# Patient Record
Sex: MALE | Race: WHITE | Employment: OTHER | URBAN - METROPOLITAN AREA
[De-identification: names, ages, dates, MRNs, and addresses within clinical notes are randomized per-mention and may not be internally consistent; named-entity substitution may affect disease eponyms.]

---

## 2017-03-07 PROBLEM — K92.2 UPPER GI BLEED: Status: ACTIVE | Noted: 2017-03-07

## 2018-06-25 PROBLEM — I21.4 NSTEMI (NON-ST ELEVATED MYOCARDIAL INFARCTION) (HCC): Status: ACTIVE | Noted: 2018-06-25

## 2018-07-23 ENCOUNTER — TELEPHONE (OUTPATIENT)
Dept: CARDIOLOGY CLINIC | Age: 73
End: 2018-07-23

## 2018-07-23 ENCOUNTER — OFFICE VISIT (OUTPATIENT)
Dept: CARDIOLOGY CLINIC | Age: 73
End: 2018-07-23

## 2018-07-23 VITALS
BODY MASS INDEX: 37.74 KG/M2 | SYSTOLIC BLOOD PRESSURE: 160 MMHG | HEART RATE: 68 BPM | WEIGHT: 249 LBS | DIASTOLIC BLOOD PRESSURE: 90 MMHG | HEIGHT: 68 IN

## 2018-07-23 DIAGNOSIS — I25.10 CORONARY ARTERY DISEASE INVOLVING NATIVE CORONARY ARTERY OF NATIVE HEART WITHOUT ANGINA PECTORIS: Primary | ICD-10-CM

## 2018-07-23 PROCEDURE — 99214 OFFICE O/P EST MOD 30 MIN: CPT | Performed by: INTERNAL MEDICINE

## 2018-07-23 NOTE — TELEPHONE ENCOUNTER
Jadyn Joya at Dr. Scott Kim requested records to be faxed. EKG, Last OV note, cath and echo sent to [phone].

## 2018-07-23 NOTE — PROGRESS NOTES
([facility] [address] [facility] mellitus ([facility] Gout; Hyperlipidemia; Hypertension; and Lumbago. and presents with     Plan:  1. CAD: Continue aspirin and plavix for atleast one yr, continue statins, calcium channel betablockers, case discussed with Dr. Shruthi Davis, patient is referred to [facility] for angioplasty as he was turned down by CT sx  2. DM: stable continue metformin  3. Paroxysmal afib: stable, continue eliquis and calcium channel blocker with BB  4. Dyslipidemia: continue statins, CHECK LIPIDS  5. HTN: stable, continue lopressor and cardizezm medicatons  6. Health maintenance: exerise and diet  All labs, medications and tests reviewed, continue all other medications of all above medical condition listed as is.

## 2018-09-25 ENCOUNTER — HOSPITAL ENCOUNTER (INPATIENT)
Age: 73
LOS: 3 days | Discharge: NURSING, REHAB OR LONG-TERM CARE | DRG: 379 | End: 2018-10-01
Attending: EMERGENCY MEDICINE | Admitting: INTERNAL MEDICINE
Payer: COMMERCIAL

## 2018-09-25 ENCOUNTER — APPOINTMENT (OUTPATIENT)
Dept: GENERAL RADIOLOGY | Age: 73
DRG: 379 | End: 2018-09-25
Payer: COMMERCIAL

## 2018-09-25 DIAGNOSIS — R60.0 BILATERAL LOWER EXTREMITY EDEMA: ICD-10-CM

## 2018-09-25 DIAGNOSIS — K62.5 RECTAL BLEEDING: Primary | ICD-10-CM

## 2018-09-25 LAB
ALBUMIN SERPL-MCNC: 3.6 GM/DL (ref 3.4–5)
ALP BLD-CCNC: 92 IU/L (ref 40–129)
ALT SERPL-CCNC: 7 U/L (ref 10–40)
ANION GAP SERPL CALCULATED.3IONS-SCNC: 13 MMOL/L (ref 4–16)
AST SERPL-CCNC: 10 IU/L (ref 15–37)
BASOPHILS ABSOLUTE: 0.1 K/CU MM
BASOPHILS RELATIVE PERCENT: 0.7 % (ref 0–1)
BILIRUB SERPL-MCNC: 0.4 MG/DL (ref 0–1)
BUN BLDV-MCNC: 13 MG/DL (ref 6–23)
CALCIUM SERPL-MCNC: 9.4 MG/DL (ref 8.3–10.6)
CHLORIDE BLD-SCNC: 101 MMOL/L (ref 99–110)
CO2: 25 MMOL/L (ref 21–32)
CREAT SERPL-MCNC: 0.6 MG/DL (ref 0.9–1.3)
DIFFERENTIAL TYPE: ABNORMAL
EOSINOPHILS ABSOLUTE: 0.2 K/CU MM
EOSINOPHILS RELATIVE PERCENT: 2.5 % (ref 0–3)
GFR AFRICAN AMERICAN: >60 ML/MIN/1.73M2
GFR NON-AFRICAN AMERICAN: >60 ML/MIN/1.73M2
GLUCOSE BLD-MCNC: 113 MG/DL (ref 70–99)
GLUCOSE BLD-MCNC: 126 MG/DL (ref 70–99)
HCT VFR BLD CALC: 30.6 % (ref 42–52)
HEMOGLOBIN: 9.2 GM/DL (ref 13.5–18)
IMMATURE NEUTROPHIL %: 0.5 % (ref 0–0.43)
INR BLD: 1.55 INDEX
LYMPHOCYTES ABSOLUTE: 1.3 K/CU MM
LYMPHOCYTES RELATIVE PERCENT: 16.3 % (ref 24–44)
MCH RBC QN AUTO: 25.8 PG (ref 27–31)
MCHC RBC AUTO-ENTMCNC: 30.1 % (ref 32–36)
MCV RBC AUTO: 86 FL (ref 78–100)
MONOCYTES ABSOLUTE: 0.7 K/CU MM
MONOCYTES RELATIVE PERCENT: 8.9 % (ref 0–4)
NUCLEATED RBC %: 0 %
PDW BLD-RTO: 16.8 % (ref 11.7–14.9)
PLATELET # BLD: 476 K/CU MM (ref 140–440)
PMV BLD AUTO: 8.5 FL (ref 7.5–11.1)
POTASSIUM SERPL-SCNC: 3.7 MMOL/L (ref 3.5–5.1)
PRO-BNP: 352 PG/ML
PROTHROMBIN TIME: 17.6 SECONDS (ref 9.12–12.5)
RBC # BLD: 3.56 M/CU MM (ref 4.6–6.2)
SEGMENTED NEUTROPHILS ABSOLUTE COUNT: 5.8 K/CU MM
SEGMENTED NEUTROPHILS RELATIVE PERCENT: 71.1 % (ref 36–66)
SODIUM BLD-SCNC: 139 MMOL/L (ref 135–145)
TOTAL IMMATURE NEUTOROPHIL: 0.04 K/CU MM
TOTAL NUCLEATED RBC: 0 K/CU MM
TOTAL PROTEIN: 7.3 GM/DL (ref 6.4–8.2)
TROPONIN T: <0.01 NG/ML
WBC # BLD: 8.1 K/CU MM (ref 4–10.5)

## 2018-09-25 PROCEDURE — 84484 ASSAY OF TROPONIN QUANT: CPT

## 2018-09-25 PROCEDURE — 99285 EMERGENCY DEPT VISIT HI MDM: CPT

## 2018-09-25 PROCEDURE — 93005 ELECTROCARDIOGRAM TRACING: CPT | Performed by: PHYSICIAN ASSISTANT

## 2018-09-25 PROCEDURE — 6370000000 HC RX 637 (ALT 250 FOR IP): Performed by: INTERNAL MEDICINE

## 2018-09-25 PROCEDURE — 80053 COMPREHEN METABOLIC PANEL: CPT

## 2018-09-25 PROCEDURE — 85025 COMPLETE CBC W/AUTO DIFF WBC: CPT

## 2018-09-25 PROCEDURE — 86901 BLOOD TYPING SEROLOGIC RH(D): CPT

## 2018-09-25 PROCEDURE — G0378 HOSPITAL OBSERVATION PER HR: HCPCS

## 2018-09-25 PROCEDURE — 86850 RBC ANTIBODY SCREEN: CPT

## 2018-09-25 PROCEDURE — 71045 X-RAY EXAM CHEST 1 VIEW: CPT

## 2018-09-25 PROCEDURE — 86900 BLOOD TYPING SEROLOGIC ABO: CPT

## 2018-09-25 PROCEDURE — 82962 GLUCOSE BLOOD TEST: CPT

## 2018-09-25 PROCEDURE — 83880 ASSAY OF NATRIURETIC PEPTIDE: CPT

## 2018-09-25 PROCEDURE — 85610 PROTHROMBIN TIME: CPT

## 2018-09-25 RX ORDER — FAMOTIDINE 40 MG/1
40 TABLET, FILM COATED ORAL NIGHTLY
Refills: 4 | Status: ON HOLD | COMMUNITY
Start: 2018-09-05 | End: 2020-01-22 | Stop reason: HOSPADM

## 2018-09-25 RX ORDER — POLYETHYLENE GLYCOL 3350 17 G/17G
17 POWDER, FOR SOLUTION ORAL DAILY PRN
Refills: 4 | COMMUNITY
Start: 2018-09-05 | End: 2020-01-17

## 2018-09-25 RX ORDER — NICOTINE POLACRILEX 4 MG
15 LOZENGE BUCCAL PRN
Status: DISCONTINUED | OUTPATIENT
Start: 2018-09-25 | End: 2018-10-01 | Stop reason: HOSPADM

## 2018-09-25 RX ORDER — PRAVASTATIN SODIUM 10 MG
20 TABLET ORAL NIGHTLY
Status: DISCONTINUED | OUTPATIENT
Start: 2018-09-25 | End: 2018-10-01 | Stop reason: HOSPADM

## 2018-09-25 RX ORDER — POLYETHYLENE GLYCOL 3350 17 G/17G
17 POWDER, FOR SOLUTION ORAL ONCE
Status: COMPLETED | OUTPATIENT
Start: 2018-09-25 | End: 2018-09-25

## 2018-09-25 RX ORDER — ACETAMINOPHEN 325 MG/1
650 TABLET ORAL EVERY 4 HOURS PRN
Status: DISCONTINUED | OUTPATIENT
Start: 2018-09-25 | End: 2018-10-01 | Stop reason: HOSPADM

## 2018-09-25 RX ORDER — SODIUM CHLORIDE 0.9 % (FLUSH) 0.9 %
10 SYRINGE (ML) INJECTION EVERY 12 HOURS SCHEDULED
Status: DISCONTINUED | OUTPATIENT
Start: 2018-09-25 | End: 2018-09-30

## 2018-09-25 RX ORDER — TRAMADOL HYDROCHLORIDE 50 MG/1
50 TABLET ORAL EVERY 6 HOURS PRN
Status: DISCONTINUED | OUTPATIENT
Start: 2018-09-25 | End: 2018-10-01 | Stop reason: HOSPADM

## 2018-09-25 RX ORDER — ALLOPURINOL 300 MG/1
300 TABLET ORAL DAILY
Status: DISCONTINUED | OUTPATIENT
Start: 2018-09-25 | End: 2018-10-01 | Stop reason: HOSPADM

## 2018-09-25 RX ORDER — SODIUM CHLORIDE 0.9 % (FLUSH) 0.9 %
10 SYRINGE (ML) INJECTION PRN
Status: DISCONTINUED | OUTPATIENT
Start: 2018-09-25 | End: 2018-10-01 | Stop reason: HOSPADM

## 2018-09-25 RX ORDER — ONDANSETRON 2 MG/ML
4 INJECTION INTRAMUSCULAR; INTRAVENOUS EVERY 6 HOURS PRN
Status: DISCONTINUED | OUTPATIENT
Start: 2018-09-25 | End: 2018-10-01 | Stop reason: HOSPADM

## 2018-09-25 RX ORDER — DILTIAZEM HYDROCHLORIDE 360 MG/1
360 CAPSULE, EXTENDED RELEASE ORAL DAILY
Status: DISCONTINUED | OUTPATIENT
Start: 2018-09-25 | End: 2018-09-25 | Stop reason: CLARIF

## 2018-09-25 RX ORDER — FAMOTIDINE 20 MG/1
40 TABLET, FILM COATED ORAL NIGHTLY
Status: DISCONTINUED | OUTPATIENT
Start: 2018-09-25 | End: 2018-10-01 | Stop reason: HOSPADM

## 2018-09-25 RX ORDER — DEXTROSE MONOHYDRATE 50 MG/ML
100 INJECTION, SOLUTION INTRAVENOUS PRN
Status: DISCONTINUED | OUTPATIENT
Start: 2018-09-25 | End: 2018-10-01 | Stop reason: HOSPADM

## 2018-09-25 RX ORDER — CLOPIDOGREL BISULFATE 75 MG/1
75 TABLET ORAL DAILY
Status: CANCELLED | OUTPATIENT
Start: 2018-09-25

## 2018-09-25 RX ORDER — DILTIAZEM HYDROCHLORIDE 180 MG/1
360 CAPSULE, COATED, EXTENDED RELEASE ORAL DAILY
Status: DISCONTINUED | OUTPATIENT
Start: 2018-09-25 | End: 2018-10-01 | Stop reason: HOSPADM

## 2018-09-25 RX ORDER — ASPIRIN 81 MG/1
81 TABLET, CHEWABLE ORAL DAILY
Status: DISCONTINUED | OUTPATIENT
Start: 2018-09-25 | End: 2018-10-01 | Stop reason: HOSPADM

## 2018-09-25 RX ORDER — NITROGLYCERIN 0.4 MG/1
0.4 TABLET SUBLINGUAL EVERY 5 MIN PRN
Status: DISCONTINUED | OUTPATIENT
Start: 2018-09-25 | End: 2018-10-01 | Stop reason: HOSPADM

## 2018-09-25 RX ORDER — DEXTROSE MONOHYDRATE 25 G/50ML
12.5 INJECTION, SOLUTION INTRAVENOUS PRN
Status: DISCONTINUED | OUTPATIENT
Start: 2018-09-25 | End: 2018-10-01 | Stop reason: HOSPADM

## 2018-09-25 RX ADMIN — ALLOPURINOL 300 MG: 300 TABLET ORAL at 18:11

## 2018-09-25 RX ADMIN — METOPROLOL TARTRATE 25 MG: 25 TABLET ORAL at 20:18

## 2018-09-25 RX ADMIN — DILTIAZEM HYDROCHLORIDE 360 MG: 180 CAPSULE, COATED, EXTENDED RELEASE ORAL at 18:11

## 2018-09-25 RX ADMIN — PRAVASTATIN SODIUM 20 MG: 10 TABLET ORAL at 20:18

## 2018-09-25 RX ADMIN — POLYETHYLENE GLYCOL 3350 17 G: 17 POWDER, FOR SOLUTION ORAL at 18:11

## 2018-09-25 RX ADMIN — TRAMADOL HYDROCHLORIDE 50 MG: 50 TABLET, FILM COATED ORAL at 20:18

## 2018-09-25 RX ADMIN — FAMOTIDINE 40 MG: 20 TABLET ORAL at 20:18

## 2018-09-25 ASSESSMENT — PAIN DESCRIPTION - LOCATION
LOCATION: HIP
LOCATION: GENERALIZED

## 2018-09-25 ASSESSMENT — PAIN DESCRIPTION - PAIN TYPE: TYPE: CHRONIC PAIN

## 2018-09-25 ASSESSMENT — PAIN SCALES - GENERAL
PAINLEVEL_OUTOF10: 7

## 2018-09-25 NOTE — ED PROVIDER NOTES
eMERGENCY dEPARTMENT eNCOUnter      PCP: Malathi Gutierrez MD    CHIEF COMPLAINT    Chief Complaint   Patient presents with    Rectal Bleeding    Leg Swelling       HPI    Mohinder Davies is a 68 y.o. male who presents with Bilateral lower extremity swelling. Onset greater than one year. Context is patient notes persistent swelling and redness to bilateral lower extremities greater than one year. He states he comes in today because he is \"tired of it\". No new nature or severity recently. No recent trauma. No chest pain or shortness of breath. Patient also complains of bright red blood per rectum. Onset 2-3 weeks. Context is patient states he had a cardiac catheterization done 4 weeks ago and was discharged home at that time with blood thinner. He states he notes bright red blood with stool movements and in his adult diaper since this time. Otherwise no spontaneous bruises, bleeding from any other orifice. No lightheadedness, dizziness. No headache or vision changes. REVIEW OF SYSTEMS    Constitutional:  Denies fever, chills, weight loss or weakness   HENT:  Denies sore throat or ear pain   Cardiovascular:  Denies chest pain, palpitations   Respiratory:  Denies cough or shortness of breath    GI:  See HPI. Denies abdominal pain, nausea, vomiting, or diarrhea  :  Denies any urinary symptoms   Musculoskeletal:  PI.  Otherwise no rash. Denies back pain  Skin:  See HPI. Otherwise no rash. Neurologic:  Denies headache, focal weakness or sensory changes   Endocrine:  Denies polyuria or polydypsia   Lymphatic:  Denies swollen glands     All other review of systems are negative  See HPI and nursing notes for additional information     PAST MEDICAL AND SURGICAL HISTORY    Past Medical History:   Diagnosis Date    Arthropathy     Atrial fibrillation ([address]     Atrial flutter ([address]     Diabetes mellitus ([facility] [facility]     Gout     Hyperlipidemia     Hypertension     Lumbago      History reviewed. Inferior leads               EKG Interpretation  Please see ED physician's note for EKG interpretation      RADIOLOGY    Xr Chest Portable    Result Date: 9/25/2018  EXAMINATION: SINGLE XRAY VIEW OF THE CHEST 9/25/2018 1:58 pm COMPARISON: 06/25/2018 HISTORY: ORDERING SYSTEM PROVIDED HISTORY: leg swelling TECHNOLOGIST PROVIDED HISTORY: Reason for exam:->leg swelling Ordering Physician Provided Reason for Exam: bloody stool Acuity: Acute Type of Exam: Initial FINDINGS: Patchy atelectasis or consolidation at the right lung base. No pneumothorax. No pleural effusion. No other lung consolidation. Heart size is stable. Pulmonary vascular redistribution. Patchy atelectasis or consolidation at the right lung base. Pulmonary vascular redistribution. ______________________________________________________________________    Procedures:  Rectal Exam   Female supervising physician present. Rectal exam was performed by me:  - External inspection reveals full non-thrombosed external hemorrhoids. No masses, signs of abscess, fissures. - rectal exam is negative for masses or tenderness. Sphincter tone intact. No gross blood. Slightly enlarged prostate. No prostate nodules   Bedside Guiac testing done and results were positive. .   ( was positive ).    ______________________________________________________________________        ED COURSE & MEDICAL DECISION MAKING       History exam is consistent with rectal bleeding. Patient Hemoccult positive today. Patient otherwise hemodynamically stable. Patient does have chronic bilateral lower extremity edema. He lives alone at home and is wheelchair-bound. I feel he warrants admission to hospital for further workup of rectal bleeding, GI consult, especially in the setting that is currently taking Elavil at this. 1435 - I discussed patient case with hospitalist, Dr. Tai Stovall. He agrees to admit patient.   He recommends that I notify GI for

## 2018-09-25 NOTE — ED TRIAGE NOTES
Patient presented to the ED with complaints of rectal bleeding. Patient states his bleeding has been present for the past year but has increased tremendously over the past 2 days. Patient states he also has increase bilateral leg swelling, redness, and weeping.

## 2018-09-26 LAB
ANION GAP SERPL CALCULATED.3IONS-SCNC: 12 MMOL/L (ref 4–16)
BASOPHILS ABSOLUTE: 0.1 K/CU MM
BASOPHILS RELATIVE PERCENT: 0.5 % (ref 0–1)
BUN BLDV-MCNC: 9 MG/DL (ref 6–23)
CALCIUM SERPL-MCNC: 9.2 MG/DL (ref 8.3–10.6)
CHLORIDE BLD-SCNC: 101 MMOL/L (ref 99–110)
CO2: 27 MMOL/L (ref 21–32)
CREAT SERPL-MCNC: 0.6 MG/DL (ref 0.9–1.3)
DIFFERENTIAL TYPE: ABNORMAL
EOSINOPHILS ABSOLUTE: 0.2 K/CU MM
EOSINOPHILS RELATIVE PERCENT: 2 % (ref 0–3)
ESTIMATED AVERAGE GLUCOSE: 128 MG/DL
GFR AFRICAN AMERICAN: >60 ML/MIN/1.73M2
GFR NON-AFRICAN AMERICAN: >60 ML/MIN/1.73M2
GLUCOSE BLD-MCNC: 108 MG/DL (ref 70–99)
GLUCOSE BLD-MCNC: 119 MG/DL (ref 70–99)
GLUCOSE BLD-MCNC: 131 MG/DL (ref 70–99)
HBA1C MFR BLD: 6.1 % (ref 4.2–6.3)
HCT VFR BLD CALC: 28.7 % (ref 42–52)
HEMOGLOBIN: 8.6 GM/DL (ref 13.5–18)
IMMATURE NEUTROPHIL %: 0.5 % (ref 0–0.43)
LYMPHOCYTES ABSOLUTE: 1.3 K/CU MM
LYMPHOCYTES RELATIVE PERCENT: 14.2 % (ref 24–44)
MAGNESIUM: 1.6 MG/DL (ref 1.8–2.4)
MCH RBC QN AUTO: 25.4 PG (ref 27–31)
MCHC RBC AUTO-ENTMCNC: 30 % (ref 32–36)
MCV RBC AUTO: 84.9 FL (ref 78–100)
MONOCYTES ABSOLUTE: 0.9 K/CU MM
MONOCYTES RELATIVE PERCENT: 9.4 % (ref 0–4)
NUCLEATED RBC %: 0 %
PDW BLD-RTO: 16.5 % (ref 11.7–14.9)
PLATELET # BLD: 454 K/CU MM (ref 140–440)
PMV BLD AUTO: 8.7 FL (ref 7.5–11.1)
POTASSIUM SERPL-SCNC: 3.5 MMOL/L (ref 3.5–5.1)
RBC # BLD: 3.38 M/CU MM (ref 4.6–6.2)
SEGMENTED NEUTROPHILS ABSOLUTE COUNT: 6.9 K/CU MM
SEGMENTED NEUTROPHILS RELATIVE PERCENT: 73.4 % (ref 36–66)
SODIUM BLD-SCNC: 140 MMOL/L (ref 135–145)
TOTAL IMMATURE NEUTOROPHIL: 0.05 K/CU MM
TOTAL NUCLEATED RBC: 0 K/CU MM
WBC # BLD: 9.4 K/CU MM (ref 4–10.5)

## 2018-09-26 PROCEDURE — 2580000003 HC RX 258: Performed by: INTERNAL MEDICINE

## 2018-09-26 PROCEDURE — 97116 GAIT TRAINING THERAPY: CPT

## 2018-09-26 PROCEDURE — 97167 OT EVAL HIGH COMPLEX 60 MIN: CPT

## 2018-09-26 PROCEDURE — 97535 SELF CARE MNGMENT TRAINING: CPT

## 2018-09-26 PROCEDURE — 6370000000 HC RX 637 (ALT 250 FOR IP): Performed by: INTERNAL MEDICINE

## 2018-09-26 PROCEDURE — 93010 ELECTROCARDIOGRAM REPORT: CPT | Performed by: INTERNAL MEDICINE

## 2018-09-26 PROCEDURE — 97530 THERAPEUTIC ACTIVITIES: CPT

## 2018-09-26 PROCEDURE — 85025 COMPLETE CBC W/AUTO DIFF WBC: CPT

## 2018-09-26 PROCEDURE — G8987 SELF CARE CURRENT STATUS: HCPCS

## 2018-09-26 PROCEDURE — 97163 PT EVAL HIGH COMPLEX 45 MIN: CPT

## 2018-09-26 PROCEDURE — G8978 MOBILITY CURRENT STATUS: HCPCS

## 2018-09-26 PROCEDURE — G0378 HOSPITAL OBSERVATION PER HR: HCPCS

## 2018-09-26 PROCEDURE — 83036 HEMOGLOBIN GLYCOSYLATED A1C: CPT

## 2018-09-26 PROCEDURE — G8979 MOBILITY GOAL STATUS: HCPCS

## 2018-09-26 PROCEDURE — 94761 N-INVAS EAR/PLS OXIMETRY MLT: CPT

## 2018-09-26 PROCEDURE — 83735 ASSAY OF MAGNESIUM: CPT

## 2018-09-26 PROCEDURE — 82962 GLUCOSE BLOOD TEST: CPT

## 2018-09-26 PROCEDURE — 36415 COLL VENOUS BLD VENIPUNCTURE: CPT

## 2018-09-26 PROCEDURE — G8988 SELF CARE GOAL STATUS: HCPCS

## 2018-09-26 PROCEDURE — 80048 BASIC METABOLIC PNL TOTAL CA: CPT

## 2018-09-26 RX ORDER — TAMSULOSIN HYDROCHLORIDE 0.4 MG/1
0.4 CAPSULE ORAL DAILY
Status: DISCONTINUED | OUTPATIENT
Start: 2018-09-26 | End: 2018-10-01 | Stop reason: HOSPADM

## 2018-09-26 RX ADMIN — BISACODYL 10 MG: 5 TABLET, COATED ORAL at 20:27

## 2018-09-26 RX ADMIN — SODIUM CHLORIDE, PRESERVATIVE FREE 10 ML: 5 INJECTION INTRAVENOUS at 08:41

## 2018-09-26 RX ADMIN — ASPIRIN 81 MG CHEWABLE TABLET 81 MG: 81 TABLET CHEWABLE at 08:40

## 2018-09-26 RX ADMIN — METOPROLOL TARTRATE 25 MG: 25 TABLET ORAL at 08:40

## 2018-09-26 RX ADMIN — POLYETHYLENE GLYCOL-3350 AND ELECTROLYTES 4000 ML: 236; 6.74; 5.86; 2.97; 22.74 POWDER, FOR SOLUTION ORAL at 19:44

## 2018-09-26 RX ADMIN — DILTIAZEM HYDROCHLORIDE 360 MG: 180 CAPSULE, COATED, EXTENDED RELEASE ORAL at 08:41

## 2018-09-26 RX ADMIN — SODIUM CHLORIDE, PRESERVATIVE FREE 10 ML: 5 INJECTION INTRAVENOUS at 20:27

## 2018-09-26 RX ADMIN — PRAVASTATIN SODIUM 20 MG: 10 TABLET ORAL at 20:27

## 2018-09-26 RX ADMIN — FAMOTIDINE 40 MG: 20 TABLET ORAL at 20:26

## 2018-09-26 RX ADMIN — METOPROLOL TARTRATE 25 MG: 25 TABLET ORAL at 20:26

## 2018-09-26 RX ADMIN — TAMSULOSIN HYDROCHLORIDE 0.4 MG: 0.4 CAPSULE ORAL at 16:45

## 2018-09-26 RX ADMIN — ALLOPURINOL 300 MG: 300 TABLET ORAL at 08:40

## 2018-09-26 ASSESSMENT — PAIN SCALES - GENERAL
PAINLEVEL_OUTOF10: 0

## 2018-09-26 ASSESSMENT — PAIN DESCRIPTION - PAIN TYPE: TYPE: CHRONIC PAIN

## 2018-09-26 ASSESSMENT — PAIN SCALES - WONG BAKER: WONGBAKER_NUMERICALRESPONSE: 2

## 2018-09-26 ASSESSMENT — PAIN DESCRIPTION - ORIENTATION: ORIENTATION: RIGHT

## 2018-09-26 ASSESSMENT — PAIN DESCRIPTION - LOCATION: LOCATION: HIP

## 2018-09-26 NOTE — PROGRESS NOTES
Within Normal Limits  Observation/Palpation  Posture: Fair (pt forward flexion in stand to hold onto guard rails of BSC to assist in balance for toileting urinating into hand urinal)  Observation: BLE edema, redness and open sores   Balance  Sitting Balance: Supervision  Standing Balance: Contact guard assistance  Standing Balance  Time: ~2 minutes  Activity: in stand to urinate into hand urinal holding onto BSC  Sit to stand: Contact guard assistance  Stand to sit: Contact guard assistance  ADL  Feeding: Independent  Grooming: Stand by assistance  UE Bathing: Stand by assistance  LE Bathing:  Moderate assistance  UE Dressing: Setup  LE Dressing: Maximum assistance  Toileting: Maximum assistance (pt in stand to urinate forward flexion holding onto [facility] rails in front of reclining chair w/ maxA for placement of hand urinal )  Additional Comments: Some ADL determined per observation of actual ADL performance, functional mobility, balance, activity tolerance and cognition  Tone RUE  RUE Tone: Normotonic  Tone LUE  LUE Tone: Normotonic  Coordination  Movements Are Fluid And Coordinated: Yes     Bed mobility  Comment: pt sitting upright at beginning and end of session  Transfers  Sit to stand: Contact guard assistance  Stand to sit: Contact guard assistance     Cognition  Overall Cognitive Status: WFL  Perception  Overall Perceptual Status: WFL     Sensation  Overall Sensation Status: Impaired  Light Touch: Partial deficits in the RUE;Partial deficits in the LUE (numbness at times in fingers comes and goes)        LUE AROM (degrees)  LUE AROM : WFL  LUE General AROM: pt reports past 15 degrees of shoulder flexion increased pain however able to lift arms WFL, distal WFL  Left Hand AROM (degrees)  Left Hand AROM: WFL  RUE AROM (degrees)  RUE AROM : WFL  RUE General AROM: pt reports past 15 degrees of shoulder flexion increased pain however able to lift arms WFL, distal WFL  Right Hand AROM (degrees)  Right Hand AROM: WFL  LUE Strength  Gross LUE Strength: WFL  L Hand Grasp: 5/5  RUE Strength  Gross RUE Strength: WFL  R Hand Grasp: 5/5           Self Care Training:   Cues were given for safety, sequence, UE/LE placement, visual cues, and balance. Activities performed today included toileting using hand urinal    Therapeutic Activity Training:   Therapeutic activity training was instructed today. Cues were given for safety, sequence, UE/LE placement, awareness, and balance. Activities performed today included sit to stand, VCs to increase standing posture, stand to sit             Assessment   Performance deficits / Impairments: Decreased functional mobility ; Decreased safe awareness;Decreased balance;Decreased ADL status; Decreased endurance;Decreased high-level IADLs;Decreased fine motor control  Treatment Diagnosis: Rectal Bleeding  Prognosis: Fair  Decision Making: High Complexity  Assistance / Modification: Pt is a 67 yo male admitted from home for rectal bleeding. Pt at baseline is I in functional transfers and mobility (2/3 steps) w/ AD. Pt requires assistance for LE ADLs bathing/dressing and does not perform high level IADLs. ADL/high level IADL assistance provided by home health care 4x a week for 4 hours a day. Pt currently presents w/ above deficits and requires increased assistance for all ADLs, functional mobility and functional transfers. Pt would benefit from continued acute care OT services w/ discharge to SNF. Patient Education: ot role, discharge rec  Barriers to Learning: none  REQUIRES OT FOLLOW UP: Yes  Activity Tolerance  Activity Tolerance: Treatment limited secondary to medical complications (free text)  Safety Devices  Safety Devices in place: Yes  Type of devices: All fall risk precautions in place;Nurse notified;Gait belt;Patient at risk for falls; Left in chair;Call light within reach; Chair alarm in place  Restraints  Initially in place: No         Plan   Plan  Times per week: 2x+  Times per day:

## 2018-09-26 NOTE — PROGRESS NOTES
Two gi doctors listed on care team.  Sue serve to Itze Chetan Colon [address] first to see if he is the consulting  Due to his name being listed last.

## 2018-09-27 ENCOUNTER — ANESTHESIA EVENT (OUTPATIENT)
Dept: ENDOSCOPY | Age: 73
DRG: 379 | End: 2018-09-27
Payer: COMMERCIAL

## 2018-09-27 ENCOUNTER — ANESTHESIA (OUTPATIENT)
Dept: ENDOSCOPY | Age: 73
DRG: 379 | End: 2018-09-27
Payer: COMMERCIAL

## 2018-09-27 VITALS — SYSTOLIC BLOOD PRESSURE: 136 MMHG | OXYGEN SATURATION: 94 % | DIASTOLIC BLOOD PRESSURE: 67 MMHG

## 2018-09-27 LAB
GLUCOSE BLD-MCNC: 119 MG/DL (ref 70–99)
GLUCOSE BLD-MCNC: 122 MG/DL (ref 70–99)

## 2018-09-27 PROCEDURE — 6360000002 HC RX W HCPCS: Performed by: NURSE ANESTHETIST, CERTIFIED REGISTERED

## 2018-09-27 PROCEDURE — 3700000000 HC ANESTHESIA ATTENDED CARE: Performed by: INTERNAL MEDICINE

## 2018-09-27 PROCEDURE — 88305 TISSUE EXAM BY PATHOLOGIST: CPT

## 2018-09-27 PROCEDURE — 3700000001 HC ADD 15 MINUTES (ANESTHESIA): Performed by: INTERNAL MEDICINE

## 2018-09-27 PROCEDURE — 6370000000 HC RX 637 (ALT 250 FOR IP): Performed by: INTERNAL MEDICINE

## 2018-09-27 PROCEDURE — G0378 HOSPITAL OBSERVATION PER HR: HCPCS

## 2018-09-27 PROCEDURE — 2709999900 HC NON-CHARGEABLE SUPPLY: Performed by: INTERNAL MEDICINE

## 2018-09-27 PROCEDURE — 2580000003 HC RX 258: Performed by: INTERNAL MEDICINE

## 2018-09-27 PROCEDURE — 3609010600 HC COLONOSCOPY POLYPECTOMY SNARE/COLD BIOPSY: Performed by: INTERNAL MEDICINE

## 2018-09-27 PROCEDURE — 82962 GLUCOSE BLOOD TEST: CPT

## 2018-09-27 PROCEDURE — 0DBP8ZX EXCISION OF RECTUM, VIA NATURAL OR ARTIFICIAL OPENING ENDOSCOPIC, DIAGNOSTIC: ICD-10-PCS | Performed by: INTERNAL MEDICINE

## 2018-09-27 RX ORDER — PROPOFOL 10 MG/ML
INJECTION, EMULSION INTRAVENOUS CONTINUOUS PRN
Status: DISCONTINUED | OUTPATIENT
Start: 2018-09-27 | End: 2018-09-27

## 2018-09-27 RX ORDER — PROPOFOL 10 MG/ML
INJECTION, EMULSION INTRAVENOUS PRN
Status: DISCONTINUED | OUTPATIENT
Start: 2018-09-27 | End: 2018-09-27 | Stop reason: SDUPTHER

## 2018-09-27 RX ADMIN — ASPIRIN 81 MG CHEWABLE TABLET 81 MG: 81 TABLET CHEWABLE at 13:29

## 2018-09-27 RX ADMIN — PROPOFOL 30 MG: 10 INJECTION, EMULSION INTRAVENOUS at 12:22

## 2018-09-27 RX ADMIN — DILTIAZEM HYDROCHLORIDE 360 MG: 180 CAPSULE, COATED, EXTENDED RELEASE ORAL at 09:22

## 2018-09-27 RX ADMIN — PROPOFOL 30 MG: 10 INJECTION, EMULSION INTRAVENOUS at 12:29

## 2018-09-27 RX ADMIN — METOPROLOL TARTRATE 25 MG: 25 TABLET ORAL at 09:22

## 2018-09-27 RX ADMIN — PROPOFOL 50 MG: 10 INJECTION, EMULSION INTRAVENOUS at 12:08

## 2018-09-27 RX ADMIN — TAMSULOSIN HYDROCHLORIDE 0.4 MG: 0.4 CAPSULE ORAL at 13:29

## 2018-09-27 RX ADMIN — PROPOFOL 50 MG: 10 INJECTION, EMULSION INTRAVENOUS at 12:13

## 2018-09-27 RX ADMIN — PROPOFOL 50 MG: 10 INJECTION, EMULSION INTRAVENOUS at 12:10

## 2018-09-27 RX ADMIN — MAGESIUM CITRATE 296 ML: 1.75 LIQUID ORAL at 05:55

## 2018-09-27 RX ADMIN — METOPROLOL TARTRATE 25 MG: 25 TABLET ORAL at 21:33

## 2018-09-27 RX ADMIN — ALLOPURINOL 300 MG: 300 TABLET ORAL at 13:30

## 2018-09-27 RX ADMIN — FAMOTIDINE 40 MG: 20 TABLET ORAL at 21:33

## 2018-09-27 RX ADMIN — PROPOFOL 50 MG: 10 INJECTION, EMULSION INTRAVENOUS at 12:09

## 2018-09-27 RX ADMIN — PROPOFOL 30 MG: 10 INJECTION, EMULSION INTRAVENOUS at 12:16

## 2018-09-27 RX ADMIN — PROPOFOL 30 MG: 10 INJECTION, EMULSION INTRAVENOUS at 12:20

## 2018-09-27 RX ADMIN — PROPOFOL 50 MG: 10 INJECTION, EMULSION INTRAVENOUS at 12:27

## 2018-09-27 RX ADMIN — PROPOFOL 50 MG: 10 INJECTION, EMULSION INTRAVENOUS at 12:25

## 2018-09-27 RX ADMIN — SODIUM CHLORIDE, PRESERVATIVE FREE 10 ML: 5 INJECTION INTRAVENOUS at 21:35

## 2018-09-27 RX ADMIN — PRAVASTATIN SODIUM 20 MG: 10 TABLET ORAL at 21:33

## 2018-09-27 ASSESSMENT — PAIN SCALES - GENERAL: PAINLEVEL_OUTOF10: 0

## 2018-09-27 NOTE — CONSULTS
[address], [address] [facility]                                   CONSULTATION    PATIENT NAME: Thomas Domínguez                    :        1945  MED REC NO:   [id number]                          ROOM:       4102  ACCOUNT NO:   [de-identified]                           ADMIT DATE: 2018  PROVIDER:     Kareem Vides MD    CONSULT DATE:  2018    IDENTIFICATION:  The patient is a 67-year-old male. REFERRED BY:  Dr. Judith Whitman. REASON FOR CONSULTATION:  History of rectal bleeding. HISTORY OF PRESENT ILLNESS:  This is a 67-year-old male known to me came in  with a history of swelling of both feet. He has had chronic edema for a  year. The patient also has bad hips, has been unable to walk for the last  one year. The patient has had some bright red rectal bleeding off and on  for the last 3 or 4 weeks. No history of diarrhea or constipation. The  patient denies any history of weight loss or fever. PAST MEDICAL HISTORY:  The patient has a history of arthritis of the hip,  hypertension, hyperlipidemia, and atrial fibrillation. PAST SURGICAL HISTORY:  Unremarkable. The patient did not have an EGD  about two years ago which showed some gastritis. PHYSICAL EXAMINATION:  GENERAL:  Reveals he is moderately built and nourished, somewhat obese. HEENT:  Normal.  ABDOMEN:  Soft. There was no mass or tenderness. EXTREMITIES:  There is swelling of both the legs with possible some  cellulitis. LABORATORY DATA:  Electrolytes were normal.  BUN was 9, blood sugar was  108, hemoglobin was 8.6, and MCH 25.4. The patient had a CT of the chest  in  which was unremarkable except atherosclerotic changes in the aorta. IMPRESSION:  History of rectal bleeding, history of chronic edema of the  legs with possible cellulitis or venous stasis in the legs.     RECOMMENDATIONS:  We will proceed with a colonoscopy

## 2018-09-27 NOTE — ANESTHESIA PRE PROCEDURE
Department of Anesthesiology  Preprocedure Note       Name:  Farooq Banks   Age:  68 y.o.  :  1945                                          MRN:  [id number]         Date:  2018      Surgeon: Marisela Latif):  Garret John MD    Procedure: Procedure(s):  COLONOSCOPY    Medications prior to admission:   Prior to Admission medications    Medication Sig Start Date End Date Taking? Authorizing Provider   famotidine (PEPCID) 40 MG tablet Take 40 mg by mouth nightly  18  Yes Historical Provider, MD   polyethylene glycol (GLYCOLAX) powder Take 17 g by mouth daily as needed 18  Yes Historical Provider, MD   apixaban (ELIQUIS) 5 MG TABS tablet Take 1 tablet by mouth 2 times daily 18  Yes Khris Steve DO   metoprolol tartrate (LOPRESSOR) 25 MG tablet Take 1 tablet by mouth 2 times daily 18  Yes Khris Steve DO   clopidogrel (PLAVIX) 75 MG tablet Take 1 tablet by mouth daily 7/3/18  Yes Khris Steve DO   allopurinol (ZYLOPRIM) 300 MG tablet Take 300 mg by mouth daily   Yes Historical Provider, MD   diltiazem (TIAZAC) 360 MG extended release capsule Take 360 mg by mouth daily   Yes Historical Provider, MD   triamterene-hydrochlorothiazide (MAXZIDE-25) 37.5-25 MG per tablet Take 1 tablet by mouth daily   Yes Historical Provider, MD   metFORMIN (GLUCOPHAGE) 500 MG tablet Take 500 mg by mouth 2 times daily (with meals)   Yes Historical Provider, MD   pravastatin (PRAVACHOL) 20 MG tablet Take 20 mg by mouth nightly   Yes Historical Provider, MD   traMADol (ULTRAM) 50 MG tablet Take 50 mg by mouth every 6 hours as needed for Pain   Yes Historical Provider, MD   aspirin 81 MG chewable tablet Take 81 mg by mouth daily   Yes Historical Provider, MD   nitroGLYCERIN (NITROSTAT) 0.4 MG SL tablet up to max of 3 total doses.  If no relief after 1 dose, call 911. 18   Khris Steve DO       Current medications:    Current Facility-Administered Allergies: Allergies   Allergen Reactions    Demerol Hcl [Meperidine]     Gabapentin     Simvastatin        Problem List:    Patient Active Problem List   Diagnosis Code    Diabetes mellitus ([facility] E11.9    Atrial flutter (HCC) I48.92    Atrial fibrillation (HCC) I48.91    Gout M10.9    Hyperlipidemia E78.5    Hypertension I10    Arthropathy M12.9    Upper GI bleed K92.2    NSTEMI (non-ST elevated myocardial infarction) ([facility] I21.4    Rectal bleeding K62.5       Past Medical History:        Diagnosis Date    Arthropathy     Atrial fibrillation (HCC)     Atrial flutter (HCC)     Diabetes mellitus ([facility]     Gout     Hyperlipidemia     Hypertension     Lumbago        Past Surgical History:  History reviewed. No pertinent surgical history. Social History:    Social History   Substance Use Topics    Smoking status: Former Smoker     Packs/day: 0.50     Types: Cigarettes    Smokeless tobacco: Never Used    Alcohol use No                                Counseling given: Not Answered      Vital Signs (Current):   Vitals:    09/26/18 1057 09/26/18 1630 09/26/18 2015 09/27/18 0515   BP: (!) 156/80 (!) 179/81 (!) 158/72 (!) 146/65   Pulse: 66 67 67 67   Resp: 18 17 18 18   Temp: 97.8 °F (36.6 °C) 97.6 °F (36.4 °C) 98.1 °F (36.7 °C) 97.9 °F (36.6 °C)   TempSrc: Oral Oral Oral Oral   SpO2: 95% 96% 98% 93%   Weight:       Height:                                                  BP Readings from Last 3 Encounters:   09/27/18 (!) 146/65   07/23/18 (!) 160/90   07/02/18 127/73       NPO Status:                                                                                 BMI:   Wt Readings from Last 3 Encounters:   09/25/18 249 lb (112.9 kg)   07/23/18 249 lb (112.9 kg)   07/02/18 238 lb 8 oz (108.2 kg)     Body mass index is 37.86 kg/m².     CBC:   Lab Results   Component Value Date    WBC 9.4 09/26/2018    RBC 3.38 09/26/2018    HGB 8.6 09/26/2018    HCT 28.7 09/26/2018    MCV 84.9 09/26/2018 patient    Echo 6/2018:  Summary   Left ventricular systolic function is normal.   Ejection fraction is visually estimated at 50-55%.   Moderate concentric left ventricular hypertrophy.  N[place] dilated left atrium.   No evidence of any pericardial effusion.        Neuro/Psych:   Negative Neuro/Psych ROS              GI/Hepatic/Renal:   (+) bowel prep,          ROS comment: GI bleeding. Endo/Other:    (+) DiabetesType II DM, , blood dyscrasia: anticoagulation therapy:., .                  ROS comment: Gout Abdominal:   (+) obese,         Vascular: negative vascular ROS. Anesthesia Plan      general and TIVA     ASA 4       Induction: intravenous. Anesthetic plan and risks discussed with patient. Reg Taveras MD   9/27/2018    Pre Anesthesia Evaluation complete. Anesthesia plan, risks, benefits, alternatives, and personnel discussed with patient and/or legal guardian. Patient and/or legal guardian verbalized an understanding and agreed to proceed. Anesthesia plan discussed with care team members and agreed upon.   KARELY Mitchell - CRNA  9/27/2018

## 2018-09-27 NOTE — ANESTHESIA POSTPROCEDURE EVALUATION
Department of Anesthesiology  Postprocedure Note    Patient: Cory Carrington  MRN: [id number]  YOB: 1945  Date of evaluation: 9/27/2018  Time:  12:47 PM     Procedure Summary     Date:  09/27/18 Room / Location:  [address] [place] [address] 01 / [facility] ENDOSCOPY    Anesthesia Start:  7822 Anesthesia Stop:  5255    Procedure:  COLONOSCOPY POLYPECTOMY SNARE/COLD BIOPSY (N/A ) Diagnosis:  (--)    Surgeon:  Mabel Juarez MD Responsible Provider:  Mata Hernandez MD    Anesthesia Type:  general, TIVA ASA Status:  4          Anesthesia Type: general, TIVA    Francisca Phase I:  10    Francisca Phase II:  10    Last vitals: Reviewed and per EMR flowsheets.        Anesthesia Post Evaluation    Patient location during evaluation: bedside  Patient participation: complete - patient participated  Level of consciousness: awake and alert  Pain score: 0  Airway patency: patent  Nausea & Vomiting: no nausea and no vomiting  Complications: no  Cardiovascular status: blood pressure returned to baseline  Respiratory status: acceptable, nonlabored ventilation, spontaneous ventilation and room air  Hydration status: euvolemic

## 2018-09-27 NOTE — CARE COORDINATION
Met c pt to continue discharge planning. Pt is agreeable to , spoke with his sister and they chose [facility]. Permission given to share PHI, faxed info and notified of referral.  Pt will require precert/ therapy evals in chart.

## 2018-09-28 LAB — GLUCOSE BLD-MCNC: 176 MG/DL (ref 70–99)

## 2018-09-28 PROCEDURE — 1200000000 HC SEMI PRIVATE

## 2018-09-28 PROCEDURE — 6370000000 HC RX 637 (ALT 250 FOR IP): Performed by: HOSPITALIST

## 2018-09-28 PROCEDURE — 2580000003 HC RX 258: Performed by: INTERNAL MEDICINE

## 2018-09-28 PROCEDURE — 6370000000 HC RX 637 (ALT 250 FOR IP): Performed by: INTERNAL MEDICINE

## 2018-09-28 PROCEDURE — G0378 HOSPITAL OBSERVATION PER HR: HCPCS

## 2018-09-28 PROCEDURE — 82962 GLUCOSE BLOOD TEST: CPT

## 2018-09-28 RX ORDER — ZOLPIDEM TARTRATE 5 MG/1
5 TABLET ORAL ONCE
Status: COMPLETED | OUTPATIENT
Start: 2018-09-28 | End: 2018-09-28

## 2018-09-28 RX ADMIN — METOPROLOL TARTRATE 25 MG: 25 TABLET ORAL at 22:08

## 2018-09-28 RX ADMIN — TRAMADOL HYDROCHLORIDE 50 MG: 50 TABLET, FILM COATED ORAL at 18:56

## 2018-09-28 RX ADMIN — PRAVASTATIN SODIUM 20 MG: 10 TABLET ORAL at 22:08

## 2018-09-28 RX ADMIN — ZOLPIDEM TARTRATE 5 MG: 5 TABLET ORAL at 23:40

## 2018-09-28 RX ADMIN — TRAMADOL HYDROCHLORIDE 50 MG: 50 TABLET, FILM COATED ORAL at 01:40

## 2018-09-28 RX ADMIN — ALLOPURINOL 300 MG: 300 TABLET ORAL at 09:06

## 2018-09-28 RX ADMIN — INSULIN LISPRO 1 UNITS: 100 INJECTION, SOLUTION INTRAVENOUS; SUBCUTANEOUS at 22:16

## 2018-09-28 RX ADMIN — DILTIAZEM HYDROCHLORIDE 360 MG: 180 CAPSULE, COATED, EXTENDED RELEASE ORAL at 09:05

## 2018-09-28 RX ADMIN — SODIUM CHLORIDE, PRESERVATIVE FREE 10 ML: 5 INJECTION INTRAVENOUS at 09:07

## 2018-09-28 RX ADMIN — TAMSULOSIN HYDROCHLORIDE 0.4 MG: 0.4 CAPSULE ORAL at 09:06

## 2018-09-28 RX ADMIN — ASPIRIN 81 MG CHEWABLE TABLET 81 MG: 81 TABLET CHEWABLE at 09:05

## 2018-09-28 RX ADMIN — METOPROLOL TARTRATE 25 MG: 25 TABLET ORAL at 09:06

## 2018-09-28 RX ADMIN — FAMOTIDINE 40 MG: 20 TABLET ORAL at 22:08

## 2018-09-28 ASSESSMENT — PAIN SCALES - GENERAL
PAINLEVEL_OUTOF10: 4
PAINLEVEL_OUTOF10: 6
PAINLEVEL_OUTOF10: 0
PAINLEVEL_OUTOF10: 2

## 2018-09-28 ASSESSMENT — PAIN SCALES - WONG BAKER: WONGBAKER_NUMERICALRESPONSE: 2

## 2018-09-29 LAB
GLUCOSE BLD-MCNC: 112 MG/DL (ref 70–99)
GLUCOSE BLD-MCNC: 128 MG/DL (ref 70–99)
GLUCOSE BLD-MCNC: 130 MG/DL (ref 70–99)
GLUCOSE BLD-MCNC: 133 MG/DL (ref 70–99)
GLUCOSE BLD-MCNC: 134 MG/DL (ref 70–99)
HCT VFR BLD CALC: 29.2 % (ref 42–52)
HEMOGLOBIN: 8.7 GM/DL (ref 13.5–18)
MCH RBC QN AUTO: 25.5 PG (ref 27–31)
MCHC RBC AUTO-ENTMCNC: 29.8 % (ref 32–36)
MCV RBC AUTO: 85.6 FL (ref 78–100)
PDW BLD-RTO: 16.9 % (ref 11.7–14.9)
PLATELET # BLD: 464 K/CU MM (ref 140–440)
PMV BLD AUTO: 8.4 FL (ref 7.5–11.1)
RBC # BLD: 3.41 M/CU MM (ref 4.6–6.2)
WBC # BLD: 9 K/CU MM (ref 4–10.5)

## 2018-09-29 PROCEDURE — 82962 GLUCOSE BLOOD TEST: CPT

## 2018-09-29 PROCEDURE — 85027 COMPLETE CBC AUTOMATED: CPT

## 2018-09-29 PROCEDURE — 97530 THERAPEUTIC ACTIVITIES: CPT

## 2018-09-29 PROCEDURE — 36415 COLL VENOUS BLD VENIPUNCTURE: CPT

## 2018-09-29 PROCEDURE — 6370000000 HC RX 637 (ALT 250 FOR IP): Performed by: INTERNAL MEDICINE

## 2018-09-29 PROCEDURE — 1200000000 HC SEMI PRIVATE

## 2018-09-29 PROCEDURE — 2580000003 HC RX 258: Performed by: INTERNAL MEDICINE

## 2018-09-29 RX ORDER — POLYETHYLENE GLYCOL 3350 17 G/17G
17 POWDER, FOR SOLUTION ORAL DAILY
Status: DISCONTINUED | OUTPATIENT
Start: 2018-09-29 | End: 2018-10-01 | Stop reason: HOSPADM

## 2018-09-29 RX ORDER — DOCUSATE SODIUM 100 MG/1
100 CAPSULE, LIQUID FILLED ORAL DAILY
Status: DISCONTINUED | OUTPATIENT
Start: 2018-09-29 | End: 2018-10-01 | Stop reason: HOSPADM

## 2018-09-29 RX ADMIN — PRAVASTATIN SODIUM 20 MG: 10 TABLET ORAL at 20:33

## 2018-09-29 RX ADMIN — ASPIRIN 81 MG CHEWABLE TABLET 81 MG: 81 TABLET CHEWABLE at 10:15

## 2018-09-29 RX ADMIN — ALLOPURINOL 300 MG: 300 TABLET ORAL at 10:15

## 2018-09-29 RX ADMIN — FAMOTIDINE 40 MG: 20 TABLET ORAL at 20:33

## 2018-09-29 RX ADMIN — METOPROLOL TARTRATE 25 MG: 25 TABLET ORAL at 20:33

## 2018-09-29 RX ADMIN — TRAMADOL HYDROCHLORIDE 50 MG: 50 TABLET, FILM COATED ORAL at 10:12

## 2018-09-29 RX ADMIN — DILTIAZEM HYDROCHLORIDE 360 MG: 180 CAPSULE, COATED, EXTENDED RELEASE ORAL at 10:15

## 2018-09-29 RX ADMIN — TAMSULOSIN HYDROCHLORIDE 0.4 MG: 0.4 CAPSULE ORAL at 10:15

## 2018-09-29 RX ADMIN — DOCUSATE SODIUM 100 MG: 100 CAPSULE, LIQUID FILLED ORAL at 14:39

## 2018-09-29 RX ADMIN — POLYETHYLENE GLYCOL 3350 17 G: 17 POWDER, FOR SOLUTION ORAL at 14:39

## 2018-09-29 RX ADMIN — METOPROLOL TARTRATE 25 MG: 25 TABLET ORAL at 10:15

## 2018-09-29 ASSESSMENT — PAIN SCALES - GENERAL
PAINLEVEL_OUTOF10: 0
PAINLEVEL_OUTOF10: 0
PAINLEVEL_OUTOF10: 6

## 2018-09-29 ASSESSMENT — PAIN DESCRIPTION - ORIENTATION: ORIENTATION: RIGHT

## 2018-09-29 ASSESSMENT — PAIN DESCRIPTION - PAIN TYPE: TYPE: CHRONIC PAIN

## 2018-09-29 ASSESSMENT — PAIN DESCRIPTION - LOCATION: LOCATION: HIP

## 2018-09-30 LAB
GLUCOSE BLD-MCNC: 112 MG/DL (ref 70–99)
GLUCOSE BLD-MCNC: 134 MG/DL (ref 70–99)
GLUCOSE BLD-MCNC: 139 MG/DL (ref 70–99)
GLUCOSE BLD-MCNC: 159 MG/DL (ref 70–99)
GLUCOSE BLD-MCNC: 182 MG/DL (ref 70–99)

## 2018-09-30 PROCEDURE — 6370000000 HC RX 637 (ALT 250 FOR IP): Performed by: INTERNAL MEDICINE

## 2018-09-30 PROCEDURE — 82962 GLUCOSE BLOOD TEST: CPT

## 2018-09-30 PROCEDURE — 2580000003 HC RX 258: Performed by: INTERNAL MEDICINE

## 2018-09-30 PROCEDURE — 1200000000 HC SEMI PRIVATE

## 2018-09-30 RX ADMIN — DOCUSATE SODIUM 100 MG: 100 CAPSULE, LIQUID FILLED ORAL at 09:56

## 2018-09-30 RX ADMIN — INSULIN LISPRO 2 UNITS: 100 INJECTION, SOLUTION INTRAVENOUS; SUBCUTANEOUS at 12:32

## 2018-09-30 RX ADMIN — METOPROLOL TARTRATE 25 MG: 25 TABLET ORAL at 09:57

## 2018-09-30 RX ADMIN — PRAVASTATIN SODIUM 20 MG: 10 TABLET ORAL at 20:17

## 2018-09-30 RX ADMIN — DILTIAZEM HYDROCHLORIDE 360 MG: 180 CAPSULE, COATED, EXTENDED RELEASE ORAL at 09:57

## 2018-09-30 RX ADMIN — FAMOTIDINE 40 MG: 20 TABLET ORAL at 20:17

## 2018-09-30 RX ADMIN — ALLOPURINOL 300 MG: 300 TABLET ORAL at 09:56

## 2018-09-30 RX ADMIN — ASPIRIN 81 MG CHEWABLE TABLET 81 MG: 81 TABLET CHEWABLE at 09:57

## 2018-09-30 RX ADMIN — METOPROLOL TARTRATE 25 MG: 25 TABLET ORAL at 20:17

## 2018-09-30 RX ADMIN — POLYETHYLENE GLYCOL 3350 17 G: 17 POWDER, FOR SOLUTION ORAL at 09:57

## 2018-09-30 RX ADMIN — TRAMADOL HYDROCHLORIDE 50 MG: 50 TABLET, FILM COATED ORAL at 18:19

## 2018-09-30 RX ADMIN — TAMSULOSIN HYDROCHLORIDE 0.4 MG: 0.4 CAPSULE ORAL at 09:57

## 2018-09-30 ASSESSMENT — PAIN DESCRIPTION - PAIN TYPE
TYPE: CHRONIC PAIN
TYPE: CHRONIC PAIN

## 2018-09-30 ASSESSMENT — PAIN SCALES - GENERAL
PAINLEVEL_OUTOF10: 6
PAINLEVEL_OUTOF10: 0
PAINLEVEL_OUTOF10: 6

## 2018-09-30 ASSESSMENT — PAIN DESCRIPTION - FREQUENCY
FREQUENCY: CONTINUOUS
FREQUENCY: CONTINUOUS

## 2018-09-30 ASSESSMENT — PAIN DESCRIPTION - ORIENTATION
ORIENTATION: RIGHT
ORIENTATION: RIGHT

## 2018-09-30 ASSESSMENT — PAIN DESCRIPTION - LOCATION
LOCATION: HIP
LOCATION: HIP

## 2018-09-30 ASSESSMENT — PAIN DESCRIPTION - DESCRIPTORS: DESCRIPTORS: ACHING

## 2018-10-01 VITALS
HEIGHT: 68 IN | BODY MASS INDEX: 39.4 KG/M2 | OXYGEN SATURATION: 90 % | RESPIRATION RATE: 17 BRPM | WEIGHT: 260 LBS | DIASTOLIC BLOOD PRESSURE: 83 MMHG | HEART RATE: 85 BPM | TEMPERATURE: 100 F | SYSTOLIC BLOOD PRESSURE: 159 MMHG

## 2018-10-01 LAB
GLUCOSE BLD-MCNC: 132 MG/DL (ref 70–99)
GLUCOSE BLD-MCNC: 139 MG/DL (ref 70–99)

## 2018-10-01 PROCEDURE — 82962 GLUCOSE BLOOD TEST: CPT

## 2018-10-01 PROCEDURE — 6370000000 HC RX 637 (ALT 250 FOR IP): Performed by: INTERNAL MEDICINE

## 2018-10-01 PROCEDURE — 94761 N-INVAS EAR/PLS OXIMETRY MLT: CPT

## 2018-10-01 RX ORDER — TAMSULOSIN HYDROCHLORIDE 0.4 MG/1
0.4 CAPSULE ORAL DAILY
Qty: 30 CAPSULE | Refills: 3 | Status: SHIPPED | OUTPATIENT
Start: 2018-10-02

## 2018-10-01 RX ORDER — PSEUDOEPHEDRINE HCL 30 MG
100 TABLET ORAL 2 TIMES DAILY
Qty: 60 CAPSULE | Refills: 0 | Status: SHIPPED | OUTPATIENT
Start: 2018-10-01 | End: 2020-01-17

## 2018-10-01 RX ORDER — LANOLIN ALCOHOL/MO/W.PET/CERES
325 CREAM (GRAM) TOPICAL
Qty: 90 TABLET | Refills: 3 | Status: SHIPPED | OUTPATIENT
Start: 2018-10-01 | End: 2020-01-17 | Stop reason: DRUGHIGH

## 2018-10-01 RX ADMIN — TAMSULOSIN HYDROCHLORIDE 0.4 MG: 0.4 CAPSULE ORAL at 10:18

## 2018-10-01 RX ADMIN — TRAMADOL HYDROCHLORIDE 50 MG: 50 TABLET, FILM COATED ORAL at 00:22

## 2018-10-01 RX ADMIN — DOCUSATE SODIUM 100 MG: 100 CAPSULE, LIQUID FILLED ORAL at 10:17

## 2018-10-01 RX ADMIN — METOPROLOL TARTRATE 25 MG: 25 TABLET ORAL at 10:18

## 2018-10-01 RX ADMIN — POLYETHYLENE GLYCOL 3350 17 G: 17 POWDER, FOR SOLUTION ORAL at 10:18

## 2018-10-01 RX ADMIN — ASPIRIN 81 MG CHEWABLE TABLET 81 MG: 81 TABLET CHEWABLE at 10:18

## 2018-10-01 RX ADMIN — ALLOPURINOL 300 MG: 300 TABLET ORAL at 10:18

## 2018-10-01 RX ADMIN — DILTIAZEM HYDROCHLORIDE 360 MG: 180 CAPSULE, COATED, EXTENDED RELEASE ORAL at 10:18

## 2018-10-01 ASSESSMENT — PAIN SCALES - GENERAL
PAINLEVEL_OUTOF10: 0
PAINLEVEL_OUTOF10: 6

## 2018-10-01 NOTE — DISCHARGE SUMMARY
[address] Hospitalist     Discharge Summary    Name:  Leola De La Rosa /Age/Sex: 1945  (68 y.o. male)   MRN & CSN:  [id number] & [id number] Admission Date/Time: 2018 11:51 AM   Attending:  Purnima Bourne MD Discharging Physician: Purnima Bourne MD     HPI:     Please, see admission HPI in [address] and patient's hospital course below    Hospital Course:   Leola De La Rosa is a 68 y.o.  male  with past medical history of CAD, AFIB, DM type II who presents with rectal bleeding  and the following assessments reflect patient's hospital course      # Acute on chronic rectal bleeding -S/p Colonoscopy - diverticulosis -removal of polyps - on Eliquis, ASA and Plavix - recent Stent (18) - will hold Eliquis but continue with Plavix, ASA - GI following, had Scope -  - repeat CBC Hb 8.7 -stable -WEEKLY cbc ON dc     # CAD S/p STENTS (RCA, LAD) on 18 (at [facility] with ASA, Plavix, BB and Statins       # Paroxysmal AFIB - now rate controled - continue with Diltiazem, Lopressor  - holding Eliquis      # Hypertension - controled with Losartan, Lopressor and Diltiazem      # Anemia of chronic GI bleed - Hb 9.2 > 8.6, monitor CBC, transfuse for Hb <8 due to CAD     # BPH - with dribbling and frequency and nocturia - will start on Flomax, outpatient referral to Urology      Other comorbid conditions addressed include:  GERD  DM type II - Insulin sliding scale -hold metformin   Gout   Bilateral lymphedema      Awaiting percert for SNF    Patient is hemodynamically stable for DC to SNF    The patient/family expressed appropriate understanding of and agreement with the discharge recommendations, medications, and plan.      Consults this admission:  IP CONSULT TO HOSPITALIST  IP CONSULT TO GI  IP CONSULT TO GI    Discharge Instruction:   Follow up appointments: GI, cardiology   Primary care physician:  within 1 to 2 weeks    Diet:  diabetic diet   Activity: activity as tolerated  Disposition: Discharged to:   []Home, []C, [x]SNF, []Acute Rehab, []Hospice   Condition on discharge: Stable    Discharge Medications:      Toni Childs   Home Medication Instructions OGO:[id number]    Printed on:10/01/18 1102   Medication Information                      allopurinol (ZYLOPRIM) 300 MG tablet  Take 300 mg by mouth daily             apixaban (ELIQUIS) 5 MG TABS tablet  Take 1 tablet by mouth 2 times daily             aspirin 81 MG chewable tablet  Take 81 mg by mouth daily             clopidogrel (PLAVIX) 75 MG tablet  Take 1 tablet by mouth daily             diltiazem (TIAZAC) 360 MG extended release capsule  Take 360 mg by mouth daily             docusate (COLACE, DULCOLAX) 100 MG CAPS  Take 100 mg by mouth 2 times daily             famotidine (PEPCID) 40 MG tablet  Take 40 mg by mouth nightly              ferrous sulfate 325 (65 Fe) MG EC tablet  Take 1 tablet by mouth 3 times daily (with meals)             metFORMIN (GLUCOPHAGE) 500 MG tablet  Take 500 mg by mouth 2 times daily (with meals)             metoprolol tartrate (LOPRESSOR) 25 MG tablet  Take 1 tablet by mouth 2 times daily             miconazole (MICOTIN) 2 % powder  Apply topically 2 times daily. nitroGLYCERIN (NITROSTAT) 0.4 MG SL tablet  up to max of 3 total doses. If no relief after 1 dose, call 911.              polyethylene glycol (GLYCOLAX) powder  Take 17 g by mouth daily as needed             pravastatin (PRAVACHOL) 20 MG tablet  Take 20 mg by mouth nightly             tamsulosin (FLOMAX) 0.4 MG capsule  Take 1 capsule by mouth daily             triamterene-hydrochlorothiazide (MAXZIDE-25) 37.5-25 MG per tablet  Take 1 tablet by mouth daily                 Subjective _ patient resting on his recliner - denies any rectal bleeding, no chest pain, no SOB     Objective Findings at Discharge:   BP (!) 150/68   Pulse 97   Temp 98.7 °F (37.1 °C) (Oral)   Resp 16   Ht 5' 8\" (1.727 m)   Wt 260 lb (117.9 kg)   SpO2 93%   BMI

## 2018-10-01 NOTE — CARE COORDINATION
Pt's PASSR completed.  TC to [facility] [place] to inform of discharge time at 2 pm.     Kasi Odonnell, Social Work Student

## 2018-10-02 ENCOUNTER — HOSPITAL ENCOUNTER (OUTPATIENT)
Age: 73
Setting detail: SPECIMEN
Discharge: HOME | End: 2018-10-02
Payer: OTHER GOVERNMENT

## 2018-10-02 LAB
ALBUMIN SERPL-MCNC: 3.2 GM/DL (ref 3.4–5)
ALP BLD-CCNC: 80 IU/L (ref 40–129)
ALT SERPL-CCNC: 6 U/L (ref 10–40)
ANION GAP SERPL CALCULATED.3IONS-SCNC: 17 MMOL/L (ref 4–16)
AST SERPL-CCNC: 12 IU/L (ref 15–37)
BILIRUB SERPL-MCNC: 0.6 MG/DL (ref 0–1)
BUN BLDV-MCNC: 10 MG/DL (ref 6–23)
CALCIUM SERPL-MCNC: 8.5 MG/DL (ref 8.3–10.6)
CHLORIDE BLD-SCNC: 101 MMOL/L (ref 99–110)
CO2: 23 MMOL/L (ref 21–32)
CREAT SERPL-MCNC: 0.6 MG/DL (ref 0.9–1.3)
EKG ATRIAL RATE: 394 BPM
EKG DIAGNOSIS: NORMAL
EKG Q-T INTERVAL: 378 MS
EKG QRS DURATION: 74 MS
EKG QTC CALCULATION (BAZETT): 399 MS
EKG R AXIS: -17 DEGREES
EKG T AXIS: 11 DEGREES
EKG VENTRICULAR RATE: 67 BPM
GFR AFRICAN AMERICAN: >60 ML/MIN/1.73M2
GFR NON-AFRICAN AMERICAN: >60 ML/MIN/1.73M2
GLUCOSE BLD-MCNC: 111 MG/DL (ref 70–99)
HCT VFR BLD CALC: 28.6 % (ref 42–52)
HEMOGLOBIN: 8.5 GM/DL (ref 13.5–18)
MCH RBC QN AUTO: 25.6 PG (ref 27–31)
MCHC RBC AUTO-ENTMCNC: 29.7 % (ref 32–36)
MCV RBC AUTO: 86.1 FL (ref 78–100)
PDW BLD-RTO: 16.9 % (ref 11.7–14.9)
PLATELET # BLD: 460 K/CU MM (ref 140–440)
PMV BLD AUTO: 9.2 FL (ref 7.5–11.1)
POTASSIUM SERPL-SCNC: 3.1 MMOL/L (ref 3.5–5.1)
RBC # BLD: 3.32 M/CU MM (ref 4.6–6.2)
SODIUM BLD-SCNC: 141 MMOL/L (ref 135–145)
TOTAL PROTEIN: 5.9 GM/DL (ref 6.4–8.2)
WBC # BLD: 9.5 K/CU MM (ref 4–10.5)

## 2018-10-02 PROCEDURE — 85027 COMPLETE CBC AUTOMATED: CPT

## 2018-10-02 PROCEDURE — 36415 COLL VENOUS BLD VENIPUNCTURE: CPT

## 2018-10-02 PROCEDURE — 80053 COMPREHEN METABOLIC PANEL: CPT

## 2018-10-09 ENCOUNTER — HOSPITAL ENCOUNTER (OUTPATIENT)
Age: 73
Setting detail: SPECIMEN
Discharge: HOME | End: 2018-10-09
Payer: OTHER GOVERNMENT

## 2018-10-09 LAB
ANION GAP SERPL CALCULATED.3IONS-SCNC: 16 MMOL/L (ref 4–16)
BASOPHILS ABSOLUTE: 0 K/CU MM
BASOPHILS RELATIVE PERCENT: 0.5 % (ref 0–1)
BUN BLDV-MCNC: 16 MG/DL (ref 6–23)
CALCIUM SERPL-MCNC: 8.8 MG/DL (ref 8.3–10.6)
CHLORIDE BLD-SCNC: 99 MMOL/L (ref 99–110)
CO2: 25 MMOL/L (ref 21–32)
CREAT SERPL-MCNC: 0.8 MG/DL (ref 0.9–1.3)
DIFFERENTIAL TYPE: ABNORMAL
EOSINOPHILS ABSOLUTE: 0.3 K/CU MM
EOSINOPHILS RELATIVE PERCENT: 3.1 % (ref 0–3)
GFR AFRICAN AMERICAN: >60 ML/MIN/1.73M2
GFR NON-AFRICAN AMERICAN: >60 ML/MIN/1.73M2
GLUCOSE BLD-MCNC: 82 MG/DL (ref 70–99)
HCT VFR BLD CALC: 28.1 % (ref 42–52)
HEMOGLOBIN: 8.3 GM/DL (ref 13.5–18)
IMMATURE NEUTROPHIL %: 0.5 % (ref 0–0.43)
LYMPHOCYTES ABSOLUTE: 2 K/CU MM
LYMPHOCYTES RELATIVE PERCENT: 24.1 % (ref 24–44)
MCH RBC QN AUTO: 25 PG (ref 27–31)
MCHC RBC AUTO-ENTMCNC: 29.5 % (ref 32–36)
MCV RBC AUTO: 84.6 FL (ref 78–100)
MONOCYTES ABSOLUTE: 0.8 K/CU MM
MONOCYTES RELATIVE PERCENT: 9.6 % (ref 0–4)
NUCLEATED RBC %: 0 %
PDW BLD-RTO: 16.9 % (ref 11.7–14.9)
PLATELET # BLD: 610 K/CU MM (ref 140–440)
PMV BLD AUTO: 9.1 FL (ref 7.5–11.1)
POTASSIUM SERPL-SCNC: 3.6 MMOL/L (ref 3.5–5.1)
RBC # BLD: 3.32 M/CU MM (ref 4.6–6.2)
SEGMENTED NEUTROPHILS ABSOLUTE COUNT: 5.2 K/CU MM
SEGMENTED NEUTROPHILS RELATIVE PERCENT: 62.2 % (ref 36–66)
SODIUM BLD-SCNC: 140 MMOL/L (ref 135–145)
TOTAL IMMATURE NEUTOROPHIL: 0.04 K/CU MM
TOTAL NUCLEATED RBC: 0 K/CU MM
WBC # BLD: 8.4 K/CU MM (ref 4–10.5)

## 2018-10-09 PROCEDURE — 36415 COLL VENOUS BLD VENIPUNCTURE: CPT

## 2018-10-09 PROCEDURE — 85025 COMPLETE CBC W/AUTO DIFF WBC: CPT

## 2018-10-09 PROCEDURE — 80048 BASIC METABOLIC PNL TOTAL CA: CPT

## 2018-10-29 ENCOUNTER — TELEPHONE (OUTPATIENT)
Dept: CARDIOLOGY CLINIC | Age: 73
End: 2018-10-29

## 2018-10-29 NOTE — TELEPHONE ENCOUNTER
Called patient per Dasah Leyva to get a appointment per Dr Alesha Barraza    For GI bleed , spoke to patient for over 20 minutes  Didn't know who his Dr was , stated he is in a wheel chair  And doesn't know who his transportation company is   He has no family , Patient stated he will call to make a appointment when   He can set up transportation

## 2018-11-19 ENCOUNTER — HOSPITAL ENCOUNTER (OUTPATIENT)
Age: 73
Setting detail: SPECIMEN
Discharge: HOME | End: 2018-11-19
Payer: COMMERCIAL

## 2018-11-19 LAB
BASOPHILS ABSOLUTE: 0.1 K/CU MM
BASOPHILS RELATIVE PERCENT: 0.6 % (ref 0–1)
DIFFERENTIAL TYPE: ABNORMAL
EOSINOPHILS ABSOLUTE: 0.3 K/CU MM
EOSINOPHILS RELATIVE PERCENT: 4.1 % (ref 0–3)
HCT VFR BLD CALC: 31.8 % (ref 42–52)
HEMOGLOBIN: 9.2 GM/DL (ref 13.5–18)
IMMATURE NEUTROPHIL %: 0.4 % (ref 0–0.43)
LYMPHOCYTES ABSOLUTE: 1.7 K/CU MM
LYMPHOCYTES RELATIVE PERCENT: 20.9 % (ref 24–44)
MCH RBC QN AUTO: 24 PG (ref 27–31)
MCHC RBC AUTO-ENTMCNC: 28.9 % (ref 32–36)
MCV RBC AUTO: 82.8 FL (ref 78–100)
MONOCYTES ABSOLUTE: 0.8 K/CU MM
MONOCYTES RELATIVE PERCENT: 9.5 % (ref 0–4)
NUCLEATED RBC %: 0 %
PDW BLD-RTO: 20.5 % (ref 11.7–14.9)
PLATELET # BLD: 528 K/CU MM (ref 140–440)
PMV BLD AUTO: 9.4 FL (ref 7.5–11.1)
RBC # BLD: 3.84 M/CU MM (ref 4.6–6.2)
SEGMENTED NEUTROPHILS ABSOLUTE COUNT: 5.2 K/CU MM
SEGMENTED NEUTROPHILS RELATIVE PERCENT: 64.5 % (ref 36–66)
TOTAL IMMATURE NEUTOROPHIL: 0.03 K/CU MM
TOTAL NUCLEATED RBC: 0 K/CU MM
WBC # BLD: 8 K/CU MM (ref 4–10.5)

## 2018-11-19 PROCEDURE — 85025 COMPLETE CBC W/AUTO DIFF WBC: CPT

## 2018-11-20 ENCOUNTER — HOSPITAL ENCOUNTER (EMERGENCY)
Age: 73
Discharge: HOME | End: 2018-11-21
Attending: EMERGENCY MEDICINE
Payer: COMMERCIAL

## 2018-11-20 VITALS
SYSTOLIC BLOOD PRESSURE: 167 MMHG | DIASTOLIC BLOOD PRESSURE: 71 MMHG | OXYGEN SATURATION: 97 % | RESPIRATION RATE: 14 BRPM | HEART RATE: 57 BPM

## 2018-11-20 DIAGNOSIS — K62.5 RECTAL BLEEDING: Primary | ICD-10-CM

## 2018-11-20 LAB
ALBUMIN SERPL-MCNC: 3.7 GM/DL (ref 3.4–5)
ALP BLD-CCNC: 83 IU/L (ref 40–129)
ALT SERPL-CCNC: 7 U/L (ref 10–40)
ANION GAP SERPL CALCULATED.3IONS-SCNC: 15 MMOL/L (ref 4–16)
APTT: 28.1 SECONDS (ref 21.2–33)
AST SERPL-CCNC: 11 IU/L (ref 15–37)
BASOPHILS ABSOLUTE: 0.1 K/CU MM
BASOPHILS RELATIVE PERCENT: 0.7 % (ref 0–1)
BILIRUB SERPL-MCNC: 0.2 MG/DL (ref 0–1)
BUN BLDV-MCNC: 11 MG/DL (ref 6–23)
CALCIUM SERPL-MCNC: 9.8 MG/DL (ref 8.3–10.6)
CHLORIDE BLD-SCNC: 98 MMOL/L (ref 99–110)
CO2: 26 MMOL/L (ref 21–32)
CREAT SERPL-MCNC: 0.6 MG/DL (ref 0.9–1.3)
DIFFERENTIAL TYPE: ABNORMAL
EOSINOPHILS ABSOLUTE: 0.3 K/CU MM
EOSINOPHILS RELATIVE PERCENT: 3 % (ref 0–3)
GFR AFRICAN AMERICAN: >60 ML/MIN/1.73M2
GFR NON-AFRICAN AMERICAN: >60 ML/MIN/1.73M2
GLUCOSE BLD-MCNC: 118 MG/DL (ref 70–99)
HCT VFR BLD CALC: 33.5 % (ref 42–52)
HEMOGLOBIN: 9.4 GM/DL (ref 13.5–18)
IMMATURE NEUTROPHIL %: 0.6 % (ref 0–0.43)
INR BLD: 1.38 INDEX
LYMPHOCYTES ABSOLUTE: 1.8 K/CU MM
LYMPHOCYTES RELATIVE PERCENT: 21.2 % (ref 24–44)
MCH RBC QN AUTO: 24 PG (ref 27–31)
MCHC RBC AUTO-ENTMCNC: 28.1 % (ref 32–36)
MCV RBC AUTO: 85.5 FL (ref 78–100)
MONOCYTES ABSOLUTE: 0.8 K/CU MM
MONOCYTES RELATIVE PERCENT: 8.6 % (ref 0–4)
NUCLEATED RBC %: 0 %
PDW BLD-RTO: 20.4 % (ref 11.7–14.9)
PLATELET # BLD: 501 K/CU MM (ref 140–440)
PMV BLD AUTO: 9.1 FL (ref 7.5–11.1)
POTASSIUM SERPL-SCNC: 3.6 MMOL/L (ref 3.5–5.1)
PROTHROMBIN TIME: 15.7 SECONDS (ref 9.12–12.5)
RBC # BLD: 3.92 M/CU MM (ref 4.6–6.2)
SEGMENTED NEUTROPHILS ABSOLUTE COUNT: 5.7 K/CU MM
SEGMENTED NEUTROPHILS RELATIVE PERCENT: 65.9 % (ref 36–66)
SODIUM BLD-SCNC: 139 MMOL/L (ref 135–145)
TOTAL IMMATURE NEUTOROPHIL: 0.05 K/CU MM
TOTAL NUCLEATED RBC: 0 K/CU MM
TOTAL PROTEIN: 7.1 GM/DL (ref 6.4–8.2)
WBC # BLD: 8.7 K/CU MM (ref 4–10.5)

## 2018-11-20 PROCEDURE — 85730 THROMBOPLASTIN TIME PARTIAL: CPT

## 2018-11-20 PROCEDURE — 99283 EMERGENCY DEPT VISIT LOW MDM: CPT

## 2018-11-20 PROCEDURE — 36415 COLL VENOUS BLD VENIPUNCTURE: CPT

## 2018-11-20 PROCEDURE — 80053 COMPREHEN METABOLIC PANEL: CPT

## 2018-11-20 PROCEDURE — 85610 PROTHROMBIN TIME: CPT

## 2018-11-20 PROCEDURE — 85025 COMPLETE CBC W/AUTO DIFF WBC: CPT

## 2019-05-29 ENCOUNTER — HOSPITAL ENCOUNTER (OUTPATIENT)
Age: 74
Setting detail: SPECIMEN
Discharge: HOME | End: 2019-05-29
Payer: COMMERCIAL

## 2019-05-29 LAB
BACTERIA: NEGATIVE /HPF
BILIRUBIN URINE: NEGATIVE MG/DL
BLOOD, URINE: ABNORMAL
CLARITY: ABNORMAL
COLOR: YELLOW
GLUCOSE, URINE: NEGATIVE MG/DL
KETONES, URINE: NEGATIVE MG/DL
LEUKOCYTE ESTERASE, URINE: ABNORMAL
MUCUS: ABNORMAL HPF
NITRITE URINE, QUANTITATIVE: NEGATIVE
PH, URINE: 6 (ref 5–8)
PROTEIN UA: 100 MG/DL
RBC URINE: ABNORMAL /HPF (ref 0–3)
SPECIFIC GRAVITY UA: 1.01 (ref 1–1.03)
SQUAMOUS EPITHELIAL: 1 /HPF
TRICHOMONAS: ABNORMAL /HPF
UROBILINOGEN, URINE: NORMAL MG/DL (ref 0.2–1)
WBC CLUMP: ABNORMAL /HPF
WBC UA: 83 /HPF (ref 0–2)

## 2019-05-29 PROCEDURE — 81001 URINALYSIS AUTO W/SCOPE: CPT

## 2019-05-29 PROCEDURE — 87086 URINE CULTURE/COLONY COUNT: CPT

## 2019-05-31 LAB
CULTURE: ABNORMAL
Lab: ABNORMAL
SPECIMEN: ABNORMAL
TOTAL COLONY COUNT: ABNORMAL

## 2020-01-17 ENCOUNTER — HOSPITAL ENCOUNTER (INPATIENT)
Age: 75
LOS: 5 days | Discharge: HOME HEALTH | DRG: 871 | End: 2020-01-22
Attending: EMERGENCY MEDICINE | Admitting: INTERNAL MEDICINE
Payer: COMMERCIAL

## 2020-01-17 ENCOUNTER — APPOINTMENT (OUTPATIENT)
Dept: GENERAL RADIOLOGY | Age: 75
DRG: 871 | End: 2020-01-17
Payer: COMMERCIAL

## 2020-01-17 ENCOUNTER — APPOINTMENT (OUTPATIENT)
Dept: CT IMAGING | Age: 75
DRG: 871 | End: 2020-01-17
Payer: COMMERCIAL

## 2020-01-17 PROBLEM — K62.5 BRBPR (BRIGHT RED BLOOD PER RECTUM): Status: ACTIVE | Noted: 2020-01-17

## 2020-01-17 PROBLEM — A41.9 SEPSIS (HCC): Status: ACTIVE | Noted: 2020-01-17

## 2020-01-17 LAB
ABO/RH: NORMAL
ALBUMIN SERPL-MCNC: 3.6 GM/DL (ref 3.4–5)
ALP BLD-CCNC: 86 IU/L (ref 40–129)
ALT SERPL-CCNC: 6 U/L (ref 10–40)
ANION GAP SERPL CALCULATED.3IONS-SCNC: 10 MMOL/L (ref 4–16)
ANION GAP SERPL CALCULATED.3IONS-SCNC: 18 MMOL/L (ref 4–16)
ANISOCYTOSIS: ABNORMAL
ANTIBODY SCREEN: NEGATIVE
APTT: 37.7 SECONDS (ref 25.1–37.1)
AST SERPL-CCNC: 13 IU/L (ref 15–37)
BACTERIA: ABNORMAL /HPF
BANDED NEUTROPHILS ABSOLUTE COUNT: 2.3 K/CU MM
BANDED NEUTROPHILS RELATIVE PERCENT: 14 % (ref 5–11)
BASOPHILS ABSOLUTE: 0 K/CU MM
BASOPHILS RELATIVE PERCENT: 0.3 % (ref 0–1)
BILIRUB SERPL-MCNC: 0.7 MG/DL (ref 0–1)
BILIRUBIN URINE: NEGATIVE MG/DL
BLOOD, URINE: ABNORMAL
BUN BLDV-MCNC: 10 MG/DL (ref 6–23)
BUN BLDV-MCNC: 12 MG/DL (ref 6–23)
CALCIUM SERPL-MCNC: 8.2 MG/DL (ref 8.3–10.6)
CALCIUM SERPL-MCNC: 9.5 MG/DL (ref 8.3–10.6)
CHLORIDE BLD-SCNC: 93 MMOL/L (ref 99–110)
CHLORIDE BLD-SCNC: 94 MMOL/L (ref 99–110)
CLARITY: ABNORMAL
CO2: 23 MMOL/L (ref 21–32)
CO2: 28 MMOL/L (ref 21–32)
COLOR: YELLOW
CREAT SERPL-MCNC: 0.7 MG/DL (ref 0.9–1.3)
CREAT SERPL-MCNC: 0.8 MG/DL (ref 0.9–1.3)
DIFFERENTIAL TYPE: ABNORMAL
DIFFERENTIAL TYPE: ABNORMAL
EOSINOPHILS ABSOLUTE: 0 K/CU MM
EOSINOPHILS RELATIVE PERCENT: 0.2 % (ref 0–3)
GFR AFRICAN AMERICAN: >60 ML/MIN/1.73M2
GFR AFRICAN AMERICAN: >60 ML/MIN/1.73M2
GFR NON-AFRICAN AMERICAN: >60 ML/MIN/1.73M2
GFR NON-AFRICAN AMERICAN: >60 ML/MIN/1.73M2
GLUCOSE BLD-MCNC: 142 MG/DL (ref 70–99)
GLUCOSE BLD-MCNC: 142 MG/DL (ref 70–99)
GLUCOSE BLD-MCNC: 154 MG/DL (ref 70–99)
GLUCOSE BLD-MCNC: 211 MG/DL (ref 70–99)
GLUCOSE, URINE: NEGATIVE MG/DL
HCT VFR BLD CALC: 31.5 % (ref 42–52)
HCT VFR BLD CALC: 33.9 % (ref 42–52)
HCT VFR BLD CALC: 34.9 % (ref 42–52)
HEMOGLOBIN: 10.2 GM/DL (ref 13.5–18)
HEMOGLOBIN: 10.7 GM/DL (ref 13.5–18)
HEMOGLOBIN: 9.3 GM/DL (ref 13.5–18)
IMMATURE NEUTROPHIL %: 0.6 % (ref 0–0.43)
INR BLD: 2.29 INDEX
KETONES, URINE: NEGATIVE MG/DL
LACTATE: 1.9 MMOL/L (ref 0.4–2)
LACTATE: ABNORMAL MMOL/L (ref 0.4–2)
LEUKOCYTE ESTERASE, URINE: ABNORMAL
LYMPHOCYTES ABSOLUTE: 0.3 K/CU MM
LYMPHOCYTES ABSOLUTE: 0.8 K/CU MM
LYMPHOCYTES RELATIVE PERCENT: 2 % (ref 24–44)
LYMPHOCYTES RELATIVE PERCENT: 7.4 % (ref 24–44)
MCH RBC QN AUTO: 26.6 PG (ref 27–31)
MCH RBC QN AUTO: 26.9 PG (ref 27–31)
MCHC RBC AUTO-ENTMCNC: 29.5 % (ref 32–36)
MCHC RBC AUTO-ENTMCNC: 30.7 % (ref 32–36)
MCV RBC AUTO: 87.7 FL (ref 78–100)
MCV RBC AUTO: 90.3 FL (ref 78–100)
METAMYELOCYTES ABSOLUTE COUNT: 0.16 K/CU MM
METAMYELOCYTES PERCENT: 1 %
MONOCYTES ABSOLUTE: 0.7 K/CU MM
MONOCYTES ABSOLUTE: 0.8 K/CU MM
MONOCYTES RELATIVE PERCENT: 5 % (ref 0–4)
MONOCYTES RELATIVE PERCENT: 6.5 % (ref 0–4)
NITRITE URINE, QUANTITATIVE: NEGATIVE
NUCLEATED RBC %: 0 %
PDW BLD-RTO: 17.6 % (ref 11.7–14.9)
PDW BLD-RTO: 17.8 % (ref 11.7–14.9)
PH, URINE: 5 (ref 5–8)
PLATELET # BLD: 317 K/CU MM (ref 140–440)
PLATELET # BLD: 405 K/CU MM (ref 140–440)
PMV BLD AUTO: 8.8 FL (ref 7.5–11.1)
PMV BLD AUTO: 9 FL (ref 7.5–11.1)
POLYCHROMASIA: ABNORMAL
POTASSIUM SERPL-SCNC: 3.5 MMOL/L (ref 3.5–5.1)
POTASSIUM SERPL-SCNC: 3.5 MMOL/L (ref 3.5–5.1)
PRO-BNP: 764.1 PG/ML
PROTEIN UA: 100 MG/DL
PROTHROMBIN TIME: 27.9 SECONDS (ref 11.7–14.5)
RAPID INFLUENZA  B AGN: NEGATIVE
RAPID INFLUENZA A AGN: NEGATIVE
RBC # BLD: 3.49 M/CU MM (ref 4.6–6.2)
RBC # BLD: 3.98 M/CU MM (ref 4.6–6.2)
RBC URINE: 92 /HPF (ref 0–3)
SEGMENTED NEUTROPHILS ABSOLUTE COUNT: 12.8 K/CU MM
SEGMENTED NEUTROPHILS ABSOLUTE COUNT: 9.2 K/CU MM
SEGMENTED NEUTROPHILS RELATIVE PERCENT: 78 % (ref 36–66)
SEGMENTED NEUTROPHILS RELATIVE PERCENT: 85 % (ref 36–66)
SODIUM BLD-SCNC: 131 MMOL/L (ref 135–145)
SODIUM BLD-SCNC: 135 MMOL/L (ref 135–145)
SPECIFIC GRAVITY UA: 1.02 (ref 1–1.03)
TOTAL CK: 7280 IU/L (ref 38–174)
TOTAL IMMATURE NEUTOROPHIL: 0.06 K/CU MM
TOTAL NUCLEATED RBC: 0 K/CU MM
TOTAL PROTEIN: 7.7 GM/DL (ref 6.4–8.2)
TRICHOMONAS: ABNORMAL /HPF
TROPONIN T: 0.02 NG/ML
UROBILINOGEN, URINE: NORMAL MG/DL (ref 0.2–1)
WBC # BLD: 10.9 K/CU MM (ref 4–10.5)
WBC # BLD: 16.4 K/CU MM (ref 4–10.5)
WBC CLUMP: ABNORMAL /HPF
WBC UA: 1410 /HPF (ref 0–2)

## 2020-01-17 PROCEDURE — 83735 ASSAY OF MAGNESIUM: CPT

## 2020-01-17 PROCEDURE — 85007 BL SMEAR W/DIFF WBC COUNT: CPT

## 2020-01-17 PROCEDURE — C9113 INJ PANTOPRAZOLE SODIUM, VIA: HCPCS | Performed by: NURSE PRACTITIONER

## 2020-01-17 PROCEDURE — 1200000000 HC SEMI PRIVATE

## 2020-01-17 PROCEDURE — 2580000003 HC RX 258: Performed by: EMERGENCY MEDICINE

## 2020-01-17 PROCEDURE — 96366 THER/PROPH/DIAG IV INF ADDON: CPT

## 2020-01-17 PROCEDURE — 72125 CT NECK SPINE W/O DYE: CPT

## 2020-01-17 PROCEDURE — 85027 COMPLETE CBC AUTOMATED: CPT

## 2020-01-17 PROCEDURE — 2700000000 HC OXYGEN THERAPY PER DAY

## 2020-01-17 PROCEDURE — 2580000003 HC RX 258: Performed by: NURSE PRACTITIONER

## 2020-01-17 PROCEDURE — 83605 ASSAY OF LACTIC ACID: CPT

## 2020-01-17 PROCEDURE — 96376 TX/PRO/DX INJ SAME DRUG ADON: CPT

## 2020-01-17 PROCEDURE — 6360000002 HC RX W HCPCS: Performed by: NURSE PRACTITIONER

## 2020-01-17 PROCEDURE — 82962 GLUCOSE BLOOD TEST: CPT

## 2020-01-17 PROCEDURE — 86900 BLOOD TYPING SEROLOGIC ABO: CPT

## 2020-01-17 PROCEDURE — 99221 1ST HOSP IP/OBS SF/LOW 40: CPT | Performed by: INTERNAL MEDICINE

## 2020-01-17 PROCEDURE — 87086 URINE CULTURE/COLONY COUNT: CPT

## 2020-01-17 PROCEDURE — 80053 COMPREHEN METABOLIC PANEL: CPT

## 2020-01-17 PROCEDURE — 87040 BLOOD CULTURE FOR BACTERIA: CPT

## 2020-01-17 PROCEDURE — 81001 URINALYSIS AUTO W/SCOPE: CPT

## 2020-01-17 PROCEDURE — 96367 TX/PROPH/DG ADDL SEQ IV INF: CPT

## 2020-01-17 PROCEDURE — 85610 PROTHROMBIN TIME: CPT

## 2020-01-17 PROCEDURE — 94761 N-INVAS EAR/PLS OXIMETRY MLT: CPT

## 2020-01-17 PROCEDURE — 6360000002 HC RX W HCPCS: Performed by: INTERNAL MEDICINE

## 2020-01-17 PROCEDURE — 87804 INFLUENZA ASSAY W/OPTIC: CPT

## 2020-01-17 PROCEDURE — 74174 CTA ABD&PLVS W/CONTRAST: CPT

## 2020-01-17 PROCEDURE — 85018 HEMOGLOBIN: CPT

## 2020-01-17 PROCEDURE — 86901 BLOOD TYPING SEROLOGIC RH(D): CPT

## 2020-01-17 PROCEDURE — 73501 X-RAY EXAM HIP UNI 1 VIEW: CPT

## 2020-01-17 PROCEDURE — 99285 EMERGENCY DEPT VISIT HI MDM: CPT

## 2020-01-17 PROCEDURE — 70450 CT HEAD/BRAIN W/O DYE: CPT

## 2020-01-17 PROCEDURE — 6370000000 HC RX 637 (ALT 250 FOR IP): Performed by: NURSE PRACTITIONER

## 2020-01-17 PROCEDURE — 82550 ASSAY OF CK (CPK): CPT

## 2020-01-17 PROCEDURE — 96365 THER/PROPH/DIAG IV INF INIT: CPT

## 2020-01-17 PROCEDURE — 85014 HEMATOCRIT: CPT

## 2020-01-17 PROCEDURE — 83880 ASSAY OF NATRIURETIC PEPTIDE: CPT

## 2020-01-17 PROCEDURE — 36415 COLL VENOUS BLD VENIPUNCTURE: CPT

## 2020-01-17 PROCEDURE — 85025 COMPLETE CBC W/AUTO DIFF WBC: CPT

## 2020-01-17 PROCEDURE — 2580000003 HC RX 258: Performed by: INTERNAL MEDICINE

## 2020-01-17 PROCEDURE — 6360000004 HC RX CONTRAST MEDICATION: Performed by: EMERGENCY MEDICINE

## 2020-01-17 PROCEDURE — 84484 ASSAY OF TROPONIN QUANT: CPT

## 2020-01-17 PROCEDURE — 80048 BASIC METABOLIC PNL TOTAL CA: CPT

## 2020-01-17 PROCEDURE — 93005 ELECTROCARDIOGRAM TRACING: CPT | Performed by: EMERGENCY MEDICINE

## 2020-01-17 PROCEDURE — 6360000002 HC RX W HCPCS: Performed by: EMERGENCY MEDICINE

## 2020-01-17 PROCEDURE — 85730 THROMBOPLASTIN TIME PARTIAL: CPT

## 2020-01-17 PROCEDURE — 73700 CT LOWER EXTREMITY W/O DYE: CPT

## 2020-01-17 PROCEDURE — 86850 RBC ANTIBODY SCREEN: CPT

## 2020-01-17 PROCEDURE — 71046 X-RAY EXAM CHEST 2 VIEWS: CPT

## 2020-01-17 RX ORDER — TRIAMTERENE AND HYDROCHLOROTHIAZIDE 37.5; 25 MG/1; MG/1
1 TABLET ORAL DAILY
Status: DISCONTINUED | OUTPATIENT
Start: 2020-01-17 | End: 2020-01-22 | Stop reason: HOSPADM

## 2020-01-17 RX ORDER — LOSARTAN POTASSIUM 50 MG/1
50 TABLET ORAL DAILY
Status: DISCONTINUED | OUTPATIENT
Start: 2020-01-17 | End: 2020-01-22 | Stop reason: HOSPADM

## 2020-01-17 RX ORDER — 0.9 % SODIUM CHLORIDE 0.9 %
1000 INTRAVENOUS SOLUTION INTRAVENOUS ONCE
Status: COMPLETED | OUTPATIENT
Start: 2020-01-17 | End: 2020-01-17

## 2020-01-17 RX ORDER — NICOTINE POLACRILEX 4 MG
15 LOZENGE BUCCAL PRN
Status: DISCONTINUED | OUTPATIENT
Start: 2020-01-17 | End: 2020-01-22 | Stop reason: HOSPADM

## 2020-01-17 RX ORDER — DILTIAZEM HYDROCHLORIDE 120 MG/1
360 CAPSULE, COATED, EXTENDED RELEASE ORAL DAILY
Status: DISCONTINUED | OUTPATIENT
Start: 2020-01-17 | End: 2020-01-22 | Stop reason: HOSPADM

## 2020-01-17 RX ORDER — SODIUM CHLORIDE 0.9 % (FLUSH) 0.9 %
10 SYRINGE (ML) INJECTION PRN
Status: DISCONTINUED | OUTPATIENT
Start: 2020-01-17 | End: 2020-01-17 | Stop reason: SDUPTHER

## 2020-01-17 RX ORDER — FERROUS SULFATE TAB EC 324 MG (65 MG FE EQUIVALENT) 324 (65 FE) MG
324 TABLET DELAYED RESPONSE ORAL 2 TIMES DAILY
COMMUNITY
End: 2021-04-07 | Stop reason: SDUPTHER

## 2020-01-17 RX ORDER — PANTOPRAZOLE SODIUM 40 MG/10ML
40 INJECTION, POWDER, LYOPHILIZED, FOR SOLUTION INTRAVENOUS 2 TIMES DAILY
Status: DISCONTINUED | OUTPATIENT
Start: 2020-01-17 | End: 2020-01-18

## 2020-01-17 RX ORDER — DEXTROSE MONOHYDRATE 25 G/50ML
12.5 INJECTION, SOLUTION INTRAVENOUS PRN
Status: DISCONTINUED | OUTPATIENT
Start: 2020-01-17 | End: 2020-01-22 | Stop reason: HOSPADM

## 2020-01-17 RX ORDER — ALLOPURINOL 300 MG/1
300 TABLET ORAL DAILY
Status: DISCONTINUED | OUTPATIENT
Start: 2020-01-17 | End: 2020-01-22 | Stop reason: HOSPADM

## 2020-01-17 RX ORDER — SODIUM CHLORIDE 0.9 % (FLUSH) 0.9 %
10 SYRINGE (ML) INJECTION EVERY 12 HOURS SCHEDULED
Status: DISCONTINUED | OUTPATIENT
Start: 2020-01-17 | End: 2020-01-22 | Stop reason: HOSPADM

## 2020-01-17 RX ORDER — LOSARTAN POTASSIUM 50 MG/1
50 TABLET ORAL DAILY
COMMUNITY
End: 2021-10-27

## 2020-01-17 RX ORDER — ONDANSETRON 2 MG/ML
4 INJECTION INTRAMUSCULAR; INTRAVENOUS EVERY 6 HOURS PRN
Status: DISCONTINUED | OUTPATIENT
Start: 2020-01-17 | End: 2020-01-22 | Stop reason: HOSPADM

## 2020-01-17 RX ORDER — DEXTROSE MONOHYDRATE 50 MG/ML
100 INJECTION, SOLUTION INTRAVENOUS PRN
Status: DISCONTINUED | OUTPATIENT
Start: 2020-01-17 | End: 2020-01-22 | Stop reason: HOSPADM

## 2020-01-17 RX ORDER — SODIUM CHLORIDE 0.9 % (FLUSH) 0.9 %
10 SYRINGE (ML) INJECTION PRN
Status: DISCONTINUED | OUTPATIENT
Start: 2020-01-17 | End: 2020-01-22 | Stop reason: HOSPADM

## 2020-01-17 RX ORDER — SODIUM CHLORIDE 9 MG/ML
INJECTION, SOLUTION INTRAVENOUS CONTINUOUS
Status: DISCONTINUED | OUTPATIENT
Start: 2020-01-17 | End: 2020-01-17

## 2020-01-17 RX ORDER — TAMSULOSIN HYDROCHLORIDE 0.4 MG/1
0.4 CAPSULE ORAL NIGHTLY
Status: DISCONTINUED | OUTPATIENT
Start: 2020-01-17 | End: 2020-01-22 | Stop reason: HOSPADM

## 2020-01-17 RX ORDER — SODIUM CHLORIDE 9 MG/ML
INJECTION, SOLUTION INTRAVENOUS CONTINUOUS
Status: DISPENSED | OUTPATIENT
Start: 2020-01-17 | End: 2020-01-18

## 2020-01-17 RX ORDER — ACETAMINOPHEN 325 MG/1
650 TABLET ORAL EVERY 4 HOURS PRN
Status: DISCONTINUED | OUTPATIENT
Start: 2020-01-17 | End: 2020-01-22 | Stop reason: HOSPADM

## 2020-01-17 RX ORDER — FERROUS SULFATE 325(65) MG
325 TABLET ORAL 2 TIMES DAILY
Status: DISCONTINUED | OUTPATIENT
Start: 2020-01-17 | End: 2020-01-22 | Stop reason: HOSPADM

## 2020-01-17 RX ORDER — PRAVASTATIN SODIUM 10 MG
20 TABLET ORAL NIGHTLY
Status: DISCONTINUED | OUTPATIENT
Start: 2020-01-17 | End: 2020-01-22 | Stop reason: HOSPADM

## 2020-01-17 RX ADMIN — DILTIAZEM HYDROCHLORIDE 360 MG: 120 CAPSULE, COATED, EXTENDED RELEASE ORAL at 21:57

## 2020-01-17 RX ADMIN — SODIUM CHLORIDE: 9 INJECTION, SOLUTION INTRAVENOUS at 20:10

## 2020-01-17 RX ADMIN — VANCOMYCIN HYDROCHLORIDE 1500 MG: 5 INJECTION, POWDER, LYOPHILIZED, FOR SOLUTION INTRAVENOUS at 20:10

## 2020-01-17 RX ADMIN — CEFEPIME HYDROCHLORIDE 2 G: 2 INJECTION, POWDER, FOR SOLUTION INTRAVENOUS at 09:04

## 2020-01-17 RX ADMIN — SODIUM CHLORIDE: 9 INJECTION, SOLUTION INTRAVENOUS at 17:07

## 2020-01-17 RX ADMIN — PANTOPRAZOLE SODIUM 40 MG: 40 INJECTION, POWDER, FOR SOLUTION INTRAVENOUS at 22:26

## 2020-01-17 RX ADMIN — Medication 10 ML: at 10:18

## 2020-01-17 RX ADMIN — LOSARTAN POTASSIUM 50 MG: 50 TABLET, FILM COATED ORAL at 21:57

## 2020-01-17 RX ADMIN — FERROUS SULFATE TAB 325 MG (65 MG ELEMENTAL FE) 325 MG: 325 (65 FE) TAB at 21:57

## 2020-01-17 RX ADMIN — SODIUM CHLORIDE 1000 ML: 9 INJECTION, SOLUTION INTRAVENOUS at 09:03

## 2020-01-17 RX ADMIN — METOPROLOL TARTRATE 25 MG: 25 TABLET ORAL at 21:58

## 2020-01-17 RX ADMIN — SODIUM CHLORIDE: 9 INJECTION, SOLUTION INTRAVENOUS at 09:04

## 2020-01-17 RX ADMIN — TRIAMTERENE AND HYDROCHLOROTHIAZIDE 1 TABLET: 37.5; 25 TABLET ORAL at 21:58

## 2020-01-17 RX ADMIN — CEFEPIME HYDROCHLORIDE 2 G: 2 INJECTION, POWDER, FOR SOLUTION INTRAVENOUS at 22:28

## 2020-01-17 RX ADMIN — TAMSULOSIN HYDROCHLORIDE 0.4 MG: 0.4 CAPSULE ORAL at 21:57

## 2020-01-17 RX ADMIN — INSULIN LISPRO 1 UNITS: 100 INJECTION, SOLUTION INTRAVENOUS; SUBCUTANEOUS at 17:15

## 2020-01-17 RX ADMIN — ALLOPURINOL 300 MG: 300 TABLET ORAL at 21:58

## 2020-01-17 RX ADMIN — IOPAMIDOL 99 ML: 755 INJECTION, SOLUTION INTRAVENOUS at 10:19

## 2020-01-17 RX ADMIN — PRAVASTATIN SODIUM 20 MG: 10 TABLET ORAL at 21:57

## 2020-01-17 ASSESSMENT — PAIN DESCRIPTION - ONSET: ONSET: ON-GOING

## 2020-01-17 ASSESSMENT — PAIN DESCRIPTION - LOCATION
LOCATION: HIP
LOCATION: HIP

## 2020-01-17 ASSESSMENT — PAIN - FUNCTIONAL ASSESSMENT: PAIN_FUNCTIONAL_ASSESSMENT: ACTIVITIES ARE NOT PREVENTED

## 2020-01-17 ASSESSMENT — PAIN DESCRIPTION - ORIENTATION
ORIENTATION: RIGHT
ORIENTATION: RIGHT

## 2020-01-17 ASSESSMENT — PAIN DESCRIPTION - PAIN TYPE
TYPE: ACUTE PAIN
TYPE: ACUTE PAIN

## 2020-01-17 ASSESSMENT — PAIN DESCRIPTION - FREQUENCY: FREQUENCY: CONTINUOUS

## 2020-01-17 ASSESSMENT — PAIN SCALES - GENERAL
PAINLEVEL_OUTOF10: 8
PAINLEVEL_OUTOF10: 6

## 2020-01-17 ASSESSMENT — PAIN DESCRIPTION - DESCRIPTORS: DESCRIPTORS: CONSTANT

## 2020-01-17 NOTE — ED NOTES
Selena Pringle 303 paged hospitalist     Harpal Simons  01/17/20 [phone] mid level with [employer] returned call      Harpal Simons  01/17/20 [id number]

## 2020-01-17 NOTE — H&P
problem with uncertain prognosis      [] Elective major surgery      []Prescription drug management       History of Present Illness:     Chief Complaint: Fall, right hip pain    Kenzie Levy is a 69 y.o.  male  who presents with the above complaints, onset today. Context is, patient had ? fall today- he is unable to explain circumstances, whether he was dizzy or not and states he may have fallen asleep and fell out of the bed. He is otherwise answering questions appropriately and is w/o focal weakness. He denies dysuria, endorses frequency. EMS noted patient to be febrile and to have bright red blood in Depends undergarment. He reports noticing this blood intermittently, weekly to every other week, for the \"last few months. \"  He attributed this to his hx of diverticulosis. He also has hx of chronic BLE edema from venous stasis/insufficiency. He denies fever/chills, sob, chest pain/pressure, abdominal pain, diarrhea, n/v. Confirms code status. Ten point ROS reviewed negative, unless as noted above    Objective: Intake/Output Summary (Last 24 hours) at 1/17/2020 1328  Last data filed at 1/17/2020 1103  Gross per 24 hour   Intake 1050 ml   Output --   Net 1050 ml        Vitals:     Temp  99.7  HR  88  RR  18  BP  160/70  SPO2  96%    Physical Exam:     GEN Awake male, sitting upright in bed in no apparent distress. Appears given age. EYES Pupils are equally round. No scleral erythema, discharge, or conjunctivitis. HENT Mucous membranes are moist. Oral pharynx without exudates, no evidence of thrush. NECK Supple, no apparent thyromegaly or masses. RESP Clear to auscultation, no wheezes, rales or rhonchi. Symmetric chest movement while on room air. CARDIO/VASC S1/S2 auscultated. Regular rate without appreciable murmurs, rubs, or gallops. No JVD or carotid bruits. Peripheral pulses equal bilaterally and palpable. No peripheral edema.   GI Abdomen is soft without significant tenderness, Attends Denominational service: None     Active member of club or organization: None     Attends meetings of clubs or organizations: None     Relationship status: None    Intimate partner violence:     Fear of current or ex partner: None     Emotionally abused: None     Physically abused: None     Forced sexual activity: None   Other Topics Concern    None   Social History Narrative    None       Medications:     Medications:      ferrous sulfate 324 (65 Fe) MG EC tablet Take 324 mg by mouth 2 times daily Historical Provider, MD Needs Review   losartan (COZAAR) 50 MG tablet Take 50 mg by mouth daily Historical Provider, MD Needs Review   tamsulosin (FLOMAX) 0.4 MG capsule Take 1 capsule by mouth daily Deondre Vera MD Needs Review    Patient taking differently: Take 0.4 mg by mouth nightly      famotidine (PEPCID) 40 MG tablet Take 40 mg by mouth nightly  Historical MD Naeem Needs Review   apixaban (ELIQUIS) 5 MG TABS tablet Take 1 tablet by mouth 2 times daily Betsey Bartlett DO Needs Review   metoprolol tartrate (LOPRESSOR) 25 MG tablet Take 1 tablet by mouth 2 times daily Betsey Bartlett DO Needs Review   allopurinol (ZYLOPRIM) 300 MG tablet Take 300 mg by mouth daily Historical Provider, MD Needs Review   diltiazem (TIAZAC) 360 MG extended release capsule Take 360 mg by mouth daily Historical MD Naeem Needs Review   triamterene-hydrochlorothiazide (MAXZIDE-25) 37.5-25 MG per tablet Take 1 tablet by mouth daily Historical MD Naeem Needs Review   metFORMIN (GLUCOPHAGE) 500 MG tablet Take 500 mg by mouth 2 times daily (with meals) Historical Provider, MD Needs Review   pravastatin (PRAVACHOL) 20 MG tablet Take 20 mg by mouth nightly Historical MD Naeem Needs Review   docusate (COLACE, DULCOLAX) 100 MG CAPS Take 100 mg by mouth 2 times daily Deondre Vera MD Needs Review   miconazole (MICOTIN) 2 % powder Apply topically 2 times daily.  Deondre Vera MD Needs Review hip joint osteoarthrosis with collapse of the femoral head and  bony remodeling of the femoral head and acetabulum.  No evidence to suggest  an acute fracture.  There are erosive-like changes at the femoral head and  acetabular articular surface which are nonspecific and could represent  subcortical cystic change versus true erosions. Miguel Angel Kaity is also right inguinal  lymphadenopathy. Miguel Angel Kaity is joint capsular soft tissue thickening versus a  joint effusion.  Septic arthritis cannot be excluded in the appropriate  clinical setting. Right inguinal lymphadenopathy. Miguel Angel Kaity are also mildly enlarged right pelvic  lymph nodes. Subcutaneous fat stranding in the lateral aspect of the proximal thigh could  represent an early hematoma in the setting of fall.     CTA ABDOMEN PELVIS W CONTRAST [[id number]] Collected: 01/17/20 1057     Order Status: Completed Updated: 01/17/20 1252     Narrative:       EXAMINATION:  CTA OF THE ABDOMEN AND PELVIS WITH CONTRAST    1/17/2020 9:40 am:    TECHNIQUE:  CTA of the abdomen and pelvis was performed with the administration of  intravenous contrast. Multiplanar reformatted images are provided for review. MIP images are provided for review. Dose modulation, iterative  reconstruction, and/or weight based adjustment of the mA/kV was utilized to  reduce the radiation dose to as low as reasonably achievable. COMPARISON:  None.     HISTORY:  ORDERING SYSTEM PROVIDED HISTORY: GIB PROTOCOL  TECHNOLOGIST PROVIDED HISTORY:  Reason for exam:->GIB PROTOCOL  Reason for Exam: gi bleed protocol  Acuity: Acute  Type of Exam: Initial  Additional signs and symptoms: isovue-370 99ml IV    FINDINGS:    CTA ABDOMEN:    The lung bases are clear bilaterally.  Atherosclerotic vascular  calcifications are present.  The liver, spleen, pancreas, adrenal glands and  kidneys are without acute finding.  No urinary tract obstruction or calculus  is seen.  No bowel obstruction is seen.  No contrast extravasation is seen in  the loops of bowel within the abdomen.  Colonic diverticulosis is evident. No enlarged lymph nodes are seen.  No bony destructive process or acute  osseous injury is seen.  The delayed images demonstrate some tiny benign  subcentimeter renal cysts. CTA PELVIS:    No pelvic mass or fluid collection is seen.  Atherosclerotic vascular  calcifications are noted. Oval Cools is bladder wall thickening with probable  small diverticula likely reflecting sequela of chronic bladder outlet  obstruction.  The prostate gland is mildly enlarged.  No contrast  extravasation is seen in bowel loops of the pelvis to suggest site of active  GI bleeding.  Colonic diverticulosis is evident.  Severe arthritis of the  right hip is seen.  Moderate-to-severe arthritis of the left hip is seen. There is infiltration of the subcutaneous tissues of the patient's pannus  questioning panniculitis.  Fat-containing umbilical hernia is present. Oval Cools  are indeterminate periaortic and iliac lymph nodes.  The dominant lymph node  is seen in the external iliac chain on the right measuring 1.5 cm.     Impression:       No evidence of contrast extravasation within the bowel to suggest site of GI  bleeding.  Patient does have underlying colonic diverticulosis. Retroperitoneal lymphadenopathy which is indeterminate.  Short-term follow-up  CT in 3 months is recommended. Atherosclerotic vascular calcifications. Small renal cysts. Bilateral hip arthritis, particularly on the right. Infiltration of the subcutaneous tissues patient's pannus suggesting  panniculitis.     Abnormal bladder suggesting changes of chronic bladder outlet obstruction.     CT HEAD WO CONTRAST [[id number]] Collected: 01/17/20 1244     Order Status: Completed Updated: 01/17/20 1248     Narrative:       EXAMINATION:  CT OF THE HEAD WITHOUT CONTRAST  1/17/2020 9:39 am    TECHNIQUE:  CT of the head was performed without the administration of intravenous  contrast. Dose modulation, iterative reconstruction, and/or weight based  adjustment of the mA/kV was utilized to reduce the radiation dose to as low  as reasonably achievable. COMPARISON:  None. HISTORY:  ORDERING SYSTEM PROVIDED HISTORY: syncope, closed head injury  TECHNOLOGIST PROVIDED HISTORY:  Reason for exam:->syncope, closed head injury  Has a \"code stroke\" or \"stroke alert\" been called? ->No  Reason for Exam: syncope, closed head injury  Acuity: Acute  Type of Exam: Initial    FINDINGS:  BRAIN/VENTRICLES: There is no acute intracranial hemorrhage, mass effect or  midline shift.  No abnormal extra-axial fluid collection.  The gray-white  differentiation is maintained without evidence of an acute infarct.  There is  no evidence of hydrocephalus. Patchy hypodensities in the periventricular and  subcortical white matter, which are nonspecific, but may represent chronic  small vessel ischemic change. ORBITS: The visualized portion of the orbits demonstrate no acute abnormality. SINUSES: The visualized paranasal sinuses and mastoid air cells demonstrate  no acute abnormality. SOFT TISSUES/SKULL:  No acute abnormality of the visualized skull or soft  tissues.     Impression:       No acute intracranial abnormality.     CT CERVICAL SPINE WO CONTRAST [[id number]] Collected: 01/17/20 1054     Order Status: Completed Updated: 01/17/20 1100     Narrative:       EXAMINATION:  CT OF THE CERVICAL SPINE WITHOUT CONTRAST 1/17/2020 9:39 am    TECHNIQUE:  CT of the cervical spine was performed without the administration of  intravenous contrast. Multiplanar reformatted images are provided for review. Dose modulation, iterative reconstruction, and/or weight based adjustment of  the mA/kV was utilized to reduce the radiation dose to as low as reasonably  achievable. COMPARISON:  None.     HISTORY:  ORDERING SYSTEM PROVIDED HISTORY: fall, closed head injury  TECHNOLOGIST PROVIDED HISTORY:  Reason for exam:->fall, closed  Severe  narrowing of the superior left hip joint also present.  Pubic rami and  remainder of the pelvis intact.  Soft tissues appear normal.  Moderate  degenerative changes of the lower lumbar spine present.     Impression:       Chronic severe erosive changes of the right hip which have significantly  progressed since 03/09/2017.  Question of lucent line through the medial  aspect of right femoral head favored to be artifactual.  Moderate to severe  osteoarthritis of the left hip. Prachi Riley RECOMMENDATION:  CT scan of the right hip recommended for further evaluation especially if the  patient has severe pain above baseline and is unable to bear weight.     XR CHEST STANDARD (2 VW) [[id number]] Collected: 01/17/20 0844     Order Status: Completed Specimen: Chest Updated: 01/17/20 0850     Narrative:       EXAMINATION:  TWO XRAY VIEWS OF THE CHEST    1/17/2020 7:53 am    COMPARISON:  Chest x-ray 09/25/2018    HISTORY:  ORDERING SYSTEM PROVIDED HISTORY: fever, syncope  TECHNOLOGIST PROVIDED HISTORY:  Reason for exam:->fever, syncope  Reason for Exam: fever, syncope  Acuity: Acute  Type of Exam: Initial  Mechanism of Injury: male that presents after a fall.  Patient reports \"I do  not know what happened\".  Patient awoke on the ground next to his bed. Patient thinks he was down for approximately an hour but is not sure. Patient is unsure if he hit his head or lost consciousness. [place] is on  aspirin, Plavix and Eliquis.  Patient has been having intermittent bright red  blood per rectum.  Patient reports \"it is from my diverticulitis\".  No  current abdominal pain.  Some right hip pain that is currently 6/10, constant  and non-radiating, worse with movement    FINDINGS:  The lungs are poorly expanded.  Prominent pulmonary vasculature.   Costophrenic angles are clear.  Cardiac silhouette is unchanged, but appears  enlarged.  No large pneumothorax.  No acute displaced rib fractures.     Impression:       1.  Poorly expanded lungs.  Prominent pulmonary vasculature could be related  to the degree of inspiration or represent pulmonary vascular congestion.     XR HIP RIGHT (2-3 VIEWS) [[id number]]      Order Status: Canceled          Atrial fibrillation   Septal infarct , age undetermined   Abnormal ECG   When compared with ECG of 25-SEP-2018 12:14,   Previous ECG has undetermined rhythm, needs review   Nonspecific T wave abnormality, improved in Inferior leads   Nonspecific T wave abnormality no longer evident in Anterior leads   QT has lengthened     Electronically signed by KARELY Renee NP on 1/17/2020 at 1:28 PM

## 2020-01-17 NOTE — PROGRESS NOTES
Supriya,        Medications added or changed (ex.  new medication, dosage change, interval change, formulation change):  Ferrous sulfate dose changed to 324 mg bid from 325 mg tid  Losartan new medication    Medications removed from list (include reason, ex. noncompliance, medication cost, therapy complete etc.):   Aspirin no longer taking  Plavix no longer taking  Docusate no longer taking  Miconazole powder no longer using  Glycolax no longer using    Comments:  Patient has medications pill packed and had his pills with him  Updated med list per list included with pill pack   Patient states he does not take any other medications     To my knowledge the above medication history is accurate as of 1/17/2020 1:09 PM.   Paolo Hawkins CPhT   1/17/2020 1:09 PM

## 2020-01-17 NOTE — CONSULTS
CARDIOLOGY CONSULT NOTE   Reason for consultation:  GI bleeding     Referring physician:  Tomas Wright MD     Primary care physician: Chema Aldrich MD      Dear   Thanks for the consult. History of present illness:Bryce is a 69 y. o.year old who  presents with falls from his bed, he has severe leg swelling with lymphedema. He cannot walk properly due to hip pain, he had complex CSI of his RCA and LAD at [facility], he has afib and takes eliquis, he stopped plavix after one yr of his stent due to leg skin bleeding and has hemorrhoids and diverticulosis bleed also, cardiology consult is called for management of anticogulation, patient is not worried about his Gi bleed as he thinks from hemorrhoids and diverticulosis. He had fever also. Chief Complaint   Patient presents with    Fall     slid down wall, right leg ended up under patinet for ~1 hour    Fever     102.4 pta for EMS    Hip Pain     right     Blood pressure, cholesterol, blood glucose and weight are well controlled. Past medical history:    has a past medical history of Arthropathy, Atrial fibrillation ([address] Atrial flutter ([address] Diabetes mellitus ([address] Gout, Hyperlipidemia, Hypertension, and Lumbago. Past surgical history:   has a past surgical history that includes Colonoscopy (N/A, 9/27/2018). Social History:   reports that he has quit smoking. His smoking use included cigarettes. He smoked 0.50 packs per day. He has never used smokeless tobacco. He reports that he does not drink alcohol or use drugs.   Family history:   no family history of CAD, STROKE of DM    Allergies   Allergen Reactions    Demerol Hcl [Meperidine]     Gabapentin     Simvastatin        sodium chloride flush 0.9 % injection 10 mL, 2 times per day  sodium chloride flush 0.9 % injection 10 mL, PRN  magnesium hydroxide (MILK OF MAGNESIA) 400 MG/5ML suspension 30 mL, Daily PRN  ondansetron (ZOFRAN) injection 4 mg, Q6H PRN  insulin lispro (HUMALOG) injection vial 0-6 Units, TID WC  insulin lispro (HUMALOG) injection vial 0-3 Units, Nightly  glucose (GLUTOSE) 40 % oral gel 15 g, PRN  dextrose 50 % IV solution, PRN  glucagon (rDNA) injection 1 mg, PRN  dextrose 5 % solution, PRN  cefepime (MAXIPIME) 2 g in dextrose 5 % 50 mL IVPB, Q12H  pantoprazole (PROTONIX) injection 40 mg, BID  acetaminophen (TYLENOL) tablet 650 mg, Q4H PRN  allopurinol (ZYLOPRIM) tablet 300 mg, Daily  diltiazem (CARDIZEM CD) extended release capsule 360 mg, Daily  ferrous sulfate tablet 325 mg, BID  losartan (COZAAR) tablet 50 mg, Daily  metoprolol tartrate (LOPRESSOR) tablet 25 mg, BID  pravastatin (PRAVACHOL) tablet 20 mg, Nightly  tamsulosin (FLOMAX) capsule 0.4 mg, Nightly  triamterene-hydrochlorothiazide (MAXZIDE-25) 37.5-25 MG per tablet 1 tablet, Daily  0.9 % sodium chloride infusion, Continuous      Current Facility-Administered Medications   Medication Dose Route Frequency Provider Last Rate Last Dose    sodium chloride flush 0.9 % injection 10 mL  10 mL Intravenous 2 times per day Pavan Vanessa, APRN - NP        sodium chloride flush 0.9 % injection 10 mL  10 mL Intravenous PRN Pavan Vanessa, APRN - NP        magnesium hydroxide (MILK OF MAGNESIA) 400 MG/5ML suspension 30 mL  30 mL Oral Daily PRN Pavan Vanessa, APRN - NP        ondansetron (ZOFRAN) injection 4 mg  4 mg Intravenous Q6H PRN Pavan Vanessa, APRN - NP        insulin lispro (HUMALOG) injection vial 0-6 Units  0-6 Units Subcutaneous TID WC Viaruby Vanessa, APRN - NP        insulin lispro (HUMALOG) injection vial 0-3 Units  0-3 Units Subcutaneous Nightly Pavan Vanessa, APRN - NP        glucose (GLUTOSE) 40 % oral gel 15 g  15 g Oral PRN Pavan Hornd, APRN - NP        dextrose 50 % IV solution  12.5 g Intravenous PRN Pavan Vanessa, APRN - NP        glucagon (rDNA) injection 1 mg  1 mg Intramuscular PRN Pavan Vanessa, APRN - NP        dextrose 5 % solution  100 mL/hr Intravenous PRN Clayton Maclachlan bleeding problems, blood clots or swollen lymph nodes  · Allergic/Immunologic: No nasal congestion or hives  All systems negative except as marked. ·   ·      Physical Examination:    Vitals:    01/17/20 1557   BP: (!) 154/66   Pulse: 96   Resp:    Temp: 98.3 °F (36.8 °C)   SpO2: 100%      Wt Readings from Last 3 Encounters:   01/17/20 240 lb (108.9 kg)   10/01/18 260 lb (117.9 kg)   07/23/18 249 lb (112.9 kg)     Body mass index is 36.49 kg/m². General Appearance:  No distress, conversant    Constitutional:  Well developed, Well nourished, No acute distress, Non-toxic appearance. HENT:  Normocephalic, Atraumatic, Bilateral external ears normal, Oropharynx moist, No oral exudates, Nose normal. Neck- Normal range of motion, No tenderness, Supple, No stridor,no apical-carotid delay, no carotid bruit  Eyes:  PERRL, EOMI, Conjunctiva normal, No discharge. Respiratory:  Normal breath sounds, No respiratory distress, No wheezing, No chest tenderness. ,no use of accessory muscles, diaphragm movement is normal  Cardiovascular: (PMI) apex non displaced,no lifts no thrills, no s3,no s4, Normal heart rate, Normal rhythm, No murmurs, No rubs, No gallops. Carotid arteries pulse and amplitude are normal no bruit, no abdominal bruit noted ( normal abdominal aorta ausculation), femoral arteries pulse and amplitude are normal no bruit, pedal pulses are normal  GI:  Bowel sounds normal, Soft, No tenderness, No masses, No pulsatile masses, no hepatosplenomegally, no bruits  : External genitalia appear normal, No masses or lesions. No discharge. No CVA tenderness. Musculoskeletal:  Intact distal pulses, No edema, No tenderness, No cyanosis, No clubbing. Good range of motion in all major joints. No tenderness to palpation or major deformities noted. Back- No tenderness. Integument:  Warm, Dry, No erythema, No rash. Skin: no rash, no ulcers  Lymphatic:  No lymphadenopathy noted.    Neurologic:  Alert & oriented x 3,

## 2020-01-18 LAB
ADENOVIRUS DETECTION BY PCR: NOT DETECTED
BORDETELLA PERTUSSIS PCR: NOT DETECTED
CHLAMYDOPHILA PNEUMONIA PCR: NOT DETECTED
CORONAVIRUS 229E PCR: NOT DETECTED
CORONAVIRUS HKU1 PCR: NOT DETECTED
CORONAVIRUS NL63 PCR: NOT DETECTED
CORONAVIRUS OC43 PCR: NOT DETECTED
CULTURE: ABNORMAL
GLUCOSE BLD-MCNC: 115 MG/DL (ref 70–99)
GLUCOSE BLD-MCNC: 125 MG/DL (ref 70–99)
GLUCOSE BLD-MCNC: 174 MG/DL (ref 70–99)
GLUCOSE BLD-MCNC: 190 MG/DL (ref 70–99)
HCT VFR BLD CALC: 31.3 % (ref 42–52)
HCT VFR BLD CALC: 31.5 % (ref 42–52)
HCT VFR BLD CALC: 32.7 % (ref 42–52)
HEMOGLOBIN: 9.4 GM/DL (ref 13.5–18)
HEMOGLOBIN: 9.4 GM/DL (ref 13.5–18)
HEMOGLOBIN: 9.8 GM/DL (ref 13.5–18)
HUMAN METAPNEUMOVIRUS PCR: NOT DETECTED
INFLUENZA A BY PCR: NOT DETECTED
INFLUENZA A H1 (2009) PCR: NOT DETECTED
INFLUENZA A H1 PANDEMIC PCR: NOT DETECTED
INFLUENZA A H3 PCR: NOT DETECTED
INFLUENZA B BY PCR: NOT DETECTED
Lab: ABNORMAL
MAGNESIUM: 1.8 MG/DL (ref 1.8–2.4)
MYCOPLASMA PNEUMONIAE PCR: NOT DETECTED
PARAINFLUENZA 1 PCR: NOT DETECTED
PARAINFLUENZA 2 PCR: NOT DETECTED
PARAINFLUENZA 3 PCR: NOT DETECTED
PARAINFLUENZA 4 PCR: NOT DETECTED
RHINOVIRUS ENTEROVIRUS PCR: NOT DETECTED
RSV PCR: NOT DETECTED
SPECIMEN: ABNORMAL

## 2020-01-18 PROCEDURE — 87486 CHLMYD PNEUM DNA AMP PROBE: CPT

## 2020-01-18 PROCEDURE — 87798 DETECT AGENT NOS DNA AMP: CPT

## 2020-01-18 PROCEDURE — 85014 HEMATOCRIT: CPT

## 2020-01-18 PROCEDURE — 2700000000 HC OXYGEN THERAPY PER DAY

## 2020-01-18 PROCEDURE — 2580000003 HC RX 258: Performed by: INTERNAL MEDICINE

## 2020-01-18 PROCEDURE — 6360000002 HC RX W HCPCS: Performed by: INTERNAL MEDICINE

## 2020-01-18 PROCEDURE — 94761 N-INVAS EAR/PLS OXIMETRY MLT: CPT

## 2020-01-18 PROCEDURE — 6370000000 HC RX 637 (ALT 250 FOR IP): Performed by: NURSE PRACTITIONER

## 2020-01-18 PROCEDURE — 82962 GLUCOSE BLOOD TEST: CPT

## 2020-01-18 PROCEDURE — 96376 TX/PRO/DX INJ SAME DRUG ADON: CPT

## 2020-01-18 PROCEDURE — 87581 M.PNEUMON DNA AMP PROBE: CPT

## 2020-01-18 PROCEDURE — 6370000000 HC RX 637 (ALT 250 FOR IP): Performed by: SPECIALIST

## 2020-01-18 PROCEDURE — 1200000000 HC SEMI PRIVATE

## 2020-01-18 PROCEDURE — 85018 HEMOGLOBIN: CPT

## 2020-01-18 PROCEDURE — 87633 RESP VIRUS 12-25 TARGETS: CPT

## 2020-01-18 PROCEDURE — 2580000003 HC RX 258: Performed by: NURSE PRACTITIONER

## 2020-01-18 PROCEDURE — 96366 THER/PROPH/DIAG IV INF ADDON: CPT

## 2020-01-18 PROCEDURE — 36415 COLL VENOUS BLD VENIPUNCTURE: CPT

## 2020-01-18 PROCEDURE — 93010 ELECTROCARDIOGRAM REPORT: CPT | Performed by: INTERNAL MEDICINE

## 2020-01-18 PROCEDURE — 6360000002 HC RX W HCPCS: Performed by: NURSE PRACTITIONER

## 2020-01-18 RX ORDER — PANTOPRAZOLE SODIUM 40 MG/1
40 TABLET, DELAYED RELEASE ORAL
Status: DISCONTINUED | OUTPATIENT
Start: 2020-01-18 | End: 2020-01-22 | Stop reason: HOSPADM

## 2020-01-18 RX ADMIN — TAMSULOSIN HYDROCHLORIDE 0.4 MG: 0.4 CAPSULE ORAL at 20:05

## 2020-01-18 RX ADMIN — FERROUS SULFATE TAB 325 MG (65 MG ELEMENTAL FE) 325 MG: 325 (65 FE) TAB at 08:38

## 2020-01-18 RX ADMIN — CEFEPIME HYDROCHLORIDE 2 G: 2 INJECTION, POWDER, FOR SOLUTION INTRAVENOUS at 08:37

## 2020-01-18 RX ADMIN — LOSARTAN POTASSIUM 50 MG: 50 TABLET, FILM COATED ORAL at 08:38

## 2020-01-18 RX ADMIN — VANCOMYCIN HYDROCHLORIDE 1500 MG: 5 INJECTION, POWDER, LYOPHILIZED, FOR SOLUTION INTRAVENOUS at 20:53

## 2020-01-18 RX ADMIN — DILTIAZEM HYDROCHLORIDE 360 MG: 120 CAPSULE, COATED, EXTENDED RELEASE ORAL at 08:37

## 2020-01-18 RX ADMIN — METOPROLOL TARTRATE 25 MG: 25 TABLET ORAL at 20:05

## 2020-01-18 RX ADMIN — INSULIN LISPRO 1 UNITS: 100 INJECTION, SOLUTION INTRAVENOUS; SUBCUTANEOUS at 11:35

## 2020-01-18 RX ADMIN — TRIAMTERENE AND HYDROCHLOROTHIAZIDE 1 TABLET: 37.5; 25 TABLET ORAL at 08:37

## 2020-01-18 RX ADMIN — ALLOPURINOL 300 MG: 300 TABLET ORAL at 08:37

## 2020-01-18 RX ADMIN — FERROUS SULFATE TAB 325 MG (65 MG ELEMENTAL FE) 325 MG: 325 (65 FE) TAB at 20:05

## 2020-01-18 RX ADMIN — VANCOMYCIN HYDROCHLORIDE 1500 MG: 5 INJECTION, POWDER, LYOPHILIZED, FOR SOLUTION INTRAVENOUS at 09:10

## 2020-01-18 RX ADMIN — CEFEPIME HYDROCHLORIDE 2 G: 2 INJECTION, POWDER, FOR SOLUTION INTRAVENOUS at 20:07

## 2020-01-18 RX ADMIN — SODIUM CHLORIDE, PRESERVATIVE FREE 10 ML: 5 INJECTION INTRAVENOUS at 20:10

## 2020-01-18 RX ADMIN — PANTOPRAZOLE SODIUM 40 MG: 40 TABLET, DELAYED RELEASE ORAL at 08:45

## 2020-01-18 RX ADMIN — METOPROLOL TARTRATE 25 MG: 25 TABLET ORAL at 08:37

## 2020-01-18 RX ADMIN — SODIUM CHLORIDE: 9 INJECTION, SOLUTION INTRAVENOUS at 06:56

## 2020-01-18 RX ADMIN — PRAVASTATIN SODIUM 20 MG: 10 TABLET ORAL at 20:05

## 2020-01-18 ASSESSMENT — PAIN DESCRIPTION - ONSET
ONSET: ON-GOING
ONSET: ON-GOING

## 2020-01-18 ASSESSMENT — PAIN SCALES - GENERAL
PAINLEVEL_OUTOF10: 6

## 2020-01-18 ASSESSMENT — PAIN DESCRIPTION - LOCATION
LOCATION: HIP
LOCATION: HIP

## 2020-01-18 ASSESSMENT — PAIN DESCRIPTION - ORIENTATION
ORIENTATION: RIGHT
ORIENTATION: RIGHT

## 2020-01-18 ASSESSMENT — PAIN DESCRIPTION - PAIN TYPE
TYPE: ACUTE PAIN;CHRONIC PAIN
TYPE: ACUTE PAIN

## 2020-01-18 ASSESSMENT — PAIN - FUNCTIONAL ASSESSMENT
PAIN_FUNCTIONAL_ASSESSMENT: PREVENTS OR INTERFERES SOME ACTIVE ACTIVITIES AND ADLS
PAIN_FUNCTIONAL_ASSESSMENT: PREVENTS OR INTERFERES WITH ALL ACTIVE AND SOME PASSIVE ACTIVITIES

## 2020-01-18 ASSESSMENT — PAIN DESCRIPTION - DESCRIPTORS
DESCRIPTORS: CONSTANT
DESCRIPTORS: CONSTANT

## 2020-01-18 ASSESSMENT — PAIN DESCRIPTION - FREQUENCY
FREQUENCY: CONTINUOUS
FREQUENCY: CONTINUOUS

## 2020-01-18 NOTE — CONSULTS
(MAXIPIME) 2 g in dextrose 5 % 50 mL IVPB, 2 g, Intravenous, Q12H  acetaminophen (TYLENOL) tablet 650 mg, 650 mg, Oral, Q4H PRN  allopurinol (ZYLOPRIM) tablet 300 mg, 300 mg, Oral, Daily  diltiazem (CARDIZEM CD) extended release capsule 360 mg, 360 mg, Oral, Daily  ferrous sulfate tablet 325 mg, 325 mg, Oral, BID  losartan (COZAAR) tablet 50 mg, 50 mg, Oral, Daily  metoprolol tartrate (LOPRESSOR) tablet 25 mg, 25 mg, Oral, BID  pravastatin (PRAVACHOL) tablet 20 mg, 20 mg, Oral, Nightly  tamsulosin (FLOMAX) capsule 0.4 mg, 0.4 mg, Oral, Nightly  triamterene-hydrochlorothiazide (MAXZIDE-25) 37.5-25 MG per tablet 1 tablet, 1 tablet, Oral, Daily  vancomycin (VANCOCIN) 1,500 mg in dextrose 5 % 500 mL IVPB, 1,500 mg, Intravenous, Q12H    Allergies:  Demerol hcl [meperidine]; Gabapentin; and Simvastatin    Social History:   TOBACCO:   reports that he has quit smoking. His smoking use included cigarettes. He smoked 0.50 packs per day. He has never used smokeless tobacco.  ETOH:   reports no history of alcohol use. DRUGS:   reports no history of drug use. MARITAL STATUS:    OCCUPATION:      Family History:     History reviewed. No pertinent family history. REVIEW OF SYSTEMS:        PHYSICAL EXAM:    VITALS:  /66   Pulse 86   Temp 98.2 °F (36.8 °C) (Oral)   Resp 17   Ht 5' 8\" (1.727 m)   Wt 278 lb (126.1 kg)   SpO2 96%   BMI 42.27 kg/m²     TEMPERATURE:  Current - Temp: 98.2 °F (36.8 °C); Max - Temp  Av.1 °F (36.7 °C)  Min: 97.5 °F (36.4 °C)  Max: 98.5 °F (36.9 °C)  24HR BLOOD PRESSURE RANGE:  Systolic (12QSK), VVX:368 , Min:130 , NMU:610   ; Diastolic (87RYX), OLR:71, Min:59, Max:85    8HR INTAKE OUTPUT:  No intake/output data recorded.   URINARY CATHETER OUTPUT (Lane):     DRAIN/TUBE OUTPUT:        CONSTITUTIONAL:  awake, alert, poor historian  Obese, AF. VSS  Buried penis in fat bad and uncirc  No concerning scrotal masses  He declined a JESSICA  LE edema with RLE cellulitis    Data:    WBC:    Lab Results   Component Value Date    WBC 10.9 01/17/2020     Hemoglobin/Hematocrit:    Lab Results   Component Value Date    HGB 9.4 01/18/2020    HCT 31.3 01/18/2020     BMP:    Lab Results   Component Value Date     01/17/2020    K 3.5 01/17/2020    CL 93 01/17/2020    CO2 28 01/17/2020    BUN 10 01/17/2020    LABALBU 3.6 01/17/2020    CREATININE 0.7 01/17/2020    CALCIUM 8.2 01/17/2020    GFRAA >60 01/17/2020    LABGLOM >60 01/17/2020     PT/INR:    Lab Results   Component Value Date    PROTIME 27.9 01/17/2020    INR 2.29 01/17/2020           Imaging: [unfilled]    CT Scan a/p 1/17/2020:  No stones, mild enlarged prostate, thickening bladder with small diverticulum      Impression: acute urinary tract infection                       Chronic voiding symptoms on flomax                        On eliquis for a-fib. On aspirin and plavix      Recommendation: 1. Await urine culture and treat as indicated for 10 days                                   2. Continue flomax                                   3.  Follow-up outpatient for cystoscopy, PVR, and uroflow                                   4.  Will sign off      Patient seen and examined, chart reviewed.      Electronically signed by Gabriel Streeter MD on 1/18/2020 at 12:34 PM

## 2020-01-18 NOTE — CONSULTS
Vitals:  BP (!) 130/59   Pulse 78   Temp 98.5 °F (36.9 °C) (Oral)   Resp 22   Ht 5' 8\" (1.727 m)   Wt 278 lb (126.1 kg)   SpO2 95%   BMI 42.27 kg/m²   CONSTITUTIONAL: alert, cooperative, no apparent distress,   EYES:  pupils equal, round and reactive to light and sclera clear  ENT:  normocepalic, without obvious abnormality  NECK:  supple, symmetrical, trachea midline  HEMATOLOGIC/LYMPHATICS:  no cervical lymphadenopathy and no supraclavicular lymphadenopathy  LUNGS:  clear to auscultation  CARDIOVASCULAR:  regular rate and rhythm and no murmur noted  ABDOMEN:  Soft, non-tender with normal bowel sounds. No organomegaly or masses  NEUROLOGIC: no focal deficit detected  SKIN:  no lesions  EXTREMITIES: no clubbing, cyanosis, or edema  RECTAL; no mass- stool dark (?iron)    IMPRESSION:  1) history of hemorrhoids, rectal polyp and diverticulosis-- S/P colonoscopy by Dr Carmen Summers 9/2018  2) intermittent rectal bleeding- probably due to #1 aggravated by anticoag  3) history of erosive duodenitis 3/2017 by EGD    RECOMMENDATIONS:  1) he is not interested in repeat colonoscopy at this time  2) would follow H/H- if significant drop will talk to him again about repeat endoscopies  3) long-term PPI for ulcer prophylaxis        Mohit Strickland M.D

## 2020-01-19 LAB
ANION GAP SERPL CALCULATED.3IONS-SCNC: 9 MMOL/L (ref 4–16)
BUN BLDV-MCNC: 11 MG/DL (ref 6–23)
CALCIUM SERPL-MCNC: 8.5 MG/DL (ref 8.3–10.6)
CHLORIDE BLD-SCNC: 98 MMOL/L (ref 99–110)
CO2: 30 MMOL/L (ref 21–32)
CREAT SERPL-MCNC: 0.7 MG/DL (ref 0.9–1.3)
DOSE AMOUNT: NORMAL
DOSE TIME: NORMAL
GFR AFRICAN AMERICAN: >60 ML/MIN/1.73M2
GFR NON-AFRICAN AMERICAN: >60 ML/MIN/1.73M2
GLUCOSE BLD-MCNC: 132 MG/DL (ref 70–99)
GLUCOSE BLD-MCNC: 133 MG/DL (ref 70–99)
GLUCOSE BLD-MCNC: 152 MG/DL (ref 70–99)
GLUCOSE BLD-MCNC: 175 MG/DL (ref 70–99)
GLUCOSE BLD-MCNC: 195 MG/DL (ref 70–99)
HCT VFR BLD CALC: 30.6 % (ref 42–52)
HEMOGLOBIN: 9.1 GM/DL (ref 13.5–18)
POTASSIUM SERPL-SCNC: 3.5 MMOL/L (ref 3.5–5.1)
SODIUM BLD-SCNC: 137 MMOL/L (ref 135–145)
VANCOMYCIN TROUGH: 18.4 UG/ML (ref 10–20)

## 2020-01-19 PROCEDURE — 85018 HEMOGLOBIN: CPT

## 2020-01-19 PROCEDURE — 80202 ASSAY OF VANCOMYCIN: CPT

## 2020-01-19 PROCEDURE — 6360000002 HC RX W HCPCS: Performed by: INTERNAL MEDICINE

## 2020-01-19 PROCEDURE — 82962 GLUCOSE BLOOD TEST: CPT

## 2020-01-19 PROCEDURE — 85014 HEMATOCRIT: CPT

## 2020-01-19 PROCEDURE — 96366 THER/PROPH/DIAG IV INF ADDON: CPT

## 2020-01-19 PROCEDURE — 6370000000 HC RX 637 (ALT 250 FOR IP): Performed by: SPECIALIST

## 2020-01-19 PROCEDURE — 2580000003 HC RX 258: Performed by: NURSE PRACTITIONER

## 2020-01-19 PROCEDURE — 2580000003 HC RX 258: Performed by: INTERNAL MEDICINE

## 2020-01-19 PROCEDURE — 36415 COLL VENOUS BLD VENIPUNCTURE: CPT

## 2020-01-19 PROCEDURE — 97162 PT EVAL MOD COMPLEX 30 MIN: CPT

## 2020-01-19 PROCEDURE — 97530 THERAPEUTIC ACTIVITIES: CPT

## 2020-01-19 PROCEDURE — 1200000000 HC SEMI PRIVATE

## 2020-01-19 PROCEDURE — 6360000002 HC RX W HCPCS: Performed by: NURSE PRACTITIONER

## 2020-01-19 PROCEDURE — 96376 TX/PRO/DX INJ SAME DRUG ADON: CPT

## 2020-01-19 PROCEDURE — 51702 INSERT TEMP BLADDER CATH: CPT

## 2020-01-19 PROCEDURE — 87081 CULTURE SCREEN ONLY: CPT

## 2020-01-19 PROCEDURE — 80048 BASIC METABOLIC PNL TOTAL CA: CPT

## 2020-01-19 PROCEDURE — 6370000000 HC RX 637 (ALT 250 FOR IP): Performed by: NURSE PRACTITIONER

## 2020-01-19 RX ADMIN — TRIAMTERENE AND HYDROCHLOROTHIAZIDE 1 TABLET: 37.5; 25 TABLET ORAL at 08:28

## 2020-01-19 RX ADMIN — CEFEPIME HYDROCHLORIDE 2 G: 2 INJECTION, POWDER, FOR SOLUTION INTRAVENOUS at 21:06

## 2020-01-19 RX ADMIN — CEFEPIME HYDROCHLORIDE 2 G: 2 INJECTION, POWDER, FOR SOLUTION INTRAVENOUS at 08:27

## 2020-01-19 RX ADMIN — LOSARTAN POTASSIUM 50 MG: 50 TABLET, FILM COATED ORAL at 08:27

## 2020-01-19 RX ADMIN — METOPROLOL TARTRATE 25 MG: 25 TABLET ORAL at 20:52

## 2020-01-19 RX ADMIN — ALLOPURINOL 300 MG: 300 TABLET ORAL at 08:28

## 2020-01-19 RX ADMIN — METOPROLOL TARTRATE 25 MG: 25 TABLET ORAL at 08:27

## 2020-01-19 RX ADMIN — VANCOMYCIN HYDROCHLORIDE 1500 MG: 5 INJECTION, POWDER, LYOPHILIZED, FOR SOLUTION INTRAVENOUS at 09:00

## 2020-01-19 RX ADMIN — FERROUS SULFATE TAB 325 MG (65 MG ELEMENTAL FE) 325 MG: 325 (65 FE) TAB at 20:52

## 2020-01-19 RX ADMIN — SODIUM CHLORIDE, PRESERVATIVE FREE 10 ML: 5 INJECTION INTRAVENOUS at 20:54

## 2020-01-19 RX ADMIN — VANCOMYCIN 1500 MG: 1 INJECTION, SOLUTION INTRAVENOUS at 21:37

## 2020-01-19 RX ADMIN — PANTOPRAZOLE SODIUM 40 MG: 40 TABLET, DELAYED RELEASE ORAL at 06:16

## 2020-01-19 RX ADMIN — DILTIAZEM HYDROCHLORIDE 360 MG: 120 CAPSULE, COATED, EXTENDED RELEASE ORAL at 08:27

## 2020-01-19 RX ADMIN — SODIUM CHLORIDE, PRESERVATIVE FREE 10 ML: 5 INJECTION INTRAVENOUS at 08:27

## 2020-01-19 RX ADMIN — TAMSULOSIN HYDROCHLORIDE 0.4 MG: 0.4 CAPSULE ORAL at 20:52

## 2020-01-19 RX ADMIN — PRAVASTATIN SODIUM 20 MG: 10 TABLET ORAL at 20:52

## 2020-01-19 RX ADMIN — INSULIN LISPRO 1 UNITS: 100 INJECTION, SOLUTION INTRAVENOUS; SUBCUTANEOUS at 17:02

## 2020-01-19 RX ADMIN — FERROUS SULFATE TAB 325 MG (65 MG ELEMENTAL FE) 325 MG: 325 (65 FE) TAB at 08:28

## 2020-01-19 RX ADMIN — INSULIN LISPRO 1 UNITS: 100 INJECTION, SOLUTION INTRAVENOUS; SUBCUTANEOUS at 12:19

## 2020-01-19 NOTE — CONSULTS
WFL  · Hearing:  [facility]  · Cardiopulmonary:  VSS  · Cognition: WFL, see OT/SLP note for further evaluation. Body Structures/Function  · ROM R/L:  WFL. · Strength R/L:  2/5, poor strength observed in function. · Neuro:  WFL      Mobility:  · Rolling L/R:  Min A  · Supine to sit:  Mod A-- pt needs assist for bilat LE's to reach EOB. HOB elevated. Trunk boost and seated scooting assist needed to reach EOB. Takes approx 5 min to complete   · Transfers: max A transfers from EOB and commode. Needs assist for LE repositioning d/t edema restrictions to ROM. Unsuccessful on first trial. Increased momentum cues and lift on subsequent attempts. Fwd flexed with excess WB through device. Pivot transfers are very effortful, slides feet to pivot, not       able to clear foot from ground    · Sitting balance:  SBA. · Standing balance:  Min/CGA with RW-- excess reliance on device and double limb support. Statically stands for up to 2 min at [facility]. No LE buckling or LOB with pivoting. Min A for pericare in standing  · Gait: unable at this time    [facility] 6 Clicks Inpatient Mobility:   Current: 12/24  PLOF: 21/24    Treatment:  Therapeutic Activity Training:   Therapeutic activity training was instructed today. Cues were given for safety, sequence, UE/LE placement, awareness, and balance. Activities performed today included bed mobility training, sup-sit, sit-stand, SPT. Safety: patient left in chair with chair alarm, call light within reach, RN notified, gait belt used. Assessment:  Pt is a 68 yo male who presents to [facility] with UTI and sepsis. He had fall at home. BRBPR also pertinent to admission (had BM today with no blood in stool). PT evaluation reveals strength, power, balance, and endurance deficits. Pt has limited functional capacity at baseline, but has had decline and not receiving adequate assist at home. Pt to benefit from continued skilled PT and dc to SNF when medically cleared.    Activity

## 2020-01-19 NOTE — CONSULTS
[address] [facility]  consulted by Dr. Nabor Clark for monitoring and adjustment. Indication for treatment: UTI with sepsis, Cellulitis  Goal trough: 15 mcg/mL  Other antimicrobials:  cefepime     Pertinent Laboratory Values:   Temp Readings from Last 3 Encounters:   01/19/20 98.3 °F (36.8 °C) (Oral)   10/01/18 100 °F (37.8 °C) (Oral)   07/02/18 97.5 °F (36.4 °C) (Oral)     Recent Labs     01/17/20  0800 01/17/20  1119 01/17/20  2228   WBC 16.4*  --  10.9*   LACTATE 3.2  LACTI CALLED TO DR BENITES @ 0869 [phone] [facility] MLT  RESULTS READ BACK  * 1.9  --      Recent Labs     01/17/20  0800 01/17/20  2228 01/19/20  0518   BUN 12 10 11   CREATININE 0.8* 0.7* 0.7*     Estimated Creatinine Clearance: 124 mL/min (A) (based on SCr of 0.7 mg/dL (L)). Intake/Output Summary (Last 24 hours) at 1/19/2020 1227  Last data filed at 1/19/2020 0758  Gross per 24 hour   Intake 940 ml   Output 450 ml   Net 490 ml       Pertinent Cultures:  Date    Source    Results  1/17   Rapid Influenza  Negative  1/17   Urine          Heavy Aerococcus  1/17                            Blood                                       No growth    Vancomycin level:   TROUGH:    Recent Labs     01/19/20  0518   VANCOTROUGH 18.4     RANDOM:  No results for input(s): VANCORANDOM in the last 72 hours. Assessment:  · WBC and temperature: WBC improved down to 10.9 on 1/17, pt afebrile  · SCr, BUN, and urine output: SCR normal, output data appears incomplete   · Diabetic patient  · Day(s) of therapy: 3  · Vancomycin level: 1/19 - 18.4 (8½ hour level)    Plan:  · Vanco level within acceptable range @18.4.   Level was drawn early with true trough likely being between 10-15  · Continue vancomycin 1,500mg ivpb q12h  · Renal function remains stable  · Will recheck the vanco level on 1/21 @07:30  · Heavy aerococcus in the urine  · Pharmacy will continue to monitor patient and adjust therapy as indicated    [facility] 1/21 @07:30    Thank you for the consult. Vasquez Salinas  1/19/2020 12:27 PM

## 2020-01-19 NOTE — PROGRESS NOTES
Pt up to chair today with therapy. Had BM. Juan Pantoja used to place pt back into bed. Pt repositioned throughout the day but removed pillows and leaned to the rt side. Pt resting in bed. Call light within reach. Call concerns addressed.

## 2020-01-19 NOTE — PROGRESS NOTES
Medications:   Medications:    pantoprazole  40 mg Oral QAM AC    sodium chloride flush  10 mL Intravenous 2 times per day    insulin lispro  0-6 Units Subcutaneous TID WC    insulin lispro  0-3 Units Subcutaneous Nightly    cefepime  2 g Intravenous Q12H    allopurinol  300 mg Oral Daily    diltiazem  360 mg Oral Daily    ferrous sulfate  325 mg Oral BID    losartan  50 mg Oral Daily    metoprolol tartrate  25 mg Oral BID    pravastatin  20 mg Oral Nightly    tamsulosin  0.4 mg Oral Nightly    triamterene-hydrochlorothiazide  1 tablet Oral Daily    vancomycin  1,500 mg Intravenous Q12H      Infusions:    dextrose       PRN Meds: sodium chloride flush, 10 mL, PRN  magnesium hydroxide, 30 mL, Daily PRN  ondansetron, 4 mg, Q6H PRN  glucose, 15 g, PRN  dextrose, 12.5 g, PRN  glucagon (rDNA), 1 mg, PRN  dextrose, 100 mL/hr, PRN  acetaminophen, 650 mg, Q4H PRN          Electronically signed by Chuck Borja MD on 1/19/2020 at 10:19 AM

## 2020-01-20 LAB
ANION GAP SERPL CALCULATED.3IONS-SCNC: 11 MMOL/L (ref 4–16)
BASOPHILS ABSOLUTE: 0 K/CU MM
BASOPHILS RELATIVE PERCENT: 0.5 % (ref 0–1)
BUN BLDV-MCNC: 9 MG/DL (ref 6–23)
CALCIUM SERPL-MCNC: 8.5 MG/DL (ref 8.3–10.6)
CHLORIDE BLD-SCNC: 97 MMOL/L (ref 99–110)
CO2: 29 MMOL/L (ref 21–32)
CREAT SERPL-MCNC: 0.6 MG/DL (ref 0.9–1.3)
DIFFERENTIAL TYPE: ABNORMAL
EOSINOPHILS ABSOLUTE: 0.2 K/CU MM
EOSINOPHILS RELATIVE PERCENT: 4.2 % (ref 0–3)
FERRITIN: 272 NG/ML (ref 30–400)
FOLATE: 9.8 NG/ML (ref 3.1–17.5)
GFR AFRICAN AMERICAN: >60 ML/MIN/1.73M2
GFR NON-AFRICAN AMERICAN: >60 ML/MIN/1.73M2
GLUCOSE BLD-MCNC: 109 MG/DL (ref 70–99)
GLUCOSE BLD-MCNC: 110 MG/DL (ref 70–99)
GLUCOSE BLD-MCNC: 114 MG/DL (ref 70–99)
GLUCOSE BLD-MCNC: 131 MG/DL (ref 70–99)
GLUCOSE BLD-MCNC: 134 MG/DL (ref 70–99)
HCT VFR BLD CALC: 28.9 % (ref 42–52)
HCT VFR BLD CALC: 43.5 % (ref 42–52)
HEMOGLOBIN: 12.7 GM/DL (ref 13.5–18)
HEMOGLOBIN: 8.7 GM/DL (ref 13.5–18)
IMMATURE NEUTROPHIL %: 0.5 % (ref 0–0.43)
IRON: 27 UG/DL (ref 59–158)
LYMPHOCYTES ABSOLUTE: 0.6 K/CU MM
LYMPHOCYTES RELATIVE PERCENT: 11.5 % (ref 24–44)
MCH RBC QN AUTO: 27.7 PG (ref 27–31)
MCHC RBC AUTO-ENTMCNC: 29.2 % (ref 32–36)
MCV RBC AUTO: 94.8 FL (ref 78–100)
MONOCYTES ABSOLUTE: 0.5 K/CU MM
MONOCYTES RELATIVE PERCENT: 9.3 % (ref 0–4)
NUCLEATED RBC %: 0 %
PCT TRANSFERRIN: 10 % (ref 10–44)
PDW BLD-RTO: 18.5 % (ref 11.7–14.9)
PLATELET # BLD: 304 K/CU MM (ref 140–440)
PMV BLD AUTO: 10 FL (ref 7.5–11.1)
POTASSIUM SERPL-SCNC: 3.6 MMOL/L (ref 3.5–5.1)
RBC # BLD: 4.59 M/CU MM (ref 4.6–6.2)
RETICULOCYTE COUNT PCT: 1.4 % (ref 0.2–2.2)
SEGMENTED NEUTROPHILS ABSOLUTE COUNT: 4.1 K/CU MM
SEGMENTED NEUTROPHILS RELATIVE PERCENT: 74 % (ref 36–66)
SODIUM BLD-SCNC: 137 MMOL/L (ref 135–145)
TOTAL IMMATURE NEUTOROPHIL: 0.03 K/CU MM
TOTAL IRON BINDING CAPACITY: 265 UG/DL (ref 250–450)
TOTAL NUCLEATED RBC: 0 K/CU MM
TOTAL RETICULOCYTE COUNT: 0.06 K/CU MM
UNSATURATED IRON BINDING CAPACITY: 238 UG/DL (ref 110–370)
VITAMIN B-12: 992.2 PG/ML (ref 211–911)
WBC # BLD: 5.5 K/CU MM (ref 4–10.5)

## 2020-01-20 PROCEDURE — 82607 VITAMIN B-12: CPT

## 2020-01-20 PROCEDURE — 97530 THERAPEUTIC ACTIVITIES: CPT

## 2020-01-20 PROCEDURE — 96367 TX/PROPH/DG ADDL SEQ IV INF: CPT

## 2020-01-20 PROCEDURE — 82728 ASSAY OF FERRITIN: CPT

## 2020-01-20 PROCEDURE — 83540 ASSAY OF IRON: CPT

## 2020-01-20 PROCEDURE — 96376 TX/PRO/DX INJ SAME DRUG ADON: CPT

## 2020-01-20 PROCEDURE — 2580000003 HC RX 258: Performed by: INTERNAL MEDICINE

## 2020-01-20 PROCEDURE — 6370000000 HC RX 637 (ALT 250 FOR IP): Performed by: NURSE PRACTITIONER

## 2020-01-20 PROCEDURE — 97166 OT EVAL MOD COMPLEX 45 MIN: CPT

## 2020-01-20 PROCEDURE — 1200000000 HC SEMI PRIVATE

## 2020-01-20 PROCEDURE — 51798 US URINE CAPACITY MEASURE: CPT

## 2020-01-20 PROCEDURE — 99222 1ST HOSP IP/OBS MODERATE 55: CPT | Performed by: INTERNAL MEDICINE

## 2020-01-20 PROCEDURE — 85045 AUTOMATED RETICULOCYTE COUNT: CPT

## 2020-01-20 PROCEDURE — 85018 HEMOGLOBIN: CPT

## 2020-01-20 PROCEDURE — 85014 HEMATOCRIT: CPT

## 2020-01-20 PROCEDURE — 99211 OFF/OP EST MAY X REQ PHY/QHP: CPT

## 2020-01-20 PROCEDURE — 6370000000 HC RX 637 (ALT 250 FOR IP): Performed by: SPECIALIST

## 2020-01-20 PROCEDURE — APPSS60 APP SPLIT SHARED TIME 46-60 MINUTES: Performed by: NURSE PRACTITIONER

## 2020-01-20 PROCEDURE — 80048 BASIC METABOLIC PNL TOTAL CA: CPT

## 2020-01-20 PROCEDURE — 82962 GLUCOSE BLOOD TEST: CPT

## 2020-01-20 PROCEDURE — 83550 IRON BINDING TEST: CPT

## 2020-01-20 PROCEDURE — 36415 COLL VENOUS BLD VENIPUNCTURE: CPT

## 2020-01-20 PROCEDURE — 85025 COMPLETE CBC W/AUTO DIFF WBC: CPT

## 2020-01-20 PROCEDURE — 82746 ASSAY OF FOLIC ACID SERUM: CPT

## 2020-01-20 PROCEDURE — 96366 THER/PROPH/DIAG IV INF ADDON: CPT

## 2020-01-20 PROCEDURE — 6360000002 HC RX W HCPCS: Performed by: NURSE PRACTITIONER

## 2020-01-20 PROCEDURE — 97535 SELF CARE MNGMENT TRAINING: CPT

## 2020-01-20 PROCEDURE — 2580000003 HC RX 258: Performed by: NURSE PRACTITIONER

## 2020-01-20 PROCEDURE — 6360000002 HC RX W HCPCS: Performed by: INTERNAL MEDICINE

## 2020-01-20 PROCEDURE — 97110 THERAPEUTIC EXERCISES: CPT

## 2020-01-20 RX ADMIN — ACETAMINOPHEN 650 MG: 325 TABLET ORAL at 07:05

## 2020-01-20 RX ADMIN — CEFEPIME HYDROCHLORIDE 2 G: 2 INJECTION, POWDER, FOR SOLUTION INTRAVENOUS at 08:51

## 2020-01-20 RX ADMIN — METOPROLOL TARTRATE 25 MG: 25 TABLET ORAL at 21:46

## 2020-01-20 RX ADMIN — AMPICILLIN SODIUM 1 G: 1 INJECTION, POWDER, FOR SOLUTION INTRAMUSCULAR; INTRAVENOUS at 23:52

## 2020-01-20 RX ADMIN — ALLOPURINOL 300 MG: 300 TABLET ORAL at 08:51

## 2020-01-20 RX ADMIN — PANTOPRAZOLE SODIUM 40 MG: 40 TABLET, DELAYED RELEASE ORAL at 07:05

## 2020-01-20 RX ADMIN — FERROUS SULFATE TAB 325 MG (65 MG ELEMENTAL FE) 325 MG: 325 (65 FE) TAB at 08:51

## 2020-01-20 RX ADMIN — METOPROLOL TARTRATE 25 MG: 25 TABLET ORAL at 08:51

## 2020-01-20 RX ADMIN — SODIUM CHLORIDE, PRESERVATIVE FREE 10 ML: 5 INJECTION INTRAVENOUS at 23:52

## 2020-01-20 RX ADMIN — DILTIAZEM HYDROCHLORIDE 360 MG: 120 CAPSULE, COATED, EXTENDED RELEASE ORAL at 08:51

## 2020-01-20 RX ADMIN — FERROUS SULFATE TAB 325 MG (65 MG ELEMENTAL FE) 325 MG: 325 (65 FE) TAB at 21:46

## 2020-01-20 RX ADMIN — LOSARTAN POTASSIUM 50 MG: 50 TABLET, FILM COATED ORAL at 08:51

## 2020-01-20 RX ADMIN — AMPICILLIN SODIUM 1 G: 1 INJECTION, POWDER, FOR SOLUTION INTRAMUSCULAR; INTRAVENOUS at 20:20

## 2020-01-20 RX ADMIN — TAMSULOSIN HYDROCHLORIDE 0.4 MG: 0.4 CAPSULE ORAL at 21:46

## 2020-01-20 RX ADMIN — AMPICILLIN SODIUM 1 G: 1 INJECTION, POWDER, FOR SOLUTION INTRAMUSCULAR; INTRAVENOUS at 17:42

## 2020-01-20 RX ADMIN — TRIAMTERENE AND HYDROCHLOROTHIAZIDE 1 TABLET: 37.5; 25 TABLET ORAL at 08:51

## 2020-01-20 RX ADMIN — SODIUM CHLORIDE, PRESERVATIVE FREE 10 ML: 5 INJECTION INTRAVENOUS at 08:51

## 2020-01-20 RX ADMIN — PRAVASTATIN SODIUM 20 MG: 10 TABLET ORAL at 21:46

## 2020-01-20 ASSESSMENT — PAIN SCALES - GENERAL: PAINLEVEL_OUTOF10: 6

## 2020-01-20 NOTE — CONSULTS
Take 1 tablet by mouth 2 times daily 60 tablet 0    allopurinol (ZYLOPRIM) 300 MG tablet Take 300 mg by mouth daily      diltiazem (TIAZAC) 360 MG extended release capsule Take 360 mg by mouth daily      triamterene-hydrochlorothiazide (MAXZIDE-25) 37.5-25 MG per tablet Take 1 tablet by mouth daily      metFORMIN (GLUCOPHAGE) 500 MG tablet Take 500 mg by mouth 2 times daily (with meals)      pravastatin (PRAVACHOL) 20 MG tablet Take 20 mg by mouth nightly      nitroGLYCERIN (NITROSTAT) 0.4 MG SL tablet up to max of 3 total doses.  If no relief after 1 dose, call 911. 25 tablet 0         Objective:      BP (!) 160/70   Pulse 75   Temp 98 °F (36.7 °C) (Oral)   Resp 18   Ht 5' 8\" (1.727 m)   Wt 286 lb 3.2 oz (129.8 kg)   SpO2 98%   BMI 43.52 kg/m²   Bill Risk Score: Bill Scale Score: 16    LABS    CBC:   Lab Results   Component Value Date    WBC 10.9 01/17/2020    RBC 3.49 01/17/2020    HGB 8.7 01/20/2020    HCT 28.9 01/20/2020    MCV 90.3 01/17/2020    MCH 26.6 01/17/2020    MCHC 29.5 01/17/2020    RDW 17.8 01/17/2020     01/17/2020    MPV 8.8 01/17/2020     CMP:    Lab Results   Component Value Date     01/20/2020    K 3.6 01/20/2020    CL 97 01/20/2020    CO2 29 01/20/2020    BUN 9 01/20/2020    CREATININE 0.6 01/20/2020    GFRAA >60 01/20/2020    LABGLOM >60 01/20/2020    GLUCOSE 114 01/20/2020    PROT 7.7 01/17/2020    LABALBU 3.6 01/17/2020    CALCIUM 8.5 01/20/2020    BILITOT 0.7 01/17/2020    ALKPHOS 86 01/17/2020    AST 13 01/17/2020    ALT 6 01/17/2020     Albumin:    Lab Results   Component Value Date    LABALBU 3.6 01/17/2020     PT/INR:    Lab Results   Component Value Date    PROTIME 27.9 01/17/2020    INR 2.29 01/17/2020     HgBA1c:    Lab Results   Component Value Date    LABA1C 6.1 09/26/2018         Assessment:     Patient Active Problem List   Diagnosis    Diabetes mellitus ([facility]    Atrial flutter ([facility]    Atrial fibrillation ([facility]    Gout    Hyperlipidemia   

## 2020-01-20 NOTE — PROGRESS NOTES
Hospitalist Progress Note      Name:  Светлана Kirk /Age/Sex: 1945  (69 y.o. male)   MRN & CSN:  [id number] & [id number] Admission Date/Time: 2020  7:32 AM   [facility]  [facility] PCP: Ghislaine Donato MD         Hospital Day: 4    Assessment and Plan:   Светлана Kirk is a 69 y.o.  male  who presents with:    Patient presented with a fall, could not get up, activated the EMS with his bracelet, temp 102.4 per EMS. See ID noted  evening - can be discharge on PO abx. Needs to be seen by CM prior to dc. He told me he will decline an ECF. He is disabled, can only take a few steps to transfer to [facility]. He is mostly in a . Sister is his main contact, and her number is at the bottom of this note. He has home aides at home.       Sepsis had 102.4 fever per EMS  -RR 22 on arrival  -had a fall  -admitted  and treated for cellulitis RLE based on redness (which he thinks is baseline)  -also treated for UTI - see CT findings  - - ID consult appreciated - can be discharged on amoxicillin (recommendation received after end of shift) - per ID doubt cellulitis, and diagnosed by Aerococcus bacteriuria    Venous stasis changes   -outpatient follow up  -wound care team consult, and recommend outpatient follow up with wound care center    GI bleed - conservative management for now, GI saw patient and signed off - see GI note  -hx pandiverticulosis on cscope int he past    Acute urinary retention - follow up outpatient with urology  -he has LUTS    AFIB - appreciate cardiology consult  -per cardio office note - Dr. Cesar Foster on 2018, patient with paroxysmal AFIB  -On admit EKG showed AFIB    R hip arthritis  -he reports the R hip is \"bone on bone\"     Anemia  - - check anemia panel    CAD  -had PCI LAD at [facility]       Social hx  -patient lives in [address] [place] [address] per sister, she helped  him there about 2 years ago  -patient is in a [facility], can take 2 steps to transfer, and that is about

## 2020-01-20 NOTE — CONSULTS
Noted    Sepsis ([facility] 01/17/2020    BRBPR (bright red blood per rectum) 01/17/2020    Rectal bleeding 09/25/2018    NSTEMI (non-ST elevated myocardial infarction) ([facility] 06/25/2018    Upper GI bleed 03/07/2017    Diabetes mellitus (HCC)     Atrial flutter (HCC)     Atrial fibrillation (HCC)     Gout     Hyperlipidemia     Hypertension     Arthropathy      Past Medical History:   Diagnosis Date    Arthropathy     Atrial fibrillation (HCC)     Atrial flutter (HCC)     Diabetes mellitus ([facility]     Gout     Hyperlipidemia     Hypertension     Lumbago       Past Surgical History:   Procedure Laterality Date    COLONOSCOPY N/A 9/27/2018    COLONOSCOPY POLYPECTOMY SNARE/COLD BIOPSY performed by Raiza Clemente MD at [facility] reviewed. No pertinent family history. Infectious disease related family history - not contibutory. SOCIAL HISTORY  Social History     Tobacco Use    Smoking status: Former Smoker     Packs/day: 0.50     Types: Cigarettes    Smokeless tobacco: Never Used   Substance Use Topics    Alcohol use: No       Born:[place], [place]   Lives:[place], [place] alone   No recent travel of significance.  No recent unusual exposures.  NO pets   ? ALLERGIES  Allergies   Allergen Reactions    Demerol Hcl [Meperidine]     Gabapentin     Simvastatin       MEDICATIONS  Reviewed and are per the chart/EMR. ? Antibiotics:   Present:  Vancomycin 1/19-  Cefepime 1/17-    Past:  Ceftriaxone 1/17    ?  -------------------------------------------------------------------------------------------------------------------    Vital Signs:  Vitals:    01/20/20 0845   BP: (!) 160/70   Pulse: 75   Resp: 18   Temp: 98 °F (36.7 °C)   SpO2: 98%         Exam:    VS: noted; wt 286 lb (129.8 kg) 5'8\" Height  Gen: alert and oriented X3, no distress  Skin: no stigmata of endocarditis  Wounds: BLE lymphedema. No ulcerations present.    HEMT: AT/NC Oropharynx pink, moist, and without lesions or changes of chronic bladder outlet obstruction. 1/17/20 CT Hip right WO Contrast:  Impression:        Severe right hip joint osteoarthrosis with collapse of the femoral head and  bony remodeling of the femoral head and acetabulum.  No evidence to suggest  an acute fracture.  There are erosive-like changes at the femoral head and  acetabular articular surface which are nonspecific and could represent  subcortical cystic change versus true erosions.  There is also right inguinal  lymphadenopathy. Lieutenant Lines is joint capsular soft tissue thickening versus a  joint effusion.  Septic arthritis cannot be excluded in the appropriate  clinical setting. Right inguinal lymphadenopathy. Lieutenant Lines are also mildly enlarged right pelvic  lymph nodes. Subcutaneous fat stranding in the lateral aspect of the proximal thigh could  represent an early hematoma in the setting of fall. ??  I have examined this patient and available medical records on this date and have made the above observations, conclusions and recommendations. Electronically signed by: Electronically signed by Chris Ray.  KARELY Alvarez CNP on 1/20/2020 at 9:31 AM

## 2020-01-20 NOTE — PROGRESS NOTES
Fair  Plan: 2+x/week      Goals:  1. Pt will complete all aspects of bed mobility for EOB/OOB ADLs min A  2. Pt will complete UB/LB bathing min A with setup using long handled sponge PRN  3. Pt will complete all aspects of LB dressing mod A with setup using AE PRN  4. Pt will complete all functional transfers to and from bed, chair, toilet, shower chair CGA/good safety awareness  5. Pt will ambulate HH distance to bathroom for toileting CGA with RW  6. Pt will complete all aspects of toileting task mod A  7. Pt will complete oral hygiene/grooming routine in standing at sink CGA with setup  8.  Pt will complete ther ex/ther act with focus on light UE strengthening and functional standing tolerance >4 minutes      Time:   Time in: 1353  Time out: 1448  Timed treatment minutes: 40  Total time: 55      Electronically signed by:    CASE Cassidy/L, [facility], .[id number]

## 2020-01-20 NOTE — PLAN OF CARE
Problem: Falls - Risk of:  Goal: Will remain free from falls  Description  Will remain free from falls  1/18/2020 1026 by Nu Dominique LPN  Outcome: Ongoing  0/75/0196 3947 by Silvio Zavala LPN  Outcome: Ongoing  2/28/3800 1833 by Silvio Zavala LPN  Outcome: Ongoing  Goal: Absence of physical injury  Description  Absence of physical injury  1/18/2020 1026 by Nu Dominique LPN  Outcome: Ongoing  4/40/8197 8219 by Silvio Zavala LPN  Outcome: Ongoing  6/39/2781 6529 by Silvio Zavala LPN  Outcome: Ongoing     Problem: Pain:  Goal: Pain level will decrease  Description  Pain level will decrease  1/18/2020 1026 by Nu Dominique LPN  Outcome: Ongoing  8/58/9958 5789 by Silvio Zavala LPN  Outcome: Ongoing  7/40/6486 3952 by Silvio Zavala LPN  Outcome: Ongoing  Goal: Control of acute pain  Description  Control of acute pain  1/18/2020 1026 by Nu Dominique LPN  Outcome: Ongoing  5/90/5989 1108 by Silvio Zavala LPN  Outcome: Ongoing  6/93/0202 7235 by Silvio Zavala LPN  Outcome: Ongoing  Goal: Control of chronic pain  Description  Control of chronic pain  1/18/2020 1026 by Nu Dominique LPN  Outcome: Ongoing  9/60/9355 7398 by Silvio Zavala LPN  Outcome: Ongoing  9/13/9654 1542 by Silvio Zavala LPN  Outcome: Ongoing     Problem: Risk for Impaired Skin Integrity  Goal: Tissue integrity - skin and mucous membranes  Description  Structural intactness and normal physiological function of skin and  mucous membranes.   1/18/2020 1026 by Nu Dominique LPN  Outcome: Ongoing  0/84/0194 5614 by Silvio Zavala LPN  Outcome: Ongoing  0/69/1636 4446 by Silvio Zavala LPN  Outcome: Ongoing
Problem: Falls - Risk of:  Goal: Will remain free from falls  Description  Will remain free from falls  7/87/1640 7532 by Estephania Artis LPN  Outcome: Ongoing  3/16/4485 5348 by Estephania Artis LPN  Outcome: Ongoing  1/17/2020 1654 by Leif Chu RN  Outcome: Ongoing  Goal: Absence of physical injury  Description  Absence of physical injury  9/73/5369 7809 by Estephania Artis LPN  Outcome: Ongoing  5/81/3009 1791 by Estephania Artis LPN  Outcome: Ongoing  1/17/2020 1654 by Leif Chu RN  Outcome: Ongoing     Problem: Pain:  Goal: Pain level will decrease  Description  Pain level will decrease  5/12/2930 7631 by Estephania Artis LPN  Outcome: Ongoing  9/95/0138 9545 by Estephania Artis LPN  Outcome: Ongoing  1/17/2020 1654 by Leif Chu RN  Outcome: Ongoing  Goal: Control of acute pain  Description  Control of acute pain  0/51/9745 4683 by Estephania Artis LPN  Outcome: Ongoing  6/29/5821 8846 by Estephania Artis LPN  Outcome: Ongoing  1/17/2020 1654 by Leif Chu RN  Outcome: Ongoing  Goal: Control of chronic pain  Description  Control of chronic pain  2/99/9425 0683 by Estephania Artis LPN  Outcome: Ongoing  7/92/0693 4942 by Estephania Artis LPN  Outcome: Ongoing  1/17/2020 1654 by Leif Chu RN  Outcome: Ongoing     Problem: Risk for Impaired Skin Integrity  Goal: Tissue integrity - skin and mucous membranes  Description  Structural intactness and normal physiological function of skin and  mucous membranes.   0/81/9642 1975 by Estephania Artis LPN  Outcome: Ongoing  4/38/5760 3655 by Estephania Artis LPN  Outcome: Ongoing
Problem: Falls - Risk of:  Goal: Will remain free from falls  Description  Will remain free from falls  Outcome: Ongoing  Goal: Absence of physical injury  Description  Absence of physical injury  Outcome: Ongoing
Problem: Falls - Risk of:  Goal: Will remain free from falls  Description  Will remain free from falls  Outcome: Ongoing  Goal: Absence of physical injury  Description  Absence of physical injury  Outcome: Ongoing     Problem: Pain:  Goal: Pain level will decrease  Description  Pain level will decrease  Outcome: Ongoing  Goal: Control of acute pain  Description  Control of acute pain  Outcome: Ongoing  Goal: Control of chronic pain  Description  Control of chronic pain  Outcome: Ongoing     Problem: Risk for Impaired Skin Integrity  Goal: Tissue integrity - skin and mucous membranes  Description  Structural intactness and normal physiological function of skin and  mucous membranes.   Outcome: Ongoing
1654 by Tiera Moore RN  Outcome: Ongoing  Goal: Control of chronic pain  Description  Control of chronic pain  6/64/0707 2592 by Shabnam Naidu LPN  Outcome: Ongoing  1/17/2020 1654 by Tiera Moore RN  Outcome: Ongoing     Problem: Risk for Impaired Skin Integrity  Goal: Tissue integrity - skin and mucous membranes  Description  Structural intactness and normal physiological function of skin and  mucous membranes. Outcome: Ongoing     Problem: Falls - Risk of:  Goal: Will remain free from falls  Description  Will remain free from falls  2/51/3514 9576 by Shabnam Naidu LPN  Outcome: Ongoing  1/17/2020 1654 by Tiera Moore RN  Outcome: Ongoing  Goal: Absence of physical injury  Description  Absence of physical injury  6/64/2752 4668 by Shabnam Naidu LPN  Outcome: Ongoing  1/17/2020 1654 by Tiera Moore RN  Outcome: Ongoing     Problem: Pain:  Goal: Pain level will decrease  Description  Pain level will decrease  8/19/2747 0322 by Shabnam Naidu LPN  Outcome: Ongoing  1/17/2020 1654 by Tiera Moore RN  Outcome: Ongoing  Goal: Control of acute pain  Description  Control of acute pain  3/11/1389 1182 by Shabnam Naidu LPN  Outcome: Ongoing  1/17/2020 1654 by Tiera Moore RN  Outcome: Ongoing  Goal: Control of chronic pain  Description  Control of chronic pain  0/22/0494 2257 by Shabnam Naidu LPN  Outcome: Ongoing  1/17/2020 1654 by Tiera Moore RN  Outcome: Ongoing     Problem: Risk for Impaired Skin Integrity  Goal: Tissue integrity - skin and mucous membranes  Description  Structural intactness and normal physiological function of skin and  mucous membranes.   Outcome: Ongoing

## 2020-01-21 LAB
CULTURE: NORMAL
GLUCOSE BLD-MCNC: 102 MG/DL (ref 70–99)
GLUCOSE BLD-MCNC: 110 MG/DL (ref 70–99)
GLUCOSE BLD-MCNC: 172 MG/DL (ref 70–99)
Lab: NORMAL
SPECIMEN: NORMAL

## 2020-01-21 PROCEDURE — 6370000000 HC RX 637 (ALT 250 FOR IP): Performed by: SPECIALIST

## 2020-01-21 PROCEDURE — 97535 SELF CARE MNGMENT TRAINING: CPT

## 2020-01-21 PROCEDURE — 82962 GLUCOSE BLOOD TEST: CPT

## 2020-01-21 PROCEDURE — 6370000000 HC RX 637 (ALT 250 FOR IP): Performed by: NURSE PRACTITIONER

## 2020-01-21 PROCEDURE — 85014 HEMATOCRIT: CPT

## 2020-01-21 PROCEDURE — 97530 THERAPEUTIC ACTIVITIES: CPT

## 2020-01-21 PROCEDURE — 99232 SBSQ HOSP IP/OBS MODERATE 35: CPT | Performed by: NURSE PRACTITIONER

## 2020-01-21 PROCEDURE — 2580000003 HC RX 258: Performed by: NURSE PRACTITIONER

## 2020-01-21 PROCEDURE — 1200000000 HC SEMI PRIVATE

## 2020-01-21 PROCEDURE — 94761 N-INVAS EAR/PLS OXIMETRY MLT: CPT

## 2020-01-21 PROCEDURE — 96366 THER/PROPH/DIAG IV INF ADDON: CPT

## 2020-01-21 PROCEDURE — 85018 HEMOGLOBIN: CPT

## 2020-01-21 PROCEDURE — 6360000002 HC RX W HCPCS: Performed by: INTERNAL MEDICINE

## 2020-01-21 PROCEDURE — 2580000003 HC RX 258: Performed by: INTERNAL MEDICINE

## 2020-01-21 PROCEDURE — 97116 GAIT TRAINING THERAPY: CPT

## 2020-01-21 PROCEDURE — 96376 TX/PRO/DX INJ SAME DRUG ADON: CPT

## 2020-01-21 RX ADMIN — LOSARTAN POTASSIUM 50 MG: 50 TABLET, FILM COATED ORAL at 09:05

## 2020-01-21 RX ADMIN — PRAVASTATIN SODIUM 20 MG: 10 TABLET ORAL at 21:14

## 2020-01-21 RX ADMIN — METOPROLOL TARTRATE 25 MG: 25 TABLET ORAL at 21:14

## 2020-01-21 RX ADMIN — SODIUM CHLORIDE, PRESERVATIVE FREE 10 ML: 5 INJECTION INTRAVENOUS at 09:06

## 2020-01-21 RX ADMIN — AMPICILLIN SODIUM 1 G: 1 INJECTION, POWDER, FOR SOLUTION INTRAMUSCULAR; INTRAVENOUS at 03:46

## 2020-01-21 RX ADMIN — ALLOPURINOL 300 MG: 300 TABLET ORAL at 09:05

## 2020-01-21 RX ADMIN — FERROUS SULFATE TAB 325 MG (65 MG ELEMENTAL FE) 325 MG: 325 (65 FE) TAB at 21:13

## 2020-01-21 RX ADMIN — FERROUS SULFATE TAB 325 MG (65 MG ELEMENTAL FE) 325 MG: 325 (65 FE) TAB at 09:05

## 2020-01-21 RX ADMIN — AMPICILLIN SODIUM 1 G: 1 INJECTION, POWDER, FOR SOLUTION INTRAMUSCULAR; INTRAVENOUS at 16:34

## 2020-01-21 RX ADMIN — METOPROLOL TARTRATE 25 MG: 25 TABLET ORAL at 09:05

## 2020-01-21 RX ADMIN — AMPICILLIN SODIUM 1 G: 1 INJECTION, POWDER, FOR SOLUTION INTRAMUSCULAR; INTRAVENOUS at 13:35

## 2020-01-21 RX ADMIN — AMPICILLIN SODIUM 1 G: 1 INJECTION, POWDER, FOR SOLUTION INTRAMUSCULAR; INTRAVENOUS at 09:06

## 2020-01-21 RX ADMIN — PANTOPRAZOLE SODIUM 40 MG: 40 TABLET, DELAYED RELEASE ORAL at 06:16

## 2020-01-21 RX ADMIN — TAMSULOSIN HYDROCHLORIDE 0.4 MG: 0.4 CAPSULE ORAL at 21:13

## 2020-01-21 RX ADMIN — DILTIAZEM HYDROCHLORIDE 360 MG: 120 CAPSULE, COATED, EXTENDED RELEASE ORAL at 09:05

## 2020-01-21 RX ADMIN — TRIAMTERENE AND HYDROCHLOROTHIAZIDE 1 TABLET: 37.5; 25 TABLET ORAL at 09:05

## 2020-01-21 NOTE — PROGRESS NOTES
dentition in good state of repair  Eyes: PERRLA, EOMI, conjunctiva pink, sclera anicteric. Neck: Supple. Trachea midline. No LAD. Chest: no distress and CTA. Good air movement. Heart: RRR and no MRG. Abd: soft, non-distended, no tenderness, no hepatomegaly. Normoactive bowel sounds. Ext: no clubbing, cyanosis, or edema  Catheter Site: without erythema or tenderness draining clear yellow urine  Neuro: Mental status intact.  CN 2-12 intact and no focal sensory or motor deficits     Radiologic / Imaging / TESTING  Reviewed       Labs:    Recent Results (from the past 24 hour(s))   POCT Glucose    Collection Time: 01/20/20  1:33 PM   Result Value Ref Range    POC Glucose 134 (H) 70 - 99 MG/DL   ANEMIA PANEL    Collection Time: 01/20/20  2:57 PM   Result Value Ref Range    WBC 5.5 4.0 - 10.5 K/CU MM    RBC 4.59 (L) 4.6 - 6.2 M/CU MM    Hemoglobin 12.7 (L) 13.5 - 18.0 GM/DL    Hematocrit 43.5 42 - 52 %    MCV 94.8 78 - 100 FL    MCH 27.7 27 - 31 PG    MCHC 29.2 (L) 32.0 - 36.0 %    RDW 18.5 (H) 11.7 - 14.9 %    Platelets 853 434 - 040 K/CU MM    MPV 10.0 7.5 - 11.1 FL    Differential Type AUTOMATED DIFFERENTIAL     Segs Relative 74.0 (H) 36 - 66 %    Lymphocytes % 11.5 (L) 24 - 44 %    Monocytes % 9.3 (H) 0 - 4 %    Eosinophils % 4.2 (H) 0 - 3 %    Basophils % 0.5 0 - 1 %    Segs Absolute 4.1 K/CU MM    Lymphocytes Absolute 0.6 K/CU MM    Monocytes Absolute 0.5 K/CU MM    Eosinophils Absolute 0.2 K/CU MM    Basophils Absolute 0.0 K/CU MM    Nucleated RBC % 0.0 %    Total Nucleated RBC 0.0 K/CU MM    TRC 0.0620 K/CU MM    Total Immature Neutrophil 0.03 K/CU MM    Immature Neutrophil % 0.5 (H) 0 - 0.43 %    Vitamin B-12 992.2 (H) 211 - 911 pg/ml    Folate 9.8 3.1 - 17.5 NG/ML    Iron 27 (L) 59 - 158 ug/dL    UIBC 238 110 - 370 ug/dL    TIBC 265 250 - 450 ug/dL    Transferrin % 10 10 - 44 %    Retic Ct Pct 1.4 0.2 - 2.20 %    Ferritin 272 30 - 400 NG/ML   POCT Glucose    Collection Time: 01/20/20  6:46 PM   Result Value Ref Range    POC Glucose 109 (H) 70 - 99 MG/DL   POCT Glucose    Collection Time: 01/20/20  8:56 PM   Result Value Ref Range    POC Glucose 110 (H) 70 - 99 MG/DL   POCT Glucose    Collection Time: 01/21/20  9:04 AM   Result Value Ref Range    POC Glucose 102 (H) 70 - 99 MG/DL     CULTURE results: Invalid input(s): BLOOD CULTURE,  URINE CULTURE, SURGICAL CULTURE    Diagnosis:  Patient Active Problem List   Diagnosis    [facility] mellitus ([facility]    Atrial flutter (HCC)    Atrial fibrillation ([facility]    Gout    Hyperlipidemia    Hypertension    Arthropathy    Upper GI bleed    NSTEMI (non-ST elevated myocardial infarction) (HCC)    Rectal bleeding    Sepsis (HCC)    BRBPR (bright red blood per rectum)    Acute cystitis without hematuria    Leukocytosis       Active Problems  Active Problems:    Sepsis (HCC)    BRBPR (bright red blood per rectum)    Acute cystitis without hematuria    Leukocytosis  Resolved Problems:    * No resolved hospital problems. *    Electronically signed by: Electronically signed by KARELY Grubbs CNP on 1/21/2020 at 10:28 AM

## 2020-01-21 NOTE — PROGRESS NOTES
PRN  ondansetron, 4 mg, Q6H PRN  glucose, 15 g, PRN  dextrose, 12.5 g, PRN  glucagon (rDNA), 1 mg, PRN  dextrose, 100 mL/hr, PRN  acetaminophen, 650 mg, Q4H PRN      Electronically signed by Haylee Marshall MD on 1/21/2020 at 5:37 PM

## 2020-01-21 NOTE — PROGRESS NOTES
Pt sat in chair most of day. Pt worked with therapy from chair to bedside and from chair to bed. Pt had no complaints of pain. Pt vitals WNL.

## 2020-01-22 VITALS
TEMPERATURE: 97.9 F | DIASTOLIC BLOOD PRESSURE: 76 MMHG | SYSTOLIC BLOOD PRESSURE: 131 MMHG | BODY MASS INDEX: 39.4 KG/M2 | HEIGHT: 68 IN | WEIGHT: 260 LBS | RESPIRATION RATE: 18 BRPM | HEART RATE: 92 BPM | OXYGEN SATURATION: 95 %

## 2020-01-22 LAB
BASOPHILS ABSOLUTE: 0.1 K/CU MM
BASOPHILS RELATIVE PERCENT: 0.5 % (ref 0–1)
CULTURE: NORMAL
CULTURE: NORMAL
DIFFERENTIAL TYPE: ABNORMAL
EOSINOPHILS ABSOLUTE: 0.2 K/CU MM
EOSINOPHILS RELATIVE PERCENT: 2.4 % (ref 0–3)
GLUCOSE BLD-MCNC: 123 MG/DL (ref 70–99)
GLUCOSE BLD-MCNC: 241 MG/DL (ref 70–99)
HCT VFR BLD CALC: 35.3 % (ref 42–52)
HEMOGLOBIN: 9.9 GM/DL (ref 13.5–18)
IMMATURE NEUTROPHIL %: 1.8 % (ref 0–0.43)
LYMPHOCYTES ABSOLUTE: 0.9 K/CU MM
LYMPHOCYTES RELATIVE PERCENT: 10.2 % (ref 24–44)
Lab: NORMAL
Lab: NORMAL
MCH RBC QN AUTO: 26.9 PG (ref 27–31)
MCHC RBC AUTO-ENTMCNC: 28 % (ref 32–36)
MCV RBC AUTO: 95.9 FL (ref 78–100)
MONOCYTES ABSOLUTE: 0.6 K/CU MM
MONOCYTES RELATIVE PERCENT: 6.7 % (ref 0–4)
NUCLEATED RBC %: 0.4 %
PDW BLD-RTO: 17.4 % (ref 11.7–14.9)
PLATELET # BLD: 396 K/CU MM (ref 140–440)
PMV BLD AUTO: 9 FL (ref 7.5–11.1)
RBC # BLD: 3.68 M/CU MM (ref 4.6–6.2)
SEGMENTED NEUTROPHILS ABSOLUTE COUNT: 7.2 K/CU MM
SEGMENTED NEUTROPHILS RELATIVE PERCENT: 78.4 % (ref 36–66)
SPECIMEN: NORMAL
SPECIMEN: NORMAL
TOTAL IMMATURE NEUTOROPHIL: 0.16 K/CU MM
TOTAL NUCLEATED RBC: 0 K/CU MM
WBC # BLD: 9.1 K/CU MM (ref 4–10.5)

## 2020-01-22 PROCEDURE — 6360000002 HC RX W HCPCS: Performed by: INTERNAL MEDICINE

## 2020-01-22 PROCEDURE — 36415 COLL VENOUS BLD VENIPUNCTURE: CPT

## 2020-01-22 PROCEDURE — 99232 SBSQ HOSP IP/OBS MODERATE 35: CPT | Performed by: NURSE PRACTITIONER

## 2020-01-22 PROCEDURE — 6370000000 HC RX 637 (ALT 250 FOR IP): Performed by: NURSE PRACTITIONER

## 2020-01-22 PROCEDURE — 51798 US URINE CAPACITY MEASURE: CPT

## 2020-01-22 PROCEDURE — 85025 COMPLETE CBC W/AUTO DIFF WBC: CPT

## 2020-01-22 PROCEDURE — 82962 GLUCOSE BLOOD TEST: CPT

## 2020-01-22 PROCEDURE — 6370000000 HC RX 637 (ALT 250 FOR IP): Performed by: SPECIALIST

## 2020-01-22 PROCEDURE — 6370000000 HC RX 637 (ALT 250 FOR IP): Performed by: INTERNAL MEDICINE

## 2020-01-22 PROCEDURE — 2580000003 HC RX 258: Performed by: NURSE PRACTITIONER

## 2020-01-22 PROCEDURE — 96376 TX/PRO/DX INJ SAME DRUG ADON: CPT

## 2020-01-22 PROCEDURE — 2580000003 HC RX 258: Performed by: INTERNAL MEDICINE

## 2020-01-22 RX ORDER — PANTOPRAZOLE SODIUM 40 MG/1
40 TABLET, DELAYED RELEASE ORAL
Qty: 60 TABLET | Refills: 0 | Status: SHIPPED | OUTPATIENT
Start: 2020-01-23 | End: 2020-11-17

## 2020-01-22 RX ORDER — CLOPIDOGREL BISULFATE 75 MG/1
75 TABLET ORAL DAILY
Status: DISCONTINUED | OUTPATIENT
Start: 2020-01-22 | End: 2020-01-22

## 2020-01-22 RX ORDER — AMOXICILLIN 500 MG/1
500 CAPSULE ORAL 2 TIMES DAILY
Qty: 14 CAPSULE | Refills: 0 | Status: SHIPPED | OUTPATIENT
Start: 2020-01-22 | End: 2020-01-29

## 2020-01-22 RX ADMIN — DILTIAZEM HYDROCHLORIDE 360 MG: 120 CAPSULE, COATED, EXTENDED RELEASE ORAL at 09:28

## 2020-01-22 RX ADMIN — PANTOPRAZOLE SODIUM 40 MG: 40 TABLET, DELAYED RELEASE ORAL at 06:12

## 2020-01-22 RX ADMIN — ALLOPURINOL 300 MG: 300 TABLET ORAL at 09:29

## 2020-01-22 RX ADMIN — TRIAMTERENE AND HYDROCHLOROTHIAZIDE 1 TABLET: 37.5; 25 TABLET ORAL at 09:28

## 2020-01-22 RX ADMIN — AMPICILLIN SODIUM 1 G: 1 INJECTION, POWDER, FOR SOLUTION INTRAMUSCULAR; INTRAVENOUS at 04:01

## 2020-01-22 RX ADMIN — APIXABAN 5 MG: 5 TABLET, FILM COATED ORAL at 13:28

## 2020-01-22 RX ADMIN — FERROUS SULFATE TAB 325 MG (65 MG ELEMENTAL FE) 325 MG: 325 (65 FE) TAB at 09:28

## 2020-01-22 RX ADMIN — SODIUM CHLORIDE, PRESERVATIVE FREE 10 ML: 5 INJECTION INTRAVENOUS at 09:29

## 2020-01-22 RX ADMIN — LOSARTAN POTASSIUM 50 MG: 50 TABLET, FILM COATED ORAL at 09:29

## 2020-01-22 RX ADMIN — INSULIN LISPRO 2 UNITS: 100 INJECTION, SOLUTION INTRAVENOUS; SUBCUTANEOUS at 13:23

## 2020-01-22 RX ADMIN — METOPROLOL TARTRATE 25 MG: 25 TABLET ORAL at 09:28

## 2020-01-22 RX ADMIN — AMPICILLIN SODIUM 1 G: 1 INJECTION, POWDER, FOR SOLUTION INTRAMUSCULAR; INTRAVENOUS at 09:29

## 2020-01-22 RX ADMIN — AMPICILLIN SODIUM 1 G: 1 INJECTION, POWDER, FOR SOLUTION INTRAMUSCULAR; INTRAVENOUS at 00:08

## 2020-01-22 RX ADMIN — AMPICILLIN SODIUM 1 G: 1 INJECTION, POWDER, FOR SOLUTION INTRAMUSCULAR; INTRAVENOUS at 13:28

## 2020-01-22 NOTE — DISCHARGE SUMMARY
Discharge Summary    Name:  Colonel Cooper /Age/Sex: 1945  (69 y.o. 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  male who was admitted to the hospital after he fell at home. He does not think he lost consciousness. He was noted to have a T-max of 102.4 in route to the hospital per EMS report. He has not had any fevers while in the hospital. He was subsequently noted to have UTI. He was treated with ampicillin and discharged with amoxicillin for 7 more days. Patient refused to go to SNF multiple times. He has the capacity to make medical decisions. Principal admission diagnosis    1. UTI-- treated with ampicillin in the hospital.  ID consulted. Patient discharged with amoxicillin for 7 more days. 2.  Acute Urinary retention-- Lane removed. Patient passed voiding trials. Patient discharged home without Lane. 3.  Suspected GI bleeding-- Hgb 10.7 on admission. Baseline Hgb around 9. Hgb remains stable around baseline. GI consulted. Conservative management recommended. GI bleeding has not been confirmed. Less likely patient has GI bleeding. Patient was discharged home with Protonix. Patient to follow-up with GI on outpatient basis. 4.  Paroxysmal A. fib-- rate controlled, on Eliquis  -Continue metoprolol/diltiazem    5. CAD--s/p PCI with DIANA x2 to LAD in 2018. Patient is not on aspirin or Plavix. He is only on Eliquis. Additional discharge diagnosis  1. Gout--no acute flare  2. Hypertension--BP controlled  3. BPH--on Flomax  4. DM2--on metformin,  5. Morbid obesity-- weighs 260 pounds, BMI 39.6  6. Right hip osteoarthritis-- severe  7.   Chronic anemia--on iron supplementation      The patient expressed appropriate understanding of and agreement with the discharge recommendations,
BID    losartan  50 mg Oral Daily    metoprolol tartrate  25 mg Oral BID    pravastatin  20 mg Oral Nightly    tamsulosin  0.4 mg Oral Nightly    triamterene-hydrochlorothiazide  1 tablet Oral Daily      Infusions:    dextrose       PRN Meds: sodium chloride flush, 10 mL, PRN  magnesium hydroxide, 30 mL, Daily PRN  ondansetron, 4 mg, Q6H PRN  glucose, 15 g, PRN  dextrose, 12.5 g, PRN  glucagon (rDNA), 1 mg, PRN  dextrose, 100 mL/hr, PRN  acetaminophen, 650 mg, Q4H PRN        Electronically signed by Evelyne Roberts MD on 1/22/2020 at 12:27 PM

## 2020-01-22 NOTE — CARE COORDINATION
Dr Jaqueline Giang states discharge plan is for today and pt will need [facility]. , also a ride home. Spoke with pt and he has not preference for [facility] or transportation agencies. PS to Justina STRONG earlier , and she has [facility]. set up. Set up Transport with [facility] for 1630. PS to Dr Jaqueline Giang and updated pt's RN Mollie Simmonds.  Pt also agreeable with plan. 1640 Called [facility]. and to inform them of pt's discharge.

## 2020-01-23 LAB
EKG DIAGNOSIS: NORMAL
EKG Q-T INTERVAL: 392 MS
EKG QRS DURATION: 94 MS
EKG QTC CALCULATION (BAZETT): 492 MS
EKG R AXIS: -26 DEGREES
EKG T AXIS: 50 DEGREES
EKG VENTRICULAR RATE: 95 BPM

## 2020-11-17 ENCOUNTER — HOSPITAL ENCOUNTER (OUTPATIENT)
Age: 75
Setting detail: OBSERVATION
Discharge: HOME | End: 2020-11-18
Attending: HOSPITALIST | Admitting: HOSPITALIST
Payer: COMMERCIAL

## 2020-11-17 PROBLEM — E87.6 ACUTE HYPOKALEMIA: Status: ACTIVE | Noted: 2020-11-17

## 2020-11-17 LAB
ABO/RH: NORMAL
ALBUMIN SERPL-MCNC: 3.1 GM/DL (ref 3.4–5)
ALP BLD-CCNC: 69 IU/L (ref 40–129)
ALT SERPL-CCNC: <5 U/L (ref 10–40)
ANION GAP SERPL CALCULATED.3IONS-SCNC: 14 MMOL/L (ref 4–16)
ANTIBODY SCREEN: NEGATIVE
APTT: 36.9 SECONDS (ref 25.1–37.1)
AST SERPL-CCNC: 11 IU/L (ref 15–37)
BASOPHILS ABSOLUTE: 0.1 K/CU MM
BASOPHILS RELATIVE PERCENT: 0.6 % (ref 0–1)
BILIRUB SERPL-MCNC: 0.4 MG/DL (ref 0–1)
BUN BLDV-MCNC: 8 MG/DL (ref 6–23)
CALCIUM SERPL-MCNC: 9.1 MG/DL (ref 8.3–10.6)
CHLORIDE BLD-SCNC: 97 MMOL/L (ref 99–110)
CO2: 25 MMOL/L (ref 21–32)
CREAT SERPL-MCNC: 0.7 MG/DL (ref 0.9–1.3)
DIFFERENTIAL TYPE: ABNORMAL
EOSINOPHILS ABSOLUTE: 0.6 K/CU MM
EOSINOPHILS RELATIVE PERCENT: 7.2 % (ref 0–3)
ESTIMATED AVERAGE GLUCOSE: 131 MG/DL
GFR AFRICAN AMERICAN: >60 ML/MIN/1.73M2
GFR NON-AFRICAN AMERICAN: >60 ML/MIN/1.73M2
GLUCOSE BLD-MCNC: 114 MG/DL (ref 70–99)
GLUCOSE BLD-MCNC: 118 MG/DL (ref 70–99)
GLUCOSE BLD-MCNC: 119 MG/DL (ref 70–99)
HBA1C MFR BLD: 6.2 % (ref 4.2–6.3)
HCT VFR BLD CALC: 30.9 % (ref 42–52)
HCT VFR BLD CALC: 31.7 % (ref 42–52)
HCT VFR BLD CALC: 36.9 % (ref 42–52)
HEMOGLOBIN: 10.7 GM/DL (ref 13.5–18)
HEMOGLOBIN: 9.5 GM/DL (ref 13.5–18)
HEMOGLOBIN: 9.7 GM/DL (ref 13.5–18)
IMMATURE NEUTROPHIL %: 0.4 % (ref 0–0.43)
INR BLD: 1.8 INDEX
LIPASE: 23 IU/L (ref 13–60)
LYMPHOCYTES ABSOLUTE: 1 K/CU MM
LYMPHOCYTES RELATIVE PERCENT: 12.7 % (ref 24–44)
MAGNESIUM: 2 MG/DL (ref 1.8–2.4)
MCH RBC QN AUTO: 28 PG (ref 27–31)
MCHC RBC AUTO-ENTMCNC: 30.6 % (ref 32–36)
MCV RBC AUTO: 91.4 FL (ref 78–100)
MONOCYTES ABSOLUTE: 0.6 K/CU MM
MONOCYTES RELATIVE PERCENT: 7.8 % (ref 0–4)
NUCLEATED RBC %: 0 %
PDW BLD-RTO: 17.5 % (ref 11.7–14.9)
PLATELET # BLD: 437 K/CU MM (ref 140–440)
PMV BLD AUTO: 8.5 FL (ref 7.5–11.1)
POTASSIUM SERPL-SCNC: 3.3 MMOL/L (ref 3.5–5.1)
PROTHROMBIN TIME: 21.9 SECONDS (ref 11.7–14.5)
RBC # BLD: 3.47 M/CU MM (ref 4.6–6.2)
SARS-COV-2, NAAT: NOT DETECTED
SEGMENTED NEUTROPHILS ABSOLUTE COUNT: 5.7 K/CU MM
SEGMENTED NEUTROPHILS RELATIVE PERCENT: 71.3 % (ref 36–66)
SODIUM BLD-SCNC: 136 MMOL/L (ref 135–145)
SOURCE: NORMAL
TOTAL IMMATURE NEUTOROPHIL: 0.03 K/CU MM
TOTAL NUCLEATED RBC: 0 K/CU MM
TOTAL PROTEIN: 7 GM/DL (ref 6.4–8.2)
WBC # BLD: 8 K/CU MM (ref 4–10.5)

## 2020-11-17 PROCEDURE — 85730 THROMBOPLASTIN TIME PARTIAL: CPT

## 2020-11-17 PROCEDURE — 93005 ELECTROCARDIOGRAM TRACING: CPT | Performed by: PHYSICIAN ASSISTANT

## 2020-11-17 PROCEDURE — 85018 HEMOGLOBIN: CPT

## 2020-11-17 PROCEDURE — 83690 ASSAY OF LIPASE: CPT

## 2020-11-17 PROCEDURE — 85014 HEMATOCRIT: CPT

## 2020-11-17 PROCEDURE — 99285 EMERGENCY DEPT VISIT HI MDM: CPT

## 2020-11-17 PROCEDURE — 96374 THER/PROPH/DIAG INJ IV PUSH: CPT

## 2020-11-17 PROCEDURE — 6360000002 HC RX W HCPCS: Performed by: NURSE PRACTITIONER

## 2020-11-17 PROCEDURE — 2580000003 HC RX 258: Performed by: NURSE PRACTITIONER

## 2020-11-17 PROCEDURE — 86850 RBC ANTIBODY SCREEN: CPT

## 2020-11-17 PROCEDURE — 6370000000 HC RX 637 (ALT 250 FOR IP): Performed by: NURSE PRACTITIONER

## 2020-11-17 PROCEDURE — 80053 COMPREHEN METABOLIC PANEL: CPT

## 2020-11-17 PROCEDURE — G0378 HOSPITAL OBSERVATION PER HR: HCPCS

## 2020-11-17 PROCEDURE — 85610 PROTHROMBIN TIME: CPT

## 2020-11-17 PROCEDURE — C9113 INJ PANTOPRAZOLE SODIUM, VIA: HCPCS | Performed by: NURSE PRACTITIONER

## 2020-11-17 PROCEDURE — 94761 N-INVAS EAR/PLS OXIMETRY MLT: CPT

## 2020-11-17 PROCEDURE — 99284 EMERGENCY DEPT VISIT MOD MDM: CPT

## 2020-11-17 PROCEDURE — 93010 ELECTROCARDIOGRAM REPORT: CPT | Performed by: INTERNAL MEDICINE

## 2020-11-17 PROCEDURE — 6370000000 HC RX 637 (ALT 250 FOR IP): Performed by: SPECIALIST

## 2020-11-17 PROCEDURE — 1200000000 HC SEMI PRIVATE

## 2020-11-17 PROCEDURE — 86901 BLOOD TYPING SEROLOGIC RH(D): CPT

## 2020-11-17 PROCEDURE — 83735 ASSAY OF MAGNESIUM: CPT

## 2020-11-17 PROCEDURE — 85025 COMPLETE CBC W/AUTO DIFF WBC: CPT

## 2020-11-17 PROCEDURE — 82962 GLUCOSE BLOOD TEST: CPT

## 2020-11-17 PROCEDURE — 36415 COLL VENOUS BLD VENIPUNCTURE: CPT

## 2020-11-17 PROCEDURE — 86900 BLOOD TYPING SEROLOGIC ABO: CPT

## 2020-11-17 PROCEDURE — U0002 COVID-19 LAB TEST NON-CDC: HCPCS

## 2020-11-17 PROCEDURE — 83036 HEMOGLOBIN GLYCOSYLATED A1C: CPT

## 2020-11-17 RX ORDER — ACETAMINOPHEN 650 MG/1
650 SUPPOSITORY RECTAL EVERY 6 HOURS PRN
Status: DISCONTINUED | OUTPATIENT
Start: 2020-11-17 | End: 2020-11-18 | Stop reason: HOSPADM

## 2020-11-17 RX ORDER — NICOTINE POLACRILEX 4 MG
15 LOZENGE BUCCAL PRN
Status: DISCONTINUED | OUTPATIENT
Start: 2020-11-17 | End: 2020-11-18 | Stop reason: HOSPADM

## 2020-11-17 RX ORDER — DILTIAZEM HYDROCHLORIDE 180 MG/1
360 CAPSULE, COATED, EXTENDED RELEASE ORAL DAILY
Status: DISCONTINUED | OUTPATIENT
Start: 2020-11-17 | End: 2020-11-18 | Stop reason: HOSPADM

## 2020-11-17 RX ORDER — FERROUS SULFATE 325(65) MG
324 TABLET ORAL 2 TIMES DAILY WITH MEALS
Status: DISCONTINUED | OUTPATIENT
Start: 2020-11-18 | End: 2020-11-18 | Stop reason: HOSPADM

## 2020-11-17 RX ORDER — SODIUM CHLORIDE 0.9 % (FLUSH) 0.9 %
10 SYRINGE (ML) INJECTION PRN
Status: DISCONTINUED | OUTPATIENT
Start: 2020-11-17 | End: 2020-11-18 | Stop reason: HOSPADM

## 2020-11-17 RX ORDER — POTASSIUM CHLORIDE 20 MEQ/1
20 TABLET, EXTENDED RELEASE ORAL DAILY
COMMUNITY

## 2020-11-17 RX ORDER — PRAVASTATIN SODIUM 10 MG
20 TABLET ORAL NIGHTLY
Status: DISCONTINUED | OUTPATIENT
Start: 2020-11-17 | End: 2020-11-18 | Stop reason: HOSPADM

## 2020-11-17 RX ORDER — POTASSIUM CHLORIDE 7.45 MG/ML
10 INJECTION INTRAVENOUS PRN
Status: DISCONTINUED | OUTPATIENT
Start: 2020-11-17 | End: 2020-11-18 | Stop reason: HOSPADM

## 2020-11-17 RX ORDER — FAMOTIDINE 40 MG/1
40 TABLET, FILM COATED ORAL NIGHTLY
COMMUNITY

## 2020-11-17 RX ORDER — POTASSIUM CHLORIDE 20 MEQ/1
20 TABLET, EXTENDED RELEASE ORAL DAILY
Status: DISCONTINUED | OUTPATIENT
Start: 2020-11-17 | End: 2020-11-18 | Stop reason: HOSPADM

## 2020-11-17 RX ORDER — DEXTROSE MONOHYDRATE 50 MG/ML
100 INJECTION, SOLUTION INTRAVENOUS PRN
Status: DISCONTINUED | OUTPATIENT
Start: 2020-11-17 | End: 2020-11-18 | Stop reason: HOSPADM

## 2020-11-17 RX ORDER — TRIAMTERENE AND HYDROCHLOROTHIAZIDE 37.5; 25 MG/1; MG/1
1 TABLET ORAL DAILY
Status: DISCONTINUED | OUTPATIENT
Start: 2020-11-17 | End: 2020-11-18 | Stop reason: HOSPADM

## 2020-11-17 RX ORDER — DEXTROSE MONOHYDRATE 25 G/50ML
12.5 INJECTION, SOLUTION INTRAVENOUS PRN
Status: DISCONTINUED | OUTPATIENT
Start: 2020-11-17 | End: 2020-11-18 | Stop reason: HOSPADM

## 2020-11-17 RX ORDER — NITROGLYCERIN 0.4 MG/1
0.4 TABLET SUBLINGUAL EVERY 5 MIN PRN
Status: DISCONTINUED | OUTPATIENT
Start: 2020-11-17 | End: 2020-11-18 | Stop reason: HOSPADM

## 2020-11-17 RX ORDER — POTASSIUM CHLORIDE 20 MEQ/1
40 TABLET, EXTENDED RELEASE ORAL PRN
Status: DISCONTINUED | OUTPATIENT
Start: 2020-11-17 | End: 2020-11-18 | Stop reason: HOSPADM

## 2020-11-17 RX ORDER — SODIUM CHLORIDE 9 MG/ML
10 INJECTION INTRAVENOUS EVERY 12 HOURS
Status: DISCONTINUED | OUTPATIENT
Start: 2020-11-17 | End: 2020-11-18 | Stop reason: HOSPADM

## 2020-11-17 RX ORDER — ACETAMINOPHEN 325 MG/1
650 TABLET ORAL EVERY 6 HOURS PRN
Status: DISCONTINUED | OUTPATIENT
Start: 2020-11-17 | End: 2020-11-18 | Stop reason: HOSPADM

## 2020-11-17 RX ORDER — ALLOPURINOL 300 MG/1
300 TABLET ORAL DAILY
Status: DISCONTINUED | OUTPATIENT
Start: 2020-11-17 | End: 2020-11-18 | Stop reason: HOSPADM

## 2020-11-17 RX ORDER — LOSARTAN POTASSIUM 50 MG/1
50 TABLET ORAL DAILY
Status: DISCONTINUED | OUTPATIENT
Start: 2020-11-17 | End: 2020-11-18 | Stop reason: HOSPADM

## 2020-11-17 RX ORDER — ONDANSETRON 2 MG/ML
4 INJECTION INTRAMUSCULAR; INTRAVENOUS EVERY 6 HOURS PRN
Status: DISCONTINUED | OUTPATIENT
Start: 2020-11-17 | End: 2020-11-18 | Stop reason: HOSPADM

## 2020-11-17 RX ORDER — TAMSULOSIN HYDROCHLORIDE 0.4 MG/1
0.4 CAPSULE ORAL NIGHTLY
Status: DISCONTINUED | OUTPATIENT
Start: 2020-11-17 | End: 2020-11-18 | Stop reason: HOSPADM

## 2020-11-17 RX ORDER — PANTOPRAZOLE SODIUM 40 MG/10ML
40 INJECTION, POWDER, LYOPHILIZED, FOR SOLUTION INTRAVENOUS EVERY 12 HOURS
Status: DISCONTINUED | OUTPATIENT
Start: 2020-11-17 | End: 2020-11-18 | Stop reason: HOSPADM

## 2020-11-17 RX ORDER — PROMETHAZINE HYDROCHLORIDE 12.5 MG/1
12.5 TABLET ORAL EVERY 6 HOURS PRN
Status: DISCONTINUED | OUTPATIENT
Start: 2020-11-17 | End: 2020-11-18 | Stop reason: HOSPADM

## 2020-11-17 RX ORDER — SODIUM CHLORIDE 0.9 % (FLUSH) 0.9 %
10 SYRINGE (ML) INJECTION EVERY 12 HOURS SCHEDULED
Status: DISCONTINUED | OUTPATIENT
Start: 2020-11-17 | End: 2020-11-18 | Stop reason: HOSPADM

## 2020-11-17 RX ADMIN — PRAVASTATIN SODIUM 20 MG: 10 TABLET ORAL at 21:23

## 2020-11-17 RX ADMIN — SODIUM CHLORIDE, PRESERVATIVE FREE 10 ML: 5 INJECTION INTRAVENOUS at 21:23

## 2020-11-17 RX ADMIN — PANTOPRAZOLE SODIUM 40 MG: 40 INJECTION, POWDER, FOR SOLUTION INTRAVENOUS at 18:13

## 2020-11-17 RX ADMIN — TRIAMTERENE AND HYDROCHLOROTHIAZIDE 1 TABLET: 37.5; 25 TABLET ORAL at 18:13

## 2020-11-17 RX ADMIN — POTASSIUM CHLORIDE 20 MEQ: 1500 TABLET, EXTENDED RELEASE ORAL at 18:13

## 2020-11-17 RX ADMIN — SODIUM CHLORIDE, PRESERVATIVE FREE 10 ML: 5 INJECTION INTRAVENOUS at 18:19

## 2020-11-17 RX ADMIN — POLYETHYLENE GLYCOL 3350, SODIUM SULFATE ANHYDROUS, SODIUM BICARBONATE, SODIUM CHLORIDE, POTASSIUM CHLORIDE 2000 ML: 236; 22.74; 6.74; 5.86; 2.97 POWDER, FOR SOLUTION ORAL at 18:13

## 2020-11-17 RX ADMIN — ACETAMINOPHEN 650 MG: 325 TABLET ORAL at 19:27

## 2020-11-17 RX ADMIN — DILTIAZEM HYDROCHLORIDE 360 MG: 180 CAPSULE, COATED, EXTENDED RELEASE ORAL at 18:13

## 2020-11-17 RX ADMIN — LOSARTAN POTASSIUM 50 MG: 50 TABLET, FILM COATED ORAL at 18:14

## 2020-11-17 RX ADMIN — ALLOPURINOL 300 MG: 300 TABLET ORAL at 18:14

## 2020-11-17 RX ADMIN — METOPROLOL TARTRATE 25 MG: 25 TABLET, FILM COATED ORAL at 21:23

## 2020-11-17 RX ADMIN — TAMSULOSIN HYDROCHLORIDE 0.4 MG: 0.4 CAPSULE ORAL at 21:23

## 2020-11-17 ASSESSMENT — PAIN DESCRIPTION - LOCATION
LOCATION: HIP
LOCATION: HIP

## 2020-11-17 ASSESSMENT — PAIN SCALES - GENERAL
PAINLEVEL_OUTOF10: 7
PAINLEVEL_OUTOF10: 6
PAINLEVEL_OUTOF10: 6

## 2020-11-17 NOTE — ED PROVIDER NOTES
eMERGENCY dEPARTMENT eNCOUnter         [address] [facility]     PCP: Olivier Spain MD    CHIEF COMPLAINT    Chief Complaint   Patient presents with    Rectal Bleeding       HPI    Jaelyn Josue is a 77 y.o. male who presents with rectal bleeding. Onset is acute on chronic. Context patient states for the last 40+ years, he has had abnormal bowel movements, intermittently bright red blood and sometimes black and dark. Was sent by PCP to a GI specialist who he saw on 11/10/2020, was told he would need a colonoscopy which patient was reluctant to do as he is mainly bedbound and is concerned about the colon prep which would cause a lot of diarrhea/stool. He was told by GI specialist that bleeding may be secondary to hemorrhoid however \"I do not believe it, there is too much blood. \"  States that he began having dark stools again this morning with a history of diverticulitis/diverticulosis. Denying any abdominal pain, fever chills, dizziness or lightheadedness. Is on Eliquis for history of A. fib. No dysuria hematuria. Denying any abdominal surgical history. Patient states last colonoscopy was in 2018. States that this morning, he had 3 episodes of bright red blood mixed with dark stools. REVIEW OF SYSTEMS    Constitutional:  Denies fever, chills, weight loss or weakness   HENT:  Denies sore throat or ear pain   Cardiovascular:  Denies chest pain, palpitations or swelling   Respiratory:  Denies cough or shortness of breath   GI:  See HPI above  : No hematuria or dysuria. Musculoskeletal:  Denies back pain or groin pain or masses. No pain or swelling of extremities.   Skin:  Denies rash  Neurologic:  Denies headache, focal weakness or sensory changes   Endocrine:  Denies polyuria or polydypsia   Lymphatic:  Denies swollen glands     All other review of systems are negative  See HPI and nursing notes for additional information     PAST MEDICAL & SURGICAL HISTORY    Past Medical History:   Diagnosis Date    Arthropathy     [facility]     Atrial flutter [facility]     Diabetes mellitus ([facility]     Gout     Hyperlipidemia     Hypertension     Lumbago      Past Surgical History:   Procedure Laterality Date    COLONOSCOPY N/A 9/27/2018    COLONOSCOPY POLYPECTOMY SNARE/COLD BIOPSY performed by Osiris Reed MD at [address] [place] [address]    Current Outpatient Rx   Medication Sig Dispense Refill    famotidine (PEPCID) 40 MG tablet Take 40 mg by mouth nightly      potassium chloride (KLOR-CON M) 20 MEQ extended release tablet Take 20 mEq by mouth daily      ferrous sulfate 324 (65 Fe) MG EC tablet Take 324 mg by mouth 2 times daily      losartan (COZAAR) 50 MG tablet Take 50 mg by mouth daily      tamsulosin (FLOMAX) 0.4 MG capsule Take 1 capsule by mouth daily (Patient taking differently: Take 0.4 mg by mouth nightly ) 30 capsule 3    apixaban (ELIQUIS) 5 MG TABS tablet Take 1 tablet by mouth 2 times daily 60 tablet 0    metoprolol tartrate (LOPRESSOR) 25 MG tablet Take 1 tablet by mouth 2 times daily 60 tablet 0    allopurinol (ZYLOPRIM) 300 MG tablet Take 300 mg by mouth daily      diltiazem (TIAZAC) 360 MG extended release capsule Take 360 mg by mouth daily      triamterene-hydrochlorothiazide (MAXZIDE-25) 37.5-25 MG per tablet Take 1 tablet by mouth daily      metFORMIN (GLUCOPHAGE) 500 MG tablet Take 500 mg by mouth 2 times daily (with meals)      pravastatin (PRAVACHOL) 20 MG tablet Take 20 mg by mouth nightly      nitroGLYCERIN (NITROSTAT) 0.4 MG SL tablet up to max of 3 total doses.  If no relief after 1 dose, call 911. 25 tablet 0       ALLERGIES    Allergies   Allergen Reactions    Demerol Hcl [Meperidine]     Gabapentin     Simvastatin        SOCIAL AND FAMILY HISTORY    Social History     Socioeconomic History    Marital status: Single     Spouse name: Not on file    Number of children: Not on file  Years of education: Not on file    Highest education level: Not on file   Occupational History    Not on file   Social Needs    Financial resource strain: Not on file    Food insecurity     Worry: Not on file     Inability: Not on file    Transportation needs     Medical: Not on file     Non-medical: Not on file   Tobacco Use    Smoking status: Former Smoker     Packs/day: 0.50     Types: Cigarettes    Smokeless tobacco: Never Used   Substance and Sexual Activity    Alcohol use: No    Drug use: No    Sexual activity: Not on file   Lifestyle    Physical activity     Days per week: Not on file     Minutes per session: Not on file    Stress: Not on file   Relationships    Social connections     Talks on phone: Not on file     Gets together: Not on file     Attends Sikhism service: Not on file     Active member of club or organization: Not on file     Attends meetings of clubs or organizations: Not on file     Relationship status: Not on file    Intimate partner violence     Fear of current or ex partner: Not on file     Emotionally abused: Not on file     Physically abused: Not on file     Forced sexual activity: Not on file   Other Topics Concern    Not on file   Social History Narrative    Not on file     No family history on file. PHYSICAL EXAM    VITAL SIGNS: BP (!) 149/64   Pulse 66   Temp 98 °F (36.7 °C) (Oral)   Resp 14   SpO2 94%   General:  Well developed, obese male, In no acute distress  Eyes:  Sclera nonicteric, Conjunctiva moist, No discharge  Head:  Normocephalic, Atramautic  Neck/Lymphatics: Supple, no JVD, no swollen nodes  Respiratory:  Clear to ausculation bilaterally, No retractions, Non labored breathing  Cardiovascular:  RRR, No murmurs/gallops/thrills  GI:   No gross discoloration. Bowel sounds present in all quadrants, No audible bruits. Soft,  Nondistended. Soft obese abdomen without localized tenderness however exam is difficult secondary to patient's body habitus. No palpable pulsatile masses or obvious hernias. Back:  No CVA tenderness to percussion. RECTAL:  Chaperoned exam with Ahsan Dillard RN at bedside. Placed into right side lying position. +multiple soft nontender nonbleeding skin tags with a larger external nonthrombosed hemorrhoid at the 6oclock position, no active hemorrhage. No anal fissures. No outer anal rin tenderness induration or fluctuance. There is no tenderness to palpation. Rectal tone is strong. Rectal vault is clear of any stool, no sample available for hemoccult card.  Upon removal of finger, slight pink bloody discharge on tip of glove, no clots or bright red blood  Musculoskeletal:  No edema, No deformity  Peripheral Vascular: Distal pulses 2+ equal bilaterally  Integument: No rash, Normal turgor  Neurologic:  Alert & oriented, Normal speech  Psychiatric: Cooperative, pleasant affect       I have reviewed and interpreted all of the currently available lab results from this visit (if applicable):  Results for orders placed or performed during the hospital encounter of 11/17/20   CBC auto diff   Result Value Ref Range    WBC 8.0 4.0 - 10.5 K/CU MM    RBC 3.47 (L) 4.6 - 6.2 M/CU MM    Hemoglobin 9.7 (L) 13.5 - 18.0 GM/DL    Hematocrit 31.7 (L) 42 - 52 %    MCV 91.4 78 - 100 FL    MCH 28.0 27 - 31 PG    MCHC 30.6 (L) 32.0 - 36.0 %    RDW 17.5 (H) 11.7 - 14.9 %    Platelets 767 968 - 463 K/CU MM    MPV 8.5 7.5 - 11.1 FL    Differential Type AUTOMATED DIFFERENTIAL     Segs Relative 71.3 (H) 36 - 66 %    Lymphocytes % 12.7 (L) 24 - 44 %    Monocytes % 7.8 (H) 0 - 4 %    Eosinophils % 7.2 (H) 0 - 3 %    Basophils % 0.6 0 - 1 %    Segs Absolute 5.7 K/CU MM    Lymphocytes Absolute 1.0 K/CU MM    Monocytes Absolute 0.6 K/CU MM    Eosinophils Absolute 0.6 K/CU MM    Basophils Absolute 0.1 K/CU MM    Nucleated RBC % 0.0 %    Total Nucleated RBC 0.0 K/CU MM    Total Immature Neutrophil 0.03 K/CU MM    Immature Neutrophil % 0.4 0 - 0.43 %   CMP   Result Value Ref Range    Sodium 136 135 - 145 MMOL/L    Potassium 3.3 (L) 3.5 - 5.1 MMOL/L    Chloride 97 (L) 99 - 110 mMol/L    CO2 25 21 - 32 MMOL/L    BUN 8 6 - 23 MG/DL    CREATININE 0.7 (L) 0.9 - 1.3 MG/DL    Glucose 119 (H) 70 - 99 MG/DL    Calcium 9.1 8.3 - 10.6 MG/DL    Alb 3.1 (L) 3.4 - 5.0 GM/DL    Total Protein 7.0 6.4 - 8.2 GM/DL    Total Bilirubin 0.4 0.0 - 1.0 MG/DL    ALT <5 (L) 10 - 40 U/L    AST 11 (L) 15 - 37 IU/L    Alkaline Phosphatase 69 40 - 129 IU/L    GFR Non-African American >60 >60 mL/min/1.73m2    GFR African American >60 >60 mL/min/1.73m2    Anion Gap 14 4 - 16   Lipase   Result Value Ref Range    Lipase 23 13 - 60 IU/L   Protime/INR & PTT   Result Value Ref Range    Protime 21.9 (H) 11.7 - 14.5 SECONDS    INR 1.80 INDEX    aPTT 36.9 25.1 - 37.1 SECONDS   Hemoglobin and hematocrit, blood   Result Value Ref Range    Hemoglobin 9.5 (L) 13.5 - 18.0 GM/DL    Hematocrit 30.9 (L) 42 - 52 %   EKG 12 Lead   Result Value Ref Range    Ventricular Rate 62 BPM    Atrial Rate 227 BPM    QRS Duration 104 ms    Q-T Interval 452 ms    QTc Calculation (Bazett) 458 ms    R Axis -41 degrees    T Axis 5 degrees    Diagnosis       Junctional rhythm  Left axis deviation  Possible Anterior infarct (cited on or before 17-JAN-2020)  Abnormal ECG  When compared with ECG of 17-JAN-2020 07:57,  Junctional rhythm has replaced Atrial fibrillation  Vent. rate has decreased BY  33 BPM  Nonspecific T wave abnormality no longer evident in Lateral leads     TYPE AND SCREEN   Result Value Ref Range    ABO/Rh O POSITIVE     Antibody Screen NEGATIVE         RADIOLOGY/PROCEDURES    NONE    ED COURSE & MEDICAL DECISION MAKING      Vital signs and nursing notes reviewed during ED course. I have independently evaluated this patient . Supervising MD - Dr Estephanie Hunter - present in the Emergency Department, available for consultation, throughout entirety of  patient care.  All pertinent Lab data and radiographic results reviewed with patient at bedside. The patient and / or the family were informed of the results of any tests, a time was given to answer questions, a plan was proposed and they agreed with plan. Differential diagnosis: Abdominal Aortic Aneurysm, Ischemic Bowel, Bowel Obstruction, Acute Cholecystitis, Acute Appendicitis, other    Clinical  IMPRESSION    1. Lower GI bleed        Patient presents with acute on chronic rectal bleeding. On exam, obese elderly male, nontoxic afebrile and in no acute respiratory distress. Vitals are stable, appears hemodynamically intact, not hypotensive or tachycardic. Abdominal exam is difficult secondary patient's body habitus but no localized tenderness rebound or guarding. No CVA tenderness. On chaperoned rectal exam, patient has multiple external skin tags without evidence of fissures. There is a small soft nonthrombosed not actively bleeding external hemorrhoid. Rectal vault is clear of any stool, unable to obtain a sample for Hemoccult testing however following the removal of finger during digital rectal exam, there is a small amount of pink blood to the tip of the glove. Presentation concerning for lower GI bleed versus internal hemorrhoid. CBC without leukocytosis. Hemoglobin is 9.7 which is stable compared to previous. Mild hypokalemia 3.3 which patient states is chronic and take potassium supplements for. Normal BUN and creatinine. Normal lipase. Did order PT/INR and type and screen. Patient's primary concern is that he is not able to tolerate outpatient bowel prep as he lives by himself. We will plan to consult with case management as well as patient's GI, Dr. Mills Leader for further recommendations. Did order a three hour repeat H& H which is unchanged. Patient remained hemodynamically stable throughout ED encounter.       I did consult with Dr. Mills Leader - GI - and discussed patient's history, ED presentation/course including any pertinent laboratory findings and imaging study findings. He/she recommends hospitalist admission, clear liquid diet starting now, for bowel prep and anticipate colonoscopy    I then spoke with hospitalist, Voncille Cogan CNP, who agrees to admit patient for further care. In light of current events, I did utilize appropriate PPE (including N95 face mask, safety glasses, gloves as recommended by the health facility/national standard best practice, during my bedside interactions with the patient. Patient was masked throughout ED course. Full droplet precaution as well as full PPE was followed throughout patient's ED course and evaluation. In consideration of current COVID19 pandemic, with effort to minimize unnecessary provider exposure, this patient was seen at bedside by me independently. However, in compliance with current hospital EMILY/ED protocol, prior to admission I did discuss this patient case with emergency department physician, Dr. Cece Hess, who did agree with ED workup/evaluation and plan for admission. Of note, this Pt was NOT admitted to the ICU. Comment: Please note this report has been produced using speech recognition software and may contain errors related to that system including errors in grammar, punctuation, and spelling, as well as words and phrases that may be inappropriate. If there are any questions or concerns please feel free to contact the dictating provider for clarification.           Saulo Ba PA-C  11/17/20 1508

## 2020-11-17 NOTE — ACP (ADVANCE CARE PLANNING)
Advance Care Planning     Advance Care Planning Activator (Inpatient)  Conversation Note      Date of ACP Conversation: 11/17/2020    Conversation Conducted with: Patient with Decision Making Capacity    ACP Activator: Neal Doty    *When Decision Maker makes decisions on behalf of the incapacitated patient: Decision Maker is asked to consider and make decisions based on patient values, known preferences, or best interests. Health Care Decision Maker:     Current Designated Health Care Decision Maker:   Primary Decision Maker: Maria M Lauren - Brother/Sister - [phone]  (If there is a valid Health Care Decision Maker named in the [facility] Makers\" box in the ACP activity, but it is not visible above, be sure to open that field and then select the health care decision maker relationship (ie \"primary\") in the blank space to the right of the name.) Validate  this information as still accurate & up-to-date; edit [address] field as needed.)    Note: Assess and validate information in current ACP documents, as indicated. Care Preferences    Ventilation: \"If you were in your present state of health and suddenly became very ill and were unable to breathe on your own, what would your preference be about the use of a ventilator (breathing machine) if it were available to you? \"      Would the patient desire the use of ventilator (breathing machine)?: Initially, Pt is unclear- not sure if he wants this. Pt descibes his life as being poor yet not sure if he wants to have DNR order. However, pt later stated Yes- he wants ventilation. \"If your health worsens and it becomes clear that your chance of recovery is unlikely, what would your preference be about the use of a ventilator (breathing machine) if it were available to you? \"     Would the patient desire the use of ventilator (breathing machine)?:   NO      Resuscitation  \"CPR works best to restart the heart when there is a sudden event, like a heart attack, in someone who is otherwise healthy. Unfortunately, CPR does not typically restart the heart for people who have serious health conditions or who are very sick. \"    \"In the event your heart stopped as a result of an underlying serious health condition, would you want attempts to be made to restart your heart (answer \"yes\" for attempt to resuscitate) or would you prefer a natural death (answer \"no\" for do not attempt to resuscitate)? \"  Initially, Pt is unclear- not sure if he wants this. Pt descibes his life as being poor yet not sure if he wants to have DNR order. However, pt later stated Yes- he wants Resuscitation. NOTE: If the patient has a valid advance directive AND now provides care preference(s) that are inconsistent with that prior directive, advise the patient to consider either: creating a new advance directive that complies with state-specific requirements; or, if that is not possible, orally revoking that prior directive in accordance with state-specific requirements, which must be documented in the EHR. [] Yes   [] No   Educated Patient / Rani Christianson regarding differences between Advance Directives and portable DNR orders.     Length of ACP Conversation in minutes:  18    Conversation Outcomes:  [x] ACP discussion completed  [] Existing advance directive reviewed with patient; no changes to patient's previously recorded wishes  [] New Advance Directive completed  [] Portable Do Not Rescitate prepared for Provider review and signature  [] POLST/POST/MOLST/MOST prepared for Provider review and signature      Follow-up plan:    [] Schedule follow-up conversation to continue planning  [] Referred individual to Provider for additional questions/concerns   [] Advised patient/agent/surrogate to review completed ACP document and update if needed with changes in condition, patient preferences or care setting    [x] This note routed to one or more involved healthcare providers     LSW identified POA in chart dated 10/21/99 appointing his sister above as his agent.

## 2020-11-17 NOTE — ED NOTES
Pt AOx4, requesting to use the restroom. This nurse provided to urinal for the pt to use and even placed their genital into urinal and gave privacy. Pt ha been calling staff to help him hold his genital area to help him pee. Pt does admit that he uses urinal at home and is independently repositions himself in bed but when urinal handed to him he says \" I dont know how to hold the urinal\". This nurse helped to place the urinal in right place one more time. Call light within reach.      Susana Reilly RN  11/17/20 3472

## 2020-11-17 NOTE — PROGRESS NOTES
Medication History  [facility]    Patient Name: Emilee Díaz 1945     Medication history has been completed by: Beth Dugan CPhT    Source(s) of information: patient supplied packaged medications from retail pharmacy     Primary Care Physician: Soco Pereira MD     Pharmacy: [facility]    Allergies as of 11/17/2020 - Review Complete 01/20/2020   Allergen Reaction Noted    Demerol hcl [meperidine]  03/07/2017    Gabapentin  03/07/2017    Simvastatin  03/07/2017        Prior to Admission medications    Medication Sig Start Date End Date Taking?  Authorizing Provider   famotidine (PEPCID) 40 MG tablet Take 40 mg by mouth nightly   Yes Historical Provider, MD   potassium chloride (KLOR-CON M) 20 MEQ extended release tablet Take 20 mEq by mouth daily   Yes Historical Provider, MD   ferrous sulfate 324 (65 Fe) MG EC tablet Take 324 mg by mouth 2 times daily   Yes Historical Provider, MD   losartan (COZAAR) 50 MG tablet Take 50 mg by mouth daily   Yes Historical Provider, MD   tamsulosin (FLOMAX) 0.4 MG capsule Take 1 capsule by mouth daily  Patient taking differently: Take 0.4 mg by mouth nightly  10/2/18  Yes Clementina Glover MD   apixaban (ELIQUIS) 5 MG TABS tablet Take 1 tablet by mouth 2 times daily 7/2/18  Yes Karen Jhaveri DO   metoprolol tartrate (LOPRESSOR) 25 MG tablet Take 1 tablet by mouth 2 times daily 7/2/18  Yes Karen Jhaveri,    allopurinol (ZYLOPRIM) 300 MG tablet Take 300 mg by mouth daily   Yes Historical Provider, MD   diltiazem (TIAZAC) 360 MG extended release capsule Take 360 mg by mouth daily   Yes Historical Provider, MD   triamterene-hydrochlorothiazide (MAXZIDE-25) 37.5-25 MG per tablet Take 1 tablet by mouth daily   Yes Historical Provider, MD   metFORMIN (GLUCOPHAGE) 500 MG tablet Take 500 mg by mouth 2 times daily (with meals)   Yes Historical Provider, MD   pravastatin (PRAVACHOL) 20 MG tablet Take 20 mg by mouth nightly   Yes Historical Provider, MD   nitroGLYCERIN (NITROSTAT) 0.4 MG SL tablet up to max of 3 total doses. If no relief after 1 dose, call 911. 7/2/18   Ambar Hickey,      Medications added or changed (ex. new medication, dosage change, interval change, formulation change):  Pantoprazole changed to Famotidine (added)  Potassium chloride (added)    Comments:  Medication list provided by patient from retail pharmacy. Patient states he has taken no meds piror to ER visit.     To my knowledge the above medication history is accurate as of 11/17/2020 1:11 PM.   Rajendra Monroy CPhT   11/17/2020 1:11 PM

## 2020-11-17 NOTE — ED NOTES
Pt repositioned in the bed, linens and sheets changed and put on a gown. Call light within reach.      Chester Lau RN  11/17/20 [id number]

## 2020-11-17 NOTE — CONSULTS
Department of Internal Medicine  Gastroenterology Consult Note  Melany Camejo MD      Reason for Consult:  Rectal bleeding    Primary Care Physician:  Soco Pereira MD    History Obtained From:  patient    HISTORY OF PRESENT ILLNESS:              The patient is a 77 y.o.  male former patient of Dr Angelo Blair who has retired. He had a 1 cm rectal tubulo-villous adenoma removed by Dr Bernabe Clark 2 years ago and erosive duodenitis by EGD 3/2017. I saw him earlier this year with rectal bleeding, however he declined colonoscopy. He presented to the ED with intermittent rectal bleeding and a Hb of 9.7      Past Medical History:        Diagnosis Date    Arthropathy     Atrial fibrillation (HCC)     Atrial flutter (HCC)     Diabetes mellitus ([address]     Gout     Hyperlipidemia     Hypertension     Lumbago        Past Surgical History:        Procedure Laterality Date    COLONOSCOPY N/A 9/27/2018    COLONOSCOPY POLYPECTOMY SNARE/COLD BIOPSY performed by Khadra Hurley MD at [facility] ENDOSCOPY       Medications Prior to Admission:    Prior to Admission medications    Medication Sig Start Date End Date Taking? Authorizing Provider   pantoprazole (PROTONIX) 40 MG tablet Take 1 tablet by mouth every morning (before breakfast) 1/23/20   Zenaida Salinas MD   ferrous sulfate 324 (65 Fe) MG EC tablet Take 324 mg by mouth 2 times daily    Historical Provider, MD   losartan (COZAAR) 50 MG tablet Take 50 mg by mouth daily    Historical Provider, MD   tamsulosin (FLOMAX) 0.4 MG capsule Take 1 capsule by mouth daily  Patient taking differently: Take 0.4 mg by mouth nightly  10/2/18   Clementina Glover MD   nitroGLYCERIN (NITROSTAT) 0.4 MG SL tablet up to max of 3 total doses.  If no relief after 1 dose, call 911. 7/2/18   Karen Jhaveri, DO   apixaban (ELIQUIS) 5 MG TABS tablet Take 1 tablet by mouth 2 times daily 7/2/18   Karen Jhaveri, DO   metoprolol tartrate (LOPRESSOR) 25 MG tablet Take 1 tablet by mouth 2 times daily 7/2/18   Mel Toussaint DO   allopurinol (ZYLOPRIM) 300 MG tablet Take 300 mg by mouth daily    Historical Provider, MD   diltiazem (TIAZAC) 360 MG extended release capsule Take 360 mg by mouth daily    Historical Provider, MD   triamterene-hydrochlorothiazide (MAXZIDE-25) 37.5-25 MG per tablet Take 1 tablet by mouth daily    Historical Provider, MD   metFORMIN (GLUCOPHAGE) 500 MG tablet Take 500 mg by mouth 2 times daily (with meals)    Historical Provider, MD   pravastatin (PRAVACHOL) 20 MG tablet Take 20 mg by mouth nightly    Historical Provider, MD       Allergies: Allergies   Allergen Reactions    Demerol Hcl [Meperidine]     Gabapentin     Simvastatin    . Social History:    TOBACCO:  No  ETOH:  No    Family History:   No family history on file. REVIEW OF SYSTEMS: (POSITIVES WILL BE UNDERLINED)  CONSTITUTIONAL:    Weight change,fatigue, fever, chills  EYES:  Diplopia, change in vision  EARS:  hearing loss, tinnitus, vertigo  NOSE:   epistaxis  MOUTH/THROAT:     hoarseness, sore throat. RESPIRATORY:  SOB,  cough, sputum, hemoptysis  CARDIOVASCULAR : chest pain,palpitations, dyspnea exertion, orthopnea, paroxysmal nocturnal dyspnea, pedal edema. GASTROINTESTINAL:  See HPI  GENITOURINARY:   dysuria, hematuria, . HEMATOLOGIC/LYMPHATIC:   Anemia, bleeding tendencies.   MUSCULOSKELETAL:    myalgias,  joint pain  NEUROLOGICAL:   Loss of Consciousness, paresthesias, anesthesias, focal weakness  SKIN :   History of dermatitis, rashes  PSYCHIATRIC:  depression, , anxiety past psychosis  ENDOCRINE:  History of diabetes, thyroid disease  ALL/IMM : allergies, rashes    PHYSICAL EXAM:    Vitals:  BP (!) 149/64   Pulse 66   Temp 98 °F (36.7 °C) (Oral)   Resp 14   SpO2 94%   CONSTITUTIONAL: alert, cooperative, no apparent distress,   EYES:  pupils equal, round and reactive to light and sclera clear  ENT:  normocepalic, without obvious abnormality  NECK:  supple, symmetrical,

## 2020-11-17 NOTE — CARE COORDINATION
LSW was consulted to assist this pt with discharge planning. Pt here today for intermittent rectal bleeding and a Hb of 9.7. Thalia Rubin informed LSW that pt lives by self and has not been able to do colonoscopy prep. Pt cannot get to bathroom in time and fears he will have accidents all over his house. GI is to be consulted. LSW spoke to this pt and explained CM role. Pt reports he lives alone in a 1 story apartment - [facility] [facility]. Pt explained he has Waiver via [employer] and Provider is Mick [phone]. Pt reports he receives 20 hrs week- 5 hr on ///. Pt states he really likes his HHA- Deborah Young. Pt does have [employer] and [place] benefits which he uses and his PCP is from [place]- Dr. Aranza Lindsay. Pt also noted he sees Dr. Nisha Douglas as doctor because [facility] requires this. Pt can cannot afford his medications. Pt has transportation via [address] [place] [address] and [employer]. Pt has the following DME: w/c, walker, lift  chair, raised toilet seat and electric chair, which [facility] paid $18,000.00 for. Thalia Rubin then came into room. She just got off phone w/Dr. Sylvie Barger. He wants to admit pt and have colonoscopy completed here in hospital. Pt is amenable to this and does not prefer to go to [place]. LSW then completed ACP w/pt. He reports she has no adult children, his parents are  as well as his brother and he has 5 sisters living. Although Flori Schaeffer- sister is his POA, he does not talk to her much. Pt states he is unclear about his code status at this time. Pt descibes his life as being poor yet not sure if he wants to have DNR order. LSW stresses importance for him to make decision on way or the other. In regards to SNF, pt is consistent as he has been in past- He WILL NOT GO TO A SNF! Pt wants to remain in his apartment. He notes his mom went in SNF when she was 77 and remained there for 15 yrs and wanted to leave @ the end.      1325  LSW called Mick- spoke to [place].  Informed her of pt's

## 2020-11-17 NOTE — ED NOTES
Report given to [facility] for room 1539 and denies further questions.      Alonzo Ortiz RN  11/17/20 5523

## 2020-11-18 ENCOUNTER — ANESTHESIA (OUTPATIENT)
Dept: ENDOSCOPY | Age: 75
End: 2020-11-18
Payer: COMMERCIAL

## 2020-11-18 ENCOUNTER — ANESTHESIA EVENT (OUTPATIENT)
Dept: ENDOSCOPY | Age: 75
End: 2020-11-18
Payer: COMMERCIAL

## 2020-11-18 VITALS
SYSTOLIC BLOOD PRESSURE: 189 MMHG | OXYGEN SATURATION: 92 % | HEART RATE: 62 BPM | TEMPERATURE: 97.4 F | RESPIRATION RATE: 18 BRPM | DIASTOLIC BLOOD PRESSURE: 81 MMHG

## 2020-11-18 VITALS
DIASTOLIC BLOOD PRESSURE: 58 MMHG | TEMPERATURE: 98.6 F | OXYGEN SATURATION: 96 % | RESPIRATION RATE: 18 BRPM | SYSTOLIC BLOOD PRESSURE: 124 MMHG

## 2020-11-18 PROBLEM — K62.5 RECTAL BLEED: Status: ACTIVE | Noted: 2020-11-18

## 2020-11-18 LAB
ANION GAP SERPL CALCULATED.3IONS-SCNC: 13 MMOL/L (ref 4–16)
BUN BLDV-MCNC: 5 MG/DL (ref 6–23)
CALCIUM SERPL-MCNC: 9 MG/DL (ref 8.3–10.6)
CHLORIDE BLD-SCNC: 102 MMOL/L (ref 99–110)
CO2: 25 MMOL/L (ref 21–32)
CREAT SERPL-MCNC: 0.6 MG/DL (ref 0.9–1.3)
GFR AFRICAN AMERICAN: >60 ML/MIN/1.73M2
GFR NON-AFRICAN AMERICAN: >60 ML/MIN/1.73M2
GLUCOSE BLD-MCNC: 103 MG/DL (ref 70–99)
GLUCOSE BLD-MCNC: 167 MG/DL (ref 70–99)
HCT VFR BLD CALC: 36.2 % (ref 42–52)
HCT VFR BLD CALC: 40.9 % (ref 42–52)
HEMOGLOBIN: 11.1 GM/DL (ref 13.5–18)
HEMOGLOBIN: 11.5 GM/DL (ref 13.5–18)
IRON: 32 UG/DL (ref 59–158)
MCH RBC QN AUTO: 28.5 PG (ref 27–31)
MCHC RBC AUTO-ENTMCNC: 30.7 % (ref 32–36)
MCV RBC AUTO: 92.8 FL (ref 78–100)
PCT TRANSFERRIN: 10 % (ref 10–44)
PDW BLD-RTO: 17.5 % (ref 11.7–14.9)
PLATELET # BLD: 533 K/CU MM (ref 140–440)
PMV BLD AUTO: 9.3 FL (ref 7.5–11.1)
POTASSIUM SERPL-SCNC: 4.1 MMOL/L (ref 3.5–5.1)
RBC # BLD: 3.9 M/CU MM (ref 4.6–6.2)
SODIUM BLD-SCNC: 140 MMOL/L (ref 135–145)
TOTAL IRON BINDING CAPACITY: 307 UG/DL (ref 250–450)
UNSATURATED IRON BINDING CAPACITY: 275 UG/DL (ref 110–370)
WBC # BLD: 7.5 K/CU MM (ref 4–10.5)

## 2020-11-18 PROCEDURE — 80048 BASIC METABOLIC PNL TOTAL CA: CPT

## 2020-11-18 PROCEDURE — 85014 HEMATOCRIT: CPT

## 2020-11-18 PROCEDURE — C9113 INJ PANTOPRAZOLE SODIUM, VIA: HCPCS | Performed by: NURSE PRACTITIONER

## 2020-11-18 PROCEDURE — 82962 GLUCOSE BLOOD TEST: CPT

## 2020-11-18 PROCEDURE — G0378 HOSPITAL OBSERVATION PER HR: HCPCS

## 2020-11-18 PROCEDURE — 6370000000 HC RX 637 (ALT 250 FOR IP): Performed by: SPECIALIST

## 2020-11-18 PROCEDURE — 2709999900 HC NON-CHARGEABLE SUPPLY: Performed by: SPECIALIST

## 2020-11-18 PROCEDURE — 36415 COLL VENOUS BLD VENIPUNCTURE: CPT

## 2020-11-18 PROCEDURE — 85018 HEMOGLOBIN: CPT

## 2020-11-18 PROCEDURE — 96376 TX/PRO/DX INJ SAME DRUG ADON: CPT

## 2020-11-18 PROCEDURE — 6370000000 HC RX 637 (ALT 250 FOR IP): Performed by: NURSE PRACTITIONER

## 2020-11-18 PROCEDURE — 3700000001 HC ADD 15 MINUTES (ANESTHESIA): Performed by: SPECIALIST

## 2020-11-18 PROCEDURE — 2500000003 HC RX 250 WO HCPCS: Performed by: NURSE ANESTHETIST, CERTIFIED REGISTERED

## 2020-11-18 PROCEDURE — 85027 COMPLETE CBC AUTOMATED: CPT

## 2020-11-18 PROCEDURE — 83540 ASSAY OF IRON: CPT

## 2020-11-18 PROCEDURE — 94761 N-INVAS EAR/PLS OXIMETRY MLT: CPT

## 2020-11-18 PROCEDURE — 83550 IRON BINDING TEST: CPT

## 2020-11-18 PROCEDURE — 2580000003 HC RX 258: Performed by: NURSE PRACTITIONER

## 2020-11-18 PROCEDURE — 6360000002 HC RX W HCPCS: Performed by: NURSE PRACTITIONER

## 2020-11-18 PROCEDURE — 3609027000 HC COLONOSCOPY: Performed by: SPECIALIST

## 2020-11-18 PROCEDURE — 6360000002 HC RX W HCPCS: Performed by: NURSE ANESTHETIST, CERTIFIED REGISTERED

## 2020-11-18 PROCEDURE — 3700000000 HC ANESTHESIA ATTENDED CARE: Performed by: SPECIALIST

## 2020-11-18 RX ORDER — LIDOCAINE HYDROCHLORIDE 20 MG/ML
INJECTION, SOLUTION EPIDURAL; INFILTRATION; INTRACAUDAL; PERINEURAL PRN
Status: DISCONTINUED | OUTPATIENT
Start: 2020-11-18 | End: 2020-11-18 | Stop reason: SDUPTHER

## 2020-11-18 RX ORDER — SODIUM CHLORIDE 9 MG/ML
INJECTION, SOLUTION INTRAVENOUS CONTINUOUS
Status: ACTIVE | OUTPATIENT
Start: 2020-11-18 | End: 2020-11-18

## 2020-11-18 RX ORDER — PROPOFOL 10 MG/ML
INJECTION, EMULSION INTRAVENOUS PRN
Status: DISCONTINUED | OUTPATIENT
Start: 2020-11-18 | End: 2020-11-18 | Stop reason: SDUPTHER

## 2020-11-18 RX ADMIN — FERROUS SULFATE TAB 325 MG (65 MG ELEMENTAL FE) 324 MG: 325 (65 FE) TAB at 10:07

## 2020-11-18 RX ADMIN — PROPOFOL 220 MG: 10 INJECTION, EMULSION INTRAVENOUS at 08:55

## 2020-11-18 RX ADMIN — POLYETHYLENE GLYCOL 3350, SODIUM SULFATE ANHYDROUS, SODIUM BICARBONATE, SODIUM CHLORIDE, POTASSIUM CHLORIDE 2000 ML: 236; 22.74; 6.74; 5.86; 2.97 POWDER, FOR SOLUTION ORAL at 04:53

## 2020-11-18 RX ADMIN — TRIAMTERENE AND HYDROCHLOROTHIAZIDE 1 TABLET: 37.5; 25 TABLET ORAL at 10:01

## 2020-11-18 RX ADMIN — DILTIAZEM HYDROCHLORIDE 360 MG: 180 CAPSULE, COATED, EXTENDED RELEASE ORAL at 10:02

## 2020-11-18 RX ADMIN — METOPROLOL TARTRATE 25 MG: 25 TABLET, FILM COATED ORAL at 10:02

## 2020-11-18 RX ADMIN — SODIUM CHLORIDE: 9 INJECTION, SOLUTION INTRAVENOUS at 00:57

## 2020-11-18 RX ADMIN — PANTOPRAZOLE SODIUM 40 MG: 40 INJECTION, POWDER, FOR SOLUTION INTRAVENOUS at 04:53

## 2020-11-18 RX ADMIN — LIDOCAINE HYDROCHLORIDE 100 MG: 20 INJECTION, SOLUTION EPIDURAL; INFILTRATION; INTRACAUDAL; PERINEURAL at 08:53

## 2020-11-18 RX ADMIN — ALLOPURINOL 300 MG: 300 TABLET ORAL at 10:02

## 2020-11-18 RX ADMIN — INSULIN LISPRO 1 UNITS: 100 INJECTION, SOLUTION INTRAVENOUS; SUBCUTANEOUS at 12:34

## 2020-11-18 RX ADMIN — POTASSIUM CHLORIDE 20 MEQ: 1500 TABLET, EXTENDED RELEASE ORAL at 10:01

## 2020-11-18 RX ADMIN — PROPOFOL 60 MG: 10 INJECTION, EMULSION INTRAVENOUS at 08:53

## 2020-11-18 RX ADMIN — LOSARTAN POTASSIUM 50 MG: 50 TABLET, FILM COATED ORAL at 10:02

## 2020-11-18 NOTE — DISCHARGE INSTR - COC
Continuity of Care Form    Patient Name: Maninder Mauritian   :  1945  MRN:  [id number]    Admit date:  2020  Discharge date:  ***    Code Status Order: Full Code   Advance Directives:     Admitting Physician:  Sandoval Souza MD  PCP: Gayle Rodriguez MD    Discharging Nurse: [facility] Unit/Room#: ENDO/NONE  Discharging Unit Phone Number: ***    Emergency Contact:   Extended Emergency Contact Information  Primary Emergency Contact: Maria M Lauren  Home Phone: [phone]  Relation: Brother/Sister    Past Surgical History:  Past Surgical History:   Procedure Laterality Date    COLONOSCOPY N/A 2018    COLONOSCOPY POLYPECTOMY SNARE/COLD BIOPSY performed by Radha Gibbs MD at [facility] ENDOSCOPY       Immunization History:   Immunization History   Administered Date(s) Administered    Tdap (Boostrix, Adacel) 2017       Active Problems:  Patient Active Problem List   Diagnosis Code    Diabetes mellitus ([address] E11.9    Atrial flutter ([address] I48.92    Atrial fibrillation ([address] I48.91    Gout M10.9    Hyperlipidemia E78.5    Hypertension I10    Arthropathy M12.9    Upper GI bleed K92.2    NSTEMI (non-ST elevated myocardial infarction) ([address] I21.4    Rectal bleeding K62.5    Sepsis ([address] A41.9    BRBPR (bright red blood per rectum) K62.5    Acute cystitis without hematuria N30.00    Leukocytosis D72.829    Acute hypokalemia E87.6       Isolation/Infection:   Isolation          No Isolation        Patient Infection Status     None to display          Nurse Assessment:  Last Vital Signs: BP (!) 189/81   Pulse 62   Temp 97.4 °F (36.3 °C) (Oral)   Resp 18   SpO2 92%     Last documented pain score (0-10 scale): Pain Level: 7  Last Weight:   Wt Readings from Last 1 Encounters:   20 260 lb (117.9 kg)     Mental Status:  {IP PT MENTAL STATUS:}    IV Access:  [facility] IV ACCESS:194714252}    Nursing Mobility/ADLs:  Walking   {P DME AZTP:992722017}  Transfer  {P DME IWYZ:829686508}  Bathing  {CHP DME WBSQ:249122732}  Dressing  {CHP DME LPBI:088933204}  Toileting  {CHP DME SDO}  Feeding  {CHP DME GXBT:805379598}  Med Admin  {CHP DME HQWD:301585735}  Med Delivery   { FERMIN MED Delivery:032310066}    Wound Care Documentation and Therapy:  Wound 17 Leg Left (Active)   Number of days: 1248        Elimination:  Continence:   · Bowel: {YES / MP:71265}  · Bladder: {YES / TM:82748}  Urinary Catheter: {Urinary Catheter:541941756}   Colostomy/Ileostomy/Ileal Conduit: {YES / PD:79794}       Date of Last BM: ***    Intake/Output Summary (Last 24 hours) at 2020 0747  Last data filed at 2020 0458  Gross per 24 hour   Intake --   Output 1075 ml   Net -1075 ml     I/O last 3 completed shifts:  In: -   Out: 1081 [Urine:1075]    Safety Concerns:     [address] Safety Concerns:604412134}    Impairments/Disabilities:      [address] Impairments/Disabilities:137700324}      Patient's personal belongings (please select all that are sent with patient):  {CHP DME Belongings:247894260}    RN SIGNATURE:  {Esignature:014075521}    CASE MANAGEMENT/SOCIAL WORK SECTION    Inpatient Status Date: ***    Readmission Risk Assessment Score:  Readmission Risk              Risk of Unplanned Readmission:        14           Discharging to Facility/ Agency   · Name:   · Address:  · Phone:  · Fax:    Dialysis Facility (if applicable)   · Name:  · Address:  · Dialysis Schedule:  · Phone:  · Fax:    / signature: {Esignature:641477225}    PHYSICIAN SECTION      Nutrition Therapy:  Current Nutrition Therapy:   [address] Diet List:638195109}    Routes of Feeding: {CHP DME Other Feedings:711753465}  Liquids: {Slp liquid thickness:72458}  Daily Fluid Restriction: {CHP DME Yes amt example:637297648}  Last Modified Barium Swallow with Video (Video Swallowing Test): {Done Not Done VTKD:027398430}    Treatments at the Time of Hospital Discharge:   Respiratory Treatments: ***  Oxygen Therapy:  {Therapy; copd oxygen:29230}  Ventilator:    {[facility] Vent BPTF:530341962}    Rehab Therapies: {THERAPEUTIC INTERVENTION:4957914329}  Weight Bearing Status/Restrictions: {[facility] Weight Bearin}  Other Medical Equipment (for information only, NOT a DME order):  {EQUIPMENT:018330521}  Other Treatments: ***    Prognosis: {Prognosis:2677262331}    Condition at Discharge: [address] [place] [address] Patient Condition:360306029}    Rehab Potential (if transferring to Rehab): {Prognosis:4185965971}    Recommended Labs or Other Treatments After Discharge: ***    Physician Certification: I certify the above information and transfer of Roma Sanford  is necessary for the continuing treatment of the diagnosis listed and that he requires {Admit to Appropriate Level of Care:66200} for {GREATER/LESS:283240760} 30 days.      Update Admission H&P: {CHP DME Changes in ZRJSE:667254450}    PHYSICIAN SIGNATURE:  {Esignature:383803773}

## 2020-11-18 NOTE — PROGRESS NOTES
REPORT CALLED TO MIKAELA VALLES. PT STILL DROWSY AT THIS TIME. VS STABLE. MAY HAVE DIABETIC DIET PER DR MACARIO.

## 2020-11-18 NOTE — CARE COORDINATION
CM into see pt to initiate a safe discharge plan. Cm into see, introduced self and explained role of CM. Pt is kind, alert and oriented. Pt lives alone in the [place] [address]. Pt shared that he is able to walk only a few steps. He feels he safely is able to transfer from bed/ to lift chair/to scooter. Pt DME includes a walker, scooter, lift chr. He receives meals 7 days a week. Transport is from [employer] or [facility]. He also uses [email or web address] [facility]. Pt has aides from [employer] 5 hrs per day. They do grocery shopping, assist with all ADLS. Pt has no children but has 4 sisters and Briseida Hays is described as very supportive. Pt has a [facility] physician and Dr Cesilia Bennett. CM discussed discharge planning. CM discussed SNF and pt adamantly refuses stating that he wants to go home. Pt has [facility] home care. CM called [facility] and spoke with   CM provided card and encouraged to call for any needs or concern. CM is available if any needs arise. Upon discharge pt will be going home with [address] [place] [address] care  Please call family  Please fax H/P, D/S.  AVS, order, and face sheet to [phone]  Please call [phone] and inform of discharge  CM received call from Karolyn at [facility] and they will be able to see pt for SN when discharged.  [facility]

## 2020-11-18 NOTE — BRIEF OP NOTE
BRIEF COLONOSCOPY REPORT:   Photos and full colonoscopy report available by going to \"chart review\" then \"procedures\" then  \"colonoscopy\" then \"View Endoscopy Report\"      IMPRESSION :   1) poor prep-- sufficient to rule out a large lesion but not to exclude small or flat polyps  2) pan-diverticulosis noted - R10.0  2) small internal hemorrhoids  4) small rectal varices noted    PLAN :  1) repeat colonoscopy within 1 year due to poor prep  2) OK with me to discharge

## 2020-11-18 NOTE — H&P
History and Physical  KARELY Hutchison-BC   Internal Medicine Hospitalist      Name:  Jaelyn Josue /Age/Sex: 1945  (77 y.o. male)   MRN & CSN:  [id number] & [id number] Admission Date/Time: 2020  8:24 AM   Location:  [address] [place] [address]-A PCP: Olivier Spain MD       Hospital Day: 1      Supervising Physician: Dr. Irby Median     Chief Complaint: Rectal Bleeding     Assessment and Plan:   Jaelyn Josue is a 77 y.o. male who presents with Rectal bleeding     Rectal bleeding, suspected r/t lower GI bleed vs hemorroid - known h/o chronic rectal bleed.       -was told by GI that he need a colonoscopy but reluctant to do       -was also told that bleeding could be r/t hemorroid       -Hgb in ED 9.5       -Rapid COVID-Negative    -GI Dr. Frandy Pabon consulted in ED, plan to scope tomorrow    -monitor H & H q 6 hrs    -clear liquid for now, for bowel prep    -NPO after MN for possible colonoscopy    -continue gentle IVF    -Protonix IV, 40 mg BID     -hold Eliquis    -will transfuse blood for Hgb <7    -check daily CBC, BMP in AM    -pending stool occult blood      Acute hypokalemia (3.3), Mg level normal.       -cont tele monitoring       -on K sliding scale protocol       -replete and re-check, BMP in AM            Paroxysmal A.  Fib - rate controlled, on Eliquis but put on hold d/t bleeding.       -continue metoprolol/diltiazem       -cont tele monitoring     Hypertension, uncontrolled       -cont Losartan, Lopressor, Maxzide       -monitor BP trends     DM type 2 - Last A1C 6.1       -Sliding scale       -monitor accucheck       -diabetic diet       -pending HgbA1C     Morbid obesity - current BMI 39.5       -Health maintenance: exercise, lifestyle modification, and reduced caloric intake     Chronic Illnesses:       -CAD s/p PCI with DIANA x2 to LAD (2018), not on aspirin or Plavix but on Eliquis       -gout -no acute flare, on allopurinol       -hyperlipidemia -on Pravastatin       -bilateral frequency or urgency. Reports that he has 3 episode of bright red blood that mixed with dark stools this morning. Patient denies chest pain, palpitation, fever, chills or diaphoresis, cough, and SOB or difficulty breathing. Denies any other symptoms including headache, paresthesias, and B/L weakness. Rapid COVID19 test in ED is negative. Upon interview, the patient provided the history as above. ED Course: Discussed case with ED physician prior to admission. ROS: Ten point ROS reviewed and negative, unless as noted above per HPI. Objective: Intake/Output Summary (Last 24 hours) at 11/17/2020 2159  Last data filed at 11/17/2020 1656  Gross per 24 hour   Intake --   Output 350 ml   Net -350 ml        Vitals:   Vitals:    11/17/20 0825 11/17/20 1032 11/17/20 1636 11/17/20 2100   BP: (!) 174/72 (!) 149/64 (!) 166/90 (!) 180/73   Pulse: 66  96 72   Resp: 14  15 18   Temp: 98 °F (36.7 °C)  97.7 °F (36.5 °C) 97.9 °F (36.6 °C)   TempSrc: Oral  Oral Oral   SpO2: 93% 94% 94% 92%     Physical Exam: 11/17/20    GEN  -Awake, alert, appearing elderly male, lying in bed, cooperative, able to give adequate history. Appears given age. EYES -Pupils are equally round. No vision changes. No scleral erythema, discharge, or conjunctivitis. HENT -MM are moist. Oral pharynx without exudates, no evidence of thrush. NECK -Supple, no apparent thyromegaly or masses. RESP -LS CTA equal bilat, no wheezes, rales or rhonchi. Symmetric chest movement. No respiratory distress noted. C/V  -S1/S2 auscultated. RRR without appreciable M/R/G. No JVD or carotid bruits. Peripheral pulses equal bilaterally and palpable. Peripheral pulses equal bilaterally and palpable. No peripheral edema. GI  -Abdomen is soft, round, non-distended, no significant tenderness. No masses or guarding. + BS in all quadrants. Rectal exam deferred.   -Lane catheter is not present.   LYMPH  -No palpable cervical lymphadenopathy and no hepatosplenomegaly. No petechiae or ecchymoses. MS  -B/L extremities strong muscles strength. Full movements. No gross joint deformities. No swelling, intact sensation symmetrical.   SKIN  -Normal coloration, warm, dry. No open wounds or ulcers. NEURO  -CN 2-12 appear grossly intact, normal speech, no lateralizing weakness. PSYC  -Awake, alert, oriented x 4. Appropriate affect. Past Medical History:      Past Medical History:   Diagnosis Date    Arthropathy     Atrial fibrillation (HCC)     [facility] fl[facility] ([facility]     Diabetes mellitus ([facility]     Gout     Hyperlipidemia     Hypertension     Lumbago      Past Surgery History:  Patient  has a past surgical history that includes Colonoscopy (N/A, 9/27/2018). Social History:    FAM HX: Assessed: family history includes No Known Problems in his father and mother.   Soc HX:   Social History     Socioeconomic History    Marital status: Single     Spouse name: Not on file    Number of children: Not on file    Years of education: Not on file    Highest education level: Not on file   Occupational History    Not on file   Social Needs    Financial resource strain: Not on file    Food insecurity     Worry: Not on file     Inability: Not on file    Transportation needs     Medical: Not on file     Non-medical: Not on file   Tobacco Use    Smoking status: Former Smoker     Packs/day: 0.50     Types: Cigarettes    Smokeless tobacco: Never Used   Substance and Sexual Activity    Alcohol use: No    Drug use: No    Sexual activity: Not on file   Lifestyle    Physical activity     Days per week: Not on file     Minutes per session: Not on file    Stress: Not on file   Relationships    Social connections     Talks on phone: Not on file     Gets together: Not on file     Attends [employer] service: Not on file     Active member of club or organization: Not on file     Attends meetings of clubs or organizations: Not on file     Relationship status: Not on file  Intimate partner violence     Fear of current or ex partner: Not on file     Emotionally abused: Not on file     Physically abused: Not on file     Forced sexual activity: Not on file   Other Topics Concern    Not on file   Social History Narrative    Not on file     TOBACCO:   reports that he has quit smoking. His smoking use included cigarettes. He smoked 0.50 packs per day. He has never used smokeless tobacco.  ETOH:   reports no history of alcohol use. Drugs:  reports no history of drug use. Allergies: Allergies   Allergen Reactions    Demerol Hcl [Meperidine]     Gabapentin     Simvastatin      Medications:   Medications:    sodium chloride flush  10 mL Intravenous 2 times per day    pantoprazole  40 mg Intravenous Q12H    And    sodium chloride (PF)  10 mL Intravenous Q12H    allopurinol  300 mg Oral Daily    dilTIAZem  360 mg Oral Daily    [START ON 11/18/2020] ferrous sulfate  324 mg Oral BID WC    losartan  50 mg Oral Daily    metoprolol tartrate  25 mg Oral BID    potassium chloride  20 mEq Oral Daily    pravastatin  20 mg Oral Nightly    tamsulosin  0.4 mg Oral Nightly    triamterene-hydroCHLOROthiazide  1 tablet Oral Daily    insulin lispro  0-6 Units Subcutaneous TID WC    insulin lispro  0-3 Units Subcutaneous Nightly    [START ON 11/18/2020] polyethylene glycol  2,000 mL Oral Once      Infusions:    dextrose       PRN Meds: sodium chloride flush, 10 mL, PRN  acetaminophen, 650 mg, Q6H PRN    Or  acetaminophen, 650 mg, Q6H PRN  promethazine, 12.5 mg, Q6H PRN    Or  ondansetron, 4 mg, Q6H PRN  potassium chloride, 40 mEq, PRN    Or  potassium alternative oral replacement, 40 mEq, PRN    Or  potassium chloride, 10 mEq, PRN  nitroGLYCERIN, 0.4 mg, Q5 Min PRN  glucose, 15 g, PRN  dextrose, 12.5 g, PRN  glucagon (rDNA), 1 mg, PRN  dextrose, 100 mL/hr, PRN      Prior to Admission Meds:  Prior to Admission medications    Medication Sig Start Date End Date Taking?  Authorizing Provider   famotidine (PEPCID) 40 MG tablet Take 40 mg by mouth nightly   Yes Historical Provider, MD   potassium chloride (KLOR-CON M) 20 MEQ extended release tablet Take 20 mEq by mouth daily   Yes Historical Provider, MD   ferrous sulfate 324 (65 Fe) MG EC tablet Take 324 mg by mouth 2 times daily   Yes Historical Provider, MD   losartan (COZAAR) 50 MG tablet Take 50 mg by mouth daily   Yes Historical Provider, MD   tamsulosin (FLOMAX) 0.4 MG capsule Take 1 capsule by mouth daily  Patient taking differently: Take 0.4 mg by mouth nightly  10/2/18  Yes Lorrie Prather MD   apixaban (ELIQUIS) 5 MG TABS tablet Take 1 tablet by mouth 2 times daily 7/2/18  Yes Pamella Atkinson DO   metoprolol tartrate (LOPRESSOR) 25 MG tablet Take 1 tablet by mouth 2 times daily 7/2/18  Yes Pamella Atkinson DO   allopurinol (ZYLOPRIM) 300 MG tablet Take 300 mg by mouth daily   Yes Historical Provider, MD   diltiazem (TIAZAC) 360 MG extended release capsule Take 360 mg by mouth daily   Yes Historical Provider, MD   triamterene-hydrochlorothiazide (MAXZIDE-25) 37.5-25 MG per tablet Take 1 tablet by mouth daily   Yes Historical Provider, MD   metFORMIN (GLUCOPHAGE) 500 MG tablet Take 500 mg by mouth 2 times daily (with meals)   Yes Historical Provider, MD   pravastatin (PRAVACHOL) 20 MG tablet Take 20 mg by mouth nightly   Yes Historical Provider, MD   nitroGLYCERIN (NITROSTAT) 0.4 MG SL tablet up to max of 3 total doses.  If no relief after 1 dose, call 911. 7/2/18   Pamella Atkinson DO     Data:     Laboratory this visit:  Reviewed  Recent Labs     11/17/20 0905 11/17/20  1246 11/17/20  1943   WBC 8.0  --   --    HGB 9.7* 9.5* 10.7*   HCT 31.7* 30.9* 36.9*     --   --       Recent Labs     11/17/20 0905      K 3.3*   CL 97*   CO2 25   BUN 8   CREATININE 0.7*     Recent Labs     11/17/20 0905   AST 11*   ALT <5*   BILITOT 0.4   ALKPHOS 69     Recent Labs     11/17/20 0905   INR 1.80     No results for input(s): CKTOTAL, CKMB, CKMBINDEX in the last 72 hours. Invalid input(s): Tree Kelsey input(s): PRO-BNP    Radiology this visit:  Reviewed. No results found. EKG this visit:   EKG: atrial tachycardia, rate 62  Left axis deviation   Possible Anterior infarct (cited on or before 17-JAN-2020)   Abnormal ECG   When compared with ECG of 17-JAN-2020 07:57,   Junctional rhythm has replaced Atrial fibrillation   Vent. rate has decreased BY  33 BPM   Nonspecific T wave abnormality no longer evident in Lateral leads   Confirmed by Leigh Garcia MD [facility] ([id number]) on 11/17/2020 5:17:33 PM    Current Treatment Team:  Treatment Team: Attending Provider: Mahamed Gaytan MD; Consulting Physician: Tiffanie Toledo MD; Consulting Physician: Bing Desai MD; Consulting Physician: Mahmaed Gaytan MD; Surgeon: Bing Desai MD; Advanced Practice Nurse: KARELY Hutchison CNP; Registered Nurse:  RENATO Magana   [facility]  11/17/2020 9:59 PM      Electronically signed by KARELY Hutchison CNP on 11/17/2020 at 9:59 PM

## 2020-11-18 NOTE — ANESTHESIA PRE PROCEDURE
Department of Anesthesiology  Preprocedure Note       Name:  Sukumar Wagner   Age:  77 y.o.  :  1945                                          MRN:  [id number]         Date:  2020      Surgeon: Chepe Mcmullen):  Armen Mariscal MD    Procedure: Procedure(s):  COLONOSCOPY DIAGNOSTIC    Medications prior to admission:   Prior to Admission medications    Medication Sig Start Date End Date Taking? Authorizing Provider   famotidine (PEPCID) 40 MG tablet Take 40 mg by mouth nightly   Yes Historical Provider, MD   potassium chloride (KLOR-CON M) 20 MEQ extended release tablet Take 20 mEq by mouth daily   Yes Historical Provider, MD   ferrous sulfate 324 (65 Fe) MG EC tablet Take 324 mg by mouth 2 times daily   Yes Historical Provider, MD   losartan (COZAAR) 50 MG tablet Take 50 mg by mouth daily   Yes Historical Provider, MD   tamsulosin (FLOMAX) 0.4 MG capsule Take 1 capsule by mouth daily  Patient taking differently: Take 0.4 mg by mouth nightly  10/2/18  Yes Tonia Mccoy MD   apixaban (ELIQUIS) 5 MG TABS tablet Take 1 tablet by mouth 2 times daily 18  Yes Du Cruz DO   metoprolol tartrate (LOPRESSOR) 25 MG tablet Take 1 tablet by mouth 2 times daily 18  Yes Du Cruz DO   allopurinol (ZYLOPRIM) 300 MG tablet Take 300 mg by mouth daily   Yes Historical Provider, MD   diltiazem (TIAZAC) 360 MG extended release capsule Take 360 mg by mouth daily   Yes Historical Provider, MD   triamterene-hydrochlorothiazide (MAXZIDE-25) 37.5-25 MG per tablet Take 1 tablet by mouth daily   Yes Historical Provider, MD   metFORMIN (GLUCOPHAGE) 500 MG tablet Take 500 mg by mouth 2 times daily (with meals)   Yes Historical Provider, MD   pravastatin (PRAVACHOL) 20 MG tablet Take 20 mg by mouth nightly   Yes Historical Provider, MD   nitroGLYCERIN (NITROSTAT) 0.4 MG SL tablet up to max of 3 total doses.  If no relief after 1 dose, call 911. 18   Du Cruz DO Current medications:    Current Facility-Administered Medications   Medication Dose Route Frequency Provider Last Rate Last Dose    0.9 % sodium chloride infusion   Intravenous Continuous Zimmer Ridgel, APRN - CNP 75 mL/hr at 11/18/20 0057      sodium chloride flush 0.9 % injection 10 mL  10 mL Intravenous 2 times per day Zimmer Ridgel, APRN - CNP   10 mL at 11/17/20 2123    sodium chloride flush 0.9 % injection 10 mL  10 mL Intravenous PRN Zimmer Ridgel, APRN - CNP        acetaminophen (TYLENOL) tablet 650 mg  650 mg Oral Q6H PRN Zimmer Ridgel, APRN - CNP   650 mg at 11/17/20 1927    Or    acetaminophen (TYLENOL) suppository 650 mg  650 mg Rectal Q6H PRN Zimmer Ridgel, APRN - CNP        promethazine (PHENERGAN) tablet 12.5 mg  12.5 mg Oral Q6H PRN Zimmer Ridgel, APRN - CNP        Or    ondansetron (ZOFRAN) injection 4 mg  4 mg Intravenous Q6H PRN Zimmer Ridgel, APRN - CNP        pantoprazole (PROTONIX) injection 40 mg  40 mg Intravenous Q12H Zimmer Ridgel, APRN - CNP   40 mg at 11/18/20 0453    And    sodium chloride (PF) 0.9 % injection 10 mL  10 mL Intravenous Q12H Zimmer Ridgel, APRN - CNP   10 mL at 11/17/20 1819    potassium chloride (KLOR-CON M) extended release tablet 40 mEq  40 mEq Oral PRN Zimmer Ridgel, APRN - CNP        Or    potassium bicarb-citric acid (EFFER-K) effervescent tablet 40 mEq  40 mEq Oral PRN Zimmer Ridgel, APRN - CNP        Or    potassium chloride 10 mEq/100 mL IVPB (Peripheral Line)  10 mEq Intravenous PRN Zimmer Ridgel, APRN - CNP        allopurinol (ZYLOPRIM) tablet 300 mg  300 mg Oral Daily Graciela Livingston, APRN - CNP   300 mg at 11/17/20 1814    dilTIAZem (CARDIZEM CD) extended release capsule 360 mg  360 mg Oral Daily Graciela Livingston, APRN - CNP   360 mg at 11/17/20 1813    ferrous sulfate (IRON 325) tablet 324 mg  324 mg Oral BID WC Graciela Livingston, APRN - CNP        losartan (COZAAR) tablet 50 mg  50 mg Oral Daily Graciela Livingston, APRN - ELSY   50 mg Leukocytosis D72.829    Acute hypokalemia E87.6       Past Medical History:        Diagnosis Date    Arthropathy     Atrial fibrillation (HCC)     Atrial flutter (HCC)     Diabetes mellitus ([address]     Gout     Hyperlipidemia     Hypertension     Lumbago        Past Surgical History:        Procedure Laterality Date    COLONOSCOPY N/A 9/27/2018    COLONOSCOPY POLYPECTOMY SNARE/COLD BIOPSY performed by Radha Gibbs MD at [facility] ENDOSCOPY       Social History:    Social History     Tobacco Use    Smoking status: Former Smoker     Packs/day: 0.50     Types: Cigarettes    Smokeless tobacco: Never Used   Substance Use Topics    Alcohol use:  No                                Counseling given: Not Answered      Vital Signs (Current):   Vitals:    11/17/20 1032 11/17/20 1636 11/17/20 2100 11/18/20 0630   BP: (!) 149/64 (!) 166/90 (!) 180/73 (!) 189/81   Pulse:  96 72 62   Resp:  15 18 18   Temp:  97.7 °F (36.5 °C) 97.9 °F (36.6 °C) 97.4 °F (36.3 °C)   TempSrc:  Oral Oral Oral   SpO2: 94% 94% 92% 92%                                              BP Readings from Last 3 Encounters:   11/18/20 (!) 189/81   01/22/20 131/76   11/20/18 (!) 167/71       NPO Status:                                                                                 BMI:   Wt Readings from Last 3 Encounters:   01/22/20 260 lb (117.9 kg)   10/01/18 260 lb (117.9 kg)   07/23/18 249 lb (112.9 kg)     There is no height or weight on file to calculate BMI.    CBC:   Lab Results   Component Value Date    WBC 7.5 11/18/2020    RBC 3.90 11/18/2020    HGB 11.1 11/18/2020    HCT 36.2 11/18/2020    MCV 92.8 11/18/2020    RDW 17.5 11/18/2020     11/18/2020       CMP:   Lab Results   Component Value Date     11/18/2020    K 4.1 11/18/2020     11/18/2020    CO2 25 11/18/2020    BUN 5 11/18/2020    CREATININE 0.6 11/18/2020    GFRAA >60 11/18/2020    LABGLOM >60 11/18/2020    GLUCOSE 103 11/18/2020    PROT 7.0 11/17/2020    CALCIUM 9.0 11/18/2020    BILITOT 0.4 11/17/2020    ALKPHOS 69 11/17/2020    AST 11 11/17/2020    ALT <5 11/17/2020       POC Tests:   Recent Labs     11/17/20 2103   POCGLU 114*       Coags:   Lab Results   Component Value Date    PROTIME 21.9 11/17/2020    INR 1.80 11/17/2020    APTT 36.9 11/17/2020       HCG (If Applicable): No results found for: PREGTESTUR, PREGSERUM, HCG, HCGQUANT     ABGs: No results found for: PHART, PO2ART, ZDX8NQC, GLM1MAZ, BEART, F7WZZKLY     Type & Screen (If Applicable):  No results found for: LABABO, LABRH    Drug/Infectious Status (If Applicable):  No results found for: HIV, HEPCAB    COVID-19 Screening (If Applicable):   Lab Results   Component Value Date    COVID19 NOT DETECTED 11/17/2020         Anesthesia Evaluation    Airway: Mallampati: II  TM distance: >3 FB   Neck ROM: full  Mouth opening: > = 3 FB Dental:    (+) upper dentures      Pulmonary:normal exam                               Cardiovascular:    (+) hypertension:, past MI:,                   Neuro/Psych:               GI/Hepatic/Renal:             Endo/Other:                     Abdominal:           Vascular:                                        Anesthesia Plan      general     ASA 3       Induction: intravenous. MIPS: Postoperative opioids intended. Anesthetic plan and risks discussed with patient. Plan discussed with CRNA.     Attending anesthesiologist reviewed and agrees with Pre Eval content              Sim Schlatter, MD   11/18/2020

## 2020-11-18 NOTE — PROGRESS NOTES
Occupational Therapy      Pt discharged from therapy services. At baseline pt performs stand pivot transfers from hospital bed to wheelchair and has raised toilet seat w/ walk in shower and shower bench. He receives care from caregivers 4x a week for 20 hours a week. Pt reports no current weakness and is not interested in therapy services despite education on importance of therapy for discharge planning.  Pt has been discharged from OT therapy as he is at baseline level and is not interested in therapy services    Ana Alcantar OTR/L [id number]  2:05 PM,11/18/2020

## 2020-11-18 NOTE — DISCHARGE SUMMARY
Hospital Medicine  DISCHARGE SUMMARY  Date: 11/18/2020  Name: Bianca De Leon  MRN: [id number]  YOB: 1945     Patient's PCP: Ramsey Moctezuma MD  Admit Date: 11/17/2020   Discharge Date: 11/18/2020  Admitting Physician: Rubens Nicholas MD  Discharge Physician: Rubens Nicholas MD      Discharge Diagnoses:   Rectal bleeding - known h/o chronic rectal bleed   Acute hypokalemia    Paroxysmal A. Fib    Hypertension, uncontrolled   DM type 2    Morbid obesity - BMI 39.5     Chronic Illnesses:       -CAD s/p PCI with DIANA x2 to LAD (08/2018)       -gout        -hyperlipidemia       -bilateral hip osteoarthritis       -chronic anemia        -BPH     HPI:    Chief Complaint   Patient presents with    Rectal Bleeding     Bianca De Leon is a 77 y.o. male with past medical history of CAD, Afib on Eliquis, DM type 2, gout, hyperlipidemia, hypertension, bilateral hip osteoarthritis, chronic anemia, and lumbago presented to the ED with complaints of rectal bleeding. Patient reports that he also had history of chronic rectal bleed and was seen by GI last 11/10/2020, who told he would need a colonoscopy. Stated that he was reluctant to do because he is bed bound and he is concerned about colon prep that could cause him to have diarrhea. He was also told the his bleeding could be secondary to hemorrhoid. He denies abdominal pain, nausea, vomiting, changes in bowels, dysuria, hematuria, and frequency or urgency. Reports that he has 3 episode of bright red blood that mixed with dark stools this morning. Patient denies chest pain, palpitation, fever, chills or diaphoresis, cough, and SOB or difficulty breathing. Denies any other symptoms including headache, paresthesias, and B/L weakness. Rapid COVID19 test in ED is negative. Hospital Course  Patient came in due to rectal bleeding. There was concern for lower GI bleed versus hemorrhoid. He has a history of chronic rectal bleeding.   Hemoglobin was stable during his metoprolol tartrate 25 MG tablet  Commonly known as:  LOPRESSOR  Take 1 tablet by mouth 2 times daily     nitroGLYCERIN 0.4 MG SL tablet  Commonly known as:  NITROSTAT  up to max of 3 total doses. If no relief after 1 dose, call 911. potassium chloride 20 MEQ extended release tablet  Commonly known as:  KLOR-CON M     pravastatin 20 MG tablet  Commonly known as:  PRAVACHOL     triamterene-hydroCHLOROthiazide 37.5-25 MG per tablet  Commonly known as:  MAXZIDE-25        STOP taking these medications    pantoprazole 40 MG tablet  Commonly known as:  PROTONIX            Patient Instructions: Follow-up With  Details  Why  Contact Info   [address]   Shamar Ni MD  Schedule an appointment as soon as possible for a visit in 1 week  for follow up of your hospital stay  [address] [facility]   Bertrand Meigs, MD  Schedule an appointment as soon as possible for a visit in 1 month  to check on your rectal bleeding  [address] [facility]  [phone]         Medications: see computerized discharge medication list  Activity: activity as tolerated  Diet: cardiac diet and diabetic diet   Disposition: home with home health  Discharged Condition: Stable      The patient was seen and examined on day of discharge and this discharge summary is in conjunction with any daily progress note from day of discharge. Time spent on discharge is 39 minutes in the examination, evaluation, counseling and review of medications and discharge plan.     Leia Stinson MD

## 2020-11-19 ENCOUNTER — CARE COORDINATION (OUTPATIENT)
Dept: CASE MANAGEMENT | Age: 75
End: 2020-11-19

## 2020-11-19 NOTE — CARE COORDINATION
Maddy [facility] Transitions Initial Follow Up Call    Call within 2 business days of discharge: Yes    Patient: Cuba Quan Patient : 1945   MRN: [id number]  Reason for Admission:   Lower GI Bleed  Discharge Date: 20 RARS: Readmission Risk Score: 14      Last Discharge [facility]       Complaint Diagnosis Description Type Department Provider    20 Rectal Bleeding Lower GI bleed ED to Hosp-Admission (Discharged) (ADMITTED) 5130 Angel Lovelace MD           Spoke with:   patient    Facility:   [facility]    Non-face-to-face services provided:  Communication with home health agencies or other community services the patient is currently using-1  Education of patient/family/caregiver/guardian to support self-management-1       Challenges to be reviewed by the provider   Additional needs identified to be addressed with provider : N/A    COVID-19:  Not detected 20         Method of communication with provider : phone    Advance Care Planning:   Does patient have an Advance Directive:  yes    Was this a readmission? yes  Patient stated reason for admission: no  Patients top risk factors for readmission: UTI/ Acute urinary retention    Care Transition Nurse (CTN) contacted the patient by telephone to perform post hospital discharge assessment. Verified name and  with patient as identifiers. Provided introduction to self, and explanation of the CTN role. Patient reports that he is feeling ok. Denies abdominal pain, cramping, dark or bloody stools. Reports that his bowels are moving w/o issue. Urinating qs. Instructed on GI bleed and s/s to report to MD.    Patient is very pleasant; is extremely talkative and is difficult to engage. Patient tends to politely talk over CTN. CTN reviewed discharge instructions, medical action plan and red flags with patient who verbalized understanding.  Patient was given an opportunity to ask questions and does not have any further questions or concerns at this time. Were discharge instructions available to patient? Yes. Reviewed appropriate site of care based on symptoms and resources available to patient including: COVID risk education. The patient agrees to contact the PCP office for questions related to their healthcare. Medication reconciliation was performed with patient, who verbalizes understanding of administration of home medications. Advised obtaining a 90-day supply of all daily and as-needed medications. Patient confirms that he has all of his medications and is taking them as directed. Patient confirms that he is active with [facility]. Reports that he has not been contacted to this point. Initially patient was adamant that CTN not contact agency to confirm services; but finally consented requesting that ''nothing be done to get his hours taken away\". Reassurance given. Informed of CTN plan for follow up. Covid Risk Education    Patient has following risk factors of:  CAD/DM   Education provided regarding infection prevention, and signs and symptoms of COVID-19 and when to seek medical attention with patient who verbalized understanding. Discussed exposure protocols and quarantine From CDC: Are you at higher risk for severe illness?   and given an opportunity for questions and concerns. The patient agrees to contact the COVID-19 hotline [phone] or PCP office for questions related to COVID-19. For more information on steps you can take to protect yourself, see CDC's How to Protect Yourself     Patient/family/caregiver given information for [facility] and agrees to enroll ;  declines      Plan for CTN follow up based on severity of symptoms and risk factors. Plan for next call:  Confirm PCP follow up    CTN provided contact information for future needs. Spoke with [facility]. Confirmed services and plan for [place] [address] follow up. Spoke with [address] [place] [address].   Earliest available appointment scheduled Dec. 2nd at 1:30.      Spoke with patient . Confirmed above. No questions or further needs reported. Care Transitions 24 Hour Call    Do you have any ongoing symptoms?:  No  Do you have a copy of your discharge instructions?:  Yes  Do you have all of your prescriptions and are they filled?:  Yes  Have you been contacted by a [email or web address]?:  No  Have you scheduled your follow up appointment?:  Yes  How are you going to get to your appointment?:  Car - family or friend to transport  Were you discharged with any Home Care or Post Acute Services:  Yes  Post Acute Services:  [address] [place] [address] Abimael Calero  Do you feel like you have everything you need to keep you well at home?:  Yes  Care Transitions Interventions         No future appointments.     Rajesh Smith RN

## 2020-11-24 LAB
EKG ATRIAL RATE: 227 BPM
EKG DIAGNOSIS: NORMAL
EKG Q-T INTERVAL: 452 MS
EKG QRS DURATION: 104 MS
EKG QTC CALCULATION (BAZETT): 458 MS
EKG R AXIS: -41 DEGREES
EKG T AXIS: 5 DEGREES
EKG VENTRICULAR RATE: 62 BPM

## 2020-12-04 ENCOUNTER — CARE COORDINATION (OUTPATIENT)
Dept: CASE MANAGEMENT | Age: 75
End: 2020-12-04

## 2020-12-04 NOTE — CARE COORDINATION
[facility] Transitions Follow Up Call    2020    Patient: Cassie Price  Patient : 1945   MRN: [id number]  Reason for Admission:   Lower GI Bleed  Discharge Date: 20 RARS: Readmission Risk Score: 14      Follow up: Attempted to reach patient for Care Transition follow up. No answer to phone. Message left with CTN contact information and request for call back. No future appointments.     Eve Rubio RN

## 2020-12-15 ENCOUNTER — CARE COORDINATION (OUTPATIENT)
Dept: CASE MANAGEMENT | Age: 75
End: 2020-12-15

## 2020-12-15 NOTE — CARE COORDINATION
[facility] Transitions Follow Up Call    12/15/2020    Patient: Shekhar Campoverde  Patient : 1945   MRN: [id number]  Reason for Admission:   Lower GI bleed  Discharge Date: 20 RARS: Readmission Risk Score: 14    Follow Up: Attempted to reach patient for Care Transition. No answer to phone. Message left on voicemail to inform of final transition call. CTN contact information given with request for call back. No further outreach planned. No future appointments.     Steven Bullard RN

## 2021-01-29 ENCOUNTER — CLINICAL DOCUMENTATION (OUTPATIENT)
Dept: ONCOLOGY | Age: 76
End: 2021-01-29

## 2021-02-12 ENCOUNTER — HOSPITAL ENCOUNTER (OUTPATIENT)
Dept: INFUSION THERAPY | Age: 76
Discharge: HOME | End: 2021-02-12
Payer: COMMERCIAL

## 2021-02-12 ENCOUNTER — INITIAL CONSULT (OUTPATIENT)
Dept: ONCOLOGY | Age: 76
End: 2021-02-12
Payer: COMMERCIAL

## 2021-02-12 VITALS
BODY MASS INDEX: 39.53 KG/M2 | OXYGEN SATURATION: 96 % | SYSTOLIC BLOOD PRESSURE: 147 MMHG | RESPIRATION RATE: 18 BRPM | HEIGHT: 68 IN | HEART RATE: 60 BPM | DIASTOLIC BLOOD PRESSURE: 73 MMHG

## 2021-02-12 DIAGNOSIS — D64.9 ANEMIA, UNSPECIFIED TYPE: ICD-10-CM

## 2021-02-12 DIAGNOSIS — E53.8 B12 DEFICIENCY: ICD-10-CM

## 2021-02-12 LAB
ALBUMIN SERPL-MCNC: 3.7 GM/DL (ref 3.4–5)
ALP BLD-CCNC: 88 IU/L (ref 40–128)
ALT SERPL-CCNC: 7 U/L (ref 10–40)
ANION GAP SERPL CALCULATED.3IONS-SCNC: 10 MMOL/L (ref 4–16)
AST SERPL-CCNC: 13 IU/L (ref 15–37)
BASOPHILS ABSOLUTE: 0 K/CU MM
BASOPHILS RELATIVE PERCENT: 0.2 % (ref 0–1)
BILIRUB SERPL-MCNC: 0.5 MG/DL (ref 0–1)
BUN BLDV-MCNC: 11 MG/DL (ref 6–23)
CALCIUM SERPL-MCNC: 8.7 MG/DL (ref 8.3–10.6)
CHLORIDE BLD-SCNC: 104 MMOL/L (ref 99–110)
CO2: 27 MMOL/L (ref 21–32)
CREAT SERPL-MCNC: 0.8 MG/DL (ref 0.9–1.3)
DIFFERENTIAL TYPE: ABNORMAL
EOSINOPHILS ABSOLUTE: 0.3 K/CU MM
EOSINOPHILS RELATIVE PERCENT: 3.9 % (ref 0–3)
ERYTHROCYTE SEDIMENTATION RATE: 98 MM/HR (ref 0–20)
FERRITIN: 46 NG/ML (ref 30–400)
FOLATE: 8.6 NG/ML (ref 3.1–17.5)
GFR AFRICAN AMERICAN: >60 ML/MIN/1.73M2
GFR NON-AFRICAN AMERICAN: >60 ML/MIN/1.73M2
GLUCOSE BLD-MCNC: 97 MG/DL (ref 70–99)
HCT VFR BLD CALC: 30.7 % (ref 42–52)
HEMOGLOBIN: 9.6 GM/DL (ref 13.5–18)
IRON: 61 UG/DL (ref 59–158)
LACTATE DEHYDROGENASE: 142 IU/L (ref 120–246)
LYMPHOCYTES ABSOLUTE: 1 K/CU MM
LYMPHOCYTES RELATIVE PERCENT: 12 % (ref 24–44)
MCH RBC QN AUTO: 27.5 PG (ref 27–31)
MCHC RBC AUTO-ENTMCNC: 31.3 % (ref 32–36)
MCV RBC AUTO: 88 FL (ref 78–100)
MONOCYTES ABSOLUTE: 0.5 K/CU MM
MONOCYTES RELATIVE PERCENT: 6.5 % (ref 0–4)
PCT TRANSFERRIN: 20 % (ref 10–44)
PDW BLD-RTO: 17.7 % (ref 11.7–14.9)
PLATELET # BLD: 408 K/CU MM (ref 140–440)
PMV BLD AUTO: 8 FL (ref 7.5–11.1)
POTASSIUM SERPL-SCNC: 3.7 MMOL/L (ref 3.5–5.1)
RBC # BLD: 3.49 M/CU MM (ref 4.6–6.2)
RETICULOCYTE COUNT PCT: 1.6 % (ref 0.2–2.2)
SEGMENTED NEUTROPHILS ABSOLUTE COUNT: 6.2 K/CU MM
SEGMENTED NEUTROPHILS RELATIVE PERCENT: 77.4 % (ref 36–66)
SODIUM BLD-SCNC: 141 MMOL/L (ref 135–145)
TOTAL IRON BINDING CAPACITY: 299 UG/DL (ref 250–450)
TOTAL PROTEIN: 7.1 GM/DL (ref 6.4–8.2)
TSH HIGH SENSITIVITY: 2.84 UIU/ML (ref 0.27–4.2)
UNSATURATED IRON BINDING CAPACITY: 238 UG/DL (ref 110–370)
VITAMIN B-12: 341.5 PG/ML (ref 211–911)
WBC # BLD: 8 K/CU MM (ref 4–10.5)

## 2021-02-12 PROCEDURE — 84443 ASSAY THYROID STIM HORMONE: CPT

## 2021-02-12 PROCEDURE — 83550 IRON BINDING TEST: CPT

## 2021-02-12 PROCEDURE — 99202 OFFICE O/P NEW SF 15 MIN: CPT

## 2021-02-12 PROCEDURE — 99204 OFFICE O/P NEW MOD 45 MIN: CPT | Performed by: INTERNAL MEDICINE

## 2021-02-12 PROCEDURE — 82728 ASSAY OF FERRITIN: CPT

## 2021-02-12 PROCEDURE — 82607 VITAMIN B-12: CPT

## 2021-02-12 PROCEDURE — G8427 DOCREV CUR MEDS BY ELIG CLIN: HCPCS | Performed by: INTERNAL MEDICINE

## 2021-02-12 PROCEDURE — 83540 ASSAY OF IRON: CPT

## 2021-02-12 PROCEDURE — 3017F COLORECTAL CA SCREEN DOC REV: CPT | Performed by: INTERNAL MEDICINE

## 2021-02-12 PROCEDURE — 84165 PROTEIN E-PHORESIS SERUM: CPT

## 2021-02-12 PROCEDURE — 80053 COMPREHEN METABOLIC PANEL: CPT

## 2021-02-12 PROCEDURE — 1123F ACP DISCUSS/DSCN MKR DOCD: CPT | Performed by: INTERNAL MEDICINE

## 2021-02-12 PROCEDURE — 4040F PNEUMOC VAC/ADMIN/RCVD: CPT | Performed by: INTERNAL MEDICINE

## 2021-02-12 PROCEDURE — 85045 AUTOMATED RETICULOCYTE COUNT: CPT

## 2021-02-12 PROCEDURE — 86039 ANTINUCLEAR ANTIBODIES (ANA): CPT

## 2021-02-12 PROCEDURE — 82746 ASSAY OF FOLIC ACID SERUM: CPT

## 2021-02-12 PROCEDURE — 36415 COLL VENOUS BLD VENIPUNCTURE: CPT

## 2021-02-12 PROCEDURE — G8484 FLU IMMUNIZE NO ADMIN: HCPCS | Performed by: INTERNAL MEDICINE

## 2021-02-12 PROCEDURE — 86430 RHEUMATOID FACTOR TEST QUAL: CPT

## 2021-02-12 PROCEDURE — 1036F TOBACCO NON-USER: CPT | Performed by: INTERNAL MEDICINE

## 2021-02-12 PROCEDURE — 86038 ANTINUCLEAR ANTIBODIES: CPT

## 2021-02-12 PROCEDURE — 85025 COMPLETE CBC W/AUTO DIFF WBC: CPT

## 2021-02-12 PROCEDURE — 83010 ASSAY OF HAPTOGLOBIN QUANT: CPT

## 2021-02-12 PROCEDURE — G8417 CALC BMI ABV UP PARAM F/U: HCPCS | Performed by: INTERNAL MEDICINE

## 2021-02-12 PROCEDURE — 85652 RBC SED RATE AUTOMATED: CPT

## 2021-02-12 PROCEDURE — 83615 LACTATE (LD) (LDH) ENZYME: CPT

## 2021-02-12 NOTE — PROGRESS NOTES
MA Rooming Questions  Patient: Daniel Cordova  MRN: [id number]    Date: 2/12/2021        1. Do you have any new issues? no     NP  2. Do you need any refills on medications?    no    5. Did the patient have a depression screening completed today?  No    No data recorded     PHQ-9 Given to (if applicable):               PHQ-9 Score (if applicable):                     [] Positive     []  Negative              Does question #9 need addressed (if applicable)                     [] Yes    []  No               Jessi Newton MA

## 2021-02-12 NOTE — PROGRESS NOTES
Patient Name:  Gerson Felipe  Patient :  1945  Patient MRN:  [id number]     Primary Oncologist: Abraham Fowler MD  Referring Provider: Stephanie James MD     Date of Service: 2021      Reason for Consult:  Anemia      Chief Complaint:    Chief Complaint   Patient presents with   [facility] Patient       Encounter Diagnosis   Name Primary?  Anemia, unspecified type         HPI:   2021: He arrived alone to the clinic today. Reported that he has a history of diverticulitis-reported black tarry stools most of the days with bright red bleeding per rectum 2 to 3 days a month. Intermittent constipation, relieved with MiraLAX. Is on iron tablets. Reported that he had very little stool content each time. Reported epigastric abdominal pain. Denied any nausea vomiting. Reported that he takes Tums. Reported that he is on blood thinners. Not sure whether he has lost any weight. Denied any other overt bleeding. Denied any lower extremity edema. Denied any chest pain. Reported shortness of breath on minimal exertion. Denied any palpitations or dizziness. Denied dysphagia odynophagia. 2021 CT scan of the head was normal. CT scan of the cervical spine was normal except for mild to moderate spondylosis. CT scan of the right hip revealed joint osteoarthrosis. Right inguinal lymphadenopathy. Subcutaneous fat stranding in the lateral aspect of the proximal thigh could represent early hematoma. CT of the abdomen pelvis revealed retroperitoneal lymphadenopathy. Panniculitis    2021:CBC 2020 CBC with WBC of 7.1 hemoglobin of 9.8 hematocrit of 29.2 MCV of 82.4 platelets of 653 dip within normal limits sodium 138 potassium 3.3 BUN 7 creatinine 0.8 calcium 9.6 LFTs within normal limits. Urine with no blood.   Celiac panel with IgA 492    Past Medical History:     Hypertension, BPH, gout, GERD, diabetes, hyperlipidemia                                                           Past Surgery History:    PCI with stent placement         Demerol is reported as allergies but he had very mild symptoms. Social History:   Lives by himself. Did not make the mistake of [place]. Quit smoking 3 years ago. Prior to which he smoked 2 packs/day for 50 years. Alcohol abuse in the past quit in 1998. Smoked marijuana. Family History:    Sister diagnosed with breast cancer.                                                                                            Allergies   Allergen Reactions    Demerol Hcl [Meperidine]     Gabapentin     Simvastatin        Current Outpatient Medications on File Prior to Visit   Medication Sig Dispense Refill    famotidine (PEPCID) 40 MG tablet Take 40 mg by mouth nightly      potassium chloride (KLOR-CON M) 20 MEQ extended release tablet Take 20 mEq by mouth daily      ferrous sulfate 324 (65 Fe) MG EC tablet Take 324 mg by mouth 2 times daily      losartan (COZAAR) 50 MG tablet Take 50 mg by mouth daily      tamsulosin (FLOMAX) 0.4 MG capsule Take 1 capsule by mouth daily (Patient taking differently: Take 0.4 mg by mouth nightly ) 30 capsule 3    apixaban (ELIQUIS) 5 MG TABS tablet Take 1 tablet by mouth 2 times daily 60 tablet 0    metoprolol tartrate (LOPRESSOR) 25 MG tablet Take 1 tablet by mouth 2 times daily 60 tablet 0    allopurinol (ZYLOPRIM) 300 MG tablet Take 300 mg by mouth daily      diltiazem (TIAZAC) 360 MG extended release capsule Take 360 mg by mouth daily      triamterene-hydrochlorothiazide (MAXZIDE-25) 37.5-25 MG per tablet Take 1 tablet by mouth daily      metFORMIN (GLUCOPHAGE) 500 MG tablet Take 500 mg by mouth 2 times daily (with meals)      pravastatin (PRAVACHOL) 20 MG tablet Take 20 mg by mouth nightly      nitroGLYCERIN (NITROSTAT) 0.4 MG SL tablet up to max of 3 total doses. If no relief after 1 dose, call 911. (Patient not taking: Reported on 2/12/2021) 25 tablet 0     No current facility-administered medications on file prior to visit. Review of Systems:    Constitutional:  No weight loss, No fever, No chills, No night sweats. Energy level poor  Eyes:  No diplopia, No transient or permanent loss of vision, No scotomata. ENT / Mouth:  No epistaxis, No dysphagia, No hoarseness, No oral ulcers, No gingival bleeding. No sore throat, No postnasal drip, No nasal drip, No mouth pain, No sinus pain, No tinnitus, Normal hearing. Cardiovascular:  No chest pain, No palpitations, No syncope, No upper extremity edema, No lower extremity edema, No calf discomfort. Respiratory:  No cough. No hemoptysis, No pleurisy, No wheezing, No dyspnea. Gastrointestinal:  No nausea, No vomiting,No diarrhea, No hematochezia, No melena, No jaundice, No dyspepsia, No dysphagia. Urinary:  No dysuria, No hematuria, No urinary incontinence. Skin:  No rash, No nodules, No pruritus, No lesions. Neurologic:  No confusion, No seizures, No syncope, No tremor, No speech change, No headache, No hiccups, No abnormal gait, No sensory changes, No weakness. Psychiatric:  No depression, No anxiety, Concentration normal.  Endocrine:  No polyuria, No polydipsia, No hot flashes, No thyroid symptoms. Hematologic:  No epistaxis, No gingival bleeding, No petechiae, No ecchymosis. Lymphatic:  No lymphadenopathy, No lymphedema. Allergy / Immunologic:  No eczema, No frequent mucous infections, No frequent respiratory infections, No recurrent urticarial, No frequent skin infections. Vital Signs: BP (!) 147/73 (Site: Right Upper Arm, Position: Sitting, Cuff Size: Large Adult)   Pulse 60   Resp 18   Ht 5' 8\" (1.727 m)   SpO2 96%   BMI 39.53 kg/m²      Physical Exam:  CONSTITUTIONAL: awake, alert, obese, arrive in motorized wheelchair.    EYES: palor, no icetrus  ENT: ATNC  NECK: fullness  HEMATOLOGIC/LYMPHATIC: no cervical, supraclavicular or axillary lymphadenopathy   LUNGS: bilateral coarse bs   CARDIOVASCULAR:s1s2 rrr no murmurs  ABDOMEN: soft ntnd bs pos  NEUROLOGIC: GI  EXTREMITIES: bilateral LE edema with chronic venous stasis changes.       Labs:  Hematology:  Lab Results   Component Value Date    WBC 7.5 11/18/2020    RBC 3.90 (L) 11/18/2020    HGB 11.5 (L) 11/18/2020    HCT 40.9 (L) 11/18/2020    MCV 92.8 11/18/2020    MCH 28.5 11/18/2020    MCHC 30.7 (L) 11/18/2020    RDW 17.5 (H) 11/18/2020     (H) 11/18/2020    MPV 9.3 11/18/2020    BANDSPCT 14 (H) 01/17/2020    SEGSPCT 71.3 (H) 11/17/2020    EOSRELPCT 7.2 (H) 11/17/2020    BASOPCT 0.6 11/17/2020    LYMPHOPCT 12.7 (L) 11/17/2020    MONOPCT 7.8 (H) 11/17/2020    BANDABS 2.30 01/17/2020    SEGSABS 5.7 11/17/2020    EOSABS 0.6 11/17/2020    BASOSABS 0.1 11/17/2020    LYMPHSABS 1.0 11/17/2020    MONOSABS 0.6 11/17/2020    DIFFTYPE AUTOMATED DIFFERENTIAL 11/17/2020    ANISOCYTOSIS 1+ 01/17/2020    POLYCHROM 1+ 01/17/2020     No results found for: ESR  Chemistry:  Lab Results   Component Value Date     11/18/2020    K 4.1 11/18/2020     11/18/2020    CO2 25 11/18/2020    BUN 5 (L) 11/18/2020    CREATININE 0.6 (L) 11/18/2020    GLUCOSE 103 (H) 11/18/2020    CALCIUM 9.0 11/18/2020    PROT 7.0 11/17/2020    LABALBU 3.1 (L) 11/17/2020    BILITOT 0.4 11/17/2020    ALKPHOS 69 11/17/2020    AST 11 (L) 11/17/2020    ALT <5 (L) 11/17/2020    LABGLOM >60 11/18/2020    GFRAA >60 11/18/2020    MG 2.0 11/17/2020    POCGLU 167 (H) 11/18/2020     No results found for: MMA, LDH, HOMOCYSTEINE  No components found for: LD  No results found for: TSHHS, T4FREE, FT3  Immunology:  Lab Results   Component Value Date    PROT 7.0 11/17/2020     No results found for: [place], KLFLCR  No results found for: B2M  Coagulation Panel:  Lab Results   Component Value Date    PROTIME 21.9 (H) 11/17/2020    INR 1.80 11/17/2020    APTT 36.9 11/17/2020     Anemia Panel:  Lab Results   Component Value Date    XJWKJKMN74 992.2 (H) 01/20/2020    FOLATE 9.8 01/20/2020     Tumor Markers:  No results found for: , CEA, , LABCA2, PSA     Observations:  ECOG:  No data recorded     Assessment & Plan:                                                          Anemia:  NC. Note low normal ferritin. Continue oral iron. Is being followed by GI, no evidence of GI malignancy. R/O hemolysis, monoclonal gammopathy, thyroid dysfunction, CTD, RA. May have MDS vs AOCD vs aoe. May consider further evaluation with [facility] if anemia persistent or worsening. Continue other medical care. Thank you for letting us be part of the care and will follow along. Discussed above findings and plan with him and he voiced understanding. Answered all his questions. Discussed healthy lifestyle including healthy diet, regular exercise as tolerated. Also discussed importance of being up-to-date with age-appropriate screening tools. Recommend follow-up with primary care physician and other  specialist.    Please do not hesitate to contact us if you need further information.     Return to clinic April 2021 or earlier if new Sx.     Satinder Landis    Electronically signed by Bora Andres MD on 2/12/2021 at 2:54 PM

## 2021-02-14 LAB
HAPTOGLOBIN: 306 MG/DL (ref 30–200)
RHEUMATOID FACTOR: <10 IU/ML (ref 0–14)

## 2021-02-15 PROBLEM — E53.8 B12 DEFICIENCY: Status: ACTIVE | Noted: 2021-02-15

## 2021-02-15 LAB
ALBUMIN ELP: 3.1 GM/DL (ref 3.2–5.6)
ALPHA-1-GLOBULIN: 0.3 GM/DL (ref 0.1–0.4)
ALPHA-2-GLOBULIN: 1 GM/DL (ref 0.4–1.2)
ANTI-NUCLEAR ANTIBODY (ANA): DETECTED
BETA GLOBULIN: 1.1 GM/DL (ref 0.5–1.3)
GAMMA GLOBULIN: 1.5 GM/DL (ref 0.5–1.6)
SPEP INTERPRETATION: ABNORMAL
TOTAL PROTEIN: 7.1 GM/DL (ref 6.4–8.2)

## 2021-02-15 RX ORDER — CYANOCOBALAMIN 1000 UG/ML
1000 INJECTION INTRAMUSCULAR; SUBCUTANEOUS ONCE
Status: CANCELLED
Start: 2021-02-22

## 2021-02-16 DIAGNOSIS — R76.8 ELEVATED ANTINUCLEAR ANTIBODY (ANA) LEVEL: ICD-10-CM

## 2021-02-16 LAB
ANA PATTERN: ABNORMAL
ANA TITER: ABNORMAL
ANTINUCLEAR ANTIBODY, HEP-2, IGG: DETECTED
INTERPRETATION: ABNORMAL

## 2021-02-19 ENCOUNTER — TELEPHONE (OUTPATIENT)
Dept: ONCOLOGY | Age: 76
End: 2021-02-19

## 2021-02-19 ENCOUNTER — TELEPHONE (OUTPATIENT)
Dept: INFUSION THERAPY | Age: 76
End: 2021-02-19

## 2021-02-19 NOTE — TELEPHONE ENCOUNTER
Called to schedule pt for his B12 injections; pt was unware he needs injections. Pt states he is in a wheelchair & asked if Sandy Sharpe can do this for him.  NN April informed & will call pt

## 2021-02-19 NOTE — TELEPHONE ENCOUNTER
Spoke with pt regarding his desire to have the B12 injections given by his home health nurse. His home health care agency is [facility] [phone]. Attempted to call to give orders but unable to leave message. Will call when the [address] [place] [address] [place].

## 2021-02-22 ENCOUNTER — CLINICAL DOCUMENTATION (OUTPATIENT)
Dept: ONCOLOGY | Age: 76
End: 2021-02-22

## 2021-02-22 NOTE — PROGRESS NOTES
Copy of vitamin b 12 order faxed to [facility] [phone] they will give the monthly injections.

## 2021-04-07 ENCOUNTER — OFFICE VISIT (OUTPATIENT)
Dept: ONCOLOGY | Age: 76
End: 2021-04-07
Payer: COMMERCIAL

## 2021-04-07 DIAGNOSIS — D64.9 ANEMIA, UNSPECIFIED TYPE: Primary | ICD-10-CM

## 2021-04-07 PROCEDURE — 99422 OL DIG E/M SVC 11-20 MIN: CPT | Performed by: INTERNAL MEDICINE

## 2021-04-07 RX ORDER — FERROUS SULFATE TAB EC 324 MG (65 MG FE EQUIVALENT) 324 (65 FE) MG
324 TABLET DELAYED RESPONSE ORAL 2 TIMES DAILY
Qty: 60 TABLET | Refills: 5 | Status: SHIPPED | OUTPATIENT
Start: 2021-04-07 | End: 2021-11-15

## 2021-04-07 ASSESSMENT — PATIENT HEALTH QUESTIONNAIRE - PHQ9
SUM OF ALL RESPONSES TO PHQ QUESTIONS 1-9: 1
SUM OF ALL RESPONSES TO PHQ QUESTIONS 1-9: 1

## 2021-04-07 NOTE — PROGRESS NOTES
MA Rooming Questions  Patient: Zaira Prakash  MRN: [id number]    Date: 4/7/2021        1. Do you have any new issues?   no         2. Do you need any refills on medications?    no    3. Have you had any imaging done since your last visit? yes - labs 02/12    4. Have you been hospitalized or seen in the emergency room since your last visit here?   no    5. Did the patient have a depression screening completed today?  Yes    PHQ-9 Total Score: 1 (4/7/2021 10:02 AM)       PHQ-9 Given to (if applicable):               PHQ-9 Score (if applicable):                     [] Positive     []  Negative              Does question #9 need addressed (if applicable)                     [] Yes    []  No               Emma Muñoz MA
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
## 2021-05-07 ENCOUNTER — HOSPITAL ENCOUNTER (OUTPATIENT)
Age: 76
Setting detail: SPECIMEN
Discharge: HOME | End: 2021-05-07
Payer: COMMERCIAL

## 2021-05-07 LAB
ALBUMIN SERPL-MCNC: 3.7 GM/DL (ref 3.4–5)
ALP BLD-CCNC: 82 IU/L (ref 40–129)
ALT SERPL-CCNC: 7 U/L (ref 10–40)
ANION GAP SERPL CALCULATED.3IONS-SCNC: 9 MMOL/L (ref 4–16)
AST SERPL-CCNC: 16 IU/L (ref 15–37)
BANDED NEUTROPHILS ABSOLUTE COUNT: 0.07 K/CU MM
BANDED NEUTROPHILS RELATIVE PERCENT: 1 % (ref 5–11)
BASOPHILS ABSOLUTE: 0.1 K/CU MM
BASOPHILS RELATIVE PERCENT: 2 % (ref 0–1)
BILIRUB SERPL-MCNC: 0.4 MG/DL (ref 0–1)
BUN BLDV-MCNC: 9 MG/DL (ref 6–23)
CALCIUM SERPL-MCNC: 9.4 MG/DL (ref 8.3–10.6)
CHLORIDE BLD-SCNC: 97 MMOL/L (ref 99–110)
CO2: 29 MMOL/L (ref 21–32)
CREAT SERPL-MCNC: 0.7 MG/DL (ref 0.9–1.3)
DIFFERENTIAL TYPE: ABNORMAL
EOSINOPHILS ABSOLUTE: 0.3 K/CU MM
EOSINOPHILS RELATIVE PERCENT: 5 % (ref 0–3)
FERRITIN: 57 NG/ML (ref 30–400)
FOLATE: 6.6 NG/ML (ref 3.1–17.5)
GFR AFRICAN AMERICAN: >60 ML/MIN/1.73M2
GFR NON-AFRICAN AMERICAN: >60 ML/MIN/1.73M2
GLUCOSE BLD-MCNC: 87 MG/DL (ref 70–99)
HCT VFR BLD CALC: 34.1 % (ref 42–52)
HEMOGLOBIN: 10.7 GM/DL (ref 13.5–18)
IRON: 48 UG/DL (ref 59–158)
LYMPHOCYTES ABSOLUTE: 1.5 K/CU MM
LYMPHOCYTES RELATIVE PERCENT: 22 % (ref 24–44)
MCH RBC QN AUTO: 28.2 PG (ref 27–31)
MCHC RBC AUTO-ENTMCNC: 31.4 % (ref 32–36)
MCV RBC AUTO: 89.7 FL (ref 78–100)
MONOCYTES ABSOLUTE: 0.4 K/CU MM
MONOCYTES RELATIVE PERCENT: 6 % (ref 0–4)
PCT TRANSFERRIN: 16 % (ref 10–44)
PDW BLD-RTO: 16.8 % (ref 11.7–14.9)
PLATELET # BLD: 451 K/CU MM (ref 140–440)
PMV BLD AUTO: 9.3 FL (ref 7.5–11.1)
POTASSIUM SERPL-SCNC: 4.2 MMOL/L (ref 3.5–5.1)
RBC # BLD: 3.8 M/CU MM (ref 4.6–6.2)
RBC # BLD: ABNORMAL 10*6/UL
SEGMENTED NEUTROPHILS ABSOLUTE COUNT: 4.4 K/CU MM
SEGMENTED NEUTROPHILS RELATIVE PERCENT: 64 % (ref 36–66)
SODIUM BLD-SCNC: 135 MMOL/L (ref 135–145)
TOTAL IRON BINDING CAPACITY: 302 UG/DL (ref 250–450)
TOTAL PROTEIN: 7.3 GM/DL (ref 6.4–8.2)
TSH HIGH SENSITIVITY: 3.28 UIU/ML (ref 0.27–4.2)
UNSATURATED IRON BINDING CAPACITY: 254 UG/DL (ref 110–370)
VITAMIN B-12: 951.2 PG/ML (ref 211–911)
WBC # BLD: 6.8 K/CU MM (ref 4–10.5)

## 2021-05-07 PROCEDURE — 85007 BL SMEAR W/DIFF WBC COUNT: CPT

## 2021-05-07 PROCEDURE — 82728 ASSAY OF FERRITIN: CPT

## 2021-05-07 PROCEDURE — 80053 COMPREHEN METABOLIC PANEL: CPT

## 2021-05-07 PROCEDURE — 83550 IRON BINDING TEST: CPT

## 2021-05-07 PROCEDURE — 82607 VITAMIN B-12: CPT

## 2021-05-07 PROCEDURE — 82746 ASSAY OF FOLIC ACID SERUM: CPT

## 2021-05-07 PROCEDURE — 83540 ASSAY OF IRON: CPT

## 2021-05-07 PROCEDURE — 85027 COMPLETE CBC AUTOMATED: CPT

## 2021-05-07 PROCEDURE — 84443 ASSAY THYROID STIM HORMONE: CPT

## 2021-06-07 ENCOUNTER — HOSPITAL ENCOUNTER (EMERGENCY)
Age: 76
Discharge: HOME | End: 2021-06-07
Attending: EMERGENCY MEDICINE
Payer: COMMERCIAL

## 2021-06-07 VITALS
WEIGHT: 260.44 LBS | HEIGHT: 68 IN | HEART RATE: 54 BPM | TEMPERATURE: 98.3 F | BODY MASS INDEX: 39.47 KG/M2 | OXYGEN SATURATION: 94 % | RESPIRATION RATE: 20 BRPM | SYSTOLIC BLOOD PRESSURE: 139 MMHG | DIASTOLIC BLOOD PRESSURE: 69 MMHG

## 2021-06-07 DIAGNOSIS — M79.89 LEG SWELLING: Primary | ICD-10-CM

## 2021-06-07 DIAGNOSIS — I89.0 LYMPHEDEMA: ICD-10-CM

## 2021-06-07 DIAGNOSIS — L03.116 CELLULITIS OF LEFT LOWER EXTREMITY: ICD-10-CM

## 2021-06-07 LAB
ALBUMIN SERPL-MCNC: 3.6 GM/DL (ref 3.4–5)
ALP BLD-CCNC: 72 IU/L (ref 40–129)
ALT SERPL-CCNC: 9 U/L (ref 10–40)
ANION GAP SERPL CALCULATED.3IONS-SCNC: 10 MMOL/L (ref 4–16)
AST SERPL-CCNC: 14 IU/L (ref 15–37)
BASOPHILS ABSOLUTE: 0 K/CU MM
BASOPHILS RELATIVE PERCENT: 0.6 % (ref 0–1)
BILIRUB SERPL-MCNC: 0.4 MG/DL (ref 0–1)
BILIRUBIN DIRECT: 0.2 MG/DL (ref 0–0.3)
BILIRUBIN, INDIRECT: 0.2 MG/DL (ref 0–0.7)
BUN BLDV-MCNC: 7 MG/DL (ref 6–23)
CALCIUM SERPL-MCNC: 9.3 MG/DL (ref 8.3–10.6)
CHLORIDE BLD-SCNC: 99 MMOL/L (ref 99–110)
CO2: 27 MMOL/L (ref 21–32)
CREAT SERPL-MCNC: 0.6 MG/DL (ref 0.9–1.3)
DIFFERENTIAL TYPE: ABNORMAL
EOSINOPHILS ABSOLUTE: 0.3 K/CU MM
EOSINOPHILS RELATIVE PERCENT: 4.4 % (ref 0–3)
GFR AFRICAN AMERICAN: >60 ML/MIN/1.73M2
GFR NON-AFRICAN AMERICAN: >60 ML/MIN/1.73M2
GLUCOSE BLD-MCNC: 99 MG/DL (ref 70–99)
HCT VFR BLD CALC: 35.2 % (ref 42–52)
HEMOGLOBIN: 11 GM/DL (ref 13.5–18)
IMMATURE NEUTROPHIL %: 0.3 % (ref 0–0.43)
LACTIC ACID, SEPSIS: 1.9 MMOL/L (ref 0.5–1.9)
LACTIC ACID, SEPSIS: 2 MMOL/L (ref 0.5–1.9)
LYMPHOCYTES ABSOLUTE: 1.1 K/CU MM
LYMPHOCYTES RELATIVE PERCENT: 15.4 % (ref 24–44)
MAGNESIUM: 1.7 MG/DL (ref 1.8–2.4)
MCH RBC QN AUTO: 27.6 PG (ref 27–31)
MCHC RBC AUTO-ENTMCNC: 31.3 % (ref 32–36)
MCV RBC AUTO: 88.4 FL (ref 78–100)
MONOCYTES ABSOLUTE: 0.6 K/CU MM
MONOCYTES RELATIVE PERCENT: 8.4 % (ref 0–4)
NUCLEATED RBC %: 0 %
PDW BLD-RTO: 17.1 % (ref 11.7–14.9)
PLATELET # BLD: 362 K/CU MM (ref 140–440)
PMV BLD AUTO: 9.3 FL (ref 7.5–11.1)
POTASSIUM SERPL-SCNC: 3.5 MMOL/L (ref 3.5–5.1)
RBC # BLD: 3.98 M/CU MM (ref 4.6–6.2)
SEGMENTED NEUTROPHILS ABSOLUTE COUNT: 5 K/CU MM
SEGMENTED NEUTROPHILS RELATIVE PERCENT: 70.9 % (ref 36–66)
SODIUM BLD-SCNC: 136 MMOL/L (ref 135–145)
TOTAL IMMATURE NEUTOROPHIL: 0.02 K/CU MM
TOTAL NUCLEATED RBC: 0 K/CU MM
TOTAL PROTEIN: 7 GM/DL (ref 6.4–8.2)
TROPONIN T: <0.01 NG/ML
WBC # BLD: 7 K/CU MM (ref 4–10.5)

## 2021-06-07 PROCEDURE — 83605 ASSAY OF LACTIC ACID: CPT

## 2021-06-07 PROCEDURE — 80053 COMPREHEN METABOLIC PANEL: CPT

## 2021-06-07 PROCEDURE — 85025 COMPLETE CBC W/AUTO DIFF WBC: CPT

## 2021-06-07 PROCEDURE — 84484 ASSAY OF TROPONIN QUANT: CPT

## 2021-06-07 PROCEDURE — 83735 ASSAY OF MAGNESIUM: CPT

## 2021-06-07 PROCEDURE — 82248 BILIRUBIN DIRECT: CPT

## 2021-06-07 PROCEDURE — 36415 COLL VENOUS BLD VENIPUNCTURE: CPT

## 2021-06-07 PROCEDURE — 6370000000 HC RX 637 (ALT 250 FOR IP): Performed by: EMERGENCY MEDICINE

## 2021-06-07 PROCEDURE — 99285 EMERGENCY DEPT VISIT HI MDM: CPT

## 2021-06-07 RX ORDER — DOXYCYCLINE HYCLATE 100 MG
100 TABLET ORAL ONCE
Status: COMPLETED | OUTPATIENT
Start: 2021-06-07 | End: 2021-06-07

## 2021-06-07 RX ORDER — DOXYCYCLINE HYCLATE 100 MG
100 TABLET ORAL 2 TIMES DAILY
Qty: 20 TABLET | Refills: 0 | Status: SHIPPED | OUTPATIENT
Start: 2021-06-07 | End: 2021-06-17

## 2021-06-07 RX ADMIN — DOXYCYCLINE HYCLATE 100 MG: 100 TABLET, COATED ORAL at 18:08

## 2021-06-07 SDOH — ECONOMIC STABILITY: FOOD INSECURITY: WITHIN THE PAST 12 MONTHS, YOU WORRIED THAT YOUR FOOD WOULD RUN OUT BEFORE YOU GOT MONEY TO BUY MORE.: OFTEN TRUE

## 2021-06-07 SDOH — ECONOMIC STABILITY: FOOD INSECURITY: WITHIN THE PAST 12 MONTHS, THE FOOD YOU BOUGHT JUST DIDN'T LAST AND YOU DIDN'T HAVE MONEY TO GET MORE.: OFTEN TRUE

## 2021-06-07 ASSESSMENT — PAIN DESCRIPTION - DESCRIPTORS: DESCRIPTORS: ACHING

## 2021-06-07 ASSESSMENT — PAIN DESCRIPTION - FREQUENCY: FREQUENCY: CONTINUOUS

## 2021-06-07 ASSESSMENT — PAIN DESCRIPTION - LOCATION: LOCATION: LEG

## 2021-06-07 ASSESSMENT — PAIN DESCRIPTION - PAIN TYPE: TYPE: ACUTE PAIN

## 2021-06-07 ASSESSMENT — PAIN DESCRIPTION - ORIENTATION: ORIENTATION: RIGHT;LEFT

## 2021-06-07 NOTE — ED NOTES
Pt in wheelchair awaiting transport. Education provided for home care and medication. Pt denies any questions.       Zeke Sears RN  06/07/21 [id number]
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
## 2021-06-07 NOTE — ED PROVIDER NOTES
Emergency Department Encounter    Patient: Tyao Newton  MRN: [id number]  : 1945  Date of Evaluation: 2021  ED Provider:  Allen Danielle MD    Triage Chief Complaint:   Leg Swelling    [place]:  Tayo Newton is a 77 y.o. male that presents with acute on chronic bilateral lower extremity swelling and redness. No fever or chills. No leg pain. Patient reports swelling has not increased any more than normal and is just slightly more red. He does feel that his bilateral legs are more warm than usual.  Patient reports he called home health told him he should go to the emergency department for further evaluation.   ROS - see HPI, below listed is current ROS at time of my eval:  General:  No fevers, no chills, no weakness  Eyes:  No recent vison changes, no discharge  ENT:  No sore throat, no nasal congestion, no hearing changes  Cardiovascular:  No chest pain, no palpitations  Respiratory:  No shortness of breath, no cough, no wheezing  Gastrointestinal:  No pain, no nausea, no vomiting, no diarrhea  Musculoskeletal:  No muscle pain, no joint pain  Skin:  No rash, no pruritis, no easy bruising  Neurologic:  No speech problems, no headache, no extremity numbness, no extremity tingling, no extremity weakness  Psychiatric:  No anxiety  Genitourinary:  No dysuria, no hematuria  Endocrine:  No unexpected weight gain, no unexpected weight loss  Extremities:  no edema, no pain    Past Medical History:   Diagnosis Date    Arthropathy     Atrial fibrillation (HCC)     Atrial flutter (HCC)     Diabetes mellitus (HCC)     Gout     Hyperlipidemia     Hypertension     Lumbago      Past Surgical History:   Procedure Laterality Date    COLONOSCOPY N/A 2018    COLONOSCOPY POLYPECTOMY SNARE/COLD BIOPSY performed by Abraham Conway MD at [place] [address] COLONOSCOPY N/A 2020    COLONOSCOPY DIAGNOSTIC performed by Zaid Mejía MD at [facility] ENDOSCOPY     Family History   Problem Relation Age of Onset  No Known Problems Mother     No Known Problems Father      Social History     Socioeconomic History    Marital status: Single     Spouse name: Not on file    Number of children: Not on file    Years of education: Not on file    Highest education level: Not on file   Occupational History    Not on file   Tobacco Use    Smoking status: Former Smoker     Packs/day: 0.50     Types: Cigarettes    Smokeless tobacco: Never Used   Substance and Sexual Activity    Alcohol use: No    Drug use: No    Sexual activity: Not on file   Other Topics Concern    Not on file   Social History Narrative    Not on file     Social Determinants of Health     Financial Resource Strain:     Difficulty of Paying Living Expenses:    Food Insecurity: Food Insecurity Present    Worried About Running Out of Food in the Last Year: Often true    Ivy of Food in the Last Year: Often true   Transportation Needs:     Lack of Transportation (Medical):  Lack of Transportation (Non-Medical):    Physical Activity:     Days of Exercise per Week:     Minutes of Exercise per Session:    Stress:     Feeling of Stress :    Social Connections:     Frequency of Communication with Friends and Family:     Frequency of Social Gatherings with Friends and Family:     Attends L[employer] day Services:     Active Member of Clubs or Organizations:     Attends Club or Organization Meetings:     Marital Status:    Intimate Partner Violence:     Fear of Current or Ex-Partner:     Emotionally Abused:     Physically Abused:     Sexually Abused:      No current facility-administered medications for this encounter.      Current Outpatient Medications   Medication Sig Dispense Refill    doxycycline hyclate (VIBRA-TABS) 100 MG tablet Take 1 tablet by mouth 2 times daily for 10 days 20 tablet 0    cyanocobalamin ([employer] VITAMIN B12) 1000 MCG tablet Take 1 tablet by mouth daily 30 tablet 5    ferrous sulfate 324 (65 Fe) MG EC tablet Take 1 tablet by mouth 2 times daily 60 tablet 5    famotidine (PEPCID) 40 MG tablet Take 40 mg by mouth nightly      potassium chloride (KLOR-CON M) 20 MEQ extended release tablet Take 20 mEq by mouth daily      losartan (COZAAR) 50 MG tablet Take 50 mg by mouth daily      tamsulosin (FLOMAX) 0.4 MG capsule Take 1 capsule by mouth daily (Patient taking differently: Take 0.4 mg by mouth nightly ) 30 capsule 3    nitroGLYCERIN (NITROSTAT) 0.4 MG SL tablet up to max of 3 total doses. If no relief after 1 dose, call 911. (Patient not taking: Reported on 2/12/2021) 25 tablet 0    apixaban (ELIQUIS) 5 MG TABS tablet Take 1 tablet by mouth 2 times daily 60 tablet 0    metoprolol tartrate (LOPRESSOR) 25 MG tablet Take 1 tablet by mouth 2 times daily 60 tablet 0    allopurinol (ZYLOPRIM) 300 MG tablet Take 300 mg by mouth daily      diltiazem (TIAZAC) 360 MG extended release capsule Take 360 mg by mouth daily      triamterene-hydrochlorothiazide (MAXZIDE-25) 37.5-25 MG per tablet Take 1 tablet by mouth daily      metFORMIN (GLUCOPHAGE) 500 MG tablet Take 500 mg by mouth 2 times daily (with meals)      pravastatin (PRAVACHOL) 20 MG tablet Take 20 mg by mouth nightly       Allergies   Allergen Reactions    Demerol Hcl [Meperidine]     Gabapentin     Simvastatin        Nursing Notes Reviewed    Physical Exam:  Triage VS:    ED Triage Vitals [06/07/21 1408]   Enc Vitals Group      /64      Pulse 58      Resp 18      Temp 99 °F (37.2 °C)      Temp src       SpO2 93 %      Weight 260 lb 7 oz (118.1 kg)      Height 5' 8\" (1.727 m)      Head Circumference       Peak Flow       Pain Score       Pain Loc       Pain Edu? Excl. in [address]? My pulse ox interpretation is - normal    General appearance:  No acute distress. Skin:  Warm. Dry. Eye:  Extraocular movements intact. Ears, nose, mouth and throat:  Oral mucosa moist   Neck:  Trachea midline. Extremity: Bilateral lower extremity lymphedema.   Erythema and warmth noted bilaterally left greater than right. Heart:  Regular rate and rhythm, normal S1 & S2, no extra heart sounds. Perfusion:  intact  Respiratory:  Lungs clear to auscultation bilaterally. Respirations nonlabored. Abdominal:  Normal bowel sounds. Soft. Nontender. Non distended. Back:  No CVA tenderness to palpation     Neurological:  Alert and oriented times 3. No focal neuro deficits.              Psychiatric:  Appropriate    I have reviewed and interpreted all of the currently available lab results from this visit (if applicable):  Results for orders placed or performed during the hospital encounter of 06/07/21   CBC Auto Differential   Result Value Ref Range    WBC 7.0 4.0 - 10.5 K/CU MM    RBC 3.98 (L) 4.6 - 6.2 M/CU MM    Hemoglobin 11.0 (L) 13.5 - 18.0 GM/DL    Hematocrit 35.2 (L) 42 - 52 %    MCV 88.4 78 - 100 FL    MCH 27.6 27 - 31 PG    MCHC 31.3 (L) 32.0 - 36.0 %    RDW 17.1 (H) 11.7 - 14.9 %    Platelets 724 419 - 056 K/CU MM    MPV 9.3 7.5 - 11.1 FL    Differential Type AUTOMATED DIFFERENTIAL     Segs Relative 70.9 (H) 36 - 66 %    Lymphocytes % 15.4 (L) 24 - 44 %    Monocytes % 8.4 (H) 0 - 4 %    Eosinophils % 4.4 (H) 0 - 3 %    Basophils % 0.6 0 - 1 %    Segs Absolute 5.0 K/CU MM    Lymphocytes Absolute 1.1 K/CU MM    Monocytes Absolute 0.6 K/CU MM    Eosinophils Absolute 0.3 K/CU MM    Basophils Absolute 0.0 K/CU MM    Nucleated RBC % 0.0 %    Total Nucleated RBC 0.0 K/CU MM    Total Immature Neutrophil 0.02 K/CU MM    Immature Neutrophil % 0.3 0 - 0.43 %   Basic Metabolic Panel w/ Reflex to MG   Result Value Ref Range    Sodium 136 135 - 145 MMOL/L    Potassium 3.5 3.5 - 5.1 MMOL/L    Chloride 99 99 - 110 mMol/L    CO2 27 21 - 32 MMOL/L    Anion Gap 10 4 - 16    BUN 7 6 - 23 MG/DL    CREATININE 0.6 (L) 0.9 - 1.3 MG/DL    Glucose 99 70 - 99 MG/DL    Calcium 9.3 8.3 - 10.6 MG/DL    GFR Non-African American >60 >60 mL/min/1.73m2    GFR African American >60 >60 mL/min/1.73m2 Troponin   Result Value Ref Range    Troponin T <0.010 <0.01 NG/ML   Hepatic Function Panel   Result Value Ref Range    Albumin 3.6 3.4 - 5.0 GM/DL    Total Bilirubin 0.4 0.0 - 1.0 MG/DL    Bilirubin, Direct 0.2 0.0 - 0.3 MG/DL    Bilirubin, Indirect 0.2 0 - 0.7 MG/DL    Alkaline Phosphatase 72 40 - 129 IU/L    AST 14 (L) 15 - 37 IU/L    ALT 9 (L) 10 - 40 U/L    Total Protein 7.0 6.4 - 8.2 GM/DL   Lactate, Sepsis   Result Value Ref Range    Lactic Acid, Sepsis 2.0 (HH) 0.5 - 1.9 mMOL/L   Lactate, Sepsis   Result Value Ref Range    Lactic Acid, Sepsis 1.9 0.5 - 1.9 mMOL/L   Magnesium   Result Value Ref Range    Magnesium 1.7 (L) 1.8 - 2.4 mg/dl      Radiographs (if obtained):  Radiologist's Report Reviewed:  No results found. MDM:  76-year-old male with a past with history of A. fib/a flutter on Eliquis, hyperlipidemia, hypertension with concern for lower extremity cellulitis. Patient has chronic lymphedema. Strict ED return precautions given. Will treat patient with doxycycline 1 tab p.o. twice daily x10 days. Patient given first dose in the emergency department. Discussed importance of calling his primary care physician first thing tomorrow morning for close follow-up and further recommendations. Patient knowledge understanding agreement plan of care. He was discharged in good condition with stable vitals. Clinical Impression:  1. Leg swelling    2. Lymphedema    3.  Cellulitis of left lower extremity      Disposition referral (if applicable):  Fanta Murray MD  [address] [place] [id number] [phone]    Call   for close follow up and further recommendations    Disposition medications (if applicable):  Discharge Medication List as of 6/7/2021  6:00 PM        ED Provider Disposition Time  DISPOSITION Decision To Discharge 06/07/2021 05:49:57 PM      Comment: Please note this report has been produced using speech recognition software and may contain errors related to that system including errors in grammar, punctuation, and spelling, as well as words and phrases that may be inappropriate. Efforts were made to edit the dictations.         Maira Garay MD  06/07/21 [id number]

## 2021-06-07 NOTE — ED NOTES
Bed: H-07  Expected date:   Expected time:   Means of arrival:   Comments:  Ruby Whitney RN  06/07/21 [id number]

## 2021-06-30 ENCOUNTER — OFFICE VISIT (OUTPATIENT)
Dept: ONCOLOGY | Age: 76
End: 2021-06-30
Payer: COMMERCIAL

## 2021-06-30 DIAGNOSIS — D72.829 LEUKOCYTOSIS, UNSPECIFIED TYPE: ICD-10-CM

## 2021-06-30 DIAGNOSIS — E53.8 B12 DEFICIENCY: Primary | ICD-10-CM

## 2021-06-30 DIAGNOSIS — D64.9 ANEMIA, UNSPECIFIED TYPE: ICD-10-CM

## 2021-06-30 PROCEDURE — 99422 OL DIG E/M SVC 11-20 MIN: CPT | Performed by: INTERNAL MEDICINE

## 2021-06-30 NOTE — PROGRESS NOTES
Patient Name:  Amber Tamez  Patient :  1945  Patient MRN:  [id number]     Primary Oncologist: Ran Mcgowan MD  Referring Provider: Shannan Meraz MD     Date of Service: 2021     Pursuant to the emergency declaration under the [address] [place] [address] authority and the [employer] Act, this Virtual  Visit was conducted, with patient's consent, to reduce the patient's risk of exposure to COVID-19 and provide continuity of care for an established patient.     This patient was contacted today via telephone with audio capabilities      Patient gave verbal consent which was obtained during the visit. Chief Complaint:    Chief Complaint   Patient presents with    Follow-up       Encounter Diagnoses   Name Primary?  B12 deficiency Yes    Leukocytosis, unspecified type     Anemia, unspecified type         HPI:   2021: He arrived alone to the clinic today. Reported that he has a history of diverticulitis-reported black tarry stools most of the days with bright red bleeding per rectum 2 to 3 days a month. Intermittent constipation, relieved with MiraLAX. Is on iron tablets. Reported that he had very little stool content each time. Reported epigastric abdominal pain. Denied any nausea vomiting. Reported that he takes Tums. Reported that he is on blood thinners. Not sure whether he has lost any weight. Denied any other overt bleeding. Denied any lower extremity edema. Denied any chest pain. Reported shortness of breath on minimal exertion. Denied any palpitations or dizziness. Denied dysphagia odynophagia. 2021 CT scan of the head was normal. CT scan of the cervical spine was normal except for mild to moderate spondylosis. CT scan of the right hip revealed joint osteoarthrosis. Right inguinal lymphadenopathy.   Subcutaneous fat stranding in the lateral aspect of the proximal thigh could represent early hematoma. CT of the abdomen pelvis revealed retroperitoneal lymphadenopathy. Panniculitis    2/5/2021:CBC July 1, 2020 CBC with WBC of 7.1 hemoglobin of 9.8 hematocrit of 29.2 MCV of 82.4 platelets of 483 dip within normal limits sodium 138 potassium 3.3 BUN 7 creatinine 0.8 calcium 9.6 LFTs within normal limits. Urine with no blood. Celiac panel with IgA 492    Past Medical History:     Hypertension, BPH, gout, GERD, diabetes, hyperlipidemia                                                           Past Surgery History:    PCI with stent placement         Demerol is reported as allergies but he had very mild symptoms. Social History:   Lives by himself. Did not make the mistake of [place]. Quit smoking 3 years ago. Prior to which he smoked 2 packs/day for 50 years. Alcohol abuse in the past quit in 1998. Smoked marijuana. Family History:    Sister diagnosed with breast cancer.                                                                                            Allergies   Allergen Reactions    Demerol Hcl [Meperidine]     Gabapentin     Simvastatin        Current Outpatient Medications on File Prior to Visit   Medication Sig Dispense Refill    cyanocobalamin ([employer] VITAMIN B12) 1000 MCG tablet Take 1 tablet by mouth daily 30 tablet 5    ferrous sulfate 324 (65 Fe) MG EC tablet Take 1 tablet by mouth 2 times daily 60 tablet 5    famotidine (PEPCID) 40 MG tablet Take 40 mg by mouth nightly      potassium chloride (KLOR-CON M) 20 MEQ extended release tablet Take 20 mEq by mouth daily      losartan (COZAAR) 50 MG tablet Take 50 mg by mouth daily      tamsulosin (FLOMAX) 0.4 MG capsule Take 1 capsule by mouth daily (Patient taking differently: Take 0.4 mg by mouth nightly ) 30 capsule 3    nitroGLYCERIN (NITROSTAT) 0.4 MG SL tablet up to max of 3 total doses. If no relief after 1 dose, call 911. (Patient not taking: Reported on 2/12/2021) 25 tablet 0    apixaban (ELIQUIS) 5 MG TABS tablet Take 1 tablet by mouth 2 times daily 60 tablet 0    metoprolol tartrate (LOPRESSOR) 25 MG tablet Take 1 tablet by mouth 2 times daily 60 tablet 0    allopurinol (ZYLOPRIM) 300 MG tablet Take 300 mg by mouth daily      diltiazem (TIAZAC) 360 MG extended release capsule Take 360 mg by mouth daily      triamterene-hydrochlorothiazide (MAXZIDE-25) 37.5-25 MG per tablet Take 1 tablet by mouth daily      metFORMIN (GLUCOPHAGE) 500 MG tablet Take 500 mg by mouth 2 times daily (with meals)      pravastatin (PRAVACHOL) 20 MG tablet Take 20 mg by mouth nightly       No current facility-administered medications on file prior to visit. Interval history: 6/30/21: Televisit: Reported that he went to ER as his toes were swollen and discoloration. Very sedentary LS. Last week he has blood in the urine for three days and quit spontaneously. Has been having dark stools. Has been on oral iron tablets. Has h/o diverticulitis. No other bleeding. No chest pain, increased sob. No fever. No weight loss. Vital Signs: There were no vitals taken for this visit. Physical Exam:  Alert and oriented  Televisit.       Labs:  Hematology:  Lab Results   Component Value Date    WBC 7.0 06/07/2021    RBC 3.98 (L) 06/07/2021    HGB 11.0 (L) 06/07/2021    HCT 35.2 (L) 06/07/2021    MCV 88.4 06/07/2021    MCH 27.6 06/07/2021    MCHC 31.3 (L) 06/07/2021    RDW 17.1 (H) 06/07/2021     06/07/2021    MPV 9.3 06/07/2021    BANDSPCT 1 (L) 05/07/2021    SEGSPCT 70.9 (H) 06/07/2021    EOSRELPCT 4.4 (H) 06/07/2021    BASOPCT 0.6 06/07/2021    LYMPHOPCT 15.4 (L) 06/07/2021    MONOPCT 8.4 (H) 06/07/2021    BANDABS 0.07 05/07/2021    SEGSABS 5.0 06/07/2021    EOSABS 0.3 06/07/2021    BASOSABS 0.0 06/07/2021    LYMPHSABS 1.1 06/07/2021    MONOSABS 0.6 06/07/2021 DIFFTYPE AUTOMATED DIFFERENTIAL 06/07/2021    ANISOCYTOSIS 1+ 01/17/2020    POLYCHROM 1+ 01/17/2020     Lab Results   Component Value Date    ESR 98 (H) 02/12/2021     Chemistry:  Lab Results   Component Value Date     06/07/2021    K 3.5 06/07/2021    CL 99 06/07/2021    CO2 27 06/07/2021    BUN 7 06/07/2021    CREATININE 0.6 (L) 06/07/2021    GLUCOSE 99 06/07/2021    CALCIUM 9.3 06/07/2021    PROT 7.0 06/07/2021    LABALBU 3.6 06/07/2021    BILITOT 0.4 06/07/2021    ALKPHOS 72 06/07/2021    AST 14 (L) 06/07/2021    ALT 9 (L) 06/07/2021    LABGLOM >60 06/07/2021    GFRAA >60 06/07/2021    MG 1.7 (L) 06/07/2021    POCGLU 167 (H) 11/18/2020     Lab Results   Component Value Date     02/12/2021     No components found for: LD  Lab Results   Component Value Date    TSHHS 3.280 05/07/2021     Immunology:  Lab Results   Component Value Date    PROT 7.0 06/07/2021    SPEP INTERPRETATION - No significant abnormality. LP. 02/12/2021    ALBUMINELP 3.1 (L) 02/12/2021    LABALPH 0.3 02/12/2021    LABALPH 1.0 02/12/2021    LABBETA 1.1 02/12/2021    GAMGLOB 1.5 02/12/2021     No results found for: Zuhair Mcghee, FRANKIELCR  No results found for: B2M  Coagulation Panel:  Lab Results   Component Value Date    PROTIME 21.9 (H) 11/17/2020    INR 1.80 11/17/2020    APTT 36.9 11/17/2020     Anemia Panel:  Lab Results   Component Value Date    UQRUIRNE18 951.2 (H) 05/07/2021    FOLATE 6.6 05/07/2021     Tumor Markers:  No results found for: , CEA, , LABCA2, PSA     Observations:  ECOG:  No data recorded       Assessment & Plan:                                                          Anemia:  NC. Note low normal ferritin in may 2021. Continue oral iron along with orange juice. Defers GI or urology eval.  No hemolysis, monoclonal gammopathy, thyroid dysfunction, CTD, RA. May have MDS vs AOCD vs aoe. Hb 11 June 7 2021. Ferritin not done.  Continue oral iron and will monitor for now    Continue other medical care.    Discussed above findings and plan with him and he voiced understanding. Answered all his questions. Discussed healthy lifestyle including healthy diet, regular exercise as tolerated. Also discussed importance of being up-to-date with age-appropriate screening tools. Recommend follow-up with primary care physician and other  specialist.    Please do not hesitate to contact us if you need further information.     Televisit , labs ordered for June 21    Total time 11min     MEGHAN    Electronically signed by Karsten Ren MD on 6/30/2021 at 4:11 PM

## 2021-06-30 NOTE — PROGRESS NOTES
MA Rooming Questions  Patient: Lenin Ko  MRN: [id number]    Date: 6/30/2021        1. Do you have any new issues?   no         2. Do you need any refills on medications?    no    3. Have you had any imaging done since your last visit?   no    4. Have you been hospitalized or seen in the emergency room since your last visit here?   yes - ED    5. Did the patient have a depression screening completed today?  No    No data recorded     PHQ-9 Given to (if applicable):               PHQ-9 Score (if applicable):                     [] Positive     []  Negative              Does question #9 need addressed (if applicable)                     [] Yes    []  No               Candace Chaves CMA

## 2021-11-15 RX ORDER — FERROUS SULFATE 325(65) MG
TABLET ORAL
Qty: 60 TABLET | Refills: 5 | Status: SHIPPED | OUTPATIENT
Start: 2021-11-15

## 2021-12-08 ENCOUNTER — APPOINTMENT (OUTPATIENT)
Dept: GENERAL RADIOLOGY | Age: 76
DRG: 870 | End: 2021-12-08
Payer: COMMERCIAL

## 2021-12-08 ENCOUNTER — APPOINTMENT (OUTPATIENT)
Dept: CT IMAGING | Age: 76
DRG: 870 | End: 2021-12-08
Payer: COMMERCIAL

## 2021-12-08 ENCOUNTER — HOSPITAL ENCOUNTER (INPATIENT)
Age: 76
LOS: 27 days | Discharge: NURSING, REHAB OR LONG-TERM CARE | DRG: 870 | End: 2022-01-05
Attending: EMERGENCY MEDICINE | Admitting: INTERNAL MEDICINE
Payer: COMMERCIAL

## 2021-12-08 DIAGNOSIS — I48.20 CHRONIC ATRIAL FIBRILLATION (HCC): Primary | ICD-10-CM

## 2021-12-08 DIAGNOSIS — J18.9 MULTIFOCAL PNEUMONIA: ICD-10-CM

## 2021-12-08 DIAGNOSIS — N30.00 ACUTE CYSTITIS WITHOUT HEMATURIA: ICD-10-CM
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
## 2021-12-08 DIAGNOSIS — I21.4 NSTEMI (NON-ST ELEVATED MYOCARDIAL INFARCTION) (HCC): ICD-10-CM

## 2021-12-08 DIAGNOSIS — M12.9 ARTHROPATHY: ICD-10-CM

## 2021-12-08 LAB
ALBUMIN SERPL-MCNC: 3.3 GM/DL (ref 3.4–5)
ALP BLD-CCNC: 75 IU/L (ref 40–129)
ALT SERPL-CCNC: 9 U/L (ref 10–40)
ANION GAP SERPL CALCULATED.3IONS-SCNC: 16 MMOL/L (ref 4–16)
AST SERPL-CCNC: 29 IU/L (ref 15–37)
BACTERIA: NEGATIVE /HPF
BASOPHILS ABSOLUTE: 0 K/CU MM
BASOPHILS RELATIVE PERCENT: 0.2 % (ref 0–1)
BILIRUB SERPL-MCNC: 0.6 MG/DL (ref 0–1)
BILIRUBIN URINE: NEGATIVE MG/DL
BLOOD, URINE: ABNORMAL
BUN BLDV-MCNC: 19 MG/DL (ref 6–23)
CALCIUM SERPL-MCNC: 8.1 MG/DL (ref 8.3–10.6)
CHLORIDE BLD-SCNC: 96 MMOL/L (ref 99–110)
CLARITY: ABNORMAL
CO2: 20 MMOL/L (ref 21–32)
COLOR: YELLOW
CREAT SERPL-MCNC: 0.8 MG/DL (ref 0.9–1.3)
DIFFERENTIAL TYPE: ABNORMAL
EOSINOPHILS ABSOLUTE: 0 K/CU MM
EOSINOPHILS RELATIVE PERCENT: 0 % (ref 0–3)
GFR AFRICAN AMERICAN: >60 ML/MIN/1.73M2
GFR NON-AFRICAN AMERICAN: >60 ML/MIN/1.73M2
GLUCOSE BLD-MCNC: 155 MG/DL (ref 70–99)
GLUCOSE, URINE: NEGATIVE MG/DL
HCT VFR BLD CALC: 34.9 % (ref 42–52)
HEMOGLOBIN: 10.9 GM/DL (ref 13.5–18)
IMMATURE NEUTROPHIL %: 0.6 % (ref 0–0.43)
KETONES, URINE: ABNORMAL MG/DL
LACTATE: 1.9 MMOL/L (ref 0.4–2)
LEUKOCYTE ESTERASE, URINE: ABNORMAL
LYMPHOCYTES ABSOLUTE: 0.3 K/CU MM
LYMPHOCYTES RELATIVE PERCENT: 3.9 % (ref 24–44)
MCH RBC QN AUTO: 28.2 PG (ref 27–31)
MCHC RBC AUTO-ENTMCNC: 31.2 % (ref 32–36)
MCV RBC AUTO: 90.2 FL (ref 78–100)
MONOCYTES ABSOLUTE: 0.3 K/CU MM
MONOCYTES RELATIVE PERCENT: 5.3 % (ref 0–4)
MUCUS: ABNORMAL HPF
NITRITE URINE, QUANTITATIVE: POSITIVE
NUCLEATED RBC %: 0 %
PDW BLD-RTO: 15.7 % (ref 11.7–14.9)
PH, URINE: 7 (ref 5–8)
PLATELET # BLD: 262 K/CU MM (ref 140–440)
PMV BLD AUTO: 8.8 FL (ref 7.5–11.1)
POTASSIUM SERPL-SCNC: 3.1 MMOL/L (ref 3.5–5.1)
PROTEIN UA: >500 MG/DL
RBC # BLD: 3.87 M/CU MM (ref 4.6–6.2)
RBC URINE: 779 /HPF (ref 0–3)
SEGMENTED NEUTROPHILS ABSOLUTE COUNT: 5.7 K/CU MM
SEGMENTED NEUTROPHILS RELATIVE PERCENT: 90 % (ref 36–66)
SODIUM BLD-SCNC: 132 MMOL/L (ref 135–145)
SPECIFIC GRAVITY UA: 1.02 (ref 1–1.03)
TOTAL CK: 547 IU/L (ref 38–174)
TOTAL IMMATURE NEUTOROPHIL: 0.04 K/CU MM
TOTAL NUCLEATED RBC: 0 K/CU MM
TOTAL PROTEIN: 6.4 GM/DL (ref 6.4–8.2)
TRICHOMONAS: ABNORMAL /HPF
TROPONIN T: 0.09 NG/ML
UROBILINOGEN, URINE: NEGATIVE MG/DL (ref 0.2–1)
WBC # BLD: 6.4 K/CU MM (ref 4–10.5)
WBC UA: 16 /HPF (ref 0–2)

## 2021-12-08 PROCEDURE — 85025 COMPLETE CBC W/AUTO DIFF WBC: CPT

## 2021-12-08 PROCEDURE — 87040 BLOOD CULTURE FOR BACTERIA: CPT

## 2021-12-08 PROCEDURE — 72170 X-RAY EXAM OF PELVIS: CPT

## 2021-12-08 PROCEDURE — 87449 NOS EACH ORGANISM AG IA: CPT

## 2021-12-08 PROCEDURE — 87186 SC STD MICRODIL/AGAR DIL: CPT

## 2021-12-08 PROCEDURE — 70450 CT HEAD/BRAIN W/O DYE: CPT

## 2021-12-08 PROCEDURE — 6360000002 HC RX W HCPCS: Performed by: STUDENT IN AN ORGANIZED HEALTH CARE EDUCATION/TRAINING PROGRAM

## 2021-12-08 PROCEDURE — 80053 COMPREHEN METABOLIC PANEL: CPT

## 2021-12-08 PROCEDURE — 6360000004 HC RX CONTRAST MEDICATION: Performed by: STUDENT IN AN ORGANIZED HEALTH CARE EDUCATION/TRAINING PROGRAM

## 2021-12-08 PROCEDURE — 87150 DNA/RNA AMPLIFIED PROBE: CPT

## 2021-12-08 PROCEDURE — 93005 ELECTROCARDIOGRAM TRACING: CPT | Performed by: EMERGENCY MEDICINE

## 2021-12-08 PROCEDURE — 74177 CT ABD & PELVIS W/CONTRAST: CPT

## 2021-12-08 PROCEDURE — 87899 AGENT NOS ASSAY W/OPTIC: CPT

## 2021-12-08 PROCEDURE — 99285 EMERGENCY DEPT VISIT HI MDM: CPT

## 2021-12-08 PROCEDURE — 83605 ASSAY OF LACTIC ACID: CPT

## 2021-12-08 PROCEDURE — 81001 URINALYSIS AUTO W/SCOPE: CPT

## 2021-12-08 PROCEDURE — 96375 TX/PRO/DX INJ NEW DRUG ADDON: CPT

## 2021-12-08 PROCEDURE — 72125 CT NECK SPINE W/O DYE: CPT

## 2021-12-08 PROCEDURE — 71045 X-RAY EXAM CHEST 1 VIEW: CPT

## 2021-12-08 PROCEDURE — 2580000003 HC RX 258: Performed by: EMERGENCY MEDICINE

## 2021-12-08 PROCEDURE — 82550 ASSAY OF CK (CPK): CPT

## 2021-12-08 PROCEDURE — 73030 X-RAY EXAM OF SHOULDER: CPT

## 2021-12-08 PROCEDURE — 6360000002 HC RX W HCPCS: Performed by: EMERGENCY MEDICINE

## 2021-12-08 PROCEDURE — 84484 ASSAY OF TROPONIN QUANT: CPT

## 2021-12-08 PROCEDURE — 71260 CT THORAX DX C+: CPT

## 2021-12-08 PROCEDURE — 2500000003 HC RX 250 WO HCPCS: Performed by: STUDENT IN AN ORGANIZED HEALTH CARE EDUCATION/TRAINING PROGRAM

## 2021-12-08 PROCEDURE — 6370000000 HC RX 637 (ALT 250 FOR IP): Performed by: EMERGENCY MEDICINE

## 2021-12-08 RX ORDER — METOPROLOL TARTRATE 50 MG/1
25 TABLET, FILM COATED ORAL ONCE
Status: COMPLETED | OUTPATIENT
Start: 2021-12-08 | End: 2021-12-08

## 2021-12-08 RX ORDER — DILTIAZEM HYDROCHLORIDE 5 MG/ML
15 INJECTION INTRAVENOUS ONCE
Status: COMPLETED | OUTPATIENT
Start: 2021-12-08 | End: 2021-12-08

## 2021-12-08 RX ORDER — MORPHINE SULFATE 2 MG/ML
2 INJECTION, SOLUTION INTRAMUSCULAR; INTRAVENOUS ONCE
Status: COMPLETED | OUTPATIENT
Start: 2021-12-08 | End: 2021-12-08

## 2021-12-08 RX ORDER — LORAZEPAM 2 MG/ML
0.5 INJECTION INTRAMUSCULAR ONCE
Status: COMPLETED | OUTPATIENT
Start: 2021-12-08 | End: 2021-12-08

## 2021-12-08 RX ORDER — 0.9 % SODIUM CHLORIDE 0.9 %
500 INTRAVENOUS SOLUTION INTRAVENOUS ONCE
Status: COMPLETED | OUTPATIENT
Start: 2021-12-08 | End: 2021-12-09

## 2021-12-08 RX ADMIN — DILTIAZEM HYDROCHLORIDE 15 MG: 5 INJECTION INTRAVENOUS at 22:30

## 2021-12-08 RX ADMIN — MORPHINE SULFATE 2 MG: 2 INJECTION, SOLUTION INTRAMUSCULAR; INTRAVENOUS at 21:36

## 2021-12-08 RX ADMIN — METOPROLOL TARTRATE 25 MG: 50 TABLET, FILM COATED ORAL at 21:36

## 2021-12-08 RX ADMIN — LORAZEPAM 0.5 MG: 2 INJECTION INTRAMUSCULAR; INTRAVENOUS at 21:36

## 2021-12-08 RX ADMIN — IOPAMIDOL 80 ML: 755 INJECTION, SOLUTION INTRAVENOUS at 22:24

## 2021-12-08 RX ADMIN — SODIUM CHLORIDE 500 ML: 9 INJECTION, SOLUTION INTRAVENOUS at 21:36

## 2021-12-08 ASSESSMENT — PAIN SCALES - GENERAL: PAINLEVEL_OUTOF10: 4

## 2021-12-08 NOTE — ED PROVIDER NOTES
Triage Chief Complaint:   Fall      [place]:  Aimee Apple is a 77 y.o. male that presents to the emergency department with a fall out of wheelchair, says he doesn't recall what happened. States he \"laid on the ground for an hour. \"  He does have a history of A. fib, a flutter, diabetes, gout, hypertension, hyperlipidemia. .  Patient is on Eliquis. Patient has extremely swollen bilateral lower extremities that he appears to have been itching and has some abrasions to his lower extremities. He has chronic lymphedema, this is not new for him. Vitals were normal per EMS. Patient denies that his legs are more swollen than normal. He denies any chest pain, no diaphoresis. He denies any diarrhea, vomiting. No fevers or chills. No chest pain or pressure. No difficulty breathing.     Past Medical History:   Diagnosis Date    Arthropathy     Atrial fibrillation (HCC)     Atrial flutter (HCC)     Diabetes mellitus ([facility] [address]     Gout     Hyperlipidemia     Hypertension     Lumbago      Past Surgical History:   Procedure Laterality Date    COLONOSCOPY N/A 9/27/2018    COLONOSCOPY POLYPECTOMY SNARE/COLD BIOPSY performed by Elvis Cooper MD at [place] [address] COLONOSCOPY N/A 11/18/2020    COLONOSCOPY DIAGNOSTIC performed by Genoveva Ritter MD at [facility] ENDOSCOPY     Family History   Problem Relation Age of Onset    No Known Problems Mother     No Known Problems Father      Social History     Socioeconomic History    Marital status: Single     Spouse name: Not on file    Number of children: Not on file    Years of education: Not on file    Highest education level: Not on file   Occupational History    Not on file   Tobacco Use    Smoking status: Former Smoker     Packs/day: 0.50     Types: Cigarettes    Smokeless tobacco: Never Used   Substance and Sexual Activity    Alcohol use: No    Drug use: No    Sexual activity: Not on file   Other Topics Concern    Not on file   Social History Narrative    Not on file     Social Determinants of Health     Financial Resource Strain:     Difficulty of Paying Living Expenses: Not on file   Food Insecurity: Food Insecurity Present    Worried About [address] in the Last Year: Often true    Ivy of Food in the Last Year: Often true   Transportation Needs:     Lack of Transportation (Medical): Not on file    Lack of Transportation (Non-Medical):  Not on file   Physical Activity:     Days of Exercise per Week: Not on file    Minutes of Exercise per Session: Not on file   Stress:     Feeling of Stress : Not on file   Social Connections:     Frequency of Communication with Friends and Family: Not on file    Frequency of Social Gatherings with Friends and Family: Not on file    Attends Jehovah's witness Services: Not on file    Active Member of Clubs or Organizations: Not on file    Attends Club or Organization Meetings: Not on file    Marital Status: Not on file   Intimate Partner Violence:     Fear of Current or Ex-Partner: Not on file    Emotionally Abused: Not on file    Physically Abused: Not on file    Sexually Abused: Not on file   Housing Stability:     Unable to Pay for Housing in the Last Year: Not on file    Number of Places Lived in the Last Year: Not on file    Unstable Housing in the Last Year: Not on file     Current Facility-Administered Medications   Medication Dose Route Frequency Provider Last Rate Last Admin    metoprolol tartrate (LOPRESSOR) tablet 25 mg  25 mg Oral Once Alison Souza MD        morphine (PF) injection 2 mg  2 mg IntraVENous Once Alison Souza MD        0.9 % sodium chloride bolus  500 mL IntraVENous Once Alison Souza MD         Current Outpatient Medications   Medication Sig Dispense Refill    FEROSUL 325 (65 Fe) MG tablet take ONE TABLET BY MOUTH TWICE DAILY 60 tablet 5    lidocaine (LIDODERM) 5 % APPLY 1 PATCH TO SKIN EVERY DAY AS NEEDED - LEAVE ON FOR UP TO 12 HOURS - AFTER YOU TAKE THE PATCH OFF, DO NOT PUT ANOTHER PATCH ON THAT AREA OF SKIN FOR AT LEAST 12 HOURS      losartan (COZAAR) 100 MG tablet TAKE ONE TABLET BY MOUTH EVERY DAY      cyanocobalamin (CVS VITAMIN B12) 1000 MCG tablet Take 1 tablet by mouth daily 30 tablet 5    famotidine (PEPCID) 40 MG tablet Take 40 mg by mouth nightly      potassium chloride (KLOR-CON M) 20 MEQ extended release tablet Take 20 mEq by mouth daily      tamsulosin (FLOMAX) 0.4 MG capsule Take 1 capsule by mouth daily (Patient taking differently: Take 0.4 mg by mouth nightly ) 30 capsule 3    nitroGLYCERIN (NITROSTAT) 0.4 MG SL tablet up to max of 3 total doses. If no relief after 1 dose, call 911. 25 tablet 0    apixaban (ELIQUIS) 5 MG TABS tablet Take 1 tablet by mouth 2 times daily 60 tablet 0    metoprolol tartrate (LOPRESSOR) 25 MG tablet Take 1 tablet by mouth 2 times daily 60 tablet 0    allopurinol (ZYLOPRIM) 300 MG tablet Take 300 mg by mouth daily      diltiazem (TIAZAC) 360 MG extended release capsule Take 360 mg by mouth daily      triamterene-hydrochlorothiazide (MAXZIDE-25) 37.5-25 MG per tablet Take 1 tablet by mouth daily      metFORMIN (GLUCOPHAGE) 500 MG tablet Take 500 mg by mouth 2 times daily (with meals)      pravastatin (PRAVACHOL) 20 MG tablet Take 20 mg by mouth nightly       Allergies   Allergen Reactions    Demerol Hcl [Meperidine]     Gabapentin     Simvastatin      Nursing Notes Reviewed    ROS:  At least 10 systems reviewed and otherwise negative except as in the [place]. Physical Exam:  ED Triage Vitals [12/08/21 1737]   Enc Vitals Group      BP (!) 151/68      Pulse 99      Resp 18      Temp 98.9 °F (37.2 °C)      Temp src       SpO2 93 %      Weight       Height       Head Circumference       Peak Flow       Pain Score       Pain Loc       Pain Edu? Excl. in [address]? My pulse oximetry interpretation is which is within the normal range    GENERAL APPEARANCE: Awake and alert. Cooperative. No acute distress.   HEAD: Normocephalic. Atraumatic. EYES: EOM's grossly intact. Sclera anicteric. ENT: Mucous membranes are moist. Tolerates saliva. No trismus. NECK: Supple. No meningismus. Trachea midline. HEART: RRR. Radial pulses 2+. LUNGS: Respirations unlabored. CTAB. No wheezing or stridor. ABDOMEN: Soft. Non-tender. No guarding or rebound. Elevated BMI. EXTREMITIES: chronic lymphedema. Some abrasion to both legs. No convincing cellulitis, warmth, redness, appears to have chronic venous stasis. SKIN: Warm and dry. NEUROLOGICAL: No gross facial drooping. Moves all 4 extremities spontaneously. Awake, alert, oriented. PSYCHIATRIC: Normal mood. I have reviewed and interpreted all of the currently available lab results from this visit (if applicable):  No results found for this visit on 12/08/21. Radiographs:  [] Radiologist's Wet Read Report Reviewed:        XR PELVIS (1-2 VIEWS) (Final result)  Result time 12/08/21 19:56:32  Final result by Michelle Leonardo MD (12/08/21 19:56:32)                Impression:    No acute abnormality detected. Severe degenerative changes at the right hip, with chronic bony remodeling,   not substantially changed when compared to the CT from January 2020. Narrative:    EXAMINATION:   ONE XRAY VIEW OF THE PELVIS     12/8/2021 2:48 pm     COMPARISON:   CT abdomen pelvis, 01/17/2020     HISTORY:   ORDERING SYSTEM PROVIDED HISTORY: trauma   TECHNOLOGIST PROVIDED HISTORY:   Reason for exam:->trauma   Reason for Exam: pain   Additional signs and symptoms: none   Relevant Medical/Surgical History: none     FINDINGS:   The ilioischial and iliopectineal lines are intact bilaterally.  The SI   joints and pubic symphysis are congruent.  The sacral neural foramina arches   are intact.      Severe, chronic degenerative disease is seen at the right hip, with bony   remodeling of the acetabulum and the femoral head, which appears similar when   compared to the previous abdomen and pelvis CTA from January 2020. Marguerite Parris is   at least moderate if not severe left hip arthrosis, not well evaluated.                       XR CHEST PORTABLE (Final result)  Result time 12/08/21 19:36:27  Final result by Richard Serna MD (12/08/21 19:36:27)                Impression:    1. No acute cardiopulmonary process identified. 2. Chronic interstitial changes of the lungs. Narrative:    EXAMINATION:   ONE XRAY VIEW OF THE CHEST     12/8/2021 5:48 pm     COMPARISON:   Chest x-ray January 17, 2020; chest x-ray September 25, 2018     HISTORY:   ORDERING SYSTEM PROVIDED HISTORY: fall   TECHNOLOGIST PROVIDED HISTORY:   Reason for exam:->fall   Reason for Exam: fall   Relevant Medical/Surgical History: A-fib     FINDINGS:   Cardiac silhouette is enlarged but stable.  Atherosclerotic changes of the   aorta.  Patchy interstitial opacities throughout the lungs appear unchanged   when compared with exam from January 17, 2020 and are similar to exam from   September 25, 2018 suggesting chronic interstitial lung changes.  No definite   superimposed consolidation.  No pleural effusion or pneumothorax.                       CT CERVICAL SPINE WO CONTRAST (Final result)  Result time 12/08/21 19:46:00  Final result by Love Valencia MD (12/08/21 19:46:00)                Impression:    Motion degradation challenge evaluation.  No convincing evidence acute   displaced fracture traumatic malalignment.  There are moderate multilevel   degenerate change             Narrative:    EXAMINATION:   CT OF THE CERVICAL SPINE WITHOUT CONTRAST 12/8/2021 6:31 pm     TECHNIQUE:   CT of the cervical spine was performed without the administration of   intravenous contrast. Multiplanar reformatted images are provided for review. Dose modulation, iterative reconstruction, and/or weight based adjustment of   the mA/kV was utilized to reduce the radiation dose to as low as reasonably   achievable.      COMPARISON:   CT cervical spine 01/17/2020 HISTORY:   ORDERING SYSTEM PROVIDED HISTORY: found on ground   TECHNOLOGIST PROVIDED HISTORY:   Reason for exam:->found on ground   Decision Support Exception - unselect if not a suspected or confirmed   emergency medical condition->Emergency Medical Condition ([place])   Reason for Exam: found on ground   Additional signs and symptoms: poor historian   Relevant Medical/Surgical History: unknown     FINDINGS:   BONES/ALIGNMENT: Motion challenge evaluation.  No convincing evidence acute   displaced fracture traumatic malalignment slight reversal normal cervical   lordosis could be related to positioning or spasm. DEGENERATIVE CHANGES: Moderate severe multilevel degenerate changes notably   C4 through C7. SOFT TISSUES: There is no prevertebral soft tissue swelling.                       CT HEAD WO CONTRAST (Final result)  Result time 12/08/21 19:28:08  Final result by Jerardo Mo MD (12/08/21 19:28:08)                Impression:    No acute intracranial abnormality. Geographic lucency frontal bone noted, consider bone scan imaging or consider   skeletal survey. Narrative:    EXAMINATION:   CT OF THE HEAD WITHOUT CONTRAST  12/8/2021 6:31 pm     TECHNIQUE:   CT of the head was performed without the administration of intravenous   contrast. Dose modulation, iterative reconstruction, and/or weight based   adjustment of the mA/kV was utilized to reduce the radiation dose to as low   as reasonably achievable. COMPARISON:   CT head 01/17/2020     HISTORY:   ORDERING SYSTEM PROVIDED HISTORY: fall, found on ground   TECHNOLOGIST PROVIDED HISTORY:   Reason for exam:->fall, found on ground   Has a \"code stroke\" or \"stroke alert\" been called? ->No   Decision Support Exception - unselect if not a suspected or confirmed   emergency medical condition->Emergency Medical Condition ([place])   Reason for Exam: fall, found on ground   Additional signs and symptoms: poor historian   Relevant Medical/Surgical History: unknown     FINDINGS:   BRAIN/VENTRICLES: There is no acute intracranial hemorrhage, mass effect or   midline shift.  No abnormal extra-axial fluid collection.  The gray-white   differentiation is maintained without evidence of an acute infarct.  There is   no evidence of hydrocephalus. Generalized involution changes and chronic   small ischemic disease.  Motion does degrade image quality.  Vascular   calcifications. ORBITS: The visualized portion of the orbits demonstrate no acute abnormality. SINUSES: The visualized paranasal sinuses and mastoid air cells demonstrate   no acute abnormality. SOFT TISSUES/SKULL:  No acute abnormality of the visualized skull or soft   tissues.  Geographic lucency lesion identified right frontal bone appears to   be new from prior examination. [] Discussed with Radiologist:     [] The following radiograph was interpreted by myself in the absence of a radiologist:     EKG: (All EKG's are interpreted by myself in the absence of a cardiologist)  The Ekg interpreted by me shows  atrial fibrillation with a rate of 92  Axis is   Left axis deviation  QTc is  normal  Intervals and Durations are unremarkable. ST Segments: no acute change  No significant change from prior EKG dated 11-      MDM:  Normotensive. Afebrile. Heart in the 90s. Sats 93% on room air. CT head shows no acute finding. CT c spine no fracture, DJD. Cruz Freud CXR shows no acute findings. XR pelvis shows no acute abnormality. DJD in right hip. Patient has chronic afib history, takes metoprolol at night, did take morning meds. Heart rate did jump up to 130s. Gave his night dose of beta blocker. Stated hes now having LLQ abd pain, will send him back down to CT scan. Labs were hemolyzed and are needing a redraw. Awaiting labs, CT abd and xr left shoulder as RN stated he is now complaining of left shoulder pain, WIll sign out to oncoming provider.      8:52 PM EST I have signed out Anamika Salmeron Rajiv's Emergency Department care to Dr. Rachel Vidales. We discussed the history, physical exam, completed/pending test results (if obtained) and current treatment plan. Please refer to his/her chart for the patients further details, remaining Emergency Department course, final disposition and diagnosis. Clinical Impression:  1. Chronic atrial fibrillation ([address]    2. Fall, initial encounter        Disposition Vitals:  [unfilled], [unfilled], [unfilled], [unfilled]    Disposition referral (if applicable):  No follow-up provider specified.     Disposition medications (if applicable):  New Prescriptions    No medications on file         (Please note that portions of this note may have been completed with a voice recognition program. Efforts were made to edit the dictations but occasionally words are mis-transcribed.)    MD Henrique Nayak MD  12/08/21 [id number]

## 2021-12-08 NOTE — ED NOTES
Bed: 02TR-02  Expected date:   Expected time:   Means of arrival:   Comments:  ems     Nessa Rodgers RN  12/08/21 [id number]

## 2021-12-09 ENCOUNTER — APPOINTMENT (OUTPATIENT)
Dept: ULTRASOUND IMAGING | Age: 76
DRG: 870 | End: 2021-12-09
Payer: COMMERCIAL

## 2021-12-09 ENCOUNTER — APPOINTMENT (OUTPATIENT)
Dept: GENERAL RADIOLOGY | Age: 76
DRG: 870 | End: 2021-12-09
Payer: COMMERCIAL

## 2021-12-09 ENCOUNTER — APPOINTMENT (OUTPATIENT)
Dept: CT IMAGING | Age: 76
DRG: 870 | End: 2021-12-09
Payer: COMMERCIAL

## 2021-12-09 PROBLEM — J18.9 PNA (PNEUMONIA): Status: ACTIVE | Noted: 2021-12-09

## 2021-12-09 PROBLEM — R77.8 TROPONIN I ABOVE REFERENCE RANGE: Status: ACTIVE | Noted: 2021-12-09

## 2021-12-09 LAB
ALBUMIN SERPL-MCNC: 3 GM/DL (ref 3.4–5)
ALP BLD-CCNC: 67 IU/L (ref 40–129)
ALT SERPL-CCNC: 10 U/L (ref 10–40)
ANION GAP SERPL CALCULATED.3IONS-SCNC: 9 MMOL/L (ref 4–16)
APTT: 41.2 SECONDS (ref 25.1–37.1)
APTT: >240 SECONDS (ref 25.1–37.1)
AST SERPL-CCNC: 32 IU/L (ref 15–37)
BASOPHILS ABSOLUTE: 0 K/CU MM
BASOPHILS RELATIVE PERCENT: 0.1 % (ref 0–1)
BILIRUB SERPL-MCNC: 0.4 MG/DL (ref 0–1)
BUN BLDV-MCNC: 16 MG/DL (ref 6–23)
CALCIUM SERPL-MCNC: 8.3 MG/DL (ref 8.3–10.6)
CHLORIDE BLD-SCNC: 98 MMOL/L (ref 99–110)
CO2: 26 MMOL/L (ref 21–32)
CREAT SERPL-MCNC: 0.8 MG/DL (ref 0.9–1.3)
DIFFERENTIAL TYPE: ABNORMAL
EKG ATRIAL RATE: 94 BPM
EKG DIAGNOSIS: NORMAL
EKG Q-T INTERVAL: 358 MS
EKG QRS DURATION: 102 MS
EKG QTC CALCULATION (BAZETT): 442 MS
EKG R AXIS: -53 DEGREES
EKG T AXIS: 78 DEGREES
EKG VENTRICULAR RATE: 92 BPM
EOSINOPHILS ABSOLUTE: 0 K/CU MM
EOSINOPHILS RELATIVE PERCENT: 0 % (ref 0–3)
GFR AFRICAN AMERICAN: >60 ML/MIN/1.73M2
GFR NON-AFRICAN AMERICAN: >60 ML/MIN/1.73M2
GLUCOSE BLD-MCNC: 174 MG/DL (ref 70–99)
HCT VFR BLD CALC: 33.9 % (ref 42–52)
HCT VFR BLD CALC: 36.6 % (ref 42–52)
HCT VFR BLD CALC: 39.6 % (ref 42–52)
HEMOGLOBIN: 10.5 GM/DL (ref 13.5–18)
HEMOGLOBIN: 11.2 GM/DL (ref 13.5–18)
HEMOGLOBIN: 12.3 GM/DL (ref 13.5–18)
IMMATURE NEUTROPHIL %: 0.5 % (ref 0–0.43)
LEGIONELLA URINARY AG: NEGATIVE
LV EF: 38 %
LVEF MODALITY: NORMAL
LYMPHOCYTES ABSOLUTE: 0.6 K/CU MM
LYMPHOCYTES RELATIVE PERCENT: 6.1 % (ref 24–44)
MAGNESIUM: 2 MG/DL (ref 1.8–2.4)
MCH RBC QN AUTO: 27.9 PG (ref 27–31)
MCHC RBC AUTO-ENTMCNC: 31 % (ref 32–36)
MCV RBC AUTO: 89.9 FL (ref 78–100)
MONOCYTES ABSOLUTE: 0.5 K/CU MM
MONOCYTES RELATIVE PERCENT: 5.6 % (ref 0–4)
NUCLEATED RBC %: 0 %
PDW BLD-RTO: 16 % (ref 11.7–14.9)
PLATELET # BLD: 255 K/CU MM (ref 140–440)
PMV BLD AUTO: 9.5 FL (ref 7.5–11.1)
POTASSIUM SERPL-SCNC: 2.9 MMOL/L (ref 3.5–5.1)
RAPID INFLUENZA  B AGN: NEGATIVE
RAPID INFLUENZA A AGN: NEGATIVE
RBC # BLD: 3.77 M/CU MM (ref 4.6–6.2)
SARS-COV-2, NAAT: DETECTED
SEGMENTED NEUTROPHILS ABSOLUTE COUNT: 8.5 K/CU MM
SEGMENTED NEUTROPHILS RELATIVE PERCENT: 87.7 % (ref 36–66)
SODIUM BLD-SCNC: 133 MMOL/L (ref 135–145)
SOURCE: ABNORMAL
STREP PNEUMONIAE ANTIGEN: NORMAL
TOTAL IMMATURE NEUTOROPHIL: 0.05 K/CU MM
TOTAL NUCLEATED RBC: 0 K/CU MM
TOTAL PROTEIN: 6.7 GM/DL (ref 6.4–8.2)
TROPONIN T: 0.15 NG/ML
TROPONIN T: 0.21 NG/ML
TROPONIN T: 0.24 NG/ML
WBC # BLD: 9.7 K/CU MM (ref 4–10.5)

## 2021-12-09 PROCEDURE — 85025 COMPLETE CBC W/AUTO DIFF WBC: CPT

## 2021-12-09 PROCEDURE — 71045 X-RAY EXAM CHEST 1 VIEW: CPT

## 2021-12-09 PROCEDURE — 87804 INFLUENZA ASSAY W/OPTIC: CPT

## 2021-12-09 PROCEDURE — 80053 COMPREHEN METABOLIC PANEL: CPT

## 2021-12-09 PROCEDURE — 6370000000 HC RX 637 (ALT 250 FOR IP): Performed by: INTERNAL MEDICINE

## 2021-12-09 PROCEDURE — 84484 ASSAY OF TROPONIN QUANT: CPT

## 2021-12-09 PROCEDURE — 1200000000 HC SEMI PRIVATE

## 2021-12-09 PROCEDURE — 2580000003 HC RX 258: Performed by: STUDENT IN AN ORGANIZED HEALTH CARE EDUCATION/TRAINING PROGRAM

## 2021-12-09 PROCEDURE — 93970 EXTREMITY STUDY: CPT

## 2021-12-09 PROCEDURE — 87635 SARS-COV-2 COVID-19 AMP PRB: CPT

## 2021-12-09 PROCEDURE — 87086 URINE CULTURE/COLONY COUNT: CPT

## 2021-12-09 PROCEDURE — 96365 THER/PROPH/DIAG IV INF INIT: CPT

## 2021-12-09 PROCEDURE — 85018 HEMOGLOBIN: CPT

## 2021-12-09 PROCEDURE — 2580000003 HC RX 258: Performed by: INTERNAL MEDICINE

## 2021-12-09 PROCEDURE — 6360000002 HC RX W HCPCS: Performed by: STUDENT IN AN ORGANIZED HEALTH CARE EDUCATION/TRAINING PROGRAM

## 2021-12-09 PROCEDURE — 94761 N-INVAS EAR/PLS OXIMETRY MLT: CPT

## 2021-12-09 PROCEDURE — 85014 HEMATOCRIT: CPT

## 2021-12-09 PROCEDURE — 2500000003 HC RX 250 WO HCPCS: Performed by: NURSE PRACTITIONER

## 2021-12-09 PROCEDURE — 70450 CT HEAD/BRAIN W/O DYE: CPT

## 2021-12-09 PROCEDURE — 85730 THROMBOPLASTIN TIME PARTIAL: CPT

## 2021-12-09 PROCEDURE — 93306 TTE W/DOPPLER COMPLETE: CPT

## 2021-12-09 PROCEDURE — 96375 TX/PRO/DX INJ NEW DRUG ADDON: CPT

## 2021-12-09 PROCEDURE — 83735 ASSAY OF MAGNESIUM: CPT

## 2021-12-09 PROCEDURE — 2700000000 HC OXYGEN THERAPY PER DAY

## 2021-12-09 PROCEDURE — 99223 1ST HOSP IP/OBS HIGH 75: CPT | Performed by: INTERNAL MEDICINE

## 2021-12-09 PROCEDURE — 93010 ELECTROCARDIOGRAM REPORT: CPT | Performed by: INTERNAL MEDICINE

## 2021-12-09 PROCEDURE — 6360000002 HC RX W HCPCS: Performed by: INTERNAL MEDICINE

## 2021-12-09 PROCEDURE — 2580000003 HC RX 258: Performed by: NURSE PRACTITIONER

## 2021-12-09 RX ORDER — POTASSIUM CHLORIDE 20 MEQ/1
40 TABLET, EXTENDED RELEASE ORAL PRN
Status: DISCONTINUED | OUTPATIENT
Start: 2021-12-09 | End: 2021-12-30 | Stop reason: SDUPTHER

## 2021-12-09 RX ORDER — SODIUM CHLORIDE 0.9 % (FLUSH) 0.9 %
5-40 SYRINGE (ML) INJECTION PRN
Status: DISCONTINUED | OUTPATIENT
Start: 2021-12-09 | End: 2022-01-05 | Stop reason: HOSPADM

## 2021-12-09 RX ORDER — HEPARIN SODIUM 1000 [USP'U]/ML
30 INJECTION, SOLUTION INTRAVENOUS; SUBCUTANEOUS PRN
Status: DISCONTINUED | OUTPATIENT
Start: 2021-12-09 | End: 2021-12-10

## 2021-12-09 RX ORDER — POTASSIUM CHLORIDE 20 MEQ/1
60 TABLET, EXTENDED RELEASE ORAL ONCE
Status: COMPLETED | OUTPATIENT
Start: 2021-12-09 | End: 2021-12-09

## 2021-12-09 RX ORDER — POLYETHYLENE GLYCOL 3350 17 G/17G
17 POWDER, FOR SOLUTION ORAL DAILY PRN
Status: DISCONTINUED | OUTPATIENT
Start: 2021-12-09 | End: 2022-01-05 | Stop reason: HOSPADM

## 2021-12-09 RX ORDER — TAMSULOSIN HYDROCHLORIDE 0.4 MG/1
0.4 CAPSULE ORAL NIGHTLY
Status: DISCONTINUED | OUTPATIENT
Start: 2021-12-09 | End: 2022-01-05 | Stop reason: HOSPADM

## 2021-12-09 RX ORDER — DILTIAZEM HYDROCHLORIDE 5 MG/ML
10 INJECTION INTRAVENOUS ONCE
Status: COMPLETED | OUTPATIENT
Start: 2021-12-09 | End: 2021-12-09

## 2021-12-09 RX ORDER — SODIUM CHLORIDE 0.9 % (FLUSH) 0.9 %
5-40 SYRINGE (ML) INJECTION EVERY 12 HOURS SCHEDULED
Status: DISCONTINUED | OUTPATIENT
Start: 2021-12-09 | End: 2022-01-05 | Stop reason: HOSPADM

## 2021-12-09 RX ORDER — HEPARIN SODIUM 10000 [USP'U]/100ML
5-30 INJECTION, SOLUTION INTRAVENOUS CONTINUOUS
Status: DISCONTINUED | OUTPATIENT
Start: 2021-12-09 | End: 2021-12-10

## 2021-12-09 RX ORDER — ONDANSETRON 2 MG/ML
4 INJECTION INTRAMUSCULAR; INTRAVENOUS EVERY 6 HOURS PRN
Status: DISCONTINUED | OUTPATIENT
Start: 2021-12-09 | End: 2022-01-05 | Stop reason: HOSPADM

## 2021-12-09 RX ORDER — ONDANSETRON 4 MG/1
4 TABLET, ORALLY DISINTEGRATING ORAL EVERY 8 HOURS PRN
Status: DISCONTINUED | OUTPATIENT
Start: 2021-12-09 | End: 2022-01-05 | Stop reason: HOSPADM

## 2021-12-09 RX ORDER — TRIAMTERENE AND HYDROCHLOROTHIAZIDE 37.5; 25 MG/1; MG/1
1 TABLET ORAL DAILY
Status: DISCONTINUED | OUTPATIENT
Start: 2021-12-09 | End: 2021-12-19

## 2021-12-09 RX ORDER — ALLOPURINOL 300 MG/1
300 TABLET ORAL DAILY
Status: DISCONTINUED | OUTPATIENT
Start: 2021-12-09 | End: 2022-01-05 | Stop reason: HOSPADM

## 2021-12-09 RX ORDER — FAMOTIDINE 20 MG/1
40 TABLET, FILM COATED ORAL NIGHTLY
Status: DISCONTINUED | OUTPATIENT
Start: 2021-12-09 | End: 2021-12-13

## 2021-12-09 RX ORDER — POTASSIUM CHLORIDE 7.45 MG/ML
10 INJECTION INTRAVENOUS PRN
Status: DISCONTINUED | OUTPATIENT
Start: 2021-12-09 | End: 2021-12-30 | Stop reason: SDUPTHER

## 2021-12-09 RX ORDER — DILTIAZEM HYDROCHLORIDE 180 MG/1
360 CAPSULE, COATED, EXTENDED RELEASE ORAL DAILY
Status: DISCONTINUED | OUTPATIENT
Start: 2021-12-09 | End: 2021-12-09

## 2021-12-09 RX ORDER — IPRATROPIUM BROMIDE AND ALBUTEROL SULFATE 2.5; .5 MG/3ML; MG/3ML
1 SOLUTION RESPIRATORY (INHALATION)
Status: DISCONTINUED | OUTPATIENT
Start: 2021-12-09 | End: 2021-12-09

## 2021-12-09 RX ORDER — LOSARTAN POTASSIUM 100 MG/1
100 TABLET ORAL DAILY
Status: DISCONTINUED | OUTPATIENT
Start: 2021-12-09 | End: 2021-12-19

## 2021-12-09 RX ORDER — HEPARIN SODIUM 1000 [USP'U]/ML
60 INJECTION, SOLUTION INTRAVENOUS; SUBCUTANEOUS ONCE
Status: COMPLETED | OUTPATIENT
Start: 2021-12-09 | End: 2021-12-09

## 2021-12-09 RX ORDER — ALBUTEROL SULFATE 2.5 MG/3ML
2.5 SOLUTION RESPIRATORY (INHALATION)
Status: DISCONTINUED | OUTPATIENT
Start: 2021-12-09 | End: 2021-12-09

## 2021-12-09 RX ORDER — AZITHROMYCIN 250 MG/1
500 TABLET, FILM COATED ORAL EVERY 24 HOURS
Status: DISPENSED | OUTPATIENT
Start: 2021-12-10 | End: 2021-12-13

## 2021-12-09 RX ORDER — PRAVASTATIN SODIUM 10 MG
20 TABLET ORAL NIGHTLY
Status: DISCONTINUED | OUTPATIENT
Start: 2021-12-09 | End: 2022-01-05 | Stop reason: HOSPADM

## 2021-12-09 RX ORDER — METOPROLOL TARTRATE 50 MG/1
25 TABLET, FILM COATED ORAL 2 TIMES DAILY
Status: DISCONTINUED | OUTPATIENT
Start: 2021-12-09 | End: 2021-12-10

## 2021-12-09 RX ORDER — HEPARIN SODIUM 1000 [USP'U]/ML
60 INJECTION, SOLUTION INTRAVENOUS; SUBCUTANEOUS PRN
Status: DISCONTINUED | OUTPATIENT
Start: 2021-12-09 | End: 2021-12-10

## 2021-12-09 RX ORDER — ACETAMINOPHEN 650 MG/1
650 SUPPOSITORY RECTAL EVERY 6 HOURS PRN
Status: DISCONTINUED | OUTPATIENT
Start: 2021-12-09 | End: 2022-01-05 | Stop reason: HOSPADM

## 2021-12-09 RX ORDER — ACETAMINOPHEN 325 MG/1
650 TABLET ORAL EVERY 6 HOURS PRN
Status: DISCONTINUED | OUTPATIENT
Start: 2021-12-09 | End: 2022-01-05 | Stop reason: HOSPADM

## 2021-12-09 RX ORDER — SODIUM CHLORIDE 9 MG/ML
25 INJECTION, SOLUTION INTRAVENOUS PRN
Status: DISCONTINUED | OUTPATIENT
Start: 2021-12-09 | End: 2022-01-05 | Stop reason: HOSPADM

## 2021-12-09 RX ADMIN — METOPROLOL TARTRATE 25 MG: 50 TABLET, FILM COATED ORAL at 09:17

## 2021-12-09 RX ADMIN — METOPROLOL TARTRATE 25 MG: 50 TABLET, FILM COATED ORAL at 22:45

## 2021-12-09 RX ADMIN — HEPARIN SODIUM 6500 UNITS: 1000 INJECTION INTRAVENOUS; SUBCUTANEOUS at 09:17

## 2021-12-09 RX ADMIN — DILTIAZEM HYDROCHLORIDE 5 MG/HR: 5 INJECTION INTRAVENOUS at 23:47

## 2021-12-09 RX ADMIN — POTASSIUM CHLORIDE 60 MEQ: 1500 TABLET, EXTENDED RELEASE ORAL at 11:57

## 2021-12-09 RX ADMIN — FAMOTIDINE 40 MG: 20 TABLET ORAL at 22:44

## 2021-12-09 RX ADMIN — TAMSULOSIN HYDROCHLORIDE 0.4 MG: 0.4 CAPSULE ORAL at 22:45

## 2021-12-09 RX ADMIN — LOSARTAN POTASSIUM 100 MG: 100 TABLET, FILM COATED ORAL at 09:17

## 2021-12-09 RX ADMIN — SODIUM CHLORIDE, PRESERVATIVE FREE 10 ML: 5 INJECTION INTRAVENOUS at 22:43

## 2021-12-09 RX ADMIN — AZITHROMYCIN DIHYDRATE 500 MG: 500 INJECTION, POWDER, LYOPHILIZED, FOR SOLUTION INTRAVENOUS at 01:24

## 2021-12-09 RX ADMIN — HEPARIN SODIUM AND DEXTROSE 12 UNITS/KG/HR: 10000; 5 INJECTION INTRAVENOUS at 09:27

## 2021-12-09 RX ADMIN — CEFTRIAXONE 1000 MG: 1 INJECTION, POWDER, FOR SOLUTION INTRAMUSCULAR; INTRAVENOUS at 02:57

## 2021-12-09 RX ADMIN — DILTIAZEM HYDROCHLORIDE 10 MG: 5 INJECTION INTRAVENOUS at 23:30

## 2021-12-09 RX ADMIN — PRAVASTATIN SODIUM 20 MG: 10 TABLET ORAL at 22:44

## 2021-12-09 RX ADMIN — ALLOPURINOL 300 MG: 300 TABLET ORAL at 09:17

## 2021-12-09 NOTE — ED NOTES
Incont care given to patient. Patient turn onto right side per request. VSS.       August King RN  12/09/21 [id number]

## 2021-12-09 NOTE — ED NOTES
Patient aPTT >240.0, heparin infusion stopped at this time per orders and Dr. Ry Mina notified.       Macho He RN  12/09/21 [id number]

## 2021-12-09 NOTE — ED NOTES
Patient refusing dinner at this time. Did tolerate drinking fluids.       Mireille Jackson RN  12/09/21 [id number]

## 2021-12-09 NOTE — ED NOTES
This nurse received new order for patient to have Klor-con 60 mEq once per Dr. Chloe Kearns. This nurse confirmed and read back with Dr. Chloe Kearns.       Denise Connelly RN  12/09/21 1077

## 2021-12-09 NOTE — CONSULTS
(ZYLOPRIM) tablet 300 mg, Daily  [Held by provider] apixaban (ELIQUIS) tablet 5 mg, BID  dilTIAZem (CARDIZEM CD) extended release capsule 360 mg, Daily  famotidine (PEPCID) tablet 40 mg, Nightly  [Held by provider] metFORMIN (GLUCOPHAGE) tablet 500 mg, BID WC  losartan (COZAAR) tablet 100 mg, Daily  metoprolol tartrate (LOPRESSOR) tablet 25 mg, BID  pravastatin (PRAVACHOL) tablet 20 mg, Nightly  tamsulosin (FLOMAX) capsule 0.4 mg, Nightly  triamterene-hydroCHLOROthiazide (MAXZIDE-25) 37.5-25 MG per tablet 1 tablet, Daily  sodium chloride flush 0.9 % injection 5-40 mL, 2 times per day  sodium chloride flush 0.9 % injection 5-40 mL, PRN  0.9 % sodium chloride infusion, PRN  ondansetron (ZOFRAN-ODT) disintegrating tablet 4 mg, Q8H PRN   Or  ondansetron (ZOFRAN) injection 4 mg, Q6H PRN  polyethylene glycol (GLYCOLAX) packet 17 g, Daily PRN  acetaminophen (TYLENOL) tablet 650 mg, Q6H PRN   Or  acetaminophen (TYLENOL) suppository 650 mg, Q6H PRN  albuterol (PROVENTIL) nebulizer solution 2.5 mg, Q2H PRN  ipratropium-albuterol (DUONEB) nebulizer solution 1 ampule, Q4H WA  [START ON 12/10/2021] cefTRIAXone (ROCEPHIN) 1000 mg IVPB in 50 mL D5W minibag, Q24H   And  [START ON 12/10/2021] azithromycin (ZITHROMAX) tablet 500 mg, Q24H      Current Facility-Administered Medications   Medication Dose Route Frequency Provider Last Rate Last Admin    potassium chloride (KLOR-CON M) extended release tablet 40 mEq  40 mEq Oral PRN Ever Meka Turner MD        Or    potassium bicarb-citric acid (EFFER-K) effervescent tablet 40 mEq  40 mEq Oral PRN Ever Meka Turner MD        Or    potassium chloride 10 mEq/100 mL IVPB (Peripheral Line)  10 mEq IntraVENous PRN Ever Meka Turner MD        allopurinol (ZYLOPRIM) tablet 300 mg  300 mg Oral Daily Ever Meka Turner, MD        [Held by provider] apixaban (ELIQUIS) tablet 5 mg  5 mg Oral BID Ever Turner MD        dilTIAZem (CARDIZEM CD) extended release capsule 360 mg  360 mg Oral Daily Ever Cb Waller MD        famotidine (PEPCID) tablet 40 mg  40 mg Oral Nightly Boris Skinner MD        [Held by provider] metFORMIN (GLUCOPHAGE) tablet 500 mg  500 mg Oral BID  Ever Cb Waller MD        losartan (COZAAR) tablet 100 mg  100 mg Oral Daily Ever Cb Waller MD        metoprolol tartrate (LOPRESSOR) tablet 25 mg  25 mg Oral BID Janeen PINEDA MD        pravastatin (PRAVACHOL) tablet 20 mg  20 mg Oral Nightly Ever Cb Waller MD        tamsulosin (FLOMAX) capsule 0.4 mg  0.4 mg Oral Nightly Janeen PINEDA MD        triamterene-hydroCHLOROthiazide (MAXZIDE-25) 37.5-25 MG per tablet 1 tablet  1 tablet Oral Daily Ever Cb Waller MD        sodium chloride flush 0.9 % injection 5-40 mL  5-40 mL IntraVENous 2 times per day Janeen PINEDA MD        sodium chloride flush 0.9 % injection 5-40 mL  5-40 mL IntraVENous PRN Ever Cb Waller MD        0.9 % sodium chloride infusion  25 mL IntraVENous PRN Ever Cb Waller MD        ondansetron (ZOFRAN-ODT) disintegrating tablet 4 mg  4 mg Oral Q8H PRN Ever Cb Waller MD        Or    ondansetron (ZOFRAN) injection 4 mg  4 mg IntraVENous Q6H PRN Ever Cb Waller MD        polyethylene glycol (GLYCOLAX) packet 17 g  17 g Oral Daily PRN Ever Cb Waller MD        acetaminophen (TYLENOL) tablet 650 mg  650 mg Oral Q6H PRN Janeen PINEDA MD        Or    acetaminophen (TYLENOL) suppository 650 mg  650 mg Rectal Q6H PRN Ever Cb Waller MD        albuterol (PROVENTIL) nebulizer solution 2.5 mg  2.5 mg Nebulization Q2H PRN Janeen PINEDA MD        ipratropium-albuterol (DUONEB) nebulizer solution 1 ampule  1 ampule Inhalation Q4H WA Ever Cb Waller MD        [START ON 12/10/2021] cefTRIAXone (ROCEPHIN) 1000 mg IVPB in 50 mL D5W minibag  1,000 mg IntraVENous Q24H Ever Reubin Waller, MD        And   [place]ake [START ON 12/10/2021] azithromycin (ZITHROMAX) tablet 500 mg  500 mg Oral Q24H Ever Waller MD         Current Outpatient Medications   Medication Sig Dispense Refill    FEROSUL 325 (65 Fe) MG tablet take ONE TABLET BY MOUTH TWICE DAILY 60 tablet 5    lidocaine (LIDODERM) 5 % APPLY 1 PATCH TO SKIN EVERY DAY AS NEEDED - LEAVE ON FOR UP TO 12 HOURS - AFTER YOU TAKE THE PATCH OFF, DO NOT PUT ANOTHER PATCH ON THAT AREA OF SKIN FOR AT LEAST 12 HOURS      losartan (COZAAR) 100 MG tablet TAKE ONE TABLET BY MOUTH EVERY DAY      cyanocobalamin ([employer] VITAMIN B12) 1000 MCG tablet Take 1 tablet by mouth daily 30 tablet 5    famotidine (PEPCID) 40 MG tablet Take 40 mg by mouth nightly      potassium chloride (KLOR-CON M) 20 MEQ extended release tablet Take 20 mEq by mouth daily      tamsulosin (FLOMAX) 0.4 MG capsule Take 1 capsule by mouth daily (Patient taking differently: Take 0.4 mg by mouth nightly ) 30 capsule 3    nitroGLYCERIN (NITROSTAT) 0.4 MG SL tablet up to max of 3 total doses.  If no relief after 1 dose, call 911. 25 tablet 0    apixaban (ELIQUIS) 5 MG TABS tablet Take 1 tablet by mouth 2 times daily 60 tablet 0    metoprolol tartrate (LOPRESSOR) 25 MG tablet Take 1 tablet by mouth 2 times daily 60 tablet 0    allopurinol (ZYLOPRIM) 300 MG tablet Take 300 mg by mouth daily      diltiazem (TIAZAC) 360 MG extended release capsule Take 360 mg by mouth daily      triamterene-hydrochlorothiazide (MAXZIDE-25) 37.5-25 MG per tablet Take 1 tablet by mouth daily      metFORMIN (GLUCOPHAGE) 500 MG tablet Take 500 mg by mouth 2 times daily (with meals)      pravastatin (PRAVACHOL) 20 MG tablet Take 20 mg by mouth nightly       Review of Systems:   · Constitutional: No Fever or Weight Loss   · Eyes: No Decreased Vision  · ENT: No Headaches, Hearing Loss or Vertigo  · Cardiovascular: As per HPI  · Respiratory: As per HPI  · Gastrointestinal: No abdominal pain, appetite loss, blood in stools, constipation, diarrhea or heartburn  · Genitourinary: No dysuria, trouble voiding, or hematuria  · Musculoskeletal:  No gait disturbance, weakness or joint complaints  · Integumentary: No rash or pruritis  · Neurological: No TIA or stroke symptoms  · Psychiatric: No anxiety or depression  · Endocrine: No malaise, fatigue or temperature intolerance  · Hematologic/Lymphatic: No bleeding problems, blood clots or swollen lymph nodes  · Allergic/Immunologic: No nasal congestion or hives  All systems negative except as marked. Physical Examination:    Vitals:    12/09/21 0022 12/09/21 0252 12/09/21 0459 12/09/21 0712   BP: 131/81 114/67 119/71 124/65   Pulse: 81 77 69 61   Resp: 26 23 29 15   Temp:       SpO2: 94% 96% 98% 94%       General Appearance:  No distress, conversant    Constitutional:  Well developed, Well nourished, No acute distress, Non-toxic appearance. HENT:  Normocephalic, Atraumatic, Bilateral external ears normal, Oropharynx moist, No oral exudates, Nose normal. Neck- Normal range of motion, No tenderness, Supple, No stridor,no apical-carotid delay  Lymphatics : no palpable lymph nodes  Eyes:  PERRL, EOMI, Conjunctiva normal, No discharge. Respiratory:  Normal breath sounds, No respiratory distress, No wheezing, No chest tenderness. ,no use of accessory muscles, crackles Absent   Cardiovascular: (PMI) apex non displaced,no lifts no thrills, ankle swelling   , 1+, s1 and s2 audible,Murmur. Present, JVD not noted    Abdomen /GI:  Bowel sounds normal, Soft, No tenderness, No masses, No gross visceromegaly   :  No costovertebral angle tenderness   Musculoskeletal: Chronic lymphangitic changes with chronic skin changes 2+ edema, no tenderness, no deformities.  Back- no tenderness  Integument:  Well hydrated, no rash   Lymphatic:  No lymphadenopathy noted   Neurologic:  Alert & oriented x 3, CN 2-12 normal, normal motor function, normal sensory function, no focal deficits noted           Medical decision making and Data review:    Lab Review   Recent Labs     12/08/21  2210   WBC 6.4   HGB 10.9*   HCT 34.9*         Recent Labs     12/08/21  2210   *   K 3.1*   CL 96*   CO2 20* BUN 19   CREATININE 0.8*     Recent Labs     12/08/21 2210   AST 29   ALT 9*   BILITOT 0.6   ALKPHOS 75     Recent Labs     12/08/21  2210 12/09/21  0240   TROPONINT 0.088* 0.244*       No results for input(s): PROBNP in the last 72 hours. Lab Results   Component Value Date    INR 1.80 11/17/2020    PROTIME 21.9 (H) 11/17/2020       EKG: (reviewed by myself)    ECHO:(reviewed by myself)    Chest Xray:(reviewed by myself)  XR PELVIS (1-2 VIEWS)    Result Date: 12/8/2021  EXAMINATION: ONE XRAY VIEW OF THE PELVIS 12/8/2021 2:48 pm COMPARISON: CT abdomen pelvis, 01/17/2020 HISTORY: ORDERING SYSTEM PROVIDED HISTORY: trauma TECHNOLOGIST PROVIDED HISTORY: Reason for exam:->trauma Reason for Exam: pain Additional signs and symptoms: none Relevant Medical/Surgical History: none FINDINGS: The ilioischial and iliopectineal lines are intact bilaterally. The SI joints and pubic symphysis are congruent. The sacral neural foramina arches are intact. Severe, chronic degenerative disease is seen at the right hip, with bony remodeling of the acetabulum and the femoral head, which appears similar when compared to the previous abdomen and pelvis CTA from January 2020. There is at least moderate if not severe left hip arthrosis, not well evaluated. No acute abnormality detected. Severe degenerative changes at the right hip, with chronic bony remodeling, not substantially changed when compared to the CT from January 2020. CT HEAD WO CONTRAST    Result Date: 12/9/2021  EXAMINATION: CT OF THE HEAD WITHOUT CONTRAST  12/9/2021 12:49 am TECHNIQUE: CT of the head was performed without the administration of intravenous contrast. Dose modulation, iterative reconstruction, and/or weight based adjustment of the mA/kV was utilized to reduce the radiation dose to as low as reasonably achievable.  COMPARISON: None HISTORY: ORDERING SYSTEM PROVIDED HISTORY: AMS, fall TECHNOLOGIST PROVIDED HISTORY: Reason for exam:->AMS, fall Has a \"code stroke\" or \"stroke alert\" been called? ->No Decision Support Exception - unselect if not a suspected or confirmed emergency medical condition->Emergency Medical Condition (MA) Reason for Exam: AMS, fall FINDINGS: BRAIN/VENTRICLES: There is no acute intracranial hemorrhage, mass effect or midline shift. No abnormal extra-axial fluid collection. The gray-white differentiation is maintained without evidence of an acute infarct. There is no evidence of hydrocephalus. Moderate periventricular and deep subcortical white matter hypodensity is present. Diffuse atrophy is present. ORBITS: The visualized portion of the orbits demonstrate no acute abnormality. SINUSES: Ethmoid sinus disease is present. SOFT TISSUES/SKULL:  No acute abnormality of the visualized skull or soft tissues. No acute intracranial abnormality. Age related changes including chronic small vessel ischemic disease and cerebral atrophy. CT HEAD WO CONTRAST    Result Date: 12/8/2021  EXAMINATION: CT OF THE HEAD WITHOUT CONTRAST  12/8/2021 6:31 pm TECHNIQUE: CT of the head was performed without the administration of intravenous contrast. Dose modulation, iterative reconstruction, and/or weight based adjustment of the mA/kV was utilized to reduce the radiation dose to as low as reasonably achievable. COMPARISON: CT head 01/17/2020 HISTORY: ORDERING SYSTEM PROVIDED HISTORY: fall, found on ground TECHNOLOGIST PROVIDED HISTORY: Reason for exam:->fall, found on ground Has a \"code stroke\" or \"stroke alert\" been called? ->No Decision Support Exception - unselect if not a suspected or confirmed emergency medical condition->Emergency Medical Condition (MA) Reason for Exam: fall, found on ground Additional signs and symptoms: poor historian Relevant Medical/Surgical History: unknown FINDINGS: BRAIN/VENTRICLES: There is no acute intracranial hemorrhage, mass effect or midline shift. No abnormal extra-axial fluid collection.   The gray-white differentiation is maintained without evidence of an acute infarct. There is no evidence of hydrocephalus. Generalized involution changes and chronic small ischemic disease. Motion does degrade image quality. Vascular calcifications. ORBITS: The visualized portion of the orbits demonstrate no acute abnormality. SINUSES: The visualized paranasal sinuses and mastoid air cells demonstrate no acute abnormality. SOFT TISSUES/SKULL:  No acute abnormality of the visualized skull or soft tissues. Geographic lucency lesion identified right frontal bone appears to be new from prior examination. No acute intracranial abnormality. Geographic lucency frontal bone noted, consider bone scan imaging or consider skeletal survey. CT CHEST W CONTRAST    Result Date: 12/8/2021  EXAMINATION: CT OF THE ABDOMEN AND PELVIS WITH CONTRAST; CT OF THE CHEST WITH CONTRAST 12/8/2021 10:26 pm; 12/8/2021 10:25 pm TECHNIQUE: CT of the abdomen and pelvis was performed with the administration of intravenous contrast. Multiplanar reformatted images are provided for review. Dose modulation, iterative reconstruction, and/or weight based adjustment of the mA/kV was utilized to reduce the radiation dose to as low as reasonably achievable.; CT of the chest was performed with the administration of intravenous contrast. Multiplanar reformatted images are provided for review. Dose modulation, iterative reconstruction, and/or weight based adjustment of the mA/kV was utilized to reduce the radiation dose to as low as reasonably achievable. COMPARISON: CT angiogram chest abdomen/pelvis 01/17/2020 HISTORY: ORDERING SYSTEM PROVIDED HISTORY: abd pain TECHNOLOGIST PROVIDED HISTORY: Reason for exam:->abd pain Additional Contrast?->None Decision Support Exception - unselect if not a suspected or confirmed emergency medical condition->Emergency Medical Condition (MA) Reason for Exam: abd pain.  combative,won't listen Additional signs and symptoms: no Relevant Medical/Surgical History: 80 ml isovue 370 used.; ORDERING SYSTEM PROVIDED HISTORY: rule out occult PNA or injury, sats now 92% TECHNOLOGIST PROVIDED HISTORY: Reason for exam:->rule out occult PNA or injury, sats now 92% Reason for Exam: rule out occult PNA or injury, sats now 92% Additional signs and symptoms: no Relevant Medical/Surgical History: 80 ml isovue 370 used. FINDINGS: Chest: Mediastinum: Cardiomegaly. Triple-vessel coronary disease. No pericardial effusion. Mild scattered mediastinal hilar lymph nodes likely reactive. Lungs/pleura: Multifocal airspace opacities worrisome for multifocal pneumonia. These airspace opacities most confluent right lower lobe, left lower lobe lingula and left greater than right upper lobe. No effusion. No pneumothorax Soft Tissues/Bones: Degenerate changes. No suspicious osseous lesion. Abdomen/Pelvis: Organs: The kidneys symmetric in size and enhancement with multiple bilateral hypodensities. These are most compatible with cyst.  These are not well characterized due to the subcentimeter size. No hydronephrosis. No perinephric collections. Otherwise the liver, gallbladder, spleen, right adrenal gland and pancreas unremarkable. Mild thickened appearance left adrenal gland. Motion challenge evaluation of the organs. GI/Bowel: Mild retained stool throughout the colon. No evidence of bowel obstruction. Colonic diverticulosis noted. This most pronounced within the sigmoid colon. The appendix unremarkable. Pelvis: Multiple bladder trabeculation and diverticula adenopathy identified. There is slight haziness of the bladder anteriorly and from prevesical fat could be infectious inflammatory. Please correlate urinalysis findings. Prostate is borderline size mildly enlarged. Peritoneum/Retroperitoneum: Aortic vascular calcifications. Multiple perirectal lymph nodes again identified.   Unclear if these are reactive due to the the inflammatory change involving the bladder or could be due to underlying lymphoproliferative process. Again these are a follow up in 3-6 months. Bones/Soft Tissues: Multilevel degenerate change. This is L4-5 identified unchanged prior exam.  No suspicious osseous lesion. Multifocal consolidative changes both lungs worrisome for multifocal pneumonia. Follow-up in following medical treatment course is recommended document complete resolution. Multifocal bladder traumatic trabeculation/diverticula noted with slight haziness of surrounding fat planes. Please correlate with diffuse urinalysis findings. Is findings could suggest underlying cystitis. Prominence of the Alisson aortic lymph nodes again identified. These could be reactive due to an underlying infectious inflammatory process such is the bladder haziness. However neoplastic process may also considered. Consider 3-6 month follow-up. RECOMMENDATIONS: Unavailable     CT CERVICAL SPINE WO CONTRAST    Result Date: 12/8/2021  EXAMINATION: CT OF THE CERVICAL SPINE WITHOUT CONTRAST 12/8/2021 6:31 pm TECHNIQUE: CT of the cervical spine was performed without the administration of intravenous contrast. Multiplanar reformatted images are provided for review. Dose modulation, iterative reconstruction, and/or weight based adjustment of the mA/kV was utilized to reduce the radiation dose to as low as reasonably achievable. COMPARISON: CT cervical spine 01/17/2020 HISTORY: ORDERING SYSTEM PROVIDED HISTORY: found on ground TECHNOLOGIST PROVIDED HISTORY: Reason for exam:->found on ground Decision Support Exception - unselect if not a suspected or confirmed emergency medical condition->Emergency Medical Condition (MA) Reason for Exam: found on ground Additional signs and symptoms: poor historian Relevant Medical/Surgical History: unknown FINDINGS: BONES/ALIGNMENT: Motion challenge evaluation.   No convincing evidence acute displaced fracture traumatic malalignment slight reversal normal cervical lordosis could be related to medical treatment course is recommended document complete resolution. Multifocal bladder traumatic trabeculation/diverticula noted with slight haziness of surrounding fat planes. Please correlate with diffuse urinalysis findings. Is findings could suggest underlying cystitis. Prominence of the Alisson aortic lymph nodes again identified. These could be reactive due to an underlying infectious inflammatory process such is the bladder haziness. However neoplastic process may also considered. Consider 3-6 month follow-up. RECOMMENDATIONS: Unavailable     XR SHOULDER LEFT (MIN 2 VIEWS)    Result Date: 12/8/2021  EXAMINATION: THREE XRAY VIEWS OF THE LEFT SHOULDER 12/8/2021 9:13 pm COMPARISON: None. HISTORY: ORDERING SYSTEM PROVIDED HISTORY: now having pain after fall TECHNOLOGIST PROVIDED HISTORY: Reason for exam:->now having pain after fall Reason for Exam: now having pain after fall Additional signs and symptoms: now having pain after fall Relevant Medical/Surgical History: now having pain after fall FINDINGS: No acute fracture or dislocation is identified. A mass or masslike consolidation is noted in the left lung. No acute fracture identified. Incidentally noted mass or masslike consolidation in the left upper lobe. Advise chest CT for further evaluation. XR CHEST PORTABLE    Result Date: 12/8/2021  EXAMINATION: ONE XRAY VIEW OF THE CHEST 12/8/2021 5:48 pm COMPARISON: Chest x-ray January 17, 2020; chest x-ray September 25, 2018 HISTORY: ORDERING SYSTEM PROVIDED HISTORY: fall TECHNOLOGIST PROVIDED HISTORY: Reason for exam:->fall Reason for Exam: fall Relevant Medical/Surgical History: A-fib FINDINGS: Cardiac silhouette is enlarged but stable. Atherosclerotic changes of the aorta. Patchy interstitial opacities throughout the lungs appear unchanged when compared with exam from January 17, 2020 and are similar to exam from September 25, 2018 suggesting chronic interstitial lung changes.   No definite superimposed consolidation. No pleural effusion or pneumothorax. 1. No acute cardiopulmonary process identified. 2. Chronic interstitial changes of the lungs. All labs, medications and tests reviewed by myself including data  from outside source , patient and available family . Continue all other medications of all above medical condition listed as is. Impression:  Active Problems:    Diabetes mellitus (HCC)    PNA (pneumonia)    Troponin I above reference range  Resolved Problems:    * No resolved hospital problems. *      Assessment: 77 y. o.year old with PMH of  has a past medical history of Arthropathy, Atrial fibrillation ([address] Atrial flutter ([address] Diabetes mellitus ([facility] [address] Gout, Hyperlipidemia, Hypertension, and Lumbago. Plan and Recommendations:    Elevated Troponin : Complicated coronary on cath in 2018 check serial troponin if they are not rising we will continue medical management for now. If troponin are rising , patient may need further invasive / non invasive work up . Continue Aspirin and add  anti anginal therapy for chest pain . Start heparin  Get echo  Ultrasound lower extremity rule out DVT  CHF: Acute Systolic/ Diastolic decompensated heart failure. Appears to be volume overloaded . Agree with diuresis. We can try IV Lasix 40 mg BID. Sven Cong Strict Is and Os and Daily weights. chf nurse consult  HTN: stable, continue present medications   Atrial Fibrillation: In sinus rhythm now on chronic anticoagulation hold for possible invasive work-up if needed  DVT prophylaxis if no contraindication  6. Dyslipidemia: continue statins           Thank you  much for consult and giving us the opportunity in contributing in the care of this patient. Please feel free to call me for any questions.        Zelma Bernheim, MD, 12/9/2021 7:18 AM

## 2021-12-09 NOTE — CONSULTS
[facility]ckLo[facility] Hospitalat [facility], [address]   Consult Note  [facility] 1 2 3 4 5    Date: 2021   Patient: Susan Reyes   : 1945   DOA: 2021   MRN: [id number]   ROOM#: [id number]     Reason for Consult: Multifocal bladder traumatic trabeculation/diverticula, cystitis  Requesting Physician:  Dr. Ravi Mir  Collaborating Urologist on Call at time of admission: Dr. Ericka Petit: Fall    History Obtained From:  patient, electronic medical record    HISTORY OF PRESENT ILLNESS:                The patient is a 77 y.o. male with significant past medical history of A-fib on Eliquis, A-flutter, DM, BPH, HLD, and HTN who presented with a fal from his wheelchair. Work-up revealed possible CAP, elevated troponin, cystitis, and microscopic hematuria. Pt is resting comfortably and states he is feeling OK. He does not think he has any issues voiding. Currently has external catheter in place with dark yellow/yuni urine return. Pt is a poor historian. ED Provider's HPI 21: Susan Reyes is a 77 y.o. male that presents to the emergency department with a fall out of wheelchair, says he doesn't recall what happened. States he \"laid on the ground for an hour. \"  He does have a history of A. fib, a flutter, diabetes, gout, hypertension, hyperlipidemia. .  Patient is on Eliquis. Patient has extremely swollen bilateral lower extremities that he appears to have been itching and has some abrasions to his lower extremities. He has chronic lymphedema, this is not new for him. Vitals were normal per EMS. Patient denies that his legs are more swollen than normal. He denies any chest pain, no diaphoresis. He denies any diarrhea, vomiting. No fevers or chills. No chest pain or pressure. No difficulty breathing.     Past Medical History:        Diagnosis Date    Arthropathy     Atrial fibrillation (HCC)     Atrial flutter (HCC)     Diabetes mellitus ([address]     Gout     Hyperlipidemia     Hypertension  Lumbago      Past Surgical History:        Procedure Laterality Date    COLONOSCOPY N/A 9/27/2018    COLONOSCOPY POLYPECTOMY SNARE/COLD BIOPSY performed by Beverly Colmenares MD at [facility] COLONOSCOPY N/A 11/18/2020    COLONOSCOPY DIAGNOSTIC performed by Leigh Gan MD at [facility] ENDOSCOPY     Current Medications:   Current Facility-Administered Medications: potassium chloride (KLOR-CON M) extended release tablet 40 mEq, 40 mEq, Oral, PRN **OR** potassium bicarb-citric acid (EFFER-K) effervescent tablet 40 mEq, 40 mEq, Oral, PRN **OR** potassium chloride 10 mEq/100 mL IVPB (Peripheral Line), 10 mEq, IntraVENous, PRN  allopurinol (ZYLOPRIM) tablet 300 mg, 300 mg, Oral, Daily  [Held by provider] apixaban (ELIQUIS) tablet 5 mg, 5 mg, Oral, BID  dilTIAZem (CARDIZEM CD) extended release capsule 360 mg, 360 mg, Oral, Daily  famotidine (PEPCID) tablet 40 mg, 40 mg, Oral, Nightly  [Held by provider] metFORMIN (GLUCOPHAGE) tablet 500 mg, 500 mg, Oral, BID WC  losartan (COZAAR) tablet 100 mg, 100 mg, Oral, Daily  metoprolol tartrate (LOPRESSOR) tablet 25 mg, 25 mg, Oral, BID  pravastatin (PRAVACHOL) tablet 20 mg, 20 mg, Oral, Nightly  tamsulosin (FLOMAX) capsule 0.4 mg, 0.4 mg, Oral, Nightly  triamterene-hydroCHLOROthiazide (MAXZIDE-25) 37.5-25 MG per tablet 1 tablet, 1 tablet, Oral, Daily  sodium chloride flush 0.9 % injection 5-40 mL, 5-40 mL, IntraVENous, 2 times per day  sodium chloride flush 0.9 % injection 5-40 mL, 5-40 mL, IntraVENous, PRN  0.9 % sodium chloride infusion, 25 mL, IntraVENous, PRN  ondansetron (ZOFRAN-ODT) disintegrating tablet 4 mg, 4 mg, Oral, Q8H PRN **OR** ondansetron (ZOFRAN) injection 4 mg, 4 mg, IntraVENous, Q6H PRN  polyethylene glycol (GLYCOLAX) packet 17 g, 17 g, Oral, Daily PRN  acetaminophen (TYLENOL) tablet 650 mg, 650 mg, Oral, Q6H PRN **OR** acetaminophen (TYLENOL) suppository 650 mg, 650 mg, Rectal, Q6H PRN  albuterol (PROVENTIL) nebulizer solution 2.5 mg, 2.5 mg, Nebulization, Q2H PRN  ipratropium-albuterol (DUONEB) nebulizer solution 1 ampule, 1 ampule, Inhalation, Q4H WA  [START ON 12/10/2021] cefTRIAXone (ROCEPHIN) 1000 mg IVPB in 50 mL D5W minibag, 1,000 mg, IntraVENous, Q24H **AND** [START ON 12/10/2021] azithromycin (ZITHROMAX) tablet 500 mg, 500 mg, Oral, Q24H  heparin (porcine) injection 60 Units/kg, 60 Units/kg, IntraVENous, Once  heparin (porcine) injection 60 Units/kg, 60 Units/kg, IntraVENous, PRN  heparin (porcine) injection 30 Units/kg, 30 Units/kg, IntraVENous, PRN  heparin 25,000 units in dextrose 5% 250 mL (premix) infusion, 5-30 Units/kg/hr, IntraVENous, Continuous    Allergies:  Demerol hcl [meperidine], Gabapentin, and Simvastatin    Social History:   TOBACCO:   reports that he has quit smoking. His smoking use included cigarettes. He smoked 0.50 packs per day. He has never used smokeless tobacco.  ETOH:   reports no history of alcohol use. DRUGS:   reports no history of drug use. Family History:       Problem Relation Age of Onset    No Known Problems Mother     No Known Problems Father        REVIEW OF SYSTEMS:     CONSTITUTIONAL:  negative  RESPIRATORY:  negative  CARDIOVASCULAR:  negative  GASTROINTESTINAL:  negative  GENITOURINARY:  negative    PHYSICAL EXAM:      VITALS:  /65   Pulse 61   Temp 98.9 °F (37.2 °C)   Resp 15   SpO2 94%      TEMPERATURE:  Current - Temp: 98.9 °F (37.2 °C);  Max - Temp  Av.9 °F (37.2 °C)  Min: 98.9 °F (37.2 °C)  Max: 98.9 °F (37.2 °C)  24HR BLOOD PRESSURE RANGE:  Systolic (20OXL), GLF:390 , Min:114 , RKA:400   ; Diastolic (64DDZ), MTQ:29, Min:65, Max:175    General appearance: alert, appears stated age, cooperative, no distress and moderately obese  Head: Normocephalic, without obvious abnormality, atraumatic  Back: No CVA tenderness  Abdomen: Soft, non-tender, non-distended   : External catheter in place, urine dark yellow/yuni colored    DATA:    WBC:    Lab Results   Component Value Date    WBC 6.4 12/08/2021     Hemoglobin/Hematocrit:    Lab Results   Component Value Date    HGB 10.9 12/08/2021    HCT 34.9 12/08/2021     BMP:    Lab Results   Component Value Date     12/08/2021    K 3.1 12/08/2021    CL 96 12/08/2021    CO2 20 12/08/2021    BUN 19 12/08/2021    LABALBU 3.3 12/08/2021    CREATININE 0.8 12/08/2021    CALCIUM 8.1 12/08/2021    GFRAA >60 12/08/2021    LABGLOM >60 12/08/2021     PT/INR:    Lab Results   Component Value Date    PROTIME 21.9 11/17/2020    INR 1.80 11/17/2020     Blood Culture: pending  Urine Culture: pending    Imaging:  XR PELVIS (1-2 VIEWS)    Result Date: 12/8/2021  EXAMINATION: ONE XRAY VIEW OF THE PELVIS 12/8/2021 2:48 pm COMPARISON: CT abdomen pelvis, 01/17/2020 HISTORY: ORDERING SYSTEM PROVIDED HISTORY: trauma TECHNOLOGIST PROVIDED HISTORY: Reason for exam:->trauma Reason for Exam: pain Additional signs and symptoms: none Relevant Medical/Surgical History: none FINDINGS: The ilioischial and iliopectineal lines are intact bilaterally. The SI joints and pubic symphysis are congruent. The sacral neural foramina arches are intact. Severe, chronic degenerative disease is seen at the right hip, with bony remodeling of the acetabulum and the femoral head, which appears similar when compared to the previous abdomen and pelvis CTA from January 2020. There is at least moderate if not severe left hip arthrosis, not well evaluated. No acute abnormality detected. Severe degenerative changes at the right hip, with chronic bony remodeling, not substantially changed when compared to the CT from January 2020. CT HEAD WO CONTRAST    Result Date: 12/9/2021  EXAMINATION: CT OF THE HEAD WITHOUT CONTRAST  12/9/2021 12:49 am TECHNIQUE: CT of the head was performed without the administration of intravenous contrast. Dose modulation, iterative reconstruction, and/or weight based adjustment of the mA/kV was utilized to reduce the radiation dose to as low as reasonably achievable. COMPARISON: None HISTORY: ORDERING SYSTEM PROVIDED HISTORY: AMS, fall TECHNOLOGIST PROVIDED HISTORY: Reason for exam:->AMS, fall Has a \"code stroke\" or \"stroke alert\" been called? ->No Decision Support Exception - unselect if not a suspected or confirmed emergency medical condition->Emergency Medical Condition (MA) Reason for Exam: AMS, fall FINDINGS: BRAIN/VENTRICLES: There is no acute intracranial hemorrhage, mass effect or midline shift. No abnormal extra-axial fluid collection. The gray-white differentiation is maintained without evidence of an acute infarct. There is no evidence of hydrocephalus. Moderate periventricular and deep subcortical white matter hypodensity is present. Diffuse atrophy is present. ORBITS: The visualized portion of the orbits demonstrate no acute abnormality. SINUSES: Ethmoid sinus disease is present. SOFT TISSUES/SKULL:  No acute abnormality of the visualized skull or soft tissues. No acute intracranial abnormality. Age related changes including chronic small vessel ischemic disease and cerebral atrophy. CT HEAD WO CONTRAST    Result Date: 12/8/2021  EXAMINATION: CT OF THE HEAD WITHOUT CONTRAST  12/8/2021 6:31 pm TECHNIQUE: CT of the head was performed without the administration of intravenous contrast. Dose modulation, iterative reconstruction, and/or weight based adjustment of the mA/kV was utilized to reduce the radiation dose to as low as reasonably achievable. COMPARISON: CT head 01/17/2020 HISTORY: ORDERING SYSTEM PROVIDED HISTORY: fall, found on ground TECHNOLOGIST PROVIDED HISTORY: Reason for exam:->fall, found on ground Has a \"code stroke\" or \"stroke alert\" been called? ->No Decision Support Exception - unselect if not a suspected or confirmed emergency medical condition->Emergency Medical Condition (MA) Reason for Exam: fall, found on ground Additional signs and symptoms: poor historian Relevant Medical/Surgical History: unknown FINDINGS: BRAIN/VENTRICLES: There is no acute intracranial hemorrhage, mass effect or midline shift. No abnormal extra-axial fluid collection. The gray-white differentiation is maintained without evidence of an acute infarct. There is no evidence of hydrocephalus. Generalized involution changes and chronic small ischemic disease. Motion does degrade image quality. Vascular calcifications. ORBITS: The visualized portion of the orbits demonstrate no acute abnormality. SINUSES: The visualized paranasal sinuses and mastoid air cells demonstrate no acute abnormality. SOFT TISSUES/SKULL:  No acute abnormality of the visualized skull or soft tissues. Geographic lucency lesion identified right frontal bone appears to be new from prior examination. No acute intracranial abnormality. Geographic lucency frontal bone noted, consider bone scan imaging or consider skeletal survey. CT CHEST W CONTRAST    Result Date: 12/8/2021  EXAMINATION: CT OF THE ABDOMEN AND PELVIS WITH CONTRAST; CT OF THE CHEST WITH CONTRAST 12/8/2021 10:26 pm; 12/8/2021 10:25 pm TECHNIQUE: CT of the abdomen and pelvis was performed with the administration of intravenous contrast. Multiplanar reformatted images are provided for review. Dose modulation, iterative reconstruction, and/or weight based adjustment of the mA/kV was utilized to reduce the radiation dose to as low as reasonably achievable.; CT of the chest was performed with the administration of intravenous contrast. Multiplanar reformatted images are provided for review. Dose modulation, iterative reconstruction, and/or weight based adjustment of the mA/kV was utilized to reduce the radiation dose to as low as reasonably achievable.  COMPARISON: CT angiogram chest abdomen/pelvis 01/17/2020 HISTORY: ORDERING SYSTEM PROVIDED HISTORY: abd pain TECHNOLOGIST PROVIDED HISTORY: Reason for exam:->abd pain Additional Contrast?->None Decision Support Exception - unselect if not a suspected or confirmed emergency medical condition->Emergency Medical Condition (MA) Reason for Exam: abd pain. combative,won't listen Additional signs and symptoms: no Relevant Medical/Surgical History: 80 ml isovue 370 used.; ORDERING SYSTEM PROVIDED HISTORY: rule out occult PNA or injury, sats now 92% TECHNOLOGIST PROVIDED HISTORY: Reason for exam:->rule out occult PNA or injury, sats now 92% Reason for Exam: rule out occult PNA or injury, sats now 92% Additional signs and symptoms: no Relevant Medical/Surgical History: 80 ml isovue 370 used. FINDINGS: Chest: Mediastinum: Cardiomegaly. Triple-vessel coronary disease. No pericardial effusion. Mild scattered mediastinal hilar lymph nodes likely reactive. Lungs/pleura: Multifocal airspace opacities worrisome for multifocal pneumonia. These airspace opacities most confluent right lower lobe, left lower lobe lingula and left greater than right upper lobe. No effusion. No pneumothorax Soft Tissues/Bones: Degenerate changes. No suspicious osseous lesion. Abdomen/Pelvis: Organs: The kidneys symmetric in size and enhancement with multiple bilateral hypodensities. These are most compatible with cyst.  These are not well characterized due to the subcentimeter size. No hydronephrosis. No perinephric collections. Otherwise the liver, gallbladder, spleen, right adrenal gland and pancreas unremarkable. Mild thickened appearance left adrenal gland. Motion challenge evaluation of the organs. GI/Bowel: Mild retained stool throughout the colon. No evidence of bowel obstruction. Colonic diverticulosis noted. This most pronounced within the sigmoid colon. The appendix unremarkable. Pelvis: Multiple bladder trabeculation and diverticula adenopathy identified. There is slight haziness of the bladder anteriorly and from prevesical fat could be infectious inflammatory. Please correlate urinalysis findings. Prostate is borderline size mildly enlarged. Peritoneum/Retroperitoneum: Aortic vascular calcifications. Multiple perirectal lymph nodes again identified. Unclear if these are reactive due to the the inflammatory change involving the bladder or could be due to underlying lymphoproliferative process. Again these are a follow up in 3-6 months. Bones/Soft Tissues: Multilevel degenerate change. This is L4-5 identified unchanged prior exam.  No suspicious osseous lesion. Multifocal consolidative changes both lungs worrisome for multifocal pneumonia. Follow-up in following medical treatment course is recommended document complete resolution. Multifocal bladder traumatic trabeculation/diverticula noted with slight haziness of surrounding fat planes. Please correlate with diffuse urinalysis findings. Is findings could suggest underlying cystitis. Prominence of the Alisson aortic lymph nodes again identified. These could be reactive due to an underlying infectious inflammatory process such is the bladder haziness. However neoplastic process may also considered. Consider 3-6 month follow-up. RECOMMENDATIONS: Unavailable     CT CERVICAL SPINE WO CONTRAST    Result Date: 12/8/2021  EXAMINATION: CT OF THE CERVICAL SPINE WITHOUT CONTRAST 12/8/2021 6:31 pm TECHNIQUE: CT of the cervical spine was performed without the administration of intravenous contrast. Multiplanar reformatted images are provided for review. Dose modulation, iterative reconstruction, and/or weight based adjustment of the mA/kV was utilized to reduce the radiation dose to as low as reasonably achievable.  COMPARISON: CT cervical spine 01/17/2020 HISTORY: ORDERING SYSTEM PROVIDED HISTORY: found on ground TECHNOLOGIST PROVIDED HISTORY: Reason for exam:->found on ground Decision Support Exception - unselect if not a suspected or confirmed emergency medical condition->Emergency Medical Condition (MA) Reason for Exam: found on ground Additional signs and symptoms: poor historian Relevant Medical/Surgical History: unknown FINDINGS: BONES/ALIGNMENT: Motion challenge evaluation. No convincing evidence acute displaced fracture traumatic malalignment slight reversal normal cervical lordosis could be related to positioning or spasm. DEGENERATIVE CHANGES: Moderate severe multilevel degenerate changes notably C4 through C7. SOFT TISSUES: There is no prevertebral soft tissue swelling. Motion degradation challenge evaluation. No convincing evidence acute displaced fracture traumatic malalignment. There are moderate multilevel degenerate change     CT ABDOMEN PELVIS W IV CONTRAST Additional Contrast? None    Result Date: 12/8/2021  EXAMINATION: CT OF THE ABDOMEN AND PELVIS WITH CONTRAST; CT OF THE CHEST WITH CONTRAST 12/8/2021 10:26 pm; 12/8/2021 10:25 pm TECHNIQUE: CT of the abdomen and pelvis was performed with the administration of intravenous contrast. Multiplanar reformatted images are provided for review. Dose modulation, iterative reconstruction, and/or weight based adjustment of the mA/kV was utilized to reduce the radiation dose to as low as reasonably achievable.; CT of the chest was performed with the administration of intravenous contrast. Multiplanar reformatted images are provided for review. Dose modulation, iterative reconstruction, and/or weight based adjustment of the mA/kV was utilized to reduce the radiation dose to as low as reasonably achievable. COMPARISON: CT angiogram chest abdomen/pelvis 01/17/2020 HISTORY: ORDERING SYSTEM PROVIDED HISTORY: abd pain TECHNOLOGIST PROVIDED HISTORY: Reason for exam:->abd pain Additional Contrast?->None Decision Support Exception - unselect if not a suspected or confirmed emergency medical condition->Emergency Medical Condition (MA) Reason for Exam: abd pain.  combative,won't listen Additional signs and symptoms: no Relevant Medical/Surgical History: 80 ml isovue 370 used.; ORDERING SYSTEM PROVIDED HISTORY: rule out occult PNA or injury, sats now 92% TECHNOLOGIST PROVIDED HISTORY: Reason for exam:->rule out occult PNA or injury, sats now 92% Reason for Exam: rule out occult PNA or injury, sats now 92% Additional signs and symptoms: no Relevant Medical/Surgical History: 80 ml isovue 370 used. FINDINGS: Chest: Mediastinum: Cardiomegaly. Triple-vessel coronary disease. No pericardial effusion. Mild scattered mediastinal hilar lymph nodes likely reactive. Lungs/pleura: Multifocal airspace opacities worrisome for multifocal pneumonia. These airspace opacities most confluent right lower lobe, left lower lobe lingula and left greater than right upper lobe. No effusion. No pneumothorax Soft Tissues/Bones: Degenerate changes. No suspicious osseous lesion. Abdomen/Pelvis: Organs: The kidneys symmetric in size and enhancement with multiple bilateral hypodensities. These are most compatible with cyst.  These are not well characterized due to the subcentimeter size. No hydronephrosis. No perinephric collections. Otherwise the liver, gallbladder, spleen, right adrenal gland and pancreas unremarkable. Mild thickened appearance left adrenal gland. Motion challenge evaluation of the organs. GI/Bowel: Mild retained stool throughout the colon. No evidence of bowel obstruction. Colonic diverticulosis noted. This most pronounced within the sigmoid colon. The appendix unremarkable. Pelvis: Multiple bladder trabeculation and diverticula adenopathy identified. There is slight haziness of the bladder anteriorly and from prevesical fat could be infectious inflammatory. Please correlate urinalysis findings. Prostate is borderline size mildly enlarged. Peritoneum/Retroperitoneum: Aortic vascular calcifications. Multiple perirectal lymph nodes again identified. Unclear if these are reactive due to the the inflammatory change involving the bladder or could be due to underlying lymphoproliferative process. Again these are a follow up in 3-6 months. Bones/Soft Tissues: Multilevel degenerate change.   This is L4-5 identified unchanged prior exam.  No suspicious osseous lesion. Multifocal consolidative changes both lungs worrisome for multifocal pneumonia. Follow-up in following medical treatment course is recommended document complete resolution. Multifocal bladder traumatic trabeculation/diverticula noted with slight haziness of surrounding fat planes. Please correlate with diffuse urinalysis findings. Is findings could suggest underlying cystitis. Prominence of the Alisson aortic lymph nodes again identified. These could be reactive due to an underlying infectious inflammatory process such is the bladder haziness. However neoplastic process may also considered. Consider 3-6 month follow-up. RECOMMENDATIONS: Unavailable       XR SHOULDER LEFT (MIN 2 VIEWS)    Result Date: 12/8/2021  EXAMINATION: THREE XRAY VIEWS OF THE LEFT SHOULDER 12/8/2021 9:13 pm COMPARISON: None. HISTORY: ORDERING SYSTEM PROVIDED HISTORY: now having pain after fall TECHNOLOGIST PROVIDED HISTORY: Reason for exam:->now having pain after fall Reason for Exam: now having pain after fall Additional signs and symptoms: now having pain after fall Relevant Medical/Surgical History: now having pain after fall FINDINGS: No acute fracture or dislocation is identified. A mass or masslike consolidation is noted in the left lung. No acute fracture identified. Incidentally noted mass or masslike consolidation in the left upper lobe. Advise chest CT for further evaluation. XR CHEST PORTABLE    Result Date: 12/8/2021  EXAMINATION: ONE XRAY VIEW OF THE CHEST 12/8/2021 5:48 pm COMPARISON: Chest x-ray January 17, 2020; chest x-ray September 25, 2018 HISTORY: ORDERING SYSTEM PROVIDED HISTORY: fall TECHNOLOGIST PROVIDED HISTORY: Reason for exam:->fall Reason for Exam: fall Relevant Medical/Surgical History: A-fib FINDINGS: Cardiac silhouette is enlarged but stable. Atherosclerotic changes of the aorta.   Patchy interstitial opacities throughout the lungs appear unchanged when

## 2021-12-09 NOTE — PLAN OF CARE
Admission pending CT head, now more confused after recent fall, initial CT w/o ICH, will repeat.  C/w neurochecks

## 2021-12-09 NOTE — H&P
HISTORY AND PHYSICAL  (Hospitalist, Internal Medicine)  IDENTIFYING INFORMATION   PATIENT:  Nat Slater  MRN:  [id number]  ADMIT DATE: 12/8/2021  TIME OF EVALUATION: 12/9/2021 2:15 AM    CHIEF COMPLAINT     Came in for fall  HISTORY OF PRESENT ILLNESS   Nat Slater is a 77 y.o. male admitted for potential pneumonia, although patient is a poor historian and has no acute complaints of lower respiratory tract or upper respiratory tract infection. Patient denies any shortness of breath or cough. ED provider started treatment based on imaging, will continue treatment at this time, and call pulmonary to further differentiate whether patient has pneumonia as he shows limited clinical signs at this time. Patient does realize that he fell couple days ago and had a hard time getting up. Does complain of generalized weakness, however otherwise has no focal neurological deficits. Pt otherwise has no complaints of CP, SOB, dizziness, N/V/C/D, abdominal pain, dysuria, joint pains, rash/boils, or fevers.        PMH listed below:    PAST MEDICAL, SURGICAL, FAMILY, and SOCIAL HISTORY     Past Medical History:   Diagnosis Date    Arthropathy     Atrial fibrillation (HCC)     [facility]     Diabetes mellitus ([facility]     Gout     Hyperlipidemia     Hypertension     Lumbago      Past Surgical History:   Procedure Laterality Date    COLONOSCOPY N/A 9/27/2018    COLONOSCOPY POLYPECTOMY SNARE/COLD BIOPSY performed by Osvaldo Hemphill MD at [place] [address] COLONOSCOPY N/A 11/18/2020    COLONOSCOPY DIAGNOSTIC performed by Maykel Esposito MD at [facility] ENDOSCOPY     Family History   Problem Relation Age of Onset    No Known Problems Mother     No Known Problems Father      Family Hx of HTN  Family Hx as reviewed above, otherwise non-contributory  Social History     Socioeconomic History    Marital status: Single     Spouse name: None    Number of children: None    Years of education: None    Highest education level: None   Occupational History    None   Tobacco Use    Smoking status: Former Smoker     Packs/day: 0.50     Types: Cigarettes    Smokeless tobacco: Never Used   Substance and Sexual Activity    Alcohol use: No    Drug use: No    Sexual activity: None   Other Topics Concern    None   Social History Narrative    None     Social Determinants of Health     Financial Resource Strain:     Difficulty of Paying Living Expenses: Not on file   Food Insecurity: Food Insecurity Present    Worried About Running Out of Food in the Last Year: Often true    Ivy of Food in the Last Year: Often true   Transportation Needs:     Lack of Transportation (Medical): Not on file    Lack of Transportation (Non-Medical): Not on file   Physical Activity:     Days of Exercise per Week: Not on file    Minutes of Exercise per Session: Not on file   Stress:     Feeling of Stress : Not on file   Social Connections:     Frequency of Communication with Friends and Family: Not on file    Frequency of Social Gatherings with Friends and Family: Not on file    Attends Congregational Services: Not on file    Active Member of [address] [place] [address] or Organizations: Not on file    Attends Club or Organization Meetings: Not on file    Marital Status: Not on file   Intimate Partner Violence:     Fear of Current or Ex-Partner: Not on file    Emotionally Abused: Not on file    Physically Abused: Not on file    Sexually Abused: Not on file   Housing Stability:     Unable to Pay for Housing in the Last Year: Not on file    Number of [place] in the Last Year: Not on file    Unstable Housing in the Last Year: Not on file       MEDICATIONS   Medications Prior to Admission  Not in a hospital admission.     Current Medications  Current Facility-Administered Medications   Medication Dose Route Frequency Provider Last Rate Last Admin    potassium chloride (KLOR-CON M) extended release tablet 40 mEq  40 mEq Oral CLYDEN Ever Gray MD        Or   Ap Mahan potassium bicarb-citric acid (EFFER-K) effervescent tablet 40 mEq  40 mEq Oral PRN Ever Turner MD        Or    potassium chloride 10 mEq/100 mL IVPB (Peripheral Line)  10 mEq IntraVENous PRN Ever Turner MD        cefTRIAXone (ROCEPHIN) 1000 mg IVPB in 50 mL D5W minibag  1,000 mg IntraVENous Once Trupti Valiente MD         Current Outpatient Medications   Medication Sig Dispense Refill    FEROSUL 325 (65 Fe) MG tablet take ONE TABLET BY MOUTH TWICE DAILY 60 tablet 5    lidocaine (LIDODERM) 5 % APPLY 1 PATCH TO SKIN EVERY DAY AS NEEDED - LEAVE ON FOR UP TO 12 HOURS - AFTER YOU TAKE THE PATCH OFF, DO NOT PUT ANOTHER PATCH ON THAT AREA OF SKIN FOR AT LEAST 12 HOURS      losartan (COZAAR) 100 MG tablet TAKE ONE TABLET BY MOUTH EVERY DAY      cyanocobalamin ([employer] VITAMIN B12) 1000 MCG tablet Take 1 tablet by mouth daily 30 tablet 5    famotidine (PEPCID) 40 MG tablet Take 40 mg by mouth nightly      potassium chloride (KLOR-CON M) 20 MEQ extended release tablet Take 20 mEq by mouth daily      tamsulosin (FLOMAX) 0.4 MG capsule Take 1 capsule by mouth daily (Patient taking differently: Take 0.4 mg by mouth nightly ) 30 capsule 3    nitroGLYCERIN (NITROSTAT) 0.4 MG SL tablet up to max of 3 total doses.  If no relief after 1 dose, call 911. 25 tablet 0    apixaban (ELIQUIS) 5 MG TABS tablet Take 1 tablet by mouth 2 times daily 60 tablet 0    metoprolol tartrate (LOPRESSOR) 25 MG tablet Take 1 tablet by mouth 2 times daily 60 tablet 0    allopurinol (ZYLOPRIM) 300 MG tablet Take 300 mg by mouth daily      diltiazem (TIAZAC) 360 MG extended release capsule Take 360 mg by mouth daily      triamterene-hydrochlorothiazide (MAXZIDE-25) 37.5-25 MG per tablet Take 1 tablet by mouth daily      metFORMIN (GLUCOPHAGE) 500 MG tablet Take 500 mg by mouth 2 times daily (with meals)      pravastatin (PRAVACHOL) 20 MG tablet Take 20 mg by mouth nightly           Allergies  Allergies   Allergen Reactions    Demerol Hcl [Meperidine]     Gabapentin     Simvastatin        REVIEW OF SYSTEMS   Within above limitations. 14 point review of systems reviewed. Pertinent positive or negative as per HPI or otherwise negative per 14 point systems review. PHYSICAL EXAM     Wt Readings from Last 3 Encounters:   06/07/21 260 lb 7 oz (118.1 kg)   01/22/20 260 lb (117.9 kg)   10/01/18 260 lb (117.9 kg)       Blood pressure 131/81, pulse 81, temperature 98.9 °F (37.2 °C), resp. rate 26, SpO2 94 %. General - AAO x 3  Psych - Appropriate affect/speech. No agitation  Eyes - Eye lids intact. No scleral icterus  ENT - Lips wnl. External ear clear/dry/intact. No thyromegaly on inspection  Neuro - No gross peripheral or central neuro deficits on inspection  Heart - Sinus. RRR. S1 and S2 present. No added HS/murmurs appreciated. No elevated JVD appreciated  Lung - Adequate air entry b/l, No crackles/wheezes appreciated  GI - Soft. No guarding/rigidity. No hepatosplenomegaly/ascites. BS+   - No CVA/suprapubic tenderness or palpable bladder distension  Skin - Intact. No rash/petechiae/ecchymosis. Warm extremities  MSK - Joints with normal ROM.  No joint swellings    Lines/Drains/Airways/Wounds:  [unfilled]    LABS AND IMAGING   CBC  [unfilled]    Last 3 Hemoglobin  Lab Results   Component Value Date    HGB 10.9 12/08/2021    HGB 11.0 06/07/2021    HGB 10.7 05/07/2021     Last 3 WBC/ANC  Lab Results   Component Value Date    WBC 6.4 12/08/2021    WBC 7.0 06/07/2021    WBC 6.8 05/07/2021     No components found for: GRNLOCTYABS  Last 3 Platelets  No results found for: PLATELET  Chemistry  [unfilled]  [unfilled]  Lab Results   Component Value Date     02/12/2021     Coagulation Studies  Lab Results   Component Value Date    INR 1.80 11/17/2020     Liver Function Studies  Lab Results   Component Value Date    ALT 9 12/08/2021    AST 29 12/08/2021    ALKPHOS 75 12/08/2021       Recent Imaging        Relevant labs and imaging reviewed    ASSESSMENT AND PLAN   Robert Yeager is a 77 y.o. male p/w    Multifocal consolidated lesions possibly d/t Community-acquired pneumonia  - CXR with infiltrates  - ceftriaxone and azithromycin   - sputum cuture, , legionella antigen, strep pneumonia antigen  -Covid swab ordered, f/u   - PRN O2 via NC  - pulmonary consult if needing continued antibiotics, as patient reports no symptoms of lower respiratory tract infection    Fall  -Repeat head CT with no acute findings  Neurochecks  PT OT      Paroxysmal A.  Fib - rate controlled, on Eliquis but put on hold d/t possible bleeding.       -continue metoprolol/diltiazem       -cont tele monitoring    Hypertension, uncontrolled       -cont Losartan, Lopressor, Maxzide       -monitor BP trends    Hematuria  Seen on UA  With possible concern of cystitis  Urology consult  Continue with antibiotics as above  Monitor H&H  Held anticoagulation, restart if hemoglobin stable        DM type 2 -        -Sliding scale       -monitor accucheck       -diabetic diet       -pending HgbA1C    Morbid obesity - current BMI 39.5       -Health maintenance: exercise, lifestyle modification, and reduced caloric intake    Chronic Illnesses:       -CAD s/p PCI with DIANA x2 to LAD (08/2018), not on aspirin or Plavix but on Eliquis, held temporarily at this time       -hyperlipidemia -on Pravastatin       -bilateral hip osteoarthritis       -chronic anemia -on ferrous sulfate       -BPH -on Flomax        Case d/w ED provider    DVT ppx: held for recent fall and hematuria  Code status: [facility], Internal Medicine  12/9/2021 at 2:29 AM

## 2021-12-09 NOTE — ED NOTES
Patient admitted to room 3007. [facility]       HAI García  12/09/21 6880

## 2021-12-10 LAB
APTT: 41.2 SECONDS (ref 25.1–37.1)
BASE EXCESS MIXED: 1.8 (ref 0–1.2)
CARBON MONOXIDE, BLOOD: 1.8 % (ref 0–5)
CO2 CONTENT: 30.2 MMOL/L (ref 19–24)
COMMENT: ABNORMAL
CULTURE: NORMAL
HCO3 ARTERIAL: 28.6 MMOL/L (ref 18–23)
HCT VFR BLD CALC: 36 % (ref 42–52)
HCT VFR BLD CALC: 37.1 % (ref 42–52)
HCT VFR BLD CALC: 38.3 % (ref 42–52)
HEMOGLOBIN: 11.2 GM/DL (ref 13.5–18)
HEMOGLOBIN: 11.6 GM/DL (ref 13.5–18)
HEMOGLOBIN: 11.8 GM/DL (ref 13.5–18)
HIGH SENSITIVE C-REACTIVE PROTEIN: 171 MG/L
Lab: NORMAL
METHEMOGLOBIN ARTERIAL: 1.2 %
O2 SATURATION: 86.5 % (ref 96–97)
PCO2 ARTERIAL: 53 MMHG (ref 32–45)
PH BLOOD: 7.34 (ref 7.34–7.45)
PO2 ARTERIAL: 58 MMHG (ref 75–100)
POTASSIUM SERPL-SCNC: 3.7 MMOL/L (ref 3.5–5.1)
PROCALCITONIN: 31.75
SPECIMEN: NORMAL
TROPONIN T: 0.42 NG/ML
TROPONIN T: 0.6 NG/ML

## 2021-12-10 PROCEDURE — 82803 BLOOD GASES ANY COMBINATION: CPT

## 2021-12-10 PROCEDURE — 2500000003 HC RX 250 WO HCPCS: Performed by: NURSE PRACTITIONER

## 2021-12-10 PROCEDURE — 6360000002 HC RX W HCPCS: Performed by: NURSE PRACTITIONER

## 2021-12-10 PROCEDURE — 6370000000 HC RX 637 (ALT 250 FOR IP): Performed by: INTERNAL MEDICINE

## 2021-12-10 PROCEDURE — 85730 THROMBOPLASTIN TIME PARTIAL: CPT

## 2021-12-10 PROCEDURE — 86141 C-REACTIVE PROTEIN HS: CPT

## 2021-12-10 PROCEDURE — 84145 PROCALCITONIN (PCT): CPT

## 2021-12-10 PROCEDURE — 84132 ASSAY OF SERUM POTASSIUM: CPT

## 2021-12-10 PROCEDURE — 2060000000 HC ICU INTERMEDIATE R&B

## 2021-12-10 PROCEDURE — 36600 WITHDRAWAL OF ARTERIAL BLOOD: CPT

## 2021-12-10 PROCEDURE — 94761 N-INVAS EAR/PLS OXIMETRY MLT: CPT

## 2021-12-10 PROCEDURE — 6360000002 HC RX W HCPCS: Performed by: INTERNAL MEDICINE

## 2021-12-10 PROCEDURE — 85014 HEMATOCRIT: CPT

## 2021-12-10 PROCEDURE — XW043H5 INTRODUCTION OF TOCILIZUMAB INTO CENTRAL VEIN, PERCUTANEOUS APPROACH, NEW TECHNOLOGY GROUP 5: ICD-10-PCS | Performed by: INTERNAL MEDICINE

## 2021-12-10 PROCEDURE — 2580000003 HC RX 258: Performed by: INTERNAL MEDICINE

## 2021-12-10 PROCEDURE — 99213 OFFICE O/P EST LOW 20 MIN: CPT

## 2021-12-10 PROCEDURE — 6370000000 HC RX 637 (ALT 250 FOR IP): Performed by: NURSE PRACTITIONER

## 2021-12-10 PROCEDURE — 84484 ASSAY OF TROPONIN QUANT: CPT

## 2021-12-10 PROCEDURE — APPSS60 APP SPLIT SHARED TIME 46-60 MINUTES: Performed by: NURSE PRACTITIONER

## 2021-12-10 PROCEDURE — 36415 COLL VENOUS BLD VENIPUNCTURE: CPT

## 2021-12-10 PROCEDURE — 99233 SBSQ HOSP IP/OBS HIGH 50: CPT | Performed by: INTERNAL MEDICINE

## 2021-12-10 PROCEDURE — 51702 INSERT TEMP BLADDER CATH: CPT

## 2021-12-10 PROCEDURE — 83735 ASSAY OF MAGNESIUM: CPT

## 2021-12-10 PROCEDURE — 2700000000 HC OXYGEN THERAPY PER DAY

## 2021-12-10 PROCEDURE — 85018 HEMOGLOBIN: CPT

## 2021-12-10 RX ORDER — FUROSEMIDE 10 MG/ML
40 INJECTION INTRAMUSCULAR; INTRAVENOUS 2 TIMES DAILY
Status: DISCONTINUED | OUTPATIENT
Start: 2021-12-10 | End: 2021-12-19

## 2021-12-10 RX ORDER — SPIRONOLACTONE 25 MG/1
25 TABLET ORAL DAILY
Status: DISCONTINUED | OUTPATIENT
Start: 2021-12-10 | End: 2021-12-19

## 2021-12-10 RX ORDER — METOPROLOL SUCCINATE 50 MG/1
50 TABLET, EXTENDED RELEASE ORAL 2 TIMES DAILY
Status: DISCONTINUED | OUTPATIENT
Start: 2021-12-10 | End: 2022-01-05 | Stop reason: HOSPADM

## 2021-12-10 RX ORDER — LORAZEPAM 2 MG/ML
0.5 INJECTION INTRAMUSCULAR ONCE
Status: COMPLETED | OUTPATIENT
Start: 2021-12-10 | End: 2021-12-10

## 2021-12-10 RX ORDER — DEXAMETHASONE SODIUM PHOSPHATE 10 MG/ML
6 INJECTION INTRAMUSCULAR; INTRAVENOUS DAILY
Status: DISCONTINUED | OUTPATIENT
Start: 2021-12-10 | End: 2021-12-29

## 2021-12-10 RX ORDER — GUAIFENESIN/DEXTROMETHORPHAN 100-10MG/5
5 SYRUP ORAL EVERY 4 HOURS PRN
Status: DISCONTINUED | OUTPATIENT
Start: 2021-12-10 | End: 2022-01-05 | Stop reason: HOSPADM

## 2021-12-10 RX ORDER — FUROSEMIDE 10 MG/ML
40 INJECTION INTRAMUSCULAR; INTRAVENOUS 3 TIMES DAILY
Status: DISCONTINUED | OUTPATIENT
Start: 2021-12-10 | End: 2021-12-10 | Stop reason: SDUPTHER

## 2021-12-10 RX ORDER — POTASSIUM CHLORIDE 20 MEQ/1
40 TABLET, EXTENDED RELEASE ORAL 2 TIMES DAILY WITH MEALS
Status: DISCONTINUED | OUTPATIENT
Start: 2021-12-10 | End: 2021-12-12 | Stop reason: ALTCHOICE

## 2021-12-10 RX ORDER — NITROGLYCERIN 20 MG/100ML
10 INJECTION INTRAVENOUS CONTINUOUS
Status: DISCONTINUED | OUTPATIENT
Start: 2021-12-10 | End: 2021-12-11

## 2021-12-10 RX ORDER — FUROSEMIDE 10 MG/ML
40 INJECTION INTRAMUSCULAR; INTRAVENOUS 2 TIMES DAILY
Status: DISCONTINUED | OUTPATIENT
Start: 2021-12-10 | End: 2021-12-10

## 2021-12-10 RX ORDER — POTASSIUM CHLORIDE 20 MEQ/1
40 TABLET, EXTENDED RELEASE ORAL
Status: DISCONTINUED | OUTPATIENT
Start: 2021-12-10 | End: 2021-12-10 | Stop reason: DRUGHIGH

## 2021-12-10 RX ADMIN — FUROSEMIDE 40 MG: 10 INJECTION, SOLUTION INTRAVENOUS at 01:29

## 2021-12-10 RX ADMIN — SODIUM CHLORIDE, PRESERVATIVE FREE 10 ML: 5 INJECTION INTRAVENOUS at 21:17

## 2021-12-10 RX ADMIN — METOPROLOL TARTRATE 25 MG: 50 TABLET, FILM COATED ORAL at 06:27

## 2021-12-10 RX ADMIN — LOSARTAN POTASSIUM 100 MG: 100 TABLET, FILM COATED ORAL at 06:26

## 2021-12-10 RX ADMIN — TAMSULOSIN HYDROCHLORIDE 0.4 MG: 0.4 CAPSULE ORAL at 21:12

## 2021-12-10 RX ADMIN — NITROGLYCERIN 10 MCG/MIN: 20 INJECTION INTRAVENOUS at 10:38

## 2021-12-10 RX ADMIN — LORAZEPAM 0.5 MG: 2 INJECTION INTRAMUSCULAR; INTRAVENOUS at 01:29

## 2021-12-10 RX ADMIN — FUROSEMIDE 40 MG: 10 INJECTION, SOLUTION INTRAVENOUS at 16:43

## 2021-12-10 RX ADMIN — SPIRONOLACTONE 25 MG: 25 TABLET ORAL at 16:57

## 2021-12-10 RX ADMIN — APIXABAN 5 MG: 5 TABLET, FILM COATED ORAL at 06:46

## 2021-12-10 RX ADMIN — FUROSEMIDE 40 MG: 10 INJECTION, SOLUTION INTRAVENOUS at 13:29

## 2021-12-10 RX ADMIN — FAMOTIDINE 40 MG: 20 TABLET ORAL at 21:12

## 2021-12-10 RX ADMIN — ALLOPURINOL 300 MG: 300 TABLET ORAL at 06:27

## 2021-12-10 RX ADMIN — DEXAMETHASONE SODIUM PHOSPHATE 6 MG: 10 INJECTION INTRAMUSCULAR; INTRAVENOUS at 13:28

## 2021-12-10 RX ADMIN — LORAZEPAM 0.5 MG: 2 INJECTION INTRAMUSCULAR; INTRAVENOUS at 06:26

## 2021-12-10 RX ADMIN — METOPROLOL SUCCINATE 50 MG: 50 TABLET, EXTENDED RELEASE ORAL at 21:17

## 2021-12-10 RX ADMIN — POTASSIUM CHLORIDE 40 MEQ: 1500 TABLET, EXTENDED RELEASE ORAL at 06:28

## 2021-12-10 RX ADMIN — TRIAMTERENE AND HYDROCHLOROTHIAZIDE 1 TABLET: 37.5; 25 TABLET ORAL at 06:27

## 2021-12-10 RX ADMIN — CEFTRIAXONE SODIUM 1000 MG: 1 INJECTION, POWDER, FOR SOLUTION INTRAMUSCULAR; INTRAVENOUS at 01:34

## 2021-12-10 RX ADMIN — PRAVASTATIN SODIUM 20 MG: 10 TABLET ORAL at 21:12

## 2021-12-10 RX ADMIN — TOCILIZUMAB 810 MG: 180 INJECTION, SOLUTION SUBCUTANEOUS at 17:57

## 2021-12-10 RX ADMIN — POTASSIUM CHLORIDE 40 MEQ: 1500 TABLET, EXTENDED RELEASE ORAL at 16:44

## 2021-12-10 RX ADMIN — FUROSEMIDE 40 MG: 10 INJECTION, SOLUTION INTRAVENOUS at 06:26

## 2021-12-10 RX ADMIN — GUAIFENESIN AND DEXTROMETHORPHAN 5 ML: 100; 10 SYRUP ORAL at 06:26

## 2021-12-10 RX ADMIN — APIXABAN 5 MG: 5 TABLET, FILM COATED ORAL at 21:12

## 2021-12-10 RX ADMIN — AZITHROMYCIN MONOHYDRATE 500 MG: 250 TABLET ORAL at 00:39

## 2021-12-10 ASSESSMENT — PAIN SCALES - GENERAL
PAINLEVEL_OUTOF10: 0
PAINLEVEL_OUTOF10: 0

## 2021-12-10 NOTE — ED NOTES
Pt had a drop in O2. Pt lungs sound \"wet\". Pt placed on non-rebreather. Respiratory called.  dawson QUINTEROS messaged and called for salma Nicole RN  12/09/21 2120

## 2021-12-10 NOTE — PROGRESS NOTES
.        Hospitalist Progress Note      Name:  Ronald Chu /Age/Sex: 1945  (77 y.o. male)   MRN & CSN:  [id number] & [id number] Admission Date/Time: 2021  5:33 PM   Location:  [address] [place] [address] PCP: Naresh Frey MD         Hospital Day: 3    Assessment and Plan:   Ronald Chu is a 77 y.o.  male  who presents with <principal problem not specified>    1. Covid pneumonia. Started on dexamethasone. Consulted pharmacy for tocilizumab and remdesivir. 2. Community acquired pneumonia. Procalcitonin 31.75, . Continue IV antibiotics. Follow up cultures. 3. Acute respiratory failure due to covid pneumonia. Continue supplemental oxygen. Pulmonary consult. 4. Fall   5. Hypertension  6. Lymphedema  7. Paroxysmal atrial fibrillation on apixaban  8. Diabetes Mellitus  9. CAD. Elevated troponin. Cardiology consulted  10. Acute CHF. On IV lasix per Cardiology  11. Hypokalemia. Replace and monitor    Diet ADULT DIET; Regular   DVT Prophylaxis [] Lovenox, []  Heparin, [] SCDs, [] Ambulation   GI Prophylaxis [] PPI,  [] H2 Blocker,  [] Carafate,  [] Diet/Tube Feeds   Code Status Full Code   Disposition Patient requires continued admission due to   MDM [] Low, [] Moderate,[]  High  Patient's risk as above due to      History of Present Illness:     Chief Complaint: <principal problem not specified>  Ronald Chu is a 77 y.o.  male  who presents with fall but found to have pneumonia. Patient more concerned about his legs. Limited history. Not volunteering much history. Ten point ROS reviewed negative, unless as noted above    Objective: Intake/Output Summary (Last 24 hours) at 12/10/2021 1218  Last data filed at 12/10/2021 7346  Gross per 24 hour   Intake --   Output 400 ml   Net -400 ml      Vitals:   Vitals:    12/10/21 0627   BP: (!) 164/87   Pulse: 101   Resp:    Temp:    SpO2:      Physical Exam:   GEN Awake male, sitting upright in bed in no apparent distress.  Appears given age.  Gerry Mage are equally round. No scleral erythema, discharge, or conjunctivitis. HENT Mucous membranes are moist. Oral pharynx without exudates, no evidence of thrush. NECK Supple, no apparent thyromegaly or masses. RESP Bilateral rhonchi. Symmetric chest movement. CARDIO/VASC S1/S2 auscultated. Regular rate without appreciable murmurs, rubs, or gallops. No JVD or carotid bruits. Moderate bilateral lymphedema. GI Abdomen is soft without significant tenderness, masses, or guarding. Bowel sounds are normoactive. Rectal exam deferred.  No costovertebral angle tenderness. Normal appearing external genitalia. Lane catheter is not present. HEME/LYMPH No palpable cervical lymphadenopathy and no hepatosplenomegaly. No petechiae or ecchymoses. MSK No gross joint deformities. SKIN Normal coloration, warm, dry. NEURO Cranial nerves appear intact, no lateralizing weakness. PSYCH Awake, alert, oriented x 3.       Medications:   Medications:    furosemide  40 mg IntraVENous TID    potassium chloride  40 mEq Oral BID WC    dexamethasone  6 mg IntraVENous Daily    allopurinol  300 mg Oral Daily    apixaban  5 mg Oral BID    famotidine  40 mg Oral Nightly    [Held by provider] metFORMIN  500 mg Oral BID WC    losartan  100 mg Oral Daily    metoprolol tartrate  25 mg Oral BID    pravastatin  20 mg Oral Nightly    tamsulosin  0.4 mg Oral Nightly    triamterene-hydroCHLOROthiazide  1 tablet Oral Daily    sodium chloride flush  5-40 mL IntraVENous 2 times per day    cefTRIAXone (ROCEPHIN) IV  1,000 mg IntraVENous Q24H    And    azithromycin  500 mg Oral Q24H      Infusions:    nitroGLYCERIN 10 mcg/min (12/10/21 1038)    sodium chloride      dilTIAZem (CARDIZEM) 125 mg in dextrose 5% 125 mL infusion 5 mg/hr (12/10/21 0020)     PRN Meds: guaiFENesin-dextromethorphan, 5 mL, Q4H PRN  potassium chloride, 40 mEq, PRN   Or  potassium alternative oral replacement, 40 mEq, PRN   Or  potassium chloride, 10 mEq, PRN  sodium chloride flush, 5-40 mL, PRN  sodium chloride, 25 mL, PRN  ondansetron, 4 mg, Q8H PRN   Or  ondansetron, 4 mg, Q6H PRN  polyethylene glycol, 17 g, Daily PRN  acetaminophen, 650 mg, Q6H PRN   Or  acetaminophen, 650 mg, Q6H PRN            Electronically signed by Daniel Hunt MD on 12/10/2021 at 12:18 PM

## 2021-12-10 NOTE — PROGRESS NOTES
Upon entering room pt was found to have nasal cannula on forehead and SPO2 75%. NC placed back in nose pt slow to recover. Non rebreather placed on pt at 15L until saturations increase and WNL.    VSS  RR 24 HR 90 controlled afib

## 2021-12-10 NOTE — PROGRESS NOTES
[facility] [place]  [address], [address] [facility],Third [address]  Phone: [phone]    Fax [phone]                  Jannette Faulkner MD, Nadeen Wells MD, [address] MD, MD Karli Joya MD Bettyjane Mola, MD Nels Clubs, KARELY Vargas, KARELY Young, [address] [place], [place] [address]KARELY Polanco    Cardiology Progress Note     Today's Plan: diuresis/ pulmonary    Admit Date:  12/8/2021    Consult reason/ Seen today for: atrial fibrillation    Subjective and  Overnight Events:  Appears to be in respiratory distress. He is now on 100% NRBM. Positive for COVID and respiratory distress    Assessment / Plan / Recommendation:     1. Elevated Troponin : Complicated coronary on cath in 2018. troponin this morning is higher, but given respiratory status and COVID + infection, we will continue medical management for now. Continue Aspirin  2. Echo shows EF of 35 to 40% with septal wall hypokinesis and moderate regurgitation, fall in EF since June 2018  3. Check ABG  4. Give AM lasix now, start NTG infusion. He would probably benefit from bipap. Pulmonology physician, Dr. Jailyn Fermin, notified of patient condition. 5. CHF: Acute Systolic/ Diastolic decompensated heart failure. Appears to be volume overloaded. Agree with diuresis. Increase IV Lasix to 40 mg TID. Start NTG infusion. Strict Is and Os and Daily weights. chf nurse consult  6. HTN: stable, continue present medications   7. Atrial Fibrillation: In sinus rhythm now on chronic anticoagulation. Hepairn was on hold due to elevated PTT< resume eliquis given no intervention planned at this time. IF needed can switch to lovenox if PO medications become problematic. 8. DVT prophylaxis if no contraindication  9. Dyslipidemia: continue statins       Echo 12/9/2021  Summary   Left ventricular systolic function is abnormal.   Ejection fraction is visually estimated at 35-40 %%.    Septal wall is hypokinetic   Moderate left ventricular hypertrophy. Grade III diastolic dysfunction. Moderately dilated left atrium. Mild to moderate mitral regurgitation. No evidence of any pericardial effusion. History of Presenting Illness:    Chief complain on admission : 77 y. o.year old who is admitted for  Chief Complaint   Patient presents with   [facility] Fall        Past medical history:    has a past medical history of Arthropathy, Atrial fibrillation ([address] Atrial flutter ([address] Diabetes mellitus ([address] Gout, Hyperlipidemia, Hypertension, and Lumbago. Past surgical history:   has a past surgical history that includes Colonoscopy (N/A, 9/27/2018) and Colonoscopy (N/A, 11/18/2020). Social History:   reports that he has quit smoking. His smoking use included cigarettes. He smoked 0.50 packs per day. He has never used smokeless tobacco. He reports that he does not drink alcohol and does not use drugs. Family history:  family history includes No Known Problems in his father and mother. Allergies   Allergen Reactions    Demerol Hcl [Meperidine]     Gabapentin     Simvastatin        Review of Systems   All 14 systems were reviewed and are negative  Except for the positive findings  which as documented     /72   Pulse 89   Temp 99.9 °F (37.7 °C) (Oral)   Resp 27   Ht 5' 8\" (1.727 m)   Wt 229 lb 4.5 oz (104 kg)   SpO2 (!) 88%   BMI 34.86 kg/m²   No intake or output data in the 24 hours ending 12/10/21 0532    Physical Exam  Vitals reviewed. Constitutional:       General: He is in acute distress. Appearance: Normal appearance. He is obese. He is not ill-appearing. HENT:      Head: Atraumatic. Neck:      Vascular: No carotid bruit. Cardiovascular:      Rate and Rhythm: Tachycardia present. Rhythm irregular. Pulses: Normal pulses. Heart sounds: Murmur heard. Pulmonary:      Effort: Respiratory distress present. Breath sounds: Wheezing and rales present. Comments: 100% NRBM  Musculoskeletal:         General: No swelling or deformity. Cervical back: Neck supple. No muscular tenderness. Neurological:      Mental Status: He is alert. Telemetry Reviewed:   Atrial fibrillation rate 110    Medications:    furosemide  40 mg IntraVENous BID    LORazepam  0.5 mg IntraVENous Once    allopurinol  300 mg Oral Daily    [Held by provider] apixaban  5 mg Oral BID    famotidine  40 mg Oral Nightly    [Held by provider] metFORMIN  500 mg Oral BID WC    losartan  100 mg Oral Daily    metoprolol tartrate  25 mg Oral BID    pravastatin  20 mg Oral Nightly    tamsulosin  0.4 mg Oral Nightly    triamterene-hydroCHLOROthiazide  1 tablet Oral Daily    sodium chloride flush  5-40 mL IntraVENous 2 times per day    cefTRIAXone (ROCEPHIN) IV  1,000 mg IntraVENous Q24H    And    azithromycin  500 mg Oral Q24H      sodium chloride      heparin (PORCINE) Infusion Stopped (12/09/21 1844)    dilTIAZem (CARDIZEM) 125 mg in dextrose 5% 125 mL infusion 5 mg/hr (12/10/21 0020)     guaiFENesin-dextromethorphan, potassium chloride **OR** potassium alternative oral replacement **OR** potassium chloride, sodium chloride flush, sodium chloride, ondansetron **OR** ondansetron, polyethylene glycol, acetaminophen **OR** acetaminophen, heparin (porcine), heparin (porcine)    Lab Data:  CBC:   Recent Labs     12/08/21 2210 12/08/21 2210 12/09/21  0704 12/09/21  0704 12/09/21  1548 12/09/21  2130 12/10/21  0451   WBC 6.4  --  9.7  --   --   --   --    HGB 10.9*   < > 10.5*   < > 11.2* 12.3* 11.8*   HCT 34.9*   < > 33.9*   < > 36.6* 39.6* 38.3*   MCV 90.2  --  89.9  --   --   --   --      --  255  --   --   --   --     < > = values in this interval not displayed.      BMP:   Recent Labs     12/08/21 2210 12/09/21  0704   * 133*   K 3.1* 2.9*   CL 96* 98*   CO2 20* 26   BUN 19 16   CREATININE 0.8* 0.8*     PT/INR: No results for input(s): PROTIME, INR in the last 72 hours.  BNP:  No results for input(s): PROBNP in the last 72 hours. TROPONIN:   Recent Labs     12/09/21  0240 12/09/21  0704 12/09/21  1548   TROPONINT 0.244* 0.214* 0.154*           All labs, medications and tests reviewed by myself , continue all other medications of all above medical condition listed as is except for changes mentioned above. Thank you very much for consult , please call with questions. Electronically signed by KARELY Keene CNP on 12/10/2021 at 5:32 AM    CARDIOLOGY ATTENDING ADDENDUM    I have seen ,spoken to  and examined this patient personally, independently of the nurse practitioner. I have reviewed the hospital care given to date and reviewed all pertinent labs and imaging. The plan was developed mutually at the time of the visit with the patient,  NP   and myself. I have spoken with patient, nursing staff and provided written and verbal instructions . The above note has been reviewed and I agree with the assessment, diagnosis, and treatment plan with changes made by me as follows     HPI:  I have reviewed the above HPI  And agree with above   Lamont Pascual is a 77 y. o.year old who and presents with had concerns including Fall.   Chief Complaint   Patient presents with    Fall     Please review addendum/changes made to note above   Interval history: Respiratory distress in setting of COVID and CHF    Physical Exam:  General:  Awake, alert, NAD  Head:normal  Eye:normal  Neck:  No JVD   Chest:  Clear to auscultation, respiration easy  Cardiovascular:  S1 and S2 audible, No added heart sounds, No 2+f ankle edema, or volume overload, No evidence of JVD, No crackles  Abdomen:   nontender  Extremities:  2+ edema  Pulses; palpable  Neuro: grossly normal      MEDICAL DECISION MAKING;    I agree with the above plan, which was planned by myself and discussed with NP.  -Bilateral edema with EF of 35%  Abnormal troponin in setting of CHF possible acute coronary syndrome and Covid pneumonia complicated by acute respiratory distress  Continue supportive care  Restart Eliquis, uptitrate metoprolol  Add Plavix  And Aldactone  At Lasix      Gus Madera MD [facility] 12/10/21

## 2021-12-10 NOTE — PROGRESS NOTES
[facility], [address]   Progress Note  [facility] 0 1 2      Date: 12/10/2021   Patient: Kathleen Perez   : 1945   DOA: 2021   MRN: [id number]   ROOM#: [id number]     Admit Date: 2021     Collaborating Urologist on Call at time of admission: Dr. Yesenia Conway  CC: Fall  Reason for Consult: Multifocal bladder traumatic trabeculation/diverticula, cystitis    Subjective:     Pain: mild, no nausea and no vomiting,   Bowel Movement/Flatus: Yes  Voidinfr castrejon catheter in place, urine clear yellow with minimal debris in tubing    Pt resting in bed, denies any issues with his catheter. On 100% NRBM.     Objective:    Vitals:    BP (!) 164/87   Pulse 101   Temp (!) 56 °F (13.3 °C)   Resp 27   Ht 5' 8\" (1.727 m)   Wt 229 lb 4.5 oz (104 kg)   SpO2 (!) 88%   BMI 34.86 kg/m²    Temp  Av.9 °F (31.6 °C)  Min: 56 °F (13.3 °C)  Max: 100.8 °F (38.2 °C)     Intake/Output Summary (Last 24 hours) at 12/10/2021 0830  Last data filed at 12/10/2021 4025  Gross per 24 hour   Intake --   Output 400 ml   Net -400 ml       Physical Exam:   General appearance: alert, appears stated age, cooperative, moderate distress and moderately obese  Head: Normocephalic, without obvious abnormality, atraumatic  Back: No CVA tenderness  Abdomen: Soft, non-tender, non-distended   : 16fr castrejon catheter in place, urine clear yellow with minimal debris in tubing    Labs:   WBC:    Lab Results   Component Value Date    WBC 9.7 2021      Hemoglobin/Hematocrit:    Lab Results   Component Value Date    HGB 11.8 12/10/2021    HCT 38.3 12/10/2021      BMP:   Lab Results   Component Value Date     2021    K 2.9 2021    CL 98 2021    CO2 26 2021    BUN 16 2021    LABALBU 3.0 2021    CREATININE 0.8 2021    CALCIUM 8.3 2021    GFRAA >60 2021    LABGLOM >60 2021      PT/INR:    Lab Results   Component Value Date    PROTIME 21.9 2020    INR 1.80 11/17/2020      PTT:    Lab Results   Component Value Date    APTT >240.0 12/09/2021     Blood Culture: 4 OUT OF 4 BOTTLES POSITIVE, PROTEUS SPECIES BY PCR  Urine Culture: pending    Imaging:  ECHO Complete 2DW Doppler W Color    Result Date: 12/9/2021  Transthoracic Echocardiography Report (TTE)  Demographics   Patient Name       Miquel Goldsmith    Date of Study       12/09/2021   Date of Birth      1945         Gender              Male   Age                77 year(s)         Race                   Patient Number     [id number]         Room Number         Chad Carmona [facility] 76.   Visit Number       [id number]   Corporate ID       [id number]   Accession Number   [id number]         [address] Solano [facility]   Ordering Physician Susana Henson MD                 Physician           MD  Procedure Type of Study   TTE procedure:ECHOCARDIOGRAM COMPLETE 2D W DOPPLER W COLOR. Procedure Date Date: 12/09/2021 Start: 11:31 AM Study Location: Portable Technical Quality: Adequate visualization Indications:NSTEMI. Patient Status: Routine Height: 68 inches Weight: 239 pounds BSA: 2.2 m2 BMI: 36.34 kg/m2 HR: 66 bpm BP: 151/68 mmHg  Conclusions   Summary  Left ventricular systolic function is abnormal.  Ejection fraction is visually estimated at 35-40 %%. Septal wall is hypokinetic  Moderate left ventricular hypertrophy. Grade III diastolic dysfunction. Moderately dilated left atrium. Mild to moderate mitral regurgitation. No evidence of any pericardial effusion.    Signature   ------------------------------------------------------------------  Electronically signed by Jose Carlos Kamara MD (Interpreting  physician) on 12/09/2021 at 03:41 PM  ------------------------------------------------------------------   Findings   Left Ventricle  Left ventricular systolic function is abnormal. Ejection fraction is visually estimated at 35-40 %  Moderate left ventricular hypertrophy. Grade III diastolic dysfunction. Left ventricle size is normal.  Septal wall is hypokinetic   Left Atrium  Moderately dilated left atrium. Right Atrium  Essentially normal right atrium. Right Ventricle  Essentially normal right ventricle. Aortic Valve  Structurally normal aortic valve. Mitral Valve  Mild to moderate mitral regurgitation. Tricuspid Valve  Structurally normal tricuspid valve. Pulmonic Valve  The pulmonic valve was not well visualized. Pericardial Effusion  No evidence of any pericardial effusion. Pleural Effusion  No evidence of pleural effusion. Miscellaneous  Inferior vena cava is dilated, measuring at 2.7 cm, but does collapse with  respiration.   M-Mode/2D Measurements & Calculations   LV Diastolic Dimension:  LV Systolic Dimension:  LA Dimension: 4 cmAO Root  5.83 cm                  4.04 cm                 Dimension: 3.8 cmLA Area:  LV FS:30.7 %             LV Volume Diastolic:    30.6 cm2  LV PW Diastolic: 6.53 cm 904 ml  LV PW Systolic: 3.75 cm  LV Volume Systolic: 85  Septum Diastolic: 5.45   ml  cm                       LV EDV/LV EDV Index:    RV Diastolic Dimension:  Septum Systolic: 8.67 cm 578 LL/17 m2LV ESV/LV   2.69 cm  CO: 4.74 l/min           ESV Index: 85 ml/39 m2  CI: 2.15 l/m*m2          EF Calculated (A4C): 41 LA/Aorta: 1.05                           %  LV Area Diastolic: 98.1  EF Calculated (2D):     LA volume/Index: 99 ml  cm2                      57.6 %                  /82X6  LV Area Systolic: 80.8  cm2                      LV Length: 9.06 cm                            LVOT: 2.2 cm  Doppler Measurements & Calculations   MV Peak E-Wave: 101 cm/s AV Peak Velocity: 127 cm/s LVOT Peak Velocity: 103  MV Peak A-Wave: 34.1     AV Peak Gradient: 6.45     cm/s  cm/s                     mmHg                       LVOT Mean Velocity: 72.7  MV E/A Ratio: 2.96       AV Mean Velocity: 95.8     cm/s  MV Peak Gradient: 4.08   cm/s                       LVOT Peak Gradient: 4  mmHg                     AV Mean Gradient: 4 mmHg   mmHgLVOT Mean Gradient:                           AV VTI: 23.9 cm            2 mmHg  MV P1/2t: 58 msec        AV Area (Continuity):3 cm2  MVA by PHT:3.79 cm2                           LVOT VTI: 18.9 cm  MV E' Septal Velocity:  6.8 cm/s  MV E' Lateral Velocity:  7.68 cm/s  MV E/E' septal: 14.85  MV E/E' lateral: 13.15      XR PELVIS (1-2 VIEWS)    Result Date: 12/8/2021  EXAMINATION: ONE XRAY VIEW OF THE PELVIS 12/8/2021 2:48 pm COMPARISON: CT abdomen pelvis, 01/17/2020 HISTORY: ORDERING SYSTEM PROVIDED HISTORY: trauma TECHNOLOGIST PROVIDED HISTORY: Reason for exam:->trauma Reason for Exam: pain Additional signs and symptoms: none Relevant Medical/Surgical History: none FINDINGS: The ilioischial and iliopectineal lines are intact bilaterally. The SI joints and pubic symphysis are congruent. The sacral neural foramina arches are intact. Severe, chronic degenerative disease is seen at the right hip, with bony remodeling of the acetabulum and the femoral head, which appears similar when compared to the previous abdomen and pelvis CTA from January 2020. There is at least moderate if not severe left hip arthrosis, not well evaluated. No acute abnormality detected. Severe degenerative changes at the right hip, with chronic bony remodeling, not substantially changed when compared to the CT from January 2020. CT HEAD WO CONTRAST    Result Date: 12/10/2021  EXAMINATION: CT OF THE HEAD WITHOUT CONTRAST  12/9/2021 12:49 am TECHNIQUE: CT of the head was performed without the administration of intravenous contrast. Dose modulation, iterative reconstruction, and/or weight based adjustment of the mA/kV was utilized to reduce the radiation dose to as low as reasonably achievable.  COMPARISON: None HISTORY: ORDERING SYSTEM PROVIDED HISTORY: AMS, fall TECHNOLOGIST PROVIDED HISTORY: Reason for exam:->AMS, fall Has a \"code stroke\" or \"stroke alert\" been called? ->No Decision Support Exception - unselect if not a suspected or confirmed emergency medical condition->Emergency Medical Condition (MA) Reason for Exam: AMS, fall FINDINGS: BRAIN/VENTRICLES: There is no acute intracranial hemorrhage, mass effect or midline shift. No abnormal extra-axial fluid collection. The gray-white differentiation is maintained without evidence of an acute infarct. There is no evidence of hydrocephalus. Moderate periventricular and deep subcortical white matter hypodensity is present. Diffuse atrophy is present. ORBITS: The visualized portion of the orbits demonstrate no acute abnormality. SINUSES: Ethmoid sinus disease is present. SOFT TISSUES/SKULL:  No acute abnormality of the visualized skull or soft tissues. No acute intracranial abnormality. Age related changes including chronic small vessel ischemic disease and cerebral atrophy. CT HEAD WO CONTRAST    Result Date: 12/8/2021  EXAMINATION: CT OF THE HEAD WITHOUT CONTRAST  12/8/2021 6:31 pm TECHNIQUE: CT of the head was performed without the administration of intravenous contrast. Dose modulation, iterative reconstruction, and/or weight based adjustment of the mA/kV was utilized to reduce the radiation dose to as low as reasonably achievable. COMPARISON: CT head 01/17/2020 HISTORY: ORDERING SYSTEM PROVIDED HISTORY: fall, found on ground TECHNOLOGIST PROVIDED HISTORY: Reason for exam:->fall, found on ground Has a \"code stroke\" or \"stroke alert\" been called? ->No Decision Support Exception - unselect if not a suspected or confirmed emergency medical condition->Emergency Medical Condition (MA) Reason for Exam: fall, found on ground Additional signs and symptoms: poor historian Relevant Medical/Surgical History: unknown FINDINGS: BRAIN/VENTRICLES: There is no acute intracranial hemorrhage, mass effect or midline shift.   No abnormal extra-axial fluid collection. The gray-white differentiation is maintained without evidence of an acute infarct. There is no evidence of hydrocephalus. Generalized involution changes and chronic small ischemic disease. Motion does degrade image quality. Vascular calcifications. ORBITS: The visualized portion of the orbits demonstrate no acute abnormality. SINUSES: The visualized paranasal sinuses and mastoid air cells demonstrate no acute abnormality. SOFT TISSUES/SKULL:  No acute abnormality of the visualized skull or soft tissues. Geographic lucency lesion identified right frontal bone appears to be new from prior examination. No acute intracranial abnormality. Geographic lucency frontal bone noted, consider bone scan imaging or consider skeletal survey. CT CHEST W CONTRAST    Result Date: 12/8/2021  EXAMINATION: CT OF THE ABDOMEN AND PELVIS WITH CONTRAST; CT OF THE CHEST WITH CONTRAST 12/8/2021 10:26 pm; 12/8/2021 10:25 pm TECHNIQUE: CT of the abdomen and pelvis was performed with the administration of intravenous contrast. Multiplanar reformatted images are provided for review. Dose modulation, iterative reconstruction, and/or weight based adjustment of the mA/kV was utilized to reduce the radiation dose to as low as reasonably achievable.; CT of the chest was performed with the administration of intravenous contrast. Multiplanar reformatted images are provided for review. Dose modulation, iterative reconstruction, and/or weight based adjustment of the mA/kV was utilized to reduce the radiation dose to as low as reasonably achievable. COMPARISON: CT angiogram chest abdomen/pelvis 01/17/2020 HISTORY: ORDERING SYSTEM PROVIDED HISTORY: abd pain TECHNOLOGIST PROVIDED HISTORY: Reason for exam:->abd pain Additional Contrast?->None Decision Support Exception - unselect if not a suspected or confirmed emergency medical condition->Emergency Medical Condition (MA) Reason for Exam: abd pain.  combative,won't listen Additional signs and symptoms: no Relevant Medical/Surgical History: 80 ml isovue 370 used.; ORDERING SYSTEM PROVIDED HISTORY: rule out occult PNA or injury, sats now 92% TECHNOLOGIST PROVIDED HISTORY: Reason for exam:->rule out occult PNA or injury, sats now 92% Reason for Exam: rule out occult PNA or injury, sats now 92% Additional signs and symptoms: no Relevant Medical/Surgical History: 80 ml isovue 370 used. FINDINGS: Chest: Mediastinum: Cardiomegaly. Triple-vessel coronary disease. No pericardial effusion. Mild scattered mediastinal hilar lymph nodes likely reactive. Lungs/pleura: Multifocal airspace opacities worrisome for multifocal pneumonia. These airspace opacities most confluent right lower lobe, left lower lobe lingula and left greater than right upper lobe. No effusion. No pneumothorax Soft Tissues/Bones: Degenerate changes. No suspicious osseous lesion. Abdomen/Pelvis: Organs: The kidneys symmetric in size and enhancement with multiple bilateral hypodensities. These are most compatible with cyst.  These are not well characterized due to the subcentimeter size. No hydronephrosis. No perinephric collections. Otherwise the liver, gallbladder, spleen, right adrenal gland and pancreas unremarkable. Mild thickened appearance left adrenal gland. Motion challenge evaluation of the organs. GI/Bowel: Mild retained stool throughout the colon. No evidence of bowel obstruction. Colonic diverticulosis noted. This most pronounced within the sigmoid colon. The appendix unremarkable. Pelvis: Multiple bladder trabeculation and diverticula adenopathy identified. There is slight haziness of the bladder anteriorly and from prevesical fat could be infectious inflammatory. Please correlate urinalysis findings. Prostate is borderline size mildly enlarged. Peritoneum/Retroperitoneum: Aortic vascular calcifications. Multiple perirectal lymph nodes again identified.   Unclear if these are reactive due to the the inflammatory change involving the bladder or could be due to underlying lymphoproliferative process. Again these are a follow up in 3-6 months. Bones/Soft Tissues: Multilevel degenerate change. This is L4-5 identified unchanged prior exam.  No suspicious osseous lesion. Multifocal consolidative changes both lungs worrisome for multifocal pneumonia. Follow-up in following medical treatment course is recommended document complete resolution. Multifocal bladder traumatic trabeculation/diverticula noted with slight haziness of surrounding fat planes. Please correlate with diffuse urinalysis findings. Is findings could suggest underlying cystitis. Prominence of the Alisson aortic lymph nodes again identified. These could be reactive due to an underlying infectious inflammatory process such is the bladder haziness. However neoplastic process may also considered. Consider 3-6 month follow-up. RECOMMENDATIONS: Unavailable     CT CERVICAL SPINE WO CONTRAST    Result Date: 12/8/2021  EXAMINATION: CT OF THE CERVICAL SPINE WITHOUT CONTRAST 12/8/2021 6:31 pm TECHNIQUE: CT of the cervical spine was performed without the administration of intravenous contrast. Multiplanar reformatted images are provided for review. Dose modulation, iterative reconstruction, and/or weight based adjustment of the mA/kV was utilized to reduce the radiation dose to as low as reasonably achievable. COMPARISON: CT cervical spine 01/17/2020 HISTORY: ORDERING SYSTEM PROVIDED HISTORY: found on ground TECHNOLOGIST PROVIDED HISTORY: Reason for exam:->found on ground Decision Support Exception - unselect if not a suspected or confirmed emergency medical condition->Emergency Medical Condition (MA) Reason for Exam: found on ground Additional signs and symptoms: poor historian Relevant Medical/Surgical History: unknown FINDINGS: BONES/ALIGNMENT: Motion challenge evaluation.   No convincing evidence acute displaced fracture traumatic malalignment slight signs and symptoms: no Relevant Medical/Surgical History: 80 ml isovue 370 used. FINDINGS: Chest: Mediastinum: Cardiomegaly. Triple-vessel coronary disease. No pericardial effusion. Mild scattered mediastinal hilar lymph nodes likely reactive. Lungs/pleura: Multifocal airspace opacities worrisome for multifocal pneumonia. These airspace opacities most confluent right lower lobe, left lower lobe lingula and left greater than right upper lobe. No effusion. No pneumothorax Soft Tissues/Bones: Degenerate changes. No suspicious osseous lesion. Abdomen/Pelvis: Organs: The kidneys symmetric in size and enhancement with multiple bilateral hypodensities. These are most compatible with cyst.  These are not well characterized due to the subcentimeter size. No hydronephrosis. No perinephric collections. Otherwise the liver, gallbladder, spleen, right adrenal gland and pancreas unremarkable. Mild thickened appearance left adrenal gland. Motion challenge evaluation of the organs. GI/Bowel: Mild retained stool throughout the colon. No evidence of bowel obstruction. Colonic diverticulosis noted. This most pronounced within the sigmoid colon. The appendix unremarkable. Pelvis: Multiple bladder trabeculation and diverticula adenopathy identified. There is slight haziness of the bladder anteriorly and from prevesical fat could be infectious inflammatory. Please correlate urinalysis findings. Prostate is borderline size mildly enlarged. Peritoneum/Retroperitoneum: Aortic vascular calcifications. Multiple perirectal lymph nodes again identified. Unclear if these are reactive due to the the inflammatory change involving the bladder or could be due to underlying lymphoproliferative process. Again these are a follow up in 3-6 months. Bones/Soft Tissues: Multilevel degenerate change. This is L4-5 identified unchanged prior exam.  No suspicious osseous lesion.      Multifocal consolidative changes both lungs worrisome for multifocal pneumonia. Follow-up in following medical treatment course is recommended document complete resolution. Multifocal bladder traumatic trabeculation/diverticula noted with slight haziness of surrounding fat planes. Please correlate with diffuse urinalysis findings. Is findings could suggest underlying cystitis. Prominence of the Alisson aortic lymph nodes again identified. These could be reactive due to an underlying infectious inflammatory process such is the bladder haziness. However neoplastic process may also considered. Consider 3-6 month follow-up. RECOMMENDATIONS: Unavailable     XR SHOULDER LEFT (MIN 2 VIEWS)    Result Date: 12/8/2021  EXAMINATION: THREE XRAY VIEWS OF THE LEFT SHOULDER 12/8/2021 9:13 pm COMPARISON: None. HISTORY: ORDERING SYSTEM PROVIDED HISTORY: now having pain after fall TECHNOLOGIST PROVIDED HISTORY: Reason for exam:->now having pain after fall Reason for Exam: now having pain after fall Additional signs and symptoms: now having pain after fall Relevant Medical/Surgical History: now having pain after fall FINDINGS: No acute fracture or dislocation is identified. A mass or masslike consolidation is noted in the left lung. No acute fracture identified. Incidentally noted mass or masslike consolidation in the left upper lobe. Advise chest CT for further evaluation. XR CHEST PORTABLE    Result Date: 12/10/2021  EXAMINATION: ONE XRAY VIEW OF THE CHEST 12/9/2021 10:42 pm COMPARISON: December 8, 2021 HISTORY: ORDERING SYSTEM PROVIDED HISTORY: dyspnea TECHNOLOGIST PROVIDED HISTORY: Reason for exam:->dyspnea Reason for Exam: dyspnea Additional signs and symptoms: unknown Relevant Medical/Surgical History: A-fib FINDINGS: No lines or tubes. Cardiomediastinal silhouette is enlarged. Interval worsening of diffuse airspace opacities. Probable small right pleural effusion. No pneumothorax. No acute osseous abnormality.      1. Cardiomegaly with severe congestive heart failure. XR CHEST PORTABLE    Result Date: 12/8/2021  EXAMINATION: ONE XRAY VIEW OF THE CHEST 12/8/2021 5:48 pm COMPARISON: Chest x-ray January 17, 2020; chest x-ray September 25, 2018 HISTORY: ORDERING SYSTEM PROVIDED HISTORY: fall TECHNOLOGIST PROVIDED HISTORY: Reason for exam:->fall Reason for Exam: fall Relevant Medical/Surgical History: A-fib FINDINGS: Cardiac silhouette is enlarged but stable. Atherosclerotic changes of the aorta. Patchy interstitial opacities throughout the lungs appear unchanged when compared with exam from January 17, 2020 and are similar to exam from September 25, 2018 suggesting chronic interstitial lung changes. No definite superimposed consolidation. No pleural effusion or pneumothorax. 1. No acute cardiopulmonary process identified. 2. Chronic interstitial changes of the lungs. VL DUP LOWER EXTREMITY VENOUS BILATERAL    Result Date: 12/9/2021  EXAMINATION: DUPLEX VENOUS ULTRASOUND OF THE BILATERAL LOWER [id number]/9/2021 8:16 am TECHNIQUE: Duplex ultrasound using B-mode/gray scaled imaging, Doppler spectral analysis and color flow Doppler was obtained of the deep venous structures of the lower bilateral extremities. COMPARISON: None. HISTORY: ORDERING SYSTEM PROVIDED HISTORY: dvt TECHNOLOGIST PROVIDED HISTORY: Reason for exam:->dvt FINDINGS: Overall exam is somewhat technically difficult and limited secondary to patient body habitus. Left popliteal vein not visualized secondary to patient's limited mobility. Right femoral vein difficult to compress secondary to patient body habitus. Within the limitations: The visualized veins of the bilateral lower extremities are patent and free of echogenic thrombus. The veins demonstrate good compressibility with normal color flow study and spectral analysis. Nonspecific subcutaneous edema to bilateral lower extremities. No focal fluid collections noted. No evidence of DVT in either lower extremity within the limitations. Mild nonspecific subcutaneous edema to bilateral lower extremities. Assessment & Plan:      Bhavani Montano is a 77y.o. year old male admitted 12/8/2021 for pneumonia, UTI.     1) Sepsis with Cystitis: CT a/p w contrast 12/8/21: Multifocal bladder traumatic trabeculation/diverticula noted with slight haziness of surrounding fat planes. Blood cultures 4/4 +Proteus              UA w pos nit, small leuks, >770 RBC's; urine cx pending. WBC 9.7, T-max 100.8 overnight              On IV Rocephin; adjust abx as necessary per urine c&s. 2) BPH: continue Flomax. Recommend outpatient Cystoscopy/TRUS/Uroflow in the near future. Lane removal/voiding trial prior to discharge     Will follow peripherally. Patient seen and examined, chart reviewed.      Electronically signed by Conrad Anne PA-C on 12/10/2021 at 8:30 AM

## 2021-12-10 NOTE — CONSULTS
[facility]  Consult Note       Leopold Bruch  AGE: 77 y.o. GENDER: male  : 1945  TODAY'S DATE:  12/10/2021    Subjective:     Reason for  Evaluation and Assessment: wound care eval. Leopold Bruch is a 77 y.o. male referred by:   [x] Physician  [] Nursing  [] Other:      Wound Identification:  Wound Type: venous and pressure  Contributing Factors: edema, venous stasis, obesity and malnutrition        PAST MEDICAL HISTORY        Diagnosis Date    Arthropathy     Atrial fibrillation (HCC)     [facility]     Diabetes mellitus ([facility]     Gout     Hyperlipidemia     Hypertension     Lumbago        PAST SURGICAL HISTORY    Past Surgical History:   Procedure Laterality Date    COLONOSCOPY N/A 2018    COLONOSCOPY POLYPECTOMY SNARE/COLD BIOPSY performed by Nichol Pizarro MD at [place] [address] COLONOSCOPY N/A 2020    COLONOSCOPY DIAGNOSTIC performed by Radha Burden MD at [facility] ENDOSCOPY       FAMILY HISTORY    Family History   Problem Relation Age of Onset    No Known Problems Mother     No Known Problems Father        SOCIAL HISTORY    Social History     Tobacco Use    Smoking status: Former Smoker     Packs/day: 0.50     Types: Cigarettes    Smokeless tobacco: Never Used   Substance Use Topics    Alcohol use: No    Drug use: No       ALLERGIES    Allergies   Allergen Reactions    Demerol Hcl [Meperidine]     Gabapentin     Simvastatin        MEDICATIONS    No current facility-administered medications on file prior to encounter.      Current Outpatient Medications on File Prior to Encounter   Medication Sig Dispense Refill    FEROSUL 325 (65 Fe) MG tablet take ONE TABLET BY MOUTH TWICE DAILY 60 tablet 5    lidocaine (LIDODERM) 5 % APPLY 1 PATCH TO SKIN EVERY DAY AS NEEDED - LEAVE ON FOR UP TO 12 HOURS - AFTER YOU TAKE THE PATCH OFF, DO NOT PUT ANOTHER PATCH ON THAT AREA OF SKIN FOR AT LEAST 12 HOURS      losartan (COZAAR) 100 MG tablet TAKE ONE TABLET BY MOUTH EVERY DAY      cyanocobalamin ([employer] VITAMIN B12) 1000 MCG tablet Take 1 tablet by mouth daily 30 tablet 5    famotidine (PEPCID) 40 MG tablet Take 40 mg by mouth nightly      potassium chloride (KLOR-CON M) 20 MEQ extended release tablet Take 20 mEq by mouth daily      tamsulosin (FLOMAX) 0.4 MG capsule Take 1 capsule by mouth daily (Patient taking differently: Take 0.4 mg by mouth nightly ) 30 capsule 3    nitroGLYCERIN (NITROSTAT) 0.4 MG SL tablet up to max of 3 total doses.  If no relief after 1 dose, call 911. 25 tablet 0    apixaban (ELIQUIS) 5 MG TABS tablet Take 1 tablet by mouth 2 times daily 60 tablet 0    metoprolol tartrate (LOPRESSOR) 25 MG tablet Take 1 tablet by mouth 2 times daily 60 tablet 0    allopurinol (ZYLOPRIM) 300 MG tablet Take 300 mg by mouth daily      diltiazem (TIAZAC) 360 MG extended release capsule Take 360 mg by mouth daily      triamterene-hydrochlorothiazide (MAXZIDE-25) 37.5-25 MG per tablet Take 1 tablet by mouth daily      metFORMIN (GLUCOPHAGE) 500 MG tablet Take 500 mg by mouth 2 times daily (with meals)      pravastatin (PRAVACHOL) 20 MG tablet Take 20 mg by mouth nightly           Objective:      BP (!) 164/87   Pulse 101   Temp (!) 56 °F (13.3 °C)   Resp 27   Ht 5' 8\" (1.727 m)   Wt 229 lb 4.5 oz (104 kg)   SpO2 (!) 88%   BMI 34.86 kg/m²   Bill Risk Score: Bill Scale Score: 14    LABS    CBC:   Lab Results   Component Value Date    WBC 9.7 12/09/2021    RBC 3.77 12/09/2021    HGB 11.8 12/10/2021    HCT 38.3 12/10/2021    MCV 89.9 12/09/2021    MCH 27.9 12/09/2021    MCHC 31.0 12/09/2021    RDW 16.0 12/09/2021     12/09/2021    MPV 9.5 12/09/2021     CMP:    Lab Results   Component Value Date     12/09/2021    K 2.9 12/09/2021    CL 98 12/09/2021    CO2 26 12/09/2021    BUN 16 12/09/2021    CREATININE 0.8 12/09/2021    GFRAA >60 12/09/2021    LABGLOM >60 12/09/2021    GLUCOSE 174 12/09/2021    PROT 6.7 12/09/2021 LABALBU 3.0 12/09/2021    CALCIUM 8.3 12/09/2021    BILITOT 0.4 12/09/2021    ALKPHOS 67 12/09/2021    AST 32 12/09/2021    ALT 10 12/09/2021     Albumin:    Lab Results   Component Value Date    LABALBU 3.0 12/09/2021     PT/INR:    Lab Results   Component Value Date    PROTIME 21.9 11/17/2020    INR 1.80 11/17/2020     HgBA1c:    Lab Results   Component Value Date    LABA1C 6.2 11/17/2020         Assessment:     Patient Active Problem List   Diagnosis    [facility] mellitus [facility]    Atrial flutter ([facility]    Atrial fibrillation ([facility]    Gout    Hyperlipidemia    Hypertension    Arthropathy    Upper GI bleed    NSTEMI (non-ST elevated myocardial infarction) ([facility]    Rectal bleeding    Sepsis ([facility]    BRBPR (bright red blood per rectum)    Acute cystitis without hematuria    Leukocytosis    Acute hypokalemia    Rectal bleed    Anemia    B12 deficiency    Elevated antinuclear antibody (FREDRICK) level    PNA (pneumonia)    Troponin I above reference range       Measurements:  Wound 06/18/17 Leg Left (Active)   Number of days: 1635       Wound 12/10/21 Tibial Right; Posterior (Active)   Wound Image   12/10/21 0900   Wound Etiology Venous 12/10/21 0900   Dressing Status New dressing applied 12/10/21 0900   Wound Cleansed Other (Comment) 12/10/21 0900   Dressing/Treatment ABD; Hydrofiber Ag; Roll gauze 12/10/21 0900   Wound Length (cm) 9.5 cm 12/10/21 0900   Wound Width (cm) 6 cm 12/10/21 0900   Wound Depth (cm) 0.1 cm 12/10/21 0900   Wound Surface Area (cm^2) 57 cm^2 12/10/21 0900   Wound Volume (cm^3) 5.7 cm^3 12/10/21 0900   Distance Tunneling (cm) 0 cm 12/10/21 0900   Tunneling Position ___ O'Clock 0 12/10/21 0900   Undermining Starts ___ O'Clock 0 12/10/21 0900   Undermining Ends___ O'Clock 0 12/10/21 0900   Undermining Maxium Distance (cm) 0 12/10/21 0900   Drainage Amount Moderate 12/10/21 0900   Drainage Description Serosanguinous 12/10/21 0900   Odor None 12/10/21 0900   Alisson-wound Assessment Intact 12/10/21 0900   Margins Undefined edges 12/10/21 0900   Wound Thickness Description not for Pressure Injury Partial thickness 12/10/21 0900   Number of days: 0       Wound 12/10/21 Pretibial Right; Lateral cluster (Active)   Wound Image   12/10/21 0900   Wound Etiology Venous 12/10/21 0900   Dressing Status New dressing applied 12/10/21 0900   Wound Cleansed Cleansed with saline 12/10/21 0900   Dressing/Treatment ABD; Hydrofiber Ag; Roll gauze 12/10/21 0900   Wound Length (cm) 9 cm 12/10/21 0900   Wound Width (cm) 11 cm 12/10/21 0900   Wound Depth (cm) 0.1 cm 12/10/21 0900   Wound Surface Area (cm^2) 99 cm^2 12/10/21 0900   Wound Volume (cm^3) 9.9 cm^3 12/10/21 0900   Distance Tunneling (cm) 0 cm 12/10/21 0900   Tunneling Position ___ O'Clock 0 12/10/21 0900   Undermining Starts ___ O'Clock 0 12/10/21 0900   Undermining Ends___ O'Clock 0 12/10/21 0900   Undermining Maxium Distance (cm) 0 12/10/21 0900   Drainage Amount Moderate 12/10/21 0900   Drainage Description Serosanguinous 12/10/21 0900   Odor None 12/10/21 0900   Alisson-wound Assessment Intact 12/10/21 0900   Margins Defined edges 12/10/21 0900   Wound Thickness Description not for Pressure Injury Full thickness 12/10/21 0900   Number of days: 0       Wound 12/10/21 Buttocks Left (Active)   Wound Image   12/10/21 0900   Wound Etiology Deep tissue/Injury 12/10/21 0900   Dressing Status New dressing applied 12/10/21 0900   Wound Cleansed Not Cleansed 12/10/21 0900   Dressing/Treatment Silicone border 32/48/12 0900   Wound Length (cm) 1 cm 12/10/21 0900   Wound Width (cm) 0.8 cm 12/10/21 0900   Wound Depth (cm) 0 cm 12/10/21 0900   Wound Surface Area (cm^2) 0.8 cm^2 12/10/21 0900   Wound Volume (cm^3) 0 cm^3 12/10/21 0900   Distance Tunneling (cm) 0 cm 12/10/21 0900   Tunneling Position ___ O'Clock 0 12/10/21 0900   Undermining Starts ___ O'Clock 0 12/10/21 0900   Undermining Ends___ O'Clock 0 12/10/21 0900   Undermining Maxium Distance (cm) 0 12/10/21 0900 Drainage Amount None 12/10/21 0900   Odor None 12/10/21 0900   Alisson-wound Assessment Intact 12/10/21 0900   Margins Attached edges 12/10/21 0900   Number of days: 0       Response to treatment:   Patient tolerated fair    Pain Assessment:  Severity:  Pt did not verbalize pain   Quality of pain:   Wound Pain Timing/Severity:   Premedicated:     Plan:     Plan of Care: Wound 12/10/21 Tibial Right; Posterior-Dressing/Treatment: ABD, Hydrofiber Ag, Roll gauze  Wound 12/10/21 Pretibial Right; Lateral cluster-Dressing/Treatment: ABD, Hydrofiber Ag, Roll gauze  Wound 12/10/21 Buttocks Left-Dressing/Treatment: Silicone border     Patient in bed is on a non-re breather and is sob. Pt agreeable to wound care eval. Pt has wounds to posterior/lateral leg venous/trauma. Pt has chronic venous skin changes. Cleansed with NS. Measured and pictured. Applied dressing as above. Pt has a deep tissue injury to left buttocks measured and pictured. Applied silicone border. Heels intact and floated. Pt turned to rt side. Atmos air pump placed to bed. Pt is at moderate risk for skin breakdown AEB heriberto. Follow heriberto orders. Specialty Bed Required :  yes  [] Low Air Loss   [x] Pressure Redistribution  [] Fluid Immersion  [] Bariatric  [] Total Pressure Relief  [] Other:     Discharge Plan:  Placement for patient upon discharge: tbd  Hospice Care: no  Patient appropriate for Outpatient [address]: [facility]    Patient/Caregiver Teaching:  Level of patient/caregiver understanding able to:  Cares explained as given. No evidence of learning. Electronically signed by Katerina Casas.  HAI Hoang,  on 12/10/2021 at 11:48 AM

## 2021-12-11 ENCOUNTER — APPOINTMENT (OUTPATIENT)
Dept: GENERAL RADIOLOGY | Age: 76
DRG: 870 | End: 2021-12-11
Payer: COMMERCIAL

## 2021-12-11 LAB
ALBUMIN SERPL-MCNC: 3.3 GM/DL (ref 3.4–5)
ALP BLD-CCNC: 69 IU/L (ref 40–129)
ALT SERPL-CCNC: 16 U/L (ref 10–40)
ANION GAP SERPL CALCULATED.3IONS-SCNC: 13 MMOL/L (ref 4–16)
APTT: 39.3 SECONDS (ref 25.1–37.1)
APTT: 44.3 SECONDS (ref 25.1–37.1)
APTT: 46.8 SECONDS (ref 25.1–37.1)
AST SERPL-CCNC: 38 IU/L (ref 15–37)
BASE EXCESS MIXED: 0.8 (ref 0–1.2)
BILIRUB SERPL-MCNC: 0.5 MG/DL (ref 0–1)
BUN BLDV-MCNC: 26 MG/DL (ref 6–23)
CALCIUM SERPL-MCNC: 8.7 MG/DL (ref 8.3–10.6)
CARBON MONOXIDE, BLOOD: 1.8 % (ref 0–5)
CHLORIDE BLD-SCNC: 98 MMOL/L (ref 99–110)
CO2 CONTENT: 29.9 MMOL/L (ref 19–24)
CO2: 27 MMOL/L (ref 21–32)
CREAT SERPL-MCNC: 1 MG/DL (ref 0.9–1.3)
CULTURE: ABNORMAL
GFR AFRICAN AMERICAN: >60 ML/MIN/1.73M2
GFR NON-AFRICAN AMERICAN: >60 ML/MIN/1.73M2
GLUCOSE BLD-MCNC: 245 MG/DL (ref 70–99)
GLUCOSE BLD-MCNC: 279 MG/DL (ref 70–99)
HCO3 ARTERIAL: 28.2 MMOL/L (ref 18–23)
HCT VFR BLD CALC: 34.3 % (ref 42–52)
HCT VFR BLD CALC: 35.3 % (ref 42–52)
HCT VFR BLD CALC: 35.9 % (ref 42–52)
HCT VFR BLD CALC: 40.2 % (ref 42–52)
HEMOGLOBIN: 10.7 GM/DL (ref 13.5–18)
HEMOGLOBIN: 11 GM/DL (ref 13.5–18)
HEMOGLOBIN: 11.1 GM/DL (ref 13.5–18)
HEMOGLOBIN: 12.7 GM/DL (ref 13.5–18)
Lab: ABNORMAL
Lab: ABNORMAL
MCH RBC QN AUTO: 27.7 PG (ref 27–31)
MCHC RBC AUTO-ENTMCNC: 30.9 % (ref 32–36)
MCV RBC AUTO: 89.5 FL (ref 78–100)
METHEMOGLOBIN ARTERIAL: 1.3 %
O2 SATURATION: 81.3 % (ref 96–97)
PCO2 ARTERIAL: 56 MMHG (ref 32–45)
PDW BLD-RTO: 15.9 % (ref 11.7–14.9)
PH BLOOD: 7.31 (ref 7.34–7.45)
PLATELET # BLD: 300 K/CU MM (ref 140–440)
PMV BLD AUTO: 9.9 FL (ref 7.5–11.1)
PO2 ARTERIAL: 55 MMHG (ref 75–100)
POTASSIUM SERPL-SCNC: 4.8 MMOL/L (ref 3.5–5.1)
RBC # BLD: 4.01 M/CU MM (ref 4.6–6.2)
SODIUM BLD-SCNC: 138 MMOL/L (ref 135–145)
SPECIMEN: ABNORMAL
SPECIMEN: ABNORMAL
TOTAL PROTEIN: 6.6 GM/DL (ref 6.4–8.2)
TROPONIN T: 0.21 NG/ML
TROPONIN T: 0.22 NG/ML
TROPONIN T: 0.22 NG/ML
WBC # BLD: 4.9 K/CU MM (ref 4–10.5)

## 2021-12-11 PROCEDURE — 6370000000 HC RX 637 (ALT 250 FOR IP): Performed by: INTERNAL MEDICINE

## 2021-12-11 PROCEDURE — 99223 1ST HOSP IP/OBS HIGH 75: CPT | Performed by: INTERNAL MEDICINE

## 2021-12-11 PROCEDURE — 6360000002 HC RX W HCPCS: Performed by: INTERNAL MEDICINE

## 2021-12-11 PROCEDURE — 85027 COMPLETE CBC AUTOMATED: CPT

## 2021-12-11 PROCEDURE — 2580000003 HC RX 258: Performed by: INTERNAL MEDICINE

## 2021-12-11 PROCEDURE — 80053 COMPREHEN METABOLIC PANEL: CPT

## 2021-12-11 PROCEDURE — 2580000003 HC RX 258: Performed by: NURSE PRACTITIONER

## 2021-12-11 PROCEDURE — 85014 HEMATOCRIT: CPT

## 2021-12-11 PROCEDURE — 85018 HEMOGLOBIN: CPT

## 2021-12-11 PROCEDURE — 94761 N-INVAS EAR/PLS OXIMETRY MLT: CPT

## 2021-12-11 PROCEDURE — 82803 BLOOD GASES ANY COMBINATION: CPT

## 2021-12-11 PROCEDURE — 2700000000 HC OXYGEN THERAPY PER DAY

## 2021-12-11 PROCEDURE — 84484 ASSAY OF TROPONIN QUANT: CPT

## 2021-12-11 PROCEDURE — 36415 COLL VENOUS BLD VENIPUNCTURE: CPT

## 2021-12-11 PROCEDURE — 6360000002 HC RX W HCPCS

## 2021-12-11 PROCEDURE — 82962 GLUCOSE BLOOD TEST: CPT

## 2021-12-11 PROCEDURE — 71045 X-RAY EXAM CHEST 1 VIEW: CPT

## 2021-12-11 PROCEDURE — 94660 CPAP INITIATION&MGMT: CPT

## 2021-12-11 PROCEDURE — 99233 SBSQ HOSP IP/OBS HIGH 50: CPT | Performed by: INTERNAL MEDICINE

## 2021-12-11 PROCEDURE — 6360000002 HC RX W HCPCS: Performed by: NURSE PRACTITIONER

## 2021-12-11 PROCEDURE — 2060000000 HC ICU INTERMEDIATE R&B

## 2021-12-11 PROCEDURE — 2500000003 HC RX 250 WO HCPCS: Performed by: INTERNAL MEDICINE

## 2021-12-11 PROCEDURE — 85730 THROMBOPLASTIN TIME PARTIAL: CPT

## 2021-12-11 PROCEDURE — 36600 WITHDRAWAL OF ARTERIAL BLOOD: CPT

## 2021-12-11 PROCEDURE — 2500000003 HC RX 250 WO HCPCS: Performed by: NURSE PRACTITIONER

## 2021-12-11 RX ORDER — OXYCODONE HYDROCHLORIDE 5 MG/1
5 TABLET ORAL EVERY 4 HOURS PRN
Status: DISCONTINUED | OUTPATIENT
Start: 2021-12-11 | End: 2022-01-05 | Stop reason: HOSPADM

## 2021-12-11 RX ORDER — LORAZEPAM 2 MG/ML
0.5 INJECTION INTRAMUSCULAR ONCE
Status: COMPLETED | OUTPATIENT
Start: 2021-12-11 | End: 2021-12-11

## 2021-12-11 RX ORDER — MORPHINE SULFATE 2 MG/ML
2 INJECTION, SOLUTION INTRAMUSCULAR; INTRAVENOUS ONCE
Status: DISCONTINUED | OUTPATIENT
Start: 2021-12-11 | End: 2021-12-20

## 2021-12-11 RX ORDER — MORPHINE SULFATE 4 MG/ML
INJECTION, SOLUTION INTRAMUSCULAR; INTRAVENOUS
Status: COMPLETED
Start: 2021-12-11 | End: 2021-12-11

## 2021-12-11 RX ADMIN — DEXMEDETOMIDINE 0.1 MCG/KG/HR: 100 INJECTION, SOLUTION, CONCENTRATE INTRAVENOUS at 21:34

## 2021-12-11 RX ADMIN — OXYCODONE HYDROCHLORIDE 5 MG: 5 TABLET ORAL at 16:16

## 2021-12-11 RX ADMIN — AZITHROMYCIN MONOHYDRATE 500 MG: 250 TABLET ORAL at 00:45

## 2021-12-11 RX ADMIN — TRIAMTERENE AND HYDROCHLOROTHIAZIDE 1 TABLET: 37.5; 25 TABLET ORAL at 09:19

## 2021-12-11 RX ADMIN — MORPHINE SULFATE 2 MG: 4 INJECTION INTRAVENOUS at 19:15

## 2021-12-11 RX ADMIN — APIXABAN 5 MG: 5 TABLET, FILM COATED ORAL at 09:20

## 2021-12-11 RX ADMIN — METOPROLOL SUCCINATE 50 MG: 50 TABLET, EXTENDED RELEASE ORAL at 09:20

## 2021-12-11 RX ADMIN — FUROSEMIDE 40 MG: 10 INJECTION, SOLUTION INTRAVENOUS at 09:20

## 2021-12-11 RX ADMIN — DEXAMETHASONE SODIUM PHOSPHATE 6 MG: 10 INJECTION INTRAMUSCULAR; INTRAVENOUS at 09:20

## 2021-12-11 RX ADMIN — LORAZEPAM 0.5 MG: 2 INJECTION INTRAMUSCULAR; INTRAVENOUS at 20:07

## 2021-12-11 RX ADMIN — LOSARTAN POTASSIUM 100 MG: 100 TABLET, FILM COATED ORAL at 09:24

## 2021-12-11 RX ADMIN — POTASSIUM CHLORIDE 40 MEQ: 1500 TABLET, EXTENDED RELEASE ORAL at 16:16

## 2021-12-11 RX ADMIN — FUROSEMIDE 40 MG: 10 INJECTION, SOLUTION INTRAVENOUS at 17:14

## 2021-12-11 RX ADMIN — SPIRONOLACTONE 25 MG: 25 TABLET ORAL at 09:19

## 2021-12-11 RX ADMIN — CEFTRIAXONE SODIUM 1000 MG: 1 INJECTION, POWDER, FOR SOLUTION INTRAMUSCULAR; INTRAVENOUS at 03:25

## 2021-12-11 RX ADMIN — POTASSIUM CHLORIDE 40 MEQ: 1500 TABLET, EXTENDED RELEASE ORAL at 09:19

## 2021-12-11 RX ADMIN — SODIUM CHLORIDE, PRESERVATIVE FREE 10 ML: 5 INJECTION INTRAVENOUS at 09:20

## 2021-12-11 RX ADMIN — ALLOPURINOL 300 MG: 300 TABLET ORAL at 09:24

## 2021-12-11 RX ADMIN — SODIUM CHLORIDE, PRESERVATIVE FREE 10 ML: 5 INJECTION INTRAVENOUS at 20:11

## 2021-12-11 RX ADMIN — DILTIAZEM HYDROCHLORIDE 5 MG/HR: 5 INJECTION INTRAVENOUS at 19:10

## 2021-12-11 ASSESSMENT — PAIN SCALES - GENERAL
PAINLEVEL_OUTOF10: 5
PAINLEVEL_OUTOF10: 0
PAINLEVEL_OUTOF10: 5
PAINLEVEL_OUTOF10: 0

## 2021-12-11 NOTE — PROGRESS NOTES
.        Hospitalist Progress Note      Name:  Summer Suggs /Age/Sex: 1945  (77 y.o. male)   MRN & CSN:  [id number] & [id number] Admission Date/Time: 2021  5:33 PM   Location:  -A PCP: Leigh Colmenares MD         Hospital Day: 4    Assessment and Plan:   Summer Suggs is a 77 y.o.  male  who presents with <principal problem not specified>    1. Covid pneumonia. Continue dexamethasone. Tocilizumab given 12/10/2021.   2. Community acquired pneumonia. Procalcitonin 31.75, . Continue IV antibiotics. Follow up cultures. 3. Acute respiratory failure due to covid pneumonia. Continue supplemental oxygen. Requiring high flow oxygen. Pulmonary consult. 4. Fall   5. Hypertension  6. Lymphedema  7. Paroxysmal atrial fibrillation on apixaban  8. Diabetes Mellitus  9. CAD. Elevated troponin. Cardiology consulted  10. Acute CHF. On IV lasix per Cardiology  11. Hypokalemia. Replace and monitor  12. Hip pain. Pain meds. Diet ADULT DIET; Regular   DVT Prophylaxis [] Lovenox, []  Heparin, [] SCDs, [] Ambulation   GI Prophylaxis [] PPI,  [] H2 Blocker,  [] Carafate,  [] Diet/Tube Feeds   Code Status Full Code   Disposition Patient requires continued admission due to   MDM [] Low, [] Moderate,[]  High  Patient's risk as above due to      History of Present Illness:     Chief Complaint: <principal problem not specified>  Summer Suggs is a 77 y.o.  male  who presents with fall but found to have community acquired pneumonia, started on IV antibiotics. Tested for covid after admission, came back positive. Was on room air with satisfactory o2 sats. Now on high flow oxygen. Complains of right hip pain. States he has arthritis and is supposed to have hip replacement     Ten point ROS reviewed negative, unless as noted above    Objective:        Intake/Output Summary (Last 24 hours) at 2021 1127  Last data filed at 2021 0918  Gross per 24 hour   Intake 239.41 ml   Output 2400 ml   Net -2160.59 ml      Vitals:   Vitals:    12/11/21 0914   BP: 109/84   Pulse: 76   Resp: 26   Temp: 97.9 °F (36.6 °C)   SpO2: 95%     Physical Exam:   GEN Awake male, sitting upright in bed in no apparent distress. Appears given age. EYES Pupils are equally round. No scleral erythema, discharge, or conjunctivitis. HENT Mucous membranes are moist. Oral pharynx without exudates, no evidence of thrush. NECK Supple, no apparent thyromegaly or masses. RESP Bilateral rhonchi, few coarse crackles. Symmetric chest movement. CARDIO/VASC S1/S2 auscultated. Regular rate without appreciable murmurs, rubs, or gallops. No JVD or carotid bruits. Moderate bilateral lymphedema. GI Abdomen is soft without significant tenderness, masses, or guarding. Bowel sounds are normoactive. Rectal exam deferred.  No costovertebral angle tenderness. Normal appearing external genitalia. Lane catheter is not present. HEME/LYMPH No palpable cervical lymphadenopathy and no hepatosplenomegaly. No petechiae or ecchymoses. MSK No gross joint deformities. SKIN Normal coloration, warm, dry. NEURO Cranial nerves appear intact, no lateralizing weakness. PSYCH Awake, alert, oriented x 3.       Medications:   Medications:    potassium chloride  40 mEq Oral BID WC    dexamethasone  6 mg IntraVENous Daily    furosemide  40 mg IntraVENous BID    metoprolol succinate  50 mg Oral BID    spironolactone  25 mg Oral Daily    allopurinol  300 mg Oral Daily    apixaban  5 mg Oral BID    famotidine  40 mg Oral Nightly    [Held by provider] metFORMIN  500 mg Oral BID WC    losartan  100 mg Oral Daily    pravastatin  20 mg Oral Nightly    tamsulosin  0.4 mg Oral Nightly    triamterene-hydroCHLOROthiazide  1 tablet Oral Daily    sodium chloride flush  5-40 mL IntraVENous 2 times per day    cefTRIAXone (ROCEPHIN) IV  1,000 mg IntraVENous Q24H    And    azithromycin  500 mg Oral Q24H      Infusions:    sodium chloride       PRN Meds: guaiFENesin-dextromethorphan, 5 mL, Q4H PRN  potassium chloride, 40 mEq, PRN   Or  potassium alternative oral replacement, 40 mEq, PRN   Or  potassium chloride, 10 mEq, PRN  sodium chloride flush, 5-40 mL, PRN  sodium chloride, 25 mL, PRN  ondansetron, 4 mg, Q8H PRN   Or  ondansetron, 4 mg, Q6H PRN  polyethylene glycol, 17 g, Daily PRN  acetaminophen, 650 mg, Q6H PRN   Or  acetaminophen, 650 mg, Q6H PRN            Electronically signed by Rehana Dixon MD on 12/11/2021 at 11:27 AM

## 2021-12-11 NOTE — PROGRESS NOTES
12/11/21 0825   Oxygen Therapy/Pulse Ox   O2 Therapy Oxygen   $Oxygen $Daily Charge   O2 Device High flow nasal cannula   O2 Flow Rate (L/min) 15 L/min   SpO2 93 %   Pulse Oximeter Device Mode Continuous   Pulse Oximeter Device Location Finger   $Pulse Oximeter $Spot check (multiple/continuous)   Blood Gas  Performed?  No

## 2021-12-11 NOTE — PROGRESS NOTES
Physician Progress Note      Rajesh Valera  CSN #:                  [id number]  :                       1945  ADMIT DATE:       2021 5:33 PM  [address] [place] DATE:  RESPONDING  PROVIDER #:        Adan Bowers           QUERY TEXT:    Pt admitted with pneumonia. Pt noted to have laboratory test positive for   COVID 19 Pneumonia. If possible, please document in progress notes and   discharge summary if Covid 19 Pneumonia was present on admission (POA): The medical record reflects the following:  Risk Factors: Pneumonia  Clinical Indicators: SARS CoV-2 Rapid  \"DETECTED The specimen is POSITIVE for   SARS-CoV-2, the novel coronovirus  associated with COVID-19.\", \"Multifocal consolidated lesions possibly d/t   Community-acquired pneumonia  - CXR with infiltrates \"  Treatment: Testing, IV Rocephin, azithromycin    Thank you Shell Carye RN, CDS ([phone])  Options provided:  -- Yes, COVID-19 Pneumonia was present at the time of the order to admit to   the hospital  -- Other - I will add my own diagnosis  -- Disagree - Not applicable / Not valid  -- Disagree - Clinically unable to determine / Unknown  -- Refer to Clinical Documentation Reviewer    PROVIDER RESPONSE TEXT:    COVID-19 was not present at the time of inpatient admission order.     Query created by: Trupti Gu on 12/10/2021 8:06 AM      Electronically signed by:  Roma Reyna 2021 9:58 AM

## 2021-12-11 NOTE — PROGRESS NOTES
no thrills, no s3,no s4, Normal heart rate, Normal rhythm, No murmurs, No rubs, No gallops. Carotid arteries pulse and amplitude are normal no bruit, no abdominal bruit noted ( normal abdominal aorta ausculation), femoral arteries pulse and amplitude are normal no bruit, pedal pulses are normal  GI:  Bowel sounds normal, Soft, No tenderness, No masses, No pulsatile masses. : External genitalia appear normal, No masses or lesions. No discharge. No CVA tenderness. Musculoskeletal:  Intact distal pulses, No edema, No tenderness, No cyanosis, No clubbing. Good range of motion in all major joints. No tenderness to palpation or major deformities noted. Back- No tenderness. Integument:  Warm, Dry, No erythema, No rash. Lymphatic:  No lymphadenopathy noted. Neurologic:  Alert & oriented x 3, Normal motor function, Normal sensory function, No focal deficits noted.    Psychiatric:  Affect normal, Judgment normal, Mood normal.     Medications:    potassium chloride  40 mEq Oral BID WC    dexamethasone  6 mg IntraVENous Daily    furosemide  40 mg IntraVENous BID    metoprolol succinate  50 mg Oral BID    spironolactone  25 mg Oral Daily    allopurinol  300 mg Oral Daily    apixaban  5 mg Oral BID    famotidine  40 mg Oral Nightly    [Held by provider] metFORMIN  500 mg Oral BID WC    losartan  100 mg Oral Daily    pravastatin  20 mg Oral Nightly    tamsulosin  0.4 mg Oral Nightly    triamterene-hydroCHLOROthiazide  1 tablet Oral Daily    sodium chloride flush  5-40 mL IntraVENous 2 times per day    cefTRIAXone (ROCEPHIN) IV  1,000 mg IntraVENous Q24H    And    azithromycin  500 mg Oral Q24H      sodium chloride       oxyCODONE, guaiFENesin-dextromethorphan, potassium chloride **OR** potassium alternative oral replacement **OR** potassium chloride, sodium chloride flush, sodium chloride, ondansetron **OR** ondansetron, polyethylene glycol, acetaminophen **OR** acetaminophen    Lab Data:  CBC:   Recent Labs     12/08/21 2210 12/08/21 2210 12/09/21  0704 12/09/21  1548 12/10/21  2112 12/11/21  0253 12/11/21  0848   WBC 6.4  --  9.7  --   --   --  4.9   HGB 10.9*   < > 10.5*   < > 11.2* 10.7* 11.1*  11.0*   HCT 34.9*   < > 33.9*   < > 36.0* 34.3* 35.9*  35.3*   MCV 90.2  --  89.9  --   --   --  89.5     --  255  --   --   --  300    < > = values in this interval not displayed. BMP:   Recent Labs     12/08/21 2210 12/09/21  0704 12/10/21  1503   * 133*  --    K 3.1* 2.9* 3.7   CL 96* 98*  --    CO2 20* 26  --    BUN 19 16  --    CREATININE 0.8* 0.8*  --      LIVER PROFILE:   Recent Labs     12/08/21 2210 12/09/21  0704   AST 29 32   ALT 9* 10   BILITOT 0.6 0.4   ALKPHOS 75 67     PT/INR: No results for input(s): PROTIME, INR in the last 72 hours. APTT:   Recent Labs     12/10/21  2112 12/11/21  0253 12/11/21  0848   APTT 41.2* 46.8* 44.3*     BNP:  No results for input(s): BNP in the last 72 hours. TROPONIN: @TROPONINI:3@      Assessment:  77 y. o.year old who is admitted for          Plan:  Elevated trop\" h/o LAD rotablator used PCI of mid LAD at [place], once COVID and CHF is resolved, will decide for angiogram  CHF; ok to stop IV ntg TODAY, continue lasix drip  Cardiomyopathy: on losartan and BB, LVEF IS 35-40%  All labs, medications and tests reviewed, continue all other medications of all above medical condition listed as is.       Yobany Barajas MD, MD 12/11/2021 4:52 PM

## 2021-12-11 NOTE — PROGRESS NOTES
Skin assessment completed by this RN and Dalia VALLES scattered bruising, redness, abrasions BUE, redness/DTI left buttocks/coccyx, redness/tear L groin, redness BLE.

## 2021-12-11 NOTE — CONSULTS
Pulmonary Consult Note      Reason for Consult: Pneumonia  Requesting Physician: Dr. Samuel Thurman  Subjective:   CHIEF COMPLAINT :fall    Patient Active Problem List    Diagnosis Date Noted    PNA (pneumonia) 12/09/2021    Troponin I above reference range 12/09/2021    Elevated antinuclear antibody (FREDRICK) level 02/16/2021    B12 deficiency 02/15/2021    Anemia 02/12/2021    Rectal bleed 11/18/2020    Acute hypokalemia 11/17/2020    Acute cystitis without hematuria     Leukocytosis     Sepsis ([facility] [address] 01/17/2020    BRBPR (bright red blood per rectum) 01/17/2020    Rectal bleeding 09/25/2018    NSTEMI (non-ST elevated myocardial infarction) ([facility] [address] 06/25/2018    Upper GI bleed 03/07/2017    Diabetes mellitus ([facility] [address]     Atrial flutter (HCC)     Atrial fibrillation ([facility] [address]     Gout     Hyperlipidemia     Hypertension     Arthropathy         HPI:                The patient is a 77 y.o. male with significant past medical history of Afib- flutter, DM II, HLD, HTN  presents with complaints of fall. He is a poor historian. He has gen weakness, no fever, no chest pain, no palpitations, no leg weakness. He had a fall recently. He is positive for COVID-19. He has combined systolic and Diastolic dysfunction. His CXR and CTA showed no PE but bilateral pneumonia. At this time he is getting Abx, Inhalers. Tocilizumab, Decadron and diuresis. He is lying in the bed.  He is in mild resp distress    Past Medical History:      Diagnosis Date    Arthropathy     Atrial fibrillation (HCC)     Atrial flutter (HCC)     Diabetes mellitus ([facility] [address]     Gout     Hyperlipidemia     Hypertension     Lumbago       Past Surgical History:        Procedure Laterality Date    COLONOSCOPY N/A 9/27/2018    COLONOSCOPY POLYPECTOMY SNARE/COLD BIOPSY performed by Willis Zhang MD at [place] [address] COLONOSCOPY N/A 11/18/2020    COLONOSCOPY DIAGNOSTIC performed by Ana Sotomayor MD at [facility] ENDOSCOPY     Current Medications:     potassium chloride  40 mEq Oral BID WC    dexamethasone  6 mg IntraVENous Daily    furosemide  40 mg IntraVENous BID    metoprolol succinate  50 mg Oral BID    spironolactone  25 mg Oral Daily    allopurinol  300 mg Oral Daily    apixaban  5 mg Oral BID    famotidine  40 mg Oral Nightly    [Held by provider] metFORMIN  500 mg Oral BID WC    losartan  100 mg Oral Daily    pravastatin  20 mg Oral Nightly    tamsulosin  0.4 mg Oral Nightly    triamterene-hydroCHLOROthiazide  1 tablet Oral Daily    sodium chloride flush  5-40 mL IntraVENous 2 times per day    cefTRIAXone (ROCEPHIN) IV  1,000 mg IntraVENous Q24H    And    azithromycin  500 mg Oral Q24H     Allergies:    Social History:    TOBACCO:   reports that he has quit smoking. His smoking use included cigarettes. He smoked 0.50 packs per day. He has never used smokeless tobacco.  ETOH:   reports no history of alcohol use. Patient currently lives independently    Family History:       Problem Relation Age of Onset    No Known Problems Mother     No Known Problems Father        REVIEW OF SYSTEMS:    CONSTITUTIONAL:  negative for fevers, chills, diaphoresis, activity change, appetite change, fatigue, night sweats and unexpected weight change.    EYES:  negative for blurred vision, eye discharge, visual disturbance and icterus  HEENT:  negative for hearing loss, tinnitus, ear drainage, sinus pressure, nasal congestion, epistaxis and snoring  RESPIRATORY:  See HPI  CARDIOVASCULAR:  negative for chest pain, palpitations, exertional chest pressure/discomfort, edema, syncope  GASTROINTESTINAL:  negative for nausea, vomiting, diarrhea, constipation, blood in stool and abdominal pain  GENITOURINARY:  negative for frequency, dysuria, urinary incontinence, decreased urine volume, and hematuria  HEMATOLOGIC/LYMPHATIC:  negative for easy bruising, bleeding and lymphadenopathy  ALLERGIC/IMMUNOLOGIC:  negative for recurrent infections, angioedema, anaphylaxis and drug reactions  ENDOCRINE:  negative for weight changes and diabetic symptoms including polyuria, polydipsia and polyphagia  MUSCULOSKELETAL:  negative for  pain, joint swelling, decreased range of motion and muscle weakness  NEUROLOGICAL:  negative for headaches, slurred speech, unilateral weakness  PSYCHIATRIC/BEHAVIORAL: negative for hallucinations, behavioral problems, confusion and agitation. Objective:   PHYSICAL EXAM:      VITALS:  /84   Pulse 76   Temp 97.9 °F (36.6 °C) (Oral)   Resp 26   Ht 5' 8\" (1.727 m)   Wt 229 lb 4.5 oz (104 kg)   SpO2 95%   BMI 34.86 kg/m²   24HR INTAKE/OUTPUT:      Intake/Output Summary (Last 24 hours) at 2021 1208  Last data filed at 2021 0918  Gross per 24 hour   Intake 239.41 ml   Output 2400 ml   Net -2160.59 ml     CURRENT PULSE OXIMETRY:  SpO2: 95 %  24HR PULSE OXIMETRY RANGE:  SpO2  Av %  Min: 90 %  Max: 96 %    CONSTITUTIONAL:  awake, alert, cooperative, no apparent distress, and appears stated age  NECK:  Supple, symmetrical, trachea midline, no adenopathy, thyroid symmetric, not enlarged and no tenderness, skin normal  LUNGS: Bilateral basal crackles  CARDIOVASCULAR:  normal S1 and S2, no edema and no JVD  ABDOMEN:  normal bowel sounds, non-distended and no masses palpated, and no tenderness to palpation. No hepatospleenomegaly  LYMPHADENOPATHY:  no axillary or supraclavicular adenopathy. No cervical adnenopathy  PSYCHIATRIC: Oriented to person place and time. No obvious depression or anxiety. MUSCULOSKELETAL: No obvious misalignment or effusion of the joints. No clubbing, cyanosis of the digits. SKIN:  normal skin color, texture, turgor and no redness, warmth, or swelling.  No palpable nodules    DATA:    Old records have been reviewed  CBC with Differential:    Lab Results   Component Value Date    WBC 4.9 2021    RBC 4.01 2021    HGB 11.1 2021    HGB 11.0 2021    HCT 35.9 2021    HCT 35.3 2021    PLT 300 12/11/2021    MCV 89.5 12/11/2021    MCH 27.7 12/11/2021    MCHC 30.9 12/11/2021    RDW 15.9 12/11/2021    SEGSPCT 87.7 12/09/2021    BANDSPCT 1 05/07/2021    LYMPHOPCT 6.1 12/09/2021    MONOPCT 5.6 12/09/2021    BASOPCT 0.1 12/09/2021    MONOSABS 0.5 12/09/2021    LYMPHSABS 0.6 12/09/2021    EOSABS 0.0 12/09/2021    BASOSABS 0.0 12/09/2021    DIFFTYPE AUTOMATED DIFFERENTIAL 12/09/2021     BMP:    Lab Results   Component Value Date     12/09/2021    K 3.7 12/10/2021    CL 98 12/09/2021    CO2 26 12/09/2021    BUN 16 12/09/2021    CREATININE 0.8 12/09/2021    CALCIUM 8.3 12/09/2021    GFRAA >60 12/09/2021    LABGLOM >60 12/09/2021    GLUCOSE 174 12/09/2021     Hepatic Function Panel:    Lab Results   Component Value Date    ALKPHOS 67 12/09/2021    ALT 10 12/09/2021    AST 32 12/09/2021    PROT 6.7 12/09/2021    BILITOT 0.4 12/09/2021    BILIDIR 0.2 06/07/2021    IBILI 0.2 06/07/2021     ABG:    Lab Results   Component Value Date    QBC3UDM 28.6 12/10/2021    FQY5VJU 53.0 12/10/2021    PO2ART 58 12/10/2021       Cultures:   Pending    Radiology Review:    Cardiomegaly with severe congestive heart failure  Multifocal consolidative changes both lungs worrisome for multifocal   pneumonia.  Follow-up in following medical treatment course is recommended   document complete resolution.       Multifocal bladder traumatic trabeculation/diverticula noted with slight   haziness of surrounding fat planes.  Please correlate with diffuse urinalysis   findings.  Is findings could suggest underlying cystitis.       Prominence of the Alisson aortic lymph nodes again identified.  These could be   reactive due to an underlying infectious inflammatory process such is the   bladder haziness.  However neoplastic process may also considered.  Consider   3-6 month follow-up.          Assessment/Plan       Patient Active Problem List    Diagnosis Date Noted    PNA (pneumonia) 12/09/2021    Troponin I above reference range 12/09/2021  Elevated antinuclear antibody (FREDRICK) level 02/16/2021    B12 deficiency 02/15/2021    Anemia 02/12/2021    Rectal bleed 11/18/2020    Acute hypokalemia 11/17/2020    Acute cystitis without hematuria     Leukocytosis     Sepsis ([facility] [address] 01/17/2020    BRBPR (bright red blood per rectum) 01/17/2020    Rectal bleeding 09/25/2018    NSTEMI (non-ST elevated myocardial infarction) ([facility] [address] 06/25/2018    Upper GI bleed 03/07/2017    Diabetes mellitus (HCC)     Atrial flutter (HCC)     Atrial fibrillation (HCC)     Gout     Hyperlipidemia     Hypertension     Arthropathy    Acute on chronic hypoxic hypercapneic resp failure sec to Cardiogenic and non Cardiogenic Pulmonary edema  Systolic CHF with EF of 44-53%  Grade II Diastolic dysfunction  Bilateral Pneumonia sec to COVID-19  Obesity  DIVYA  Chronic Metabolic alkalosis      PLAN  1. Abx  2. F/u C&S  3. BIPAP qhs and prn  4. Keep sats > 92%  5. ICS  6. OOB  7. CXR in am  8. Tocilizumab  9. Decadron  10. Insulin  11. Inhalers  12. CHF optimization  13. DVT and GI Prophylaxis  14.  C/w present management          Electronically signed by Derrek Jackson MD on 12/11/2021 at 12:08 PM

## 2021-12-12 ENCOUNTER — APPOINTMENT (OUTPATIENT)
Dept: GENERAL RADIOLOGY | Age: 76
DRG: 870 | End: 2021-12-12
Payer: COMMERCIAL

## 2021-12-12 LAB
ALBUMIN SERPL-MCNC: 3 GM/DL (ref 3.4–5)
ALP BLD-CCNC: 64 IU/L (ref 40–128)
ALT SERPL-CCNC: 14 U/L (ref 10–40)
ANION GAP SERPL CALCULATED.3IONS-SCNC: 8 MMOL/L (ref 4–16)
APTT: 33.4 SECONDS (ref 25.1–37.1)
APTT: 35.4 SECONDS (ref 25.1–37.1)
APTT: 38.1 SECONDS (ref 25.1–37.1)
APTT: 38.6 SECONDS (ref 25.1–37.1)
AST SERPL-CCNC: 30 IU/L (ref 15–37)
BILIRUB SERPL-MCNC: 0.4 MG/DL (ref 0–1)
BUN BLDV-MCNC: 32 MG/DL (ref 6–23)
CALCIUM SERPL-MCNC: 8.9 MG/DL (ref 8.3–10.6)
CHLORIDE BLD-SCNC: 102 MMOL/L (ref 99–110)
CO2: 34 MMOL/L (ref 21–32)
CREAT SERPL-MCNC: 1 MG/DL (ref 0.9–1.3)
GFR AFRICAN AMERICAN: >60 ML/MIN/1.73M2
GFR NON-AFRICAN AMERICAN: >60 ML/MIN/1.73M2
GLUCOSE BLD-MCNC: 184 MG/DL (ref 70–99)
HCT VFR BLD CALC: 35.4 % (ref 42–52)
HCT VFR BLD CALC: 36.6 % (ref 42–52)
HCT VFR BLD CALC: 39.4 % (ref 42–52)
HCT VFR BLD CALC: 40 % (ref 42–52)
HEMOGLOBIN: 11 GM/DL (ref 13.5–18)
HEMOGLOBIN: 11.4 GM/DL (ref 13.5–18)
HEMOGLOBIN: 12.2 GM/DL (ref 13.5–18)
HEMOGLOBIN: 12.2 GM/DL (ref 13.5–18)
POTASSIUM SERPL-SCNC: 5.3 MMOL/L (ref 3.5–5.1)
SODIUM BLD-SCNC: 144 MMOL/L (ref 135–145)
TOTAL PROTEIN: 6.3 GM/DL (ref 6.4–8.2)
TROPONIN T: 0.14 NG/ML
TROPONIN T: 0.16 NG/ML
TROPONIN T: 0.18 NG/ML
TROPONIN T: 0.21 NG/ML

## 2021-12-12 PROCEDURE — 94660 CPAP INITIATION&MGMT: CPT

## 2021-12-12 PROCEDURE — 2580000003 HC RX 258: Performed by: NURSE PRACTITIONER

## 2021-12-12 PROCEDURE — 99233 SBSQ HOSP IP/OBS HIGH 50: CPT | Performed by: INTERNAL MEDICINE

## 2021-12-12 PROCEDURE — 6360000002 HC RX W HCPCS: Performed by: INTERNAL MEDICINE

## 2021-12-12 PROCEDURE — 2060000000 HC ICU INTERMEDIATE R&B

## 2021-12-12 PROCEDURE — 6370000000 HC RX 637 (ALT 250 FOR IP): Performed by: INTERNAL MEDICINE

## 2021-12-12 PROCEDURE — 80053 COMPREHEN METABOLIC PANEL: CPT

## 2021-12-12 PROCEDURE — 2580000003 HC RX 258: Performed by: INTERNAL MEDICINE

## 2021-12-12 PROCEDURE — 85730 THROMBOPLASTIN TIME PARTIAL: CPT

## 2021-12-12 PROCEDURE — 36415 COLL VENOUS BLD VENIPUNCTURE: CPT

## 2021-12-12 PROCEDURE — 85018 HEMOGLOBIN: CPT

## 2021-12-12 PROCEDURE — 84484 ASSAY OF TROPONIN QUANT: CPT

## 2021-12-12 PROCEDURE — 2500000003 HC RX 250 WO HCPCS: Performed by: NURSE PRACTITIONER

## 2021-12-12 PROCEDURE — 71045 X-RAY EXAM CHEST 1 VIEW: CPT

## 2021-12-12 PROCEDURE — 85014 HEMATOCRIT: CPT

## 2021-12-12 PROCEDURE — 99232 SBSQ HOSP IP/OBS MODERATE 35: CPT | Performed by: INTERNAL MEDICINE

## 2021-12-12 RX ORDER — LORAZEPAM 2 MG/ML
1 INJECTION INTRAMUSCULAR EVERY 6 HOURS PRN
Status: COMPLETED | OUTPATIENT
Start: 2021-12-12 | End: 2021-12-26

## 2021-12-12 RX ADMIN — PRAVASTATIN SODIUM 20 MG: 10 TABLET ORAL at 20:22

## 2021-12-12 RX ADMIN — APIXABAN 5 MG: 5 TABLET, FILM COATED ORAL at 20:22

## 2021-12-12 RX ADMIN — FAMOTIDINE 40 MG: 20 TABLET ORAL at 20:22

## 2021-12-12 RX ADMIN — SODIUM CHLORIDE, PRESERVATIVE FREE 10 ML: 5 INJECTION INTRAVENOUS at 20:23

## 2021-12-12 RX ADMIN — DEXMEDETOMIDINE 1 MCG/KG/HR: 100 INJECTION, SOLUTION, CONCENTRATE INTRAVENOUS at 23:22

## 2021-12-12 RX ADMIN — DEXMEDETOMIDINE 0.3 MCG/KG/HR: 100 INJECTION, SOLUTION, CONCENTRATE INTRAVENOUS at 16:49

## 2021-12-12 RX ADMIN — DEXAMETHASONE SODIUM PHOSPHATE 6 MG: 10 INJECTION INTRAMUSCULAR; INTRAVENOUS at 08:03

## 2021-12-12 RX ADMIN — CEFTRIAXONE SODIUM 1000 MG: 1 INJECTION, POWDER, FOR SOLUTION INTRAMUSCULAR; INTRAVENOUS at 03:51

## 2021-12-12 RX ADMIN — TAMSULOSIN HYDROCHLORIDE 0.4 MG: 0.4 CAPSULE ORAL at 20:22

## 2021-12-12 RX ADMIN — SODIUM CHLORIDE, PRESERVATIVE FREE 10 ML: 5 INJECTION INTRAVENOUS at 08:03

## 2021-12-12 RX ADMIN — FUROSEMIDE 40 MG: 10 INJECTION, SOLUTION INTRAVENOUS at 18:29

## 2021-12-12 RX ADMIN — FUROSEMIDE 40 MG: 10 INJECTION, SOLUTION INTRAVENOUS at 08:02

## 2021-12-12 RX ADMIN — METOPROLOL SUCCINATE 50 MG: 50 TABLET, EXTENDED RELEASE ORAL at 20:22

## 2021-12-12 ASSESSMENT — PAIN SCALES - GENERAL: PAINLEVEL_OUTOF10: 0

## 2021-12-12 NOTE — PROGRESS NOTES
12/12/21 1114   NIV Type   NIV Started/Stopped On   Equipment Type v60   Mode Bilevel   Mask Type Full face mask   Mask Size Medium   Settings/Measurements   IPAP 22 cmH20   CPAP/EPAP 10 cmH2O   Rate Ordered 12   Resp 19   Insp Rise Time (%) 3 %   FiO2  90 %   I Time/ I Time % 0.9 s   Vt Exhaled 516 mL   Minute Volume 11.5 Liters   Mask Leak (lpm) 18 lpm   Comfort Level Good   Using Accessory Muscles No   SpO2 97   Breath Sounds   Right Upper Lobe Diminished   Right Middle Lobe Diminished   Right Lower Lobe Diminished   Left Upper Lobe Diminished   Left Lower Lobe Diminished   Alarm Settings   Alarms On Y   Press Low Alarm 3 cmH2O   High Pressure Alarm 30 cmH2O   Delay Alarm 20 sec(s)   Apnea (secs) 20 secs   Resp Rate Low Alarm 12   High Respiratory Rate 50 br/min

## 2021-12-12 NOTE — PROGRESS NOTES
Today's plan:  continue IV lasix 40mg bid, ON BIPAP now for respiratory distress with possible ARDS      Admit Date:  12/8/2021    Subjective:OK      Chief complaints on admission  Chief Complaint   Patient presents with    Fall         History of present illness:Bryce is a 77 y. o.year old who  presents with had concerns including Fall. Past medical history:    has a past medical history of Arthropathy, Atrial fibrillation ([address] Atrial flutter ([address] Diabetes mellitus ([address] Gout, Hyperlipidemia, Hypertension, and Lumbago. Past surgical history:   has a past surgical history that includes Colonoscopy (N/A, 9/27/2018) and Colonoscopy (N/A, 11/18/2020). Social History:   reports that he has quit smoking. His smoking use included cigarettes. He smoked 0.50 packs per day. He has never used smokeless tobacco. He reports that he does not drink alcohol and does not use drugs. Family history:  family history includes No Known Problems in his father and mother. Allergies   Allergen Reactions    Demerol Hcl [Meperidine]     Gabapentin     Simvastatin          Objective:   BP (!) 152/111   Pulse 59   Temp 97.1 °F (36.2 °C) (Axillary)   Resp 20   Ht 5' 8\" (1.727 m)   Wt 229 lb 4.5 oz (104 kg)   SpO2 96%   BMI 34.86 kg/m²       Intake/Output Summary (Last 24 hours) at 12/12/2021 1705  Last data filed at 12/12/2021 1632  Gross per 24 hour   Intake 347.77 ml   Output 1365 ml   Net -1017.23 ml         Physical Exam:  Constitutional:  Well developed, Well nourished, No acute distress, Non-toxic appearance. HENT:  Normocephalic, Atraumatic, Bilateral external ears normal, Oropharynx moist, No oral exudates, Nose normal. Neck- Normal range of motion, No tenderness, Supple, No stridor. Eyes:  PERRL, EOMI, Conjunctiva normal, No discharge. Respiratory:  Normal breath sounds, No respiratory distress, No wheezing, No chest tenderness. ,no use of accessory muscles, diaphragm movement is normal  Cardiovascular: (PMI) apex non displaced,no lifts no thrills, no s3,no s4, Normal heart rate, Normal rhythm, No murmurs, No rubs, No gallops. Carotid arteries pulse and amplitude are normal no bruit, no abdominal bruit noted ( normal abdominal aorta ausculation), femoral arteries pulse and amplitude are normal no bruit, pedal pulses are normal  GI:  Bowel sounds normal, Soft, No tenderness, No masses, No pulsatile masses. : External genitalia appear normal, No masses or lesions. No discharge. No CVA tenderness. Musculoskeletal:  Intact distal pulses, No edema, No tenderness, No cyanosis, No clubbing. Good range of motion in all major joints. No tenderness to palpation or major deformities noted. Back- No tenderness. Integument:  Warm, Dry, No erythema, No rash. Lymphatic:  No lymphadenopathy noted. Neurologic:  Alert & oriented x 3, Normal motor function, Normal sensory function, No focal deficits noted.    Psychiatric:  Affect normal, Judgment normal, Mood normal.     Medications:    morphine  2 mg IntraVENous Once    dexmedetomidine (PRECEDEX) IV bolus  1 mcg/kg IntraVENous Once    dexamethasone  6 mg IntraVENous Daily    furosemide  40 mg IntraVENous BID    metoprolol succinate  50 mg Oral BID    spironolactone  25 mg Oral Daily    allopurinol  300 mg Oral Daily    apixaban  5 mg Oral BID    famotidine  40 mg Oral Nightly    [Held by provider] metFORMIN  500 mg Oral BID WC    losartan  100 mg Oral Daily    pravastatin  20 mg Oral Nightly    tamsulosin  0.4 mg Oral Nightly    triamterene-hydroCHLOROthiazide  1 tablet Oral Daily    sodium chloride flush  5-40 mL IntraVENous 2 times per day    cefTRIAXone (ROCEPHIN) IV  1,000 mg IntraVENous Q24H    And    azithromycin  500 mg Oral Q24H      dilTIAZem (CARDIZEM) 125 mg in dextrose 5% 125 mL infusion Stopped (12/11/21 2312)    dexmedetomidine (PRECEDEX) IV infusion 0.3 mcg/kg/hr (12/12/21 1865)    sodium chloride       oxyCODONE, guaiFENesin-dextromethorphan, potassium chloride **OR** potassium alternative oral replacement **OR** potassium chloride, sodium chloride flush, sodium chloride, ondansetron **OR** ondansetron, polyethylene glycol, acetaminophen **OR** acetaminophen    Lab Data:  CBC:   Recent Labs     12/11/21  0848 12/11/21  1939 12/12/21  0132 12/12/21  0635 12/12/21  1328   WBC 4.9  --   --   --   --    HGB 11.1*  11.0*   < > 11.0* 11.4* 12.2*   HCT 35.9*  35.3*   < > 35.4* 36.6* 39.4*   MCV 89.5  --   --   --   --      --   --   --   --     < > = values in this interval not displayed. BMP:   Recent Labs     12/10/21  1503 12/11/21  1939 12/12/21  1049   NA  --  138 144   K 3.7 4.8 5.3*   CL  --  98* 102   CO2  --  27 34*   BUN  --  26* 32*   CREATININE  --  1.0 1.0     LIVER PROFILE:   Recent Labs     12/11/21  1939 12/12/21  1049   AST 38* 30   ALT 16 14   BILITOT 0.5 0.4   ALKPHOS 69 64     PT/INR: No results for input(s): PROTIME, INR in the last 72 hours. APTT:   Recent Labs     12/12/21  0132 12/12/21  0635 12/12/21  1328   APTT 38.6* 38.1* 35.4     BNP:  No results for input(s): BNP in the last 72 hours. TROPONIN: @TROPONINI:3@      Assessment:  77 y. o.year old who is admitted for          Plan:  Elevated trop\" h/o LAD rotablator used PCI of mid LAD at [place], once COVID and CHF is resolved, will decide for angiogram  CHF; ok to stop IV ntg TODAY, continue lasix drip  Cardiomyopathy: on losartan and BB, LVEF IS 35-40%  All labs, medications and tests reviewed, continue all other medications of all above medical condition listed as is.       Keisha Smith MD, MD 12/12/2021 5:05 PM

## 2021-12-12 NOTE — PROGRESS NOTES
12/12/21 1509   NIV Type   NIV Started/Stopped On   Equipment Type v60   Mode Bilevel   Mask Type Full face mask   Mask Size Medium   Settings/Measurements   IPAP 22 cmH20   CPAP/EPAP 10 cmH2O   Rate Ordered 12   Resp 21   Insp Rise Time (%) 3 %   FiO2  75 %   I Time/ I Time % 0.9 s   Vt Exhaled 673 mL   Minute Volume 20.1 Liters   Mask Leak (lpm) 17 lpm   Comfort Level Good   Using Accessory Muscles No   SpO2 96   Breath Sounds   Right Upper Lobe Diminished   Right Middle Lobe Diminished   Right Lower Lobe Diminished   Left Upper Lobe Diminished   Left Lower Lobe Diminished   Alarm Settings   Alarms On Y   Press Low Alarm 3 cmH2O   High Pressure Alarm 30 cmH2O   Delay Alarm 20 sec(s)   Apnea (secs) 20 secs   Resp Rate Low Alarm 12   High Respiratory Rate 50 br/min

## 2021-12-12 NOTE — SIGNIFICANT EVENT
SIGNIFICANT EVENT:  RAPID RESPONSE    RAPID RESPONSE was called for Jerry Wolfe for hypoxia, A fib with RVR. Patient seen and examined in 2030/2030-A.    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 Patient was on nonrebreather and high flow nasal cannula with oxygen saturation 84-88%. Patient was awake, alert, oriented, tachypneic, tachycardic. Gave 2 mg of IV morphine. Stat ABG ordered. Patient placed on BiPAP. Chest x-ray, CMP repeated. VITALS  Vitals:    12/11/21 1902 12/11/21 1921 12/11/21 1922 12/11/21 1948   BP: (!) 207/114  (!) 152/107 125/74   Pulse: 136  130 107   Resp: 26 29 (!) 34 27   Temp:    97.1 °F (36.2 °C)   TempSrc:    Axillary   SpO2: (!) 79%  92% 94%   Weight:       Height:              PHYSICAL EXAM   GEN awake, alert, in moderate distress  HENT moist mucous membranes   RESP rhonchi  CARDIO/VASC tachycardic  NEURO no focal deficits       ASSESSMENT/PLAN:    #. Acute respiratory failure with hypoxia secondary to Covid 19 and CHF:  -ABG-pH 7.31, PCO2 56, PO2 55, HCO3 28.2  -Chest x-ray-personally reviewed-multiple patchy infiltrates.  -Placed on BiPAP-22/10, FiO2 100%. -Patient agreeable for intubation if needed. -Pulmonology on board. #.  A. fib with RVR:  -Patient started on Cardizem drip.  -Is on Eliquis, metoprolol succinate    #. COVID-19 pneumonia:  -Patient is on dexamethasone  -On ceftriaxone, azithromycin. Procalcitonin 31.75. #.  Acute on chronic systolic CHF  -Patient is on Lasix 40 mg IV twice daily.  -Is on spironolactone, losartan, metoprolol succinate    #.   Hypokalemia  -Potassium 2.9-12/9/2021, repeat was 3.7 on 12/10/2021.  -Patient is on potassium supplements, spironolactone, losartan. Will repeat CMP. #.  Chronic anemia  -Monitor H&H closely as patient is on Eliquis. Patient has history of GI bleed/rectal bleeding in the past.      Due to the immediate potential for life-threatening deterioration due to acute respiratory failure with hypoxia, A. fib with RVR , I spent 34 minutes providing critical care. This time is excluding time spent performing procedures.     Electronically signed by Amalia Rios MD on 12/11/2021 at 8:01 PM

## 2021-12-12 NOTE — PROGRESS NOTES
12/12/21 0821   NIV Type   $NIV $Daily Charge   NIV Started/Stopped On   Equipment Type v60   Mode Bilevel   Mask Type Full face mask   Mask Size Medium   Settings/Measurements   IPAP 22 cmH20   CPAP/EPAP 10 cmH2O   Rate Ordered 12   Resp 27   Insp Rise Time (%) 3 %   FiO2  95 %   I Time/ I Time % 0.9 s   Vt Exhaled 341 mL   Minute Volume 9.6 Liters   Mask Leak (lpm) 29 lpm   Comfort Level Good   Using Accessory Muscles No   SpO2 96   Breath Sounds   Right Upper Lobe Diminished   Right Middle Lobe Diminished   Right Lower Lobe Diminished   Left Upper Lobe Diminished   Left Lower Lobe Diminished   Alarm Settings   Alarms On Y   Press Low Alarm 3 cmH2O   High Pressure Alarm 30 cmH2O   Delay Alarm 20 sec(s)   Apnea (secs) 20 secs   Resp Rate Low Alarm 12   High Respiratory Rate 50 br/min

## 2021-12-12 NOTE — PROGRESS NOTES
.        Hospitalist Progress Note      Name:  Robert Yeager /Age/Sex: 1945  (77 y.o. male)   MRN & CSN:  [id number] & [id number] Admission Date/Time: 2021  5:33 PM   Location:  -A PCP: Dixon Bailey MD         Hospital Day: 5    Assessment and Plan:   Robert Yeager is a 77 y.o.  male  who presents with <principal problem not specified>    1. Covid pneumonia. Continue dexamethasone. Tocilizumab given 12/10/2021.   2. Community acquired pneumonia. Procalcitonin 31.75, . Continue IV antibiotics. Follow up cultures. 3. Acute respiratory failure due to covid pneumonia. On bipap. Pulmonary following. 4. Fall    5. Hypertension  6. Lymphedema  7. Paroxysmal atrial fibrillation on apixaban  8. Diabetes Mellitus  9. CAD. Elevated troponin. Cardiology consulted  10. Acute CHF. On IV lasix per Cardiology  11. Hypokalemia. Replace and monitor  12. Hip pain. Pain meds. Prognosis guarded    Diet ADULT DIET; Regular   DVT Prophylaxis [] Lovenox, []  Heparin, [] SCDs, [] Ambulation   GI Prophylaxis [] PPI,  [] H2 Blocker,  [] Carafate,  [] Diet/Tube Feeds   Code Status Full Code   Disposition Patient requires continued admission due to   MDM [] Low, [] Moderate,[]  High  Patient's risk as above due to      History of Present Illness:     Chief Complaint: <principal problem not specified>  Robert Yeager is a 77 y.o.  male  who presents with fall but found to have community acquired pneumonia, started on IV antibiotics. Tested for covid after admission, came back positive. Overnight patient went into respiratory distress, worsening hypoxia, placed on bipap, precedex. Ten point ROS reviewed negative, unless as noted above    Objective:        Intake/Output Summary (Last 24 hours) at 2021 1610  Last data filed at 2021 3456  Gross per 24 hour   Intake 107.77 ml   Output 1215 ml   Net -1107.23 ml      Vitals:   Vitals:    21 1509   BP:    Pulse:    Resp: 21   Temp: SpO2:      Physical Exam:   GEN Drowsy male. Appears given age. EYES Pupils are equally round. No scleral erythema, discharge, or conjunctivitis. HENT Mucous membranes are moist. Oral pharynx without exudates, no evidence of thrush. NECK Supple, no apparent thyromegaly or masses. RESP Bilateral rhonchi, few coarse crackles. Symmetric chest movement. CARDIO/VASC S1/S2 auscultated. Regular rate without appreciable murmurs, rubs, or gallops. No JVD or carotid bruits. Bilateral lymphedema. GI Abdomen is soft without significant tenderness, masses, or guarding. Bowel sounds are normoactive. Rectal exam deferred.  No costovertebral angle tenderness. Normal appearing external genitalia. Lane catheter is not present. HEME/LYMPH No palpable cervical lymphadenopathy and no hepatosplenomegaly. No petechiae or ecchymoses. MSK No gross joint deformities. SKIN Normal coloration, warm, dry.   NEURO  Drowsy, lightly sedated  PSYCH   Drowsy, lightly sedated    Medications:   Medications:    morphine  2 mg IntraVENous Once    dexmedetomidine (PRECEDEX) IV bolus  1 mcg/kg IntraVENous Once    dexamethasone  6 mg IntraVENous Daily    furosemide  40 mg IntraVENous BID    metoprolol succinate  50 mg Oral BID    spironolactone  25 mg Oral Daily    allopurinol  300 mg Oral Daily    apixaban  5 mg Oral BID    famotidine  40 mg Oral Nightly    [Held by provider] metFORMIN  500 mg Oral BID WC    losartan  100 mg Oral Daily    pravastatin  20 mg Oral Nightly    tamsulosin  0.4 mg Oral Nightly    triamterene-hydroCHLOROthiazide  1 tablet Oral Daily    sodium chloride flush  5-40 mL IntraVENous 2 times per day    cefTRIAXone (ROCEPHIN) IV  1,000 mg IntraVENous Q24H    And    azithromycin  500 mg Oral Q24H      Infusions:    dilTIAZem (CARDIZEM) 125 mg in dextrose 5% 125 mL infusion Stopped (12/11/21 7812)    dexmedetomidine (PRECEDEX) IV infusion 0.2 mcg/kg/hr (12/12/21 5007)    sodium chloride       PRN Meds: oxyCODONE, 5 mg, Q4H PRN  guaiFENesin-dextromethorphan, 5 mL, Q4H PRN  potassium chloride, 40 mEq, PRN   Or  potassium alternative oral replacement, 40 mEq, PRN   Or  potassium chloride, 10 mEq, PRN  sodium chloride flush, 5-40 mL, PRN  sodium chloride, 25 mL, PRN  ondansetron, 4 mg, Q8H PRN   Or  ondansetron, 4 mg, Q6H PRN  polyethylene glycol, 17 g, Daily PRN  acetaminophen, 650 mg, Q6H PRN   Or  acetaminophen, 650 mg, Q6H PRN            Electronically signed by Lesvia Aceves MD on 12/12/2021 at 4:10 PM

## 2021-12-12 NOTE — PROGRESS NOTES
Pulmonary and Critical Care  Progress Note      VITALS:  BP (!) 152/96   Pulse 56   Temp 97.2 °F (36.2 °C) (Axillary)   Resp 21   Ht 5' 8\" (1.727 m)   Wt 229 lb 4.5 oz (104 kg)   SpO2 98%   BMI 34.86 kg/m²     Subjective:   CHIEF COMPLAINT :fall     HPI:                The patient is lying in the bed. He is in mild resp distress. He is still on high flow oxygen. Objective:   PHYSICAL EXAM:    LUNGS:Occasional basal crackles  Abd-soft, BS+,NT  Ext- no pedal edema  CVS-s1s2, no murmurs      DATA:    CBC:  Recent Labs     12/11/21  0848 12/11/21  0848 12/11/21  1939 12/12/21  0132 12/12/21  0635   WBC 4.9  --   --   --   --    RBC 4.01*  --   --   --   --    HGB 11.1*  11.0*   < > 12.7* 11.0* 11.4*   HCT 35.9*  35.3*   < > 40.2* 35.4* 36.6*     --   --   --   --    MCV 89.5  --   --   --   --    MCH 27.7  --   --   --   --    MCHC 30.9*  --   --   --   --    RDW 15.9*  --   --   --   --     < > = values in this interval not displayed. BMP:  Recent Labs     12/10/21  1503 12/11/21  1939 12/12/21  1049   NA  --  138 144   K 3.7 4.8 5.3*   CL  --  98* 102   CO2  --  27 34*   BUN  --  26* 32*   CREATININE  --  1.0 1.0   CALCIUM  --  8.7 8.9   GLUCOSE  --  279* 184*      ABG:  Recent Labs     12/10/21  0700 12/11/21  1915   PH 7.34 7.31*   PO2ART 58* 55*   WUL0OFH 53.0* 56.0*   O2SAT 86.5* 81.3*     BNP  No results found for: BNP   D-Dimer:  No results found for: DDIMER   Radiology:   Persistent bilateral airspace disease without acute interval change.         1.        Assessment/Plan     Patient Active Problem List    Diagnosis Date Noted    PNA (pneumonia) 12/09/2021    Troponin I above reference range 12/09/2021    Elevated antinuclear antibody (FREDRICK) level 02/16/2021    B12 deficiency 02/15/2021    Anemia 02/12/2021    Rectal bleed 11/18/2020    Acute hypokalemia 11/17/2020    Acute cystitis without hematuria     Leukocytosis     Sepsis ([facility] [address] 01/17/2020    BRBPR (bright red blood per rectum) 01/17/2020    Rectal bleeding 09/25/2018    NSTEMI (non-ST elevated myocardial infarction) ([facility] [address] 06/25/2018    Upper GI bleed 03/07/2017    Diabetes mellitus (HCC)     Atrial flutter (HCC)     Atrial fibrillation (HCC)     Gout     Hyperlipidemia     Hypertension     Arthropathy    Acute on chronic hypoxic hypercapneic resp failure sec to Cardiogenic and non Cardiogenic Pulmonary edema  Systolic CHF with EF of 13-57%  Grade II Diastolic dysfunction  Bilateral Pneumonia sec to COVID-19  Obesity  DIVYA  Chronic Metabolic alkalosis       1. Abx  2. F/u C&S  3. Inhalers  4. Decadron  5. Insulin  6. ICS  7. OOB  8. Keep sats >92%  9. BIPAP  10. Prone ventilation as tolerated  11.  C.w present management    Electronically signed by Carmen Seals MD on 12/12/2021 at 1:46 PM

## 2021-12-13 ENCOUNTER — APPOINTMENT (OUTPATIENT)
Dept: GENERAL RADIOLOGY | Age: 76
DRG: 870 | End: 2021-12-13
Payer: COMMERCIAL

## 2021-12-13 ENCOUNTER — ANESTHESIA (OUTPATIENT)
Dept: INPATIENT UNIT | Age: 76
DRG: 870 | End: 2021-12-13
Payer: COMMERCIAL

## 2021-12-13 ENCOUNTER — ANESTHESIA EVENT (OUTPATIENT)
Dept: INPATIENT UNIT | Age: 76
DRG: 870 | End: 2021-12-13
Payer: COMMERCIAL

## 2021-12-13 LAB
ALBUMIN SERPL-MCNC: 2.9 GM/DL (ref 3.4–5)
ALP BLD-CCNC: 63 IU/L (ref 40–129)
ALT SERPL-CCNC: 12 U/L (ref 10–40)
ANION GAP SERPL CALCULATED.3IONS-SCNC: 12 MMOL/L (ref 4–16)
APTT: 32.9 SECONDS (ref 25.1–37.1)
APTT: 34.6 SECONDS (ref 25.1–37.1)
AST SERPL-CCNC: 24 IU/L (ref 15–37)
BASOPHILS ABSOLUTE: 0 K/CU MM
BASOPHILS RELATIVE PERCENT: 0.1 % (ref 0–1)
BILIRUB SERPL-MCNC: 0.3 MG/DL (ref 0–1)
BUN BLDV-MCNC: 41 MG/DL (ref 6–23)
CALCIUM SERPL-MCNC: 8.4 MG/DL (ref 8.3–10.6)
CHLORIDE BLD-SCNC: 103 MMOL/L (ref 99–110)
CO2: 26 MMOL/L (ref 21–32)
CREAT SERPL-MCNC: 0.9 MG/DL (ref 0.9–1.3)
DIFFERENTIAL TYPE: ABNORMAL
EOSINOPHILS ABSOLUTE: 0 K/CU MM
EOSINOPHILS RELATIVE PERCENT: 0 % (ref 0–3)
GFR AFRICAN AMERICAN: >60 ML/MIN/1.73M2
GFR NON-AFRICAN AMERICAN: >60 ML/MIN/1.73M2
GLUCOSE BLD-MCNC: 203 MG/DL (ref 70–99)
GLUCOSE BLD-MCNC: 213 MG/DL (ref 70–99)
GLUCOSE BLD-MCNC: 256 MG/DL (ref 70–99)
GLUCOSE BLD-MCNC: 261 MG/DL (ref 70–99)
GLUCOSE BLD-MCNC: 280 MG/DL (ref 70–99)
HCT VFR BLD CALC: 40.3 % (ref 42–52)
HCT VFR BLD CALC: 43.1 % (ref 42–52)
HEMOGLOBIN: 12.6 GM/DL (ref 13.5–18)
HEMOGLOBIN: 13.2 GM/DL (ref 13.5–18)
IMMATURE NEUTROPHIL %: 1.4 % (ref 0–0.43)
LYMPHOCYTES ABSOLUTE: 0.3 K/CU MM
LYMPHOCYTES RELATIVE PERCENT: 4.7 % (ref 24–44)
MAGNESIUM: 1.8 MG/DL (ref 1.8–2.4)
MCH RBC QN AUTO: 27.8 PG (ref 27–31)
MCHC RBC AUTO-ENTMCNC: 31.3 % (ref 32–36)
MCV RBC AUTO: 88.8 FL (ref 78–100)
MONOCYTES ABSOLUTE: 0.1 K/CU MM
MONOCYTES RELATIVE PERCENT: 1.8 % (ref 0–4)
NUCLEATED RBC %: 0.7 %
PDW BLD-RTO: 15.6 % (ref 11.7–14.9)
PLATELET # BLD: 360 K/CU MM (ref 140–440)
PMV BLD AUTO: 10.6 FL (ref 7.5–11.1)
POTASSIUM SERPL-SCNC: 4.4 MMOL/L (ref 3.5–5.1)
RBC # BLD: 4.54 M/CU MM (ref 4.6–6.2)
SEGMENTED NEUTROPHILS ABSOLUTE COUNT: 6.7 K/CU MM
SEGMENTED NEUTROPHILS RELATIVE PERCENT: 92 % (ref 36–66)
SODIUM BLD-SCNC: 141 MMOL/L (ref 135–145)
TOTAL IMMATURE NEUTOROPHIL: 0.1 K/CU MM
TOTAL NUCLEATED RBC: 0.1 K/CU MM
TOTAL PROTEIN: 5.7 GM/DL (ref 6.4–8.2)
TROPONIN T: 0.15 NG/ML
TROPONIN T: 0.15 NG/ML
WBC # BLD: 7.3 K/CU MM (ref 4–10.5)

## 2021-12-13 PROCEDURE — 5A1955Z RESPIRATORY VENTILATION, GREATER THAN 96 CONSECUTIVE HOURS: ICD-10-PCS | Performed by: INTERNAL MEDICINE

## 2021-12-13 PROCEDURE — 31500 INSERT EMERGENCY AIRWAY: CPT

## 2021-12-13 PROCEDURE — 36556 INSERT NON-TUNNEL CV CATH: CPT

## 2021-12-13 PROCEDURE — 89220 SPUTUM SPECIMEN COLLECTION: CPT

## 2021-12-13 PROCEDURE — 71045 X-RAY EXAM CHEST 1 VIEW: CPT

## 2021-12-13 PROCEDURE — 6360000002 HC RX W HCPCS: Performed by: INTERNAL MEDICINE

## 2021-12-13 PROCEDURE — 6360000002 HC RX W HCPCS: Performed by: NURSE PRACTITIONER

## 2021-12-13 PROCEDURE — 36620 INSERTION CATHETER ARTERY: CPT

## 2021-12-13 PROCEDURE — 85025 COMPLETE CBC W/AUTO DIFF WBC: CPT

## 2021-12-13 PROCEDURE — 6360000002 HC RX W HCPCS

## 2021-12-13 PROCEDURE — 99233 SBSQ HOSP IP/OBS HIGH 50: CPT | Performed by: INTERNAL MEDICINE

## 2021-12-13 PROCEDURE — 94761 N-INVAS EAR/PLS OXIMETRY MLT: CPT

## 2021-12-13 PROCEDURE — 2580000003 HC RX 258: Performed by: NURSE PRACTITIONER

## 2021-12-13 PROCEDURE — 2500000003 HC RX 250 WO HCPCS: Performed by: NURSE PRACTITIONER

## 2021-12-13 PROCEDURE — 2500000003 HC RX 250 WO HCPCS

## 2021-12-13 PROCEDURE — 32551 INSERTION OF CHEST TUBE: CPT

## 2021-12-13 PROCEDURE — 0W9B30Z DRAINAGE OF LEFT PLEURAL CAVITY WITH DRAINAGE DEVICE, PERCUTANEOUS APPROACH: ICD-10-PCS | Performed by: THORACIC SURGERY (CARDIOTHORACIC VASCULAR SURGERY)

## 2021-12-13 PROCEDURE — 2000000000 HC ICU R&B

## 2021-12-13 PROCEDURE — 85014 HEMATOCRIT: CPT

## 2021-12-13 PROCEDURE — 31500 INSERT EMERGENCY AIRWAY: CPT | Performed by: NURSE ANESTHETIST, CERTIFIED REGISTERED

## 2021-12-13 PROCEDURE — 6370000000 HC RX 637 (ALT 250 FOR IP): Performed by: NURSE PRACTITIONER

## 2021-12-13 PROCEDURE — 36415 COLL VENOUS BLD VENIPUNCTURE: CPT

## 2021-12-13 PROCEDURE — 6370000000 HC RX 637 (ALT 250 FOR IP): Performed by: INTERNAL MEDICINE

## 2021-12-13 PROCEDURE — 84484 ASSAY OF TROPONIN QUANT: CPT

## 2021-12-13 PROCEDURE — 2700000000 HC OXYGEN THERAPY PER DAY

## 2021-12-13 PROCEDURE — 2580000003 HC RX 258: Performed by: INTERNAL MEDICINE

## 2021-12-13 PROCEDURE — 74018 RADEX ABDOMEN 1 VIEW: CPT

## 2021-12-13 PROCEDURE — 82962 GLUCOSE BLOOD TEST: CPT

## 2021-12-13 PROCEDURE — 83735 ASSAY OF MAGNESIUM: CPT

## 2021-12-13 PROCEDURE — 80053 COMPREHEN METABOLIC PANEL: CPT

## 2021-12-13 PROCEDURE — 02HV33Z INSERTION OF INFUSION DEVICE INTO SUPERIOR VENA CAVA, PERCUTANEOUS APPROACH: ICD-10-PCS | Performed by: THORACIC SURGERY (CARDIOTHORACIC VASCULAR SURGERY)

## 2021-12-13 PROCEDURE — 94002 VENT MGMT INPAT INIT DAY: CPT

## 2021-12-13 PROCEDURE — 85730 THROMBOPLASTIN TIME PARTIAL: CPT

## 2021-12-13 PROCEDURE — 85018 HEMOGLOBIN: CPT

## 2021-12-13 PROCEDURE — 0BH17EZ INSERTION OF ENDOTRACHEAL AIRWAY INTO TRACHEA, VIA NATURAL OR ARTIFICIAL OPENING: ICD-10-PCS | Performed by: INTERNAL MEDICINE

## 2021-12-13 RX ORDER — ZIPRASIDONE MESYLATE 20 MG/ML
20 INJECTION, POWDER, LYOPHILIZED, FOR SOLUTION INTRAMUSCULAR ONCE
Status: COMPLETED | OUTPATIENT
Start: 2021-12-13 | End: 2021-12-13

## 2021-12-13 RX ORDER — PROPOFOL 10 MG/ML
5-50 INJECTION, EMULSION INTRAVENOUS
Status: DISCONTINUED | OUTPATIENT
Start: 2021-12-13 | End: 2021-12-29

## 2021-12-13 RX ORDER — NOREPINEPHRINE BIT/0.9 % NACL 16MG/250ML
2-100 INFUSION BOTTLE (ML) INTRAVENOUS CONTINUOUS
Status: DISCONTINUED | OUTPATIENT
Start: 2021-12-13 | End: 2021-12-29

## 2021-12-13 RX ORDER — SODIUM CHLORIDE 0.9 % (FLUSH) 0.9 %
5-40 SYRINGE (ML) INJECTION EVERY 12 HOURS SCHEDULED
Status: DISCONTINUED | OUTPATIENT
Start: 2021-12-13 | End: 2022-01-05 | Stop reason: HOSPADM

## 2021-12-13 RX ORDER — NICOTINE POLACRILEX 4 MG
15 LOZENGE BUCCAL PRN
Status: DISCONTINUED | OUTPATIENT
Start: 2021-12-13 | End: 2022-01-05 | Stop reason: HOSPADM

## 2021-12-13 RX ORDER — CHLORHEXIDINE GLUCONATE 0.12 MG/ML
15 RINSE ORAL 2 TIMES DAILY
Status: DISCONTINUED | OUTPATIENT
Start: 2021-12-13 | End: 2022-01-05 | Stop reason: HOSPADM

## 2021-12-13 RX ORDER — DIPHENHYDRAMINE HYDROCHLORIDE 50 MG/ML
25 INJECTION INTRAMUSCULAR; INTRAVENOUS ONCE
Status: COMPLETED | OUTPATIENT
Start: 2021-12-13 | End: 2021-12-13

## 2021-12-13 RX ORDER — 0.9 % SODIUM CHLORIDE 0.9 %
1000 INTRAVENOUS SOLUTION INTRAVENOUS ONCE
Status: COMPLETED | OUTPATIENT
Start: 2021-12-13 | End: 2021-12-13

## 2021-12-13 RX ORDER — NOREPINEPHRINE BIT/0.9 % NACL 16MG/250ML
INFUSION BOTTLE (ML) INTRAVENOUS
Status: COMPLETED
Start: 2021-12-13 | End: 2021-12-13

## 2021-12-13 RX ORDER — MIDAZOLAM HYDROCHLORIDE 2 MG/2ML
2 INJECTION, SOLUTION INTRAMUSCULAR; INTRAVENOUS ONCE
Status: COMPLETED | OUTPATIENT
Start: 2021-12-13 | End: 2021-12-13

## 2021-12-13 RX ORDER — DEXTROSE MONOHYDRATE 25 G/50ML
12.5 INJECTION, SOLUTION INTRAVENOUS PRN
Status: DISCONTINUED | OUTPATIENT
Start: 2021-12-13 | End: 2022-01-05 | Stop reason: HOSPADM

## 2021-12-13 RX ORDER — DEXTROSE MONOHYDRATE 50 MG/ML
100 INJECTION, SOLUTION INTRAVENOUS PRN
Status: DISCONTINUED | OUTPATIENT
Start: 2021-12-13 | End: 2022-01-05 | Stop reason: HOSPADM

## 2021-12-13 RX ORDER — SODIUM CHLORIDE 9 MG/ML
25 INJECTION, SOLUTION INTRAVENOUS PRN
Status: DISCONTINUED | OUTPATIENT
Start: 2021-12-13 | End: 2021-12-25

## 2021-12-13 RX ORDER — SODIUM CHLORIDE 0.9 % (FLUSH) 0.9 %
5-40 SYRINGE (ML) INJECTION PRN
Status: DISCONTINUED | OUTPATIENT
Start: 2021-12-13 | End: 2022-01-05 | Stop reason: HOSPADM

## 2021-12-13 RX ADMIN — SODIUM CHLORIDE, PRESERVATIVE FREE 10 ML: 5 INJECTION INTRAVENOUS at 09:07

## 2021-12-13 RX ADMIN — LORAZEPAM 1 MG: 2 INJECTION INTRAMUSCULAR; INTRAVENOUS at 00:17

## 2021-12-13 RX ADMIN — INSULIN LISPRO 6 UNITS: 100 INJECTION, SOLUTION INTRAVENOUS; SUBCUTANEOUS at 18:19

## 2021-12-13 RX ADMIN — MIDAZOLAM 1 MG/HR: 5 INJECTION INTRAMUSCULAR; INTRAVENOUS at 14:41

## 2021-12-13 RX ADMIN — TAMSULOSIN HYDROCHLORIDE 0.4 MG: 0.4 CAPSULE ORAL at 20:18

## 2021-12-13 RX ADMIN — FUROSEMIDE 40 MG: 10 INJECTION, SOLUTION INTRAVENOUS at 17:06

## 2021-12-13 RX ADMIN — CHLORHEXIDINE GLUCONATE 0.12% ORAL RINSE 15 ML: 1.2 LIQUID ORAL at 20:18

## 2021-12-13 RX ADMIN — Medication 2 MCG/MIN: at 07:06

## 2021-12-13 RX ADMIN — METOPROLOL SUCCINATE 50 MG: 50 TABLET, EXTENDED RELEASE ORAL at 20:17

## 2021-12-13 RX ADMIN — DIPHENHYDRAMINE HYDROCHLORIDE 25 MG: 50 INJECTION INTRAMUSCULAR; INTRAVENOUS at 03:42

## 2021-12-13 RX ADMIN — SPIRONOLACTONE 25 MG: 25 TABLET ORAL at 13:28

## 2021-12-13 RX ADMIN — HYDRALAZINE HYDROCHLORIDE 10 MG: 20 INJECTION INTRAMUSCULAR; INTRAVENOUS at 05:52

## 2021-12-13 RX ADMIN — FAMOTIDINE 20 MG: 10 INJECTION, SOLUTION INTRAVENOUS at 20:18

## 2021-12-13 RX ADMIN — CHLORHEXIDINE GLUCONATE 0.12% ORAL RINSE 15 ML: 1.2 LIQUID ORAL at 09:06

## 2021-12-13 RX ADMIN — SODIUM CHLORIDE, PRESERVATIVE FREE 10 ML: 5 INJECTION INTRAVENOUS at 20:19

## 2021-12-13 RX ADMIN — FAMOTIDINE 20 MG: 10 INJECTION, SOLUTION INTRAVENOUS at 09:06

## 2021-12-13 RX ADMIN — MIDAZOLAM HYDROCHLORIDE 2 MG: 1 INJECTION, SOLUTION INTRAMUSCULAR; INTRAVENOUS at 17:23

## 2021-12-13 RX ADMIN — FUROSEMIDE 40 MG: 10 INJECTION, SOLUTION INTRAVENOUS at 09:06

## 2021-12-13 RX ADMIN — OXYCODONE HYDROCHLORIDE 5 MG: 5 TABLET ORAL at 18:19

## 2021-12-13 RX ADMIN — PRAVASTATIN SODIUM 20 MG: 10 TABLET ORAL at 20:17

## 2021-12-13 RX ADMIN — DEXMEDETOMIDINE 1.1 MCG/KG/HR: 100 INJECTION, SOLUTION, CONCENTRATE INTRAVENOUS at 03:42

## 2021-12-13 RX ADMIN — ZIPRASIDONE MESYLATE 20 MG: 20 INJECTION, POWDER, LYOPHILIZED, FOR SOLUTION INTRAMUSCULAR at 03:42

## 2021-12-13 RX ADMIN — SODIUM CHLORIDE 1000 ML: 9 INJECTION, SOLUTION INTRAVENOUS at 06:37

## 2021-12-13 RX ADMIN — APIXABAN 5 MG: 5 TABLET, FILM COATED ORAL at 13:28

## 2021-12-13 RX ADMIN — CEFTRIAXONE SODIUM 1000 MG: 1 INJECTION, POWDER, FOR SOLUTION INTRAMUSCULAR; INTRAVENOUS at 03:25

## 2021-12-13 RX ADMIN — APIXABAN 5 MG: 5 TABLET, FILM COATED ORAL at 20:17

## 2021-12-13 RX ADMIN — INSULIN LISPRO 4 UNITS: 100 INJECTION, SOLUTION INTRAVENOUS; SUBCUTANEOUS at 12:23

## 2021-12-13 RX ADMIN — ALLOPURINOL 300 MG: 300 TABLET ORAL at 13:28

## 2021-12-13 RX ADMIN — Medication 2 MCG/MIN: at 19:39

## 2021-12-13 RX ADMIN — DEXAMETHASONE SODIUM PHOSPHATE 6 MG: 10 INJECTION INTRAMUSCULAR; INTRAVENOUS at 09:06

## 2021-12-13 RX ADMIN — PROPOFOL 10 MCG/KG/MIN: 10 INJECTION, EMULSION INTRAVENOUS at 06:42

## 2021-12-13 ASSESSMENT — PULMONARY FUNCTION TESTS
PIF_VALUE: 28
PIF_VALUE: 33
PIF_VALUE: 32
PIF_VALUE: 34
PIF_VALUE: 43
PIF_VALUE: 32
PIF_VALUE: 30
PIF_VALUE: 35
PIF_VALUE: 49
PIF_VALUE: 30
PIF_VALUE: 39
PIF_VALUE: 35
PIF_VALUE: 40
PIF_VALUE: 33
PIF_VALUE: 29
PIF_VALUE: 31
PIF_VALUE: 33
PIF_VALUE: 29
PIF_VALUE: 30
PIF_VALUE: 36
PIF_VALUE: 30
PIF_VALUE: 35
PIF_VALUE: 30
PIF_VALUE: 30
PIF_VALUE: 43
PIF_VALUE: 33
PIF_VALUE: 33
PIF_VALUE: 30
PIF_VALUE: 39
PIF_VALUE: 30
PIF_VALUE: 29
PIF_VALUE: 30
PIF_VALUE: 30
PIF_VALUE: 29
PIF_VALUE: 43
PIF_VALUE: 30
PIF_VALUE: 31
PIF_VALUE: 33
PIF_VALUE: 35
PIF_VALUE: 35
PIF_VALUE: 29
PIF_VALUE: 29
PIF_VALUE: 34
PIF_VALUE: 32
PIF_VALUE: 32

## 2021-12-13 ASSESSMENT — PAIN SCALES - GENERAL
PAINLEVEL_OUTOF10: 0
PAINLEVEL_OUTOF10: 5
PAINLEVEL_OUTOF10: 0
PAINLEVEL_OUTOF10: 0

## 2021-12-13 NOTE — PROGRESS NOTES
Dr. Clair Elmore is currently at the bedside to emergently place a chest tube in Pt since his post intubation PROGRESS [facility] shows a left large tension pneumothorax.

## 2021-12-13 NOTE — PLAN OF CARE
Problem: Falls - Risk of:  Goal: Will remain free from falls  Description: Will remain free from falls  Outcome: Ongoing  Goal: Absence of physical injury  Description: Absence of physical injury  Outcome: Ongoing     Problem: Airway Clearance - Ineffective  Goal: Achieve or maintain patent airway  Outcome: Ongoing     Problem: Gas Exchange - Impaired  Goal: Absence of hypoxia  Outcome: Ongoing  Goal: Promote optimal lung function  Outcome: Ongoing     Problem: Breathing Pattern - Ineffective  Goal: Ability to achieve and maintain a regular respiratory rate  Outcome: Ongoing     Problem: Body Temperature -  Risk of, Imbalanced  Goal: Ability to maintain a body temperature within defined limits  Outcome: Ongoing  Goal: Will regain or maintain usual level of consciousness  Outcome: Ongoing  Goal: Complications related to the disease process, condition or treatment will be avoided or minimized  Outcome: Ongoing     Problem: Isolation Precautions - Risk of Spread of Infection  Goal: Prevent transmission of infection  Outcome: Ongoing     Problem: Nutrition Deficits  Goal: Optimize nutritional status  Outcome: Ongoing     Problem: Risk for Fluid Volume Deficit  Goal: Maintain normal heart rhythm  Outcome: Ongoing  Goal: Maintain absence of muscle cramping  Outcome: Ongoing  Goal: Maintain normal serum potassium, sodium, calcium, phosphorus, and pH  Outcome: Ongoing     Problem: Loneliness or Risk for Loneliness  Goal: Demonstrate positive use of time alone when socialization is not possible  Outcome: Ongoing     Problem: Fatigue  Goal: Verbalize increase energy and improved vitality  Outcome: Ongoing     Problem: Patient Education: Go to Patient Education Activity  Goal: Patient/Family Education  Outcome: Ongoing     Problem: Pain:  Description: Pain management should include both nonpharmacologic and pharmacologic interventions.   Goal: Pain level will decrease  Description: Pain level will decrease  Outcome: Ongoing  Goal: Control of acute pain  Description: Control of acute pain  Outcome: Ongoing  Goal: Control of chronic pain  Description: Control of chronic pain  Outcome: Ongoing     Problem: Skin Integrity:  Goal: Will show no infection signs and symptoms  Description: Will show no infection signs and symptoms  Outcome: Ongoing  Goal: Absence of new skin breakdown  Description: Absence of new skin breakdown  Outcome: Ongoing

## 2021-12-13 NOTE — ANESTHESIA PROCEDURE NOTES
Airway  Date/Time: 12/13/2021 6:20 AM  Urgency: emergent    Airway not difficult    General Information and Staff    Patient location during procedure: ICU  Resident/CRNA: KARELY Cunningham CRNA  Performed: resident/CRNA     Consent for Airway (if performed for an anesthetic, see related documentation for consents)  Patient identity confirmed: per hospital policy  Consent: The procedure was performed in an emergent situation. Verbal consent not obtained. Written consent not obtained. Risks and benefits: risks, benefits and alternatives were not discussed      Code status verified:yes  Indications and Patient Condition  Indications for airway management: respiratory failure  Spontaneous ventilation: present  Sedation level: no sedation  Preoxygenated: yes  Patient position: sniffing  MILS maintained throughout  Mask difficulty assessment: not attempted    Final Airway Details  Final airway type: endotracheal airway      Successful airway: ETT  Cuffed: yes   Successful intubation technique: video laryngoscopy  Facilitating devices/methods: intubating stylet  Blade: Mina  Blade size: #4  ETT size (mm): 7.5  Cormack-Lehane Classification: grade I - full view of glottis  Placement verified by: chest auscultation   Inital cuff pressure (cm H2O): 0  Measured from: lips  ETT to lips (cm): 22  Number of attempts at approach: 1  Ventilation between attempts: bag mask  Number of other approaches attempted: 0    Additional Comments  4 mg versed, 100 mg rocuronium IVP.  ETT secured by RT. VSS  no

## 2021-12-13 NOTE — PROGRESS NOTES
pT INTUBATED BY crna WITH OUT COMPLICATION WITH 7.5 ett 22 LIP. pOSITICVE COLOR CHANGE AND EQUAL CHEST RISE. pLACED ON VENT ac rr 20 vt 500 100% O2 peep 10.

## 2021-12-13 NOTE — PROGRESS NOTES
12/13/21 1505   Oxygen Therapy/Pulse Ox   O2 Therapy Oxygen   O2 Device Ventilator   FiO2  80 %   Resp 20   SpO2 98 %   Pulse Oximeter Device Mode Continuous   Pulse Oximeter Device Location Finger

## 2021-12-13 NOTE — CARE COORDINATION
Patient admitted to COVID unit and is on the vent. Per medical record review , patient is from home, has PCP and insurance to assist with RX coverage. Case Management to follow to assist with any potential needs.

## 2021-12-13 NOTE — PROCEDURES
Preprocedure diagnosis: Need for hemodynamic monitoring  Postprocedure diagnosis: Same    Procedure: Left arterial line placement using ultrasound guidance at the bedside    Surgeon: Karo Ma MD    Estimated blood loss: Less than 20 cc    Description of procedure: The left wrist was prepped and draped in normal sterile fashion. A timeout was performed and site was confirmed. Ultrasound guidance was used to access the radial artery using a micropuncture radial needle. Wire was advanced under ultrasound guidance without difficulty. The micro sheath was then advanced over the wire. The wire and dilator were removed. There was excellent pulsatile flow. This was connected to the monitoring circuit and sutured into place. No immediate complications were noted.

## 2021-12-13 NOTE — PROCEDURES
Chest Tube Placement  Placement of 12 [place] chest tube to the left side    Pre-operative Diagnosis: Left tension PTX    Post-operative Diagnosis: Same    Indications:  PTX    Procedure Details:  Unable to be obtained due to the emergent nature of this procedure. Under sterile conditions the patient was properly positioned. A standard kit was used with Chorhexidine solution and sterile drapes. 1% Lidocaine was used to anesthetize the rib space at the entry site. An entry needle above the rib margin with good return of air. The guidewire was advanced into chest.  The entry site was serially dilated. The 12 Fr chest tube was then inserted using seldinger technique. The tube was secured with a 2-0 silk and connected to a pleur-evac. Patient tolerated procedure well. CXR ordered. Findings: good rush of air on entry    Complications:  None; patient tolerated the procedure well.           Condition: Stable

## 2021-12-13 NOTE — PLAN OF CARE
Nutrition Problem #1: Inadequate oral intake  Intervention: Food and/or Nutrient Delivery: Start Tube Feeding  Nutritional Goals: pt will tolerate EN initiation within the next 24 hr

## 2021-12-13 NOTE — PROGRESS NOTES
Occupational Therapy  Per the physical rehabilitation process, the OT order will be discontinued. Current charting indicates that pt is intubated. Please re-order when extubated and medically stable to participate in skilled OT.    [address] [facility] DEDE   [id number]   4:03 PM, 12/13/2021

## 2021-12-13 NOTE — PROGRESS NOTES
.        Hospitalist Progress Note      Name:  Toby Peng /Age/Sex: 1945  (77 y.o. male)   MRN & CSN:  [id number] & [id number] Admission Date/Time: 2021  5:33 PM   Location:  -A PCP: Paulo Faulkner MD         Hospital Day: 6    Assessment and Plan:   Toby Peng is a 77 y.o.  male  who presents with <principal problem not specified>    1. Covid pneumonia. Continue dexamethasone. Tocilizumab given 12/10/2021.   2. Community acquired pneumonia. Continue IV antibiotics. Follow up cultures. 3. Acute respiratory failure due to covid pneumonia. Intubated 2021. On vent support Pulmonary following. 4. Left tension pneumothorax. Chest tube placed this morning 2021.  5. Fall    6. Hypertension  7. Lymphedema  8. Paroxysmal atrial fibrillation on apixaban  9. Diabetes Mellitus  10. CAD. Elevated troponin. Cardiology consulted  11. Acute CHF. On IV lasix per Cardiology  12. Hypokalemia. Replace and monitor  13. Hip pain. Pain meds. Prognosis guarded    Diet ADULT DIET; Regular  ADULT TUBE FEEDING; Nasogastric; Peptide Based High Protein; Continuous; 10; Yes; 10; Q 4 hours; 20; 30; Q 4 hours   DVT Prophylaxis [] Lovenox, []  Heparin, [] SCDs, [] Ambulation   GI Prophylaxis [] PPI,  [] H2 Blocker,  [] Carafate,  [] Diet/Tube Feeds   Code Status Full Code   Disposition Patient requires continued admission due to   MDM [] Low, [] Moderate,[]  High  Patient's risk as above due to      History of Present Illness:     Chief Complaint: <principal problem not specified>  Toby Peng is a 77 y.o.  male  who presents with fall but found to have community acquired pneumonia, started on IV antibiotics. Tested for covid after admission, came back positive. Patient was intubated early this morning    Chest tube placed for left tension pneumothorax    Ten point ROS reviewed negative, unless as noted above    Objective:        Intake/Output Summary (Last 24 hours) at 2021 1046  Last data filed at 12/13/2021 0553  Gross per 24 hour   Intake 240 ml   Output 1950 ml   Net -1710 ml      Vitals:   Vitals:    12/13/21 1005   BP: 81/69   Pulse: 52   Resp: 20   Temp:    SpO2: 100%     Physical Exam:   GEN Sedated male. Appears given age. EYES Pupils are equally round. No scleral erythema, discharge, or conjunctivitis. HENT Mucous membranes are moist. Oral pharynx without exudates, no evidence of thrush. NECK Supple, no apparent thyromegaly or masses. RESP Diminished BS. Left chest tube in place. CARDIO/VASC S1/S2 auscultated. Regular rate without appreciable murmurs, rubs, or gallops. No JVD or carotid bruits. Bilateral lymphedema. GI Abdomen is soft without significant tenderness, masses, or guarding. Bowel sounds are normoactive. MSK No gross joint deformities. SKIN Normal coloration, warm, dry.   NEURO  Sedated    Medications:   Medications:    chlorhexidine  15 mL Mouth/Throat BID    famotidine (PEPCID) injection  20 mg IntraVENous BID    lidocaine  5 mL IntraDERmal Once    sodium chloride flush  5-40 mL IntraVENous 2 times per day    morphine  2 mg IntraVENous Once    dexmedetomidine (PRECEDEX) IV bolus  1 mcg/kg IntraVENous Once    dexamethasone  6 mg IntraVENous Daily    furosemide  40 mg IntraVENous BID    metoprolol succinate  50 mg Oral BID    spironolactone  25 mg Oral Daily    allopurinol  300 mg Oral Daily    apixaban  5 mg Oral BID    [Held by provider] metFORMIN  500 mg Oral BID WC    losartan  100 mg Oral Daily    pravastatin  20 mg Oral Nightly    tamsulosin  0.4 mg Oral Nightly    triamterene-hydroCHLOROthiazide  1 tablet Oral Daily    sodium chloride flush  5-40 mL IntraVENous 2 times per day    cefTRIAXone (ROCEPHIN) IV  1,000 mg IntraVENous Q24H      Infusions:    propofol 10 mcg/kg/min (12/13/21 7583)    fentaNYL Stopped (12/13/21 0932)    sodium chloride      norepinephrine 1 mcg/min (12/13/21 1021)    sodium chloride       PRN Meds: hydrALAZINE (APRESOLINE) ivpb, 10 mg, Q4H PRN  sodium chloride flush, 5-40 mL, PRN  sodium chloride, 25 mL, PRN  LORazepam, 1 mg, Q6H PRN  oxyCODONE, 5 mg, Q4H PRN  guaiFENesin-dextromethorphan, 5 mL, Q4H PRN  potassium chloride, 40 mEq, PRN   Or  potassium alternative oral replacement, 40 mEq, PRN   Or  potassium chloride, 10 mEq, PRN  sodium chloride flush, 5-40 mL, PRN  sodium chloride, 25 mL, PRN  ondansetron, 4 mg, Q8H PRN   Or  ondansetron, 4 mg, Q6H PRN  polyethylene glycol, 17 g, Daily PRN  acetaminophen, 650 mg, Q6H PRN   Or  acetaminophen, 650 mg, Q6H PRN            Electronically signed by Cyndi Miller MD on 12/13/2021 at 10:46 AM

## 2021-12-13 NOTE — PROGRESS NOTES
12/13/21 1112   Oxygen Therapy/Pulse Ox   O2 Therapy Oxygen   $Oxygen $Daily Charge   O2 Device Ventilator   FiO2  90 %   Resp 20   SpO2 99 %   Pulse Oximeter Device Mode Continuous   Pulse Oximeter Device Location Forehead

## 2021-12-13 NOTE — PROGRESS NOTES
Today's plan:  Patient intubated, had tension pneumothorax and required chest tube      Admit Date:  12/8/2021    Subjective:OK      Chief complaints on admission  Chief Complaint   Patient presents with    Fall         History of present illness:Bryce is a 77 y. o.year old who  presents with had concerns including Fall. Past medical history:    has a past medical history of Arthropathy, Atrial fibrillation ([address] Atrial flutter ([address] Diabetes mellitus ([address] Gout, Hyperlipidemia, Hypertension, and Lumbago. Past surgical history:   has a past surgical history that includes Colonoscopy (N/A, 9/27/2018) and Colonoscopy (N/A, 11/18/2020). Social History:   reports that he has quit smoking. His smoking use included cigarettes. He smoked 0.50 packs per day. He has never used smokeless tobacco. He reports that he does not drink alcohol and does not use drugs. Family history:  family history includes No Known Problems in his father and mother. Allergies   Allergen Reactions    Demerol Hcl [Meperidine]     Gabapentin     Simvastatin          Objective:   BP 81/69   Pulse 52   Temp 97.5 °F (36.4 °C) (Axillary)   Resp 20   Ht 5' 7.99\" (1.727 m)   Wt 216 lb 0.8 oz (98 kg)   SpO2 100%   BMI 32.86 kg/m²       Intake/Output Summary (Last 24 hours) at 12/13/2021 1034  Last data filed at 12/13/2021 0553  Gross per 24 hour   Intake 240 ml   Output 1950 ml   Net -1710 ml         Physical Exam:  Constitutional:  Well developed, Well nourished, No acute distress, Non-toxic appearance. HENT:  Normocephalic, Atraumatic, Bilateral external ears normal, Oropharynx moist, No oral exudates, Nose normal. Neck- Normal range of motion, No tenderness, Supple, No stridor. Eyes:  PERRL, EOMI, Conjunctiva normal, No discharge. Respiratory:  Normal breath sounds, No respiratory distress, No wheezing, No chest tenderness. ,no use of accessory muscles, diaphragm movement is normal  Cardiovascular: (PMI) apex non displaced,no lifts no thrills, no s3,no s4, Normal heart rate, Normal rhythm, No murmurs, No rubs, No gallops. Carotid arteries pulse and amplitude are normal no bruit, no abdominal bruit noted ( normal abdominal aorta ausculation), femoral arteries pulse and amplitude are normal no bruit, pedal pulses are normal  GI:  Bowel sounds normal, Soft, No tenderness, No masses, No pulsatile masses. : External genitalia appear normal, No masses or lesions. No discharge. No CVA tenderness. Musculoskeletal:  Intact distal pulses, No edema, No tenderness, No cyanosis, No clubbing. Good range of motion in all major joints. No tenderness to palpation or major deformities noted. Back- No tenderness. Integument:  Warm, Dry, No erythema, No rash. Lymphatic:  No lymphadenopathy noted. Neurologic:  Alert & oriented x 3, Normal motor function, Normal sensory function, No focal deficits noted.    Psychiatric:  Affect normal, Judgment normal, Mood normal.     Medications:    chlorhexidine  15 mL Mouth/Throat BID    famotidine (PEPCID) injection  20 mg IntraVENous BID    lidocaine  5 mL IntraDERmal Once    sodium chloride flush  5-40 mL IntraVENous 2 times per day    morphine  2 mg IntraVENous Once    dexmedetomidine (PRECEDEX) IV bolus  1 mcg/kg IntraVENous Once    dexamethasone  6 mg IntraVENous Daily    furosemide  40 mg IntraVENous BID    metoprolol succinate  50 mg Oral BID    spironolactone  25 mg Oral Daily    allopurinol  300 mg Oral Daily    apixaban  5 mg Oral BID    [Held by provider] metFORMIN  500 mg Oral BID WC    losartan  100 mg Oral Daily    pravastatin  20 mg Oral Nightly    tamsulosin  0.4 mg Oral Nightly    triamterene-hydroCHLOROthiazide  1 tablet Oral Daily    sodium chloride flush  5-40 mL IntraVENous 2 times per day    cefTRIAXone (ROCEPHIN) IV  1,000 mg IntraVENous Q24H      propofol 10 mcg/kg/min (12/13/21 4783)    fentaNYL Stopped (12/13/21 7601)    sodium chloride      norepinephrine 1 mcg/min (12/13/21 1021)    sodium chloride       hydrALAZINE (APRESOLINE) ivpb, sodium chloride flush, sodium chloride, LORazepam, oxyCODONE, guaiFENesin-dextromethorphan, potassium chloride **OR** potassium alternative oral replacement **OR** potassium chloride, sodium chloride flush, sodium chloride, ondansetron **OR** ondansetron, polyethylene glycol, acetaminophen **OR** acetaminophen    Lab Data:  CBC:   Recent Labs     12/11/21  0848 12/11/21  1939 12/12/21  1328 12/12/21 2131 12/13/21  0310   WBC 4.9  --   --   --   --    HGB 11.1*  11.0*   < > 12.2* 12.2* 13.2*   HCT 35.9*  35.3*   < > 39.4* 40.0* 43.1   MCV 89.5  --   --   --   --      --   --   --   --     < > = values in this interval not displayed. BMP:   Recent Labs     12/10/21  1503 12/11/21 1939 12/12/21  1049   NA  --  138 144   K 3.7 4.8 5.3*   CL  --  98* 102   CO2  --  27 34*   BUN  --  26* 32*   CREATININE  --  1.0 1.0     LIVER PROFILE:   Recent Labs     12/11/21 1939 12/12/21  1049   AST 38* 30   ALT 16 14   BILITOT 0.5 0.4   ALKPHOS 69 64     PT/INR: No results for input(s): PROTIME, INR in the last 72 hours. APTT:   Recent Labs     12/12/21  1328 12/12/21 2131 12/13/21  0310   APTT 35.4 33.4 34.6     BNP:  No results for input(s): BNP in the last 72 hours. TROPONIN: @TROPONINI:3@      Assessment:  77 y. o.year old who is admitted for          Plan:  Respiratory failure: intubtated, tension pneumothorax, s/p chest tube, poor prognosis over all  Elevated trop\" h/o LAD rotablator used PCI of mid LAD at [place], once COVID and CHF is resolved, will decide for angiogram  CHF; ok to stop IV ntg TODAY, continue lasix drip  Cardiomyopathy: on losartan and BB, LVEF IS 35-40%  All labs, medications and tests reviewed, continue all other medications of all above medical condition listed as is.       Jacqueline Mariee MD, MD 12/13/2021 10:34 AM

## 2021-12-13 NOTE — PROGRESS NOTES
Pulmonary and Critical Care  Progress Note      VITALS:  BP (!) 120/91   Pulse 53   Temp 96.7 °F (35.9 °C) (Rectal)   Resp 20   Ht 5' 7.99\" (1.727 m)   Wt 216 lb 0.8 oz (98 kg)   SpO2 98%   BMI 32.86 kg/m²     Subjective:   CHIEF COMPLAINT :Fall     HPI:                The patient is on the vent and sedated. He is in mild resp distress. He had a left chest tube for the pneumothorax. Objective:   PHYSICAL EXAM:    LUNGS:Occasional basal crackles  Abd-soft, BS+,NT  Ext- no pedal edema  CVS-s1s2, no murmurs      DATA:    CBC:  Recent Labs     12/11/21  0848 12/11/21 1939 12/12/21 2131 12/13/21  0310 12/13/21  1014   WBC 4.9  --   --   --  7.3   RBC 4.01*  --   --   --  4.54*   HGB 11.1*  11.0*   < > 12.2* 13.2* 12.6*   HCT 35.9*  35.3*   < > 40.0* 43.1 40.3*     --   --   --  360   MCV 89.5  --   --   --  88.8   MCH 27.7  --   --   --  27.8   MCHC 30.9*  --   --   --  31.3*   RDW 15.9*  --   --   --  15.6*   SEGSPCT  --   --   --   --  92.0*    < > = values in this interval not displayed. BMP:  Recent Labs     12/11/21 1939 12/12/21  1049 12/13/21  1014    144 141   K 4.8 5.3* 4.4   CL 98* 102 103   CO2 27 34* 26   BUN 26* 32* 41*   CREATININE 1.0 1.0 0.9   CALCIUM 8.7 8.9 8.4   GLUCOSE 279* 184* 280*      ABG:  Recent Labs     12/11/21 1915   PH 7.31*   PO2ART 55*   DDY3MYW 56.0*   O2SAT 81.3*     BNP  No results found for: BNP   D-Dimer:  No results found for: DDIMER   Radiology:   Persistent small left pneumothorax with 1 cm pleural apical separation, with   intervally placed left-sided chest tube.       Similar diffuse bilateral airspace disease     Interval development of a large left tension pneumothorax.       Extensive bilateral airspace opacities without significant change       1.        Assessment/Plan     Patient Active Problem List    Diagnosis Date Noted    PNA (pneumonia) 12/09/2021    Troponin I above reference range 12/09/2021    Elevated antinuclear antibody (FREDRICK) level 02/16/2021    B12 deficiency 02/15/2021    Anemia 02/12/2021    Rectal bleed 11/18/2020    Acute hypokalemia 11/17/2020    Acute cystitis without hematuria     Leukocytosis     Sepsis ([facility] 01/17/2020    BRBPR (bright red blood per rectum) 01/17/2020    Rectal bleeding 09/25/2018    NSTEMI (non-ST elevated myocardial infarction) ([facility] 06/25/2018    Upper GI bleed 03/07/2017    Diabetes mellitus (HCC)     Atrial flutter (HCC)     Atrial fibrillation (HCC)     Gout     Hyperlipidemia     Hypertension     Arthropathy    Acute on chronic hypoxic hypercapneic resp failure sec to Cardiogenic and non Cardiogenic Pulmonary edema  Systolic CHF with EF of 12-49%  Grade II Diastolic dysfunction  Bilateral Pneumonia sec to COVID-19  Obesity  DIVYA  Chronic Metabolic alkalosis  Left pneumothorax s/p left chest tube  VDRF       1. Decadron  2. Insulin  3. Inhalers  4. CXR in am  5. CT per CTS  6. ICS  7. OOB  8. Keep sats > 92%  9. Abx  10. C/w present management\  11.  SAT and SBT trial when stable    Electronically signed by Shelly Watkins MD on 12/13/2021 at 1:14 PM

## 2021-12-13 NOTE — CONSULTS
Department of Cardiovascular & Thoracic Surgery   Consult Note        Reason for Consult: Tension pneumothorax need for IV access  Requesting Physician:  Merna Dumas MD        Date of Consult: 12/13/21    Chief Complaint: Intubated with hypoxia    History Obtained From:  electronic medical record, nurses at the bedside    HISTORY OF PRESENT ILLNESS:    The patient is a 77 y.o. male who presents with Shortness of breath and pneumonia. Patient was found to have COVID-19 and was intubated this morning. He was found to have a tension pneumothorax on the left side. He is on low-dose Levophed with poor blood pressure monitoring and very poor IV access. We were consulted to assist in the management.     Past Medical History:        Diagnosis Date    Arthropathy     Atrial fibrillation (HCC)     Atrial flutter (HCC)     Diabetes mellitus ([address]     Gout     Hyperlipidemia     Hypertension     Lumbago      Past Surgical History:        Procedure Laterality Date    COLONOSCOPY N/A 9/27/2018    COLONOSCOPY POLYPECTOMY SNARE/COLD BIOPSY performed by Madhuri Heard MD at [facility] [address] COLONOSCOPY N/A 11/18/2020    COLONOSCOPY DIAGNOSTIC performed by Denys Llanos MD at [address] [facility] ENDOSCOPY     Current Medications:   Current Facility-Administered Medications: hydrALAZINE (APRESOLINE) 10 mg in sodium chloride 0.9 % 50 mL ivpb, 10 mg, IntraVENous, Q4H PRN  propofol injection, 5-50 mcg/kg/min, IntraVENous, Titrated  chlorhexidine (PERIDEX) 0.12 % solution 15 mL, 15 mL, Mouth/Throat, BID  famotidine (PEPCID) injection 20 mg, 20 mg, IntraVENous, BID  fentaNYL (SUBLIMAZE) 1,000 mcg in sodium chloride 0.9% 100 mL infusion, 12.5-200 mcg/hr, IntraVENous, Continuous  lidocaine 1 % injection 5 mL, 5 mL, IntraDERmal, Once  sodium chloride flush 0.9 % injection 5-40 mL, 5-40 mL, IntraVENous, 2 times per day  sodium chloride flush 0.9 % injection 5-40 mL, 5-40 mL, IntraVENous, PRN  0.9 % sodium chloride infusion, 25 mL, IntraVENous, PRN  norepinephrine (LEVOPHED) 16 mg in sodium chloride 0.9 % 250 mL infusion, 2-100 mcg/min, IntraVENous, Continuous  LORazepam (ATIVAN) injection 1 mg, 1 mg, IntraVENous, Q6H PRN  oxyCODONE (ROXICODONE) immediate release tablet 5 mg, 5 mg, Oral, Q4H PRN  morphine (PF) injection 2 mg, 2 mg, IntraVENous, Once  dexmedetomidine (PRECEDEX) 104 mcg in sodium chloride 0.9 % 50 mL IV bolus, 1 mcg/kg, IntraVENous, Once  guaiFENesin-dextromethorphan (ROBITUSSIN DM) 100-10 MG/5ML syrup 5 mL, 5 mL, Oral, Q4H PRN  dexamethasone (DECADRON) injection 6 mg, 6 mg, IntraVENous, Daily  furosemide (LASIX) injection 40 mg, 40 mg, IntraVENous, BID  metoprolol succinate (TOPROL XL) extended release tablet 50 mg, 50 mg, Oral, BID  spironolactone (ALDACTONE) tablet 25 mg, 25 mg, Oral, Daily  potassium chloride (KLOR-CON M) extended release tablet 40 mEq, 40 mEq, Oral, PRN **OR** potassium bicarb-citric acid (EFFER-K) effervescent tablet 40 mEq, 40 mEq, Oral, PRN **OR** potassium chloride 10 mEq/100 mL IVPB (Peripheral Line), 10 mEq, IntraVENous, PRN  allopurinol (ZYLOPRIM) tablet 300 mg, 300 mg, Oral, Daily  apixaban (ELIQUIS) tablet 5 mg, 5 mg, Oral, BID  [Held by provider] metFORMIN (GLUCOPHAGE) tablet 500 mg, 500 mg, Oral, BID WC  losartan (COZAAR) tablet 100 mg, 100 mg, Oral, Daily  pravastatin (PRAVACHOL) tablet 20 mg, 20 mg, Oral, Nightly  tamsulosin (FLOMAX) capsule 0.4 mg, 0.4 mg, Oral, Nightly  triamterene-hydroCHLOROthiazide (MAXZIDE-25) 37.5-25 MG per tablet 1 tablet, 1 tablet, Oral, Daily  sodium chloride flush 0.9 % injection 5-40 mL, 5-40 mL, IntraVENous, 2 times per day  sodium chloride flush 0.9 % injection 5-40 mL, 5-40 mL, IntraVENous, PRN  0.9 % sodium chloride infusion, 25 mL, IntraVENous, PRN  ondansetron (ZOFRAN-ODT) disintegrating tablet 4 mg, 4 mg, Oral, Q8H PRN **OR** ondansetron (ZOFRAN) injection 4 mg, 4 mg, IntraVENous, Q6H PRN  polyethylene glycol (GLYCOLAX) packet 17 g, 17 g, Oral, Daily PRN  acetaminophen (TYLENOL) tablet 650 mg, 650 mg, Oral, Q6H PRN **OR** acetaminophen (TYLENOL) suppository 650 mg, 650 mg, Rectal, Q6H PRN  cefTRIAXone (ROCEPHIN) 1000 mg IVPB in 50 mL D5W minibag, 1,000 mg, IntraVENous, Q24H **AND** [] azithromycin (ZITHROMAX) tablet 500 mg, 500 mg, Oral, Q24H  Allergies:  Demerol hcl [meperidine], Gabapentin, and Simvastatin    Social History:   Social History     Socioeconomic History    Marital status: Single     Spouse name: Not on file    Number of children: Not on file    Years of education: Not on file    Highest education level: Not on file   Occupational History    Not on file   Tobacco Use    Smoking status: Former Smoker     Packs/day: 0.50     Types: Cigarettes    Smokeless tobacco: Never Used   Substance and Sexual Activity    Alcohol use: No    Drug use: No    Sexual activity: Not on file   Other Topics Concern    Not on file   Social History Narrative    Not on file     Social Determinants of Health     Financial Resource Strain:     Difficulty of Paying Living Expenses: Not on file   Food Insecurity: Food Insecurity Present    Worried About Running Out of Food in the Last Year: Often true    Ivy of Food in the Last Year: Often true   Transportation Needs:     Lack of Transportation (Medical): Not on file    Lack of Transportation (Non-Medical):  Not on file   Physical Activity:     Days of Exercise per Week: Not on file    Minutes of Exercise per Session: Not on file   Stress:     Feeling of Stress : Not on file   Social Connections:     Frequency of Communication with Friends and Family: Not on file    Frequency of Social Gatherings with Friends and Family: Not on file    Attends Judaism Services: Not on file    Active Member of Clubs or Organizations: Not on file    Attends Club or Organization Meetings: Not on file    Marital Status: Not on file   Intimate Partner Violence:     Fear of Current or Ex-Partner: Not on file  Emotionally Abused: Not on file    Physically Abused: Not on file    Sexually Abused: Not on file   Housing Stability:     Unable to Pay for Housing in the Last Year: Not on file    Number of Places Lived in the Last Year: Not on file    Unstable Housing in the Last Year: Not on file     Family History:    Family History   Problem Relation Age of Onset    No Known Problems Mother     No Known Problems Father        REVIEW OF SYSTEMS: Unable to determine from the patient as he is intubated though I am reviewing from previous notes in chart  CONSTITUTIONAL:  Neg. EYES:  Neg. EARS:  Neg     NOSE:  Neg.    MOUTH/THROAT:  Neg.    RESPIRATORY:   Current hypoxic episode. CARDIOVASCULAR   Neg. GASTROINTESTINAL:  Neg. GENITOURINARY:  Neg.    HEMATOLOGIC/LYMPHATIC:  Neg.    MUSCULOSKELETAL:  Neg. NEUROLOGICAL:  Neg. SKIN :  Neg. PSYCHIATRIC:  Neg   ENDOCRINE:  Neg. ALL/IMM :  Neg. PHYSICAL EXAM:  VITALS:  BP (!) 161/90   Pulse 56   Temp 97.5 °F (36.4 °C) (Axillary)   Resp 20   Ht 5' 8\" (1.727 m)   Wt 216 lb 0.8 oz (98 kg)   SpO2 99%   BMI 32.85 kg/m²   CONSTITUTIONAL: Intubated and sedated, no apparent distress, and appears stated age  NECK:  Supple, symmetrical, trachea midline, no adenopathy, thyroid symmetric, not enlarged and no tenderness, skin normal, ET tube in place  HEMATOLOGIC/LYMPHATICS:  no cervical lymphadenopathy and no supraclavicular lymphadenopathy  LUNGS: Rhonchi throughout  CARDIOVASCULAR:  Normal apical impulse, regular rate and rhythm, normal S1 and S2, no S3 or S4, and no murmur noted  CHEST/BREASTS:  symmetric  ABDOMEN: soft nt nd, no pulsitile masses, obese  MUSCULOSKELETAL:  There is no redness, warmth, or swelling of the joints. Full range of motion noted. Motor strength is 5 out of 5 all extremities bilaterally.   Tone is normal.  NEUROLOGIC: Intubated and sedated  SKIN:  no bruising or bleeding and normal skin color, texture, turgor  VASC: 1+ femoral pulses        DATA:  Chest x-ray shows large left pneumothorax    IMPRESSION  Active Hospital Problems    Diagnosis Date Noted    PNA (pneumonia) [J18.9] 12/09/2021    Troponin I above reference range [R77.8] 12/09/2021    Diabetes mellitus ([facility] [E11.9]        Large tension pneumothorax with hypoxia      RECOMMENDATIONS:    We will proceed with emergent left chest tube placement. This is an emergency and we are unable to obtain consent as the patient is on high PEEP with low saturations    There is poor IV access and blood pressure monitoring so we will place central line and arterial line as well.     Electronically signed by Kerri Fowler MD on 12/13/2021 at 9:15 AM

## 2021-12-13 NOTE — PROGRESS NOTES
Physical Therapy    Physical Therapy Treatment Note  Name: Angel Amador MRN: [id number] :   1945   Date:  2021   Admission Date: 2021 Room:  -A   Restrictions/Precautions:        (+) COVID  PT attempts to see pt for eval today, upon chart review and observation prior to entry, pt is currently intubated. PT will HOLD eval until pt extubated and medically appropriate. PT to d/c order. Please re-order when pt is medically appropriate. Thank you.              Electronically signed by:    Sebas Manning, PT  2021, 3:58 PM

## 2021-12-13 NOTE — SIGNIFICANT EVENT
Called to bedside due to nursing report of SPO2 74% on BiPAP 22/12 100% FiO2. Patient is minimally responsive Precedex. Decision made to have patient intubated and placed on mechanical ventilation. Anesthesia called to perform intubation. Patient was intubated without difficulty, patient did develop hypotension post intubation IVF bolus given, Levophed ordered to start if BP was unresponsive to IVF.

## 2021-12-13 NOTE — PROGRESS NOTES
Radiology Tech is currently at the bedside to get a STAT PCXR & KUB on Pt for post left chest tube, Left IJ CVC, and NG tube placements.

## 2021-12-13 NOTE — PROGRESS NOTES
12/13/21 0734   Oxygen Therapy/Pulse Ox   O2 Therapy Oxygen   $Oxygen $Daily Charge   O2 Device Ventilator   FiO2  100 %   Resp 20   SpO2 96 %   Pulse Oximeter Device Mode Continuous   Pulse Oximeter Device Location Forehead   $Pulse Oximeter $Spot check (multiple/continuous)

## 2021-12-13 NOTE — CONSULTS
Comprehensive Nutrition Assessment    Type and Reason for Visit:  Consult    Nutrition Recommendations/Plan:   EN Order: Vital HP @ 20 ml/hr  Will closely monitor GI tolerance, nutrition status, poc    Nutrition Assessment:  Pt admitted for chronic atrial fibrillation, PMH: HTN, HLD, Gout, DM, Afib, +covid-19, Pt currently sedated on vent, +CT, +levophed ggt, +NGT, dietitian consult for tube feed order and manage, pt is at high nutrition risk    Malnutrition Assessment:  Malnutrition Status:  Insufficient data    Context:  Acute Illness       Estimated Daily Nutrient Needs:  Energy (kcal):  [address] [facility] 2010); Weight Used for Energy Requirements:  Current     Protein (g):   (1.2-2.0 g/kg); Weight Used for Protein Requirements:  Ideal          Wounds:  Multiple, Deep Tissue Injury, Venous Stasis       Current Nutrition Therapies:    ADULT DIET; Regular (pt intubated)  Additional Calorie Sources:   pt is receiving ~164 kcal from current propofol rate    Anthropometric Measures:  · Height: 5' 7.99\" (172.7 cm)  · Current Body Weight: 216 lb 0.8 oz (98 kg)     · Ideal Body Weight: 154 lbs; % Ideal Body Weight 140.3 %   · BMI: 32.9  · BMI Categories: Obese Class 1 (BMI 30.0-34. 9)       Nutrition Diagnosis:   · Inadequate oral intake related to acute injury/trauma as evidenced by NPO or clear liquid status due to medical condition    Nutrition Interventions:   Food and/or Nutrient Delivery:  Start Tube Feeding  Nutrition Education/Counseling:  No recommendation at this time   Coordination of Nutrition Care:  Continue to monitor while inpatient    Goals:  pt will tolerate EN initiation within the next 24 hr       Nutrition Monitoring and Evaluation:   Behavioral-Environmental Outcomes:  None Identified   Food/Nutrient Intake Outcomes:  Enteral Nutrition Intake/Tolerance  Physical Signs/Symptoms Outcomes:  Biochemical Data, GI Status, Hemodynamic Status, Fluid Status or Edema, Weight, Skin     Discharge Planning:     Too soon to determine     Electronically signed by Beatrice Urbano MS, RD, LD on 12/13/21 at 10:18 AM EST    Contact: [phone]

## 2021-12-14 ENCOUNTER — APPOINTMENT (OUTPATIENT)
Dept: GENERAL RADIOLOGY | Age: 76
DRG: 870 | End: 2021-12-14
Payer: COMMERCIAL

## 2021-12-14 LAB
ALBUMIN SERPL-MCNC: 2.8 GM/DL (ref 3.4–5)
ALBUMIN SERPL-MCNC: 2.8 GM/DL (ref 3.4–5)
ALP BLD-CCNC: 65 IU/L (ref 40–128)
ALP BLD-CCNC: 66 IU/L (ref 40–128)
ALT SERPL-CCNC: 10 U/L (ref 10–40)
ALT SERPL-CCNC: 10 U/L (ref 10–40)
ANION GAP SERPL CALCULATED.3IONS-SCNC: 13 MMOL/L (ref 4–16)
ANION GAP SERPL CALCULATED.3IONS-SCNC: 14 MMOL/L (ref 4–16)
ANISOCYTOSIS: ABNORMAL
AST SERPL-CCNC: 21 IU/L (ref 15–37)
AST SERPL-CCNC: 21 IU/L (ref 15–37)
BANDED NEUTROPHILS ABSOLUTE COUNT: 1.25 K/CU MM
BANDED NEUTROPHILS RELATIVE PERCENT: 15 % (ref 5–11)
BASE EXCESS MIXED: 6.4 (ref 0–1.2)
BILIRUB SERPL-MCNC: 0.4 MG/DL (ref 0–1)
BILIRUB SERPL-MCNC: 0.5 MG/DL (ref 0–1)
BUN BLDV-MCNC: 49 MG/DL (ref 6–23)
BUN BLDV-MCNC: 52 MG/DL (ref 6–23)
CALCIUM SERPL-MCNC: 8.7 MG/DL (ref 8.3–10.6)
CALCIUM SERPL-MCNC: 8.8 MG/DL (ref 8.3–10.6)
CARBON MONOXIDE, BLOOD: 2.8 % (ref 0–5)
CHLORIDE BLD-SCNC: 107 MMOL/L (ref 99–110)
CHLORIDE BLD-SCNC: 107 MMOL/L (ref 99–110)
CO2 CONTENT: 30.5 MMOL/L (ref 19–24)
CO2: 28 MMOL/L (ref 21–32)
CO2: 29 MMOL/L (ref 21–32)
COMMENT: ABNORMAL
CREAT SERPL-MCNC: 1 MG/DL (ref 0.9–1.3)
CREAT SERPL-MCNC: 1.1 MG/DL (ref 0.9–1.3)
DIFFERENTIAL TYPE: ABNORMAL
GFR AFRICAN AMERICAN: >60 ML/MIN/1.73M2
GFR AFRICAN AMERICAN: >60 ML/MIN/1.73M2
GFR NON-AFRICAN AMERICAN: >60 ML/MIN/1.73M2
GFR NON-AFRICAN AMERICAN: >60 ML/MIN/1.73M2
GLUCOSE BLD-MCNC: 171 MG/DL (ref 70–99)
GLUCOSE BLD-MCNC: 176 MG/DL (ref 70–99)
GLUCOSE BLD-MCNC: 189 MG/DL (ref 70–99)
GLUCOSE BLD-MCNC: 191 MG/DL (ref 70–99)
GLUCOSE BLD-MCNC: 198 MG/DL (ref 70–99)
GLUCOSE BLD-MCNC: 210 MG/DL (ref 70–99)
HCO3 ARTERIAL: 29.4 MMOL/L (ref 18–23)
HCT VFR BLD CALC: 38.7 % (ref 42–52)
HEMOGLOBIN: 12.1 GM/DL (ref 13.5–18)
LYMPHOCYTES ABSOLUTE: 0.3 K/CU MM
LYMPHOCYTES RELATIVE PERCENT: 4 % (ref 24–44)
MCH RBC QN AUTO: 27.9 PG (ref 27–31)
MCHC RBC AUTO-ENTMCNC: 31.3 % (ref 32–36)
MCV RBC AUTO: 89.2 FL (ref 78–100)
METAMYELOCYTES ABSOLUTE COUNT: 0.08 K/CU MM
METAMYELOCYTES PERCENT: 1 %
METHEMOGLOBIN ARTERIAL: 0.5 %
MONOCYTES ABSOLUTE: 0.1 K/CU MM
MONOCYTES RELATIVE PERCENT: 1 % (ref 0–4)
NUCLEATED RED BLOOD CELLS: 3
O2 SATURATION: 97.6 % (ref 96–97)
PCO2 ARTERIAL: 36 MMHG (ref 32–45)
PDW BLD-RTO: 15.9 % (ref 11.7–14.9)
PH BLOOD: 7.52 (ref 7.34–7.45)
PLATELET # BLD: 311 K/CU MM (ref 140–440)
PLT MORPHOLOGY: ABNORMAL
PMV BLD AUTO: 10.8 FL (ref 7.5–11.1)
PO2 ARTERIAL: 107 MMHG (ref 75–100)
POLYCHROMASIA: ABNORMAL
POTASSIUM SERPL-SCNC: 3.6 MMOL/L (ref 3.5–5.1)
POTASSIUM SERPL-SCNC: 3.8 MMOL/L (ref 3.5–5.1)
RBC # BLD: 4.34 M/CU MM (ref 4.6–6.2)
SEGMENTED NEUTROPHILS ABSOLUTE COUNT: 6.6 K/CU MM
SEGMENTED NEUTROPHILS RELATIVE PERCENT: 79 % (ref 36–66)
SODIUM BLD-SCNC: 149 MMOL/L (ref 135–145)
SODIUM BLD-SCNC: 149 MMOL/L (ref 135–145)
TOTAL PROTEIN: 5.7 GM/DL (ref 6.4–8.2)
TOTAL PROTEIN: 5.8 GM/DL (ref 6.4–8.2)
WBC # BLD: 8.3 K/CU MM (ref 4–10.5)

## 2021-12-14 PROCEDURE — 6370000000 HC RX 637 (ALT 250 FOR IP): Performed by: NURSE PRACTITIONER

## 2021-12-14 PROCEDURE — 37799 UNLISTED PX VASCULAR SURGERY: CPT

## 2021-12-14 PROCEDURE — 6360000002 HC RX W HCPCS: Performed by: INTERNAL MEDICINE

## 2021-12-14 PROCEDURE — 2580000003 HC RX 258: Performed by: NURSE PRACTITIONER

## 2021-12-14 PROCEDURE — 85007 BL SMEAR W/DIFF WBC COUNT: CPT

## 2021-12-14 PROCEDURE — 85027 COMPLETE CBC AUTOMATED: CPT

## 2021-12-14 PROCEDURE — 2580000003 HC RX 258: Performed by: INTERNAL MEDICINE

## 2021-12-14 PROCEDURE — 2500000003 HC RX 250 WO HCPCS: Performed by: NURSE PRACTITIONER

## 2021-12-14 PROCEDURE — 2700000000 HC OXYGEN THERAPY PER DAY

## 2021-12-14 PROCEDURE — 82962 GLUCOSE BLOOD TEST: CPT

## 2021-12-14 PROCEDURE — 94761 N-INVAS EAR/PLS OXIMETRY MLT: CPT

## 2021-12-14 PROCEDURE — 2000000000 HC ICU R&B

## 2021-12-14 PROCEDURE — 80053 COMPREHEN METABOLIC PANEL: CPT

## 2021-12-14 PROCEDURE — 6370000000 HC RX 637 (ALT 250 FOR IP): Performed by: INTERNAL MEDICINE

## 2021-12-14 PROCEDURE — 89220 SPUTUM SPECIMEN COLLECTION: CPT

## 2021-12-14 PROCEDURE — 94003 VENT MGMT INPAT SUBQ DAY: CPT

## 2021-12-14 PROCEDURE — 71045 X-RAY EXAM CHEST 1 VIEW: CPT

## 2021-12-14 PROCEDURE — 99233 SBSQ HOSP IP/OBS HIGH 50: CPT | Performed by: INTERNAL MEDICINE

## 2021-12-14 PROCEDURE — 82803 BLOOD GASES ANY COMBINATION: CPT

## 2021-12-14 RX ORDER — ATROPINE SULFATE 0.1 MG/ML
INJECTION INTRAVENOUS
Status: DISPENSED
Start: 2021-12-14 | End: 2021-12-15

## 2021-12-14 RX ADMIN — METOPROLOL SUCCINATE 50 MG: 50 TABLET, EXTENDED RELEASE ORAL at 08:18

## 2021-12-14 RX ADMIN — PRAVASTATIN SODIUM 20 MG: 10 TABLET ORAL at 20:40

## 2021-12-14 RX ADMIN — TAMSULOSIN HYDROCHLORIDE 0.4 MG: 0.4 CAPSULE ORAL at 20:40

## 2021-12-14 RX ADMIN — SODIUM CHLORIDE, PRESERVATIVE FREE 10 ML: 5 INJECTION INTRAVENOUS at 08:19

## 2021-12-14 RX ADMIN — INSULIN LISPRO 2 UNITS: 100 INJECTION, SOLUTION INTRAVENOUS; SUBCUTANEOUS at 11:37

## 2021-12-14 RX ADMIN — FUROSEMIDE 40 MG: 10 INJECTION, SOLUTION INTRAVENOUS at 17:11

## 2021-12-14 RX ADMIN — CEFTRIAXONE SODIUM 1000 MG: 1 INJECTION, POWDER, FOR SOLUTION INTRAMUSCULAR; INTRAVENOUS at 04:24

## 2021-12-14 RX ADMIN — LOSARTAN POTASSIUM 100 MG: 100 TABLET, FILM COATED ORAL at 08:22

## 2021-12-14 RX ADMIN — APIXABAN 5 MG: 5 TABLET, FILM COATED ORAL at 20:40

## 2021-12-14 RX ADMIN — INSULIN LISPRO 2 UNITS: 100 INJECTION, SOLUTION INTRAVENOUS; SUBCUTANEOUS at 17:13

## 2021-12-14 RX ADMIN — MIDAZOLAM 9 MG/HR: 5 INJECTION INTRAMUSCULAR; INTRAVENOUS at 15:24

## 2021-12-14 RX ADMIN — CHLORHEXIDINE GLUCONATE 0.12% ORAL RINSE 15 ML: 1.2 LIQUID ORAL at 20:40

## 2021-12-14 RX ADMIN — METOPROLOL SUCCINATE 50 MG: 50 TABLET, EXTENDED RELEASE ORAL at 20:40

## 2021-12-14 RX ADMIN — FUROSEMIDE 40 MG: 10 INJECTION, SOLUTION INTRAVENOUS at 08:18

## 2021-12-14 RX ADMIN — Medication 12.5 MCG/HR: at 08:14

## 2021-12-14 RX ADMIN — TRIAMTERENE AND HYDROCHLOROTHIAZIDE 1 TABLET: 37.5; 25 TABLET ORAL at 08:18

## 2021-12-14 RX ADMIN — ALLOPURINOL 300 MG: 300 TABLET ORAL at 08:22

## 2021-12-14 RX ADMIN — SPIRONOLACTONE 25 MG: 25 TABLET ORAL at 08:17

## 2021-12-14 RX ADMIN — CHLORHEXIDINE GLUCONATE 0.12% ORAL RINSE 15 ML: 1.2 LIQUID ORAL at 08:18

## 2021-12-14 RX ADMIN — FAMOTIDINE 20 MG: 10 INJECTION, SOLUTION INTRAVENOUS at 20:40

## 2021-12-14 RX ADMIN — APIXABAN 5 MG: 5 TABLET, FILM COATED ORAL at 08:17

## 2021-12-14 RX ADMIN — DEXAMETHASONE SODIUM PHOSPHATE 6 MG: 10 INJECTION INTRAMUSCULAR; INTRAVENOUS at 08:17

## 2021-12-14 RX ADMIN — FAMOTIDINE 20 MG: 10 INJECTION, SOLUTION INTRAVENOUS at 08:17

## 2021-12-14 RX ADMIN — MIDAZOLAM 8 MG/HR: 5 INJECTION INTRAMUSCULAR; INTRAVENOUS at 04:07

## 2021-12-14 RX ADMIN — INSULIN LISPRO 2 UNITS: 100 INJECTION, SOLUTION INTRAVENOUS; SUBCUTANEOUS at 08:15

## 2021-12-14 ASSESSMENT — PULMONARY FUNCTION TESTS
PIF_VALUE: 25
PIF_VALUE: 28
PIF_VALUE: 29
PIF_VALUE: 25
PIF_VALUE: 29
PIF_VALUE: 24
PIF_VALUE: 25

## 2021-12-14 ASSESSMENT — PAIN SCALES - GENERAL
PAINLEVEL_OUTOF10: 8
PAINLEVEL_OUTOF10: 0
PAINLEVEL_OUTOF10: 8

## 2021-12-14 NOTE — PROGRESS NOTES
[facility]   Progress Note  [facility] 0 1 2      Date: 2021   Patient: Fabiano Serrano   : 1945   DOA: 2021   MRN: [id number]   ROOM#: -A     Admit Date: 2021     Collaborating Urologist on Call at time of admission: Dr. Candelario Rodriguez  CC: Fall  Reason for Consult: Multifocal bladder traumatic trabeculation/diverticula, cystitis    Subjective:     Pt sedated/intubated and on pressors  Voidinfr castrejon catheter in place, urine clear yellow     Objective:    Vitals:    /73   Pulse 78   Temp 99.9 °F (37.7 °C) (Rectal)   Resp 21   Ht 5' 7.99\" (1.727 m)   Wt 216 lb 0.8 oz (98 kg)   SpO2 95%   BMI 32.86 kg/m²    Temp  Av.2 °F (36.8 °C)  Min: 96.7 °F (35.9 °C)  Max: 99.9 °F (37.7 °C)     Intake/Output Summary (Last 24 hours) at 2021 4293  Last data filed at 2021 0615  Gross per 24 hour   Intake --   Output 1200 ml   Net -1200 ml       Physical Exam:   General appearance: Sedated and intubated on ventilator  Abdomen: Soft, non-distended   : 16fr castrejon catheter in place, urine clear yellow    Labs:   WBC:    Lab Results   Component Value Date    WBC 8.3 2021      Hemoglobin/Hematocrit:    Lab Results   Component Value Date    HGB 12.1 2021    HCT 38.7 2021      BMP:   Lab Results   Component Value Date     2021    K 3.8 2021     2021    CO2 28 2021    BUN 49 2021    LABALBU 2.8 2021    CREATININE 1.0 2021    CALCIUM 8.7 2021    GFRAA >60 2021    LABGLOM >60 2021      PT/INR:    Lab Results   Component Value Date    PROTIME 21.9 2020    INR 1.80 2020      PTT:    Lab Results   Component Value Date    APTT 32.9 2021     Blood Culture: Susceptibility     Proteus mirabilis     BACTERIAL SUSCEPTIBILITY PANEL DAY     ampicillin <=8  Sensitive     ampicillin-sulbactam <=8/4  Sensitive     ceFAZolin <=2  Sensitive     cefepime <=2 Sensitive     cefuroxime <=4  Sensitive     ciprofloxacin <=1  Sensitive     ertapenem <=0.5  Sensitive     gentamicin <=4  Sensitive     meropenem <=1  Sensitive     piperacillin-tazobactam <=16  Sensitive     trimethoprim-sulfamethoxazole <=2/38  Sensitive        Urine Culture: <50,000 CFU/ml mixed skin/urogenital mary jane    Imaging:  ECHO Complete 2D W Doppler W Color    Result Date: 12/9/2021  Transthoracic Echocardiography Report (TTE)  Demographics   Patient Name       Alma Mueller    Date of Study       12/09/2021   Date of Birth      1945         Gender              Male   Age                77 year(s)         Race                   Patient Number     [id number]         Room Number         [address] [facility].   Visit Number       [id number]   Corporate ID       [id number]   Accession Number   [id number]         [address] [facility] ANU   Ordering Physician Parker Henson MD                 Physician           MD  Procedure Type of Study   TTE procedure:ECHOCARDIOGRAM COMPLETE 2D W DOPPLER W COLOR. Procedure Date Date: 12/09/2021 Start: 11:31 AM Study Location: Portable Technical Quality: Adequate visualization Indications:NSTEMI. Patient Status: Routine Height: 68 inches Weight: 239 pounds BSA: 2.2 m2 BMI: 36.34 kg/m2 HR: 66 bpm BP: 151/68 mmHg  Conclusions   Summary  Left ventricular systolic function is abnormal.  Ejection fraction is visually estimated at 35-40 %%. Septal wall is hypokinetic  Moderate left ventricular hypertrophy. Grade III diastolic dysfunction. Moderately dilated left atrium. Mild to moderate mitral regurgitation. No evidence of any pericardial effusion.    Signature   ------------------------------------------------------------------  Electronically signed by Karley Nye MD (Interpreting  physician) on 12/09/2021 at 03:41 PM ------------------------------------------------------------------   Findings   Left Ventricle  Left ventricular systolic function is abnormal.  Ejection fraction is visually estimated at 35-40 %  Moderate left ventricular hypertrophy. Grade III diastolic dysfunction. Left ventricle size is normal.  Septal wall is hypokinetic   Left Atrium  Moderately dilated left atrium. Right Atrium  Essentially normal right atrium. Right Ventricle  Essentially normal right ventricle. Aortic Valve  Structurally normal aortic valve. Mitral Valve  Mild to moderate mitral regurgitation. Tricuspid Valve  Structurally normal tricuspid valve. Pulmonic Valve  The pulmonic valve was not well visualized. Pericardial Effusion  No evidence of any pericardial effusion. Pleural Effusion  No evidence of pleural effusion. Miscellaneous  Inferior vena cava is dilated, measuring at 2.7 cm, but does collapse with  respiration.   M-Mode/2D Measurements & Calculations   LV Diastolic Dimension:  LV Systolic Dimension:  LA Dimension: 4 cmAO Root  5.83 cm                  4.04 cm                 Dimension: 3.8 cmLA Area:  LV FS:30.7 %             LV Volume Diastolic:    66.4 cm2  LV PW Diastolic: 2.71 cm 237 ml  LV PW Systolic: 8.35 cm  LV Volume Systolic: 85  Septum Diastolic: 1.85   ml  cm                       LV EDV/LV EDV Index:    RV Diastolic Dimension:  Septum Systolic: 0.02 cm 769 NZ/74 m2LV ESV/LV   2.69 cm  CO: 4.74 l/min           ESV Index: 85 ml/39 m2  CI: 2.15 l/m*m2          EF Calculated (A4C): 41 LA/Aorta: 1.05                           %  LV Area Diastolic: 43.1  EF Calculated (2D):     LA volume/Index: 99 ml  cm2                      57.6 %                  /36K7  LV Area Systolic: 13.5  cm2                      LV Length: 9.06 cm                            LVOT: 2.2 cm  Doppler Measurements & Calculations   MV Peak E-Wave: 101 cm/s AV Peak Velocity: 127 cm/s LVOT Peak Velocity: 103  MV Peak A-Wave: 34.1     AV Peak Gradient: 6.45     cm/s  cm/s                     mmHg                       LVOT Mean Velocity: 72.7  MV E/A Ratio: 2.96       AV Mean Velocity: 95.8     cm/s  MV Peak Gradient: 4.08   cm/s                       LVOT Peak Gradient: 4  mmHg                     AV Mean Gradient: 4 mmHg   mmHgLVOT Mean Gradient:                           AV VTI: 23.9 cm            2 mmHg  MV P1/2t: 58 msec        AV Area (Continuity):3 cm2  MVA by PHT:3.79 cm2                           LVOT VTI: 18.9 cm  MV E' Septal Velocity:  6.8 cm/s  MV E' Lateral Velocity:  7.68 cm/s  MV E/E' septal: 14.85  MV E/E' lateral: 13.15      XR PELVIS (1-2 VIEWS)    Result Date: 12/8/2021  EXAMINATION: ONE XRAY VIEW OF THE PELVIS 12/8/2021 2:48 pm COMPARISON: CT abdomen pelvis, 01/17/2020 HISTORY: ORDERING SYSTEM PROVIDED HISTORY: trauma TECHNOLOGIST PROVIDED HISTORY: Reason for exam:->trauma Reason for Exam: pain Additional signs and symptoms: none Relevant Medical/Surgical History: none FINDINGS: The ilioischial and iliopectineal lines are intact bilaterally. The SI joints and pubic symphysis are congruent. The sacral neural foramina arches are intact. Severe, chronic degenerative disease is seen at the right hip, with bony remodeling of the acetabulum and the femoral head, which appears similar when compared to the previous abdomen and pelvis CTA from January 2020. There is at least moderate if not severe left hip arthrosis, not well evaluated. No acute abnormality detected. Severe degenerative changes at the right hip, with chronic bony remodeling, not substantially changed when compared to the CT from January 2020.      XR ABDOMEN (KUB) (SINGLE AP VIEW)    Result Date: 12/13/2021  EXAMINATION: ONE SUPINE XRAY VIEW(S) OF THE ABDOMEN 12/13/2021 10:50 am COMPARISON: CT of the abdomen and pelvis of 12/08/2021 HISTORY: ORDERING SYSTEM PROVIDED HISTORY: NG tube placement confirmation TECHNOLOGIST PROVIDED HISTORY: Reason for exam:->NG tube placement confirmation Reason for Exam: NG tube placement confirmation FINDINGS: Nonobstructive bowel gas pattern. Extensive bibasilar airspace opacities, better evaluated on recent chest imaging. Dense vascular calcifications are noted. No acute osseous abnormality. NG tube in place with tip and side port below the diaphragm and in the region of the gastric body. NG tube in place with tip and side port below the diaphragm and in the region of the gastric body. CT HEAD WO CONTRAST    Result Date: 12/10/2021  EXAMINATION: CT OF THE HEAD WITHOUT CONTRAST  12/9/2021 12:49 am TECHNIQUE: CT of the head was performed without the administration of intravenous contrast. Dose modulation, iterative reconstruction, and/or weight based adjustment of the mA/kV was utilized to reduce the radiation dose to as low as reasonably achievable. COMPARISON: None HISTORY: ORDERING SYSTEM PROVIDED HISTORY: AMS, fall TECHNOLOGIST PROVIDED HISTORY: Reason for exam:->AMS, fall Has a \"code stroke\" or \"stroke alert\" been called? ->No Decision Support Exception - unselect if not a suspected or confirmed emergency medical condition->Emergency Medical Condition (MA) Reason for Exam: AMS, fall FINDINGS: BRAIN/VENTRICLES: There is no acute intracranial hemorrhage, mass effect or midline shift. No abnormal extra-axial fluid collection. The gray-white differentiation is maintained without evidence of an acute infarct. There is no evidence of hydrocephalus. Moderate periventricular and deep subcortical white matter hypodensity is present. Diffuse atrophy is present. ORBITS: The visualized portion of the orbits demonstrate no acute abnormality. SINUSES: Ethmoid sinus disease is present. SOFT TISSUES/SKULL:  No acute abnormality of the visualized skull or soft tissues. No acute intracranial abnormality. Age related changes including chronic small vessel ischemic disease and cerebral atrophy.      CT HEAD WO CONTRAST    Result Date: 12/8/2021  EXAMINATION: CT OF THE HEAD WITHOUT CONTRAST  12/8/2021 6:31 pm TECHNIQUE: CT of the head was performed without the administration of intravenous contrast. Dose modulation, iterative reconstruction, and/or weight based adjustment of the mA/kV was utilized to reduce the radiation dose to as low as reasonably achievable. COMPARISON: CT head 01/17/2020 HISTORY: ORDERING SYSTEM PROVIDED HISTORY: fall, found on ground TECHNOLOGIST PROVIDED HISTORY: Reason for exam:->fall, found on ground Has a \"code stroke\" or \"stroke alert\" been called? ->No Decision Support Exception - unselect if not a suspected or confirmed emergency medical condition->Emergency Medical Condition (MA) Reason for Exam: fall, found on ground Additional signs and symptoms: poor historian Relevant Medical/Surgical History: unknown FINDINGS: BRAIN/VENTRICLES: There is no acute intracranial hemorrhage, mass effect or midline shift. No abnormal extra-axial fluid collection. The gray-white differentiation is maintained without evidence of an acute infarct. There is no evidence of hydrocephalus. Generalized involution changes and chronic small ischemic disease. Motion does degrade image quality. Vascular calcifications. ORBITS: The visualized portion of the orbits demonstrate no acute abnormality. SINUSES: The visualized paranasal sinuses and mastoid air cells demonstrate no acute abnormality. SOFT TISSUES/SKULL:  No acute abnormality of the visualized skull or soft tissues. Geographic lucency lesion identified right frontal bone appears to be new from prior examination. No acute intracranial abnormality. Geographic lucency frontal bone noted, consider bone scan imaging or consider skeletal survey.      CT CHEST W CONTRAST    Result Date: 12/8/2021  EXAMINATION: CT OF THE ABDOMEN AND PELVIS WITH CONTRAST; CT OF THE CHEST WITH CONTRAST 12/8/2021 10:26 pm; 12/8/2021 10:25 pm TECHNIQUE: CT of the abdomen and pelvis was performed with the administration of intravenous contrast. Multiplanar reformatted images are provided for review. Dose modulation, iterative reconstruction, and/or weight based adjustment of the mA/kV was utilized to reduce the radiation dose to as low as reasonably achievable.; CT of the chest was performed with the administration of intravenous contrast. Multiplanar reformatted images are provided for review. Dose modulation, iterative reconstruction, and/or weight based adjustment of the mA/kV was utilized to reduce the radiation dose to as low as reasonably achievable. COMPARISON: CT angiogram chest abdomen/pelvis 01/17/2020 HISTORY: ORDERING SYSTEM PROVIDED HISTORY: abd pain TECHNOLOGIST PROVIDED HISTORY: Reason for exam:->abd pain Additional Contrast?->None Decision Support Exception - unselect if not a suspected or confirmed emergency medical condition->Emergency Medical Condition (MA) Reason for Exam: abd pain. combative,won't listen Additional signs and symptoms: no Relevant Medical/Surgical History: 80 ml isovue 370 used.; ORDERING SYSTEM PROVIDED HISTORY: rule out occult PNA or injury, sats now 92% TECHNOLOGIST PROVIDED HISTORY: Reason for exam:->rule out occult PNA or injury, sats now 92% Reason for Exam: rule out occult PNA or injury, sats now 92% Additional signs and symptoms: no Relevant Medical/Surgical History: 80 ml isovue 370 used. FINDINGS: Chest: Mediastinum: Cardiomegaly. Triple-vessel coronary disease. No pericardial effusion. Mild scattered mediastinal hilar lymph nodes likely reactive. Lungs/pleura: Multifocal airspace opacities worrisome for multifocal pneumonia. These airspace opacities most confluent right lower lobe, left lower lobe lingula and left greater than right upper lobe. No effusion. No pneumothorax Soft Tissues/Bones: Degenerate changes. No suspicious osseous lesion. Abdomen/Pelvis: Organs: The kidneys symmetric in size and enhancement with multiple bilateral hypodensities.   These are 12/8/2021  EXAMINATION: CT OF THE CERVICAL SPINE WITHOUT CONTRAST 12/8/2021 6:31 pm TECHNIQUE: CT of the cervical spine was performed without the administration of intravenous contrast. Multiplanar reformatted images are provided for review. Dose modulation, iterative reconstruction, and/or weight based adjustment of the mA/kV was utilized to reduce the radiation dose to as low as reasonably achievable. COMPARISON: CT cervical spine 01/17/2020 HISTORY: ORDERING SYSTEM PROVIDED HISTORY: found on ground TECHNOLOGIST PROVIDED HISTORY: Reason for exam:->found on ground Decision Support Exception - unselect if not a suspected or confirmed emergency medical condition->Emergency Medical Condition (MA) Reason for Exam: found on ground Additional signs and symptoms: poor historian Relevant Medical/Surgical History: unknown FINDINGS: BONES/ALIGNMENT: Motion challenge evaluation. No convincing evidence acute displaced fracture traumatic malalignment slight reversal normal cervical lordosis could be related to positioning or spasm. DEGENERATIVE CHANGES: Moderate severe multilevel degenerate changes notably C4 through C7. SOFT TISSUES: There is no prevertebral soft tissue swelling. Motion degradation challenge evaluation. No convincing evidence acute displaced fracture traumatic malalignment. There are moderate multilevel degenerate change     CT ABDOMEN PELVIS W IV CONTRAST Additional Contrast? None    Result Date: 12/8/2021  EXAMINATION: CT OF THE ABDOMEN AND PELVIS WITH CONTRAST; CT OF THE CHEST WITH CONTRAST 12/8/2021 10:26 pm; 12/8/2021 10:25 pm TECHNIQUE: CT of the abdomen and pelvis was performed with the administration of intravenous contrast. Multiplanar reformatted images are provided for review.  Dose modulation, iterative reconstruction, and/or weight based adjustment of the mA/kV was utilized to reduce the radiation dose to as low as reasonably achievable.; CT of the chest was performed with the administration of intravenous contrast. Multiplanar reformatted images are provided for review. Dose modulation, iterative reconstruction, and/or weight based adjustment of the mA/kV was utilized to reduce the radiation dose to as low as reasonably achievable. COMPARISON: CT angiogram chest abdomen/pelvis 01/17/2020 HISTORY: ORDERING SYSTEM PROVIDED HISTORY: abd pain TECHNOLOGIST PROVIDED HISTORY: Reason for exam:->abd pain Additional Contrast?->None Decision Support Exception - unselect if not a suspected or confirmed emergency medical condition->Emergency Medical Condition (MA) Reason for Exam: abd pain. combative,won't listen Additional signs and symptoms: no Relevant Medical/Surgical History: 80 ml isovue 370 used.; ORDERING SYSTEM PROVIDED HISTORY: rule out occult PNA or injury, sats now 92% TECHNOLOGIST PROVIDED HISTORY: Reason for exam:->rule out occult PNA or injury, sats now 92% Reason for Exam: rule out occult PNA or injury, sats now 92% Additional signs and symptoms: no Relevant Medical/Surgical History: 80 ml isovue 370 used. FINDINGS: Chest: Mediastinum: Cardiomegaly. Triple-vessel coronary disease. No pericardial effusion. Mild scattered mediastinal hilar lymph nodes likely reactive. Lungs/pleura: Multifocal airspace opacities worrisome for multifocal pneumonia. These airspace opacities most confluent right lower lobe, left lower lobe lingula and left greater than right upper lobe. No effusion. No pneumothorax Soft Tissues/Bones: Degenerate changes. No suspicious osseous lesion. Abdomen/Pelvis: Organs: The kidneys symmetric in size and enhancement with multiple bilateral hypodensities. These are most compatible with cyst.  These are not well characterized due to the subcentimeter size. No hydronephrosis. No perinephric collections. Otherwise the liver, gallbladder, spleen, right adrenal gland and pancreas unremarkable. Mild thickened appearance left adrenal gland.   Motion challenge evaluation of the organs. GI/Bowel: Mild retained stool throughout the colon. No evidence of bowel obstruction. Colonic diverticulosis noted. This most pronounced within the sigmoid colon. The appendix unremarkable. Pelvis: Multiple bladder trabeculation and diverticula adenopathy identified. There is slight haziness of the bladder anteriorly and from prevesical fat could be infectious inflammatory. Please correlate urinalysis findings. Prostate is borderline size mildly enlarged. Peritoneum/Retroperitoneum: Aortic vascular calcifications. Multiple perirectal lymph nodes again identified. Unclear if these are reactive due to the the inflammatory change involving the bladder or could be due to underlying lymphoproliferative process. Again these are a follow up in 3-6 months. Bones/Soft Tissues: Multilevel degenerate change. This is L4-5 identified unchanged prior exam.  No suspicious osseous lesion. Multifocal consolidative changes both lungs worrisome for multifocal pneumonia. Follow-up in following medical treatment course is recommended document complete resolution. Multifocal bladder traumatic trabeculation/diverticula noted with slight haziness of surrounding fat planes. Please correlate with diffuse urinalysis findings. Is findings could suggest underlying cystitis. Prominence of the Alisson aortic lymph nodes again identified. These could be reactive due to an underlying infectious inflammatory process such is the bladder haziness. However neoplastic process may also considered. Consider 3-6 month follow-up. RECOMMENDATIONS: Unavailable     XR SHOULDER LEFT (MIN 2 VIEWS)    Result Date: 12/8/2021  EXAMINATION: THREE XRAY VIEWS OF THE LEFT SHOULDER 12/8/2021 9:13 pm COMPARISON: None.  HISTORY: ORDERING SYSTEM PROVIDED HISTORY: now having pain after fall TECHNOLOGIST PROVIDED HISTORY: Reason for exam:->now having pain after fall Reason for Exam: now having pain after fall Additional signs and symptoms: now having pain after fall Relevant Medical/Surgical History: now having pain after fall FINDINGS: No acute fracture or dislocation is identified. A mass or masslike consolidation is noted in the left lung. No acute fracture identified. Incidentally noted mass or masslike consolidation in the left upper lobe. Advise chest CT for further evaluation. XR CHEST PORTABLE    Result Date: 12/13/2021  EXAMINATION: ONE XRAY VIEW OF THE CHEST 12/13/2021 9:43 am COMPARISON: 12/13/2021, 3 hours prior HISTORY: ORDERING SYSTEM PROVIDED HISTORY: Left chest tube placement and Left IJ CVC placement. TECHNOLOGIST PROVIDED HISTORY: Reason for exam:->Left chest tube placement and Left IJ CVC placement. Reason for Exam: chest tube placement and Left IJ CVC placement. FINDINGS: Endotracheal tube terminates 5.5 cm from the cornelio, unchanged. Left-sided central venous catheter tip projects over the superior vena cava, also unchanged. Partially visualized enteric tube terminates outside of the field of view. Left-sided chest tube with distal tip projected over the left suprahilar region. The cardiomediastinal silhouette is mildly obscured, but likely unchanged. Interval decrease in size of the left pneumothorax with approximately 1 cm pleural apical separation. Diffuse pulmonary infiltrates. No large right pleural effusion grossly identified on this limited portable study. Suspected small left pleural effusion, unchanged. Osseous structures are grossly unchanged. Persistent small left pneumothorax with 1 cm pleural apical separation, with intervally placed left-sided chest tube. Similar diffuse bilateral airspace disease.      XR CHEST PORTABLE    Result Date: 12/13/2021  EXAMINATION: ONE XRAY VIEW OF THE CHEST 12/13/2021 7:06 am COMPARISON: Chest radiograph dated December 12, 2021 HISTORY: ORDERING SYSTEM PROVIDED HISTORY: ETT placement TECHNOLOGIST PROVIDED HISTORY: Reason for exam:->ETT placement Reason for Exam: et and ng tube placement confirmation Additional signs and symptoms: confirmation of tube placement FINDINGS: Tip of the ET tube is 5.5 cm above the cornelio. The enteric tube extends below the diaphragm. There is shift of the heart and mediastinum to the right. There is a large left tension pneumothorax. Diffuse bilateral airspace opacities are noted. Interval development of a large left tension pneumothorax. Extensive bilateral airspace opacities without significant change. Findings were discussed with Dr. Cristiano Kramer  at 7:43 am on 12/13/2021. XR CHEST PORTABLE    Result Date: 12/12/2021  EXAMINATION: ONE XRAY VIEW OF THE CHEST 12/12/2021 6:23 am COMPARISON: 12/11/2021 HISTORY: ORDERING SYSTEM PROVIDED HISTORY: Hypoxia TECHNOLOGIST PROVIDED HISTORY: Reason for exam:->Hypoxia Reason for Exam: Hypoxia FINDINGS: Similar partial obscuration of the cardiomediastinal silhouette, which is likely unchanged. Diffuse pulmonary infiltrates, right greater than left, similar to prior. No large pneumothorax or pleural effusion visualized on this limited portable study. Osseous structures are grossly unchanged. Persistent bilateral airspace disease without acute interval change. XR CHEST PORTABLE    Result Date: 12/11/2021  EXAMINATION: ONE XRAY VIEW OF THE CHEST 12/9/2021 10:42 pm COMPARISON: December 8, 2021 HISTORY: ORDERING SYSTEM PROVIDED HISTORY: dyspnea TECHNOLOGIST PROVIDED HISTORY: Reason for exam:->dyspnea Reason for Exam: dyspnea Additional signs and symptoms: unknown Relevant Medical/Surgical History: A-fib FINDINGS: No lines or tubes. Cardiomediastinal silhouette is enlarged. Interval worsening of diffuse airspace opacities. Probable small right pleural effusion. No pneumothorax. No acute osseous abnormality. 1. Cardiomegaly with severe congestive heart failure.      XR CHEST PORTABLE    Result Date: 12/11/2021  EXAMINATION: ONE XRAY VIEW OF THE CHEST 12/11/2021 8:10 pm COMPARISON: Chest radiograph 12/09/2021. HISTORY: ORDERING SYSTEM PROVIDED HISTORY: dyspnea hypoxia TECHNOLOGIST PROVIDED HISTORY: Reason for exam:->dyspnea hypoxia Reason for Exam: dyspnea, hypoxia Additional signs and symptoms: sob, confusion FINDINGS: The cardiac silhouette is enlarged but stable. Severe extensive bilateral airspace opacities worsened from the previous study. No pneumothorax identified. No acute osseous abnormality. Severe extensive bilateral airspace opacities worsened from the previous exam compatible with edema, ARDS, or multifocal pneumonia. XR CHEST PORTABLE    Result Date: 12/8/2021  EXAMINATION: ONE XRAY VIEW OF THE CHEST 12/8/2021 5:48 pm COMPARISON: Chest x-ray January 17, 2020; chest x-ray September 25, 2018 HISTORY: ORDERING SYSTEM PROVIDED HISTORY: fall TECHNOLOGIST PROVIDED HISTORY: Reason for exam:->fall Reason for Exam: fall Relevant Medical/Surgical History: A-fib FINDINGS: Cardiac silhouette is enlarged but stable. Atherosclerotic changes of the aorta. Patchy interstitial opacities throughout the lungs appear unchanged when compared with exam from January 17, 2020 and are similar to exam from September 25, 2018 suggesting chronic interstitial lung changes. No definite superimposed consolidation. No pleural effusion or pneumothorax. 1. No acute cardiopulmonary process identified. 2. Chronic interstitial changes of the lungs. VL DUP LOWER EXTREMITY VENOUS BILATERAL    Result Date: 12/9/2021  EXAMINATION: DUPLEX VENOUS ULTRASOUND OF THE BILATERAL LOWER [id number]/9/2021 8:16 am TECHNIQUE: Duplex ultrasound using B-mode/gray scaled imaging, Doppler spectral analysis and color flow Doppler was obtained of the deep venous structures of the lower bilateral extremities. COMPARISON: None. HISTORY: ORDERING SYSTEM PROVIDED HISTORY: dvt TECHNOLOGIST PROVIDED HISTORY: Reason for exam:->dvt FINDINGS: Overall exam is somewhat technically difficult and limited secondary to patient body habitus. Left popliteal vein not visualized secondary to patient's limited mobility. Right femoral vein difficult to compress secondary to patient body habitus. Within the limitations: The visualized veins of the bilateral lower extremities are patent and free of echogenic thrombus. The veins demonstrate good compressibility with normal color flow study and spectral analysis. Nonspecific subcutaneous edema to bilateral lower extremities. No focal fluid collections noted. No evidence of DVT in either lower extremity within the limitations. Mild nonspecific subcutaneous edema to bilateral lower extremities. Assessment & Plan:      Jerry Wolfe is a 77y.o. year old male admitted 12/8/2021 for pneumonia, UTI.     1) Sepsis with Cystitis: CT a/p w contrast 12/8/21: Multifocal bladder traumatic trabeculation/diverticula noted with slight haziness of surrounding fat planes.              Blood cultures 4/4 +Proteus Mirabilis, pansensitives              UA w pos nit, small leuks, >770 RBC's; urine cx <50,000 CFU/ml mixed skin/urogenital mary jane              WBC 8.3, T-max 99.9 overnight              On IV Rocephin; adjust abx as necessary per urine c&s.  2) BPH: continue Flomax.               Recommend outpatient Cystoscopy/TRUS/Uroflow in the near future. Lane removal/voiding trial prior to discharge     Pt stable from a  standpoint. Will sign off, please call with any questions. Pt to follow up in our office in 1 month for reevaluation. Patient seen and examined, chart reviewed.      Electronically signed by Tenisha Piedra PA-C on 12/14/2021 at 9:28 AM

## 2021-12-14 NOTE — FLOWSHEET NOTE
Patient's HR hit 36 on the monitor this afternoon but recovered. Atropine pulled at this time but was not given. Patient currently has a HR of 58. Patient currently on 2 mcg of levophed. Dr. Lucas Johnson notified of occurrence. Electronically signed: Laura Wills.  HAI Feldman, [id number], 12/14/21 12/14/21 1615   Vitals   Pulse (!) 38   Resp 20

## 2021-12-14 NOTE — PLAN OF CARE
Nutrition Problem #1: Inadequate oral intake  Intervention: Food and/or Nutrient Delivery: Start Tube Feeding, Continue NPO  Nutritional Goals: EN will be started and pt will tolerate at least 75% of estimated needs via EN

## 2021-12-14 NOTE — PROGRESS NOTES
32.9  · Adjusted Body Weight:  ; No Adjustment   · Adjusted BMI:      · BMI Categories: Obese Class 1 (BMI 30.0-34. 9)       Nutrition Diagnosis:   · Inadequate oral intake related to acute injury/trauma, impaired respiratory function as evidenced by NPO or clear liquid status due to medical condition, intubation (on mechanical vent)    Nutrition Interventions:   Food and/or Nutrient Delivery:  Start Tube Feeding, Continue NPO  Nutrition Education/Counseling:  No recommendation at this time   Coordination of Nutrition Care:  Continue to monitor while inpatient, Coordination of Community Care    Goals:  EN will be started and pt will tolerate at least 75% of estimated needs via EN       Nutrition Monitoring and Evaluation:   Behavioral-Environmental Outcomes:  None Identified   Food/Nutrient Intake Outcomes:  Enteral Nutrition Intake/Tolerance  Physical Signs/Symptoms Outcomes:  Biochemical Data, GI Status, Fluid Status or Edema, Hemodynamic Status, Skin, Weight     Discharge Planning:     Too soon to determine     Electronically signed by Dick Gaffney RD, LD on 12/14/21 at 9:47 AM EST    Contact: [phone]

## 2021-12-14 NOTE — PROGRESS NOTES
Pulmonary and Critical Care  Progress Note      VITALS:  /80   Pulse 64   Temp 99.7 °F (37.6 °C) (Rectal)   Resp 20   Ht 5' 7.99\" (1.727 m)   Wt 216 lb 0.8 oz (98 kg)   SpO2 96%   BMI 32.86 kg/m²     Subjective:   CHIEF COMPLAINT :Fall     HPI:                The patient is on the vent and sedated. His left pneumothorax has improved postb chest tube. He is not in acute resp distress    Objective:   PHYSICAL EXAM:    LUNGS:Occasional basal crackles  Abd-soft, BS+,NT  Ext- no pedal edema  CVS-s1s2, no murmurs      DATA:    CBC:  Recent Labs     12/13/21  0310 12/13/21  1014 12/14/21  0624   WBC  --  7.3 8.3   RBC  --  4.54* 4.34*   HGB 13.2* 12.6* 12.1*   HCT 43.1 40.3* 38.7*   PLT  --  360 311   MCV  --  88.8 89.2   MCH  --  27.8 27.9   MCHC  --  31.3* 31.3*   RDW  --  15.6* 15.9*   SEGSPCT  --  92.0*  --       BMP:  Recent Labs     12/12/21  1049 12/13/21  1014 12/14/21  0624    141 149*   K 5.3* 4.4 3.8    103 107   CO2 34* 26 28   BUN 32* 41* 49*   CREATININE 1.0 0.9 1.0   CALCIUM 8.9 8.4 8.7   GLUCOSE 184* 280* 189*      ABG:  Recent Labs     12/11/21  1915 12/14/21  0445   PH 7.31* 7.52*   PO2ART 55* 107*   [id number] 56.0* 36.0   O2SAT 81.3* 97.6*     BNP  No results found for: BNP   D-Dimer:  No results found for: [facility]   Radiology:   Left chest tube remains in place, with slight interval decrease in size in   small left apical pneumothorax     1.        Assessment/Plan     Patient Active Problem List    Diagnosis Date Noted    PNA (pneumonia) 12/09/2021    Troponin I above reference range 12/09/2021    Elevated antinuclear antibody (FREDRICK) level 02/16/2021    B12 deficiency 02/15/2021    Anemia 02/12/2021    Rectal bleed 11/18/2020    Acute hypokalemia 11/17/2020    Acute cystitis without hematuria     Leukocytosis     Sepsis ([facility] 01/17/2020    BRBPR (bright red blood per rectum) 01/17/2020    Rectal bleeding 09/25/2018    NSTEMI (non-ST elevated myocardial infarction) ([facility] 06/25/2018    Upper GI bleed 03/07/2017    Diabetes mellitus (HCC)     Atrial flutter (HCC)     Atrial fibrillation (HCC)     Gout     Hyperlipidemia     Hypertension     Arthropathy    Acute on chronic hypoxic hypercapneic resp failure sec to Cardiogenic and non Cardiogenic Pulmonary edema  Systolic CHF with EF of 26-56%  Grade II Diastolic dysfunction  Bilateral Pneumonia sec to COVID-19  Obesity  DIVYA  Chronic Metabolic alkalosis  Left pneumothorax s/p left chest tube  VDRF       1. Abx  2. F/u C&S  3. Inhalers  4. Decadron  5. Insulin  6. CT per CTS  7. Keep sats > 92%  8. SAT and SBT trial as tolerated  9.  C/w present management    Electronically signed by Derrek Jackson MD on 12/14/2021 at 12:34 PM

## 2021-12-14 NOTE — PROGRESS NOTES
PATIENT NAME: Nadiya Ling    TODAY'S DATE: 12/14/21    Reason for today's visit: L PTX, covid-19    SUBJECTIVE:    Pt is sedated and intubated. OBJECTIVE:   VITALS:    Vitals:    12/14/21 0900   BP: 114/75   Pulse: 73   Resp: 21   Temp:    SpO2:      INTAKE/OUTPUT:    Date 12/14/21 0000 - 12/14/21 2359   Shift 3034-9789 2357-8880 0714-3927 24 Hour Total   INTAKE   Shift Total(mL/kg)       OUTPUT   Urine(mL/kg/hr) 600(0.8)   600   Shift Total(mL/kg) 600(6.1)   600(6.1)   Weight (kg) 98 98 98 98      Patient Vitals for the past 96 hrs (Last 3 readings):   Weight   12/13/21 0636 216 lb 0.8 oz (98 kg)       EXAM:  Constitutional: Blood pressure 114/75, pulse 73, temperature 99.9 °F (37.7 °C), temperature source Rectal, resp. rate 21, height 5' 7.99\" (1.727 m), weight 216 lb 0.8 oz (98 kg), SpO2 95 %. No apparent distress, appears stated age and cooperative. Neurologic: sedated  Lungs: Good respiratory effort. CT no air leak noted   CV: Regular rate/ rhythm , no peripheral edema, feet warm and well perfused  GI: Soft, non-tender in all four quadrants, non-distended, + bowel sounds, spleen and liver no palpable masses   : bladder nondistended   MSK: no obvious deformity   Skin: warm, pink and dry       Data:  CBC:   Recent Labs     12/13/21  0310 12/13/21  1014 12/14/21  0624   WBC  --  7.3 8.3   HGB 13.2* 12.6* 12.1*   HCT 43.1 40.3* 38.7*   PLT  --  360 311     BMP:    Recent Labs     12/12/21  1049 12/13/21  1014 12/14/21  0624    141 149*   K 5.3* 4.4 3.8    103 107   CO2 34* 26 28   BUN 32* 41* 49*   CREATININE 1.0 0.9 1.0   GLUCOSE 184* 280* 189*     Hepatic:   Recent Labs     12/12/21  1049 12/13/21  1014 12/14/21  0624   AST 30 24 21   ALT 14 12 10   BILITOT 0.4 0.3 0.4   ALKPHOS 64 63 65     Mag:      Recent Labs     12/13/21  1014   MG 1.8      Phos:   No results for input(s): PHOS in the last 72 hours. INR: No results for input(s): INR in the last 72 hours.     Radiology Review:  CXR independently reviewed - no pneumothorax, chest tube in good position       ASSESSMENT:  Patient Active Problem List   Diagnosis    Diabetes mellitus ([facility]    Atrial flutter (HCC)    Atrial fibrillation (HCC)    Gout    Hyperlipidemia    Hypertension    Arthropathy    Upper GI bleed    NSTEMI (non-ST elevated myocardial infarction) ([facility]    Rectal bleeding    Sepsis ([facility]    BRBPR (bright red blood per rectum)    Acute cystitis without hematuria    Leukocytosis    Acute hypokalemia    Rectal bleed    Anemia    B12 deficiency    Elevated antinuclear antibody (FREDRICK) level    PNA (pneumonia)    Troponin I above reference range       Left pneumothorax s/p CT placement  covid-19  Respiratory failure     PLAN:     Chest tube without air leak. Continue to suction for now. Daily CXR.      Shila Amos PA-C

## 2021-12-14 NOTE — PROGRESS NOTES
12/14/21 0737   Oxygen Therapy/Pulse Ox   O2 Therapy Oxygen   $Oxygen $Daily Charge   O2 Device Ventilator   FiO2  70 %   Resp 20   SpO2 98 %   Pulse Oximeter Device Mode Continuous   Pulse Oximeter Device Location Finger   $Pulse Oximeter $Spot check (multiple/continuous)

## 2021-12-15 ENCOUNTER — APPOINTMENT (OUTPATIENT)
Dept: GENERAL RADIOLOGY | Age: 76
DRG: 870 | End: 2021-12-15
Payer: COMMERCIAL

## 2021-12-15 LAB
ALBUMIN SERPL-MCNC: 2.7 GM/DL (ref 3.4–5)
ALP BLD-CCNC: 60 IU/L (ref 40–128)
ALT SERPL-CCNC: 8 U/L (ref 10–40)
ANION GAP SERPL CALCULATED.3IONS-SCNC: 13 MMOL/L (ref 4–16)
ANISOCYTOSIS: ABNORMAL
AST SERPL-CCNC: 19 IU/L (ref 15–37)
BANDED NEUTROPHILS ABSOLUTE COUNT: 1.26 K/CU MM
BANDED NEUTROPHILS RELATIVE PERCENT: 14 % (ref 5–11)
BASE EXCESS MIXED: 5.4 (ref 0–1.2)
BILIRUB SERPL-MCNC: 0.4 MG/DL (ref 0–1)
BUN BLDV-MCNC: 63 MG/DL (ref 6–23)
CALCIUM SERPL-MCNC: 8.6 MG/DL (ref 8.3–10.6)
CARBON MONOXIDE, BLOOD: 1.4 % (ref 0–5)
CHLORIDE BLD-SCNC: 106 MMOL/L (ref 99–110)
CO2 CONTENT: 30.2 MMOL/L (ref 19–24)
CO2: 29 MMOL/L (ref 21–32)
COMMENT: ABNORMAL
CREAT SERPL-MCNC: 1.3 MG/DL (ref 0.9–1.3)
DIFFERENTIAL TYPE: ABNORMAL
GFR AFRICAN AMERICAN: >60 ML/MIN/1.73M2
GFR NON-AFRICAN AMERICAN: 54 ML/MIN/1.73M2
GLUCOSE BLD-MCNC: 205 MG/DL (ref 70–99)
GLUCOSE BLD-MCNC: 227 MG/DL (ref 70–99)
GLUCOSE BLD-MCNC: 232 MG/DL (ref 70–99)
GLUCOSE BLD-MCNC: 235 MG/DL (ref 70–99)
GLUCOSE BLD-MCNC: 240 MG/DL (ref 70–99)
HCO3 ARTERIAL: 29 MMOL/L (ref 18–23)
HCT VFR BLD CALC: 37.4 % (ref 42–52)
HEMOGLOBIN: 11.6 GM/DL (ref 13.5–18)
LYMPHOCYTES ABSOLUTE: 1 K/CU MM
LYMPHOCYTES RELATIVE PERCENT: 11 % (ref 24–44)
MCH RBC QN AUTO: 27.9 PG (ref 27–31)
MCHC RBC AUTO-ENTMCNC: 31 % (ref 32–36)
MCV RBC AUTO: 89.9 FL (ref 78–100)
METAMYELOCYTES ABSOLUTE COUNT: 0.09 K/CU MM
METAMYELOCYTES PERCENT: 1 %
METHEMOGLOBIN ARTERIAL: 1.3 %
MONOCYTES ABSOLUTE: 0.5 K/CU MM
MONOCYTES RELATIVE PERCENT: 5 % (ref 0–4)
NUCLEATED RED BLOOD CELLS: 2
O2 SATURATION: 94.1 % (ref 96–97)
PCO2 ARTERIAL: 38 MMHG (ref 32–45)
PDW BLD-RTO: 16 % (ref 11.7–14.9)
PH BLOOD: 7.49 (ref 7.34–7.45)
PLATELET # BLD: 278 K/CU MM (ref 140–440)
PMV BLD AUTO: 11.2 FL (ref 7.5–11.1)
PO2 ARTERIAL: 81 MMHG (ref 75–100)
POTASSIUM SERPL-SCNC: 3.5 MMOL/L (ref 3.5–5.1)
RBC # BLD: 4.16 M/CU MM (ref 4.6–6.2)
RBC # BLD: ABNORMAL 10*6/UL
SEGMENTED NEUTROPHILS ABSOLUTE COUNT: 6.1 K/CU MM
SEGMENTED NEUTROPHILS RELATIVE PERCENT: 68 % (ref 36–66)
SODIUM BLD-SCNC: 148 MMOL/L (ref 135–145)
TOTAL PROTEIN: 5.5 GM/DL (ref 6.4–8.2)
UNDIFFERENTIATED CELL: 1 %
WBC # BLD: 9 K/CU MM (ref 4–10.5)

## 2021-12-15 PROCEDURE — 6360000002 HC RX W HCPCS: Performed by: INTERNAL MEDICINE

## 2021-12-15 PROCEDURE — 2580000003 HC RX 258: Performed by: INTERNAL MEDICINE

## 2021-12-15 PROCEDURE — 82962 GLUCOSE BLOOD TEST: CPT

## 2021-12-15 PROCEDURE — 85007 BL SMEAR W/DIFF WBC COUNT: CPT

## 2021-12-15 PROCEDURE — 71045 X-RAY EXAM CHEST 1 VIEW: CPT

## 2021-12-15 PROCEDURE — 80053 COMPREHEN METABOLIC PANEL: CPT

## 2021-12-15 PROCEDURE — 82803 BLOOD GASES ANY COMBINATION: CPT

## 2021-12-15 PROCEDURE — 32551 INSERTION OF CHEST TUBE: CPT

## 2021-12-15 PROCEDURE — 51702 INSERT TEMP BLADDER CATH: CPT

## 2021-12-15 PROCEDURE — 2000000000 HC ICU R&B

## 2021-12-15 PROCEDURE — 83735 ASSAY OF MAGNESIUM: CPT

## 2021-12-15 PROCEDURE — 6370000000 HC RX 637 (ALT 250 FOR IP): Performed by: NURSE PRACTITIONER

## 2021-12-15 PROCEDURE — 6370000000 HC RX 637 (ALT 250 FOR IP): Performed by: INTERNAL MEDICINE

## 2021-12-15 PROCEDURE — 2580000003 HC RX 258: Performed by: NURSE PRACTITIONER

## 2021-12-15 PROCEDURE — 89220 SPUTUM SPECIMEN COLLECTION: CPT

## 2021-12-15 PROCEDURE — 94003 VENT MGMT INPAT SUBQ DAY: CPT

## 2021-12-15 PROCEDURE — 2500000003 HC RX 250 WO HCPCS: Performed by: NURSE PRACTITIONER

## 2021-12-15 PROCEDURE — 85027 COMPLETE CBC AUTOMATED: CPT

## 2021-12-15 PROCEDURE — 99233 SBSQ HOSP IP/OBS HIGH 50: CPT | Performed by: INTERNAL MEDICINE

## 2021-12-15 RX ADMIN — FUROSEMIDE 40 MG: 10 INJECTION, SOLUTION INTRAVENOUS at 08:23

## 2021-12-15 RX ADMIN — MIDAZOLAM 9 MG/HR: 5 INJECTION INTRAMUSCULAR; INTRAVENOUS at 05:54

## 2021-12-15 RX ADMIN — PRAVASTATIN SODIUM 20 MG: 10 TABLET ORAL at 20:23

## 2021-12-15 RX ADMIN — FAMOTIDINE 20 MG: 10 INJECTION, SOLUTION INTRAVENOUS at 20:33

## 2021-12-15 RX ADMIN — CHLORHEXIDINE GLUCONATE 0.12% ORAL RINSE 15 ML: 1.2 LIQUID ORAL at 08:24

## 2021-12-15 RX ADMIN — APIXABAN 5 MG: 5 TABLET, FILM COATED ORAL at 20:23

## 2021-12-15 RX ADMIN — ACETAMINOPHEN 650 MG: 325 TABLET ORAL at 03:59

## 2021-12-15 RX ADMIN — FUROSEMIDE 40 MG: 10 INJECTION, SOLUTION INTRAVENOUS at 17:05

## 2021-12-15 RX ADMIN — Medication 37.5 MCG/HR: at 08:00

## 2021-12-15 RX ADMIN — TAMSULOSIN HYDROCHLORIDE 0.4 MG: 0.4 CAPSULE ORAL at 20:23

## 2021-12-15 RX ADMIN — INSULIN LISPRO 4 UNITS: 100 INJECTION, SOLUTION INTRAVENOUS; SUBCUTANEOUS at 11:50

## 2021-12-15 RX ADMIN — APIXABAN 5 MG: 5 TABLET, FILM COATED ORAL at 08:22

## 2021-12-15 RX ADMIN — DEXAMETHASONE SODIUM PHOSPHATE 6 MG: 10 INJECTION INTRAMUSCULAR; INTRAVENOUS at 08:23

## 2021-12-15 RX ADMIN — INSULIN LISPRO 4 UNITS: 100 INJECTION, SOLUTION INTRAVENOUS; SUBCUTANEOUS at 08:01

## 2021-12-15 RX ADMIN — MIDAZOLAM 5 MG/HR: 5 INJECTION INTRAMUSCULAR; INTRAVENOUS at 17:03

## 2021-12-15 RX ADMIN — SPIRONOLACTONE 25 MG: 25 TABLET ORAL at 08:23

## 2021-12-15 RX ADMIN — SODIUM CHLORIDE, PRESERVATIVE FREE 10 ML: 5 INJECTION INTRAVENOUS at 08:26

## 2021-12-15 RX ADMIN — CEFTRIAXONE SODIUM 1000 MG: 1 INJECTION, POWDER, FOR SOLUTION INTRAMUSCULAR; INTRAVENOUS at 03:49

## 2021-12-15 RX ADMIN — ALLOPURINOL 300 MG: 300 TABLET ORAL at 08:24

## 2021-12-15 RX ADMIN — CHLORHEXIDINE GLUCONATE 0.12% ORAL RINSE 15 ML: 1.2 LIQUID ORAL at 20:24

## 2021-12-15 RX ADMIN — SODIUM CHLORIDE, PRESERVATIVE FREE 10 ML: 5 INJECTION INTRAVENOUS at 20:24

## 2021-12-15 RX ADMIN — INSULIN LISPRO 4 UNITS: 100 INJECTION, SOLUTION INTRAVENOUS; SUBCUTANEOUS at 16:59

## 2021-12-15 RX ADMIN — SODIUM CHLORIDE, PRESERVATIVE FREE 10 ML: 5 INJECTION INTRAVENOUS at 08:27

## 2021-12-15 RX ADMIN — POTASSIUM BICARBONATE 40 MEQ: 782 TABLET, EFFERVESCENT ORAL at 10:07

## 2021-12-15 RX ADMIN — FAMOTIDINE 20 MG: 10 INJECTION, SOLUTION INTRAVENOUS at 08:23

## 2021-12-15 RX ADMIN — TRIAMTERENE AND HYDROCHLOROTHIAZIDE 1 TABLET: 37.5; 25 TABLET ORAL at 08:24

## 2021-12-15 ASSESSMENT — PULMONARY FUNCTION TESTS
PIF_VALUE: 24
PIF_VALUE: 25
PIF_VALUE: 26
PIF_VALUE: 24
PIF_VALUE: 24
PIF_VALUE: 25
PIF_VALUE: 25

## 2021-12-15 ASSESSMENT — PAIN SCALES - GENERAL
PAINLEVEL_OUTOF10: 0

## 2021-12-15 NOTE — PROGRESS NOTES
.        Hospitalist Progress Note      Name:  Ciro Stephens /Age/Sex: 1945  (77 y.o. male)   MRN & CSN:  [id number] & [id number] Admission Date/Time: 2021  5:33 PM   Location:  -A PCP: Robinson Valdez MD         Hospital Day: 7    Assessment and Plan:   Ciro Stephens is a 77 y.o.  male  who presents with <principal problem not specified>    1. Covid pneumonia. Continue dexamethasone. Tocilizumab given 12/10/2021.   2. Community acquired pneumonia. Continue IV antibiotics. Follow up cultures. 3. Acute respiratory failure due to covid pneumonia. Intubated 2021. On vent support Pulmonary following. 4. Left tension pneumothorax. Chest tube placed this morning 2021.  5. Fall    6. Hypertension  7. Lymphedema  8. Paroxysmal atrial fibrillation on apixaban  9. Diabetes Mellitus  10. CAD. Elevated troponin. Cardiology consulted  11. Acute CHF. On IV lasix per Cardiology  12. Hypokalemia. Replace and monitor  13. Hip pain. Pain meds. Prognosis guarded    Diet ADULT DIET; Regular  ADULT TUBE FEEDING; Nasogastric; Peptide Based High Protein; Continuous; 10; Yes; 10; Q 4 hours; 80; 30; Q 4 hours   DVT Prophylaxis [] Lovenox, []  Heparin, [] SCDs, [] Ambulation   GI Prophylaxis [] PPI,  [] H2 Blocker,  [] Carafate,  [] Diet/Tube Feeds   Code Status Full Code   Disposition Patient requires continued admission due to   MDM [] Low, [] Moderate,[]  High  Patient's risk as above due to      History of Present Illness:     Chief Complaint: <principal problem not specified>  Ciro Stephens is a 77 y.o.  male  who presents with fall but found to have community acquired pneumonia, started on IV antibiotics. Tested for covid after admission, came back positive. Patient on vent support    Chest tube in place for left tension pneumothorax    Ten point ROS reviewed negative, unless as noted above    Objective:        Intake/Output Summary (Last 24 hours) at 2021 9836  Last data filed at 12/14/2021 1809  Gross per 24 hour   Intake 477.06 ml   Output 2140 ml   Net -1662.94 ml      Vitals:   Vitals:    12/14/21 2000   BP: 98/63   Pulse: 64   Resp: 20   Temp: 99.6 °F (37.6 °C)   SpO2: 97%     Physical Exam:   GEN Sedated male. Appears given age. EYES Pupils are equally round. No scleral erythema, discharge, or conjunctivitis. HENT Mucous membranes are moist. Oral pharynx without exudates, no evidence of thrush. NECK Supple, no apparent thyromegaly or masses. RESP Diminished BS. Left chest tube in place. CARDIO/VASC S1/S2 auscultated. Regular rate without appreciable murmurs, rubs, or gallops. No JVD or carotid bruits. Bilateral lymphedema. GI Abdomen is soft without significant tenderness, masses, or guarding. Bowel sounds are normoactive. MSK No gross joint deformities. SKIN Normal coloration, warm, dry.   NEURO  Sedated    Medications:   Medications:    atropine        chlorhexidine  15 mL Mouth/Throat BID    famotidine (PEPCID) injection  20 mg IntraVENous BID    lidocaine  5 mL IntraDERmal Once    sodium chloride flush  5-40 mL IntraVENous 2 times per day    insulin lispro  0-12 Units SubCUTAneous TID     insulin lispro  0-6 Units SubCUTAneous Nightly    morphine  2 mg IntraVENous Once    dexmedetomidine (PRECEDEX) IV bolus  1 mcg/kg IntraVENous Once    dexamethasone  6 mg IntraVENous Daily    furosemide  40 mg IntraVENous BID    metoprolol succinate  50 mg Oral BID    spironolactone  25 mg Oral Daily    allopurinol  300 mg Oral Daily    apixaban  5 mg Oral BID    [Held by provider] metFORMIN  500 mg Oral BID     losartan  100 mg Oral Daily    pravastatin  20 mg Oral Nightly    tamsulosin  0.4 mg Oral Nightly    triamterene-hydroCHLOROthiazide  1 tablet Oral Daily    sodium chloride flush  5-40 mL IntraVENous 2 times per day    cefTRIAXone (ROCEPHIN) IV  1,000 mg IntraVENous Q24H      Infusions:    propofol Stopped (12/13/21 1443)    fentaNYL 37.5 mcg/hr (12/14/21 1019)    sodium chloride      norepinephrine 2 mcg/min (12/14/21 1205)    dextrose      midazolam 9 mg/hr (12/14/21 1524)    sodium chloride       PRN Meds: hydrALAZINE (APRESOLINE) ivpb, 10 mg, Q4H PRN  sodium chloride flush, 5-40 mL, PRN  sodium chloride, 25 mL, PRN  glucose, 15 g, PRN  dextrose, 12.5 g, PRN  glucagon (rDNA), 1 mg, PRN  dextrose, 100 mL/hr, PRN  LORazepam, 1 mg, Q6H PRN  oxyCODONE, 5 mg, Q4H PRN  guaiFENesin-dextromethorphan, 5 mL, Q4H PRN  potassium chloride, 40 mEq, PRN   Or  potassium alternative oral replacement, 40 mEq, PRN   Or  potassium chloride, 10 mEq, PRN  sodium chloride flush, 5-40 mL, PRN  sodium chloride, 25 mL, PRN  ondansetron, 4 mg, Q8H PRN   Or  ondansetron, 4 mg, Q6H PRN  polyethylene glycol, 17 g, Daily PRN  acetaminophen, 650 mg, Q6H PRN   Or  acetaminophen, 650 mg, Q6H PRN

## 2021-12-15 NOTE — PROGRESS NOTES
Pulmonary and Critical Care  Progress Note      VITALS:  /74   Pulse 52   Temp 99.2 °F (37.3 °C) (Rectal)   Resp 20   Ht 5' 7.99\" (1.727 m)   Wt 216 lb 0.8 oz (98 kg)   SpO2 97%   BMI 32.86 kg/m²     Subjective:   CHIEF COMPLAINT :Fall     HPI:                The patient is on the vent and sedated. He has minimal response to painful stimuli    Objective:   PHYSICAL EXAM:    LUNGS:Occasional basal crackles  Abd-soft, BS+,NT  Ext- no pedal edema  CVS-s1s2, no murmurs      DATA:    CBC:  Recent Labs     12/13/21  1014 12/14/21  0624 12/15/21  0535   WBC 7.3 8.3 9.0   RBC 4.54* 4.34* 4.16*   HGB 12.6* 12.1* 11.6*   HCT 40.3* 38.7* 37.4*    311 278   MCV 88.8 89.2 89.9   MCH 27.8 27.9 27.9   MCHC 31.3* 31.3* 31.0*   RDW 15.6* 15.9* 16.0*   NRBC  --  3 2   SEGSPCT 92.0* 79.0* 68.0*   BANDSPCT  --  15* 14*      BMP:  Recent Labs     12/14/21  0624 12/14/21  1130 12/15/21  0535   * 149* 148*   K 3.8 3.6 3.5    107 106   CO2 28 29 29   BUN 49* 52* 63*   CREATININE 1.0 1.1 1.3   CALCIUM 8.7 8.8 8.6   GLUCOSE 189* 191* 235*      ABG:  Recent Labs     12/14/21  0445 12/15/21  0600   PH 7.52* 7.49*   PO2ART 107* 81   [id number] 36.0 38.0   O2SAT 97.6* 94.1*     BNP  No results found for: BNP   D-Dimer:  No results found for: [facility]   Radiology:   Persistent small left apical pneumothorax estimated to be approximately 10%   in severity.  Support tube/catheters project in stable/satisfactory position   in the frontal projection.       Consolidating infiltrates and or pulmonary edema, cardiomegaly unchanged   compared to 12/14/2021 consistent with diffuse bilateral pneumonia and/or   congestive heart failure.      1.       Assessment/Plan     Patient Active Problem List    Diagnosis Date Noted    PNA (pneumonia) 12/09/2021    Troponin I above reference range 12/09/2021    Elevated antinuclear antibody (FREDRICK) level 02/16/2021    B12 deficiency 02/15/2021    Anemia 02/12/2021    Rectal bleed 11/18/2020    Acute hypokalemia 11/17/2020    Acute cystitis without hematuria     Leukocytosis     Sepsis (HCC) 01/17/2020    BRBPR (bright red blood per rectum) 01/17/2020    Rectal bleeding 09/25/2018    NSTEMI (non-ST elevated myocardial infarction) ([facility] 06/25/2018    Upper GI bleed 03/07/2017    Diabetes mellitus (HCC)     Atrial flutter (HCC)     Atrial fibrillation (HCC)     Gout     Hyperlipidemia     Hypertension     Arthropathy    Acute on chronic hypoxic hypercapneic resp failure sec to Cardiogenic and non Cardiogenic Pulmonary edema  Systolic CHF with EF of 76-44%  Grade II Diastolic dysfunction  Bilateral Pneumonia sec to COVID-19  Obesity  DIVYA  Chronic Metabolic alkalosis  Left pneumothorax s/p left chest tube  VDRF       1. Decadron  2. Insulin  3. Inhalers  4. Abx  5. F/u C&S  6. Tube feeds  7. PT/OT  8. Keep sats > 92%  9. SAT and SBT trial in am as toelrated  10.  C.w present management    Electronically signed by Nam Rubio MD on 12/15/2021 at 12:27 PM

## 2021-12-15 NOTE — PROGRESS NOTES
12/15/21 0810   Vent Information   $Ventilation $Subsequent Day   Vent Type 980   Vent Mode AC/VC   Vt Ordered 500 mL   Rate Set 20 bmp   Peak Flow 55 L/min   FiO2  60 %  (decreased FI02 to 55%)   SpO2 99 %   SpO2/FiO2 ratio 165   Sensitivity 3   PEEP/CPAP 11   Humidification Source HME   Vent Patient Data   Peak Inspiratory Pressure 24 cmH2O   Mean Airway Pressure 16 cmH20   Rate Measured 20 br/min   Vt Exhaled 509 mL   Minute Volume 10.2 Liters   I:E Ratio 1:2.0   Plateau Pressure 21 SXM14   Static Compliance 43 mL/cmH2O   Total PEEP 11 cmH20   Auto PEEP 0.3 cmH20   Cough/Sputum   Sputum How Obtained Endotracheal; Suctioned   $Obtained Sample $Induced Sputum   Cough Non-productive   Frequency Infrequent   Sputum Amount None   Sputum Color None   Tenacity None   Breath Sounds   Right Upper Lobe Diminished   Right Middle Lobe Diminished   Right Lower Lobe Diminished   Left Upper Lobe Diminished   Left Lower Lobe Diminished   Additional Respiratory  Assessments   Resp 20   Position Semi-Mendez's   Alarm Settings   High Pressure Alarm 55 cmH2O   Delay Alarm 20 sec(s)   Low Minute Volume Alarm 2.5 L/min   Apnea (secs) 20 secs   High Respiratory Rate 40 br/min   Low Exhaled Vt  250 mL   ETT (adult)   Placement Date/Time: 12/13/21 0615   Preoxygenation: Yes  Mask Ventilation: Ventilated by mask (1)  Technique: Rapid sequence  Type: Cuffed  Tube Size: 7.5 mm  Location: Oral  Insertion attempts: 1   Secured at 23 cm   Measured From Lips   ET Placement Right   Secured By Commercial tube reddy   Site Condition Dry

## 2021-12-15 NOTE — PROGRESS NOTES
.        Hospitalist Progress Note      Name:  Tony Whaley /Age/Sex: 1945  (77 y.o. male)   MRN & CSN:  [id number] & [id number] Admission Date/Time: 2021  5:33 PM   Location:  -A PCP: Edda Olivarez MD         Hospital Day: 8    Assessment and Plan:   Tony Whaley is a 77 y.o.  male  who presents with <principal problem not specified>    1. Covid pneumonia. Continue dexamethasone. Tocilizumab given 12/10/2021.   2. Community acquired pneumonia. Continue IV antibiotics. Follow up cultures. 3. Acute respiratory failure due to covid pneumonia. Intubated 2021. On vent support Pulmonary following. 4. Left tension pneumothorax. Chest tube placed this morning 2021.  5. Fall    6. Hypertension  7. Lymphedema  8. Paroxysmal atrial fibrillation on apixaban  9. Diabetes Mellitus  10. CAD. Elevated troponin. Cardiology consulted  11. Acute CHF. On IV lasix per Cardiology  12. Hypokalemia. Replace and monitor  13. Hip pain. Pain meds. Prognosis guarded    Diet ADULT DIET; Regular  ADULT TUBE FEEDING; Nasogastric; Peptide Based High Protein; Continuous; 10; Yes; 10; Q 4 hours; 80; 30; Q 4 hours   DVT Prophylaxis [] Lovenox, []  Heparin, [] SCDs, [] Ambulation   GI Prophylaxis [] PPI,  [] H2 Blocker,  [] Carafate,  [] Diet/Tube Feeds   Code Status Full Code   Disposition Patient requires continued admission due to   MDM [] Low, [] Moderate,[]  High  Patient's risk as above due to      History of Present Illness:     Chief Complaint: <principal problem not specified>  Tony Whaley is a 77 y.o.  male  who presents with fall but found to have community acquired pneumonia, started on IV antibiotics. Tested for covid after admission, came back positive. Patient on vent support    Chest tube in place for left tension pneumothorax    Ten point ROS reviewed negative, unless as noted above    Objective:        Intake/Output Summary (Last 24 hours) at 12/15/2021 1409  Last data filed at 12/15/2021 1200  Gross per 24 hour   Intake 477.06 ml   Output 1265 ml   Net -787.94 ml      Vitals:   Vitals:    12/15/21 1300   BP: 112/76   Pulse: 57   Resp: 20   Temp:    SpO2:      Physical Exam:   GEN Sedated male. Appears given age. EYES Pupils are equally round. No scleral erythema, discharge, or conjunctivitis. HENT Mucous membranes are moist. Oral pharynx without exudates, no evidence of thrush. NECK Supple, no apparent thyromegaly or masses. RESP Diminished BS. Left chest tube in place. CARDIO/VASC S1/S2 auscultated. Regular rate without appreciable murmurs, rubs, or gallops. No JVD or carotid bruits. Bilateral lymphedema. GI Abdomen is soft without significant tenderness, masses, or guarding. Bowel sounds are normoactive. MSK No gross joint deformities. SKIN Normal coloration, warm, dry.   NEURO  Sedated    Medications:   Medications:    chlorhexidine  15 mL Mouth/Throat BID    famotidine (PEPCID) injection  20 mg IntraVENous BID    lidocaine  5 mL IntraDERmal Once    sodium chloride flush  5-40 mL IntraVENous 2 times per day    insulin lispro  0-12 Units SubCUTAneous TID     insulin lispro  0-6 Units SubCUTAneous Nightly    morphine  2 mg IntraVENous Once    dexmedetomidine (PRECEDEX) IV bolus  1 mcg/kg IntraVENous Once    dexamethasone  6 mg IntraVENous Daily    furosemide  40 mg IntraVENous BID    metoprolol succinate  50 mg Oral BID    spironolactone  25 mg Oral Daily    allopurinol  300 mg Oral Daily    apixaban  5 mg Oral BID    [Held by provider] metFORMIN  500 mg Oral BID WC    losartan  100 mg Oral Daily    pravastatin  20 mg Oral Nightly    tamsulosin  0.4 mg Oral Nightly    triamterene-hydroCHLOROthiazide  1 tablet Oral Daily    sodium chloride flush  5-40 mL IntraVENous 2 times per day    cefTRIAXone (ROCEPHIN) IV  1,000 mg IntraVENous Q24H      Infusions:    propofol Stopped (12/13/21 1443)    fentaNYL 62.5 mcg/hr (12/15/21 0947)    sodium chloride      norepinephrine 2 mcg/min (12/14/21 1205)    dextrose      midazolam 5 mg/hr (12/15/21 1313)    sodium chloride       PRN Meds: hydrALAZINE (APRESOLINE) ivpb, 10 mg, Q4H PRN  sodium chloride flush, 5-40 mL, PRN  sodium chloride, 25 mL, PRN  glucose, 15 g, PRN  dextrose, 12.5 g, PRN  glucagon (rDNA), 1 mg, PRN  dextrose, 100 mL/hr, PRN  LORazepam, 1 mg, Q6H PRN  oxyCODONE, 5 mg, Q4H PRN  guaiFENesin-dextromethorphan, 5 mL, Q4H PRN  potassium chloride, 40 mEq, PRN   Or  potassium alternative oral replacement, 40 mEq, PRN   Or  potassium chloride, 10 mEq, PRN  sodium chloride flush, 5-40 mL, PRN  sodium chloride, 25 mL, PRN  ondansetron, 4 mg, Q8H PRN   Or  ondansetron, 4 mg, Q6H PRN  polyethylene glycol, 17 g, Daily PRN  acetaminophen, 650 mg, Q6H PRN   Or  acetaminophen, 650 mg, Q6H PRN

## 2021-12-15 NOTE — PROGRESS NOTES
40 mEq of EFFER-K given for a potassium of 3.5 this morning. Electronically signed: Gopi Rivera.  [facility], 0509, 12/15/21

## 2021-12-15 NOTE — PROGRESS NOTES
PATIENT NAME: Martin Mcdowell    TODAY'S DATE: 12/15/21    Reason for today's visit: L PTX, covid-19    SUBJECTIVE:    Pt is sedated and intubated. Having episodes of bradycardia. OBJECTIVE:   VITALS:    Vitals:    12/15/21 1000   BP: 95/62   Pulse: 59   Resp: 20   Temp:    SpO2:      INTAKE/OUTPUT:    Date 12/15/21 0000 - 12/15/21 2359   Shift 3705-7030 1522-0136 7206-6748 24 Hour Total   INTAKE   Shift Total(mL/kg)       OUTPUT   Urine(mL/kg/hr) 450(0.6)   450   Chest Tube 0   0   Shift Total(mL/kg) 450(4.6)   450(4.6)   Weight (kg) 98 98 98 98      Patient Vitals for the past 96 hrs (Last 3 readings):   Weight   12/13/21 0636 216 lb 0.8 oz (98 kg)       EXAM:  Constitutional: Blood pressure 95/62, pulse 59, temperature 99.9 °F (37.7 °C), temperature source Rectal, resp. rate 20, height 5' 7.99\" (1.727 m), weight 216 lb 0.8 oz (98 kg), SpO2 99 %. No apparent distress, appears stated age and cooperative. Neurologic: sedated  Lungs: Good respiratory effort. CT no air leak noted   CV: Regular rate/ rhythm , no peripheral edema, feet warm and well perfused  GI: Soft, non-tender in all four quadrants, non-distended, + bowel sounds, spleen and liver no palpable masses   : bladder nondistended   MSK: no obvious deformity   Skin: warm, pink and dry       Data:  CBC:   Recent Labs     12/13/21  1014 12/14/21  0624 12/15/21  0535   WBC 7.3 8.3 9.0   HGB 12.6* 12.1* 11.6*   HCT 40.3* 38.7* 37.4*    311 278     BMP:    Recent Labs     12/14/21  0624 12/14/21  1130 12/15/21  0535   * 149* 148*   K 3.8 3.6 3.5    107 106   CO2 28 29 29   BUN 49* 52* 63*   CREATININE 1.0 1.1 1.3   GLUCOSE 189* 191* 235*     Hepatic:   Recent Labs     12/14/21  0624 12/14/21  1130 12/15/21  0535   AST 21 21 19   ALT 10 10 8*   BILITOT 0.4 0.5 0.4   ALKPHOS 65 66 60     Mag:      Recent Labs     12/13/21  1014   MG 1.8      Phos:   No results for input(s): PHOS in the last 72 hours.    INR: No results for input(s): INR in the last 72 hours. Radiology Review:  CXR independently reviewed - small apical PTX    ASSESSMENT:  Patient Active Problem List   Diagnosis    [facility] mellitus [facility]    Atrial flutter ([facility]    Atrial fibrillation (HCC)    Gout    Hyperlipidemia    Hypertension    Arthropathy    Upper GI bleed    NSTEMI (non-ST elevated myocardial infarction) ([facility]    Rectal bleeding    Sepsis ([facility]    BRBPR (bright red blood per rectum)    Acute cystitis without hematuria    Leukocytosis    Acute hypokalemia    Rectal bleed    Anemia    B12 deficiency    Elevated antinuclear antibody (FREDRICK) level    PNA (pneumonia)    Troponin I above reference range       Left pneumothorax s/p CT placement  covid-19  Respiratory failure     PLAN:     Chest tube without air leak. Still with small apical PTX. Suction increased to -40 with evacuation of some air. Weaning vent settings per pulmonary. Daily CXR.      Aruna Steven PA-C

## 2021-12-15 NOTE — PROGRESS NOTES
Comprehensive Nutrition Assessment    Type and Reason for Visit:  Reassess    Nutrition Recommendations/Plan:   Advance to goal rate of 80 ml/hr as pt is able to tolerate    Nutrition Assessment:  Observed EN hanging in room via doorway. Diet order but pt remains on vent and intubated. Main source of nutrition via NGT. Per flowsheet, EN running at 20 ml/hr with protein modular. No protein modular ordered. Left Vital HP formula with rate at room. Poor prognosis but still full code. Please advance EN rate to goal as pt tolerates if aggressive treatment is within plan of care. High nutrition risk. Malnutrition Assessment:  Malnutrition Status:  Insufficient data    Context:  Acute Illness       Estimated Daily Nutrient Needs:  Energy (kcal):  1931 [facility] 2010); Weight Used for Energy Requirements:  Current     Protein (g):  140 (at least 2 g/kg IBW); Weight Used for Protein Requirements:  Ideal        Fluid (ml/day):  fluids per nephrology; Method Used for Fluid Requirements:  Other (Comment)    Nutrition Related Findings:  MAP 88; NGT; Glu 205; Na 148      Wounds:  Multiple, Venous Stasis, Deep Tissue Injury       Current Nutrition Therapies:    ADULT DIET; Regular  ADULT TUBE FEEDING; Nasogastric; Peptide Based High Protein; Continuous; 10; Yes; 10; Q 4 hours; 80; 30; Q 4 hours  Current Tube Feeding (TF) Orders:  · Feeding Route: Nasogastric  · Formula: Peptide Based High Protein (VITAL HP)  · Schedule: Continuous (80 ml/hr)  · Additives/Modulars:  (none)  · Water Flushes: 30 Q 4 H  · Current TF & Flush Orders Provides: 20ml/hr provides 480 kcals, 73 gm protein per day  · Goal TF & Flush Orders Provides: 80 ml/hr provides 1920 kcals, 168 gm protein    Additional Calorie Sources:   Propofol stopped due to patient's heart rate.     Anthropometric Measures:  · Height: 5' 7.99\" (172.7 cm)  · Current Body Weight: 216 lb 0.8 oz (98 kg)   · Admission Body Weight: 238 lb 15.7 oz (108.4 kg)    · Usual Body Weight: (n/a)     · Ideal Body Weight: 154 lbs; % Ideal Body Weight 140.3 %   · BMI: 32.9  · Adjusted Body Weight:  ; No Adjustment   · Adjusted BMI:      · BMI Categories: Obese Class 1 (BMI 30.0-34. 9)       Nutrition Diagnosis:   · Inadequate oral intake related to acute injury/trauma, impaired respiratory function as evidenced by NPO or clear liquid status due to medical condition, intubation (on mechanical vent)    Nutrition Interventions:   Food and/or Nutrient Delivery:  Continue Current Tube Feeding  Nutrition Education/Counseling:  No recommendation at this time   Coordination of Nutrition Care:  Continue to monitor while inpatient, Coordination of Community Care    Goals:  EN will be started and pt will tolerate at least 75% of estimated needs via EN       Nutrition Monitoring and Evaluation:   Behavioral-Environmental Outcomes:  None Identified   Food/Nutrient Intake Outcomes:  Enteral Nutrition Intake/Tolerance  Physical Signs/Symptoms Outcomes:  Biochemical Data, GI Status, Fluid Status or Edema, Hemodynamic Status, Skin, Weight     Discharge Planning:     Too soon to determine     Electronically signed by Adele Torre RD, LD on 12/15/21 at 3:27 PM EST    Contact: [phone]

## 2021-12-15 NOTE — PROGRESS NOTES
12/15/21 1145   Vent Information   Equipment Changed HME   Vent Type 980   Vent Mode AC/VC   Vt Ordered 500 mL   Rate Set 20 bmp   Peak Flow 55 L/min   FiO2  50 %   SpO2 97 %   SpO2/FiO2 ratio 194   Sensitivity 3   PEEP/CPAP 11   Humidification Source HME   Nitric Oxide/Epoprostenol In Use? No   Vent Patient Data   Peak Inspiratory Pressure 25 cmH2O   Mean Airway Pressure 16 cmH20   Rate Measured 20 br/min   Vt Exhaled 507 mL   Minute Volume 10.1 Liters   I:E Ratio 1:2.0   Breath Sounds   Right Upper Lobe Diminished   Right Middle Lobe Diminished   Right Lower Lobe Diminished   Left Upper Lobe Diminished   Left Lower Lobe Diminished   Additional Respiratory  Assessments   Resp 20   Position Semi-Mendez's   Oral Care Mouth suctioned   Subglottic Suction Done?  Yes   Alarm Settings   High Pressure Alarm 55 cmH2O   Delay Alarm 20 sec(s)   Low Minute Volume Alarm 2.5 L/min   Apnea (secs) 20 secs   High Respiratory Rate 40 br/min   Low Exhaled Vt  250 mL   ETT (adult)   Placement Date/Time: 12/13/21 0615   Preoxygenation: Yes  Mask Ventilation: Ventilated by mask (1)  Technique: Rapid sequence  Type: Cuffed  Tube Size: 7.5 mm  Location: Oral  Insertion attempts: 1   Secured at 23 cm   Measured From [address] [place] [address],[address] By [employer] tube reddy   Site Condition Dry

## 2021-12-15 NOTE — PROGRESS NOTES
Patient's HR hitting as low as 39 this morning. Patient having pauses and ectopy on the monitor as well. Patient still in AFIB. Losartan and lopressor held by this Nurse. Atropine is at bedside. Dr. Catrachita Smith and Dr. Danelle Wharton notified of patient's HR this morning. Electronically signed: [facility], 0218, 12/15/21.

## 2021-12-15 NOTE — PROGRESS NOTES
Today's plan:  Patient intubated, had tension pneumothorax and required chest tube, recommend to decrease sedation to avoid bradycardia      Admit Date:  12/8/2021    Subjective:OK      Chief complaints on admission  Chief Complaint   Patient presents with    Fall         History of present illness:Bryce is a 77 y. o.year old who  presents with had concerns including Fall. Past medical history:    has a past medical history of Arthropathy, Atrial fibrillation ([address] Atrial flutter ([address] Diabetes mellitus ([address] Gout, Hyperlipidemia, Hypertension, and Lumbago. Past surgical history:   has a past surgical history that includes Colonoscopy (N/A, 9/27/2018) and Colonoscopy (N/A, 11/18/2020). Social History:   reports that he has quit smoking. His smoking use included cigarettes. He smoked 0.50 packs per day. He has never used smokeless tobacco. He reports that he does not drink alcohol and does not use drugs. Family history:  family history includes No Known Problems in his father and mother. Allergies   Allergen Reactions    Demerol Hcl [Meperidine]     Gabapentin     Simvastatin          Objective:   /74   Pulse 52   Temp 99.2 °F (37.3 °C) (Rectal)   Resp 20   Ht 5' 7.99\" (1.727 m)   Wt 216 lb 0.8 oz (98 kg)   SpO2 97%   BMI 32.86 kg/m²       Intake/Output Summary (Last 24 hours) at 12/15/2021 1232  Last data filed at 12/15/2021 1200  Gross per 24 hour   Intake 477.06 ml   Output 1465 ml   Net -987.94 ml         Physical Exam:  Constitutional:  Well developed, Well nourished, No acute distress, Non-toxic appearance. HENT:  Normocephalic, Atraumatic, Bilateral external ears normal, Oropharynx moist, No oral exudates, Nose normal. Neck- Normal range of motion, No tenderness, Supple, No stridor. Eyes:  PERRL, EOMI, Conjunctiva normal, No discharge. Respiratory:  Normal breath sounds, No respiratory distress, No wheezing, No chest tenderness. ,no use of accessory muscles, diaphragm movement is normal  Cardiovascular: (PMI) apex non displaced,no lifts no thrills, no s3,no s4, Normal heart rate, Normal rhythm, No murmurs, No rubs, No gallops. Carotid arteries pulse and amplitude are normal no bruit, no abdominal bruit noted ( normal abdominal aorta ausculation), femoral arteries pulse and amplitude are normal no bruit, pedal pulses are normal  GI:  Bowel sounds normal, Soft, No tenderness, No masses, No pulsatile masses. : External genitalia appear normal, No masses or lesions. No discharge. No CVA tenderness. Musculoskeletal:  Intact distal pulses, No edema, No tenderness, No cyanosis, No clubbing. Good range of motion in all major joints. No tenderness to palpation or major deformities noted. Back- No tenderness. Integument:  Warm, Dry, No erythema, No rash. Lymphatic:  No lymphadenopathy noted. Neurologic:  Alert & oriented x 3, Normal motor function, Normal sensory function, No focal deficits noted.    Psychiatric:  Affect normal, Judgment normal, Mood normal.     Medications:    chlorhexidine  15 mL Mouth/Throat BID    famotidine (PEPCID) injection  20 mg IntraVENous BID    lidocaine  5 mL IntraDERmal Once    sodium chloride flush  5-40 mL IntraVENous 2 times per day    insulin lispro  0-12 Units SubCUTAneous TID     insulin lispro  0-6 Units SubCUTAneous Nightly    morphine  2 mg IntraVENous Once    dexmedetomidine (PRECEDEX) IV bolus  1 mcg/kg IntraVENous Once    dexamethasone  6 mg IntraVENous Daily    furosemide  40 mg IntraVENous BID    metoprolol succinate  50 mg Oral BID    spironolactone  25 mg Oral Daily    allopurinol  300 mg Oral Daily    apixaban  5 mg Oral BID    [Held by provider] metFORMIN  500 mg Oral BID     losartan  100 mg Oral Daily    pravastatin  20 mg Oral Nightly    tamsulosin  0.4 mg Oral Nightly    triamterene-hydroCHLOROthiazide  1 tablet Oral Daily    sodium chloride flush  5-40 mL IntraVENous 2 times per day    cefTRIAXone (ROCEPHIN) IV  1,000 mg IntraVENous Q24H      propofol Stopped (12/13/21 1443)    fentaNYL 62.5 mcg/hr (12/15/21 0947)    sodium chloride      norepinephrine 2 mcg/min (12/14/21 1205)    dextrose      midazolam 6 mg/hr (12/15/21 1149)    sodium chloride       hydrALAZINE (APRESOLINE) ivpb, sodium chloride flush, sodium chloride, glucose, dextrose, glucagon (rDNA), dextrose, LORazepam, oxyCODONE, guaiFENesin-dextromethorphan, potassium chloride **OR** potassium alternative oral replacement **OR** potassium chloride, sodium chloride flush, sodium chloride, ondansetron **OR** ondansetron, polyethylene glycol, acetaminophen **OR** acetaminophen    Lab Data:  CBC:   Recent Labs     12/13/21  1014 12/14/21  0624 12/15/21  0535   WBC 7.3 8.3 9.0   HGB 12.6* 12.1* 11.6*   HCT 40.3* 38.7* 37.4*   MCV 88.8 89.2 89.9    311 278     BMP:   Recent Labs     12/14/21  0624 12/14/21  1130 12/15/21  0535   * 149* 148*   K 3.8 3.6 3.5    107 106   CO2 28 29 29   BUN 49* 52* 63*   CREATININE 1.0 1.1 1.3     LIVER PROFILE:   Recent Labs     12/14/21  0624 12/14/21  1130 12/15/21  0535   AST 21 21 19   ALT 10 10 8*   BILITOT 0.4 0.5 0.4   ALKPHOS 65 66 60     PT/INR: No results for input(s): PROTIME, INR in the last 72 hours. APTT:   Recent Labs     12/12/21  2131 12/13/21  0310 12/13/21  1014   APTT 33.4 34.6 32.9     BNP:  No results for input(s): BNP in the last 72 hours. TROPONIN: @TROPONINI:3@      Assessment:  77 y. o.year old who is admitted for          Plan:  Respiratory failure: intubtated, tension pneumothorax, s/p chest tube, poor prognosis over all  Elevated trop\" h/o LAD rotablator used PCI of mid LAD at [place], once COVID and CHF is resolved, will decide for angiogram  CHF; ok to stop IV ntg TODAY, continue lasix drip  Cardiomyopathy: on losartan and BB, LVEF IS 35-40%  All labs, medications and tests reviewed, continue all other medications of all above medical condition listed as is.      Antonio Wu MD, MD 12/15/2021 12:32 PM

## 2021-12-16 ENCOUNTER — APPOINTMENT (OUTPATIENT)
Dept: GENERAL RADIOLOGY | Age: 76
DRG: 870 | End: 2021-12-16
Payer: COMMERCIAL

## 2021-12-16 LAB
ALBUMIN SERPL-MCNC: 2.9 GM/DL (ref 3.4–5)
ALP BLD-CCNC: 59 IU/L (ref 40–129)
ALT SERPL-CCNC: 8 U/L (ref 10–40)
ANION GAP SERPL CALCULATED.3IONS-SCNC: 11 MMOL/L (ref 4–16)
AST SERPL-CCNC: 17 IU/L (ref 15–37)
BASE EXCESS MIXED: 7.8 (ref 0–1.2)
BILIRUB SERPL-MCNC: 0.4 MG/DL (ref 0–1)
BUN BLDV-MCNC: 76 MG/DL (ref 6–23)
CALCIUM SERPL-MCNC: 9 MG/DL (ref 8.3–10.6)
CARBON MONOXIDE, BLOOD: 1.5 % (ref 0–5)
CHLORIDE BLD-SCNC: 102 MMOL/L (ref 99–110)
CO2 CONTENT: 32 MMOL/L (ref 19–24)
CO2: 32 MMOL/L (ref 21–32)
COMMENT: ABNORMAL
CREAT SERPL-MCNC: 1.3 MG/DL (ref 0.9–1.3)
GFR AFRICAN AMERICAN: >60 ML/MIN/1.73M2
GFR NON-AFRICAN AMERICAN: 54 ML/MIN/1.73M2
GLUCOSE BLD-MCNC: 253 MG/DL (ref 70–99)
GLUCOSE BLD-MCNC: 289 MG/DL (ref 70–99)
GLUCOSE BLD-MCNC: 313 MG/DL (ref 70–99)
GLUCOSE BLD-MCNC: 337 MG/DL (ref 70–99)
HCO3 ARTERIAL: 30.9 MMOL/L (ref 18–23)
MAGNESIUM: 1.9 MG/DL (ref 1.8–2.4)
METHEMOGLOBIN ARTERIAL: 1.3 %
O2 SATURATION: 95.6 % (ref 96–97)
PCO2 ARTERIAL: 37 MMHG (ref 32–45)
PH BLOOD: 7.53 (ref 7.34–7.45)
PO2 ARTERIAL: 94 MMHG (ref 75–100)
POTASSIUM SERPL-SCNC: 3.8 MMOL/L (ref 3.5–5.1)
SODIUM BLD-SCNC: 145 MMOL/L (ref 135–145)
TOTAL PROTEIN: 5.6 GM/DL (ref 6.4–8.2)

## 2021-12-16 PROCEDURE — 6360000002 HC RX W HCPCS: Performed by: INTERNAL MEDICINE

## 2021-12-16 PROCEDURE — 82962 GLUCOSE BLOOD TEST: CPT

## 2021-12-16 PROCEDURE — 6370000000 HC RX 637 (ALT 250 FOR IP): Performed by: INTERNAL MEDICINE

## 2021-12-16 PROCEDURE — 80053 COMPREHEN METABOLIC PANEL: CPT

## 2021-12-16 PROCEDURE — 94003 VENT MGMT INPAT SUBQ DAY: CPT

## 2021-12-16 PROCEDURE — 89220 SPUTUM SPECIMEN COLLECTION: CPT

## 2021-12-16 PROCEDURE — 99232 SBSQ HOSP IP/OBS MODERATE 35: CPT | Performed by: INTERNAL MEDICINE

## 2021-12-16 PROCEDURE — 2000000000 HC ICU R&B

## 2021-12-16 PROCEDURE — 2500000003 HC RX 250 WO HCPCS: Performed by: NURSE PRACTITIONER

## 2021-12-16 PROCEDURE — 6370000000 HC RX 637 (ALT 250 FOR IP): Performed by: NURSE PRACTITIONER

## 2021-12-16 PROCEDURE — 99211 OFF/OP EST MAY X REQ PHY/QHP: CPT

## 2021-12-16 PROCEDURE — 71045 X-RAY EXAM CHEST 1 VIEW: CPT

## 2021-12-16 PROCEDURE — 2580000003 HC RX 258: Performed by: INTERNAL MEDICINE

## 2021-12-16 PROCEDURE — 99233 SBSQ HOSP IP/OBS HIGH 50: CPT | Performed by: INTERNAL MEDICINE

## 2021-12-16 PROCEDURE — 2580000003 HC RX 258: Performed by: NURSE PRACTITIONER

## 2021-12-16 PROCEDURE — 82803 BLOOD GASES ANY COMBINATION: CPT

## 2021-12-16 RX ADMIN — SPIRONOLACTONE 25 MG: 25 TABLET ORAL at 11:21

## 2021-12-16 RX ADMIN — INSULIN LISPRO 6 UNITS: 100 INJECTION, SOLUTION INTRAVENOUS; SUBCUTANEOUS at 11:38

## 2021-12-16 RX ADMIN — APIXABAN 5 MG: 5 TABLET, FILM COATED ORAL at 11:21

## 2021-12-16 RX ADMIN — SODIUM CHLORIDE, PRESERVATIVE FREE 10 ML: 5 INJECTION INTRAVENOUS at 11:36

## 2021-12-16 RX ADMIN — Medication 62.5 MCG/HR: at 02:51

## 2021-12-16 RX ADMIN — DEXAMETHASONE SODIUM PHOSPHATE 6 MG: 10 INJECTION INTRAMUSCULAR; INTRAVENOUS at 11:27

## 2021-12-16 RX ADMIN — ACETAMINOPHEN 650 MG: 650 SUPPOSITORY RECTAL at 11:20

## 2021-12-16 RX ADMIN — CEFTRIAXONE SODIUM 1000 MG: 1 INJECTION, POWDER, FOR SOLUTION INTRAMUSCULAR; INTRAVENOUS at 02:49

## 2021-12-16 RX ADMIN — ALLOPURINOL 300 MG: 300 TABLET ORAL at 11:21

## 2021-12-16 RX ADMIN — APIXABAN 5 MG: 5 TABLET, FILM COATED ORAL at 20:16

## 2021-12-16 RX ADMIN — FUROSEMIDE 40 MG: 10 INJECTION, SOLUTION INTRAVENOUS at 11:28

## 2021-12-16 RX ADMIN — TAMSULOSIN HYDROCHLORIDE 0.4 MG: 0.4 CAPSULE ORAL at 20:16

## 2021-12-16 RX ADMIN — PRAVASTATIN SODIUM 20 MG: 10 TABLET ORAL at 20:16

## 2021-12-16 RX ADMIN — CHLORHEXIDINE GLUCONATE 0.12% ORAL RINSE 15 ML: 1.2 LIQUID ORAL at 20:16

## 2021-12-16 RX ADMIN — SODIUM CHLORIDE, PRESERVATIVE FREE 10 ML: 5 INJECTION INTRAVENOUS at 20:16

## 2021-12-16 RX ADMIN — CHLORHEXIDINE GLUCONATE 0.12% ORAL RINSE 15 ML: 1.2 LIQUID ORAL at 11:36

## 2021-12-16 RX ADMIN — INSULIN LISPRO 8 UNITS: 100 INJECTION, SOLUTION INTRAVENOUS; SUBCUTANEOUS at 18:03

## 2021-12-16 RX ADMIN — FAMOTIDINE 20 MG: 10 INJECTION, SOLUTION INTRAVENOUS at 20:16

## 2021-12-16 RX ADMIN — FUROSEMIDE 40 MG: 10 INJECTION, SOLUTION INTRAVENOUS at 17:52

## 2021-12-16 RX ADMIN — FAMOTIDINE 20 MG: 10 INJECTION, SOLUTION INTRAVENOUS at 11:27

## 2021-12-16 ASSESSMENT — PULMONARY FUNCTION TESTS
PIF_VALUE: 24
PIF_VALUE: 24
PIF_VALUE: 23
PIF_VALUE: 23
PIF_VALUE: 24
PIF_VALUE: 22
PIF_VALUE: 24
PIF_VALUE: 23
PIF_VALUE: 25
PIF_VALUE: 26
PIF_VALUE: 26
PIF_VALUE: 25
PIF_VALUE: 24
PIF_VALUE: 23
PIF_VALUE: 25
PIF_VALUE: 22
PIF_VALUE: 23
PIF_VALUE: 24
PIF_VALUE: 23
PIF_VALUE: 23
PIF_VALUE: 22
PIF_VALUE: 23
PIF_VALUE: 24
PIF_VALUE: 24
PIF_VALUE: 25
PIF_VALUE: 24
PIF_VALUE: 22
PIF_VALUE: 23
PIF_VALUE: 25
PIF_VALUE: 23
PIF_VALUE: 24
PIF_VALUE: 25
PIF_VALUE: 24
PIF_VALUE: 23
PIF_VALUE: 22
PIF_VALUE: 23
PIF_VALUE: 25
PIF_VALUE: 23
PIF_VALUE: 22
PIF_VALUE: 24
PIF_VALUE: 23
PIF_VALUE: 23
PIF_VALUE: 24
PIF_VALUE: 25
PIF_VALUE: 24
PIF_VALUE: 23
PIF_VALUE: 24
PIF_VALUE: 23

## 2021-12-16 ASSESSMENT — PAIN SCALES - GENERAL
PAINLEVEL_OUTOF10: 0

## 2021-12-16 NOTE — FLOWSHEET NOTE
Pt vent alarming consistent low -250s. Alexia Shearer RT called, air replaced into ETT cuff, this did help TV improve and did help slight gurgle with inspiration. Dr Zee Landry notified via perfect serve pt has 3 cardiac meds due at this time 1. Lopressor 2. Losartan 3. Triamterene/HCTZ inquired whether these should held or given  52 NIBP 100 65, aware Levophed drip infusing    Leigh Crowell RN 11:46 AM     Dr Zee Landry states cardiac meds should be held as long as Levophed drip is infusing.      Leigh Crowell RN 11:51 AM

## 2021-12-16 NOTE — PROGRESS NOTES
PATIENT NAME: Emerita Aceves    TODAY'S DATE: 12/16/21    Reason for today's visit: L PTX, covid-19    SUBJECTIVE:    Pt is sedated and intubated. Doing SBTs today. OBJECTIVE:   VITALS:    Vitals:    12/16/21 1400   BP: 107/64   Pulse: 58   Resp: 20   Temp:    SpO2: 98%     INTAKE/OUTPUT:    Date 12/16/21 0000 - 12/16/21 2359   Shift 6914-9817 9181-7783 9847-9517 24 Hour Total   INTAKE   Shift Total(mL/kg)       OUTPUT   Urine(mL/kg/hr) 1000(1.3) 300  1300   Chest Tube 60 20  80   Shift Total(mL/kg) 1060(10.8) 320(3.3)  1380(14.1)   Weight (kg) 98 98 98 98      Patient Vitals for the past 96 hrs (Last 3 readings):   Weight   12/13/21 0636 216 lb 0.8 oz (98 kg)       EXAM:  Constitutional: Blood pressure 107/64, pulse 58, temperature 98.8 °F (37.1 °C), temperature source Rectal, resp. rate 20, height 5' 7.99\" (1.727 m), weight 216 lb 0.8 oz (98 kg), SpO2 98 %. No apparent distress, appears stated age and cooperative. Neurologic: sedated  Lungs: Good respiratory effort. CT no air leak noted   CV: Regular rate/ rhythm , no peripheral edema, feet warm and well perfused  GI: Soft, non-tender in all four quadrants, non-distended, + bowel sounds, spleen and liver no palpable masses   : bladder nondistended   MSK: no obvious deformity   Skin: warm, pink and dry       Data:  CBC:   Recent Labs     12/14/21  0624 12/15/21  0535   WBC 8.3 9.0   HGB 12.1* 11.6*   HCT 38.7* 37.4*    278     BMP:    Recent Labs     12/14/21  1130 12/15/21  0535 12/16/21  1100   * 148* 145   K 3.6 3.5 3.8    106 102   CO2 29 29 32   BUN 52* 63* 76*   CREATININE 1.1 1.3 1.3   GLUCOSE 191* 235* 289*     Hepatic:   Recent Labs     12/14/21  1130 12/15/21  0535 12/16/21  1100   AST 21 19 17   ALT 10 8* 8*   BILITOT 0.5 0.4 0.4   ALKPHOS 66 60 59     Mag:      Recent Labs     12/15/21  0900   MG 1.9      Phos:   No results for input(s): PHOS in the last 72 hours.    INR: No results for input(s): INR in the last 72 hours. Radiology Review:  CXR  Stable appearance to bony structures. Impression:     Stable small left apical pneumothorax. Stable pulmonary vascular congestion along with interstitial opacities and   hazy airspace opacities, left worse than right which may be on the basis of   interstitial and pulmonary edema but can also be seen in the setting of   atypical pneumonia. ASSESSMENT:  Patient Active Problem List   Diagnosis    Diabetes mellitus ([address]    Atrial flutter ([address]    Atrial fibrillation (HCC)    Gout    Hyperlipidemia    Hypertension    Arthropathy    Upper GI bleed    NSTEMI (non-ST elevated myocardial infarction) ([address]    Rectal bleeding    Sepsis ([address]    BRBPR (bright red blood per rectum)    Acute cystitis without hematuria    Leukocytosis    Acute hypokalemia    Rectal bleed    Anemia    B12 deficiency    Elevated antinuclear antibody (FREDRICK) level    PNA (pneumonia)    Troponin I above reference range       Left pneumothorax s/p CT placement  covid-19  Respiratory failure     PLAN:     Chest tube appears occluded. Tiny apical PTX remains stable. Weaning vent settings. Plan to clamp and try to remove CT tomorrow.      Aicha Velazquez PA-C

## 2021-12-16 NOTE — PROGRESS NOTES
12/16/21 0715   Vent Information   $Ventilation $Subsequent Day   Vent Type 980   Vent Mode AC/VC   Vt Ordered 500 mL   Rate Set 20 bmp   Peak Flow 55 L/min   FiO2  40 %   SpO2 98 %   SpO2/FiO2 ratio 245   Sensitivity 3   PEEP/CPAP 11   Humidification Source HME   Nitric Oxide/Epoprostenol In Use? No   Vent Patient Data   Peak Inspiratory Pressure 24 cmH2O   Mean Airway Pressure 16 cmH20   Rate Measured 20 br/min   Vt Exhaled 506 mL   Minute Volume 10.1 Liters   I:E Ratio 1:2.0   Plateau Pressure 22 [id number]   Static Compliance 45 mL/cmH2O   Total PEEP 11 cmH20   Auto PEEP 0.4 cmH20   Cough/Sputum   Sputum How Obtained Endotracheal; Suctioned   $Obtained Sample $Induced Sputum   Cough Non-productive   Frequency Infrequent   Sputum Amount None   Sputum Color None   Tenacity None   Breath Sounds   Right Upper Lobe Diminished   Right Middle Lobe Diminished   Right Lower Lobe Diminished   Left Upper Lobe Diminished   Left Lower Lobe Diminished   Additional Respiratory  Assessments   Position Semi-Mendez's   Oral Care Mouth suctioned   Subglottic Suction Done?  Yes   Alarm Settings   High Pressure Alarm 55 cmH2O   Delay Alarm 20 sec(s)   Low Minute Volume Alarm 2.5 L/min   Apnea (secs) 20 secs   High Respiratory Rate 40 br/min   Low Exhaled Vt  250 mL   ETT (adult)   Placement Date/Time: 12/13/21 0615   Preoxygenation: Yes  Mask Ventilation: Ventilated by mask (1)  Technique: Rapid sequence  Type: Cuffed  Tube Size: 7.5 mm  Location: Oral  Insertion attempts: 1   Secured at 23 cm   Measured From [address] [place] [address],[address] By [employer] tube reddy   Site Condition Dry

## 2021-12-16 NOTE — PROGRESS NOTES
Pulmonary and Critical Care  Progress Note      VITALS:  BP 99/65   Pulse 65   Temp 100.6 °F (38.1 °C) (Rectal)   Resp 20   Ht 5' 7.99\" (1.727 m)   Wt 216 lb 0.8 oz (98 kg)   SpO2 97%   BMI 32.86 kg/m²     Subjective:   CHIEF COMPLAINT :Fall     HPI:                The patient is on the vent and sedated. He has minimal response to painful stimuli. He is on 40% fio2/    Objective:   PHYSICAL EXAM:    LUNGS:Occasional basal crackles  Abd-soft, BS+,NT  Ext- no pedal edema  CVS-s1s2, no murmurs      DATA:    CBC:  Recent Labs     12/14/21  0624 12/15/21  0535   WBC 8.3 9.0   RBC 4.34* 4.16*   HGB 12.1* 11.6*   HCT 38.7* 37.4*    278   MCV 89.2 89.9   MCH 27.9 27.9   MCHC 31.3* 31.0*   RDW 15.9* 16.0*   NRBC 3 2   SEGSPCT 79.0* 68.0*   BANDSPCT 15* 14*      BMP:  Recent Labs     12/14/21  0624 12/14/21  1130 12/15/21  0535   * 149* 148*   K 3.8 3.6 3.5    107 106   CO2 28 29 29   BUN 49* 52* 63*   CREATININE 1.0 1.1 1.3   CALCIUM 8.7 8.8 8.6   GLUCOSE 189* 191* 235*      ABG:  Recent Labs     12/14/21  0445 12/15/21  0600 12/16/21  0600   PH 7.52* 7.49* 7.53*   PO2ART 107* 81 94   [id number] 36.0 38.0 37.0   O2SAT 97.6* 94.1* 95.6*     BNP  No results found for: BNP   D-Dimer:  No results found for: DDIMER   Radiology:   Stable small left apical pneumothorax.       Stable pulmonary vascular congestion along with interstitial opacities and   hazy airspace opacities, left worse than right which may be on the basis of   interstitial and pulmonary edema but can also be seen in the setting of   atypical pneumonia.      1.       Assessment/Plan     Patient Active Problem List    Diagnosis Date Noted    PNA (pneumonia) 12/09/2021    Troponin I above reference range 12/09/2021    Elevated antinuclear antibody (FREDRICK) level 02/16/2021    B12 deficiency 02/15/2021    Anemia 02/12/2021    Rectal bleed 11/18/2020    Acute hypokalemia 11/17/2020    Acute cystitis without hematuria     Leukocytosis     Sepsis ([facility] 01/17/2020    BRBPR (bright red blood per rectum) 01/17/2020    Rectal bleeding 09/25/2018    NSTEMI (non-ST elevated myocardial infarction) ([facility] 06/25/2018    Upper GI bleed 03/07/2017    Diabetes mellitus (HCC)     Atrial flutter (HCC)     Atrial fibrillation (HCC)     Gout     Hyperlipidemia     Hypertension     Arthropathy    Acute on chronic hypoxic hypercapneic resp failure sec to Cardiogenic and non Cardiogenic Pulmonary edema  Systolic CHF with EF of 18-33%  Grade II Diastolic dysfunction  Bilateral Pneumonia sec to COVID-19  Obesity  DIVYA  Chronic Metabolic alkalosis  Left pneumothorax s/p left chest tube  VDRF       1. Diuresis  2. Decadron  3. Insulin  4. Inhalers  5. Abx  6. F/u C&S  7. Tube feeds  8. PT/OT  9. Keep sats > 92%  10. Daily SAT and SBT trials  11.  C.w present management    Electronically signed by Shelly Watkins MD on 12/16/2021 at 10:48 AM

## 2021-12-16 NOTE — PROGRESS NOTES
RT Thersea notified of loud snoring and patient having low tital volume on the vent. Her and Leonardo Ramos RT are at the bedside with the patient now. PT is currently showing no signs of distress. O2 sat is currently 97%.

## 2021-12-16 NOTE — CONSULTS
[address] [place] [address] [id number] Continence Nurse  Consult Note       Jerry Wolfe  AGE: 77 y.o. GENDER: male  : 1945  TODAY'S DATE:  2021    Subjective:     Reason for  Evaluation and Assessment: wound care Reassessment. Jerry Wolfe is a 77 y.o. male referred by:   [x] Physician  [] Nursing  [] Other:     Wound Identification:  Wound Type: venous and diabetic  Contributing Factors: edema, diabetes, chronic pressure, decreased mobility and obesity        PAST MEDICAL HISTORY        Diagnosis Date    Arthropathy     Atrial fibrillation (HCC)     Atrial flutter (HCC)     Diabetes mellitus ([facility]     Gout     Hyperlipidemia     Hypertension     Lumbago        PAST SURGICAL HISTORY    Past Surgical History:   Procedure Laterality Date    COLONOSCOPY N/A 2018    COLONOSCOPY POLYPECTOMY SNARE/COLD BIOPSY performed by Aaron Urrutia MD at [place] [address] COLONOSCOPY N/A 2020    COLONOSCOPY DIAGNOSTIC performed by Ildefonso You MD at [facility] ENDOSCOPY       FAMILY HISTORY    Family History   Problem Relation Age of Onset    No Known Problems Mother     No Known Problems Father        SOCIAL HISTORY    Social History     Tobacco Use    Smoking status: Former Smoker     Packs/day: 0.50     Types: Cigarettes    Smokeless tobacco: Never Used   Substance Use Topics    Alcohol use: No    Drug use: No       ALLERGIES    Allergies   Allergen Reactions    Demerol Hcl [Meperidine]     Gabapentin     Simvastatin        MEDICATIONS    No current facility-administered medications on file prior to encounter.      Current Outpatient Medications on File Prior to Encounter   Medication Sig Dispense Refill    FEROSUL 325 (65 Fe) MG tablet take ONE TABLET BY MOUTH TWICE DAILY 60 tablet 5    lidocaine (LIDODERM) 5 % APPLY 1 PATCH TO SKIN EVERY DAY AS NEEDED - LEAVE ON FOR UP TO 12 HOURS - AFTER YOU TAKE THE PATCH OFF, DO NOT PUT ANOTHER PATCH ON THAT AREA OF SKIN FOR AT LEAST 12 HOURS      12/16/2021    PROT 5.6 12/16/2021    LABALBU 2.9 12/16/2021    CALCIUM 9.0 12/16/2021    BILITOT 0.4 12/16/2021    ALKPHOS 59 12/16/2021    AST 17 12/16/2021    ALT 8 12/16/2021     Albumin:    Lab Results   Component Value Date    LABALBU 2.9 12/16/2021     PT/INR:    Lab Results   Component Value Date    PROTIME 21.9 11/17/2020    INR 1.80 11/17/2020     HgBA1c:    Lab Results   Component Value Date    LABA1C 6.2 11/17/2020         Assessment:     Patient Active Problem List   Diagnosis    Diabetes mellitus ([facility]    Atrial flutter (HCC)    Atrial fibrillation ([facility]    Gout    Hyperlipidemia    Hypertension    Arthropathy    Upper GI bleed    NSTEMI (non-ST elevated myocardial infarction) (HCC)    Rectal bleeding    Sepsis (HCC)    BRBPR (bright red blood per rectum)    Acute cystitis without hematuria    Leukocytosis    Acute hypokalemia    Rectal bleed    Anemia    B12 deficiency    Elevated antinuclear antibody (FREDRICK) level    PNA (pneumonia)    Troponin I above reference range       Measurements:  Wound 12/10/21 Tibial Right; Posterior (Active)   Wound Image   12/16/21 1500   Wound Etiology Venous 12/16/21 1500   Dressing Status New dressing applied 12/16/21 1500   Wound Cleansed Cleansed with saline 12/16/21 1500   Dressing/Treatment Collagen; Silicone border 55/22/57 1500   Wound Length (cm) 0.5 cm 12/16/21 1500   Wound Width (cm) 0.5 cm 12/16/21 1500   Wound Depth (cm) 0.1 cm 12/16/21 1500   Wound Surface Area (cm^2) 0.25 cm^2 12/16/21 1500   Change in Wound Size % (l*w) 99.56 12/16/21 1500   Wound Volume (cm^3) 0.025 cm^3 12/16/21 1500   Wound Healing % 100 12/16/21 1500   Distance Tunneling (cm) 0 cm 12/16/21 1500   Tunneling Position ___ O'Clock 0 12/16/21 1500   Undermining Starts ___ O'Clock 0 12/16/21 1500   Undermining Ends___ O'Clock 0 12/16/21 1500   Undermining Maxium Distance (cm) 0 12/16/21 1500   Drainage Amount Moderate 12/16/21 1500   Drainage Description Serosanguinous 12/16/21 1500   Odor None 12/16/21 1500   Alisson-wound Assessment Intact 12/16/21 1500   Margins Defined edges 12/16/21 1500   Wound Thickness Description not for Pressure Injury Partial thickness 12/16/21 1500   Number of days: 6       Wound 12/10/21 Pretibial Right; Lateral cluster (Active)   Wound Image   12/16/21 1500   Wound Etiology Venous 12/16/21 1500   Dressing Status New dressing applied 12/16/21 1500   Wound Cleansed Cleansed with saline 12/16/21 1500   Dressing/Treatment Collagen; Silicone border 82/18/31 1500   Wound Length (cm) 1.3 cm 12/16/21 1500   Wound Width (cm) 1 cm 12/16/21 1500   Wound Depth (cm) 0.1 cm 12/16/21 1500   Wound Surface Area (cm^2) 1.3 cm^2 12/16/21 1500   Change in Wound Size % (l*w) 98.69 12/16/21 1500   Wound Volume (cm^3) 0.13 cm^3 12/16/21 1500   Wound Healing % 99 12/16/21 1500   Distance Tunneling (cm) 0 cm 12/16/21 1500   Tunneling Position ___ O'Clock 0 12/16/21 1500   Undermining Starts ___ O'Clock 0 12/16/21 1500   Undermining Ends___ O'Clock 0 12/16/21 1500   Undermining Maxium Distance (cm) 0 12/16/21 1500   Drainage Amount Small 12/16/21 1500   Drainage Description Serosanguinous 12/16/21 1500   Odor None 12/16/21 1500   Alisson-wound Assessment Dry/flaky 12/16/21 1500   Margins Defined edges 12/16/21 1500   Wound Thickness Description not for Pressure Injury Partial thickness 12/16/21 1500   Number of days: 6       Wound 12/10/21 Buttocks Left (Active)   Wound Image   12/16/21 1500   Wound Etiology Deep tissue/Injury 12/16/21 1500   Dressing Status New dressing applied 12/16/21 1500   Wound Cleansed Not Cleansed 12/16/21 1500   Dressing/Treatment Silicone border 32/78/85 1500   Wound Length (cm) 2 cm 12/16/21 1500   Wound Width (cm) 1 cm 12/16/21 1500   Wound Depth (cm) 0 cm 12/16/21 1500   Wound Surface Area (cm^2) 2 cm^2 12/16/21 1500   Change in Wound Size % (l*w) -150 12/16/21 1500   Wound Volume (cm^3) 0 cm^3 12/16/21 1500   Distance Tunneling (cm) 0 cm 12/16/21 1500 Tunneling Position ___ O'Clock 0 12/16/21 1500   Undermining Starts ___ O'Clock 0 12/16/21 1500   Undermining Ends___ O'Clock 0 12/16/21 1500   Undermining Maxium Distance (cm) 0 12/16/21 1500   Drainage Amount None 12/16/21 1500   Odor None 12/16/21 1500   Alisson-wound Assessment Intact 12/16/21 1500   Margins Attached edges 12/16/21 1500   Number of days: 6       Wound 12/16/21 Tibial Left; Posterior (Active)   Wound Image   12/16/21 1500   Wound Etiology Venous 12/16/21 1500   Dressing Status New dressing applied 12/16/21 1500   Wound Cleansed Cleansed with saline 12/16/21 1500   Dressing/Treatment Collagen; Silicone border 52/83/11 1500   Wound Length (cm) 2.2 cm 12/16/21 1500   Wound Width (cm) 0.5 cm 12/16/21 1500   Wound Depth (cm) 0.1 cm 12/16/21 1500   Wound Surface Area (cm^2) 1.1 cm^2 12/16/21 1500   Wound Volume (cm^3) 0.11 cm^3 12/16/21 1500   Distance Tunneling (cm) 0 cm 12/16/21 1500   Tunneling Position ___ O'Clock 0 12/16/21 1500   Undermining Starts ___ O'Clock 0 12/16/21 1500   Undermining Ends___ O'Clock 0 12/16/21 1500   Undermining Maxium Distance (cm) 0 12/16/21 1500   Drainage Amount Moderate 12/16/21 1500   Drainage Description Serosanguinous 12/16/21 1500   Odor None 12/16/21 1500   Alisson-wound Assessment Dry/flaky 12/16/21 1500   Margins Defined edges 12/16/21 1500   Wound Thickness Description not for Pressure Injury Full thickness 12/16/21 1500   Number of days: 0       Response to treatment:  Well tolerated by patient.      Pain Assessment:  Severity:  Pt is on a ventilator   Quality of pain:   Wound Pain Timing/Severity:   Premedicated: no    Plan:     Plan of Care: Wound 12/10/21 Tibial Right; Posterior-Dressing/Treatment: Collagen, Silicone border  Wound 12/10/21 Pretibial Right; Lateral cluster-Dressing/Treatment: Collagen, Silicone border  Wound 12/10/21 Buttocks Left-Dressing/Treatment: Silicone border  Wound 12/16/21 Tibial Left; Posterior-Dressing/Treatment: Collagen, Silicone border     Patient in bed is on a ventilator nurse ok with wound care Reassessment for lower leg wounds and left buttocks. Pt has rt/lt lower leg wounds venous/diabetic. Cleansed with NS. Measured and pictured. Applied puracol and optifoam border's. Left heel red/blanching. Rt heel intact. Pt turned to lt side and assisted pt's nurse to change bed linens. Pt hsa deep tissue injury to left buttocks/blister. Measured and pictured. Applied silicone border. Heels floated with pillow. Pt is at high risk for skin breakdown AEB heriberto. Follow heriberto orders. Specialty Bed Required :  yes  [x] Low Air Loss   [] Pressure Redistribution  [] Fluid Immersion  [] Bariatric  [] Total Pressure Relief  [] Other:     Discharge Plan:  Placement for patient upon discharge: tbd  Hospice Care: no  Patient appropriate for Outpatient Wound Care Center:tbd     Patient/Caregiver Teaching:  Level of patient/caregiver understanding able to: cares explained as given. No evidence of learning. Electronically signed by Nena Denton.  HAI Hoang,  on 12/16/2021 at 3:54 PM

## 2021-12-16 NOTE — PROGRESS NOTES
Today's plan:  Patient intubated, had tension pneumothorax and required chest tube, recommend to decrease sedation to avoid bradycardia, will sign off      Admit Date:  12/8/2021    Subjective:OK      Chief complaints on admission  Chief Complaint   Patient presents with    Fall         History of present illness:Bryce is a 77 y. o.year old who  presents with had concerns including Fall. Past medical history:    has a past medical history of Arthropathy, Atrial fibrillation ([address] Atrial flutter ([address] Diabetes mellitus ([address] Gout, Hyperlipidemia, Hypertension, and Lumbago. Past surgical history:   has a past surgical history that includes Colonoscopy (N/A, 9/27/2018) and Colonoscopy (N/A, 11/18/2020). Social History:   reports that he has quit smoking. His smoking use included cigarettes. He smoked 0.50 packs per day. He has never used smokeless tobacco. He reports that he does not drink alcohol and does not use drugs. Family history:  family history includes No Known Problems in his father and mother. Allergies   Allergen Reactions    Demerol Hcl [Meperidine]     Gabapentin     Simvastatin          Objective:   BP 90/69   Pulse 63   Temp 98.8 °F (37.1 °C) (Rectal)   Resp 20   Ht 5' 7.99\" (1.727 m)   Wt 216 lb 0.8 oz (98 kg)   SpO2 96%   BMI 32.86 kg/m²       Intake/Output Summary (Last 24 hours) at 12/16/2021 1252  Last data filed at 12/16/2021 1211  Gross per 24 hour   Intake 587.15 ml   Output 1490 ml   Net -902.85 ml         Physical Exam:  Constitutional:  Well developed, Well nourished, No acute distress, Non-toxic appearance. HENT:  Normocephalic, Atraumatic, Bilateral external ears normal, Oropharynx moist, No oral exudates, Nose normal. Neck- Normal range of motion, No tenderness, Supple, No stridor. Eyes:  PERRL, EOMI, Conjunctiva normal, No discharge. Respiratory:  Normal breath sounds, No respiratory distress, No wheezing, No chest tenderness. ,no use of accessory muscles, diaphragm movement is normal  Cardiovascular: (PMI) apex non displaced,no lifts no thrills, no s3,no s4, Normal heart rate, Normal rhythm, No murmurs, No rubs, No gallops. Carotid arteries pulse and amplitude are normal no bruit, no abdominal bruit noted ( normal abdominal aorta ausculation), femoral arteries pulse and amplitude are normal no bruit, pedal pulses are normal  GI:  Bowel sounds normal, Soft, No tenderness, No masses, No pulsatile masses. : External genitalia appear normal, No masses or lesions. No discharge. No CVA tenderness. Musculoskeletal:  Intact distal pulses, No edema, No tenderness, No cyanosis, No clubbing. Good range of motion in all major joints. No tenderness to palpation or major deformities noted. Back- No tenderness. Integument:  Warm, Dry, No erythema, No rash. Lymphatic:  No lymphadenopathy noted. Neurologic:  Alert & oriented x 3, Normal motor function, Normal sensory function, No focal deficits noted.    Psychiatric:  Affect normal, Judgment normal, Mood normal.     Medications:    chlorhexidine  15 mL Mouth/Throat BID    famotidine (PEPCID) injection  20 mg IntraVENous BID    lidocaine  5 mL IntraDERmal Once    sodium chloride flush  5-40 mL IntraVENous 2 times per day    insulin lispro  0-12 Units SubCUTAneous TID     insulin lispro  0-6 Units SubCUTAneous Nightly    morphine  2 mg IntraVENous Once    dexmedetomidine (PRECEDEX) IV bolus  1 mcg/kg IntraVENous Once    dexamethasone  6 mg IntraVENous Daily    furosemide  40 mg IntraVENous BID    metoprolol succinate  50 mg Oral BID    spironolactone  25 mg Oral Daily    allopurinol  300 mg Oral Daily    apixaban  5 mg Oral BID    [Held by provider] metFORMIN  500 mg Oral BID     losartan  100 mg Oral Daily    pravastatin  20 mg Oral Nightly    tamsulosin  0.4 mg Oral Nightly    triamterene-hydroCHLOROthiazide  1 tablet Oral Daily    sodium chloride flush  5-40 mL IntraVENous 2 times per day    cefTRIAXone (ROCEPHIN) IV  1,000 mg IntraVENous Q24H      propofol Stopped (12/13/21 1443)    fentaNYL 62.5 mcg/hr (12/16/21 0251)    sodium chloride      norepinephrine 2 mcg/min (12/14/21 1205)    dextrose      midazolam 5 mg/hr (12/15/21 1703)    sodium chloride       hydrALAZINE (APRESOLINE) ivpb, sodium chloride flush, sodium chloride, glucose, dextrose, glucagon (rDNA), dextrose, LORazepam, oxyCODONE, guaiFENesin-dextromethorphan, potassium chloride **OR** potassium alternative oral replacement **OR** potassium chloride, sodium chloride flush, sodium chloride, ondansetron **OR** ondansetron, polyethylene glycol, acetaminophen **OR** acetaminophen    Lab Data:  CBC:   Recent Labs     12/14/21  0624 12/15/21  0535   WBC 8.3 9.0   HGB 12.1* 11.6*   HCT 38.7* 37.4*   MCV 89.2 89.9    278     BMP:   Recent Labs     12/14/21  1130 12/15/21  0535 12/16/21  1100   * 148* 145   K 3.6 3.5 3.8    106 102   CO2 29 29 32   BUN 52* 63* 76*   CREATININE 1.1 1.3 1.3     LIVER PROFILE:   Recent Labs     12/14/21  1130 12/15/21  0535 12/16/21  1100   AST 21 19 17   ALT 10 8* 8*   BILITOT 0.5 0.4 0.4   ALKPHOS 66 60 59     PT/INR: No results for input(s): PROTIME, INR in the last 72 hours. APTT:   No results for input(s): APTT in the last 72 hours. BNP:  No results for input(s): BNP in the last 72 hours. TROPONIN: @TROPONINI:3@      Assessment:  77 y. o.year old who is admitted for          Plan:  Respiratory failure: intubtated, tension pneumothorax, s/p chest tube, poor prognosis over all  Elevated trop\" h/o LAD rotablator used PCI of mid LAD at [place], once COVID and CHF is resolved, will decide for angiogram  CHF; ok to stop IV ntg TODAY, continue lasix drip  Cardiomyopathy: on losartan and BB, LVEF IS 35-40%  All labs, medications and tests reviewed, continue all other medications of all above medical condition listed as is.       Anais Moscoso MD, MD 12/16/2021 12:52 PM

## 2021-12-16 NOTE — PROGRESS NOTES
12/16/21 1134   Vent Information   Equipment Changed HME   Vent Type 980   Vent Mode AC/VC   Vt Ordered 500 mL   Rate Set 20 bmp   Peak Flow 55 L/min   FiO2  40 %   SpO2 97 %   SpO2/FiO2 ratio 242.5   Sensitivity 3   PEEP/CPAP 9   Humidification Source HME   Nitric Oxide/Epoprostenol In Use? No   Vent Patient Data   Peak Inspiratory Pressure 22 cmH2O   Mean Airway Pressure 14 cmH20   Rate Measured 20 br/min   Vt Exhaled 463 mL   Minute Volume 9.25 Liters   I:E Ratio 1:2.0   Breath Sounds   Right Upper Lobe Diminished   Right Middle Lobe Diminished   Right Lower Lobe Diminished   Left Upper Lobe Diminished   Left Lower Lobe Diminished   Additional Respiratory  Assessments   Resp 20   Alarm Settings   High Pressure Alarm 55 cmH2O   Delay Alarm 20 sec(s)   Low Minute Volume Alarm 2.5 L/min   Apnea (secs) 20 secs   High Respiratory Rate 40 br/min   Low Exhaled Vt  250 mL   ETT (adult)   Placement Date/Time: 12/13/21 (c) 0620   Preoxygenation: Yes  Mask Ventilation: Mask ventilation not attempted (0)  Technique: Video laryngoscopy  Tube Size: 7.5 mm  Laryngoscope: Mac  Blade Size: 4  Grade View: Full view of the glottis  Insertion att. ..    Secured at 22 cm   Measured From [address] [place] [address],[address] By [employer] tube reddy   Site Condition Dry

## 2021-12-16 NOTE — FLOWSHEET NOTE
Continued suspected cuff leak noted, EZ pressure valve applied to ETT per Lisseth Vazquez RT and cuff repositioned for comfort. TV improved.      Andreea Jacob, RN 1:11 PM

## 2021-12-16 NOTE — PLAN OF CARE
Problem: Falls - Risk of:  Goal: Will remain free from falls  Description: Will remain free from falls  Outcome: Ongoing  Goal: Absence of physical injury  Description: Absence of physical injury  Outcome: Ongoing     Problem: Airway Clearance - Ineffective  Goal: Achieve or maintain patent airway  Outcome: Ongoing     Problem: Gas Exchange - Impaired  Goal: Absence of hypoxia  Outcome: Ongoing  Goal: Promote optimal lung function  Outcome: Ongoing     Problem: Breathing Pattern - Ineffective  Goal: Ability to achieve and maintain a regular respiratory rate  Outcome: Ongoing     Problem:  Body Temperature -  Risk of, Imbalanced  Goal: Ability to maintain a body temperature within defined limits  Outcome: Ongoing  Goal: Will regain or maintain usual level of consciousness  Outcome: Ongoing  Goal: Complications related to the disease process, condition or treatment will be avoided or minimized  Outcome: Ongoing     Problem: Isolation Precautions - Risk of Spread of Infection  Goal: Prevent transmission of infection  Outcome: Ongoing     Problem: Nutrition Deficits  Goal: Optimize nutritional status  Outcome: Ongoing     Problem: Risk for Fluid Volume Deficit  Goal: Maintain normal heart rhythm  Outcome: Ongoing  Goal: Maintain absence of muscle cramping  Outcome: Ongoing  Goal: Maintain normal serum potassium, sodium, calcium, phosphorus, and pH  Outcome: Ongoing     Problem: Loneliness or Risk for Loneliness  Goal: Demonstrate positive use of time alone when socialization is not possible  Outcome: Ongoing     Problem: Fatigue  Goal: Verbalize increase energy and improved vitality  Outcome: Ongoing     Problem: Patient Education: Go to Patient Education Activity  Goal: Patient/Family Education  Outcome: Ongoing     Problem: Pain:  Goal: Pain level will decrease  Description: Pain level will decrease  Outcome: Ongoing  Goal: Control of acute pain  Description: Control of acute pain  Outcome: Ongoing  Goal: Control of chronic pain  Description: Control of chronic pain  Outcome: Ongoing     Problem: Skin Integrity:  Goal: Will show no infection signs and symptoms  Description: Will show no infection signs and symptoms  Outcome: Ongoing  Goal: Absence of new skin breakdown  Description: Absence of new skin breakdown  Outcome: Ongoing     Problem: Nutrition  Goal: Optimal nutrition therapy  Outcome: Ongoing

## 2021-12-16 NOTE — FLOWSHEET NOTE
12/16/21 1454   Vitals   Pulse 65   Resp 18   Art Line   ABP (Arterial line BP) (!) 190/95   ABP mean (Arterial line mean) 121 mmHg   Oxygen Therapy   SpO2 97 %   FiO2  40 %   Vent Settings   Rate Set 20 bmp   Rate Measured 20 br/min   Vt Ordered 500 mL   Vt Exhaled 497 mL   PEEP/CPAP 9   Pressure Support 0 cmH20   Peak Inspiratory Pressure 26 cmH2O   ABP increased to 236 systolic while being turned with sedation off. Patient without any purposeful movements, but face is red. No eye opening.      Gus Maher RN 3:12 PM

## 2021-12-16 NOTE — PROGRESS NOTES
Notified Dr. Fariha Cohn via [employer] that we started the SAT at [phone] and the patient has yet to become more responsive. Dr. Fariha Cohn ordered via telephone with feedback that we will hold the Fentanyl now and start again early tomorrow morning.

## 2021-12-17 ENCOUNTER — APPOINTMENT (OUTPATIENT)
Dept: GENERAL RADIOLOGY | Age: 76
DRG: 870 | End: 2021-12-17
Payer: COMMERCIAL

## 2021-12-17 LAB
BASE EXCESS MIXED: 7.3 (ref 0–1.2)
CARBON MONOXIDE, BLOOD: 1.7 % (ref 0–5)
CO2 CONTENT: 30.7 MMOL/L (ref 19–24)
COMMENT: ABNORMAL
GLUCOSE BLD-MCNC: 121 MG/DL (ref 70–99)
GLUCOSE BLD-MCNC: 257 MG/DL (ref 70–99)
GLUCOSE BLD-MCNC: 384 MG/DL (ref 70–99)
HCO3 ARTERIAL: 29.7 MMOL/L (ref 18–23)
METHEMOGLOBIN ARTERIAL: 1 %
O2 SATURATION: 96.6 % (ref 96–97)
PCO2 ARTERIAL: 34 MMHG (ref 32–45)
PH BLOOD: 7.55 (ref 7.34–7.45)
PO2 ARTERIAL: 123 MMHG (ref 75–100)

## 2021-12-17 PROCEDURE — 2000000000 HC ICU R&B

## 2021-12-17 PROCEDURE — 89220 SPUTUM SPECIMEN COLLECTION: CPT

## 2021-12-17 PROCEDURE — 2500000003 HC RX 250 WO HCPCS: Performed by: NURSE PRACTITIONER

## 2021-12-17 PROCEDURE — 82803 BLOOD GASES ANY COMBINATION: CPT

## 2021-12-17 PROCEDURE — 71045 X-RAY EXAM CHEST 1 VIEW: CPT

## 2021-12-17 PROCEDURE — 94003 VENT MGMT INPAT SUBQ DAY: CPT

## 2021-12-17 PROCEDURE — 6360000002 HC RX W HCPCS: Performed by: INTERNAL MEDICINE

## 2021-12-17 PROCEDURE — 2580000003 HC RX 258: Performed by: NURSE PRACTITIONER

## 2021-12-17 PROCEDURE — 2580000003 HC RX 258: Performed by: INTERNAL MEDICINE

## 2021-12-17 PROCEDURE — 6370000000 HC RX 637 (ALT 250 FOR IP): Performed by: INTERNAL MEDICINE

## 2021-12-17 PROCEDURE — 99233 SBSQ HOSP IP/OBS HIGH 50: CPT | Performed by: INTERNAL MEDICINE

## 2021-12-17 PROCEDURE — 6370000000 HC RX 637 (ALT 250 FOR IP): Performed by: NURSE PRACTITIONER

## 2021-12-17 PROCEDURE — 82962 GLUCOSE BLOOD TEST: CPT

## 2021-12-17 RX ADMIN — CHLORHEXIDINE GLUCONATE 0.12% ORAL RINSE 15 ML: 1.2 LIQUID ORAL at 21:45

## 2021-12-17 RX ADMIN — TAMSULOSIN HYDROCHLORIDE 0.4 MG: 0.4 CAPSULE ORAL at 21:46

## 2021-12-17 RX ADMIN — INSULIN LISPRO 10 UNITS: 100 INJECTION, SOLUTION INTRAVENOUS; SUBCUTANEOUS at 10:40

## 2021-12-17 RX ADMIN — APIXABAN 5 MG: 5 TABLET, FILM COATED ORAL at 10:17

## 2021-12-17 RX ADMIN — ALLOPURINOL 300 MG: 300 TABLET ORAL at 10:16

## 2021-12-17 RX ADMIN — Medication 25 MCG/HR: at 07:30

## 2021-12-17 RX ADMIN — MIDAZOLAM 2.5 MG/HR: 5 INJECTION INTRAMUSCULAR; INTRAVENOUS at 01:51

## 2021-12-17 RX ADMIN — APIXABAN 5 MG: 5 TABLET, FILM COATED ORAL at 21:45

## 2021-12-17 RX ADMIN — LOSARTAN POTASSIUM 100 MG: 100 TABLET, FILM COATED ORAL at 10:16

## 2021-12-17 RX ADMIN — INSULIN LISPRO 6 UNITS: 100 INJECTION, SOLUTION INTRAVENOUS; SUBCUTANEOUS at 16:57

## 2021-12-17 RX ADMIN — SODIUM CHLORIDE, PRESERVATIVE FREE 10 ML: 5 INJECTION INTRAVENOUS at 10:18

## 2021-12-17 RX ADMIN — FUROSEMIDE 40 MG: 10 INJECTION, SOLUTION INTRAVENOUS at 21:49

## 2021-12-17 RX ADMIN — SODIUM CHLORIDE, PRESERVATIVE FREE 10 ML: 5 INJECTION INTRAVENOUS at 21:49

## 2021-12-17 RX ADMIN — CHLORHEXIDINE GLUCONATE 0.12% ORAL RINSE 15 ML: 1.2 LIQUID ORAL at 10:16

## 2021-12-17 RX ADMIN — FUROSEMIDE 40 MG: 10 INJECTION, SOLUTION INTRAVENOUS at 10:16

## 2021-12-17 RX ADMIN — CEFTRIAXONE SODIUM 1000 MG: 1 INJECTION, POWDER, FOR SOLUTION INTRAMUSCULAR; INTRAVENOUS at 03:31

## 2021-12-17 RX ADMIN — DEXAMETHASONE SODIUM PHOSPHATE 6 MG: 10 INJECTION INTRAMUSCULAR; INTRAVENOUS at 10:16

## 2021-12-17 RX ADMIN — SPIRONOLACTONE 25 MG: 25 TABLET ORAL at 10:16

## 2021-12-17 RX ADMIN — FAMOTIDINE 20 MG: 10 INJECTION, SOLUTION INTRAVENOUS at 10:16

## 2021-12-17 RX ADMIN — PRAVASTATIN SODIUM 20 MG: 10 TABLET ORAL at 21:45

## 2021-12-17 RX ADMIN — TRIAMTERENE AND HYDROCHLOROTHIAZIDE 1 TABLET: 37.5; 25 TABLET ORAL at 10:16

## 2021-12-17 RX ADMIN — FAMOTIDINE 20 MG: 10 INJECTION, SOLUTION INTRAVENOUS at 21:45

## 2021-12-17 ASSESSMENT — PULMONARY FUNCTION TESTS
PIF_VALUE: 19
PIF_VALUE: 20
PIF_VALUE: 19
PIF_VALUE: 20
PIF_VALUE: 20
PIF_VALUE: 24

## 2021-12-17 ASSESSMENT — PAIN SCALES - GENERAL
PAINLEVEL_OUTOF10: 0
PAINLEVEL_OUTOF10: 0

## 2021-12-17 NOTE — PROGRESS NOTES
12/17/21 0430   Vent Information   Vent Type 980   Vent Mode AC/VC   Vt Ordered 500 mL   Rate Set 20 bmp   Peak Flow 55 L/min   FiO2  35 %   SpO2 98 %   SpO2/FiO2 ratio 280   Sensitivity 3   PEEP/CPAP 5   Humidification Source HME   Vent Patient Data   Peak Inspiratory Pressure 19 cmH2O   Mean Airway Pressure 9.7 cmH20   Rate Measured 20 br/min   Vt Exhaled 504 mL   Minute Volume 10.2 Liters   I:E Ratio 1:2   Plateau Pressure 16 [id number]   Static Compliance 48 mL/cmH2O   Total PEEP 5.2 cmH20   Cough/Sputum   Sputum How Obtained Endotracheal; Suctioned   $Obtained Sample $Induced Sputum   Sputum Amount Small   Sputum Color Tan   Spontaneous Breathing Trial (SBT) RT Doc   Pulse 59   Additional Respiratory  Assessments   Resp 20   Alarm Settings   High Pressure Alarm 55 cmH2O   Delay Alarm 20 sec(s)   Low Minute Volume Alarm 2.5 L/min   Apnea (secs) 20 secs   High Respiratory Rate 40 br/min   Low Exhaled Vt  250 mL   ETT (adult)   Placement Date/Time: 12/13/21 0615   Preoxygenation: Yes  Mask Ventilation: Ventilated by mask (1)  Technique: Rapid sequence  Type: Cuffed  Tube Size: 7.5 mm  Location: Oral  Insertion attempts: 1   Secured at 23 cm   Measured From Lips   ET Placement Right   Secured By Commercial tube reddy   Site Condition Dry

## 2021-12-17 NOTE — PROGRESS NOTES
.        Hospitalist Progress Note      Name:  Leopold Bruch /Age/Sex: 1945  (77 y.o. male)   MRN & CSN:  [id number] & [id number] Admission Date/Time: 2021  5:33 PM   Location:  -A PCP: Liz Goldsmith MD         Hospital Day: 10    Assessment and Plan:   Leopold Bruch is a 77 y.o.  male  who presents with <principal problem not specified>    1. Covid pneumonia. Continue dexamethasone. Tocilizumab given 12/10/2021.   2. Community acquired pneumonia. Continue IV antibiotics. Follow up cultures. 3. Acute respiratory failure due to covid pneumonia. Intubated 2021. On vent support. Pulmonary following. 4. Left tension pneumothorax. Chest tube placed 2021. Removed today  5. Fall    6. Hypertension  7. Lymphedema  8. Paroxysmal atrial fibrillation on apixaban  9. Diabetes Mellitus  10. CAD. Elevated troponin. Cardiology consulted  11. Acute CHF. On IV lasix per Cardiology  12. Hypokalemia. Replace and monitor  13. Hip pain. Pain meds. Prognosis guarded    Diet ADULT DIET; Regular  ADULT TUBE FEEDING; Nasogastric; Peptide Based High Protein; Continuous; 10; Yes; 10; Q 4 hours; 80; 30; Q 4 hours   DVT Prophylaxis [] Lovenox, []  Heparin, [] SCDs, [] Ambulation   GI Prophylaxis [] PPI,  [] H2 Blocker,  [] Carafate,  [] Diet/Tube Feeds   Code Status Full Code   Disposition Patient requires continued admission due to   MDM [] Low, [] Moderate,[]  High  Patient's risk as above due to      History of Present Illness:     Chief Complaint: <principal problem not specified>  Leopold Bruch is a 77 y.o.  male  who presents with fall but found to have community acquired pneumonia, started on IV antibiotics. Tested for covid after admission, came back positive. Remains on vent support    Chest tube removed    Ten point ROS reviewed negative, unless as noted above    Objective:        Intake/Output Summary (Last 24 hours) at 2021 1523  Last data filed at 2021 0900  Gross per 24 hour   Intake 798.51 ml   Output 2625 ml   Net -1826.49 ml      Vitals:   Vitals:    12/17/21 1504   BP: (!) 84/56   Pulse: 63   Resp: 20   Temp: 99.8 °F (37.7 °C)   SpO2: 96%     Physical Exam:   GEN Sedated male. Appears given age. EYES Pupils are equally round. No scleral erythema, discharge, or conjunctivitis. HENT Mucous membranes are moist. Oral pharynx without exudates, no evidence of thrush. NECK Supple, no apparent thyromegaly or masses. RESP Diminished BS. Left chest tube in place. CARDIO/VASC S1/S2 auscultated. Regular rate without appreciable murmurs, rubs, or gallops. No JVD or carotid bruits. Bilateral lymphedema. GI Abdomen is soft without significant tenderness, masses, or guarding. Bowel sounds are normoactive. MSK No gross joint deformities. SKIN Normal coloration, warm, dry.   NEURO  Sedated    Medications:   Medications:    chlorhexidine  15 mL Mouth/Throat BID    famotidine (PEPCID) injection  20 mg IntraVENous BID    lidocaine  5 mL IntraDERmal Once    sodium chloride flush  5-40 mL IntraVENous 2 times per day    insulin lispro  0-12 Units SubCUTAneous TID WC    insulin lispro  0-6 Units SubCUTAneous Nightly    morphine  2 mg IntraVENous Once    dexmedetomidine (PRECEDEX) IV bolus  1 mcg/kg IntraVENous Once    dexamethasone  6 mg IntraVENous Daily    furosemide  40 mg IntraVENous BID    metoprolol succinate  50 mg Oral BID    spironolactone  25 mg Oral Daily    allopurinol  300 mg Oral Daily    apixaban  5 mg Oral BID    [Held by provider] metFORMIN  500 mg Oral BID WC    losartan  100 mg Oral Daily    pravastatin  20 mg Oral Nightly    tamsulosin  0.4 mg Oral Nightly    triamterene-hydroCHLOROthiazide  1 tablet Oral Daily    sodium chloride flush  5-40 mL IntraVENous 2 times per day    cefTRIAXone (ROCEPHIN) IV  1,000 mg IntraVENous Q24H      Infusions:    propofol Stopped (12/13/21 1443)    fentaNYL 25 mcg/hr (12/17/21 0730)    sodium chloride      norepinephrine Stopped (12/17/21 1035)    dextrose      midazolam 2.5 mg/hr (12/17/21 0151)    sodium chloride       PRN Meds: hydrALAZINE (APRESOLINE) ivpb, 10 mg, Q4H PRN  sodium chloride flush, 5-40 mL, PRN  sodium chloride, 25 mL, PRN  glucose, 15 g, PRN  dextrose, 12.5 g, PRN  glucagon (rDNA), 1 mg, PRN  dextrose, 100 mL/hr, PRN  LORazepam, 1 mg, Q6H PRN  oxyCODONE, 5 mg, Q4H PRN  guaiFENesin-dextromethorphan, 5 mL, Q4H PRN  potassium chloride, 40 mEq, PRN   Or  potassium alternative oral replacement, 40 mEq, PRN   Or  potassium chloride, 10 mEq, PRN  sodium chloride flush, 5-40 mL, PRN  sodium chloride, 25 mL, PRN  ondansetron, 4 mg, Q8H PRN   Or  ondansetron, 4 mg, Q6H PRN  polyethylene glycol, 17 g, Daily PRN  acetaminophen, 650 mg, Q6H PRN   Or  acetaminophen, 650 mg, Q6H PRN

## 2021-12-17 NOTE — PROGRESS NOTES
Comprehensive Nutrition Assessment    Type and Reason for Visit:  Reassess    Nutrition Recommendations/Plan:   · Please advance EN to goal rate as soon as possible  · Please do not stop tube feeds for GRV less than 500 in the absence of other signs of intolerance  · Will closely monitor GI tolerance, nutrition status, poc    Nutrition Assessment:  Pt remains intubated, +levophed ggt, EN infusing @ 30 ml/hr per flowsheet, will continue to follow at high nutrition risk    Malnutrition Assessment:  Malnutrition Status:  Insufficient data    Context:  Acute Illness       Estimated Daily Nutrient Needs:  Energy (kcal):  1931 [facility] 2010); Weight Used for Energy Requirements:  Current     Protein (g):  140 (2 g/kg); Weight Used for Protein Requirements:  Ideal        Fluid (ml/day):  fluids per nephrology; Method Used for Fluid Requirements:  Other (Comment)      Wounds:  Multiple, Venous Stasis       Current Nutrition Therapies:    Current Tube Feeding (TF) Orders:  · Feeding Route: Nasogastric  · Formula: Peptide Based High Protein (Vital HP)  · Schedule: Continuous (30 ml/hr)  · Water Flushes: 30 Q 4 H  · Current TF & Flush Orders Provides: 720 kcal and 63 g protein    Anthropometric Measures:  · Height: 5' 7.99\" (172.7 cm)  · Current Body Weight: 216 lb 0.8 oz (98 kg)   · Admission Body Weight: 233 lb 4 oz (105.8 kg) (first measured weight)    · Usual Body Weight:  (n/a)     · Ideal Body Weight: 154 lbs; % Ideal Body Weight 140.3 %   · BMI: 32.9  · BMI Categories: Obese Class 1 (BMI 30.0-34. 9)       Nutrition Diagnosis:   · Inadequate oral intake related to acute injury/trauma as evidenced by NPO or clear liquid status due to medical condition    Nutrition Interventions:   Food and/or Nutrient Delivery:  Continue Current Tube Feeding  Nutrition Education/Counseling:  No recommendation at this time   Coordination of Nutrition Care:  Continue to monitor while inpatient    Goals:  Pt will meet greater than 75% of estimated nutrient needs via EN       Nutrition Monitoring and Evaluation:   Behavioral-Environmental Outcomes:  None Identified   Food/Nutrient Intake Outcomes:  Enteral Nutrition Intake/Tolerance  Physical Signs/Symptoms Outcomes:  Biochemical Data, GI Status, Hemodynamic Status, Fluid Status or Edema, Weight, Skin     Discharge Planning:     Too soon to determine     Electronically signed by Beatrice Urbano MS, RD, LD on 12/17/21 at 8:50 AM EST    Contact: [phone]

## 2021-12-17 NOTE — PROGRESS NOTES
[facility] called with chest Xray report s/p left pleural CT removal- messaged Dr. Jack Magallanes ( who is on call for the surgeons) and sent him Xray report so he is aware.

## 2021-12-17 NOTE — PROGRESS NOTES
12/17/21 1523   Vent Information   Vent Type 980   Vent Mode AC/VC   Vt Ordered 500 mL   Rate Set 20 bmp   Peak Flow 22 L/min   FiO2  35 %   SpO2 96 %   SpO2/FiO2 ratio 274.29   Sensitivity 3   PEEP/CPAP 5   Humidification Source HME   Nitric Oxide/Epoprostenol In Use? No   Vent Patient Data   Peak Inspiratory Pressure 20 cmH2O   Mean Airway Pressure 10 cmH20   Rate Measured 20 br/min   Vt Exhaled 505 mL   Minute Volume 10.1 Liters   I:E Ratio 1:2.0   Plateau Pressure 18 [id number]   Static Compliance 39 mL/cmH2O   Total PEEP 5.3 cmH20   Auto PEEP 0.2 cmH20   Cough/Sputum   Sputum How Obtained Endotracheal; Suctioned   $Obtained Sample $Induced Sputum   Cough Non-productive   Frequency Infrequent   Sputum Amount None   Sputum Color None   Tenacity None   Breath Sounds   Right Upper Lobe Diminished   Right Middle Lobe Diminished   Right Lower Lobe Diminished   Left Upper Lobe Diminished   Left Lower Lobe Diminished   Additional Respiratory  Assessments   Position Semi-Mendez's   Subglottic Suction Done? Yes   Alarm Settings   High Pressure Alarm 55 cmH2O   Delay Alarm 20 sec(s)   Low Minute Volume Alarm 2.5 L/min   Apnea (secs) 20 secs   High Respiratory Rate 40 br/min   Low Exhaled Vt  250 mL   ETT (adult)   Placement Date/Time: 12/13/21 (c) 0620   Preoxygenation: Yes  Mask Ventilation: Mask ventilation not attempted (0)  Technique: Video laryngoscopy  Tube Size: 7.5 mm  Laryngoscope: Mac  Blade Size: 4  Grade View: Full view of the glottis  Insertion att. ..    Secured at 23 cm   Measured From Lips   ET Placement Left   Secured By Commercial tube reddy   Site Condition Dry

## 2021-12-17 NOTE — PROGRESS NOTES
Pulmonary and Critical Care  Progress Note      VITALS:  BP (!) 151/81   Pulse 87   Temp 99.4 °F (37.4 °C) (Rectal)   Resp 20   Ht 5' 7.99\" (1.727 m)   Wt 216 lb 0.8 oz (98 kg)   SpO2 90%   BMI 32.86 kg/m²     Subjective:   CHIEF COMPLAINT :Fall     HPI:                The patient is on the vent and sedated. He is on 35% fio2. He is not in acute resp distress    Objective:   PHYSICAL EXAM:    LUNGS:Occasional basal crackles  Abd-soft, BS+,NT  Ext- no pedal edema  CVS-s1s2, no murmurs      DATA:    CBC:  Recent Labs     12/15/21  0535   WBC 9.0   RBC 4.16*   HGB 11.6*   HCT 37.4*      MCV 89.9   MCH 27.9   MCHC 31.0*   RDW 16.0*   NRBC 2   SEGSPCT 68.0*   BANDSPCT 14*      BMP:  Recent Labs     12/14/21  1130 12/15/21  0535 12/16/21  1100   * 148* 145   K 3.6 3.5 3.8    106 102   CO2 29 29 32   BUN 52* 63* 76*   CREATININE 1.1 1.3 1.3   CALCIUM 8.8 8.6 9.0   GLUCOSE 191* 235* 289*      ABG:  Recent Labs     12/15/21  0600 12/16/21  0600 12/17/21  0600   PH 7.49* 7.53* 7.55*   PO2ART 81 94 123*   LIR1DIK 38.0 37.0 34.0   O2SAT 94.1* 95.6* 96.6     BNP  No results found for: BNP   D-Dimer:  No results found for: [facility]   Radiology:   . Small stable left apical pneumothorax. 2. Bilateral airspace opacities are seen without significant change     1.        Assessment/Plan     Patient Active Problem List    Diagnosis Date Noted    PNA (pneumonia) 12/09/2021    Troponin I above reference range 12/09/2021    Elevated antinuclear antibody (FREDRICK) level 02/16/2021    B12 deficiency 02/15/2021    Anemia 02/12/2021    Rectal bleed 11/18/2020    Acute hypokalemia 11/17/2020    Acute cystitis without hematuria     Leukocytosis     Sepsis ([address] 01/17/2020    BRBPR (bright red blood per rectum) 01/17/2020    Rectal bleeding 09/25/2018    NSTEMI (non-ST elevated myocardial infarction) ([facility] 06/25/2018    Upper GI bleed 03/07/2017    Diabetes mellitus ([facility]     Atrial flutter (HCC)     Atrial fibrillation ([address]     Gout     Hyperlipidemia     Hypertension     Arthropathy    Acute on chronic hypoxic hypercapneic resp failure sec to Cardiogenic and non Cardiogenic Pulmonary edema  Systolic CHF with EF of 46-81%  Grade II Diastolic dysfunction  Bilateral Pneumonia sec to COVID-19  Obesity  DIVYA  Chronic Metabolic alkalosis  Left pneumothorax s/p left chest tube  VDRF  Proteus bacteremia       1. Repeat CXR post chest tube removal  2. Decadron  3. Insulin  4. Inhalers  5. Diuresis  6. Tube feeds  7. Abx  8. F/u C&S  9. Keep sats > 92%  10.  SAT and SBT trial   11. c/w present management    Electronically signed by Aide Cardenas MD on 12/17/2021 at 11:27 AM

## 2021-12-17 NOTE — PROGRESS NOTES
.        Hospitalist Progress Note      Name:  Devon Carvajal /Age/Sex: 1945  (77 y.o. male)   MRN & CSN:  [id number] & [id number] Admission Date/Time: 2021  5:33 PM   Location:  -A PCP: Mary Yu MD         Hospital Day: 9    Assessment and Plan:   Devon Carvajal is a 77 y.o.  male  who presents with <principal problem not specified>    1. Covid pneumonia. Continue dexamethasone. Tocilizumab given 12/10/2021.   2. Community acquired pneumonia. Continue IV antibiotics. Follow up cultures. 3. Acute respiratory failure due to covid pneumonia. Intubated 2021. On vent support Pulmonary following. 4. Left tension pneumothorax. Chest tube placed 2021.  5. Fall    6. Hypertension  7. Lymphedema  8. Paroxysmal atrial fibrillation on apixaban  9. Diabetes Mellitus  10. CAD. Elevated troponin. Cardiology consulted  11. Acute CHF. On IV lasix per Cardiology  12. Hypokalemia. Replace and monitor  13. Hip pain. Pain meds. Prognosis guarded    Diet ADULT DIET; Regular  ADULT TUBE FEEDING; Nasogastric; Peptide Based High Protein; Continuous; 10; Yes; 10; Q 4 hours; 80; 30; Q 4 hours   DVT Prophylaxis [] Lovenox, []  Heparin, [] SCDs, [] Ambulation   GI Prophylaxis [] PPI,  [] H2 Blocker,  [] Carafate,  [] Diet/Tube Feeds   Code Status Full Code   Disposition Patient requires continued admission due to   MDM [] Low, [] Moderate,[]  High  Patient's risk as above due to      History of Present Illness:     Chief Complaint: <principal problem not specified>  Devon Carvajal is a 77 y.o.  male  who presents with fall but found to have community acquired pneumonia, started on IV antibiotics. Tested for covid after admission, came back positive. Remains on vent support    Chest tube in place for left tension pneumothorax    Ten point ROS reviewed negative, unless as noted above    Objective:        Intake/Output Summary (Last 24 hours) at 2021 2340  Last data filed at 2021 1852  Gross per 24 hour   Intake 798.51 ml   Output 1805 ml   Net -1006.49 ml      Vitals:   Vitals:    12/16/21 2014   BP:    Pulse: 63   Resp: 20   Temp:    SpO2: 97%     Physical Exam:   GEN Sedated male. Appears given age. EYES Pupils are equally round. No scleral erythema, discharge, or conjunctivitis. HENT Mucous membranes are moist. Oral pharynx without exudates, no evidence of thrush. NECK Supple, no apparent thyromegaly or masses. RESP Diminished BS. Left chest tube in place. CARDIO/VASC S1/S2 auscultated. Regular rate without appreciable murmurs, rubs, or gallops. No JVD or carotid bruits. Bilateral lymphedema. GI Abdomen is soft without significant tenderness, masses, or guarding. Bowel sounds are normoactive. MSK No gross joint deformities. SKIN Normal coloration, warm, dry.   NEURO  Sedated    Medications:   Medications:    chlorhexidine  15 mL Mouth/Throat BID    famotidine (PEPCID) injection  20 mg IntraVENous BID    lidocaine  5 mL IntraDERmal Once    sodium chloride flush  5-40 mL IntraVENous 2 times per day    insulin lispro  0-12 Units SubCUTAneous TID     insulin lispro  0-6 Units SubCUTAneous Nightly    morphine  2 mg IntraVENous Once    dexmedetomidine (PRECEDEX) IV bolus  1 mcg/kg IntraVENous Once    dexamethasone  6 mg IntraVENous Daily    furosemide  40 mg IntraVENous BID    metoprolol succinate  50 mg Oral BID    spironolactone  25 mg Oral Daily    allopurinol  300 mg Oral Daily    apixaban  5 mg Oral BID    [Held by provider] metFORMIN  500 mg Oral BID     losartan  100 mg Oral Daily    pravastatin  20 mg Oral Nightly    tamsulosin  0.4 mg Oral Nightly    triamterene-hydroCHLOROthiazide  1 tablet Oral Daily    sodium chloride flush  5-40 mL IntraVENous 2 times per day    cefTRIAXone (ROCEPHIN) IV  1,000 mg IntraVENous Q24H      Infusions:    propofol Stopped (12/13/21 1443)    fentaNYL 31.25 mcg/hr (12/16/21 1852)    sodium chloride      norepinephrine 2 mcg/min (12/16/21 1956)    dextrose      midazolam 2.5 mg/hr (12/16/21 1852)    sodium chloride       PRN Meds: hydrALAZINE (APRESOLINE) ivpb, 10 mg, Q4H PRN  sodium chloride flush, 5-40 mL, PRN  sodium chloride, 25 mL, PRN  glucose, 15 g, PRN  dextrose, 12.5 g, PRN  glucagon (rDNA), 1 mg, PRN  dextrose, 100 mL/hr, PRN  LORazepam, 1 mg, Q6H PRN  oxyCODONE, 5 mg, Q4H PRN  guaiFENesin-dextromethorphan, 5 mL, Q4H PRN  potassium chloride, 40 mEq, PRN   Or  potassium alternative oral replacement, 40 mEq, PRN   Or  potassium chloride, 10 mEq, PRN  sodium chloride flush, 5-40 mL, PRN  sodium chloride, 25 mL, PRN  ondansetron, 4 mg, Q8H PRN   Or  ondansetron, 4 mg, Q6H PRN  polyethylene glycol, 17 g, Daily PRN  acetaminophen, 650 mg, Q6H PRN   Or  acetaminophen, 650 mg, Q6H PRN

## 2021-12-17 NOTE — FLOWSHEET NOTE
Alvaro Delgado NP notified of ABP dropping to 73/38 after Levophed drip was stopped.  Levophed drip restarted @ 2 mcg/min at this time     Ap Colmenares RN 7:29 PM

## 2021-12-17 NOTE — PROGRESS NOTES
PATIENT NAME: Aimee Apple    TODAY'S DATE: 12/17/21    Reason for today's visit: L PTX, covid-19    SUBJECTIVE:    Pt is sedated and intubated. OBJECTIVE:   VITALS:    Vitals:    12/17/21 1130   BP:    Pulse: 74   Resp: 20   Temp:    SpO2: 90%     INTAKE/OUTPUT:    Date 12/17/21 0000 - 12/17/21 2359   Shift [id number] 1600-2359 24 Hour Total   INTAKE   Shift Total(mL/kg)       OUTPUT   Urine(mL/kg/hr) 1125(1.4) 1125  2250   Chest Tube 0   0   Shift Total(mL/kg) 1125(11.5) 1125(11.5)  9953(62)   Weight (kg) 98 98 98 98      No data found. EXAM:  Constitutional: Blood pressure 119/64, pulse 74, temperature 99.2 °F (37.3 °C), temperature source Rectal, resp. rate 20, height 5' 7.99\" (1.727 m), weight 216 lb 0.8 oz (98 kg), SpO2 90 %. No apparent distress, appears stated age and cooperative. Neurologic: sedated  Lungs: Good respiratory effort. CT no air leak noted   CV: Regular rate/ rhythm , no peripheral edema, feet warm and well perfused  GI: Soft, non-tender in all four quadrants, non-distended, + bowel sounds, spleen and liver no palpable masses   : bladder nondistended   MSK: no obvious deformity   Skin: warm, pink and dry       Data:  CBC:   Recent Labs     12/15/21  0535   WBC 9.0   HGB 11.6*   HCT 37.4*        BMP:    Recent Labs     12/15/21  0535 12/16/21  1100   * 145   K 3.5 3.8    102   CO2 29 32   BUN 63* 76*   CREATININE 1.3 1.3   GLUCOSE 235* 289*     Hepatic:   Recent Labs     12/15/21  0535 12/16/21  1100   AST 19 17   ALT 8* 8*   BILITOT 0.4 0.4   ALKPHOS 60 59     Mag:      Recent Labs     12/15/21  0900   MG 1.9      Phos:   No results for input(s): PHOS in the last 72 hours. INR: No results for input(s): INR in the last 72 hours. Radiology Review:  CXR  Impression:     1. No appreciable left pneumothorax identified on the current exam.   2. Similar bilateral airspace opacities. 3. Support lines and tubes remain in place. ASSESSMENT:  Patient Active Problem List   Diagnosis    [facility]    [facility]    Atrial fibrillation (HCC)    Gout    Hyperlipidemia    Hypertension    Arthropathy    Upper GI bleed    NSTEMI (non-ST elevated myocardial infarction) ([facility]    Rectal bleeding    Sepsis ([facility]    BRBPR (bright red blood per rectum)    Acute cystitis without hematuria    Leukocytosis    Acute hypokalemia    Rectal bleed    Anemia    B12 deficiency    Elevated antinuclear antibody (FREDRICK) level    PNA (pneumonia)    Troponin I above reference range       Left pneumothorax s/p CT placement  covid-19  Respiratory failure     PLAN:     Chest tube clamped and removed. Down on vent settings. Hopefully will be extubated soon. Repeat CXR. Will sign off.      Jessica Anthony PA-C

## 2021-12-17 NOTE — PROGRESS NOTES
Sister called- security code received- updates given/questions answered- she verbalized understanding

## 2021-12-17 NOTE — PROGRESS NOTES
12/17/21 0718   Vent Information   $Ventilation $Subsequent Day   Vent Type 980   Vent Mode AC/VC   Vt Ordered 500 mL   Rate Set 20 bmp   Peak Flow 55 L/min   FiO2  35 %   SpO2 94 %   SpO2/FiO2 ratio 268.57   Sensitivity 3   PEEP/CPAP 5   Humidification Source HME   Nitric Oxide/Epoprostenol In Use? No   Vent Patient Data   Peak Inspiratory Pressure 20 cmH2O   Mean Airway Pressure 10 cmH20   Rate Measured 20 br/min   Vt Exhaled 505 mL   Minute Volume 10.1 Liters   I:E Ratio 1:2.0   Plateau Pressure 17 [id number]   Static Compliance 43 mL/cmH2O   Total PEEP 5.2 cmH20   Auto PEEP 0.2 cmH20   Cough/Sputum   Sputum How Obtained Endotracheal   $Obtained Sample $Induced Sputum   Cough Non-productive   Frequency Infrequent   Sputum Amount Small   Sputum Color Creamy; Tan   Tenacity Thick   Breath Sounds   Right Upper Lobe Diminished   Right Middle Lobe Diminished   Right Lower Lobe Diminished   Left Upper Lobe Diminished   Left Lower Lobe Diminished   Additional Respiratory  Assessments   Position Semi-Mendez's   Oral Care Mouth suctioned   Alarm Settings   High Pressure Alarm 55 cmH2O   Delay Alarm 20 sec(s)   Low Minute Volume Alarm 2.5 L/min   Apnea (secs) 20 secs   High Respiratory Rate 40 br/min   Low Exhaled Vt  250 mL   ETT (adult)   Placement Date/Time: 12/13/21 (c) 0620   Preoxygenation: Yes  Mask Ventilation: Mask ventilation not attempted (0)  Technique: Video laryngoscopy  Tube Size: 7.5 mm  Laryngoscope: Mac  Blade Size: 4  Grade View: Full view of the glottis  Insertion att. ..    Secured at 23 cm   Measured From Lips   ET Placement Left   Secured By Commercial tube reddy   Site Condition Dry

## 2021-12-17 NOTE — PROGRESS NOTES
Updated Dr. Adrián Lozano via perfect serve that the pt started to become slightly anxious and started to wake up. PT was grabbing at the ETT tube and moving but not following commands. Pt had slow eye opening and eye movement. Dr. Adrián Lozano ordered via telephone with verbal feedback to restart the Versed and Fentanyl at half the previous dose. SAT was started at 1330.

## 2021-12-17 NOTE — PROGRESS NOTES
Damaris GAXIOLA at bedside- L- Pleural CT removed per protocol per PA- new DSG applied- no issues noted.

## 2021-12-17 NOTE — CARE COORDINATION
Received call from patient's sister Mya Jett. She stated that she wanted to discuss some discharge planning with . She confirmed that patient is from home alone and uses a wheel chair primarily for mobility . She added that patient was able to perform some ADL's and is assisted by care givers from [place] [address] [phone]. Mya Jett stated that patient has fallen a couple of times prior to admission and added that she had been speaking with patient about going into a SNF/ECF . Mya Jett stated that they had not chosen a facility but patient has been to [place] before. Patient is intubated on a vent in the COVID unit.  to follow to continue to assist with any potential needs.

## 2021-12-18 ENCOUNTER — APPOINTMENT (OUTPATIENT)
Dept: GENERAL RADIOLOGY | Age: 76
DRG: 870 | End: 2021-12-18
Payer: COMMERCIAL

## 2021-12-18 LAB
ALBUMIN SERPL-MCNC: 3 GM/DL (ref 3.4–5)
ALP BLD-CCNC: 62 IU/L (ref 40–129)
ALT SERPL-CCNC: 10 U/L (ref 10–40)
ANION GAP SERPL CALCULATED.3IONS-SCNC: 12 MMOL/L (ref 4–16)
ANION GAP SERPL CALCULATED.3IONS-SCNC: 7 MMOL/L (ref 4–16)
AST SERPL-CCNC: 22 IU/L (ref 15–37)
BACTERIA: NEGATIVE /HPF
BASE EXCESS MIXED: 8.2 (ref 0–1.2)
BILIRUB SERPL-MCNC: 0.6 MG/DL (ref 0–1)
BILIRUBIN URINE: NEGATIVE MG/DL
BLOOD, URINE: ABNORMAL
BUN BLDV-MCNC: 102 MG/DL (ref 6–23)
BUN BLDV-MCNC: 112 MG/DL (ref 6–23)
CALCIUM SERPL-MCNC: 8.9 MG/DL (ref 8.3–10.6)
CALCIUM SERPL-MCNC: 9.3 MG/DL (ref 8.3–10.6)
CARBON MONOXIDE, BLOOD: 1.8 % (ref 0–5)
CHLORIDE BLD-SCNC: 106 MMOL/L (ref 99–110)
CHLORIDE BLD-SCNC: 109 MMOL/L (ref 99–110)
CHLORIDE URINE RANDOM: 51 MMOL/L (ref 43–210)
CLARITY: CLEAR
CO2 CONTENT: 33.1 MMOL/L (ref 19–24)
CO2: 31 MMOL/L (ref 21–32)
CO2: 34 MMOL/L (ref 21–32)
COLOR: COLORLESS
COMMENT: ABNORMAL
CREAT SERPL-MCNC: 1.3 MG/DL (ref 0.9–1.3)
CREAT SERPL-MCNC: 1.3 MG/DL (ref 0.9–1.3)
CREATININE URINE: 36 MG/DL (ref 39–259)
ESTIMATED AVERAGE GLUCOSE: 163 MG/DL
GFR AFRICAN AMERICAN: >60 ML/MIN/1.73M2
GFR AFRICAN AMERICAN: >60 ML/MIN/1.73M2
GFR NON-AFRICAN AMERICAN: 54 ML/MIN/1.73M2
GFR NON-AFRICAN AMERICAN: 54 ML/MIN/1.73M2
GLUCOSE BLD-MCNC: 205 MG/DL (ref 70–99)
GLUCOSE BLD-MCNC: 254 MG/DL (ref 70–99)
GLUCOSE BLD-MCNC: 354 MG/DL (ref 70–99)
GLUCOSE BLD-MCNC: 358 MG/DL (ref 70–99)
GLUCOSE BLD-MCNC: 383 MG/DL (ref 70–99)
GLUCOSE BLD-MCNC: 429 MG/DL (ref 70–99)
GLUCOSE, URINE: NEGATIVE MG/DL
HBA1C MFR BLD: 7.3 % (ref 4.2–6.3)
HCO3 ARTERIAL: 31.9 MMOL/L (ref 18–23)
HCT VFR BLD CALC: 44 % (ref 42–52)
HEMOGLOBIN: 13.2 GM/DL (ref 13.5–18)
HIGH SENSITIVE C-REACTIVE PROTEIN: 4.2 MG/L
HYALINE CASTS: 2 /LPF
KETONES, URINE: NEGATIVE MG/DL
LEUKOCYTE ESTERASE, URINE: NEGATIVE
MAGNESIUM: 2.4 MG/DL (ref 1.8–2.4)
MCH RBC QN AUTO: 27.8 PG (ref 27–31)
MCHC RBC AUTO-ENTMCNC: 30 % (ref 32–36)
MCV RBC AUTO: 92.8 FL (ref 78–100)
METHEMOGLOBIN ARTERIAL: 1.4 %
MUCUS: ABNORMAL HPF
NITRITE URINE, QUANTITATIVE: NEGATIVE
O2 SATURATION: 89.9 % (ref 96–97)
PCO2 ARTERIAL: 40 MMHG (ref 32–45)
PDW BLD-RTO: 18 % (ref 11.7–14.9)
PH BLOOD: 7.51 (ref 7.34–7.45)
PH, URINE: 6.5 (ref 5–8)
PLATELET # BLD: 313 K/CU MM (ref 140–440)
PMV BLD AUTO: 12.3 FL (ref 7.5–11.1)
PO2 ARTERIAL: 58 MMHG (ref 75–100)
POTASSIUM SERPL-SCNC: 3.9 MMOL/L (ref 3.5–5.1)
POTASSIUM SERPL-SCNC: 4.5 MMOL/L (ref 3.5–5.1)
POTASSIUM, UR: 17.7 MMOL/L (ref 22–119)
PROT/CREAT RATIO, UR: 3.3
PROTEIN UA: 100 MG/DL
RBC # BLD: 4.74 M/CU MM (ref 4.6–6.2)
RBC URINE: 31 /HPF (ref 0–3)
SODIUM BLD-SCNC: 149 MMOL/L (ref 135–145)
SODIUM BLD-SCNC: 150 MMOL/L (ref 135–145)
SODIUM URINE: 95 MMOL/L (ref 35–167)
SPECIFIC GRAVITY UA: 1.02 (ref 1–1.03)
TOTAL PROTEIN: 5.8 GM/DL (ref 6.4–8.2)
TRICHOMONAS: ABNORMAL /HPF
URINE TOTAL PROTEIN: 119.1 MG/DL
UROBILINOGEN, URINE: NORMAL MG/DL (ref 0.2–1)
WBC # BLD: 14.9 K/CU MM (ref 4–10.5)
WBC UA: ABNORMAL /HPF (ref 0–2)

## 2021-12-18 PROCEDURE — 6360000002 HC RX W HCPCS: Performed by: INTERNAL MEDICINE

## 2021-12-18 PROCEDURE — 2500000003 HC RX 250 WO HCPCS: Performed by: INTERNAL MEDICINE

## 2021-12-18 PROCEDURE — 2580000003 HC RX 258: Performed by: NURSE PRACTITIONER

## 2021-12-18 PROCEDURE — 84300 ASSAY OF URINE SODIUM: CPT

## 2021-12-18 PROCEDURE — 85027 COMPLETE CBC AUTOMATED: CPT

## 2021-12-18 PROCEDURE — 80048 BASIC METABOLIC PNL TOTAL CA: CPT

## 2021-12-18 PROCEDURE — 89220 SPUTUM SPECIMEN COLLECTION: CPT

## 2021-12-18 PROCEDURE — 6370000000 HC RX 637 (ALT 250 FOR IP): Performed by: INTERNAL MEDICINE

## 2021-12-18 PROCEDURE — 2580000003 HC RX 258: Performed by: INTERNAL MEDICINE

## 2021-12-18 PROCEDURE — 86141 C-REACTIVE PROTEIN HS: CPT

## 2021-12-18 PROCEDURE — 82436 ASSAY OF URINE CHLORIDE: CPT

## 2021-12-18 PROCEDURE — 2700000000 HC OXYGEN THERAPY PER DAY

## 2021-12-18 PROCEDURE — 84133 ASSAY OF URINE POTASSIUM: CPT

## 2021-12-18 PROCEDURE — 83735 ASSAY OF MAGNESIUM: CPT

## 2021-12-18 PROCEDURE — 83036 HEMOGLOBIN GLYCOSYLATED A1C: CPT

## 2021-12-18 PROCEDURE — 6370000000 HC RX 637 (ALT 250 FOR IP): Performed by: NURSE PRACTITIONER

## 2021-12-18 PROCEDURE — 81001 URINALYSIS AUTO W/SCOPE: CPT

## 2021-12-18 PROCEDURE — 99233 SBSQ HOSP IP/OBS HIGH 50: CPT | Performed by: INTERNAL MEDICINE

## 2021-12-18 PROCEDURE — 2000000000 HC ICU R&B

## 2021-12-18 PROCEDURE — 2500000003 HC RX 250 WO HCPCS: Performed by: NURSE PRACTITIONER

## 2021-12-18 PROCEDURE — 82803 BLOOD GASES ANY COMBINATION: CPT

## 2021-12-18 PROCEDURE — 71045 X-RAY EXAM CHEST 1 VIEW: CPT

## 2021-12-18 PROCEDURE — 2060000000 HC ICU INTERMEDIATE R&B

## 2021-12-18 PROCEDURE — 82570 ASSAY OF URINE CREATININE: CPT

## 2021-12-18 PROCEDURE — 80053 COMPREHEN METABOLIC PANEL: CPT

## 2021-12-18 PROCEDURE — 84156 ASSAY OF PROTEIN URINE: CPT

## 2021-12-18 PROCEDURE — 94003 VENT MGMT INPAT SUBQ DAY: CPT

## 2021-12-18 PROCEDURE — 82962 GLUCOSE BLOOD TEST: CPT

## 2021-12-18 PROCEDURE — 94761 N-INVAS EAR/PLS OXIMETRY MLT: CPT

## 2021-12-18 PROCEDURE — 36600 WITHDRAWAL OF ARTERIAL BLOOD: CPT

## 2021-12-18 RX ORDER — POLYETHYLENE GLYCOL 3350 17 G/17G
17 POWDER, FOR SOLUTION ORAL DAILY
Status: DISCONTINUED | OUTPATIENT
Start: 2021-12-18 | End: 2022-01-05 | Stop reason: HOSPADM

## 2021-12-18 RX ORDER — DEXTROSE MONOHYDRATE 50 MG/ML
INJECTION, SOLUTION INTRAVENOUS CONTINUOUS
Status: DISCONTINUED | OUTPATIENT
Start: 2021-12-18 | End: 2021-12-18 | Stop reason: SDUPTHER

## 2021-12-18 RX ORDER — INSULIN GLARGINE 100 [IU]/ML
20 INJECTION, SOLUTION SUBCUTANEOUS NIGHTLY
Status: DISCONTINUED | OUTPATIENT
Start: 2021-12-18 | End: 2021-12-18 | Stop reason: SDUPTHER

## 2021-12-18 RX ORDER — INSULIN GLARGINE 100 [IU]/ML
10 INJECTION, SOLUTION SUBCUTANEOUS NIGHTLY
Status: DISCONTINUED | OUTPATIENT
Start: 2021-12-18 | End: 2021-12-19

## 2021-12-18 RX ADMIN — FUROSEMIDE 40 MG: 10 INJECTION, SOLUTION INTRAVENOUS at 08:44

## 2021-12-18 RX ADMIN — LOSARTAN POTASSIUM 100 MG: 100 TABLET, FILM COATED ORAL at 08:59

## 2021-12-18 RX ADMIN — TRIAMTERENE AND HYDROCHLOROTHIAZIDE 1 TABLET: 37.5; 25 TABLET ORAL at 08:44

## 2021-12-18 RX ADMIN — APIXABAN 5 MG: 5 TABLET, FILM COATED ORAL at 08:44

## 2021-12-18 RX ADMIN — SODIUM CHLORIDE, PRESERVATIVE FREE 10 ML: 5 INJECTION INTRAVENOUS at 21:52

## 2021-12-18 RX ADMIN — CHLORHEXIDINE GLUCONATE 0.12% ORAL RINSE 15 ML: 1.2 LIQUID ORAL at 08:44

## 2021-12-18 RX ADMIN — TAMSULOSIN HYDROCHLORIDE 0.4 MG: 0.4 CAPSULE ORAL at 21:51

## 2021-12-18 RX ADMIN — DEXMEDETOMIDINE 0.2 MCG/KG/HR: 100 INJECTION, SOLUTION, CONCENTRATE INTRAVENOUS at 10:20

## 2021-12-18 RX ADMIN — CHLORHEXIDINE GLUCONATE 0.12% ORAL RINSE 15 ML: 1.2 LIQUID ORAL at 21:51

## 2021-12-18 RX ADMIN — METOPROLOL SUCCINATE 50 MG: 50 TABLET, EXTENDED RELEASE ORAL at 21:51

## 2021-12-18 RX ADMIN — SODIUM CHLORIDE, PRESERVATIVE FREE 10 ML: 5 INJECTION INTRAVENOUS at 08:44

## 2021-12-18 RX ADMIN — PRAVASTATIN SODIUM 20 MG: 10 TABLET ORAL at 21:51

## 2021-12-18 RX ADMIN — POLYETHYLENE GLYCOL (3350) 17 G: 17 POWDER, FOR SOLUTION ORAL at 12:56

## 2021-12-18 RX ADMIN — CEFTRIAXONE SODIUM 1000 MG: 1 INJECTION, POWDER, FOR SOLUTION INTRAMUSCULAR; INTRAVENOUS at 04:23

## 2021-12-18 RX ADMIN — DEXAMETHASONE SODIUM PHOSPHATE 6 MG: 10 INJECTION INTRAMUSCULAR; INTRAVENOUS at 08:44

## 2021-12-18 RX ADMIN — ALLOPURINOL 300 MG: 300 TABLET ORAL at 08:59

## 2021-12-18 RX ADMIN — FAMOTIDINE 20 MG: 10 INJECTION, SOLUTION INTRAVENOUS at 21:51

## 2021-12-18 RX ADMIN — Medication 50 MCG/HR: at 20:02

## 2021-12-18 RX ADMIN — METOPROLOL SUCCINATE 50 MG: 50 TABLET, EXTENDED RELEASE ORAL at 08:44

## 2021-12-18 RX ADMIN — SPIRONOLACTONE 25 MG: 25 TABLET ORAL at 08:44

## 2021-12-18 RX ADMIN — INSULIN GLARGINE 10 UNITS: 100 INJECTION, SOLUTION SUBCUTANEOUS at 22:07

## 2021-12-18 RX ADMIN — APIXABAN 5 MG: 5 TABLET, FILM COATED ORAL at 21:51

## 2021-12-18 RX ADMIN — FAMOTIDINE 20 MG: 10 INJECTION, SOLUTION INTRAVENOUS at 08:44

## 2021-12-18 RX ADMIN — INSULIN HUMAN 10 UNITS: 100 INJECTION, SUSPENSION SUBCUTANEOUS at 10:30

## 2021-12-18 ASSESSMENT — PAIN SCALES - GENERAL
PAINLEVEL_OUTOF10: 0
PAINLEVEL_OUTOF10: 3
PAINLEVEL_OUTOF10: 5
PAINLEVEL_OUTOF10: 0

## 2021-12-18 ASSESSMENT — PULMONARY FUNCTION TESTS
PIF_VALUE: 16
PIF_VALUE: 21
PIF_VALUE: 19
PIF_VALUE: 16
PIF_VALUE: 16

## 2021-12-18 NOTE — PROGRESS NOTES
12/18/21 0715   Vent Information   $Ventilation $Subsequent Day   Vent Type 980   Vent Mode CPAP   Pressure Support 10 cmH20   FiO2  35 %   SpO2 93 %   SpO2/FiO2 ratio 265.71   Sensitivity 2   PEEP/CPAP 5   Humidification Source HME   Nitric Oxide/Epoprostenol In Use? No   Vent Patient Data   Peak Inspiratory Pressure 21 cmH2O   Mean Airway Pressure 10 cmH20   Rate Measured 22 br/min   Vt Exhaled 500 mL   Minute Volume 9.9 Liters   I:E Ratio 1:2.0   Cough/Sputum   Sputum How Obtained Endotracheal   $Obtained Sample $Induced Sputum   Cough None   Spontaneous Breathing Trial (SBT) RT Doc   Pulse 109   Additional Respiratory  Assessments   Resp 21   Position Semi-Mendez's   Subglottic Suction Done?  Yes   Alarm Settings   High Pressure Alarm 22 cmH2O   Delay Alarm 20 sec(s)   Low Minute Volume Alarm 2.5 L/min   Apnea (secs) 20 secs   High Respiratory Rate 40 br/min   Low Exhaled Vt  250 mL   ETT (adult)   Placement Date/Time: 12/13/21 0615   Preoxygenation: Yes  Mask Ventilation: Ventilated by mask (1)  Technique: Rapid sequence  Type: Cuffed  Tube Size: 7.5 mm  Location: Oral  Insertion attempts: 1   Secured at 23 cm   Measured From Lips   ET Placement Right   Secured By Commercial tube reddy   Site Condition Dry

## 2021-12-18 NOTE — PROGRESS NOTES
Hospitalist Progress Note      Name:  Randal Napier /Age/Sex: 1945  (77 y.o. male)   MRN & CSN:  [id number] & [id number] Admission Date/Time: 2021  5:33 PM   Location:  -A PCP: Bridget Dumont MD         Hospital Day: 11    Assessment and Plan:     Patient, 28-year-old male with past medical history of atrial fibrillation/flutter, diabetes mellitus type 2, hyperlipidemia, hypertension was admitted on 2021 after an episode of fall. Reported generalized weakness. Patient tested positive for COVID-19 infection. X-ray chest suggestive of pneumonia. Also noted to have UTI. Patient was noted to have acute systolic/diastolic CHF exacerbation. Echocardiogram showed EF 35 to 40%. Patient received 1 dose of Tocilizumab 12/10/2021. Patient was seen by pulmonary/cardiology/cardiothoracic surgery. Patient developed left tension pneumothorax which required chest tube placement. Patient was also seen by urology secondary to CT finding of multifocal bladder traumatic trabeculation/diverticula. Recommended outpatient cystoscopy/TRUS. Assessment    Acute hypoxic respiratory failure secondary to COVID-19 pneumonia  Acute complicated cystitis  Sepsis secondary to above, present on admission  Left tension pneumothorax s/p chest tube placement 2021 to   Acute systolic/diastolic CHF exacerbation  Hyponatremia. Corrected sodium to blood glucose 154 mEq  S/p fall  Hypertension  Paroxysmal atrial fibrillation  Diabetes mellitus type 2  CAD. Constipation-no bowel movements register during the entire hospitalization stay  Chronic lower extremity wound    Plan  Currently intubated and sedated. On mechanical ventilation with FiO2 35% PEEP of 5. As per pulmonary, discontinue Versed, start Precedex to help with SBT's  Briefly required Levophed drip post intubation due to excessive sedation. Currently off and maintaining MAP greater than 65. Continue IV Decadron, D10 today. CARDIO/VASC S1/S2 auscultated. Regular rate   GI Abdomen is soft without significant tenderness, masses, or guarding. Bowel sounds are normoactive. Lane catheter draining clear urine  NEURO sedated.   Unable to examine    Medications:   Medications:    insulin lispro  0-18 Units SubCUTAneous Q4H    insulin NPH  10 Units SubCUTAneous QAM    insulin glargine  20 Units SubCUTAneous Nightly    polyethylene glycol  17 g Oral Daily    chlorhexidine  15 mL Mouth/Throat BID    famotidine (PEPCID) injection  20 mg IntraVENous BID    lidocaine  5 mL IntraDERmal Once    sodium chloride flush  5-40 mL IntraVENous 2 times per day    morphine  2 mg IntraVENous Once    dexamethasone  6 mg IntraVENous Daily    furosemide  40 mg IntraVENous BID    metoprolol succinate  50 mg Oral BID    spironolactone  25 mg Oral Daily    allopurinol  300 mg Oral Daily    apixaban  5 mg Oral BID    [Held by provider] metFORMIN  500 mg Oral BID WC    losartan  100 mg Oral Daily    pravastatin  20 mg Oral Nightly    tamsulosin  0.4 mg Oral Nightly    triamterene-hydroCHLOROthiazide  1 tablet Oral Daily    sodium chloride flush  5-40 mL IntraVENous 2 times per day    cefTRIAXone (ROCEPHIN) IV  1,000 mg IntraVENous Q24H      Infusions:    dexmedetomidine (PRECEDEX) IV infusion 0.2 mcg/kg/hr (12/18/21 1020)    propofol Stopped (12/13/21 1443)    fentaNYL Stopped (12/18/21 0450)    sodium chloride      norepinephrine Stopped (12/18/21 0546)    dextrose      midazolam Stopped (12/18/21 0450)    sodium chloride       PRN Meds: hydrALAZINE (APRESOLINE) ivpb, 10 mg, Q4H PRN  sodium chloride flush, 5-40 mL, PRN  sodium chloride, 25 mL, PRN  glucose, 15 g, PRN  dextrose, 12.5 g, PRN  glucagon (rDNA), 1 mg, PRN  dextrose, 100 mL/hr, PRN  LORazepam, 1 mg, Q6H PRN  oxyCODONE, 5 mg, Q4H PRN  guaiFENesin-dextromethorphan, 5 mL, Q4H PRN  potassium chloride, 40 mEq, PRN   Or  potassium alternative oral replacement, 40 mEq, PRN Or  potassium chloride, 10 mEq, PRN  sodium chloride flush, 5-40 mL, PRN  sodium chloride, 25 mL, PRN  ondansetron, 4 mg, Q8H PRN   Or  ondansetron, 4 mg, Q6H PRN  polyethylene glycol, 17 g, Daily PRN  acetaminophen, 650 mg, Q6H PRN   Or  acetaminophen, 650 mg, Q6H PRN          Electronically signed by Lorri Alvarez MD on 12/18/2021 at 10:51 AM

## 2021-12-18 NOTE — PROGRESS NOTES
Pt seen and examined on vent and monitor na and renal function with diuresis nad increase glucose. For now supporitve care. Full note to follow.    Try free water ngt

## 2021-12-18 NOTE — CONSULTS
Endocrinology   Consult Note    Dear Doctor Ceferino Boone for the Consult     Pt. Was Admitted for : Admitted initially on December 9, 2021 with history of fall and found to have Covid pneumonia    Reason for Consult: Better control of blood glucose      History Obtained From: Sedated intubated EMR       HISTORY OF PRESENT ILLNESS:                The patient is a 77 y.o. male with significant past medical history of arthropathy, atrial fibrillation, diabetes mellitus, gout, hyperlipidemia, hypertension, back pain history of fall was found to be Covid positive with pneumonia admitted to hospital.  Last few days ago he is respiratory status getting worse since he was intubated and placed on ventilator running very high blood glucose I was  consulted for better control of blood glucose. Patient developed pneumothorax on left side chest tube was placed in suction      ROS:   Pt's ROS done in detail. Abnormal ROS are noted in Medical and Surgical History Section below:      Other Medical History:        Diagnosis Date    Arthropathy     Atrial fibrillation (HCC)     Atrial flutter (HCC)     Diabetes mellitus ([address]     Gout     Hyperlipidemia     Hypertension     Lumbago      Surgical History:        Procedure Laterality Date    COLONOSCOPY N/A 9/27/2018    COLONOSCOPY POLYPECTOMY SNARE/COLD BIOPSY performed by Kenyetta Stephen MD at [place] [address] COLONOSCOPY N/A 11/18/2020    COLONOSCOPY DIAGNOSTIC performed by Kamari Ward MD at [facility] ENDOSCOPY       Allergies:  Demerol hcl [meperidine], Gabapentin, and Simvastatin    Family History:       Problem Relation Age of Onset    No Known Problems Mother     No Known Problems Father      REVIEW OF SYSTEMS:  Review of System Done as noted above     PHYSICAL EXAM:      Vitals:    /80   Pulse 109   Temp 99.6 °F (37.6 °C) (Rectal)   Resp 21   Ht 5' 7.99\" (1.727 m)   Wt 216 lb 0.8 oz (98 kg)   SpO2 93%   BMI 32.86 kg/m²     CONSTITUTIONAL: Patient is now sedated, intubated and on ventilator  EYES:  vision intact Fundoscopic Exam not performed   ENT:Normal  NECK:  Supple, No JVD. Thyroid Exam:Normal   LUNGS:  Has Vesicular Breath Sounds, intubated and on ventilator  Has pneumothorax on left side and the chest tube was placed in the mid section  CARDIOVASCULAR: Atrial fibrillation ABDOMEN:  No scars, normal bowel sounds, soft, non-distended, non-tender, no masses palpated, no hepatolienomegaly NG tube for tube feeding  Musculoskeletal: Normal  Extremities: Normal, peripheral pulses normal, , has no edema   NEUROLOGIC: Sedated, intubated and on ventilator    DATA:    CBC: No results for input(s): WBC, HGB, PLT in the last 72 hours. CMP:  Recent Labs     12/16/21  1100 12/18/21  0553    149*   K 3.8 3.9    106   CO2 32 31   BUN 76* 102*   CREATININE 1.3 1.3   CALCIUM 9.0 8.9   PROT 5.6* 5.8*   LABALBU 2.9* 3.0*   BILITOT 0.4 0.6   ALKPHOS 59 62   AST 17 22   ALT 8* 10     Lipids:   Lab Results   Component Value Date    CHOL 137 06/26/2018    HDL 53 06/26/2018    TRIG 101 06/26/2018     Glucose:   Recent Labs     12/17/21  1040 12/17/21  1656 12/17/21  2144   POCGLU 384* 257* 121*     Hemoglobin A1C:   Lab Results   Component Value Date    LABA1C 6.2 11/17/2020     Free T4: No results found for: T4FREE  Free T3: No results found for: FT3  TSH High Sensitivity:   Lab Results   Component Value Date    TSHHS 3.280 05/07/2021       XR CHEST PORTABLE   Preliminary Result   Improving small left apical pneumothorax measuring 9 mm      New right lower lobe infiltrate may represent atelectasis versus pneumonia      Lines and tubes are stable      Stable mild pneumomediastinum         XR CHEST PORTABLE   Final Result   Interval development of a small left apical pneumothorax measuring   approximately 2.1 cm between pleural surfaces. Pneumomediastinum appears   increased when compared to the previous exam as well.       Continued patchy opacity throughout both lungs, most compatible with   multifocal pneumonia. XR CHEST PORTABLE   Final Result   1. No appreciable left pneumothorax identified on the current exam.   2. Similar bilateral airspace opacities. 3. Support lines and tubes remain in place. XR CHEST PORTABLE   Final Result   1. Small stable left apical pneumothorax. 2. Bilateral airspace opacities are seen without significant change. XR CHEST PORTABLE   Final Result   Stable small left apical pneumothorax. Stable pulmonary vascular congestion along with interstitial opacities and   hazy airspace opacities, left worse than right which may be on the basis of   interstitial and pulmonary edema but can also be seen in the setting of   atypical pneumonia. XR CHEST PORTABLE   Final Result   Persistent small left apical pneumothorax estimated to be approximately 10%   in severity. Support tube/catheters project in stable/satisfactory position   in the frontal projection. Consolidating infiltrates and or pulmonary edema, cardiomegaly unchanged   compared to 12/14/2021 consistent with diffuse bilateral pneumonia and/or   congestive heart failure. XR CHEST PORTABLE   Final Result   Left chest tube remains in place, with slight interval decrease in size in   small left apical pneumothorax. XR CHEST PORTABLE   Final Result   Persistent small left pneumothorax with 1 cm pleural apical separation, with   intervally placed left-sided chest tube. Similar diffuse bilateral airspace disease. XR ABDOMEN (KUB) (SINGLE AP VIEW)   Final Result   NG tube in place with tip and side port below the diaphragm and in the region   of the gastric body. XR CHEST PORTABLE   Final Result   Interval development of a large left tension pneumothorax. Extensive bilateral airspace opacities without significant change. Findings were discussed with Dr. Mckay Retana  at 7:43 am on 12/13/2021. XR CHEST PORTABLE   Final Result   Persistent bilateral airspace disease without acute interval change. XR CHEST PORTABLE   Final Result   Severe extensive bilateral airspace opacities worsened from the previous exam   compatible with edema, ARDS, or multifocal pneumonia. XR CHEST PORTABLE   Final Result   1. Cardiomegaly with severe congestive heart failure. VL DUP LOWER EXTREMITY VENOUS BILATERAL   Final Result   No evidence of DVT in either lower extremity within the limitations. Mild nonspecific subcutaneous edema to bilateral lower extremities. CT HEAD WO CONTRAST   Final Result   No acute intracranial abnormality. Age related changes including chronic small vessel ischemic disease and   cerebral atrophy. CT ABDOMEN PELVIS W IV CONTRAST Additional Contrast? None   Final Result   Multifocal consolidative changes both lungs worrisome for multifocal   pneumonia. Follow-up in following medical treatment course is recommended   document complete resolution. Multifocal bladder traumatic trabeculation/diverticula noted with slight   haziness of surrounding fat planes. Please correlate with diffuse urinalysis   findings. Is findings could suggest underlying cystitis. Prominence of the Alisson aortic lymph nodes again identified. These could be   reactive due to an underlying infectious inflammatory process such is the   bladder haziness. However neoplastic process may also considered. Consider   3-6 month follow-up. RECOMMENDATIONS:   Unavailable         CT CHEST W CONTRAST   Final Result   Multifocal consolidative changes both lungs worrisome for multifocal   pneumonia. Follow-up in following medical treatment course is recommended   document complete resolution. Multifocal bladder traumatic trabeculation/diverticula noted with slight   haziness of surrounding fat planes. Please correlate with diffuse urinalysis   findings.   Is findings

## 2021-12-18 NOTE — CONSULTS
Nephrology Service Consultation    Patient:  Emerita Aceves  MRN: [id number]  Consulting physician:  Lalo Jimenes MD  Reason for Consult: Acute renal failure    History Obtained From:  electronic medical record  PCP: Monique Shane MD    HISTORY OF PRESENT ILLNESS:   The patient is a 77 y.o. male who presents with recent fall with weakness noted to have COVID-19 pneumonia. Patient currently on the ventilator unable to give history information per chart. Apparently recently had been falling down hard to get. Patient with history of atrial fibrillation type 2 diabetes hypertension. Admitted to treat possible pneumonia initially. And on Eliquis for atrial fibrillation. But held due to concern of bleeding. Was on losartan with other blood pressure meds as well as Maxide on admission. Also concern of possible cystitis with hematuria. Recent EF of 35 to 40%. After admission patient also developed a pneumothorax requiring chest tube. Per urology evaluation revealed multifocal bladder trabeculation. On above setting noted to have increased renal function and hypernatremia.     Past Medical History:        Diagnosis Date    Arthropathy     Atrial fibrillation (HCC)     Atrial flutter (HCC)     Diabetes mellitus ([facility]     Gout     Hyperlipidemia     Hypertension     Lumbago        Past Surgical History:        Procedure Laterality Date    COLONOSCOPY N/A 9/27/2018    COLONOSCOPY POLYPECTOMY SNARE/COLD BIOPSY performed by Jess Clitnon MD at [place] [address] COLONOSCOPY N/A 11/18/2020    COLONOSCOPY DIAGNOSTIC performed by Naida Shields MD at [facility] ENDOSCOPY       Medications:   Scheduled Meds:   insulin lispro  0-18 Units SubCUTAneous Q4H    polyethylene glycol  17 g Oral Daily    insulin glargine  10 Units SubCUTAneous Nightly    [START ON 12/19/2021] insulin regular  10 Units SubCUTAneous Daily    chlorhexidine  15 mL Mouth/Throat BID    famotidine (PEPCID) injection  20 mg IntraVENous BID    lidocaine  5 mL IntraDERmal Once    sodium chloride flush  5-40 mL IntraVENous 2 times per day    morphine  2 mg IntraVENous Once    dexamethasone  6 mg IntraVENous Daily    [Held by provider] furosemide  40 mg IntraVENous BID    metoprolol succinate  50 mg Oral BID    [Held by provider] spironolactone  25 mg Oral Daily    allopurinol  300 mg Oral Daily    apixaban  5 mg Oral BID    [Held by provider] metFORMIN  500 mg Oral BID WC    losartan  100 mg Oral Daily    pravastatin  20 mg Oral Nightly    tamsulosin  0.4 mg Oral Nightly    [Held by provider] triamterene-hydroCHLOROthiazide  1 tablet Oral Daily    sodium chloride flush  5-40 mL IntraVENous 2 times per day    cefTRIAXone (ROCEPHIN) IV  1,000 mg IntraVENous Q24H     Continuous Infusions:   dexmedetomidine (PRECEDEX) IV infusion 0.2 mcg/kg/hr (12/18/21 1020)    propofol Stopped (12/13/21 1443)    fentaNYL Stopped (12/18/21 0450)    sodium chloride      norepinephrine Stopped (12/18/21 0546)    dextrose      midazolam Stopped (12/18/21 0450)    sodium chloride       PRN Meds:.hydrALAZINE (APRESOLINE) ivpb, sodium chloride flush, sodium chloride, glucose, dextrose, glucagon (rDNA), dextrose, LORazepam, oxyCODONE, guaiFENesin-dextromethorphan, potassium chloride **OR** potassium alternative oral replacement **OR** potassium chloride, sodium chloride flush, sodium chloride, ondansetron **OR** ondansetron, polyethylene glycol, acetaminophen **OR** acetaminophen    Allergies:  Demerol hcl [meperidine], Gabapentin, and Simvastatin    Social History:   TOBACCO:   reports that he has quit smoking. His smoking use included cigarettes. He smoked 0.50 packs per day. He has never used smokeless tobacco.  ETOH:   reports no history of alcohol use.   OCCUPATION:      Family History:       Problem Relation Age of Onset    No Known Problems Mother     No Known Problems Father        REVIEW OF SYSTEMS:  Negative except for sedated on the ventilator. Physical Exam:    I/O: 12/17 0701 - 12/18 0700  In: 1474.7 [I.V.:211.2]  Out: 2075 [Urine:2075]    Vitals: BP (!) 89/59   Pulse 64   Temp 99 °F (37.2 °C) (Rectal)   Resp 18   Ht 5' 7.99\" (1.727 m)   Wt 216 lb 0.8 oz (98 kg)   SpO2 96%   BMI 32.86 kg/m²   General appearance: Sedated ET tube  HEENT: Head: Normal, normocephalic, atraumatic. Neck: supple, symmetrical, trachea midline  Lungs: diminished breath sounds bilaterally  Heart: S1, S2 normal  Abdomen: abnormal findings:  soft NT  Extremities: edema trace  Neurologic: Mental status: alertness: Sedated      CBC:   Recent Labs     12/18/21  0915   WBC 14.9*   HGB 13.2*        BMP:    Recent Labs     12/16/21  1100 12/18/21  0553    149*   K 3.8 3.9    106   CO2 32 31   BUN 76* 102*   CREATININE 1.3 1.3   GLUCOSE 289* 429*     Hepatic:   Recent Labs     12/16/21  1100 12/18/21  0553   AST 17 22   ALT 8* 10   BILITOT 0.4 0.6   ALKPHOS 59 62     Troponin: No results for input(s): TROPONINI in the last 72 hours. BNP: No results for input(s): BNP in the last 72 hours. Lipids: No results for input(s): CHOL, HDL in the last 72 hours. Invalid input(s): LDLCALCU  ABGs:   Lab Results   Component Value Date    PO2ART 58 12/18/2021    [id number] 40.0 12/18/2021     INR: No results for input(s): INR in the last 72 hours.   Renal Labs  Albumin:    Lab Results   Component Value Date    LABALBU 3.0 12/18/2021     Calcium:    Lab Results   Component Value Date    CALCIUM 8.9 12/18/2021     Phosphorus:  No results found for: PHOS  U/A:    Lab Results   Component Value Date    NITRU POSITIVE 12/08/2021    COLORU YELLOW 12/08/2021    WBCUA 16 12/08/2021    RBCUA 779 12/08/2021    MUCUS RARE 12/08/2021    TRICHOMONAS NONE SEEN 12/08/2021    BACTERIA NEGATIVE 12/08/2021    CLARITYU HAZY 12/08/2021    SPECGRAV 1.019 12/08/2021    UROBILINOGEN NEGATIVE 12/08/2021    BILIRUBINUR NEGATIVE 12/08/2021    BLOODU LARGE 12/08/2021    KETUA MODERATE 12/08/2021     ABG:    Lab Results   Component Value Date    MHL6QHO 40.0 12/18/2021    PO2ART 58 12/18/2021    LDF4GFN 31.9 12/18/2021     HgBA1c:    Lab Results   Component Value Date    LABA1C 7.3 12/18/2021     Microalbumen/Creatinine ratio:  No components found for: RUCREAT  TSH:  No results found for: TSH  IRON:    Lab Results   Component Value Date    IRON 48 05/07/2021     Iron Saturation:  No components found for: PERCENTFE  TIBC:    Lab Results   Component Value Date    TIBC 302 05/07/2021     FERRITIN:    Lab Results   Component Value Date    FERRITIN 57 05/07/2021     RPR:  No results found for: RPR  FREDRICK:    Lab Results   Component Value Date    ANATITER 1:160 02/12/2021    FREDRICK DETECTED 02/12/2021     24 Hour Urine for Creatinine Clearance:  No components found for: CREAT4, UHRS10, UTV10  -----------------------------------------------------------------      Assessment and Recommendations     Patient Active Problem List   Diagnosis Code    Diabetes mellitus ([facility] E11.9    Atrial flutter ([facility] I48.92    Atrial fibrillation ([facility] I48.91    Gout M10.9    Hyperlipidemia E78.5    Hypertension I10    Arthropathy M12.9    Upper GI bleed K92.2    NSTEMI (non-ST elevated myocardial infarction) ([facility]) I21.4    Rectal bleeding K62.5    Sepsis ([facility]) A41.9    BRBPR (bright red blood per rectum) K62.5    Acute cystitis without hematuria N30.00    Leukocytosis D72.829    Acute hypokalemia E87.6    Rectal bleed K62.5    Anemia D64.9    B12 deficiency E53.8    Elevated antinuclear antibody (FREDRICK) level R76.8    PNA (pneumonia) J18.9    Troponin I above reference range R77.8     Impression plan  #1 hyponatremia  #2 acute renal failure with increased uremia  #3 respiratory failure  #4 COVID-19 pneumonia  #5 status post pneumothorax tension  #6 CHF exacerbation  #7 type 2 diabetes  #8 complicated UTI/cystitis    Plan  #1 sodium increased up to 149 and monitor with gentle hydration as well as decrease diuresis  #2 renal function monitor try to be careful with overdiuresis at this time, no acute needs for dialysis yet  #3 maintain on vent  #4 maintain protocols for Covid  #5 maintain recommendations from CT surgery with chest tube  #6 sliding-scale insulin  #7 urology follow-up as outpatient when more stable  For renal following above setting we will monitor closely for now  Electronically signed by Sulema Mccray MD on 12/18/2021 at 5:00 PM

## 2021-12-18 NOTE — PROGRESS NOTES
Dr. Lore Masters rounding, does not want me to give continuous dextrose 5% drip ordered. D/C'd, only wants it given if pt BG drops too low.

## 2021-12-18 NOTE — PROGRESS NOTES
12/18/21 0524   Vent Information   Vent Type 980   Vent Mode AC/VC   Vt Ordered 500 mL   Rate Set 20 bmp   Peak Flow 19 L/min   FiO2  35 %   SpO2 94 %   SpO2/FiO2 ratio 268.57   Sensitivity 3   PEEP/CPAP 5   Humidification Source HME   Nitric Oxide/Epoprostenol In Use? No   Vent Patient Data   Peak Inspiratory Pressure 19 cmH2O   Mean Airway Pressure 10.3 cmH20   Rate Measured 20 br/min   Vt Exhaled 512 mL   Spontaneous Breathing Trial (SBT) RT Doc   Pulse 104   Additional Respiratory  Assessments   Resp 20   Position Semi-Mendez's   Oral Care Completed?  No   Alarm Settings   High Pressure Alarm 55 cmH2O   Delay Alarm 20 sec(s)   Low Minute Volume Alarm 2.5 L/min   Apnea (secs) 20 secs   High Respiratory Rate 40 br/min   Low Exhaled Vt  250 mL

## 2021-12-18 NOTE — PLAN OF CARE
Problem: Falls - Risk of:  Goal: Will remain free from falls  Description: Will remain free from falls  12/18/2021 1719 by Soraya Valladares RN  Outcome: Ongoing  12/18/2021 0644 by Vaibhav Campbell RN  Outcome: Ongoing  Goal: Absence of physical injury  Description: Absence of physical injury  12/18/2021 1719 by Soraya Valladares RN  Outcome: Ongoing  12/18/2021 0644 by Vaibhav Campbell RN  Outcome: Ongoing     Problem: Airway Clearance - Ineffective  Goal: Achieve or maintain patent airway  12/18/2021 1719 by Soraya Valladares RN  Outcome: Ongoing  12/18/2021 0644 by Vaibhav Campbell RN  Outcome: Ongoing     Problem: Gas Exchange - Impaired  Goal: Absence of hypoxia  12/18/2021 1719 by Soraya Valladares RN  Outcome: Ongoing  12/18/2021 0644 by Vaibhav Campbell RN  Outcome: Ongoing  Goal: Promote optimal lung function  12/18/2021 1719 by Soraya Valladares RN  Outcome: Ongoing  12/18/2021 0644 by Vaibhav Campbell RN  Outcome: Ongoing     Problem: Breathing Pattern - Ineffective  Goal: Ability to achieve and maintain a regular respiratory rate  12/18/2021 1719 by Soraya Valladares RN  Outcome: Ongoing  12/18/2021 0644 by Vaibhav Campbell RN  Outcome: Ongoing     Problem:  Body Temperature -  Risk of, Imbalanced  Goal: Ability to maintain a body temperature within defined limits  12/18/2021 1719 by Soraya Valladares RN  Outcome: Ongoing  12/18/2021 0644 by Vaibhav Campbell RN  Outcome: Ongoing  Goal: Will regain or maintain usual level of consciousness  12/18/2021 1719 by Soraya Valladares RN  Outcome: Ongoing  12/18/2021 0644 by Vaibhav Campbell RN  Outcome: Ongoing  Goal: Complications related to the disease process, condition or treatment will be avoided or minimized  12/18/2021 1719 by Soraya Valladares RN  Outcome: Ongoing  12/18/2021 0644 by Vaibhav Campbell RN  Outcome: Ongoing     Problem: Isolation Precautions - Risk of Spread of Infection  Goal: Prevent transmission of infection  12/18/2021 1719 by Soraya Valladares RN  Outcome: Ongoing  12/18/2021 0644 by Turner Sapp RN  Outcome: Ongoing     Problem: Nutrition Deficits  Goal: Optimize nutritional status  12/18/2021 1719 by J Luis Amor RN  Outcome: Ongoing  12/18/2021 0644 by Turner Sapp RN  Outcome: Ongoing     Problem: Risk for Fluid Volume Deficit  Goal: Maintain normal heart rhythm  12/18/2021 1719 by J Luis Amor RN  Outcome: Ongoing  12/18/2021 0644 by Turner Sapp RN  Outcome: Ongoing  Goal: Maintain absence of muscle cramping  12/18/2021 1719 by J Luis Amor RN  Outcome: Ongoing  12/18/2021 0644 by Turner Sapp RN  Outcome: Ongoing  Goal: Maintain normal serum potassium, sodium, calcium, phosphorus, and pH  12/18/2021 1719 by J Luis Amor RN  Outcome: Ongoing  12/18/2021 0644 by Turner Sapp RN  Outcome: Ongoing     Problem: Loneliness or Risk for Loneliness  Goal: Demonstrate positive use of time alone when socialization is not possible  12/18/2021 1719 by J Luis Amor RN  Outcome: Ongoing  12/18/2021 0644 by Turner Sapp RN  Outcome: Ongoing     Problem: Fatigue  Goal: Verbalize increase energy and improved vitality  12/18/2021 1719 by J Luis Amor RN  Outcome: Ongoing  12/18/2021 0644 by Turner Sapp RN  Outcome: Ongoing     Problem: Patient Education: Go to Patient Education Activity  Goal: Patient/Family Education  12/18/2021 1719 by J Luis Amor RN  Outcome: Ongoing  12/18/2021 0644 by Turner Sapp RN  Outcome: Ongoing     Problem: Pain:  Goal: Pain level will decrease  Description: Pain level will decrease  12/18/2021 1719 by J Luis Amor RN  Outcome: Ongoing  12/18/2021 0644 by Turner Sapp RN  Outcome: Ongoing  Goal: Control of acute pain  Description: Control of acute pain  12/18/2021 1719 by J Luis Amor RN  Outcome: Ongoing  12/18/2021 0644 by Turner Sapp RN  Outcome: Ongoing  Goal: Control of chronic pain  Description: Control of chronic pain  12/18/2021 1719 by J Luis Amor RN  Outcome: Ongoing  12/18/2021 0644 by Hitesh Felix RN  Outcome: Ongoing     Problem: Skin Integrity:  Goal: Will show no infection signs and symptoms  Description: Will show no infection signs and symptoms  12/18/2021 1719 by Adriane Staton RN  Outcome: Ongoing  12/18/2021 0644 by Hitesh Felix RN  Outcome: Ongoing  Goal: Absence of new skin breakdown  Description: Absence of new skin breakdown  12/18/2021 1719 by Adriane Staton RN  Outcome: Ongoing  12/18/2021 0644 by Hitesh Felix RN  Outcome: Ongoing     Problem: Nutrition  Goal: Optimal nutrition therapy  12/18/2021 1719 by Adriane Staton RN  Outcome: Ongoing  12/18/2021 0644 by Hitesh Felix RN  Outcome: Ongoing

## 2021-12-18 NOTE — PROGRESS NOTES
12/18/21 0715   Oxygen Therapy/Pulse Ox   O2 Therapy Oxygen humidified   $Oxygen $Daily Charge   O2 Device Ventilator   FiO2  35 %   Resp 21   SpO2 93 %   Humidification Source HME   Pulse Oximeter Device Mode Continuous   $Pulse Oximeter $Spot check (multiple/continuous)   Blood Gas  Performed?  No

## 2021-12-19 ENCOUNTER — APPOINTMENT (OUTPATIENT)
Dept: GENERAL RADIOLOGY | Age: 76
DRG: 870 | End: 2021-12-19
Payer: COMMERCIAL

## 2021-12-19 LAB
ALBUMIN SERPL-MCNC: 3 GM/DL (ref 3.4–5)
ANION GAP SERPL CALCULATED.3IONS-SCNC: 11 MMOL/L (ref 4–16)
ANION GAP SERPL CALCULATED.3IONS-SCNC: 12 MMOL/L (ref 4–16)
ANION GAP SERPL CALCULATED.3IONS-SCNC: 8 MMOL/L (ref 4–16)
BASE EXCESS MIXED: 5.2 (ref 0–1.2)
BUN BLDV-MCNC: 114 MG/DL (ref 6–23)
BUN BLDV-MCNC: 117 MG/DL (ref 6–23)
BUN BLDV-MCNC: 123 MG/DL (ref 6–23)
CALCIUM SERPL-MCNC: 8.9 MG/DL (ref 8.3–10.6)
CALCIUM SERPL-MCNC: 9.1 MG/DL (ref 8.3–10.6)
CALCIUM SERPL-MCNC: 9.2 MG/DL (ref 8.3–10.6)
CARBON MONOXIDE, BLOOD: 3.8 % (ref 0–5)
CHLORIDE BLD-SCNC: 109 MMOL/L (ref 99–110)
CHLORIDE BLD-SCNC: 113 MMOL/L (ref 99–110)
CHLORIDE BLD-SCNC: 115 MMOL/L (ref 99–110)
CO2 CONTENT: 33.1 MMOL/L (ref 19–24)
CO2: 31 MMOL/L (ref 21–32)
CO2: 32 MMOL/L (ref 21–32)
CO2: 33 MMOL/L (ref 21–32)
COMMENT: ABNORMAL
CREAT SERPL-MCNC: 1.1 MG/DL (ref 0.9–1.3)
CREAT SERPL-MCNC: 1.1 MG/DL (ref 0.9–1.3)
CREAT SERPL-MCNC: 1.2 MG/DL (ref 0.9–1.3)
GFR AFRICAN AMERICAN: >60 ML/MIN/1.73M2
GFR NON-AFRICAN AMERICAN: 59 ML/MIN/1.73M2
GFR NON-AFRICAN AMERICAN: >60 ML/MIN/1.73M2
GFR NON-AFRICAN AMERICAN: >60 ML/MIN/1.73M2
GLUCOSE BLD-MCNC: 190 MG/DL (ref 70–99)
GLUCOSE BLD-MCNC: 217 MG/DL (ref 70–99)
GLUCOSE BLD-MCNC: 263 MG/DL (ref 70–99)
GLUCOSE BLD-MCNC: 298 MG/DL (ref 70–99)
GLUCOSE BLD-MCNC: 341 MG/DL (ref 70–99)
GLUCOSE BLD-MCNC: 342 MG/DL (ref 70–99)
GLUCOSE BLD-MCNC: 344 MG/DL (ref 70–99)
GLUCOSE BLD-MCNC: 354 MG/DL (ref 70–99)
GLUCOSE BLD-MCNC: 414 MG/DL (ref 70–99)
GRAM SMEAR: NORMAL
HCO3 ARTERIAL: 31.5 MMOL/L (ref 18–23)
HCT VFR BLD CALC: 43.3 % (ref 42–52)
HEMOGLOBIN: 12.7 GM/DL (ref 13.5–18)
Lab: NORMAL
MCH RBC QN AUTO: 28 PG (ref 27–31)
MCHC RBC AUTO-ENTMCNC: 29.3 % (ref 32–36)
MCV RBC AUTO: 95.4 FL (ref 78–100)
METHEMOGLOBIN ARTERIAL: 0.1 %
O2 SATURATION: 91.9 % (ref 96–97)
PCO2 ARTERIAL: 52 MMHG (ref 32–45)
PDW BLD-RTO: 18.2 % (ref 11.7–14.9)
PH BLOOD: 7.39 (ref 7.34–7.45)
PHOSPHORUS: 4.7 MG/DL (ref 2.5–4.9)
PLATELET # BLD: 293 K/CU MM (ref 140–440)
PMV BLD AUTO: 12.1 FL (ref 7.5–11.1)
PO2 ARTERIAL: 65 MMHG (ref 75–100)
POTASSIUM SERPL-SCNC: 4.5 MMOL/L (ref 3.5–5.1)
POTASSIUM SERPL-SCNC: 4.6 MMOL/L (ref 3.5–5.1)
POTASSIUM SERPL-SCNC: 4.8 MMOL/L (ref 3.5–5.1)
RBC # BLD: 4.54 M/CU MM (ref 4.6–6.2)
SODIUM BLD-SCNC: 154 MMOL/L (ref 135–145)
SODIUM BLD-SCNC: 155 MMOL/L (ref 135–145)
SODIUM BLD-SCNC: 155 MMOL/L (ref 135–145)
SPECIMEN: NORMAL
WBC # BLD: 14.4 K/CU MM (ref 4–10.5)

## 2021-12-19 PROCEDURE — 6370000000 HC RX 637 (ALT 250 FOR IP): Performed by: NURSE PRACTITIONER

## 2021-12-19 PROCEDURE — 89220 SPUTUM SPECIMEN COLLECTION: CPT

## 2021-12-19 PROCEDURE — 2000000000 HC ICU R&B

## 2021-12-19 PROCEDURE — 80048 BASIC METABOLIC PNL TOTAL CA: CPT

## 2021-12-19 PROCEDURE — 2500000003 HC RX 250 WO HCPCS: Performed by: INTERNAL MEDICINE

## 2021-12-19 PROCEDURE — 2580000003 HC RX 258: Performed by: INTERNAL MEDICINE

## 2021-12-19 PROCEDURE — 6370000000 HC RX 637 (ALT 250 FOR IP): Performed by: INTERNAL MEDICINE

## 2021-12-19 PROCEDURE — 99233 SBSQ HOSP IP/OBS HIGH 50: CPT | Performed by: INTERNAL MEDICINE

## 2021-12-19 PROCEDURE — 6360000002 HC RX W HCPCS: Performed by: INTERNAL MEDICINE

## 2021-12-19 PROCEDURE — 82962 GLUCOSE BLOOD TEST: CPT

## 2021-12-19 PROCEDURE — 82803 BLOOD GASES ANY COMBINATION: CPT

## 2021-12-19 PROCEDURE — 94003 VENT MGMT INPAT SUBQ DAY: CPT

## 2021-12-19 PROCEDURE — 94761 N-INVAS EAR/PLS OXIMETRY MLT: CPT

## 2021-12-19 PROCEDURE — 2580000003 HC RX 258: Performed by: NURSE PRACTITIONER

## 2021-12-19 PROCEDURE — 2060000000 HC ICU INTERMEDIATE R&B

## 2021-12-19 PROCEDURE — 36592 COLLECT BLOOD FROM PICC: CPT

## 2021-12-19 PROCEDURE — 85027 COMPLETE CBC AUTOMATED: CPT

## 2021-12-19 PROCEDURE — 71045 X-RAY EXAM CHEST 1 VIEW: CPT

## 2021-12-19 PROCEDURE — 2500000003 HC RX 250 WO HCPCS: Performed by: NURSE PRACTITIONER

## 2021-12-19 PROCEDURE — 2700000000 HC OXYGEN THERAPY PER DAY

## 2021-12-19 PROCEDURE — 36600 WITHDRAWAL OF ARTERIAL BLOOD: CPT

## 2021-12-19 PROCEDURE — 80069 RENAL FUNCTION PANEL: CPT

## 2021-12-19 PROCEDURE — 87205 SMEAR GRAM STAIN: CPT

## 2021-12-19 RX ORDER — SODIUM CHLORIDE 450 MG/100ML
INJECTION, SOLUTION INTRAVENOUS CONTINUOUS
Status: DISCONTINUED | OUTPATIENT
Start: 2021-12-19 | End: 2021-12-22

## 2021-12-19 RX ORDER — INSULIN GLARGINE 100 [IU]/ML
40 INJECTION, SOLUTION SUBCUTANEOUS NIGHTLY
Status: DISCONTINUED | OUTPATIENT
Start: 2021-12-19 | End: 2021-12-20

## 2021-12-19 RX ADMIN — INSULIN GLARGINE 40 UNITS: 100 INJECTION, SOLUTION SUBCUTANEOUS at 21:23

## 2021-12-19 RX ADMIN — SODIUM CHLORIDE, PRESERVATIVE FREE 10 ML: 5 INJECTION INTRAVENOUS at 20:46

## 2021-12-19 RX ADMIN — SODIUM CHLORIDE: 4.5 INJECTION, SOLUTION INTRAVENOUS at 10:23

## 2021-12-19 RX ADMIN — SODIUM CHLORIDE, PRESERVATIVE FREE 10 ML: 5 INJECTION INTRAVENOUS at 08:34

## 2021-12-19 RX ADMIN — CEFTRIAXONE SODIUM 1000 MG: 1 INJECTION, POWDER, FOR SOLUTION INTRAMUSCULAR; INTRAVENOUS at 03:36

## 2021-12-19 RX ADMIN — DEXMEDETOMIDINE 0.3 MCG/KG/HR: 100 INJECTION, SOLUTION, CONCENTRATE INTRAVENOUS at 02:56

## 2021-12-19 RX ADMIN — CHLORHEXIDINE GLUCONATE 0.12% ORAL RINSE 15 ML: 1.2 LIQUID ORAL at 20:46

## 2021-12-19 RX ADMIN — ALLOPURINOL 300 MG: 300 TABLET ORAL at 08:34

## 2021-12-19 RX ADMIN — APIXABAN 5 MG: 5 TABLET, FILM COATED ORAL at 10:23

## 2021-12-19 RX ADMIN — APIXABAN 5 MG: 5 TABLET, FILM COATED ORAL at 20:45

## 2021-12-19 RX ADMIN — ACETAMINOPHEN 650 MG: 325 TABLET ORAL at 20:45

## 2021-12-19 RX ADMIN — INSULIN HUMAN 30 UNITS: 100 INJECTION, SUSPENSION SUBCUTANEOUS at 08:46

## 2021-12-19 RX ADMIN — CHLORHEXIDINE GLUCONATE 0.12% ORAL RINSE 15 ML: 1.2 LIQUID ORAL at 08:34

## 2021-12-19 RX ADMIN — PRAVASTATIN SODIUM 20 MG: 10 TABLET ORAL at 20:46

## 2021-12-19 RX ADMIN — FAMOTIDINE 20 MG: 10 INJECTION, SOLUTION INTRAVENOUS at 20:46

## 2021-12-19 RX ADMIN — DEXAMETHASONE SODIUM PHOSPHATE 6 MG: 10 INJECTION INTRAMUSCULAR; INTRAVENOUS at 08:33

## 2021-12-19 RX ADMIN — POLYETHYLENE GLYCOL (3350) 17 G: 17 POWDER, FOR SOLUTION ORAL at 08:34

## 2021-12-19 RX ADMIN — FAMOTIDINE 20 MG: 10 INJECTION, SOLUTION INTRAVENOUS at 08:34

## 2021-12-19 RX ADMIN — TAMSULOSIN HYDROCHLORIDE 0.4 MG: 0.4 CAPSULE ORAL at 20:45

## 2021-12-19 ASSESSMENT — PULMONARY FUNCTION TESTS
PIF_VALUE: 20
PIF_VALUE: 19
PIF_VALUE: 19
PIF_VALUE: 20
PIF_VALUE: 20
PIF_VALUE: 15

## 2021-12-19 ASSESSMENT — PAIN SCALES - GENERAL
PAINLEVEL_OUTOF10: 1
PAINLEVEL_OUTOF10: 1
PAINLEVEL_OUTOF10: 0
PAINLEVEL_OUTOF10: 0

## 2021-12-19 NOTE — PROGRESS NOTES
Nephrology Progress Note  12/19/2021 10:01 AM  Subjective: Interval History: Mary Heath is a 77 y.o. male with sedated on the ventilator        Data:   Scheduled Meds:   insulin glargine  40 Units SubCUTAneous Nightly    insulin lispro  0-24 Units SubCUTAneous Q4H    insulin NPH  30 Units SubCUTAneous QAM    polyethylene glycol  17 g Oral Daily    chlorhexidine  15 mL Mouth/Throat BID    famotidine (PEPCID) injection  20 mg IntraVENous BID    lidocaine  5 mL IntraDERmal Once    sodium chloride flush  5-40 mL IntraVENous 2 times per day    morphine  2 mg IntraVENous Once    dexamethasone  6 mg IntraVENous Daily    metoprolol succinate  50 mg Oral BID    allopurinol  300 mg Oral Daily    apixaban  5 mg Oral BID    pravastatin  20 mg Oral Nightly    tamsulosin  0.4 mg Oral Nightly    sodium chloride flush  5-40 mL IntraVENous 2 times per day    cefTRIAXone (ROCEPHIN) IV  1,000 mg IntraVENous Q24H     Continuous Infusions:   dexmedetomidine (PRECEDEX) IV infusion 0.2 mcg/kg/hr (12/19/21 4356)    propofol Stopped (12/13/21 1443)    fentaNYL Stopped (12/19/21 1632)    sodium chloride      norepinephrine Stopped (12/19/21 0125)    dextrose      midazolam Stopped (12/18/21 0450)    sodium chloride           CBC   Recent Labs     12/18/21  0915 12/19/21  0430   WBC 14.9* 14.4*   HGB 13.2* 12.7*   HCT 44.0 43.3    293      BMP   Recent Labs     12/18/21  0553 12/18/21  2245 12/19/21  0430   * 150* 154*   K 3.9 4.5 4.6    109 109   CO2 31 34* 33*   PHOS  --   --  4.7   * 112* 114*   CREATININE 1.3 1.3 1.2     Hepatic:   Recent Labs     12/16/21  1100 12/18/21  0553   AST 17 22   ALT 8* 10   BILITOT 0.4 0.6   ALKPHOS 59 62     Troponin: No results for input(s): TROPONINI in the last 72 hours. BNP: No results for input(s): BNP in the last 72 hours. Lipids: No results for input(s): CHOL, HDL in the last 72 hours.     Invalid input(s): LDLCALCU  ABGs:   Lab Results Component Value Date    PO2ART 65 12/19/2021    IBF5KVY 52.0 12/19/2021     INR: No results for input(s): INR in the last 72 hours. Renal Labs  Albumin:    Lab Results   Component Value Date    LABALBU 3.0 12/19/2021     Calcium:    Lab Results   Component Value Date    CALCIUM 8.9 12/19/2021     Phosphorus:    Lab Results   Component Value Date    PHOS 4.7 12/19/2021     U/A:    Lab Results   Component Value Date    NITRU NEGATIVE 12/18/2021    COLORU COLORLESS 12/18/2021    WBCUA NONE SEEN 12/18/2021    RBCUA 31 12/18/2021    MUCUS RARE 12/18/2021    TRICHOMONAS NONE SEEN 12/18/2021    BACTERIA NEGATIVE 12/18/2021    CLARITYU CLEAR 12/18/2021    SPECGRAV 1.017 12/18/2021    UROBILINOGEN NORMAL 12/18/2021    BILIRUBINUR NEGATIVE 12/18/2021    BLOODU LARGE 12/18/2021    KETUA NEGATIVE 12/18/2021     ABG:    Lab Results   Component Value Date    SLX7WDL 52.0 12/19/2021    PO2ART 65 12/19/2021    GKU1DFG 31.5 12/19/2021     HgBA1c:    Lab Results   Component Value Date    LABA1C 7.3 12/18/2021     Microalbumen/Creatinine ratio:  No components found for: RUCREAT  TSH:  No results found for: TSH  IRON:    Lab Results   Component Value Date    IRON 48 05/07/2021     Iron Saturation:  No components found for: PERCENTFE  TIBC:    Lab Results   Component Value Date    TIBC 302 05/07/2021     FERRITIN:    Lab Results   Component Value Date    FERRITIN 57 05/07/2021     RPR:  No results found for: RPR  FREDRICK:    Lab Results   Component Value Date    ANATITER 1:160 02/12/2021    FREDRICK DETECTED 02/12/2021     24 Hour Urine for Creatinine Clearance:  No components found for: CREAT4, UHRS10, UTV10      Objective:   I/O: 12/18 0701 - 12/19 0700  In: 1792.8 [I.V.:285.9]  Out: 3135 [Urine:2675]  I/O last 3 completed shifts: In: 1792.8 [I.V.:285.9; NG/GT:1504; IV Piggyback:2.9]  Out: 9788 [Urine:2675]  No intake/output data recorded.   Vitals: /68   Pulse 67   Temp 97.5 °F (36.4 °C) (Rectal)   Resp (!) 32   Ht 5' 7.99\" (1.727 m)   Wt 216 lb 0.8 oz (98 kg)   SpO2 94%   BMI 32.86 kg/m²  {  General appearance: Sedated ET tube  HEENT: Head: Normal, normocephalic, atraumatic.   Neck: supple, symmetrical, trachea midline  Lungs: diminished breath sounds bilaterally  Heart: S1, S2 normal  Abdomen: abnormal findings:  soft nt  Extremities: edema trace  Neurologic: Mental status: alertness: Sedated        Assessment and Plan:      IMP:  #1 hypernatremia  #2 acute renal failure with increased uremia  #3 respiratory failure  #4 COVID-19 pneumonia  #5 status post pneumothorax tension  #6 CHF exacerbation  #7 type 2 diabetes  #8 complicated UTI/cystitis    Plan     #1 maintain free water to help with sodium  #2 creatinine holding at 1.2 in the setting of diuresis will monitor is try to cut back on diuretics  #3 wean vent as able  #4 maintain setting Covid  5 follow-up CT surgery  #6 maintain gentle diuresis if able  #7 try to tighten and improve glucose  #8 treat UTI  Will monitor and follow no acute needs for dialysis for now           Esvin Muñoz MD, MD

## 2021-12-19 NOTE — PROGRESS NOTES
Pulmonary and Critical Care  Progress Note      VITALS:  /65   Pulse 71   Temp 98.2 °F (36.8 °C) (Axillary)   Resp 15   Ht 5' 7.99\" (1.727 m)   Wt 216 lb 0.8 oz (98 kg)   SpO2 98%   BMI 32.86 kg/m²     Subjective:   CHIEF COMPLAINT :Fall     HPI:                The patient is on the vent and sedated. He failed the vent wean. Objective:   PHYSICAL EXAM:    LUNGS:Occasional basal crackles  Abd-soft, BS+,NT  Ext- no pedal edema  CVS-s1s2, no murmurs      DATA:    CBC:  Recent Labs     12/18/21  0915 12/19/21  0430   WBC 14.9* 14.4*   RBC 4.74 4.54*   HGB 13.2* 12.7*   HCT 44.0 43.3    293   MCV 92.8 95.4   MCH 27.8 28.0   MCHC 30.0* 29.3*   RDW 18.0* 18.2*      BMP:  Recent Labs     12/18/21  0553 12/18/21  2245 12/19/21  0430   * 150* 154*   K 3.9 4.5 4.6    109 109   CO2 31 34* 33*   * 112* 114*   CREATININE 1.3 1.3 1.2   CALCIUM 8.9 9.3 8.9   GLUCOSE 429* 254* 414*      ABG:  Recent Labs     12/17/21  0600 12/18/21  0600 12/19/21  0600   PH 7.55* 7.51* 7.39   PO2ART 123* 58* 65*   QIA9OOD 34.0 40.0 52.0*   O2SAT 96.6 89.9* 91.9*     BNP  No results found for: BNP   D-Dimer:  No results found for: [facility]   Radiology: The left apical pneumothorax is still identified.  Similar to 12/18/2021 but   smaller than 12/17/2021.       The multifocal pulmonary opacities are redemonstrated.  May have some   worsening atelectasis in the right lower lobe.      1.       Assessment/Plan     Patient Active Problem List    Diagnosis Date Noted    PNA (pneumonia) 12/09/2021    Troponin I above reference range 12/09/2021    Elevated antinuclear antibody (FREDRICK) level 02/16/2021    B12 deficiency 02/15/2021    Anemia 02/12/2021    Rectal bleed 11/18/2020    Acute hypokalemia 11/17/2020    Acute cystitis without hematuria     Leukocytosis     Sepsis ([facility] [address] 01/17/2020    BRBPR (bright red blood per rectum) 01/17/2020    Rectal bleeding 09/25/2018    NSTEMI (non-ST elevated myocardial infarction) ([facility] 06/25/2018    Upper GI bleed 03/07/2017    [facility]     Atrial flutter (HCC)     Atrial fibrillation (HCC)     Gout     Hyperlipidemia     Hypertension     Arthropathy    Hypernatremia  Acute on chronic hypoxic hypercapneic resp failure sec to Cardiogenic and non Cardiogenic Pulmonary edema  Systolic CHF with EF of 80-12%  Grade II Diastolic dysfunction  Bilateral Pneumonia sec to COVID-19  Obesity  DIVYA  Chronic Metabolic alkalosis  Left pneumothorax s/p left chest tube  VDRF  Proteus bacteremia       1. Free water  2. BMP in am  3. Abx  4. F/u C&S  5. Tube feeds  6. Keep sats > 92%  7. Decadron  8. CXR in am  9. Daily SAT and SBT trial  10. PT/OT  11.  C/w present management    Electronically signed by Hebert Carmichael MD on 12/19/2021 at 12:47 PM

## 2021-12-19 NOTE — CONSULTS
Comprehensive Nutrition Assessment    Type and Reason for Visit:  Reassess,Consult (TF order/mgt, noted for hyperglycemia)    Nutrition Recommendations/Plan:   Will change EN to: Peptide Based (Vital AF 1.2 Parker) at goal of 50 ml/hr with 2x Protein Modulars daily via NGT to provide 1648kcals, 142 gm protein per day. Nutrition Assessment:  Pt failed weaning SAB/SAT trials yesterday. Currently on SIMV. Remains intubated and on vent. Pt tolerating EN at 50 ml/hr. GRV <500 ml. No N/V/D. Current EN formula is with low carb, high protein content. No pressors at this time. Will switch to fiber-included, low carb, high protein formula to aid with BS regulation. Recommend advancing to goal as able to optimize nutritional needs, meeting protein needs will aid with BS regulation and wound healing. High nutrition risk. Malnutrition Assessment:  Malnutrition Status:  Insufficient data    Context:  Acute Illness       Estimated Daily Nutrient Needs:  Energy (kcal):  [facility] 2010); Weight Used for Energy Requirements:  Current     Protein (g):  140 (at least 2 g/kg IBW);  Weight Used for Protein Requirements:  Ideal        Fluid (ml/day):  fluids per neprhology; Method Used for Fluid Requirements:  Standard Renal      Nutrition Related Findings:  Rx: decadron, insulin; MAP 72, Sedated by precedex, Glu 414, Fi 02 35%      Wounds:  Multiple,Venous Stasis,Deep Tissue Injury       Current Nutrition Therapies:    ADULT TUBE FEEDING; Nasogastric; Peptide Based High Protein; Continuous; 10; Yes; 10; Q 4 hours; 80; 30; Q 4 hours  Current Tube Feeding (TF) Orders:  · Feeding Route: Nasogastric  · Formula: Peptide Based High Protein (Vital HP 1 Parker)  · Schedule: Continuous (50 ml/hr)  · Additives/Modulars:  (no protein modular ordered)  · Water Flushes: 30 Q 4 H  · Current TF & Flush Orders Provides: 50 ml/hr - 1200 kcals, 105 gm protein per day  · Goal TF & Flush Orders Provides: 80 ml/hr provides 1920 kcals, 168 gm protein    Additional Calorie Sources:   Propofol stopped due to patient's heart rate. Anthropometric Measures:  · Height: 5' 7.99\" (172.7 cm)  · Current Body Weight: 216 lb 0.8 oz (98 kg)   · Admission Body Weight: 233 lb 4 oz (105.8 kg) (first measured weight)    · Usual Body Weight:  (n/a)     · Ideal Body Weight: 154 lbs; % Ideal Body Weight 140.3 %   · BMI: 32.9  · BMI Categories: Obese Class 1 (BMI 30.0-34. 9)       Nutrition Diagnosis:   · Inadequate oral intake related to impaired respiratory function as evidenced by NPO or clear liquid status due to medical condition,intubation (fail to wean from vent)    · Altered nutrition-related lab values related to  (infection) as evidenced by lab values (high blood sugars)    Nutrition Interventions:   Food and/or Nutrient Delivery:  Modify Tube Feeding,Continue NPO  Nutrition Education/Counseling:  No recommendation at this time   Coordination of Nutrition Care:  Continue to monitor while inpatient,Coordination of Community Care    Goals:  Pt will meet greater than 75% of estimated nutrient needs via EN       Nutrition Monitoring and Evaluation:   Behavioral-Environmental Outcomes:  None Identified   Food/Nutrient Intake Outcomes:  Enteral Nutrition Intake/Tolerance  Physical Signs/Symptoms Outcomes:  Biochemical Data,GI Status,Weight,Skin     Discharge Planning:     Too soon to determine     Electronically signed by Megan Giordano RD, LD on 12/19/21 at 9:26 AM EST    Contact: [phone]

## 2021-12-19 NOTE — PROGRESS NOTES
Progress Note( Dr. Alyssa Castaneda)  12/19/2021  Subjective:   Admit Date: 12/8/2021  PCP: Leigh Colmenares MD    Admitted For :Admitted initially on December 9, 2021 with history of fall and found to have Covid pneumonia    Consulted For: Better control of blood glucose    Interval History: No major change    Patient is seen sedated, intubated and on ventilator on tube feeding      Intake/Output Summary (Last 24 hours) at 12/19/2021 1729  Last data filed at 12/19/2021 1400  Gross per 24 hour   Intake 2445.14 ml   Output 1725 ml   Net 720.14 ml       DATA    CBC:   Recent Labs     12/18/21  0915 12/19/21  0430   WBC 14.9* 14.4*   HGB 13.2* 12.7*    293    CMP:  Recent Labs     12/18/21  0553 12/18/21  0553 12/18/21  2245 12/19/21  0430 12/19/21  1250   *   < > 150* 154* 155*   K 3.9   < > 4.5 4.6 4.5      < > 109 109 113*   CO2 31   < > 34* 33* 31   *   < > 112* 114* 123*   CREATININE 1.3   < > 1.3 1.2 1.1   CALCIUM 8.9   < > 9.3 8.9 9.2   PROT 5.8*  --   --   --   --    LABALBU 3.0*  --   --  3.0*  --    BILITOT 0.6  --   --   --   --    ALKPHOS 62  --   --   --   --    AST 22  --   --   --   --    ALT 10  --   --   --   --     < > = values in this interval not displayed. Lipids:   Lab Results   Component Value Date    CHOL 137 06/26/2018    HDL 53 06/26/2018    TRIG 101 06/26/2018     Glucose:  Recent Labs     12/19/21  0822 12/19/21  1128 12/19/21  1637   POCGLU 354* 344* 190*     [id number]:  Lab Results   Component Value Date    LABA1C 7.3 12/18/2021     High Sensitivity TSH:   Lab Results   Component Value Date    TSHHS 3.280 05/07/2021     Free T3: No results found for: FT3  Free T4:No results found for: T4FREE    XR CHEST PORTABLE   Final Result   The left apical pneumothorax is still identified. Similar to 12/18/2021 but   smaller than 12/17/2021. The multifocal pulmonary opacities are redemonstrated. May have some   worsening atelectasis in the right lower lobe. XR CHEST PORTABLE   Final Result   Improving small left apical pneumothorax measuring 9 mm. New right lower lobe infiltrate may represent atelectasis versus pneumonia. Lines and tubes are stable      Stable mild pneumomediastinum. XR CHEST PORTABLE   Final Result   Interval development of a small left apical pneumothorax measuring   approximately 2.1 cm between pleural surfaces. Pneumomediastinum appears   increased when compared to the previous exam as well. Continued patchy opacity throughout both lungs, most compatible with   multifocal pneumonia. XR CHEST PORTABLE   Final Result   1. No appreciable left pneumothorax identified on the current exam.   2. Similar bilateral airspace opacities. 3. Support lines and tubes remain in place. XR CHEST PORTABLE   Final Result   1. Small stable left apical pneumothorax. 2. Bilateral airspace opacities are seen without significant change. XR CHEST PORTABLE   Final Result   Stable small left apical pneumothorax. Stable pulmonary vascular congestion along with interstitial opacities and   hazy airspace opacities, left worse than right which may be on the basis of   interstitial and pulmonary edema but can also be seen in the setting of   atypical pneumonia. XR CHEST PORTABLE   Final Result   Persistent small left apical pneumothorax estimated to be approximately 10%   in severity. Support tube/catheters project in stable/satisfactory position   in the frontal projection. Consolidating infiltrates and or pulmonary edema, cardiomegaly unchanged   compared to 12/14/2021 consistent with diffuse bilateral pneumonia and/or   congestive heart failure. XR CHEST PORTABLE   Final Result   Left chest tube remains in place, with slight interval decrease in size in   small left apical pneumothorax.          XR CHEST PORTABLE   Final Result   Persistent small left pneumothorax with 1 cm pleural apical separation, with   intervally placed left-sided chest tube. Similar diffuse bilateral airspace disease. XR ABDOMEN (KUB) (SINGLE AP VIEW)   Final Result   NG tube in place with tip and side port below the diaphragm and in the region   of the gastric body. XR CHEST PORTABLE   Final Result   Interval development of a large left tension pneumothorax. Extensive bilateral airspace opacities without significant change. Findings were discussed with Dr. Luis Owusu  at 7:43 am on 12/13/2021. XR CHEST PORTABLE   Final Result   Persistent bilateral airspace disease without acute interval change. XR CHEST PORTABLE   Final Result   Severe extensive bilateral airspace opacities worsened from the previous exam   compatible with edema, ARDS, or multifocal pneumonia. XR CHEST PORTABLE   Final Result   1. Cardiomegaly with severe congestive heart failure. VL DUP LOWER EXTREMITY VENOUS BILATERAL   Final Result   No evidence of DVT in either lower extremity within the limitations. Mild nonspecific subcutaneous edema to bilateral lower extremities. CT HEAD WO CONTRAST   Final Result   No acute intracranial abnormality. Age related changes including chronic small vessel ischemic disease and   cerebral atrophy. CT ABDOMEN PELVIS W IV CONTRAST Additional Contrast? None   Final Result   Multifocal consolidative changes both lungs worrisome for multifocal   pneumonia. Follow-up in following medical treatment course is recommended   document complete resolution. Multifocal bladder traumatic trabeculation/diverticula noted with slight   haziness of surrounding fat planes. Please correlate with diffuse urinalysis   findings. Is findings could suggest underlying cystitis. Prominence of the Alisson aortic lymph nodes again identified. These could be   reactive due to an underlying infectious inflammatory process such is the   bladder haziness. Units SubCUTAneous Nightly    insulin lispro  0-24 Units SubCUTAneous Q4H    insulin NPH  30 Units SubCUTAneous QAM    polyethylene glycol  17 g Oral Daily    chlorhexidine  15 mL Mouth/Throat BID    famotidine (PEPCID) injection  20 mg IntraVENous BID    lidocaine  5 mL IntraDERmal Once    sodium chloride flush  5-40 mL IntraVENous 2 times per day    morphine  2 mg IntraVENous Once    dexamethasone  6 mg IntraVENous Daily    metoprolol succinate  50 mg Oral BID    allopurinol  300 mg Oral Daily    apixaban  5 mg Oral BID    pravastatin  20 mg Oral Nightly    tamsulosin  0.4 mg Oral Nightly    sodium chloride flush  5-40 mL IntraVENous 2 times per day    cefTRIAXone (ROCEPHIN) IV  1,000 mg IntraVENous Q24H      Infusions:    sodium chloride 50 mL/hr at 12/19/21 1023    dexmedetomidine (PRECEDEX) IV infusion 0.2 mcg/kg/hr (12/19/21 4082)    propofol Stopped (12/13/21 1443)    fentaNYL Stopped (12/19/21 4070)    sodium chloride      norepinephrine Stopped (12/19/21 0125)    dextrose      midazolam Stopped (12/18/21 0450)    sodium chloride           Objective:   Vitals: /78   Pulse 76   Temp 98 °F (36.7 °C) (Axillary)   Resp 16   Ht 5' 7.99\" (1.727 m)   Wt 216 lb 0.8 oz (98 kg)   SpO2 96%   BMI 32.86 kg/m²   General appearance: Patient is sedated, intubated and on ventilator  Neck: no JVD or bruit  Thyroid : Normal lobes   Lungs: Has Vesicular Breath sounds intubated and on ventilator  Heart:  regular rate and rhythm  Abdomen: soft, non-tender; bowel sounds normal; no masses,  no organomegaly  Musculoskeletal: Normal  Extremities: extremities normal, , no edema  Neurologic: Patient is sedated, intubated and on ventilator    Assessment:     Patient Active Problem List:     Diabetes mellitus (HCC)     Atrial flutter (HCC)     Atrial fibrillation ([facility] [address]     Gout     Hyperlipidemia     Hypertension     Arthropathy     Upper GI bleed     NSTEMI (non-ST elevated myocardial infarction) ([facility] [address]     Rectal bleeding     Sepsis ([facility] [address]     BRBPR (bright red blood per rectum)     Acute cystitis without hematuria     Leukocytosis     Acute hypokalemia     Rectal bleed     Anemia     B12 deficiency     Elevated antinuclear antibody (FREDRICK) level     PNA (pneumonia)     Troponin I above reference range      Plan:     1. Reviewed POC blood glucose . Labs and X ray results   2. Reviewed Current Medicines   3. On Correction bolus Humalog/ Basal Lantus /NPH insulin regime  4. Monitor Blood glucose frequently   5. Modified  the dose of Insulin/ other medicines as needed   6. Will follow     .      Loc Preston MD, MD

## 2021-12-19 NOTE — PROGRESS NOTES
Hospitalist Progress Note      Name:  Mirella Alston /Age/Sex: 1945  (77 y.o. male)   MRN & CSN:  [id number] & [id number] Admission Date/Time: 2021  5:33 PM   Location:  -A PCP: Marcy Casanova MD         Hospital Day: 12    Assessment and Plan:     Patient, 43-year-old male with past medical history of atrial fibrillation/flutter, diabetes mellitus type 2, hyperlipidemia, hypertension was admitted on 2021 after an episode of fall. Reported generalized weakness. Patient tested positive for COVID-19 infection. X-ray chest suggestive of pneumonia. Also noted to have UTI. Patient was noted to have acute systolic/diastolic CHF exacerbation. Echocardiogram showed EF 35 to 40%. Patient received 1 dose of Tocilizumab 12/10/2021. Patient was seen by pulmonary/cardiology/cardiothoracic surgery. Patient developed left tension pneumothorax which required chest tube placement. Patient was also seen by urology secondary to CT finding of multifocal bladder traumatic trabeculation/diverticula. Recommended outpatient cystoscopy/TRUS. Assessment    Acute hypoxic respiratory failure secondary to COVID-19 pneumonia  Acute complicated cystitis  Sepsis secondary to above, present on admission  Left tension pneumothorax s/p chest tube placement 2021 to   Acute systolic/diastolic CHF exacerbation  Hyponatremia. Corrected sodium to blood glucose 154 mEq  S/p fall  Hypertension  Paroxysmal atrial fibrillation  Diabetes mellitus type 2  CAD. Constipation-no bowel movements register during the entire hospitalization stay  Chronic lower extremity wound    Plan  Currently intubated and sedated. On mechanical ventilation. Wean as tolerated  Continue IV Decadron, D10 today. Discontinue once patient is extubated or as per pulmonary recommendation  S/p Tocilizumab 12/10/2021  Currently on IV ceftriaxone, day 10 today. Continue antibiotic course a total of 2 weeks.   Urine Patient Class: Observation [104]   REQUIRED: Diagnosis: Vertigo [121364]   Estimated Length of Stay: Estimated stay of less than 2 midnights   Admitting Provider: Angle Pereira [[id number]]   Telemetry Bed Required?: Yes culture did not show any growth, however, unsure if the culture was collected after administration antibiotics  Lasix/Aldactone/Maxzide on hold given hyponatremia  Nephrology following  Monitor I's and O's. Insulin management per endocrinology  Dietitian consult for tube feeds given hyperglycemia. Start daily MiraLAX  Continue insulin sliding scale. Continue Eliquis/metoprolol  Telemetry monitoring    DVT prophylaxis-Eliquis twice daily      Diet ADULT TUBE FEEDING; Nasogastric; Peptide Based; Continuous; 40; Yes; 10; Q 4 hours; 50; 30; Q 4 hours; Protein; 2 ProteinX daily   DVT Prophylaxis [] Lovenox, []  Heparin, [] SCDs, [] Ambulation   GI Prophylaxis [] PPI,  [] H2 Blocker,  [] Carafate,  [] Diet/Tube Feeds   Code Status Full Code   Disposition Patient requires continued admission due to IV diuresis/antibiotics/mechanical ventilation   MDM [] Low, [] Moderate,[x]  High  Patient's risk as above due to acute hypoxic respiratory failure     History of Present Illness:     Patient seen and examined at bedside. Currently intubated and sedated. No acute overnight events noted. ROS unobtainable    Objective: Intake/Output Summary (Last 24 hours) at 12/19/2021 1131  Last data filed at 12/19/2021 0640  Gross per 24 hour   Intake 1792.75 ml   Output 2675 ml   Net -882.25 ml      Vitals:   Vitals:    12/19/21 0800   BP: 97/60   Pulse: 60   Resp: 16   Temp: 97.9 °F (36.6 °C)   SpO2: 96%     Physical Exam:   GEN Sedated male, sitting upright in bed in no apparent distress. Appears given age. RESP intubated on mechanical ventilation. Coarse breath sounds bilaterally. CARDIO/VASC S1/S2 auscultated. Regular rate   GI Abdomen is soft without significant tenderness, masses, or guarding. Bowel sounds are normoactive. Lane catheter draining clear urine  EXT   Lympedema swelling significantly decreased  NEURO sedated.      Medications:   Medications:    insulin glargine  40 Units SubCUTAneous Nightly    insulin lispro  0-24 Units SubCUTAneous Q4H    insulin NPH  30 Units SubCUTAneous QAM    polyethylene glycol  17 g Oral Daily    chlorhexidine  15 mL Mouth/Throat BID    famotidine (PEPCID) injection  20 mg IntraVENous BID    lidocaine  5 mL IntraDERmal Once    sodium chloride flush  5-40 mL IntraVENous 2 times per day    morphine  2 mg IntraVENous Once    dexamethasone  6 mg IntraVENous Daily    metoprolol succinate  50 mg Oral BID    allopurinol  300 mg Oral Daily    apixaban  5 mg Oral BID    pravastatin  20 mg Oral Nightly    tamsulosin  0.4 mg Oral Nightly    sodium chloride flush  5-40 mL IntraVENous 2 times per day    cefTRIAXone (ROCEPHIN) IV  1,000 mg IntraVENous Q24H      Infusions:    sodium chloride 50 mL/hr at 12/19/21 1023    dexmedetomidine (PRECEDEX) IV infusion 0.2 mcg/kg/hr (12/19/21 1666)    propofol Stopped (12/13/21 1443)    fentaNYL Stopped (12/19/21 2404)    sodium chloride      norepinephrine Stopped (12/19/21 0125)    dextrose      midazolam Stopped (12/18/21 0450)    sodium chloride       PRN Meds: hydrALAZINE (APRESOLINE) ivpb, 10 mg, Q4H PRN  sodium chloride flush, 5-40 mL, PRN  sodium chloride, 25 mL, PRN  glucose, 15 g, PRN  dextrose, 12.5 g, PRN  glucagon (rDNA), 1 mg, PRN  dextrose, 100 mL/hr, PRN  LORazepam, 1 mg, Q6H PRN  oxyCODONE, 5 mg, Q4H PRN  guaiFENesin-dextromethorphan, 5 mL, Q4H PRN  potassium chloride, 40 mEq, PRN   Or  potassium alternative oral replacement, 40 mEq, PRN   Or  potassium chloride, 10 mEq, PRN  sodium chloride flush, 5-40 mL, PRN  sodium chloride, 25 mL, PRN  ondansetron, 4 mg, Q8H PRN   Or  ondansetron, 4 mg, Q6H PRN  polyethylene glycol, 17 g, Daily PRN  acetaminophen, 650 mg, Q6H PRN   Or  acetaminophen, 650 mg, Q6H PRN          Electronically signed by Tanya Marcial MD on 12/19/2021 at 11:31 AM

## 2021-12-19 NOTE — PROGRESS NOTES
Pt raising bilateral extremities spontaneously and reaching for tubes. At this time pt is not following commands.  Precedex increased to obtain RASS goal.

## 2021-12-19 NOTE — PLAN OF CARE
Nutrition Problem #1: Inadequate oral intake  Intervention: Food and/or Nutrient Delivery: Modify Tube Feeding,Continue NPO  Nutritional Goals: Pt will meet greater than 75% of estimated nutrient needs via EN

## 2021-12-20 ENCOUNTER — APPOINTMENT (OUTPATIENT)
Dept: GENERAL RADIOLOGY | Age: 76
DRG: 870 | End: 2021-12-20
Payer: COMMERCIAL

## 2021-12-20 LAB
ALBUMIN SERPL-MCNC: 2.9 GM/DL (ref 3.4–5)
ANION GAP SERPL CALCULATED.3IONS-SCNC: 10 MMOL/L (ref 4–16)
ANION GAP SERPL CALCULATED.3IONS-SCNC: 7 MMOL/L (ref 4–16)
ANION GAP SERPL CALCULATED.3IONS-SCNC: 9 MMOL/L (ref 4–16)
BASE EXCESS MIXED: 6.2 (ref 0–1.2)
BUN BLDV-MCNC: 106 MG/DL (ref 6–23)
BUN BLDV-MCNC: 111 MG/DL (ref 6–23)
BUN BLDV-MCNC: 94 MG/DL (ref 6–23)
CALCIUM SERPL-MCNC: 8.7 MG/DL (ref 8.3–10.6)
CALCIUM SERPL-MCNC: 8.8 MG/DL (ref 8.3–10.6)
CALCIUM SERPL-MCNC: 9.1 MG/DL (ref 8.3–10.6)
CARBON MONOXIDE, BLOOD: 1.8 % (ref 0–5)
CHLORIDE BLD-SCNC: 113 MMOL/L (ref 99–110)
CO2 CONTENT: 31.9 MMOL/L (ref 19–24)
CO2: 29 MMOL/L (ref 21–32)
CO2: 32 MMOL/L (ref 21–32)
CO2: 32 MMOL/L (ref 21–32)
COMMENT: ABNORMAL
CREAT SERPL-MCNC: 0.8 MG/DL (ref 0.9–1.3)
CREAT SERPL-MCNC: 0.9 MG/DL (ref 0.9–1.3)
CREAT SERPL-MCNC: 1 MG/DL (ref 0.9–1.3)
GFR AFRICAN AMERICAN: >60 ML/MIN/1.73M2
GFR NON-AFRICAN AMERICAN: >60 ML/MIN/1.73M2
GLUCOSE BLD-MCNC: 193 MG/DL (ref 70–99)
GLUCOSE BLD-MCNC: 215 MG/DL (ref 70–99)
GLUCOSE BLD-MCNC: 218 MG/DL (ref 70–99)
GLUCOSE BLD-MCNC: 241 MG/DL (ref 70–99)
GLUCOSE BLD-MCNC: 244 MG/DL (ref 70–99)
GLUCOSE BLD-MCNC: 247 MG/DL (ref 70–99)
GLUCOSE BLD-MCNC: 284 MG/DL (ref 70–99)
GLUCOSE BLD-MCNC: 296 MG/DL (ref 70–99)
GLUCOSE BLD-MCNC: 332 MG/DL (ref 70–99)
HCO3 ARTERIAL: 30.6 MMOL/L (ref 18–23)
HCT VFR BLD CALC: 43 % (ref 42–52)
HEMOGLOBIN: 12.7 GM/DL (ref 13.5–18)
MCH RBC QN AUTO: 28 PG (ref 27–31)
MCHC RBC AUTO-ENTMCNC: 29.5 % (ref 32–36)
MCV RBC AUTO: 94.7 FL (ref 78–100)
METHEMOGLOBIN ARTERIAL: 1.2 %
O2 SATURATION: 92.6 % (ref 96–97)
PCO2 ARTERIAL: 42 MMHG (ref 32–45)
PDW BLD-RTO: 18.6 % (ref 11.7–14.9)
PH BLOOD: 7.47 (ref 7.34–7.45)
PHOSPHORUS: 3.6 MG/DL (ref 2.5–4.9)
PLATELET # BLD: 287 K/CU MM (ref 140–440)
PMV BLD AUTO: 11.6 FL (ref 7.5–11.1)
PO2 ARTERIAL: 66 MMHG (ref 75–100)
POTASSIUM SERPL-SCNC: 4.4 MMOL/L (ref 3.5–5.1)
POTASSIUM SERPL-SCNC: 4.4 MMOL/L (ref 3.5–5.1)
POTASSIUM SERPL-SCNC: 4.5 MMOL/L (ref 3.5–5.1)
RBC # BLD: 4.54 M/CU MM (ref 4.6–6.2)
SODIUM BLD-SCNC: 152 MMOL/L (ref 135–145)
SODIUM BLD-SCNC: 152 MMOL/L (ref 135–145)
SODIUM BLD-SCNC: 154 MMOL/L (ref 135–145)
WBC # BLD: 16.5 K/CU MM (ref 4–10.5)

## 2021-12-20 PROCEDURE — 36600 WITHDRAWAL OF ARTERIAL BLOOD: CPT

## 2021-12-20 PROCEDURE — 6370000000 HC RX 637 (ALT 250 FOR IP): Performed by: INTERNAL MEDICINE

## 2021-12-20 PROCEDURE — 6360000002 HC RX W HCPCS: Performed by: INTERNAL MEDICINE

## 2021-12-20 PROCEDURE — 71045 X-RAY EXAM CHEST 1 VIEW: CPT

## 2021-12-20 PROCEDURE — 80048 BASIC METABOLIC PNL TOTAL CA: CPT

## 2021-12-20 PROCEDURE — 6360000002 HC RX W HCPCS: Performed by: NURSE PRACTITIONER

## 2021-12-20 PROCEDURE — 94003 VENT MGMT INPAT SUBQ DAY: CPT

## 2021-12-20 PROCEDURE — 85027 COMPLETE CBC AUTOMATED: CPT

## 2021-12-20 PROCEDURE — 2500000003 HC RX 250 WO HCPCS: Performed by: NURSE PRACTITIONER

## 2021-12-20 PROCEDURE — 80053 COMPREHEN METABOLIC PANEL: CPT

## 2021-12-20 PROCEDURE — 2580000003 HC RX 258: Performed by: INTERNAL MEDICINE

## 2021-12-20 PROCEDURE — 6370000000 HC RX 637 (ALT 250 FOR IP): Performed by: NURSE PRACTITIONER

## 2021-12-20 PROCEDURE — 2580000003 HC RX 258: Performed by: NURSE PRACTITIONER

## 2021-12-20 PROCEDURE — 36592 COLLECT BLOOD FROM PICC: CPT

## 2021-12-20 PROCEDURE — 2500000003 HC RX 250 WO HCPCS: Performed by: INTERNAL MEDICINE

## 2021-12-20 PROCEDURE — 2060000000 HC ICU INTERMEDIATE R&B

## 2021-12-20 PROCEDURE — 2000000000 HC ICU R&B

## 2021-12-20 PROCEDURE — 89220 SPUTUM SPECIMEN COLLECTION: CPT

## 2021-12-20 PROCEDURE — 82803 BLOOD GASES ANY COMBINATION: CPT

## 2021-12-20 PROCEDURE — 82962 GLUCOSE BLOOD TEST: CPT

## 2021-12-20 PROCEDURE — 80069 RENAL FUNCTION PANEL: CPT

## 2021-12-20 PROCEDURE — 99233 SBSQ HOSP IP/OBS HIGH 50: CPT | Performed by: INTERNAL MEDICINE

## 2021-12-20 RX ORDER — MORPHINE SULFATE 2 MG/ML
2 INJECTION, SOLUTION INTRAMUSCULAR; INTRAVENOUS EVERY 4 HOURS PRN
Status: DISCONTINUED | OUTPATIENT
Start: 2021-12-20 | End: 2022-01-05 | Stop reason: HOSPADM

## 2021-12-20 RX ORDER — INSULIN GLARGINE 100 [IU]/ML
45 INJECTION, SOLUTION SUBCUTANEOUS NIGHTLY
Status: DISCONTINUED | OUTPATIENT
Start: 2021-12-20 | End: 2021-12-21

## 2021-12-20 RX ADMIN — CHLORHEXIDINE GLUCONATE 0.12% ORAL RINSE 15 ML: 1.2 LIQUID ORAL at 09:48

## 2021-12-20 RX ADMIN — SODIUM CHLORIDE: 4.5 INJECTION, SOLUTION INTRAVENOUS at 04:18

## 2021-12-20 RX ADMIN — SODIUM CHLORIDE, PRESERVATIVE FREE 10 ML: 5 INJECTION INTRAVENOUS at 21:19

## 2021-12-20 RX ADMIN — METOPROLOL SUCCINATE 50 MG: 50 TABLET, EXTENDED RELEASE ORAL at 09:47

## 2021-12-20 RX ADMIN — CHLORHEXIDINE GLUCONATE 0.12% ORAL RINSE 15 ML: 1.2 LIQUID ORAL at 21:19

## 2021-12-20 RX ADMIN — METOPROLOL SUCCINATE 50 MG: 50 TABLET, EXTENDED RELEASE ORAL at 21:19

## 2021-12-20 RX ADMIN — PRAVASTATIN SODIUM 20 MG: 10 TABLET ORAL at 21:19

## 2021-12-20 RX ADMIN — APIXABAN 5 MG: 5 TABLET, FILM COATED ORAL at 09:47

## 2021-12-20 RX ADMIN — SODIUM CHLORIDE, PRESERVATIVE FREE 10 ML: 5 INJECTION INTRAVENOUS at 09:47

## 2021-12-20 RX ADMIN — MORPHINE SULFATE 2 MG: 2 INJECTION, SOLUTION INTRAMUSCULAR; INTRAVENOUS at 22:03

## 2021-12-20 RX ADMIN — TAMSULOSIN HYDROCHLORIDE 0.4 MG: 0.4 CAPSULE ORAL at 21:19

## 2021-12-20 RX ADMIN — DEXAMETHASONE SODIUM PHOSPHATE 6 MG: 10 INJECTION INTRAMUSCULAR; INTRAVENOUS at 09:46

## 2021-12-20 RX ADMIN — CEFTRIAXONE SODIUM 1000 MG: 1 INJECTION, POWDER, FOR SOLUTION INTRAMUSCULAR; INTRAVENOUS at 03:08

## 2021-12-20 RX ADMIN — FAMOTIDINE 20 MG: 10 INJECTION, SOLUTION INTRAVENOUS at 21:19

## 2021-12-20 RX ADMIN — APIXABAN 5 MG: 5 TABLET, FILM COATED ORAL at 21:19

## 2021-12-20 RX ADMIN — SODIUM CHLORIDE, PRESERVATIVE FREE 10 ML: 5 INJECTION INTRAVENOUS at 09:48

## 2021-12-20 RX ADMIN — POLYETHYLENE GLYCOL (3350) 17 G: 17 POWDER, FOR SOLUTION ORAL at 09:47

## 2021-12-20 RX ADMIN — FAMOTIDINE 20 MG: 10 INJECTION, SOLUTION INTRAVENOUS at 09:46

## 2021-12-20 RX ADMIN — INSULIN GLARGINE 45 UNITS: 100 INJECTION, SOLUTION SUBCUTANEOUS at 22:04

## 2021-12-20 RX ADMIN — DEXMEDETOMIDINE 0.2 MCG/KG/HR: 100 INJECTION, SOLUTION, CONCENTRATE INTRAVENOUS at 03:01

## 2021-12-20 RX ADMIN — ALLOPURINOL 300 MG: 300 TABLET ORAL at 09:46

## 2021-12-20 ASSESSMENT — PULMONARY FUNCTION TESTS
PIF_VALUE: 23
PIF_VALUE: 23
PIF_VALUE: 20
PIF_VALUE: 20
PIF_VALUE: 24
PIF_VALUE: 20
PIF_VALUE: 23
PIF_VALUE: 21
PIF_VALUE: 20
PIF_VALUE: 20
PIF_VALUE: 18
PIF_VALUE: 20
PIF_VALUE: 20
PIF_VALUE: 24
PIF_VALUE: 21

## 2021-12-20 ASSESSMENT — PAIN SCALES - GENERAL
PAINLEVEL_OUTOF10: 5
PAINLEVEL_OUTOF10: 0
PAINLEVEL_OUTOF10: 5
PAINLEVEL_OUTOF10: 0
PAINLEVEL_OUTOF10: 0

## 2021-12-20 NOTE — PROGRESS NOTES
Physician Progress Note      PATIENTMarylen Dame  [facility] #:                  [id number]  :                       1945  ADMIT DATE:       2021 5:33 PM  [facility] DATE:  RESPONDING  PROVIDER #:        Lizzy GARAY          QUERY TEXT:    Pt admitted with Covid 19 pneumonia. Noted documentation of Sepsis with   Cystitis in the  PN by ordered Urology  consultant. If possible, please   document in progress notes and discharge summary:    The medical record reflects the following:  Risk Factors: Covid 19 Pneumonia, Cystitis , Acute respiratory failure  Clinical Indicators: Sepsis with Cystitis: CT a/p w contrast 21:   Multifocal bladder traumatic trabeculation/diverticula noted with slight   haziness of surrounding fat planes. Blood cultures 4/4 +Proteus Mirabilis,   pansensitives UA w pos nit, small leuks, >770 RBC's; urine cx <50,000 CFU/ml   mixed skin/urogenital mary jane  WBC 8.3, T-max 99.9 overnight  Treatment: On IV Rocephin, Recommend outpatient Cystoscopy/TRUS/Uroflow in the   near future. Lane removal/voiding trial prior to discharge, urine c&s. Urology consult, vented    Thank you Shea Thompson RN, CDS ([phone])  Options provided:  -- Sepsis with Cystitis confirmed present on admission  -- Sepsis with Cystitis confirmed not present on admission  -- Sepsis with Cystitis  ruled out  -- Defer to Urology consultant documentation regarding Sepsis with Cystitis  -- Other - I will add my own diagnosis  -- Disagree - Not applicable / Not valid  -- Disagree - Clinically unable to determine / Unknown  -- Refer to Clinical Documentation Reviewer    PROVIDER RESPONSE TEXT:    I defer to Urology consultant regarding documentation of Sepsis with Cystitis   .     Query created by: Garrison Hayden on 12/15/2021 11:39 AM      Electronically signed by:  Urmila Joseph 2021 1:13 PM

## 2021-12-20 NOTE — PROGRESS NOTES
This note also relates to the following rows which could not be included:  SpO2 - Cannot attach notes to unvalidated device data  Pulse - Cannot attach notes to unvalidated device data       12/20/21 0609   Vent Information   Vent Type 980   Vent Mode SIMV/VC   Vt Ordered 500 mL   Rate Set 12 bmp   Peak Flow 55 L/min   Pressure Support 15 cmH20   FiO2  30 %   Sensitivity 3   PEEP/CPAP 5   I Time/ I Time % 0 s   Humidification Source HME   Nitric Oxide/Epoprostenol In Use? No   Vent Patient Data   High Peep/I Pressure 0   Peak Inspiratory Pressure 24 cmH2O   Mean Airway Pressure 9.8 cmH20   Rate Measured 15 br/min   Vt Exhaled 884 mL   Minute Volume 9.14 Liters   I:E Ratio 1:4.00   Additional Respiratory  Assessments   Resp 13   Position Semi-Mendez's   Oral Care Completed?  No   Alarm Settings   High Pressure Alarm 55 cmH2O   Delay Alarm 20 sec(s)   Low Minute Volume Alarm 2.5 L/min   Apnea (secs) 20 secs   High Respiratory Rate 40 br/min   Low Exhaled Vt  250 mL   ETT (adult)   Placement Date/Time: 12/13/21 0615   Preoxygenation: Yes  Mask Ventilation: Ventilated by mask (1)  Technique: Rapid sequence  Type: Cuffed  Tube Size: 7.5 mm  Location: Oral  Insertion attempts: 1   Secured at 23 cm   Measured From Lips   ET Placement Right   Secured By Commercial tube reddy   Site Condition Dry

## 2021-12-20 NOTE — PROGRESS NOTES
12/19/21 2333   Vent Information   Vent Type 980   Vent Mode SIMV/VC   Vt Ordered 500 mL   Rate Set 12 bmp   Peak Flow 55 L/min   Pressure Support 15 cmH20   FiO2  30 %   SpO2 96 %   SpO2/FiO2 ratio 320   Sensitivity 3   PEEP/CPAP 5   I Time/ I Time % 0 s   Humidification Source HME   Nitric Oxide/Epoprostenol In Use? No   Vent Patient Data   High Peep/I Pressure 0   Peak Inspiratory Pressure 19 cmH2O   Mean Airway Pressure 9 cmH20   Rate Measured 15 br/min   Vt Exhaled 511 mL   Minute Volume 7.99 Liters   I:E Ratio 1:3.10   Plateau Pressure 16 [id number]   Static Compliance 39 mL/cmH2O   Total PEEP 5.8 cmH20   Auto PEEP 0.7 cmH20   Spontaneous Breathing Trial (SBT) RT Doc   Pulse 82   Additional Respiratory  Assessments   Resp 20   Position Semi-Mendez's   Oral Care Completed? No   Alarm Settings   High Pressure Alarm 55 cmH2O   Delay Alarm 20 sec(s)   Low Minute Volume Alarm 2.5 L/min   Apnea (secs) 20 secs   High Respiratory Rate 40 br/min   Low Exhaled Vt  250 mL   Patient Observation   Observations 7.5 ETT   ETT (adult)   Placement Date/Time: 12/13/21 (c) 0620   Preoxygenation: Yes  Mask Ventilation: Mask ventilation not attempted (0)  Technique: Video laryngoscopy  Tube Size: 7.5 mm  Laryngoscope: Mac  Blade Size: 4  Grade View: Full view of the glottis  Insertion att. ..    Secured at 24 cm   Measured From [address] [place] [address],[address] By Commercial tube reddy   Site Condition Dry

## 2021-12-20 NOTE — PROGRESS NOTES
Hospitalist Progress Note      Name:  Leopold Bruch /Age/Sex: 1945  (77 y.o. male)   MRN & CSN:  [id number] & [id number] Admission Date/Time: 2021  5:33 PM   Location:  -A PCP: Liz Goldsmith MD         Hospital Day: 13    Assessment and Plan:     Patient, 59-year-old male with past medical history of atrial fibrillation/flutter, diabetes mellitus type 2, hyperlipidemia, hypertension was admitted on 2021 after an episode of fall. Reported generalized weakness. Patient tested positive for COVID-19 infection. X-ray chest suggestive of pneumonia. Also noted to have UTI. Patient was noted to have acute systolic/diastolic CHF exacerbation. Echocardiogram showed EF 35 to 40%. Patient received 1 dose of Tocilizumab 12/10/2021. Patient was seen by pulmonary/cardiology/cardiothoracic surgery. Patient developed left tension pneumothorax which required chest tube placement. Patient was also seen by urology secondary to CT finding of multifocal bladder traumatic trabeculation/diverticula. Recommended outpatient cystoscopy/TRUS. Assessment    Acute hypoxic respiratory failure secondary to COVID-19 pneumonia  Acute complicated cystitis  Sepsis secondary to above, present on admission  Left tension pneumothorax s/p chest tube placement 2021 to   Acute systolic/diastolic CHF exacerbation  Hyponatremia. Corrected sodium to blood glucose 154 mEq  S/p fall  Hypertension  Paroxysmal atrial fibrillation  Diabetes mellitus type 2  CAD. Constipation-no bowel movements register during the entire hospitalization stay  Chronic lower extremity wound    Plan  Currently intubated and sedated. On mechanical ventilation. Wean as tolerated  Continue IV Decadron, D10 today. S/p Tocilizumab 12/10/2021  Currently on IV ceftriaxone, day 10 today. Continue antibiotic course a total of 2 weeks.   Urine culture did not show any growth, however, unsure if the culture was collected after administration antibiotics  Lasix/Aldactone/Maxzide on hold given hyponatremia  Nephrology following  Monitor I's and O's. Insulin management per endocrinology  Dietitian consult for tube feeds given hyperglycemia. Start daily MiraLAX  Continue insulin sliding scale. Continue Eliquis/metoprolol  Telemetry monitoring    DVT prophylaxis-Eliquis twice daily      Diet ADULT TUBE FEEDING; Nasogastric; Peptide Based; Continuous; 40; Yes; 10; Q 4 hours; 50; 30; Q 4 hours; Protein; 2 ProteinX daily   DVT Prophylaxis [] Lovenox, []  Heparin, [] SCDs, [] Ambulation   GI Prophylaxis [] PPI,  [] H2 Blocker,  [] Carafate,  [] Diet/Tube Feeds   Code Status Full Code   Disposition Patient requires continued admission due to IV diuresis/antibiotics/mechanical ventilation   MDM [] Low, [] Moderate,[x]  High  Patient's risk as above due to acute hypoxic respiratory failure     History of Present Illness:     Patient seen and examined at bedside. Currently intubated. Is waking up to decreasing sedation. ROS unobtainable    Objective: Intake/Output Summary (Last 24 hours) at 12/20/2021 1337  Last data filed at 12/20/2021 0948  Gross per 24 hour   Intake 2073.39 ml   Output 1450 ml   Net 623.39 ml      Vitals:   Vitals:    12/20/21 1330   BP: 115/71   Pulse: 71   Resp: 15   Temp:    SpO2: 97%     Physical Exam:   GEN Sedated male, sitting upright in bed in no apparent distress. Appears given age. RESP intubated on mechanical ventilation. Coarse breath sounds bilaterally. CARDIO/VASC S1/S2 auscultated. Regular rate   GI Abdomen is soft without significant tenderness, masses, or guarding. Bowel sounds are normoactive. Lane catheter draining clear urine  EXT   Lympedema swelling significantly decreased  NEURO sedated.      Medications:   Medications:    insulin glargine  45 Units SubCUTAneous Nightly    insulin NPH  35 Units SubCUTAneous QAM    insulin lispro  0-24 Units SubCUTAneous Q4H    polyethylene glycol  17 g Oral Daily    chlorhexidine  15 mL Mouth/Throat BID    famotidine (PEPCID) injection  20 mg IntraVENous BID    lidocaine  5 mL IntraDERmal Once    sodium chloride flush  5-40 mL IntraVENous 2 times per day    morphine  2 mg IntraVENous Once    dexamethasone  6 mg IntraVENous Daily    metoprolol succinate  50 mg Oral BID    allopurinol  300 mg Oral Daily    apixaban  5 mg Oral BID    pravastatin  20 mg Oral Nightly    tamsulosin  0.4 mg Oral Nightly    sodium chloride flush  5-40 mL IntraVENous 2 times per day    cefTRIAXone (ROCEPHIN) IV  1,000 mg IntraVENous Q24H      Infusions:    sodium chloride 50 mL/hr at 12/20/21 0418    dexmedetomidine (PRECEDEX) IV infusion 0.2 mcg/kg/hr (12/20/21 0301)    propofol Stopped (12/13/21 1443)    fentaNYL Stopped (12/19/21 4295)    sodium chloride      norepinephrine Stopped (12/19/21 0125)    dextrose      midazolam Stopped (12/18/21 0450)    sodium chloride       PRN Meds: hydrALAZINE (APRESOLINE) ivpb, 10 mg, Q4H PRN  sodium chloride flush, 5-40 mL, PRN  sodium chloride, 25 mL, PRN  glucose, 15 g, PRN  dextrose, 12.5 g, PRN  glucagon (rDNA), 1 mg, PRN  dextrose, 100 mL/hr, PRN  LORazepam, 1 mg, Q6H PRN  oxyCODONE, 5 mg, Q4H PRN  guaiFENesin-dextromethorphan, 5 mL, Q4H PRN  potassium chloride, 40 mEq, PRN   Or  potassium alternative oral replacement, 40 mEq, PRN   Or  potassium chloride, 10 mEq, PRN  sodium chloride flush, 5-40 mL, PRN  sodium chloride, 25 mL, PRN  ondansetron, 4 mg, Q8H PRN   Or  ondansetron, 4 mg, Q6H PRN  polyethylene glycol, 17 g, Daily PRN  acetaminophen, 650 mg, Q6H PRN   Or  acetaminophen, 650 mg, Q6H PRN          Electronically signed by Henry Lanza MD on 12/20/2021 at 1:37 PM

## 2021-12-20 NOTE — FLOWSHEET NOTE
Patient appears to becoming more alert. He turns his head toward the door upon entering. Turns his head to the side of the bed RN is working on. Patient squeezed strongly with right hand, weak grasp to left hand. Did not wiggle toes on command.

## 2021-12-20 NOTE — PROGRESS NOTES
12/20/21 0808   Vent Information   $Ventilation $Subsequent Day   Vent Type 980   Vent Mode SIMV/VC   Vt Ordered 500 mL   Rate Set 12 bmp   Peak Flow 55 L/min   Pressure Support 15 cmH20   FiO2  30 %   SpO2 95 %   SpO2/FiO2 ratio 316.67   Sensitivity 3   PEEP/CPAP 5   I Time/ I Time % 0 s   Humidification Source HME   Vent Patient Data   High Peep/I Pressure 0   Peak Inspiratory Pressure 23 cmH2O   Mean Airway Pressure 10 cmH20   Rate Measured 18 br/min   Vt Exhaled 507 mL   Minute Volume 9.34 Liters   I:E Ratio 1:2.20   Plateau Pressure 17 PND32   Static Compliance 41 mL/cmH2O   Cough/Sputum   Sputum How Obtained Endotracheal;Suctioned   $Obtained Sample $Induced Sputum   Cough Productive   Sputum Amount Small   Sputum Color Cloudy   Tenacity Thin   Spontaneous Breathing Trial (SBT) RT Doc   Pulse 75   Breath Sounds   Right Upper Lobe Diminished   Right Middle Lobe Diminished   Right Lower Lobe Diminished   Left Upper Lobe Diminished   Additional Respiratory  Assessments   Resp 18   Alarm Settings   High Pressure Alarm 55 cmH2O   Low Minute Volume Alarm 2.5 L/min   Apnea (secs) 20 secs   High Respiratory Rate 40 br/min   Low Exhaled Vt  250 mL   ETT (adult)   Placement Date/Time: 12/13/21 (c 0620   Preoxygenation: Yes  Mask Ventilation: Mask ventilation not attempted (0)  Technique: Video laryngoscopy  Tube Size: 7.5 mm  Laryngoscope: Mac  Blade Size: 4  Grade View: Full view of the glottis  Insertion att. ..    Secured at 24 cm   Measured From [address] [place] [address],[address] By [employer] tube reddy

## 2021-12-20 NOTE — PROGRESS NOTES
Pulmonary and Critical Care  Progress Note      VITALS:  /75   Pulse 65   Temp 99.4 °F (37.4 °C) (Rectal)   Resp 17   Ht 5' 7.99\" (1.727 m)   Wt 216 lb 0.8 oz (98 kg)   SpO2 96%   BMI 32.86 kg/m²     Subjective:   CHIEF COMPLAINT :Fall     HPI:                The patient is on the vent and sedated. He failed vent wean in the morning. Objective:   PHYSICAL EXAM:    LUNGS:Occasional basal crackles  Abd-soft, BS+,NT  Ext- no pedal edema  CVS-s1s2, no murmurs      DATA:    CBC:  Recent Labs     12/18/21  0915 12/19/21  0430 12/20/21  0415   WBC 14.9* 14.4* 16.5*   RBC 4.74 4.54* 4.54*   HGB 13.2* 12.7* 12.7*   HCT 44.0 43.3 43.0    293 287   MCV 92.8 95.4 94.7   MCH 27.8 28.0 28.0   MCHC 30.0* 29.3* 29.5*   RDW 18.0* 18.2* 18.6*      BMP:  Recent Labs     12/19/21  1250 12/19/21  2115 12/20/21  0415   * 155* 154*   K 4.5 4.8 4.4   * 115* 113*   CO2 31 32 32   * 117* 111*   CREATININE 1.1 1.1 1.0   CALCIUM 9.2 9.1 8.8   GLUCOSE 342* 263* 244*      ABG:  Recent Labs     12/18/21  0600 12/19/21  0600 12/20/21  0600   PH 7.51* 7.39 7.47*   PO2ART 58* 65* 66*   LIV0SQM 40.0 52.0* 42.0   O2SAT 89.9* 91.9* 92.6*     BNP  No results found for: BNP   D-Dimer:  No results found for: [facility]   Radiology:   Mild bilateral improvement in airspace opacities.  Trace left apical   pneumothorax is unchanged     1.        Assessment/Plan     Patient Active Problem List    Diagnosis Date Noted    PNA (pneumonia) 12/09/2021    Troponin I above reference range 12/09/2021    Elevated antinuclear antibody (FREDRICK) level 02/16/2021    B12 deficiency 02/15/2021    Anemia 02/12/2021    Rectal bleed 11/18/2020    Acute hypokalemia 11/17/2020    Acute cystitis without hematuria     Leukocytosis     [facility] 01/17/2020    BRBPR (bright red blood per rectum) 01/17/2020    Rectal bleeding 09/25/2018    NSTEMI (non-ST elevated myocardial infarction) ([facility] 06/25/2018    Upper GI bleed 03/07/2017  Diabetes mellitus ([facility]     Atrial flutter (HCC)     Atrial fibrillation (HCC)     Gout     Hyperlipidemia     Hypertension     Arthropathy    Hyperchloremic Hypernatremia  Acute on chronic hypoxic hypercapneic resp failure sec to Cardiogenic and non Cardiogenic Pulmonary edema  Systolic CHF with EF of 02-86%  Grade II Diastolic dysfunction  Bilateral Pneumonia sec to COVID-19  Obesity  DIVYA  Chronic Metabolic alkalosis  Left pneumothorax s/p left chest tube  VDRF  Proteus bacteremia          1. Free water  2. BMP in am  3. Abx  4. F/u C&S  5. Decadron  6. Insulin  7. Inhalers  8. Keep sats > 92%  9. PT/OR  10. Tube feeds  11. SAT and SBT trial in am  12.  C/w present management    Electronically signed by Karishma Ward MD on 12/20/2021 at 12:27 PM

## 2021-12-21 LAB
ALBUMIN SERPL-MCNC: 2.8 GM/DL (ref 3.4–5)
ANION GAP SERPL CALCULATED.3IONS-SCNC: 9 MMOL/L (ref 4–16)
ANION GAP SERPL CALCULATED.3IONS-SCNC: 9 MMOL/L (ref 4–16)
BASE EXCESS MIXED: 4.2 (ref 0–1.2)
BUN BLDV-MCNC: 78 MG/DL (ref 6–23)
BUN BLDV-MCNC: 86 MG/DL (ref 6–23)
CALCIUM SERPL-MCNC: 8.8 MG/DL (ref 8.3–10.6)
CALCIUM SERPL-MCNC: 8.8 MG/DL (ref 8.3–10.6)
CARBON MONOXIDE, BLOOD: 2.2 % (ref 0–5)
CHLORIDE BLD-SCNC: 116 MMOL/L (ref 99–110)
CHLORIDE BLD-SCNC: 116 MMOL/L (ref 99–110)
CO2 CONTENT: 29.6 MMOL/L (ref 19–24)
CO2: 29 MMOL/L (ref 21–32)
CO2: 29 MMOL/L (ref 21–32)
COMMENT: ABNORMAL
CREAT SERPL-MCNC: 0.5 MG/DL (ref 0.9–1.3)
CREAT SERPL-MCNC: 0.8 MG/DL (ref 0.9–1.3)
GFR AFRICAN AMERICAN: >60 ML/MIN/1.73M2
GFR AFRICAN AMERICAN: >60 ML/MIN/1.73M2
GFR NON-AFRICAN AMERICAN: >60 ML/MIN/1.73M2
GFR NON-AFRICAN AMERICAN: >60 ML/MIN/1.73M2
GLUCOSE BLD-MCNC: 154 MG/DL (ref 70–99)
GLUCOSE BLD-MCNC: 166 MG/DL (ref 70–99)
GLUCOSE BLD-MCNC: 172 MG/DL (ref 70–99)
GLUCOSE BLD-MCNC: 181 MG/DL (ref 70–99)
GLUCOSE BLD-MCNC: 186 MG/DL (ref 70–99)
GLUCOSE BLD-MCNC: 190 MG/DL (ref 70–99)
GLUCOSE BLD-MCNC: 217 MG/DL (ref 70–99)
GLUCOSE BLD-MCNC: 237 MG/DL (ref 70–99)
GLUCOSE BLD-MCNC: 256 MG/DL (ref 70–99)
HCO3 ARTERIAL: 28.4 MMOL/L (ref 18–23)
HCT VFR BLD CALC: 42 % (ref 42–52)
HEMOGLOBIN: 12.7 GM/DL (ref 13.5–18)
MCH RBC QN AUTO: 28.5 PG (ref 27–31)
MCHC RBC AUTO-ENTMCNC: 30.2 % (ref 32–36)
MCV RBC AUTO: 94.2 FL (ref 78–100)
METHEMOGLOBIN ARTERIAL: 1 %
O2 SATURATION: 91.2 % (ref 96–97)
PCO2 ARTERIAL: 40 MMHG (ref 32–45)
PDW BLD-RTO: 18.8 % (ref 11.7–14.9)
PH BLOOD: 7.46 (ref 7.34–7.45)
PHOSPHORUS: 2.7 MG/DL (ref 2.5–4.9)
PLATELET # BLD: 270 K/CU MM (ref 140–440)
PMV BLD AUTO: 12.5 FL (ref 7.5–11.1)
PO2 ARTERIAL: 65 MMHG (ref 75–100)
POTASSIUM SERPL-SCNC: 4.1 MMOL/L (ref 3.5–5.1)
POTASSIUM SERPL-SCNC: 4.4 MMOL/L (ref 3.5–5.1)
RBC # BLD: 4.46 M/CU MM (ref 4.6–6.2)
SODIUM BLD-SCNC: 154 MMOL/L (ref 135–145)
SODIUM BLD-SCNC: 154 MMOL/L (ref 135–145)
WBC # BLD: 17.8 K/CU MM (ref 4–10.5)

## 2021-12-21 PROCEDURE — 2580000003 HC RX 258: Performed by: INTERNAL MEDICINE

## 2021-12-21 PROCEDURE — 36600 WITHDRAWAL OF ARTERIAL BLOOD: CPT

## 2021-12-21 PROCEDURE — 2500000003 HC RX 250 WO HCPCS: Performed by: INTERNAL MEDICINE

## 2021-12-21 PROCEDURE — 6370000000 HC RX 637 (ALT 250 FOR IP): Performed by: INTERNAL MEDICINE

## 2021-12-21 PROCEDURE — 2500000003 HC RX 250 WO HCPCS: Performed by: NURSE PRACTITIONER

## 2021-12-21 PROCEDURE — 80048 BASIC METABOLIC PNL TOTAL CA: CPT

## 2021-12-21 PROCEDURE — 36592 COLLECT BLOOD FROM PICC: CPT

## 2021-12-21 PROCEDURE — 6360000002 HC RX W HCPCS: Performed by: INTERNAL MEDICINE

## 2021-12-21 PROCEDURE — 80053 COMPREHEN METABOLIC PANEL: CPT

## 2021-12-21 PROCEDURE — 2000000000 HC ICU R&B

## 2021-12-21 PROCEDURE — 6370000000 HC RX 637 (ALT 250 FOR IP): Performed by: NURSE PRACTITIONER

## 2021-12-21 PROCEDURE — 99233 SBSQ HOSP IP/OBS HIGH 50: CPT | Performed by: INTERNAL MEDICINE

## 2021-12-21 PROCEDURE — 89220 SPUTUM SPECIMEN COLLECTION: CPT

## 2021-12-21 PROCEDURE — 82962 GLUCOSE BLOOD TEST: CPT

## 2021-12-21 PROCEDURE — 2580000003 HC RX 258: Performed by: NURSE PRACTITIONER

## 2021-12-21 PROCEDURE — 80069 RENAL FUNCTION PANEL: CPT

## 2021-12-21 PROCEDURE — 94003 VENT MGMT INPAT SUBQ DAY: CPT

## 2021-12-21 PROCEDURE — 82803 BLOOD GASES ANY COMBINATION: CPT

## 2021-12-21 PROCEDURE — 2060000000 HC ICU INTERMEDIATE R&B

## 2021-12-21 PROCEDURE — 87040 BLOOD CULTURE FOR BACTERIA: CPT

## 2021-12-21 PROCEDURE — 85027 COMPLETE CBC AUTOMATED: CPT

## 2021-12-21 RX ORDER — INSULIN GLARGINE 100 [IU]/ML
55 INJECTION, SOLUTION SUBCUTANEOUS NIGHTLY
Status: DISCONTINUED | OUTPATIENT
Start: 2021-12-21 | End: 2021-12-25

## 2021-12-21 RX ADMIN — FAMOTIDINE 20 MG: 10 INJECTION, SOLUTION INTRAVENOUS at 08:58

## 2021-12-21 RX ADMIN — CHLORHEXIDINE GLUCONATE 0.12% ORAL RINSE 15 ML: 1.2 LIQUID ORAL at 08:57

## 2021-12-21 RX ADMIN — DEXMEDETOMIDINE 0.5 MCG/KG/HR: 100 INJECTION, SOLUTION, CONCENTRATE INTRAVENOUS at 00:16

## 2021-12-21 RX ADMIN — CHLORHEXIDINE GLUCONATE 0.12% ORAL RINSE 15 ML: 1.2 LIQUID ORAL at 21:18

## 2021-12-21 RX ADMIN — SODIUM CHLORIDE: 4.5 INJECTION, SOLUTION INTRAVENOUS at 17:12

## 2021-12-21 RX ADMIN — DEXAMETHASONE SODIUM PHOSPHATE 6 MG: 10 INJECTION INTRAMUSCULAR; INTRAVENOUS at 08:58

## 2021-12-21 RX ADMIN — FAMOTIDINE 20 MG: 10 INJECTION, SOLUTION INTRAVENOUS at 21:18

## 2021-12-21 RX ADMIN — CEFTRIAXONE SODIUM 1000 MG: 1 INJECTION, POWDER, FOR SOLUTION INTRAMUSCULAR; INTRAVENOUS at 04:24

## 2021-12-21 RX ADMIN — SODIUM CHLORIDE, PRESERVATIVE FREE 10 ML: 5 INJECTION INTRAVENOUS at 08:59

## 2021-12-21 RX ADMIN — APIXABAN 5 MG: 5 TABLET, FILM COATED ORAL at 08:58

## 2021-12-21 RX ADMIN — TAMSULOSIN HYDROCHLORIDE 0.4 MG: 0.4 CAPSULE ORAL at 21:19

## 2021-12-21 RX ADMIN — POLYETHYLENE GLYCOL (3350) 17 G: 17 POWDER, FOR SOLUTION ORAL at 08:57

## 2021-12-21 RX ADMIN — METOPROLOL SUCCINATE 50 MG: 50 TABLET, EXTENDED RELEASE ORAL at 08:57

## 2021-12-21 RX ADMIN — INSULIN GLARGINE 55 UNITS: 100 INJECTION, SOLUTION SUBCUTANEOUS at 21:18

## 2021-12-21 RX ADMIN — SODIUM CHLORIDE, PRESERVATIVE FREE 10 ML: 5 INJECTION INTRAVENOUS at 21:18

## 2021-12-21 RX ADMIN — ALLOPURINOL 300 MG: 300 TABLET ORAL at 08:59

## 2021-12-21 RX ADMIN — APIXABAN 5 MG: 5 TABLET, FILM COATED ORAL at 21:19

## 2021-12-21 RX ADMIN — PRAVASTATIN SODIUM 20 MG: 10 TABLET ORAL at 21:19

## 2021-12-21 RX ADMIN — SODIUM CHLORIDE: 4.5 INJECTION, SOLUTION INTRAVENOUS at 00:15

## 2021-12-21 RX ADMIN — SODIUM CHLORIDE, PRESERVATIVE FREE 10 ML: 5 INJECTION INTRAVENOUS at 21:39

## 2021-12-21 RX ADMIN — DEXMEDETOMIDINE 0.2 MCG/KG/HR: 100 INJECTION, SOLUTION, CONCENTRATE INTRAVENOUS at 15:38

## 2021-12-21 ASSESSMENT — PULMONARY FUNCTION TESTS
PIF_VALUE: 21
PIF_VALUE: 22
PIF_VALUE: 22
PIF_VALUE: 29
PIF_VALUE: 21
PIF_VALUE: 19
PIF_VALUE: 20
PIF_VALUE: 21
PIF_VALUE: 20
PIF_VALUE: 23

## 2021-12-21 ASSESSMENT — PAIN SCALES - GENERAL
PAINLEVEL_OUTOF10: 5
PAINLEVEL_OUTOF10: 1

## 2021-12-21 NOTE — PROGRESS NOTES
Nephrology Progress Note  12/21/2021 5:43 PM  Subjective: Interval History: Aleah Morejon is a 77 y.o. male sedated on the ventilator monitor        Data:   Scheduled Meds:   insulin glargine  55 Units SubCUTAneous Nightly    insulin NPH  35 Units SubCUTAneous QAM    insulin lispro  0-24 Units SubCUTAneous Q4H    polyethylene glycol  17 g Oral Daily    chlorhexidine  15 mL Mouth/Throat BID    famotidine (PEPCID) injection  20 mg IntraVENous BID    lidocaine  5 mL IntraDERmal Once    sodium chloride flush  5-40 mL IntraVENous 2 times per day    dexamethasone  6 mg IntraVENous Daily    metoprolol succinate  50 mg Oral BID    allopurinol  300 mg Oral Daily    apixaban  5 mg Oral BID    pravastatin  20 mg Oral Nightly    tamsulosin  0.4 mg Oral Nightly    sodium chloride flush  5-40 mL IntraVENous 2 times per day    cefTRIAXone (ROCEPHIN) IV  1,000 mg IntraVENous Q24H     Continuous Infusions:   sodium chloride 50 mL/hr at 12/21/21 1712    dexmedetomidine (PRECEDEX) IV infusion 0.2 mcg/kg/hr (12/21/21 1538)    propofol Stopped (12/13/21 1443)    fentaNYL Stopped (12/19/21 7898)    sodium chloride      norepinephrine Stopped (12/19/21 0125)    dextrose      midazolam Stopped (12/18/21 0450)    sodium chloride           CBC   Recent Labs     12/19/21  0430 12/20/21  0415 12/21/21  0415   WBC 14.4* 16.5* 17.8*   HGB 12.7* 12.7* 12.7*   HCT 43.3 43.0 42.0    287 270      BMP   Recent Labs     12/19/21  0430 12/19/21  1250 12/20/21  0415 12/20/21  0415 12/20/21  1210 12/20/21  2135 12/21/21  0415   *   < > 154*   < > 152* 152* 154*   K 4.6   < > 4.4   < > 4.5 4.4 4.4      < > 113*   < > 113* 113* 116*   CO2 33*   < > 32   < > 32 29 29   PHOS 4.7  --  3.6  --   --   --  2.7   *   < > 111*   < > 106* 94* 86*   CREATININE 1.2   < > 1.0   < > 0.9 0.8* 0.8*    < > = values in this interval not displayed.      Hepatic:   No results for input(s): AST, ALT, ALB, BILITOT, [I.V.:1371.1]  Out: 4792 [Urine:2450]  I/O last 3 completed shifts: In: 3562.7 [I.V.:1371.1; NG/GT:2097; IV Piggyback:94.6]  Out: 2306 [Urine:2450]  I/O this shift:  In: 682.9 [I.V.:596.9; NG/GT:86]  Out: 875 [Urine:875]  Vitals: /87   Pulse 77   Temp 99.2 °F (37.3 °C) (Rectal)   Resp 18   Ht 5' 7.99\" (1.727 m)   Wt 216 lb 0.8 oz (98 kg)   SpO2 99%   BMI 32.86 kg/m²  {  General appearance: Sedated ET tube  HEENT: Head: Normal, normocephalic, atraumatic.   Neck: supple, symmetrical, trachea midline  Lungs: diminished breath sounds bilaterally  Heart: S1, S2 normal  Abdomen: abnormal findings:  soft nt  Extremities: edema trace  Neurologic: Mental status: alertness: Sedated        Assessment and Plan:      IMP:  #1 hypernatremia  #2 acute renal failure with increased uremia  #3 respiratory failure  #4 COVID-19 pneumonia  #5 status post pneumothorax tension  #6 CHF exacerbation  #7 type 2 diabetes  #8 complicated UTI/cystitis    Plan     #1 sodium somewhat increased will maintain free water  #2 renal function stable  3 wean vent as able  4 supportive care in setting of Covid  #5 monitor urine output and volume  #6 monitor glucose tighter control  #7 UTI treat  For now maintain supportive care monitor closely           Faby Reeves MD, MD

## 2021-12-21 NOTE — PROGRESS NOTES
Nephrology Progress Note  12/20/2021 10:16 PM  Subjective: Interval History: Nat Slater is a 77 y.o. male sedated on the ventilator        Data:   Scheduled Meds:   insulin glargine  45 Units SubCUTAneous Nightly    insulin NPH  35 Units SubCUTAneous QAM    insulin lispro  0-24 Units SubCUTAneous Q4H    polyethylene glycol  17 g Oral Daily    chlorhexidine  15 mL Mouth/Throat BID    famotidine (PEPCID) injection  20 mg IntraVENous BID    lidocaine  5 mL IntraDERmal Once    sodium chloride flush  5-40 mL IntraVENous 2 times per day    dexamethasone  6 mg IntraVENous Daily    metoprolol succinate  50 mg Oral BID    allopurinol  300 mg Oral Daily    apixaban  5 mg Oral BID    pravastatin  20 mg Oral Nightly    tamsulosin  0.4 mg Oral Nightly    sodium chloride flush  5-40 mL IntraVENous 2 times per day    cefTRIAXone (ROCEPHIN) IV  1,000 mg IntraVENous Q24H     Continuous Infusions:   sodium chloride 50 mL/hr at 12/20/21 0418    dexmedetomidine (PRECEDEX) IV infusion 0.3 mcg/kg/hr (12/20/21 2020)    propofol Stopped (12/13/21 1443)    fentaNYL Stopped (12/19/21 0639)    sodium chloride      norepinephrine Stopped (12/19/21 0125)    dextrose      midazolam Stopped (12/18/21 0450)    sodium chloride           CBC   Recent Labs     12/18/21  0915 12/19/21  0430 12/20/21  0415   WBC 14.9* 14.4* 16.5*   HGB 13.2* 12.7* 12.7*   HCT 44.0 43.3 43.0    293 287      BMP   Recent Labs     12/19/21  0430 12/19/21  1250 12/19/21  2115 12/20/21  0415 12/20/21  1210   *   < > 155* 154* 152*   K 4.6   < > 4.8 4.4 4.5      < > 115* 113* 113*   CO2 33*   < > 32 32 32   PHOS 4.7  --   --  3.6  --    *   < > 117* 111* 106*   CREATININE 1.2   < > 1.1 1.0 0.9    < > = values in this interval not displayed. Hepatic:   Recent Labs     12/18/21  0553   AST 22   ALT 10   BILITOT 0.6   ALKPHOS 62     Troponin: No results for input(s): TROPONINI in the last 72 hours.   BNP: No results for input(s): BNP in the last 72 hours. Lipids: No results for input(s): CHOL, HDL in the last 72 hours. Invalid input(s): LDLCALCU  ABGs:   Lab Results   Component Value Date    PO2ART 66 12/20/2021    RIZ1UDQ 42.0 12/20/2021     INR: No results for input(s): INR in the last 72 hours. Renal Labs  Albumin:    Lab Results   Component Value Date    LABALBU 2.9 12/20/2021     Calcium:    Lab Results   Component Value Date    CALCIUM 8.7 12/20/2021     Phosphorus:    Lab Results   Component Value Date    PHOS 3.6 12/20/2021     U/A:    Lab Results   Component Value Date    NITRU NEGATIVE 12/18/2021    COLORU COLORLESS 12/18/2021    WBCUA NONE SEEN 12/18/2021    RBCUA 31 12/18/2021    MUCUS RARE 12/18/2021    TRICHOMONAS NONE SEEN 12/18/2021    BACTERIA NEGATIVE 12/18/2021    CLARITYU CLEAR 12/18/2021    SPECGRAV 1.017 12/18/2021    UROBILINOGEN NORMAL 12/18/2021    BILIRUBINUR NEGATIVE 12/18/2021    BLOODU LARGE 12/18/2021    KETUA NEGATIVE 12/18/2021     ABG:    Lab Results   Component Value Date    OMR3SVB 42.0 12/20/2021    PO2ART 66 12/20/2021    VFI7QZQ 30.6 12/20/2021     HgBA1c:    Lab Results   Component Value Date    LABA1C 7.3 12/18/2021     Microalbumen/Creatinine ratio:  No components found for: RUCREAT  TSH:  No results found for: TSH  IRON:    Lab Results   Component Value Date    IRON 48 05/07/2021     Iron Saturation:  No components found for: PERCENTFE  TIBC:    Lab Results   Component Value Date    TIBC 302 05/07/2021     FERRITIN:    Lab Results   Component Value Date    FERRITIN 57 05/07/2021     RPR:  No results found for: RPR  FREDRICK:    Lab Results   Component Value Date    ANATITER 1:160 02/12/2021    FREDRICK DETECTED 02/12/2021     24 Hour Urine for Creatinine Clearance:  No components found for: CREAT4, UHRS10, UTV10      Objective:   I/O: 12/19 0701 - 12/20 0700  In: 2053.4 [I.V.:195.4]  Out: 1900 [Urine:1900]  I/O last 3 completed shifts:   In: 1589 [I.V.:20; NG/GT:1401]  Out: 0429 [CQGIW:3644]  I/O this shift:  In: 1474.5 [I.V.:601.5; NG/GT:873]  Out: 1700 [Urine:1700]  Vitals: /78   Pulse 74   Temp 99.8 °F (37.7 °C) (Oral)   Resp 15   Ht 5' 7.99\" (1.727 m)   Wt 216 lb 0.8 oz (98 kg)   SpO2 99%   BMI 32.86 kg/m²  {  General appearance: Sedated ET tube  HEENT: Head: Normal, normocephalic, atraumatic.   Neck: supple, symmetrical, trachea midline  Lungs: diminished breath sounds bilaterally  Heart: S1, S2 normal  Abdomen: abnormal findings:  soft nt  Extremities: edema trace  Neurologic: Mental status: alertness: Sedated        Assessment and Plan:      IMP:  #1 hypernatremia  #2 acute renal failure with increased uremia  #3 respiratory failure  #4 COVID-19 pneumonia  #5 status post pneumothorax tension  #6 CHF exacerbation  #7 type 2 diabetes  #8 complicated UTI/cystitis    Plan     #1 sodium somewhat increased will monitor  #2 increased BUN and above setting of normal renal function no acute needs for dialysis yet  #3 monitor on vent try to diurese  #4 maintain therapy for Covid  #5 follow-up with CT surgery  #6 maintain diuresis  #7 medial tight glucose control  #7 maintain on antibiotic therapy  Will monitor and follow           Kevon Huber MD, MD

## 2021-12-21 NOTE — PROGRESS NOTES
Pt is awake, becoming increasing restless/agitated. He is refusing mouthcare and suctioning. Precedex titrated per orders.

## 2021-12-21 NOTE — PROGRESS NOTES
Hospitalist Progress Note      Name:  Caty Boone /Age/Sex: 1945  (77 y.o. male)   MRN & CSN:  [id number] & [id number] Admission Date/Time: 2021  5:33 PM   Location:  -A PCP: Cookie Valadez MD         Hospital Day: 14    Assessment and Plan:     Patient, 78-year-old male with past medical history of atrial fibrillation/flutter, diabetes mellitus type 2, hyperlipidemia, hypertension was admitted on 2021 after an episode of fall. Reported generalized weakness. Patient tested positive for COVID-19 infection. X-ray chest suggestive of pneumonia. Also noted to have UTI. Patient was noted to have acute systolic/diastolic CHF exacerbation. Echocardiogram showed EF 35 to 40%. Patient received 1 dose of Tocilizumab 12/10/2021. Patient was seen by pulmonary/cardiology/cardiothoracic surgery. Patient developed left tension pneumothorax which required chest tube placement. Patient was also seen by urology secondary to CT finding of multifocal bladder traumatic trabeculation/diverticula. Recommended outpatient cystoscopy/TRUS. Assessment    Acute hypoxic respiratory failure secondary to COVID-19 pneumonia  Acute complicated cystitis  Sepsis secondary to above, present on admission  Left tension pneumothorax s/p chest tube placement 2021 to   Acute systolic/diastolic CHF exacerbation  Hyponatremia. Corrected sodium to blood glucose 154 mEq  S/p fall  Hypertension  Paroxysmal atrial fibrillation  Diabetes mellitus type 2  CAD. Plan  Currently intubated. On mechanical ventilation. Wean as tolerated  Continue IV Decadron, D10 today. S/p Tocilizumab 12/10/2021  Currently on IV ceftriaxone, day 13 today. Will discontinue antibiotic tomorrow. Lasix/Aldactone/Maxzide on hold given hyponatremia  Nephrology following  Monitor I's and O's. Insulin management per endocrinology  Dietitian consult for tube feeds given hyperglycemia.   Start daily MiraLAX  Continue insulin sliding scale. Continue Eliquis/metoprolol  Telemetry monitoring    DVT prophylaxis-Eliquis twice daily      Diet ADULT TUBE FEEDING; Nasogastric; Peptide Based; Continuous; 40; Yes; 10; Q 4 hours; 50; 30; Q 4 hours; Protein; 2 ProteinX daily   DVT Prophylaxis [] Lovenox, []  Heparin, [] SCDs, [] Ambulation   GI Prophylaxis [] PPI,  [] H2 Blocker,  [] Carafate,  [] Diet/Tube Feeds   Code Status Full Code   Disposition Patient requires continued admission due to IV diuresis/antibiotics/mechanical ventilation   MDM [] Low, [] Moderate,[x]  High  Patient's risk as above due to acute hypoxic respiratory failure     History of Present Illness:     Patient seen and examined at bedside. Currently intubated. Awake, following commands    ROS unobtainable    Objective: Intake/Output Summary (Last 24 hours) at 12/21/2021 1448  Last data filed at 12/21/2021 0859  Gross per 24 hour   Intake 3562.74 ml   Output 2450 ml   Net 1112.74 ml      Vitals:   Vitals:    12/21/21 1300   BP: 110/79   Pulse: 67   Resp: 17   Temp:    SpO2: 96%     Physical Exam:   GEN Awake male, sitting upright in bed in no apparent distress. Appears given age. RESP intubated on mechanical ventilation. Coarse breath sounds bilaterally. CARDIO/VASC S1/S2 auscultated. Regular rate   GI Abdomen is soft without significant tenderness, masses, or guarding. Bowel sounds are normoactive. Lane catheter draining clear urine  EXT   Lympedema swelling significantly decreased  NEURO Awake.  Following commands    Medications:   Medications:    insulin glargine  55 Units SubCUTAneous Nightly    insulin NPH  35 Units SubCUTAneous QAM    insulin lispro  0-24 Units SubCUTAneous Q4H    polyethylene glycol  17 g Oral Daily    chlorhexidine  15 mL Mouth/Throat BID    famotidine (PEPCID) injection  20 mg IntraVENous BID    lidocaine  5 mL IntraDERmal Once    sodium chloride flush  5-40 mL IntraVENous 2 times per day    dexamethasone  6 mg IntraVENous Daily    metoprolol succinate  50 mg Oral BID    allopurinol  300 mg Oral Daily    apixaban  5 mg Oral BID    pravastatin  20 mg Oral Nightly    tamsulosin  0.4 mg Oral Nightly    sodium chloride flush  5-40 mL IntraVENous 2 times per day    cefTRIAXone (ROCEPHIN) IV  1,000 mg IntraVENous Q24H      Infusions:    sodium chloride 50 mL/hr at 12/21/21 0617    dexmedetomidine (PRECEDEX) IV infusion 0.2 mcg/kg/hr (12/21/21 0702)    propofol Stopped (12/13/21 1443)    fentaNYL Stopped (12/19/21 1357)    sodium chloride      norepinephrine Stopped (12/19/21 0125)    dextrose      midazolam Stopped (12/18/21 0450)    sodium chloride       PRN Meds: morphine, 2 mg, Q4H PRN  magic (miracle) mouthwash, 5 mL, 4x Daily PRN  hydrALAZINE (APRESOLINE) ivpb, 10 mg, Q4H PRN  sodium chloride flush, 5-40 mL, PRN  sodium chloride, 25 mL, PRN  glucose, 15 g, PRN  dextrose, 12.5 g, PRN  glucagon (rDNA), 1 mg, PRN  dextrose, 100 mL/hr, PRN  LORazepam, 1 mg, Q6H PRN  oxyCODONE, 5 mg, Q4H PRN  guaiFENesin-dextromethorphan, 5 mL, Q4H PRN  potassium chloride, 40 mEq, PRN   Or  potassium alternative oral replacement, 40 mEq, PRN   Or  potassium chloride, 10 mEq, PRN  sodium chloride flush, 5-40 mL, PRN  sodium chloride, 25 mL, PRN  ondansetron, 4 mg, Q8H PRN   Or  ondansetron, 4 mg, Q6H PRN  polyethylene glycol, 17 g, Daily PRN  acetaminophen, 650 mg, Q6H PRN   Or  acetaminophen, 650 mg, Q6H PRN          Electronically signed by Ivy Piedra MD on 12/21/2021 at 2:48 PM

## 2021-12-21 NOTE — PROGRESS NOTES
Pulmonary and Critical Care  Progress Note      VITALS:  /79   Pulse 67   Temp 99.3 °F (37.4 °C) (Rectal)   Resp 17   Ht 5' 7.99\" (1.727 m)   Wt 216 lb 0.8 oz (98 kg)   SpO2 96%   BMI 32.86 kg/m²     Subjective:   CHIEF COMPLAINT :Fall     HPI:                The patient is on the vent and sedated. He is not in acute resp distress. He is wide awake and following commands    Objective:   PHYSICAL EXAM:    LUNGS:Occasional basal crackles  Abd-soft, BS+,NT  Ext- no pedal edema  CVS-s1s2, no murmurs      DATA:    CBC:  Recent Labs     12/19/21  0430 12/20/21  0415 12/21/21  0415   WBC 14.4* 16.5* 17.8*   RBC 4.54* 4.54* 4.46*   HGB 12.7* 12.7* 12.7*   HCT 43.3 43.0 42.0    287 270   MCV 95.4 94.7 94.2   MCH 28.0 28.0 28.5   MCHC 29.3* 29.5* 30.2*   RDW 18.2* 18.6* 18.8*      BMP:  Recent Labs     12/20/21  1210 12/20/21  2135 12/21/21  0415   * 152* 154*   K 4.5 4.4 4.4   * 113* 116*   CO2 32 29 29   * 94* 86*   CREATININE 0.9 0.8* 0.8*   CALCIUM 8.7 9.1 8.8   GLUCOSE 247* 332* 237*      ABG:  Recent Labs     12/19/21  0600 12/20/21  0600 12/21/21  0600   PH 7.39 7.47* 7.46*   PO2ART 65* 66* 65*   MYW1DKL 52.0* 42.0 40.0   O2SAT 91.9* 92.6* 91.2*     BNP  No results found for: BNP   D-Dimer:  No results found for: DDIMER   1.  Radiology: None      Assessment/Plan     Patient Active Problem List    Diagnosis Date Noted    PNA (pneumonia) 12/09/2021    Troponin I above reference range 12/09/2021    Elevated antinuclear antibody (FREDRICK) level 02/16/2021    B12 deficiency 02/15/2021    Anemia 02/12/2021    Rectal bleed 11/18/2020    Acute hypokalemia 11/17/2020    Acute cystitis without hematuria     Leukocytosis     [facility] 01/17/2020    BRBPR (bright red blood per rectum) 01/17/2020    Rectal bleeding 09/25/2018    NSTEMI (non-ST elevated myocardial infarction) ([facility] 06/25/2018    Upper GI bleed 03/07/2017    Diabetes mellitus ([facility]     Atrial flutter ([facility]     Atrial fibrillation (HCC)     Gout     Hyperlipidemia     Hypertension     Arthropathy    Hyperchloremic Hypernatremia  Acute on chronic hypoxic hypercapneic resp failure sec to Cardiogenic and non Cardiogenic Pulmonary edema  Systolic CHF with EF of 83-18%  Grade II Diastolic dysfunction  Bilateral Pneumonia sec to COVID-19  Obesity  DIVYA  Chronic Metabolic alkalosis  Left pneumothorax s/p left chest tube  VDRF  Proteus bacteremia       1. Free water  2. BMP in am  3. abx  4. F/u C&s  5. Decadron  6. Insulin  7. Inhalers  8. Tube feeds  9. PT/OT  10. Keep sats > 88%  11. Daily SAT and SBT trial  12. CXR in am  15. C.w present management  14.  Extubate in am    Electronically signed by Mary Ellen Olmos MD on 12/21/2021 at 3:09 PM

## 2021-12-21 NOTE — PROGRESS NOTES
Pt is alert, following commands and nodding yes or no. Pt is calm and relaxed on precedex but did nod that he is in pain. PS hospitalist for orders.

## 2021-12-21 NOTE — PROGRESS NOTES
Progress Note( Dr. Enrique Merchant)  12/20/2021  Subjective:   Admit Date: 12/8/2021  PCP: Robinson Valdez MD    Admitted For :Admitted initially on December 9, 2021 with history of fall and found to have Covid pneumonia    Consulted For: Better control of blood glucose    Interval History: No major change    Patient is seen sedated, intubated and on ventilator on tube feeding      Intake/Output Summary (Last 24 hours) at 12/20/2021 2256  Last data filed at 12/20/2021 2147  Gross per 24 hour   Intake 2895.45 ml   Output 2450 ml   Net 445.45 ml       DATA    CBC:   Recent Labs     12/18/21  0915 12/19/21  0430 12/20/21  0415   WBC 14.9* 14.4* 16.5*   HGB 13.2* 12.7* 12.7*    293 287    CMP:  Recent Labs     12/18/21  0553 12/18/21  2245 12/19/21  0430 12/19/21  1250 12/20/21  0415 12/20/21  1210 12/20/21  2135   *   < > 154*   < > 154* 152* 152*   K 3.9   < > 4.6   < > 4.4 4.5 4.4      < > 109   < > 113* 113* 113*   CO2 31   < > 33*   < > 32 32 29   *   < > 114*   < > 111* 106* 94*   CREATININE 1.3   < > 1.2   < > 1.0 0.9 0.8*   CALCIUM 8.9   < > 8.9   < > 8.8 8.7 9.1   PROT 5.8*  --   --   --   --   --   --    LABALBU 3.0*  --  3.0*  --  2.9*  --   --    BILITOT 0.6  --   --   --   --   --   --    ALKPHOS 62  --   --   --   --   --   --    AST 22  --   --   --   --   --   --    ALT 10  --   --   --   --   --   --     < > = values in this interval not displayed.      Lipids:   Lab Results   Component Value Date    CHOL 137 06/26/2018    HDL 53 06/26/2018    TRIG 101 06/26/2018     Glucose:  Recent Labs     12/20/21  1311 12/20/21  1646 12/20/21  2135   POCGLU 241* 296* 284*     [id number]:  Lab Results   Component Value Date    LABA1C 7.3 12/18/2021     High Sensitivity TSH:   Lab Results   Component Value Date    TSHHS 3.280 05/07/2021     Free T3: No results found for: FT3  Free T4:No results found for: T4FREE    XR CHEST PORTABLE   Final Result   Mild bilateral improvement in airspace opacities. Trace left apical   pneumothorax is unchanged. XR CHEST PORTABLE   Final Result   The left apical pneumothorax is still identified. Similar to 12/18/2021 but   smaller than 12/17/2021. The multifocal pulmonary opacities are redemonstrated. May have some   worsening atelectasis in the right lower lobe. XR CHEST PORTABLE   Final Result   Improving small left apical pneumothorax measuring 9 mm. New right lower lobe infiltrate may represent atelectasis versus pneumonia. Lines and tubes are stable      Stable mild pneumomediastinum. XR CHEST PORTABLE   Final Result   Interval development of a small left apical pneumothorax measuring   approximately 2.1 cm between pleural surfaces. Pneumomediastinum appears   increased when compared to the previous exam as well. Continued patchy opacity throughout both lungs, most compatible with   multifocal pneumonia. XR CHEST PORTABLE   Final Result   1. No appreciable left pneumothorax identified on the current exam.   2. Similar bilateral airspace opacities. 3. Support lines and tubes remain in place. XR CHEST PORTABLE   Final Result   1. Small stable left apical pneumothorax. 2. Bilateral airspace opacities are seen without significant change. XR CHEST PORTABLE   Final Result   Stable small left apical pneumothorax. Stable pulmonary vascular congestion along with interstitial opacities and   hazy airspace opacities, left worse than right which may be on the basis of   interstitial and pulmonary edema but can also be seen in the setting of   atypical pneumonia. XR CHEST PORTABLE   Final Result   Persistent small left apical pneumothorax estimated to be approximately 10%   in severity. Support tube/catheters project in stable/satisfactory position   in the frontal projection.       Consolidating infiltrates and or pulmonary edema, cardiomegaly unchanged   compared to 12/14/2021 consistent with diffuse bilateral pneumonia and/or   congestive heart failure. XR CHEST PORTABLE   Final Result   Left chest tube remains in place, with slight interval decrease in size in   small left apical pneumothorax. XR CHEST PORTABLE   Final Result   Persistent small left pneumothorax with 1 cm pleural apical separation, with   intervally placed left-sided chest tube. Similar diffuse bilateral airspace disease. XR ABDOMEN (KUB) (SINGLE AP VIEW)   Final Result   NG tube in place with tip and side port below the diaphragm and in the region   of the gastric body. XR CHEST PORTABLE   Final Result   Interval development of a large left tension pneumothorax. Extensive bilateral airspace opacities without significant change. Findings were discussed with Dr. Abimael Modi  at 7:43 am on 12/13/2021. XR CHEST PORTABLE   Final Result   Persistent bilateral airspace disease without acute interval change. XR CHEST PORTABLE   Final Result   Severe extensive bilateral airspace opacities worsened from the previous exam   compatible with edema, ARDS, or multifocal pneumonia. XR CHEST PORTABLE   Final Result   1. Cardiomegaly with severe congestive heart failure. VL DUP LOWER EXTREMITY VENOUS BILATERAL   Final Result   No evidence of DVT in either lower extremity within the limitations. Mild nonspecific subcutaneous edema to bilateral lower extremities. CT HEAD WO CONTRAST   Final Result   No acute intracranial abnormality. Age related changes including chronic small vessel ischemic disease and   cerebral atrophy. CT ABDOMEN PELVIS W IV CONTRAST Additional Contrast? None   Final Result   Multifocal consolidative changes both lungs worrisome for multifocal   pneumonia. Follow-up in following medical treatment course is recommended   document complete resolution.       Multifocal bladder traumatic trabeculation/diverticula moderate multilevel   degenerate change         CT HEAD WO CONTRAST   Final Result   No acute intracranial abnormality. Geographic lucency frontal bone noted, consider bone scan imaging or consider   skeletal survey.               Scheduled Medicines   Medications:    insulin glargine  45 Units SubCUTAneous Nightly    insulin NPH  35 Units SubCUTAneous QAM    insulin lispro  0-24 Units SubCUTAneous Q4H    polyethylene glycol  17 g Oral Daily    chlorhexidine  15 mL Mouth/Throat BID    famotidine (PEPCID) injection  20 mg IntraVENous BID    lidocaine  5 mL IntraDERmal Once    sodium chloride flush  5-40 mL IntraVENous 2 times per day    dexamethasone  6 mg IntraVENous Daily    metoprolol succinate  50 mg Oral BID    allopurinol  300 mg Oral Daily    apixaban  5 mg Oral BID    pravastatin  20 mg Oral Nightly    tamsulosin  0.4 mg Oral Nightly    sodium chloride flush  5-40 mL IntraVENous 2 times per day    cefTRIAXone (ROCEPHIN) IV  1,000 mg IntraVENous Q24H      Infusions:    sodium chloride 50 mL/hr at 12/20/21 0418    dexmedetomidine (PRECEDEX) IV infusion 0.4 mcg/kg/hr (12/20/21 0765)    propofol Stopped (12/13/21 1443)    fentaNYL Stopped (12/19/21 3244)    sodium chloride      norepinephrine Stopped (12/19/21 0125)    dextrose      midazolam Stopped (12/18/21 0450)    sodium chloride           Objective:   Vitals: /78   Pulse 77   Temp 99.8 °F (37.7 °C) (Oral)   Resp 20   Ht 5' 7.99\" (1.727 m)   Wt 216 lb 0.8 oz (98 kg)   SpO2 97%   BMI 32.86 kg/m²   General appearance: Patient is sedated, intubated and on ventilator  Neck: no JVD or bruit  Thyroid : Normal lobes   Lungs: Has Vesicular Breath sounds intubated and on ventilator  Heart:  regular rate and rhythm  Abdomen: soft, non-tender; bowel sounds normal; no masses,  no organomegaly  Musculoskeletal: Normal  Extremities: extremities normal, , no edema  Neurologic: Patient is sedated, intubated and on ventilator    Assessment:     Patient Active Problem List:     Diabetes mellitus ([facility]     Atrial flutter (HCC)     Atrial fibrillation (HCC)     Gout     Hyperlipidemia     Hypertension     Arthropathy     Upper GI bleed     NSTEMI (non-ST elevated myocardial infarction) (HCC)     Rectal bleeding     Sepsis ([facility]     BRBPR (bright red blood per rectum)     Acute cystitis without hematuria     Leukocytosis     Acute hypokalemia     Rectal bleed     Anemia     B12 deficiency     Elevated antinuclear antibody (FREDRICK) level     PNA (pneumonia)     Troponin I above reference range      Plan:     1. Reviewed POC blood glucose . Labs and X ray results   2. Reviewed Current Medicines   3. On Correction bolus Humalog/ Basal Lantus /NPH insulin regime  4. Monitor Blood glucose frequently   5. Modified  the dose of Insulin/ other medicines as needed   6. Will follow     .      Loc Preston MD, MD

## 2021-12-22 ENCOUNTER — APPOINTMENT (OUTPATIENT)
Dept: GENERAL RADIOLOGY | Age: 76
DRG: 870 | End: 2021-12-22
Payer: COMMERCIAL

## 2021-12-22 LAB
ALBUMIN SERPL-MCNC: 2.7 GM/DL (ref 3.4–5)
ANION GAP SERPL CALCULATED.3IONS-SCNC: 6 MMOL/L (ref 4–16)
ANION GAP SERPL CALCULATED.3IONS-SCNC: 8 MMOL/L (ref 4–16)
ANION GAP SERPL CALCULATED.3IONS-SCNC: 8 MMOL/L (ref 4–16)
BASE EXCESS MIXED: 4.5 (ref 0–1.2)
BUN BLDV-MCNC: 60 MG/DL (ref 6–23)
BUN BLDV-MCNC: 70 MG/DL (ref 6–23)
BUN BLDV-MCNC: 72 MG/DL (ref 6–23)
CALCIUM SERPL-MCNC: 8.8 MG/DL (ref 8.3–10.6)
CALCIUM SERPL-MCNC: 8.8 MG/DL (ref 8.3–10.6)
CALCIUM SERPL-MCNC: 8.9 MG/DL (ref 8.3–10.6)
CARBON MONOXIDE, BLOOD: 2.1 % (ref 0–5)
CHLORIDE BLD-SCNC: 113 MMOL/L (ref 99–110)
CHLORIDE BLD-SCNC: 117 MMOL/L (ref 99–110)
CHLORIDE BLD-SCNC: 119 MMOL/L (ref 99–110)
CO2 CONTENT: 27.5 MMOL/L (ref 19–24)
CO2: 28 MMOL/L (ref 21–32)
CO2: 29 MMOL/L (ref 21–32)
CO2: 29 MMOL/L (ref 21–32)
COMMENT: ABNORMAL
CREAT SERPL-MCNC: 0.6 MG/DL (ref 0.9–1.3)
CREAT SERPL-MCNC: 0.6 MG/DL (ref 0.9–1.3)
CREAT SERPL-MCNC: 0.7 MG/DL (ref 0.9–1.3)
GFR AFRICAN AMERICAN: >60 ML/MIN/1.73M2
GFR NON-AFRICAN AMERICAN: >60 ML/MIN/1.73M2
GLUCOSE BLD-MCNC: 128 MG/DL (ref 70–99)
GLUCOSE BLD-MCNC: 131 MG/DL (ref 70–99)
GLUCOSE BLD-MCNC: 156 MG/DL (ref 70–99)
GLUCOSE BLD-MCNC: 165 MG/DL (ref 70–99)
GLUCOSE BLD-MCNC: 171 MG/DL (ref 70–99)
GLUCOSE BLD-MCNC: 192 MG/DL (ref 70–99)
GLUCOSE BLD-MCNC: 194 MG/DL (ref 70–99)
GLUCOSE BLD-MCNC: 201 MG/DL (ref 70–99)
GLUCOSE BLD-MCNC: 232 MG/DL (ref 70–99)
GLUCOSE BLD-MCNC: 238 MG/DL (ref 70–99)
HCO3 ARTERIAL: 26.5 MMOL/L (ref 18–23)
HCT VFR BLD CALC: 40.1 % (ref 42–52)
HEMOGLOBIN: 12.3 GM/DL (ref 13.5–18)
MCH RBC QN AUTO: 28.3 PG (ref 27–31)
MCHC RBC AUTO-ENTMCNC: 30.7 % (ref 32–36)
MCV RBC AUTO: 92.4 FL (ref 78–100)
METHEMOGLOBIN ARTERIAL: 1.5 %
O2 SATURATION: 93.8 % (ref 96–97)
PCO2 ARTERIAL: 31 MMHG (ref 32–45)
PDW BLD-RTO: 19.1 % (ref 11.7–14.9)
PH BLOOD: 7.54 (ref 7.34–7.45)
PHOSPHORUS: 2.7 MG/DL (ref 2.5–4.9)
PLATELET # BLD: 269 K/CU MM (ref 140–440)
PMV BLD AUTO: 11.9 FL (ref 7.5–11.1)
PO2 ARTERIAL: 76 MMHG (ref 75–100)
POTASSIUM SERPL-SCNC: 3.8 MMOL/L (ref 3.5–5.1)
POTASSIUM SERPL-SCNC: 3.9 MMOL/L (ref 3.5–5.1)
POTASSIUM SERPL-SCNC: 4.1 MMOL/L (ref 3.5–5.1)
RBC # BLD: 4.34 M/CU MM (ref 4.6–6.2)
SODIUM BLD-SCNC: 148 MMOL/L (ref 135–145)
SODIUM BLD-SCNC: 153 MMOL/L (ref 135–145)
SODIUM BLD-SCNC: 156 MMOL/L (ref 135–145)
WBC # BLD: 20 K/CU MM (ref 4–10.5)

## 2021-12-22 PROCEDURE — 6370000000 HC RX 637 (ALT 250 FOR IP): Performed by: INTERNAL MEDICINE

## 2021-12-22 PROCEDURE — 2580000003 HC RX 258: Performed by: NURSE PRACTITIONER

## 2021-12-22 PROCEDURE — 2500000003 HC RX 250 WO HCPCS: Performed by: NURSE PRACTITIONER

## 2021-12-22 PROCEDURE — 82803 BLOOD GASES ANY COMBINATION: CPT

## 2021-12-22 PROCEDURE — 82962 GLUCOSE BLOOD TEST: CPT

## 2021-12-22 PROCEDURE — 6360000002 HC RX W HCPCS: Performed by: INTERNAL MEDICINE

## 2021-12-22 PROCEDURE — 2000000000 HC ICU R&B

## 2021-12-22 PROCEDURE — 71045 X-RAY EXAM CHEST 1 VIEW: CPT

## 2021-12-22 PROCEDURE — 99233 SBSQ HOSP IP/OBS HIGH 50: CPT | Performed by: INTERNAL MEDICINE

## 2021-12-22 PROCEDURE — 2700000000 HC OXYGEN THERAPY PER DAY

## 2021-12-22 PROCEDURE — 2060000000 HC ICU INTERMEDIATE R&B

## 2021-12-22 PROCEDURE — 2580000003 HC RX 258: Performed by: INTERNAL MEDICINE

## 2021-12-22 PROCEDURE — 80069 RENAL FUNCTION PANEL: CPT

## 2021-12-22 PROCEDURE — 85027 COMPLETE CBC AUTOMATED: CPT

## 2021-12-22 PROCEDURE — 36600 WITHDRAWAL OF ARTERIAL BLOOD: CPT

## 2021-12-22 PROCEDURE — 2500000003 HC RX 250 WO HCPCS: Performed by: INTERNAL MEDICINE

## 2021-12-22 PROCEDURE — 80048 BASIC METABOLIC PNL TOTAL CA: CPT

## 2021-12-22 PROCEDURE — 94761 N-INVAS EAR/PLS OXIMETRY MLT: CPT

## 2021-12-22 PROCEDURE — 89220 SPUTUM SPECIMEN COLLECTION: CPT

## 2021-12-22 PROCEDURE — 6370000000 HC RX 637 (ALT 250 FOR IP): Performed by: NURSE PRACTITIONER

## 2021-12-22 RX ORDER — DEXTROSE MONOHYDRATE 50 MG/ML
INJECTION, SOLUTION INTRAVENOUS CONTINUOUS
Status: DISCONTINUED | OUTPATIENT
Start: 2021-12-22 | End: 2022-01-02

## 2021-12-22 RX ADMIN — DEXMEDETOMIDINE 0.2 MCG/KG/HR: 100 INJECTION, SOLUTION, CONCENTRATE INTRAVENOUS at 15:02

## 2021-12-22 RX ADMIN — SODIUM CHLORIDE: 4.5 INJECTION, SOLUTION INTRAVENOUS at 06:38

## 2021-12-22 RX ADMIN — INSULIN GLARGINE 55 UNITS: 100 INJECTION, SOLUTION SUBCUTANEOUS at 20:53

## 2021-12-22 RX ADMIN — APIXABAN 5 MG: 5 TABLET, FILM COATED ORAL at 08:10

## 2021-12-22 RX ADMIN — ALLOPURINOL 300 MG: 300 TABLET ORAL at 08:18

## 2021-12-22 RX ADMIN — METOPROLOL SUCCINATE 50 MG: 50 TABLET, EXTENDED RELEASE ORAL at 08:10

## 2021-12-22 RX ADMIN — SODIUM CHLORIDE, PRESERVATIVE FREE 10 ML: 5 INJECTION INTRAVENOUS at 20:54

## 2021-12-22 RX ADMIN — CEFTRIAXONE SODIUM 1000 MG: 1 INJECTION, POWDER, FOR SOLUTION INTRAMUSCULAR; INTRAVENOUS at 03:05

## 2021-12-22 RX ADMIN — TAMSULOSIN HYDROCHLORIDE 0.4 MG: 0.4 CAPSULE ORAL at 20:52

## 2021-12-22 RX ADMIN — FAMOTIDINE 20 MG: 10 INJECTION, SOLUTION INTRAVENOUS at 08:10

## 2021-12-22 RX ADMIN — SODIUM CHLORIDE, PRESERVATIVE FREE 10 ML: 5 INJECTION INTRAVENOUS at 08:11

## 2021-12-22 RX ADMIN — SODIUM CHLORIDE, PRESERVATIVE FREE 10 ML: 5 INJECTION INTRAVENOUS at 21:27

## 2021-12-22 RX ADMIN — FAMOTIDINE 20 MG: 10 INJECTION, SOLUTION INTRAVENOUS at 20:52

## 2021-12-22 RX ADMIN — DEXAMETHASONE SODIUM PHOSPHATE 6 MG: 10 INJECTION INTRAMUSCULAR; INTRAVENOUS at 08:10

## 2021-12-22 RX ADMIN — PRAVASTATIN SODIUM 20 MG: 10 TABLET ORAL at 20:53

## 2021-12-22 RX ADMIN — DEXTROSE MONOHYDRATE: 50 INJECTION, SOLUTION INTRAVENOUS at 18:01

## 2021-12-22 RX ADMIN — CHLORHEXIDINE GLUCONATE 0.12% ORAL RINSE 15 ML: 1.2 LIQUID ORAL at 08:10

## 2021-12-22 RX ADMIN — POLYETHYLENE GLYCOL (3350) 17 G: 17 POWDER, FOR SOLUTION ORAL at 08:10

## 2021-12-22 RX ADMIN — APIXABAN 5 MG: 5 TABLET, FILM COATED ORAL at 20:53

## 2021-12-22 RX ADMIN — DEXTROSE MONOHYDRATE: 50 INJECTION, SOLUTION INTRAVENOUS at 08:26

## 2021-12-22 RX ADMIN — CHLORHEXIDINE GLUCONATE 0.12% ORAL RINSE 15 ML: 1.2 LIQUID ORAL at 20:52

## 2021-12-22 ASSESSMENT — PAIN SCALES - GENERAL
PAINLEVEL_OUTOF10: 0

## 2021-12-22 ASSESSMENT — PAIN SCALES - WONG BAKER: WONGBAKER_NUMERICALRESPONSE: 0

## 2021-12-22 ASSESSMENT — PULMONARY FUNCTION TESTS
PIF_VALUE: 24
PIF_VALUE: 21
PIF_VALUE: 19
PIF_VALUE: 28

## 2021-12-22 NOTE — PLAN OF CARE
Problem: Falls - Risk of:  Goal: Will remain free from falls  Description: Will remain free from falls  Outcome: Met This Shift  Goal: Absence of physical injury  Description: Absence of physical injury  Outcome: Met This Shift     Problem: Airway Clearance - Ineffective  Goal: Achieve or maintain patent airway  Outcome: Ongoing     Problem: Gas Exchange - Impaired  Goal: Absence of hypoxia  Outcome: Met This Shift  Goal: Promote optimal lung function  Outcome: Ongoing     Problem: Breathing Pattern - Ineffective  Goal: Ability to achieve and maintain a regular respiratory rate  Outcome: Ongoing     Problem:  Body Temperature -  Risk of, Imbalanced  Goal: Ability to maintain a body temperature within defined limits  Outcome: Ongoing  Goal: Will regain or maintain usual level of consciousness  Outcome: Ongoing  Goal: Complications related to the disease process, condition or treatment will be avoided or minimized  Outcome: Ongoing     Problem: Isolation Precautions - Risk of Spread of Infection  Goal: Prevent transmission of infection  Outcome: Met This Shift     Problem: Nutrition Deficits  Goal: Optimize nutritional status  Outcome: Ongoing     Problem: Risk for Fluid Volume Deficit  Goal: Maintain normal heart rhythm  Outcome: Met This Shift  Goal: Maintain absence of muscle cramping  Outcome: Ongoing  Goal: Maintain normal serum potassium, sodium, calcium, phosphorus, and pH  Outcome: Ongoing     Problem: Loneliness or Risk for Loneliness  Goal: Demonstrate positive use of time alone when socialization is not possible  Outcome: Ongoing     Problem: Fatigue  Goal: Verbalize increase energy and improved vitality  Outcome: Ongoing     Problem: Patient Education: Go to Patient Education Activity  Goal: Patient/Family Education  Outcome: Ongoing     Problem: Pain:  Goal: Pain level will decrease  Description: Pain level will decrease  Outcome: Ongoing  Goal: Control of acute pain  Description: Control of acute pain  Outcome: Ongoing  Goal: Control of chronic pain  Description: Control of chronic pain  Outcome: Ongoing     Problem: Skin Integrity:  Goal: Will show no infection signs and symptoms  Description: Will show no infection signs and symptoms  Outcome: Ongoing  Goal: Absence of new skin breakdown  Description: Absence of new skin breakdown  Outcome: Ongoing     Problem: Nutrition  Goal: Optimal nutrition therapy  Outcome: Ongoing     Problem: Non-Violent Restraints  Goal: Removal from restraints as soon as assessed to be safe  Outcome: Ongoing  Goal: No harm/injury to patient while restraints in use  Outcome: Met This Shift  Goal: Patient's dignity will be maintained  Outcome: Met This Shift

## 2021-12-22 NOTE — PROGRESS NOTES
12/22/21 0433   Vent Information   Vent Type 980   Vent Mode SIMV/VC   Vt Ordered 500 mL   Rate Set 12 bmp   Peak Flow 55 L/min   Pressure Support 15 cmH20   FiO2  30 %   SpO2 98 %   SpO2/FiO2 ratio 326.67   Sensitivity 3   PEEP/CPAP 5   I Time/ I Time % 0 s   Humidification Source HME   Vent Patient Data   High Peep/I Pressure 0   Peak Inspiratory Pressure 19 cmH2O   Mean Airway Pressure 9.7 cmH20   Rate Measured 17 br/min   Vt Exhaled 466 mL   Minute Volume 10.5 Liters   I:E Ratio 1:4.00

## 2021-12-22 NOTE — PROGRESS NOTES
Progress Note( Dr. Moi Sanderson)  12/22/2021  Subjective:   Admit Date: 12/8/2021  PCP: Howie Winston MD    Admitted For :Admitted initially on December 9, 2021 with history of fall and found to have Covid pneumonia    Consulted For: Better control of blood glucose    Interval History: The patient is sedated intubated on ventilator at time of light sedation she responded some gestures on calling his name    Patient is seen sedated, intubated and on ventilator on tube feeding      Intake/Output Summary (Last 24 hours) at 12/22/2021 0630  Last data filed at 12/21/2021 1712  Gross per 24 hour   Intake 702.91 ml   Output 875 ml   Net -172.09 ml       DATA    CBC:   Recent Labs     12/20/21  0415 12/21/21  0415 12/22/21  0400   WBC 16.5* 17.8* 20.0*   HGB 12.7* 12.7* 12.3*    270 269    CMP:  Recent Labs     12/20/21  0415 12/20/21  1210 12/21/21  0415 12/21/21  0415 12/21/21  1708 12/22/21  0105 12/22/21  0400   *   < > 154*   < > 154* 153* 156*   K 4.4   < > 4.4   < > 4.1 3.9 3.8   *   < > 116*   < > 116* 117* 119*   CO2 32   < > 29   < > 29 28 29   *   < > 86*   < > 78* 72* 70*   CREATININE 1.0   < > 0.8*   < > 0.5* 0.6* 0.7*   CALCIUM 8.8   < > 8.8   < > 8.8 8.9 8.8   LABALBU 2.9*  --  2.8*  --   --   --  2.7*    < > = values in this interval not displayed. Lipids:   Lab Results   Component Value Date    CHOL 137 06/26/2018    HDL 53 06/26/2018    TRIG 101 06/26/2018     Glucose:  Recent Labs     12/21/21  2040 12/21/21  2328 12/22/21  0339   POCGLU 186* 181* 156*     [id number]:  Lab Results   Component Value Date    LABA1C 7.3 12/18/2021     High Sensitivity TSH:   Lab Results   Component Value Date    [facility] 3.280 05/07/2021     Free T3: No results found for: FT3  Free T4:No results found for: T4FREE    XR CHEST PORTABLE   Final Result   Mild bilateral improvement in airspace opacities. Trace left apical   pneumothorax is unchanged.          XR CHEST PORTABLE   Final Result   The left apical pneumothorax is still identified. Similar to 12/18/2021 but   smaller than 12/17/2021. The multifocal pulmonary opacities are redemonstrated. May have some   worsening atelectasis in the right lower lobe. XR CHEST PORTABLE   Final Result   Improving small left apical pneumothorax measuring 9 mm. New right lower lobe infiltrate may represent atelectasis versus pneumonia. Lines and tubes are stable      Stable mild pneumomediastinum. XR CHEST PORTABLE   Final Result   Interval development of a small left apical pneumothorax measuring   approximately 2.1 cm between pleural surfaces. Pneumomediastinum appears   increased when compared to the previous exam as well. Continued patchy opacity throughout both lungs, most compatible with   multifocal pneumonia. XR CHEST PORTABLE   Final Result   1. No appreciable left pneumothorax identified on the current exam.   2. Similar bilateral airspace opacities. 3. Support lines and tubes remain in place. XR CHEST PORTABLE   Final Result   1. Small stable left apical pneumothorax. 2. Bilateral airspace opacities are seen without significant change. XR CHEST PORTABLE   Final Result   Stable small left apical pneumothorax. Stable pulmonary vascular congestion along with interstitial opacities and   hazy airspace opacities, left worse than right which may be on the basis of   interstitial and pulmonary edema but can also be seen in the setting of   atypical pneumonia. XR CHEST PORTABLE   Final Result   Persistent small left apical pneumothorax estimated to be approximately 10%   in severity. Support tube/catheters project in stable/satisfactory position   in the frontal projection. Consolidating infiltrates and or pulmonary edema, cardiomegaly unchanged   compared to 12/14/2021 consistent with diffuse bilateral pneumonia and/or   congestive heart failure.          XR CHEST PORTABLE   Final Result   Left chest tube remains in place, with slight interval decrease in size in   small left apical pneumothorax. XR CHEST PORTABLE   Final Result   Persistent small left pneumothorax with 1 cm pleural apical separation, with   intervally placed left-sided chest tube. Similar diffuse bilateral airspace disease. XR ABDOMEN (KUB) (SINGLE AP VIEW)   Final Result   NG tube in place with tip and side port below the diaphragm and in the region   of the gastric body. XR CHEST PORTABLE   Final Result   Interval development of a large left tension pneumothorax. Extensive bilateral airspace opacities without significant change. Findings were discussed with Dr. Marsha Watkins  at 7:43 am on 12/13/2021. XR CHEST PORTABLE   Final Result   Persistent bilateral airspace disease without acute interval change. XR CHEST PORTABLE   Final Result   Severe extensive bilateral airspace opacities worsened from the previous exam   compatible with edema, ARDS, or multifocal pneumonia. XR CHEST PORTABLE   Final Result   1. Cardiomegaly with severe congestive heart failure. VL DUP LOWER EXTREMITY VENOUS BILATERAL   Final Result   No evidence of DVT in either lower extremity within the limitations. Mild nonspecific subcutaneous edema to bilateral lower extremities. CT HEAD WO CONTRAST   Final Result   No acute intracranial abnormality. Age related changes including chronic small vessel ischemic disease and   cerebral atrophy. CT ABDOMEN PELVIS W IV CONTRAST Additional Contrast? None   Final Result   Multifocal consolidative changes both lungs worrisome for multifocal   pneumonia. Follow-up in following medical treatment course is recommended   document complete resolution. Multifocal bladder traumatic trabeculation/diverticula noted with slight   haziness of surrounding fat planes. Please correlate with diffuse urinalysis   findings.   Is findings could suggest underlying cystitis. Prominence of the Alisson aortic lymph nodes again identified. These could be   reactive due to an underlying infectious inflammatory process such is the   bladder haziness. However neoplastic process may also considered. Consider   3-6 month follow-up. RECOMMENDATIONS:   Unavailable         CT CHEST W CONTRAST   Final Result   Multifocal consolidative changes both lungs worrisome for multifocal   pneumonia. Follow-up in following medical treatment course is recommended   document complete resolution. Multifocal bladder traumatic trabeculation/diverticula noted with slight   haziness of surrounding fat planes. Please correlate with diffuse urinalysis   findings. Is findings could suggest underlying cystitis. Prominence of the Alisson aortic lymph nodes again identified. These could be   reactive due to an underlying infectious inflammatory process such is the   bladder haziness. However neoplastic process may also considered. Consider   3-6 month follow-up. RECOMMENDATIONS:   Unavailable         XR SHOULDER LEFT (MIN 2 VIEWS)   Final Result   No acute fracture identified. Incidentally noted mass or masslike consolidation in the left upper lobe. Advise chest CT for further evaluation. XR PELVIS (1-2 VIEWS)   Final Result   No acute abnormality detected. Severe degenerative changes at the right hip, with chronic bony remodeling,   not substantially changed when compared to the CT from January 2020. XR CHEST PORTABLE   Final Result   1. No acute cardiopulmonary process identified. 2. Chronic interstitial changes of the lungs. CT CERVICAL SPINE WO CONTRAST   Final Result   Motion degradation challenge evaluation. No convincing evidence acute   displaced fracture traumatic malalignment.   There are moderate multilevel   degenerate change         CT HEAD WO CONTRAST   Final Result   No acute intracranial response to verbal commands when not sedated    Assessment:     Patient Active Problem List:     Diabetes mellitus ([facility] [address]     Atrial flutter (HCC)     Atrial fibrillation (HCC)     Gout     Hyperlipidemia     Hypertension     Arthropathy     Upper GI bleed     NSTEMI (non-ST elevated myocardial infarction) (HCC)     Rectal bleeding     Sepsis ([facility] [address]     BRBPR (bright red blood per rectum)     Acute cystitis without hematuria     Leukocytosis     Acute hypokalemia     Rectal bleed     Anemia     B12 deficiency     Elevated antinuclear antibody (FREDRICK) level     PNA (pneumonia)     Troponin I above reference range      Plan:     1. Reviewed POC blood glucose . Labs and X ray results   2. Reviewed Current Medicines   3. On Correction bolus Humalog/ Basal Lantus /NPH insulin regime  4. Monitor Blood glucose frequently   5. Modified  the dose of Insulin/ other medicines as needed  6. Accprding to pulmonary may Extubate later today   7. Will follow     .      Krzysztof Thomason MD, MD

## 2021-12-22 NOTE — PLAN OF CARE
Problem: Falls - Risk of:  Goal: Will remain free from falls  Description: Will remain free from falls  12/22/2021 0747 by Yisel Cuellar RN  Outcome: Ongoing  12/22/2021 0056 by Artis Rosales RN  Outcome: Met This Shift  Goal: Absence of physical injury  Description: Absence of physical injury  12/22/2021 0747 by Yisel Cuellar RN  Outcome: Ongoing  12/22/2021 0056 by Artis Rosales RN  Outcome: Met This Shift     Problem: Airway Clearance - Ineffective  Goal: Achieve or maintain patent airway  12/22/2021 0747 by Yisel Cuellar RN  Outcome: Ongoing  12/22/2021 0056 by Artis Rosales RN  Outcome: Ongoing     Problem: Gas Exchange - Impaired  Goal: Absence of hypoxia  12/22/2021 0747 by Yisel Cuellar RN  Outcome: Ongoing  12/22/2021 0056 by Artis Rosales RN  Outcome: Met This Shift  Goal: Promote optimal lung function  12/22/2021 0747 by Yisel Cuellar RN  Outcome: Ongoing  12/22/2021 0056 by Artis Rosales RN  Outcome: Ongoing     Problem: Breathing Pattern - Ineffective  Goal: Ability to achieve and maintain a regular respiratory rate  12/22/2021 0747 by Yisel Cuellar RN  Outcome: Ongoing  12/22/2021 0056 by Artis Rosales RN  Outcome: Ongoing     Problem:  Body Temperature -  Risk of, Imbalanced  Goal: Ability to maintain a body temperature within defined limits  12/22/2021 0747 by Yisel Cuellar RN  Outcome: Ongoing  12/22/2021 0056 by Artis Rosales RN  Outcome: Ongoing  Goal: Will regain or maintain usual level of consciousness  12/22/2021 0747 by Yisel Cuellar RN  Outcome: Ongoing  12/22/2021 0056 by Artis Rosales RN  Outcome: Ongoing  Goal: Complications related to the disease process, condition or treatment will be avoided or minimized  12/22/2021 0747 by Yisel Cuellar RN  Outcome: Ongoing  12/22/2021 0056 by Artis Rosales RN  Outcome: Ongoing     Problem: Isolation Precautions - Risk of Spread of Infection  Goal: Prevent transmission of infection  12/22/2021 0747 by Boby Cabral RN  Outcome: Ongoing  12/22/2021 0056 by Elvis Tapia RN  Outcome: Met This Shift     Problem: Nutrition Deficits  Goal: Optimize nutritional status  12/22/2021 0747 by Boby Cabral RN  Outcome: Ongoing  12/22/2021 0056 by Elvis Tapia RN  Outcome: Ongoing     Problem: Risk for Fluid Volume Deficit  Goal: Maintain normal heart rhythm  12/22/2021 0747 by Boby Cabral RN  Outcome: Ongoing  12/22/2021 0056 by Elvis Tapia RN  Outcome: Met This Shift  Goal: Maintain absence of muscle cramping  12/22/2021 0747 by Boby Cabral RN  Outcome: Ongoing  12/22/2021 0056 by Elvis Tapia RN  Outcome: Ongoing  Goal: Maintain normal serum potassium, sodium, calcium, phosphorus, and pH  12/22/2021 0747 by Boby Cabral RN  Outcome: Ongoing  12/22/2021 0056 by Elvis Tapia RN  Outcome: Ongoing     Problem: Loneliness or Risk for Loneliness  Goal: Demonstrate positive use of time alone when socialization is not possible  12/22/2021 0747 by Boby Cabral RN  Outcome: Ongoing  12/22/2021 0056 by Elvis Tapia RN  Outcome: Ongoing     Problem: Fatigue  Goal: Verbalize increase energy and improved vitality  12/22/2021 0747 by Boby Cabral RN  Outcome: Ongoing  12/22/2021 0056 by Elvis Tapia RN  Outcome: Ongoing     Problem: Patient Education: Go to Patient Education Activity  Goal: Patient/Family Education  12/22/2021 0747 by Boby Cabral RN  Outcome: Ongoing  12/22/2021 0056 by Elvis Tapia RN  Outcome: Ongoing     Problem: Pain:  Goal: Pain level will decrease  Description: Pain level will decrease  12/22/2021 0747 by Boby Cabral RN  Outcome: Ongoing  12/22/2021 0056 by Elvis Tapia RN  Outcome: Ongoing  Goal: Control of acute pain  Description: Control of acute pain  12/22/2021 0747 by Boby Cabral RN  Outcome: Ongoing  12/22/2021 0056 by Elvis Tapia RN  Outcome: Ongoing  Goal: Control of chronic pain  Description: Control of chronic pain  12/22/2021 0747 by Gurmeet Ro RN  Outcome: Ongoing  12/22/2021 0056 by Darvin Zendejas RN  Outcome: Ongoing     Problem: Skin Integrity:  Goal: Will show no infection signs and symptoms  Description: Will show no infection signs and symptoms  12/22/2021 0747 by Gurmeet Ro RN  Outcome: Ongoing  12/22/2021 0056 by Darvin Zendejas RN  Outcome: Ongoing  Goal: Absence of new skin breakdown  Description: Absence of new skin breakdown  12/22/2021 0747 by Gurmeet Ro RN  Outcome: Ongoing  12/22/2021 0056 by Darvin Zendejas RN  Outcome: Ongoing     Problem: Nutrition  Goal: Optimal nutrition therapy  12/22/2021 0747 by Gurmeet Ro RN  Outcome: Ongoing  12/22/2021 0056 by Darvin Zendejas RN  Outcome: Ongoing     Problem: Non-Violent Restraints  Goal: Removal from restraints as soon as assessed to be safe  12/22/2021 0747 by Gurmeet Ro RN  Outcome: Ongoing  12/22/2021 0056 by Darvin Zendejas RN  Outcome: Ongoing  Goal: No harm/injury to patient while restraints in use  12/22/2021 0747 by Gurmeet Ro RN  Outcome: Ongoing  12/22/2021 0056 by Darvin Zendejas RN  Outcome: Met This Shift  Goal: Patient's dignity will be maintained  12/22/2021 0747 by Gurmeet Ro RN  Outcome: Ongoing  12/22/2021 0056 by Darvin Zendejas RN  Outcome: Met This Shift

## 2021-12-22 NOTE — PROGRESS NOTES
Hospitalist Progress Note      Name:  Angel Amador /Age/Sex: 1945  (77 y.o. male)   MRN & CSN:  [id number] & [id number] Admission Date/Time: 2021  5:33 PM   Location:  -A PCP: Tameka Shah MD         Hospital Day: 15    Assessment and Plan:     Patient, 81-year-old male with past medical history of atrial fibrillation/flutter, diabetes mellitus type 2, hyperlipidemia, hypertension was admitted on 2021 after an episode of fall. Reported generalized weakness. Patient tested positive for COVID-19 infection. X-ray chest suggestive of pneumonia. Also noted to have UTI. Patient was noted to have acute systolic/diastolic CHF exacerbation. Echocardiogram showed EF 35 to 40%. Patient received 1 dose of Tocilizumab 12/10/2021. Patient was seen by pulmonary/cardiology/cardiothoracic surgery. Patient developed left tension pneumothorax which required chest tube placement. Patient was also seen by urology secondary to CT finding of multifocal bladder traumatic trabeculation/diverticula. Recommended outpatient cystoscopy/TRUS. Assessment    Acute hypoxic respiratory failure secondary to COVID-19 pneumonia. Extubated today  Acute complicated cystitis  Sepsis secondary to above, present on admission  Left tension pneumothorax s/p chest tube placement 2021 to   Acute systolic/diastolic CHF exacerbation  Hyponatremia. Corrected sodium to blood glucose 154 mEq  S/p fall  Hypertension  Paroxysmal atrial fibrillation  Diabetes mellitus type 2  CAD. Plan  Extubated today. Wean oxygen as tolerated  Continue IV Decadron    S/p Tocilizumab 12/10/2021  Currently on IV ceftriaxone, day 13 today. Will discontinue antibiotic tomorrow. Lasix/Aldactone/Maxzide on hold given hyponatremia  Nephrology following  Monitor I's and O's. Insulin management per endocrinology  Continue insulin sliding scale.    Continue Eliquis/metoprolol      DVT prophylaxis-Eliquis twice daily      Diet ADULT TUBE FEEDING; Nasogastric; Peptide Based High Protein; Continuous; 40; Yes; 10; Q 4 hours; 70; 30; Q 4 hours   DVT Prophylaxis [] Lovenox, []  Heparin, [] SCDs, [] Ambulation   GI Prophylaxis [] PPI,  [] H2 Blocker,  [] Carafate,  [] Diet/Tube Feeds   Code Status Full Code   Disposition Patient requires continued admission due to IV diuresis/antibiotics/mechanical ventilation   MDM [] Low, [] Moderate,[x]  High  Patient's risk as above due to acute hypoxic respiratory failure     History of Present Illness:     Patient seen and examined at bedside. Extubated. Awake, following commands      Objective: Intake/Output Summary (Last 24 hours) at 12/22/2021 1539  Last data filed at 12/22/2021 1112  Gross per 24 hour   Intake 732.91 ml   Output 1975 ml   Net -1242.09 ml      Vitals:   Vitals:    12/22/21 1501   BP: (!) 142/86   Pulse: 77   Resp: 19   Temp: 99.2 °F (37.3 °C)   SpO2: 98%     Physical Exam:   GEN Awake male, sitting upright in bed in no apparent distress. Appears given age. RESP  Coarse breath sounds bilaterally. CARDIO/VASC S1/S2 auscultated. Regular rate   GI Abdomen is soft without significant tenderness, masses, or guarding. Bowel sounds are normoactive. Lane catheter draining clear urine  EXT   Lympedema swelling significantly decreased  NEURO Awake.  Following commands    Medications:   Medications:    insulin glargine  55 Units SubCUTAneous Nightly    insulin NPH  35 Units SubCUTAneous QAM    insulin lispro  0-24 Units SubCUTAneous Q4H    polyethylene glycol  17 g Oral Daily    chlorhexidine  15 mL Mouth/Throat BID    famotidine (PEPCID) injection  20 mg IntraVENous BID    lidocaine  5 mL IntraDERmal Once    sodium chloride flush  5-40 mL IntraVENous 2 times per day    dexamethasone  6 mg IntraVENous Daily    metoprolol succinate  50 mg Oral BID    allopurinol  300 mg Oral Daily    apixaban  5 mg Oral BID    pravastatin  20 mg Oral Nightly    tamsulosin  0.4 mg Oral Nightly    sodium chloride flush  5-40 mL IntraVENous 2 times per day    cefTRIAXone (ROCEPHIN) IV  1,000 mg IntraVENous Q24H      Infusions:    dextrose 100 mL/hr at 12/22/21 0826    dexmedetomidine (PRECEDEX) IV infusion 0.2 mcg/kg/hr (12/22/21 1502)    propofol Stopped (12/13/21 1443)    fentaNYL Stopped (12/19/21 8514)    sodium chloride      norepinephrine Stopped (12/19/21 0125)    dextrose      midazolam Stopped (12/18/21 0450)    sodium chloride       PRN Meds: morphine, 2 mg, Q4H PRN  magic (miracle) mouthwash, 5 mL, 4x Daily PRN  hydrALAZINE (APRESOLINE) ivpb, 10 mg, Q4H PRN  sodium chloride flush, 5-40 mL, PRN  sodium chloride, 25 mL, PRN  glucose, 15 g, PRN  dextrose, 12.5 g, PRN  glucagon (rDNA), 1 mg, PRN  dextrose, 100 mL/hr, PRN  LORazepam, 1 mg, Q6H PRN  oxyCODONE, 5 mg, Q4H PRN  guaiFENesin-dextromethorphan, 5 mL, Q4H PRN  potassium chloride, 40 mEq, PRN   Or  potassium alternative oral replacement, 40 mEq, PRN   Or  potassium chloride, 10 mEq, PRN  sodium chloride flush, 5-40 mL, PRN  sodium chloride, 25 mL, PRN  ondansetron, 4 mg, Q8H PRN   Or  ondansetron, 4 mg, Q6H PRN  polyethylene glycol, 17 g, Daily PRN  acetaminophen, 650 mg, Q6H PRN   Or  acetaminophen, 650 mg, Q6H PRN          Electronically signed by Merna Dumas MD on 12/22/2021 at 3:39 PM

## 2021-12-22 NOTE — PLAN OF CARE
Nutrition Problem #1: Inadequate oral intake  Intervention: Food and/or Nutrient Delivery: Modify Tube Feeding  Nutritional Goals: pt will meet greater than 75% of estimated nutrient needs via EN

## 2021-12-22 NOTE — PROGRESS NOTES
Pulmonary and Critical Care  Progress Note      VITALS:  BP 99/63   Pulse 77   Temp 99.5 °F (37.5 °C) (Rectal)   Resp 18   Ht 5' 7.99\" (1.727 m)   Wt 216 lb 0.8 oz (98 kg)   SpO2 99%   BMI 32.86 kg/m²     Subjective:   CHIEF COMPLAINT :Fall     HPI:                The patient is on the vent and sedated. He is awake and follows simple commands. He is not in acute resp distress    Objective:   PHYSICAL EXAM:    LUNGS:Occasional basal crackles  Abd-soft, BS+,NT  Ext- no pedal edema  CVS-s1s2, no murmurs      DATA:    CBC:  Recent Labs     12/20/21  0415 12/21/21  0415 12/22/21  0400   WBC 16.5* 17.8* 20.0*   RBC 4.54* 4.46* 4.34*   HGB 12.7* 12.7* 12.3*   HCT 43.0 42.0 40.1*    270 269   MCV 94.7 94.2 92.4   MCH 28.0 28.5 28.3   MCHC 29.5* 30.2* 30.7*   RDW 18.6* 18.8* 19.1*      BMP:  Recent Labs     12/21/21  1708 12/22/21  0105 12/22/21  0400   * 153* 156*   K 4.1 3.9 3.8   * 117* 119*   CO2 29 28 29   BUN 78* 72* 70*   CREATININE 0.5* 0.6* 0.7*   CALCIUM 8.8 8.9 8.8   GLUCOSE 190* 194* 165*      ABG:  Recent Labs     12/20/21  0600 12/21/21  0600 12/22/21  0600   PH 7.47* 7.46* 7.54*   PO2ART 66* 65* 76   DYO9YOW 42.0 40.0 31.0*   O2SAT 92.6* 91.2* 93.8*     BNP  No results found for: BNP   D-Dimer:  No results found for: [facility]   Radiology:   Multifocal bibasilar, right greater than left, airspace disease overall   similar in appearance to the previous exam.  Tubes and lines are unchanged     1.        Assessment/Plan     Patient Active Problem List    Diagnosis Date Noted    PNA (pneumonia) 12/09/2021    Troponin I above reference range 12/09/2021    Elevated antinuclear antibody (FREDRICK) level 02/16/2021    B12 deficiency 02/15/2021    Anemia 02/12/2021    Rectal bleed 11/18/2020    Acute hypokalemia 11/17/2020    Acute cystitis without hematuria     Leukocytosis     Sepsis ([facility] [address] 01/17/2020    BRBPR (bright red blood per rectum) 01/17/2020    Rectal bleeding 09/25/2018    NSTEMI (non-ST elevated myocardial infarction) ([facility] [address] 06/25/2018    Upper GI bleed 03/07/2017    Diabetes mellitus (HCC)     Atrial flutter (HCC)     Atrial fibrillation (HCC)     Gout     Hyperlipidemia     Hypertension     Arthropathy    Hyperchloremic Hypernatremia  Acute on chronic hypoxic hypercapneic resp failure sec to Cardiogenic and non Cardiogenic Pulmonary edema  Systolic CHF with EF of 75-64%  Grade II Diastolic dysfunction  Bilateral Pneumonia sec to COVID-19  Obesity  DIVYA  Chronic Metabolic alkalosis  Left pneumothorax s/p left chest tube  VDRF  Proteus bacteremia       1. Change IV fluids to D5w or LR  2. BMP in am  3. Hold tube feeds for extubation  4. Abx  5. F/u C&S  6. Inhalers  7. Keep sats > 88%  8. PT/OT  9. Swallow eval post extubation  10. SAT and SBT trial and extubation  11.  C/w present management    Electronically signed by Robinson Dee MD on 12/22/2021 at 3:02 PM

## 2021-12-22 NOTE — PROGRESS NOTES
No recommendation at this time   Coordination of Nutrition Care:  Continue to monitor while inpatient    Goals:  pt will meet greater than 75% of estimated nutrient needs via EN       Nutrition Monitoring and Evaluation:   Behavioral-Environmental Outcomes:  None Identified   Food/Nutrient Intake Outcomes:  Enteral Nutrition Intake/Tolerance  Physical Signs/Symptoms Outcomes:  Biochemical Data,GI Status,Hemodynamic Status,Fluid Status or Edema,Weight,Skin     Discharge Planning:     Too soon to determine     Electronically signed by Ty Dumont MS, RD, LD on 12/22/21 at 12:43 PM EST    Contact: [phone]

## 2021-12-22 NOTE — PROGRESS NOTES
Progress Note( Dr. Rigoberto Mora)  12/21/2021  Subjective:   Admit Date: 12/8/2021  PCP: Fanta Murray MD    Admitted For :Admitted initially on December 9, 2021 with history of fall and found to have Covid pneumonia    Consulted For: Better control of blood glucose    Interval History: The patient is sedated intubated on ventilator at time of light sedation she responded some gestures on calling his name    Patient is seen sedated, intubated and on ventilator on tube feeding      Intake/Output Summary (Last 24 hours) at 12/21/2021 2236  Last data filed at 12/21/2021 1712  Gross per 24 hour   Intake 2771.2 ml   Output 1625 ml   Net 1146.2 ml       DATA    CBC:   Recent Labs     12/19/21  0430 12/20/21  0415 12/21/21  0415   WBC 14.4* 16.5* 17.8*   HGB 12.7* 12.7* 12.7*    287 270    CMP:  Recent Labs     12/19/21  0430 12/19/21  1250 12/20/21  0415 12/20/21  1210 12/20/21  2135 12/21/21  0415 12/21/21  1708   *   < > 154*   < > 152* 154* 154*   K 4.6   < > 4.4   < > 4.4 4.4 4.1      < > 113*   < > 113* 116* 116*   CO2 33*   < > 32   < > 29 29 29   *   < > 111*   < > 94* 86* 78*   CREATININE 1.2   < > 1.0   < > 0.8* 0.8* 0.5*   CALCIUM 8.9   < > 8.8   < > 9.1 8.8 8.8   LABALBU 3.0*  --  2.9*  --   --  2.8*  --     < > = values in this interval not displayed. Lipids:   Lab Results   Component Value Date    CHOL 137 06/26/2018    HDL 53 06/26/2018    TRIG 101 06/26/2018     Glucose:  Recent Labs     12/21/21  1315 12/21/21  1642 12/21/21  2040   POCGLU 166* 154* 186*     [id number]:  Lab Results   Component Value Date    LABA1C 7.3 12/18/2021     High Sensitivity TSH:   Lab Results   Component Value Date    TSH 3.280 05/07/2021     Free T3: No results found for: FT3  Free T4:No results found for: T4FREE    XR CHEST PORTABLE   Final Result   Mild bilateral improvement in airspace opacities. Trace left apical   pneumothorax is unchanged.          XR CHEST PORTABLE   Final Result   The left apical pneumothorax is still identified. Similar to 12/18/2021 but   smaller than 12/17/2021. The multifocal pulmonary opacities are redemonstrated. May have some   worsening atelectasis in the right lower lobe. XR CHEST PORTABLE   Final Result   Improving small left apical pneumothorax measuring 9 mm. New right lower lobe infiltrate may represent atelectasis versus pneumonia. Lines and tubes are stable      Stable mild pneumomediastinum. XR CHEST PORTABLE   Final Result   Interval development of a small left apical pneumothorax measuring   approximately 2.1 cm between pleural surfaces. Pneumomediastinum appears   increased when compared to the previous exam as well. Continued patchy opacity throughout both lungs, most compatible with   multifocal pneumonia. XR CHEST PORTABLE   Final Result   1. No appreciable left pneumothorax identified on the current exam.   2. Similar bilateral airspace opacities. 3. Support lines and tubes remain in place. XR CHEST PORTABLE   Final Result   1. Small stable left apical pneumothorax. 2. Bilateral airspace opacities are seen without significant change. XR CHEST PORTABLE   Final Result   Stable small left apical pneumothorax. Stable pulmonary vascular congestion along with interstitial opacities and   hazy airspace opacities, left worse than right which may be on the basis of   interstitial and pulmonary edema but can also be seen in the setting of   atypical pneumonia. XR CHEST PORTABLE   Final Result   Persistent small left apical pneumothorax estimated to be approximately 10%   in severity. Support tube/catheters project in stable/satisfactory position   in the frontal projection. Consolidating infiltrates and or pulmonary edema, cardiomegaly unchanged   compared to 12/14/2021 consistent with diffuse bilateral pneumonia and/or   congestive heart failure.          XR CHEST PORTABLE   Final findings could suggest underlying cystitis. Prominence of the Alisson aortic lymph nodes again identified. These could be   reactive due to an underlying infectious inflammatory process such is the   bladder haziness. However neoplastic process may also considered. Consider   3-6 month follow-up. RECOMMENDATIONS:   Unavailable         CT CHEST W CONTRAST   Final Result   Multifocal consolidative changes both lungs worrisome for multifocal   pneumonia. Follow-up in following medical treatment course is recommended   document complete resolution. Multifocal bladder traumatic trabeculation/diverticula noted with slight   haziness of surrounding fat planes. Please correlate with diffuse urinalysis   findings. Is findings could suggest underlying cystitis. Prominence of the Alisson aortic lymph nodes again identified. These could be   reactive due to an underlying infectious inflammatory process such is the   bladder haziness. However neoplastic process may also considered. Consider   3-6 month follow-up. RECOMMENDATIONS:   Unavailable         XR SHOULDER LEFT (MIN 2 VIEWS)   Final Result   No acute fracture identified. Incidentally noted mass or masslike consolidation in the left upper lobe. Advise chest CT for further evaluation. XR PELVIS (1-2 VIEWS)   Final Result   No acute abnormality detected. Severe degenerative changes at the right hip, with chronic bony remodeling,   not substantially changed when compared to the CT from January 2020. XR CHEST PORTABLE   Final Result   1. No acute cardiopulmonary process identified. 2. Chronic interstitial changes of the lungs. CT CERVICAL SPINE WO CONTRAST   Final Result   Motion degradation challenge evaluation. No convincing evidence acute   displaced fracture traumatic malalignment.   There are moderate multilevel   degenerate change         CT HEAD WO CONTRAST   Final Result   No acute intracranial abnormality. Geographic lucency frontal bone noted, consider bone scan imaging or consider   skeletal survey.          XR CHEST PORTABLE    (Results Pending)        Scheduled Medicines   Medications:    insulin glargine  55 Units SubCUTAneous Nightly    insulin NPH  35 Units SubCUTAneous QAM    insulin lispro  0-24 Units SubCUTAneous Q4H    polyethylene glycol  17 g Oral Daily    chlorhexidine  15 mL Mouth/Throat BID    famotidine (PEPCID) injection  20 mg IntraVENous BID    lidocaine  5 mL IntraDERmal Once    sodium chloride flush  5-40 mL IntraVENous 2 times per day    dexamethasone  6 mg IntraVENous Daily    metoprolol succinate  50 mg Oral BID    allopurinol  300 mg Oral Daily    apixaban  5 mg Oral BID    pravastatin  20 mg Oral Nightly    tamsulosin  0.4 mg Oral Nightly    sodium chloride flush  5-40 mL IntraVENous 2 times per day    cefTRIAXone (ROCEPHIN) IV  1,000 mg IntraVENous Q24H      Infusions:    sodium chloride 75 mL/hr at 12/21/21 1837    dexmedetomidine (PRECEDEX) IV infusion 0.2 mcg/kg/hr (12/21/21 1538)    propofol Stopped (12/13/21 1443)    fentaNYL Stopped (12/19/21 2552)    sodium chloride      norepinephrine Stopped (12/19/21 0125)    dextrose      midazolam Stopped (12/18/21 0450)    sodium chloride           Objective:   Vitals: /71   Pulse 61   Temp 99.6 °F (37.6 °C) (Rectal)   Resp 14   Ht 5' 7.99\" (1.727 m)   Wt 216 lb 0.8 oz (98 kg)   SpO2 99%   BMI 32.86 kg/m²   General appearance: Patient is sedated, intubated and on ventilator response to verbal commands (is not sedated  Neck: no JVD or bruit  Thyroid : Normal lobes   Lungs: Has Vesicular Breath sounds intubated and on ventilator  Heart:  regular rate and rhythm  Abdomen: soft, non-tender; bowel sounds normal; no masses,  no organomegaly  Musculoskeletal: Normal  Extremities: extremities normal, , no edema  Neurologic: Patient is sedated, intubated and on ventilator as noted above some response to verbal commands when not sedated    Assessment:     Patient Active Problem List:     Diabetes mellitus ([facility]     Atrial flutter (HCC)     Atrial fibrillation (HCC)     Gout     Hyperlipidemia     Hypertension     Arthropathy     Upper GI bleed     NSTEMI (non-ST elevated myocardial infarction) (HCC)     Rectal bleeding     Sepsis ([facility]     BRBPR (bright red blood per rectum)     Acute cystitis without hematuria     Leukocytosis     Acute hypokalemia     Rectal bleed     Anemia     B12 deficiency     Elevated antinuclear antibody (FREDRICK) level     PNA (pneumonia)     Troponin I above reference range      Plan:     1. Reviewed POC blood glucose . Labs and X ray results   2. Reviewed Current Medicines   3. On Correction bolus Humalog/ Basal Lantus /NPH insulin regime  4. Monitor Blood glucose frequently   5. Modified  the dose of Insulin/ other medicines as needed   6. Will follow     .      Jazmín Jessica MD, MD

## 2021-12-23 LAB
ALBUMIN SERPL-MCNC: 2.6 GM/DL (ref 3.4–5)
ANION GAP SERPL CALCULATED.3IONS-SCNC: 6 MMOL/L (ref 4–16)
BASE EXCESS MIXED: 5.9 (ref 0–1.2)
BUN BLDV-MCNC: 53 MG/DL (ref 6–23)
CALCIUM SERPL-MCNC: 8.8 MG/DL (ref 8.3–10.6)
CARBON MONOXIDE, BLOOD: 2 % (ref 0–5)
CHLORIDE BLD-SCNC: 111 MMOL/L (ref 99–110)
CO2 CONTENT: 29 MMOL/L (ref 19–24)
CO2: 30 MMOL/L (ref 21–32)
COMMENT: ABNORMAL
CREAT SERPL-MCNC: 0.5 MG/DL (ref 0.9–1.3)
GFR AFRICAN AMERICAN: >60 ML/MIN/1.73M2
GFR NON-AFRICAN AMERICAN: >60 ML/MIN/1.73M2
GLUCOSE BLD-MCNC: 108 MG/DL (ref 70–99)
GLUCOSE BLD-MCNC: 121 MG/DL (ref 70–99)
GLUCOSE BLD-MCNC: 131 MG/DL (ref 70–99)
GLUCOSE BLD-MCNC: 151 MG/DL (ref 70–99)
GLUCOSE BLD-MCNC: 93 MG/DL (ref 70–99)
GLUCOSE BLD-MCNC: 99 MG/DL (ref 70–99)
HCO3 ARTERIAL: 28 MMOL/L (ref 18–23)
HCT VFR BLD CALC: 40.9 % (ref 42–52)
HEMOGLOBIN: 12.3 GM/DL (ref 13.5–18)
MCH RBC QN AUTO: 28.2 PG (ref 27–31)
MCHC RBC AUTO-ENTMCNC: 30.1 % (ref 32–36)
MCV RBC AUTO: 93.8 FL (ref 78–100)
METHEMOGLOBIN ARTERIAL: 0.9 %
O2 SATURATION: 90.3 % (ref 96–97)
PCO2 ARTERIAL: 32 MMHG (ref 32–45)
PDW BLD-RTO: 18.8 % (ref 11.7–14.9)
PH BLOOD: 7.55 (ref 7.34–7.45)
PHOSPHORUS: 3.2 MG/DL (ref 2.5–4.9)
PLATELET # BLD: 263 K/CU MM (ref 140–440)
PMV BLD AUTO: 12.5 FL (ref 7.5–11.1)
PO2 ARTERIAL: 61 MMHG (ref 75–100)
POTASSIUM SERPL-SCNC: 3.8 MMOL/L (ref 3.5–5.1)
RBC # BLD: 4.36 M/CU MM (ref 4.6–6.2)
SODIUM BLD-SCNC: 147 MMOL/L (ref 135–145)
WBC # BLD: 17.2 K/CU MM (ref 4–10.5)

## 2021-12-23 PROCEDURE — 2580000003 HC RX 258: Performed by: INTERNAL MEDICINE

## 2021-12-23 PROCEDURE — 6370000000 HC RX 637 (ALT 250 FOR IP): Performed by: INTERNAL MEDICINE

## 2021-12-23 PROCEDURE — 2500000003 HC RX 250 WO HCPCS: Performed by: NURSE PRACTITIONER

## 2021-12-23 PROCEDURE — 2500000003 HC RX 250 WO HCPCS: Performed by: INTERNAL MEDICINE

## 2021-12-23 PROCEDURE — 6360000002 HC RX W HCPCS: Performed by: INTERNAL MEDICINE

## 2021-12-23 PROCEDURE — 94761 N-INVAS EAR/PLS OXIMETRY MLT: CPT

## 2021-12-23 PROCEDURE — 82962 GLUCOSE BLOOD TEST: CPT

## 2021-12-23 PROCEDURE — 6360000002 HC RX W HCPCS: Performed by: NURSE PRACTITIONER

## 2021-12-23 PROCEDURE — 92610 EVALUATE SWALLOWING FUNCTION: CPT

## 2021-12-23 PROCEDURE — 99211 OFF/OP EST MAY X REQ PHY/QHP: CPT

## 2021-12-23 PROCEDURE — 2060000000 HC ICU INTERMEDIATE R&B

## 2021-12-23 PROCEDURE — 84100 ASSAY OF PHOSPHORUS: CPT

## 2021-12-23 PROCEDURE — 85027 COMPLETE CBC AUTOMATED: CPT

## 2021-12-23 PROCEDURE — 82803 BLOOD GASES ANY COMBINATION: CPT

## 2021-12-23 PROCEDURE — 36600 WITHDRAWAL OF ARTERIAL BLOOD: CPT

## 2021-12-23 PROCEDURE — 82040 ASSAY OF SERUM ALBUMIN: CPT

## 2021-12-23 PROCEDURE — 2580000003 HC RX 258: Performed by: NURSE PRACTITIONER

## 2021-12-23 PROCEDURE — 99232 SBSQ HOSP IP/OBS MODERATE 35: CPT | Performed by: INTERNAL MEDICINE

## 2021-12-23 PROCEDURE — 80048 BASIC METABOLIC PNL TOTAL CA: CPT

## 2021-12-23 RX ADMIN — DEXTROSE MONOHYDRATE: 50 INJECTION, SOLUTION INTRAVENOUS at 14:33

## 2021-12-23 RX ADMIN — SODIUM CHLORIDE, PRESERVATIVE FREE 10 ML: 5 INJECTION INTRAVENOUS at 21:19

## 2021-12-23 RX ADMIN — SODIUM CHLORIDE, PRESERVATIVE FREE 10 ML: 5 INJECTION INTRAVENOUS at 21:18

## 2021-12-23 RX ADMIN — FAMOTIDINE 20 MG: 10 INJECTION, SOLUTION INTRAVENOUS at 09:55

## 2021-12-23 RX ADMIN — OXYCODONE HYDROCHLORIDE 5 MG: 5 TABLET ORAL at 23:31

## 2021-12-23 RX ADMIN — FAMOTIDINE 20 MG: 10 INJECTION, SOLUTION INTRAVENOUS at 21:18

## 2021-12-23 RX ADMIN — DEXTROSE MONOHYDRATE: 50 INJECTION, SOLUTION INTRAVENOUS at 04:07

## 2021-12-23 RX ADMIN — APIXABAN 5 MG: 5 TABLET, FILM COATED ORAL at 21:17

## 2021-12-23 RX ADMIN — TAMSULOSIN HYDROCHLORIDE 0.4 MG: 0.4 CAPSULE ORAL at 21:18

## 2021-12-23 RX ADMIN — LORAZEPAM 1 MG: 2 INJECTION INTRAMUSCULAR; INTRAVENOUS at 23:31

## 2021-12-23 RX ADMIN — ALLOPURINOL 300 MG: 300 TABLET ORAL at 09:58

## 2021-12-23 RX ADMIN — DEXTROSE MONOHYDRATE: 50 INJECTION, SOLUTION INTRAVENOUS at 23:33

## 2021-12-23 RX ADMIN — CEFTRIAXONE SODIUM 1000 MG: 1 INJECTION, POWDER, FOR SOLUTION INTRAMUSCULAR; INTRAVENOUS at 03:14

## 2021-12-23 RX ADMIN — POLYETHYLENE GLYCOL (3350) 17 G: 17 POWDER, FOR SOLUTION ORAL at 09:57

## 2021-12-23 RX ADMIN — DEXMEDETOMIDINE 0.2 MCG/KG/HR: 100 INJECTION, SOLUTION, CONCENTRATE INTRAVENOUS at 14:36

## 2021-12-23 RX ADMIN — DEXAMETHASONE SODIUM PHOSPHATE 6 MG: 10 INJECTION INTRAMUSCULAR; INTRAVENOUS at 09:56

## 2021-12-23 RX ADMIN — APIXABAN 5 MG: 5 TABLET, FILM COATED ORAL at 09:57

## 2021-12-23 RX ADMIN — PRAVASTATIN SODIUM 20 MG: 10 TABLET ORAL at 21:18

## 2021-12-23 RX ADMIN — METOPROLOL SUCCINATE 50 MG: 50 TABLET, EXTENDED RELEASE ORAL at 21:17

## 2021-12-23 ASSESSMENT — PAIN SCALES - WONG BAKER

## 2021-12-23 ASSESSMENT — PAIN SCALES - GENERAL
PAINLEVEL_OUTOF10: 0
PAINLEVEL_OUTOF10: 10

## 2021-12-23 NOTE — PROGRESS NOTES
Speech Language Pathology  Facility/Department: [facility] [place] [facility] [place]   CLINICAL BEDSIDE SWALLOW EVALUATION    NAME: Kathlynn Holter  : 1945  MRN: [id number]    IMPRESSIONS AND RECOMMENDATIONS: Kathlynn Holter was referred for a bedside swallow evaluation following admission to [facility] with COVID PNA, acute respiratory failure, CHF exacerbation, UTI. He was intubated on mechanical ventilation -. Medical hx includes afib, HTN, HLD, DM. No known history of dysphagia prior to admission. Pt seen for evaluation seated upright in bed, alert, confused. He was fairly cooperative with visual/environmental cues but did not follow all direct verbal commands. He did not follow directions to participate in oral mechanism examination. He was presented with PO trials of ice chips, thin liquids via tsp/cup/straw, nectar thick liquids via tsp/straw, puree, and regular solids. Oral stage is moderately impaired, characterized by adequate labial seal, reduced sustained mandibular elevation with open-mouth posture during trials, oral holding with reduced lingual coordination and visible loss of bolus to floor of mouth, prolonged/ineffective mastication and expectoration of solids, lingual pumping, and adequate clearance for liquids/puree. Suspect pharyngeal dysphagia characterized by delayed swallow initiation, reduced laryngeal elevation. Weak coughing followed trials of thin liquids only. Recommend initiation of pureed diet/nectar thick liquids, total feed, with aspiration precautions. Give pills in pureed or pudding consistency. SLP will follow. Results/recommendations d/w RN.      ADMISSION DATE: 2021  ADMITTING DIAGNOSIS: has Diabetes mellitus ([address] Atrial flutter ([address] Atrial fibrillation ([address] Gout; Hyperlipidemia; Hypertension; Arthropathy; Upper GI bleed; NSTEMI (non-ST elevated myocardial infarction) ([address] Rectal bleeding; Sepsis ([address] BRBPR (bright red blood per rectum);  Acute cystitis without hematuria; Leukocytosis; Acute hypokalemia; Rectal bleed; Anemia; B12 deficiency; Elevated antinuclear antibody (FREDRICK) level; PNA (pneumonia); and Troponin I above reference range on their problem list.  ONSET DATE: this admission    Recent Chest Xray/CT of Chest: see chart    Date of Eval: 12/23/2021  Evaluating Therapist: Chyrl Holter, SLP    Current Diet level:  Current Diet : NPO  Current Liquid Diet : NPO      Primary Complaint  Patient Complaint: does not state    Pain:  Pain Assessment  Pain Assessment: 0-10  Pain Level: 0  Sheikh-Baker Pain Rating: No hurt  Patient's Stated Pain Goal: No pain  Response to Pain Intervention: Other (Comment) (CPOT)  RASS Score: Alert and calm    Reason for Referral  Leopold Bruch was referred for a bedside swallow evaluation to assess the efficiency of his swallow function, identify signs and symptoms of aspiration and make recommendations regarding safe dietary consistencies, effective compensatory strategies, and safe eating environment. Impression  Dysphagia Diagnosis: Moderate oral stage dysphagia; Moderate pharyngeal stage dysphagia  Dysphagia Outcome Severity Scale: Level 3: Moderate dysphagia- Total assisstance, supervision or strategies. Two or more diet consistencies restricted     Treatment Plan  Requires SLP Intervention: Yes  Duration/Frequency of Treatment: 2-3x/week for LOS  D/C Recommendations: To be determined       Recommended Diet and Intervention  Diet Solids Recommendation: Dysphagia Pureed (Dysphagia I)  Liquid Consistency Recommendation: Mildly Thick (Nectar)  Recommended Form of Meds: Meds in puree     Therapeutic Interventions: Diet tolerance monitoring;Patient/Family education; Therapeutic PO trials with SLP    Compensatory Swallowing Strategies  Compensatory Swallowing Strategies: Upright as possible for all oral intake;Eat/Feed slowly; Small bites/sips; Total feed    Treatment/Goals  Short-term Goals  Timeframe for Short-term Goals: length of admission  Goal 1: Pt will tolerate pureed diet/nectar thick liquids with adequate oral manipulation/clearance and no s/s aspiration. Goal 2: Pt will tolerate PO trials of advanced consistencies with adequate oral manipulation/clearance and no s/s aspiration for safe diet upgrade. Goal 3: Pt/caregivers will indicate understanding of all recommendations. General  Chart Reviewed: Yes  Behavior/Cognition: Alert;Confused; Requires cueing  Respiratory Status: Room air  O2 Device: None (Room air)  Communication Observation:  (cognitive communication deficits)  Follows Directions: Simple (inconsistently)  Dentition: Edentulous  Patient Positioning: Upright in bed  Baseline Vocal Quality: Dysphonic  Volitional Cough: Weak  Prior Dysphagia History: none known prior to admission  Consistencies Administered: Reg solid; Dysphagia Pureed (Dysphagia I); Thin - teaspoon; Thin - cup; Thin - straw; Ice Chips; Nectar - straw;Nectar - teaspoon;Nectar - cup           Vision/Hearing       Oral Motor Deficits  Oral/Motor  Oral Motor: Exceptions to [facility]    Oral Phase Dysfunction  Oral Phase  Oral Phase: Exceptions     Indicators of Pharyngeal Phase Dysfunction   Pharyngeal Phase  Pharyngeal Phase: Exceptions    Prognosis  Prognosis  Prognosis for safe diet advancement: good  Individuals consulted  Consulted and agree with results and recommendations: RN    Education  Patient Education: recommendations/plan  Patient Education Response: No evidence of learning  Safety Devices in place: Yes  Type of devices:  All fall risk precautions in place       Therapy Time  SLP Individual Minutes  Time In: 1020  Time Out: [address]  Minutes: [address] [facility], SLP  12/23/2021 11:00 AM

## 2021-12-23 NOTE — PROGRESS NOTES
Pulmonary and Critical Care  Progress Note      VITALS:  BP 99/69   Pulse 67   Temp 97.3 °F (36.3 °C) (Oral)   Resp 13   Ht 5' 7.99\" (1.727 m)   Wt 216 lb 0.8 oz (98 kg)   SpO2 92%   BMI 32.86 kg/m²     Subjective:   CHIEF COMPLAINT :Fall     HPI:                The patient is lying in the bed. He is in mild resp distress. He has been extubated and doing well. He is getting swallow eval    Objective:   PHYSICAL EXAM:    LUNGS:Occasional basal crackles  Abd-soft, BS+,NT  Ext- no pedal edema  CVS-s1s2, no murmurs      DATA:    CBC:  Recent Labs     12/21/21  0415 12/22/21  0400 12/23/21  0400   WBC 17.8* 20.0* 17.2*   RBC 4.46* 4.34* 4.36*   HGB 12.7* 12.3* 12.3*   HCT 42.0 40.1* 40.9*    269 263   MCV 94.2 92.4 93.8   MCH 28.5 28.3 28.2   MCHC 30.2* 30.7* 30.1*   RDW 18.8* 19.1* 18.8*      BMP:  Recent Labs     12/22/21  0400 12/22/21 2005 12/23/21  0400   * 148* 147*   K 3.8 4.1 3.8   * 113* 111*   CO2 29 29 30   BUN 70* 60* 53*   CREATININE 0.7* 0.6* 0.5*   CALCIUM 8.8 8.8 8.8   GLUCOSE 165* 232* 108*      ABG:  Recent Labs     12/21/21  0600 12/22/21  0600 12/23/21  0600   PH 7.46* 7.54* 7.55*   PO2ART 65* 76 61*   PED1UVL 40.0 31.0* 32.0   O2SAT 91.2* 93.8* 90.3*     BNP  No results found for: BNP   D-Dimer:  No results found for: DDIMER   1.  Radiology: None      Assessment/Plan     Patient Active Problem List    Diagnosis Date Noted    PNA (pneumonia) 12/09/2021    Troponin I above reference range 12/09/2021    Elevated antinuclear antibody (FREDRICK) level 02/16/2021    B12 deficiency 02/15/2021    Anemia 02/12/2021    Rectal bleed 11/18/2020    Acute hypokalemia 11/17/2020    Acute cystitis without hematuria     Leukocytosis     [facility] 01/17/2020    BRBPR (bright red blood per rectum) 01/17/2020    Rectal bleeding 09/25/2018    NSTEMI (non-ST elevated myocardial infarction) ([address] 06/25/2018    Upper GI bleed 03/07/2017    Diabetes mellitus (HCC)     [facility]     Atrial fibrillation ([facility]     Gout     Hyperlipidemia     Hypertension     Arthropathy    Acute on chronic hypoxic hypercapneic resp failure sec to Cardiogenic and non Cardiogenic Pulmonary edema  Systolic CHF with EF of 87-79%  Grade II Diastolic dysfunction  Bilateral Pneumonia sec to COVID-19  Obesity  DIVYA  Chronic Metabolic alkalosis  Left pneumothorax s/p left chest tube  VDRF  Proteus bacteremia  Hyperchloremic Hypernatremia- improving       1. BMP in am  2. Swallow eval  3. Abx  4. F/u C&S  5. Inhalers  6. Decadron  7. Insulin  8. Keep sats > 92%  9. PT/OT  10.  C.w present management    Electronically signed by Robinson Dee MD on 12/23/2021 at 12:35 PM

## 2021-12-23 NOTE — PROGRESS NOTES
Nephrology Progress Note  12/23/2021 3:51 PM  Subjective: Interval History: Wilhemena Pulse is a 77 y.o. male extubated but weak lethargic    Data:   Scheduled Meds:   insulin glargine  55 Units SubCUTAneous Nightly    insulin NPH  35 Units SubCUTAneous QAM    insulin lispro  0-24 Units SubCUTAneous Q4H    polyethylene glycol  17 g Oral Daily    chlorhexidine  15 mL Mouth/Throat BID    famotidine (PEPCID) injection  20 mg IntraVENous BID    lidocaine  5 mL IntraDERmal Once    sodium chloride flush  5-40 mL IntraVENous 2 times per day    dexamethasone  6 mg IntraVENous Daily    metoprolol succinate  50 mg Oral BID    allopurinol  300 mg Oral Daily    apixaban  5 mg Oral BID    pravastatin  20 mg Oral Nightly    tamsulosin  0.4 mg Oral Nightly    sodium chloride flush  5-40 mL IntraVENous 2 times per day    cefTRIAXone (ROCEPHIN) IV  1,000 mg IntraVENous Q24H     Continuous Infusions:   dextrose 100 mL/hr at 12/23/21 1433    dexmedetomidine (PRECEDEX) IV infusion 0.2 mcg/kg/hr (12/23/21 1436)    propofol Stopped (12/13/21 1443)    fentaNYL Stopped (12/19/21 0328)    sodium chloride      norepinephrine Stopped (12/19/21 0125)    dextrose      midazolam Stopped (12/18/21 0450)    sodium chloride           CBC   Recent Labs     12/21/21  0415 12/22/21  0400 12/23/21  0400   WBC 17.8* 20.0* 17.2*   HGB 12.7* 12.3* 12.3*   HCT 42.0 40.1* 40.9*    269 263      BMP   Recent Labs     12/21/21  0415 12/21/21  1708 12/22/21  0400 12/22/21 2005 12/23/21  0400   *   < > 156* 148* 147*   K 4.4   < > 3.8 4.1 3.8   *   < > 119* 113* 111*   CO2 29   < > 29 29 30   PHOS 2.7  --  2.7  --  3.2   BUN 86*   < > 70* 60* 53*   CREATININE 0.8*   < > 0.7* 0.6* 0.5*    < > = values in this interval not displayed. Hepatic:   No results for input(s): AST, ALT, ALB, BILITOT, ALKPHOS in the last 72 hours. Troponin: No results for input(s): TROPONINI in the last 72 hours.   BNP: No results for input(s): BNP in the last 72 hours. Lipids: No results for input(s): CHOL, HDL in the last 72 hours. Invalid input(s): LDLCALCU  ABGs:   Lab Results   Component Value Date    PO2ART 61 12/23/2021    TGX8TAS 32.0 12/23/2021     INR: No results for input(s): INR in the last 72 hours. Renal Labs  Albumin:    Lab Results   Component Value Date    LABALBU 2.6 12/23/2021     Calcium:    Lab Results   Component Value Date    CALCIUM 8.8 12/23/2021     Phosphorus:    Lab Results   Component Value Date    PHOS 3.2 12/23/2021     U/A:    Lab Results   Component Value Date    NITRU NEGATIVE 12/18/2021    COLORU COLORLESS 12/18/2021    WBCUA NONE SEEN 12/18/2021    RBCUA 31 12/18/2021    MUCUS RARE 12/18/2021    TRICHOMONAS NONE SEEN 12/18/2021    BACTERIA NEGATIVE 12/18/2021    CLARITYU CLEAR 12/18/2021    SPECGRAV 1.017 12/18/2021    UROBILINOGEN NORMAL 12/18/2021    BILIRUBINUR NEGATIVE 12/18/2021    BLOODU LARGE 12/18/2021    KETUA NEGATIVE 12/18/2021     ABG:    Lab Results   Component Value Date    IKA0MLB 32.0 12/23/2021    PO2ART 61 12/23/2021    DLQ8REO 28.0 12/23/2021     HgBA1c:    Lab Results   Component Value Date    LABA1C 7.3 12/18/2021     Microalbumen/Creatinine ratio:  No components found for: RUCREAT  TSH:  No results found for: TSH  IRON:    Lab Results   Component Value Date    IRON 48 05/07/2021     Iron Saturation:  No components found for: PERCENTFE  TIBC:    Lab Results   Component Value Date    TIBC 302 05/07/2021     FERRITIN:    Lab Results   Component Value Date    FERRITIN 57 05/07/2021     RPR:  No results found for: RPR  FREDRICK:    Lab Results   Component Value Date    ANATITER 1:160 02/12/2021    FREDRICK DETECTED 02/12/2021     24 Hour Urine for Creatinine Clearance:  No components found for: CREAT4, UHRS10, UTV10      Objective:   I/O: 12/22 0701 - 12/23 0700  In: 1830.8 [I.V.:1780.8]  Out: 2230 [Urine:1330]  I/O last 3 completed shifts:   In: 1780.8 [I.V.:1780.8]  Out: 2480 [Urine:1330; Emesis/NG output:900]  No intake/output data recorded. Vitals: BP (!) 77/62   Pulse 62   Temp 97.6 °F (36.4 °C) (Oral)   Resp 10   Ht 5' 7.99\" (1.727 m)   Wt 216 lb 0.8 oz (98 kg)   SpO2 93%   BMI 32.86 kg/m²  {  General appearance: Weak arousable  HEENT: Head: Normal, normocephalic, atraumatic.   Neck: supple, symmetrical, trachea midline  Lungs: diminished breath sounds bilaterally  Heart: S1, S2 normal  Abdomen: abnormal findings:  soft nt  Extremities: edema trace  Neurologic: Mental status: alertness: Weak        Assessment and Plan:      IMP:  #1 hypernatremia  #2 acute renal failure with increased uremia  #3 respiratory failure  #4 COVID-19 pneumonia  #5 status post pneumothorax tension  #6 CHF exacerbation  #7 type 2 diabetes  #8 complicated UTI/cystitis    Plan     #1 sodium somewhat increased will monitor  #2 renal function holding stable  #3 monitor oxygenation off the vent  #4 treatment Covid setting  5 monitor glucose  6 antibiotics         Meagan Shearer MD, MD

## 2021-12-23 NOTE — PROGRESS NOTES
Nephrology Progress Note  12/22/2021 8:26 PM  Subjective: Interval History: Lenin Ko is a 77 y.o. male sedated on the vent will monitor      Data:   Scheduled Meds:   insulin glargine  55 Units SubCUTAneous Nightly    insulin NPH  35 Units SubCUTAneous QAM    insulin lispro  0-24 Units SubCUTAneous Q4H    polyethylene glycol  17 g Oral Daily    chlorhexidine  15 mL Mouth/Throat BID    famotidine (PEPCID) injection  20 mg IntraVENous BID    lidocaine  5 mL IntraDERmal Once    sodium chloride flush  5-40 mL IntraVENous 2 times per day    dexamethasone  6 mg IntraVENous Daily    metoprolol succinate  50 mg Oral BID    allopurinol  300 mg Oral Daily    apixaban  5 mg Oral BID    pravastatin  20 mg Oral Nightly    tamsulosin  0.4 mg Oral Nightly    sodium chloride flush  5-40 mL IntraVENous 2 times per day    cefTRIAXone (ROCEPHIN) IV  1,000 mg IntraVENous Q24H     Continuous Infusions:   dextrose 100 mL/hr at 12/22/21 1801    dexmedetomidine (PRECEDEX) IV infusion 0.2 mcg/kg/hr (12/22/21 1502)    propofol Stopped (12/13/21 1443)    fentaNYL Stopped (12/19/21 0987)    sodium chloride      norepinephrine Stopped (12/19/21 0125)    dextrose      midazolam Stopped (12/18/21 0450)    sodium chloride           CBC   Recent Labs     12/20/21  0415 12/21/21  0415 12/22/21  0400   WBC 16.5* 17.8* 20.0*   HGB 12.7* 12.7* 12.3*   HCT 43.0 42.0 40.1*    270 269      BMP   Recent Labs     12/20/21  0415 12/20/21  1210 12/21/21  0415 12/21/21  0415 12/21/21  1708 12/22/21  0105 12/22/21  0400   *   < > 154*   < > 154* 153* 156*   K 4.4   < > 4.4   < > 4.1 3.9 3.8   *   < > 116*   < > 116* 117* 119*   CO2 32   < > 29   < > 29 28 29   PHOS 3.6  --  2.7  --   --   --  2.7   *   < > 86*   < > 78* 72* 70*   CREATININE 1.0   < > 0.8*   < > 0.5* 0.6* 0.7*    < > = values in this interval not displayed.      Hepatic:   No results for input(s): AST, ALT, ALB, BILITOT, ALKPHOS in the last 72 hours. Troponin: No results for input(s): TROPONINI in the last 72 hours. BNP: No results for input(s): BNP in the last 72 hours. Lipids: No results for input(s): CHOL, HDL in the last 72 hours. Invalid input(s): LDLCALCU  ABGs:   Lab Results   Component Value Date    PO2ART 76 12/22/2021    EJA7GAT 31.0 12/22/2021     INR: No results for input(s): INR in the last 72 hours.   Renal Labs  Albumin:    Lab Results   Component Value Date    LABALBU 2.7 12/22/2021     Calcium:    Lab Results   Component Value Date    CALCIUM 8.8 12/22/2021     Phosphorus:    Lab Results   Component Value Date    PHOS 2.7 12/22/2021     U/A:    Lab Results   Component Value Date    NITRU NEGATIVE 12/18/2021    COLORU COLORLESS 12/18/2021    WBCUA NONE SEEN 12/18/2021    RBCUA 31 12/18/2021    MUCUS RARE 12/18/2021    TRICHOMONAS NONE SEEN 12/18/2021    BACTERIA NEGATIVE 12/18/2021    CLARITYU CLEAR 12/18/2021    SPECGRAV 1.017 12/18/2021    UROBILINOGEN NORMAL 12/18/2021    BILIRUBINUR NEGATIVE 12/18/2021    BLOODU LARGE 12/18/2021    KETUA NEGATIVE 12/18/2021     ABG:    Lab Results   Component Value Date    EPK6WCE 31.0 12/22/2021    PO2ART 76 12/22/2021    MKC9XXS 26.5 12/22/2021     HgBA1c:    Lab Results   Component Value Date    LABA1C 7.3 12/18/2021     Microalbumen/Creatinine ratio:  No components found for: RUCREAT  TSH:  No results found for: TSH  IRON:    Lab Results   Component Value Date    IRON 48 05/07/2021     Iron Saturation:  No components found for: PERCENTFE  TIBC:    Lab Results   Component Value Date    TIBC 302 05/07/2021     FERRITIN:    Lab Results   Component Value Date    FERRITIN 57 05/07/2021     RPR:  No results found for: RPR  FREDRICK:    Lab Results   Component Value Date    ANATITER 1:160 02/12/2021    FREDRICK DETECTED 02/12/2021     24 Hour Urine for Creatinine Clearance:  No components found for: CREAT4, UHRS10, UTV10      Objective:   I/O: 12/21 0701 - 12/22 0700  In: 702.9 [I.V.:616.9]  Out: 1975 [IPQKY:3896]  I/O last 3 completed shifts: In: 732.9 [I.V.:596.9; NG/GT:136]  Out: 1975 [RKWEA:4146]  I/O this shift:  In: 622.2 [I.V.:622.2]  Out: 850 [Urine:550; Emesis/NG output:300]  Vitals: BP (!) 142/86   Pulse 77   Temp 99.2 °F (37.3 °C) (Rectal)   Resp 16   Ht 5' 7.99\" (1.727 m)   Wt 216 lb 0.8 oz (98 kg)   SpO2 98%   BMI 32.86 kg/m²  {  General appearance: Sedated ET tube  HEENT: Head: Normal, normocephalic, atraumatic.   Neck: supple, symmetrical, trachea midline  Lungs: diminished breath sounds bilaterally  Heart: S1, S2 normal  Abdomen: abnormal findings:  soft nt  Extremities: edema trace  Neurologic: Mental status: alertness: Sedated        Assessment and Plan:      IMP:  #1 hypernatremia  #2 acute renal failure with increased uremia  #3 respiratory failure  #4 COVID-19 pneumonia  #5 status post pneumothorax tension  #6 CHF exacerbation  #7 type 2 diabetes  #8 complicated UTI/cystitis    Plan     #1 adjust IV fluids and free water  #2 renal function holding stable monitor  #3 try to wean vent  #4 treat in the setting of Covid  5 monitor glucose  #6 treat any infections  Supportive care monitor         Dionte Almonte MD, MD 36.6

## 2021-12-23 NOTE — PLAN OF CARE
Problem: Falls - Risk of:  Goal: Will remain free from falls  Description: Will remain free from falls  Outcome: Met This Shift  Goal: Absence of physical injury  Description: Absence of physical injury  Outcome: Met This Shift     Problem: Airway Clearance - Ineffective  Goal: Achieve or maintain patent airway  Outcome: Met This Shift     Problem: Gas Exchange - Impaired  Goal: Absence of hypoxia  Outcome: Met This Shift  Goal: Promote optimal lung function  Outcome: Ongoing     Problem: Breathing Pattern - Ineffective  Goal: Ability to achieve and maintain a regular respiratory rate  Outcome: Ongoing     Problem:  Body Temperature -  Risk of, Imbalanced  Goal: Ability to maintain a body temperature within defined limits  Outcome: Met This Shift  Goal: Will regain or maintain usual level of consciousness  Outcome: Ongoing  Goal: Complications related to the disease process, condition or treatment will be avoided or minimized  Outcome: Ongoing     Problem: Isolation Precautions - Risk of Spread of Infection  Goal: Prevent transmission of infection  Outcome: Met This Shift     Problem: Nutrition Deficits  Goal: Optimize nutritional status  Outcome: Ongoing     Problem: Risk for Fluid Volume Deficit  Goal: Maintain normal heart rhythm  Outcome: Met This Shift  Goal: Maintain absence of muscle cramping  Outcome: Met This Shift  Goal: Maintain normal serum potassium, sodium, calcium, phosphorus, and pH  Outcome: Met This Shift     Problem: Loneliness or Risk for Loneliness  Goal: Demonstrate positive use of time alone when socialization is not possible  Outcome: Ongoing     Problem: Fatigue  Goal: Verbalize increase energy and improved vitality  Outcome: Ongoing     Problem: Patient Education: Go to Patient Education Activity  Goal: Patient/Family Education  Outcome: Met This Shift     Problem: Pain:  Goal: Pain level will decrease  Description: Pain level will decrease  Outcome: Met This Shift  Goal: Control of acute pain  Description: Control of acute pain  Outcome: Met This Shift  Goal: Control of chronic pain  Description: Control of chronic pain  Outcome: Met This Shift     Problem: Skin Integrity:  Goal: Will show no infection signs and symptoms  Description: Will show no infection signs and symptoms  Outcome: Ongoing  Goal: Absence of new skin breakdown  Description: Absence of new skin breakdown  Outcome: Ongoing     Problem: Nutrition  Goal: Optimal nutrition therapy  12/23/2021 0223 by Manoj Boudreaux RN  Outcome: Ongoing  12/22/2021 1245 by Michell Candelario, MS, RD, LD  Outcome: Ongoing     Problem: Non-Violent Restraints  Goal: Removal from restraints as soon as assessed to be safe  Outcome: Met This Shift  Goal: No harm/injury to patient while restraints in use  Outcome: Met This Shift  Goal: Patient's dignity will be maintained  Outcome: Met This Shift

## 2021-12-23 NOTE — CONSULTS
[address] [address] Continence Nurse  Consult Note       Susan Reyes  AGE: 77 y.o. GENDER: male  : 1945  TODAY'S DATE:  2021    Subjective:     Reason for CWOCN Evaluation and Assessment: wound reassessment      Susan Reyes is a 77 y.o. male referred by:   [x] Physician  [] Nursing  [] Other:     Wound Identification:  Wound Type: pressure  Contributing Factors: edema, diabetes, chronic pressure, decreased mobility, decreased tissue oxygenation and incontinence of stool        PAST MEDICAL HISTORY        Diagnosis Date    Arthropathy     Atrial fibrillation (HCC)     Atrial flutter (HCC)     Diabetes mellitus ([facility] [address]     Gout     Hyperlipidemia     Hypertension     Lumbago        PAST SURGICAL HISTORY    Past Surgical History:   Procedure Laterality Date    COLONOSCOPY N/A 2018    COLONOSCOPY POLYPECTOMY SNARE/COLD BIOPSY performed by Simón Stephens MD at [place] [address] COLONOSCOPY N/A 2020    COLONOSCOPY DIAGNOSTIC performed by Keyonna Eric MD at [facility] ENDOSCOPY       FAMILY HISTORY    Family History   Problem Relation Age of Onset    No Known Problems Mother     No Known Problems Father        SOCIAL HISTORY    Social History     Tobacco Use    Smoking status: Former Smoker     Packs/day: 0.50     Types: Cigarettes    Smokeless tobacco: Never Used   Substance Use Topics    Alcohol use: No    Drug use: No       ALLERGIES    Allergies   Allergen Reactions    Demerol Hcl [Meperidine]     Gabapentin     Simvastatin        MEDICATIONS    No current facility-administered medications on file prior to encounter.      Current Outpatient Medications on File Prior to Encounter   Medication Sig Dispense Refill    FEROSUL 325 (65 Fe) MG tablet take ONE TABLET BY MOUTH TWICE DAILY 60 tablet 5    lidocaine (LIDODERM) 5 % APPLY 1 PATCH TO SKIN EVERY DAY AS NEEDED - LEAVE ON FOR UP TO 12 HOURS - AFTER YOU TAKE THE PATCH OFF, DO NOT PUT ANOTHER PATCH ON THAT AREA OF SKIN FOR AT LEAST 12 HOURS      losartan (COZAAR) 100 MG tablet TAKE ONE TABLET BY MOUTH EVERY DAY      cyanocobalamin ([employer] VITAMIN B12) 1000 MCG tablet Take 1 tablet by mouth daily 30 tablet 5    famotidine (PEPCID) 40 MG tablet Take 40 mg by mouth nightly      potassium chloride (KLOR-CON M) 20 MEQ extended release tablet Take 20 mEq by mouth daily      tamsulosin (FLOMAX) 0.4 MG capsule Take 1 capsule by mouth daily (Patient taking differently: Take 0.4 mg by mouth nightly ) 30 capsule 3    nitroGLYCERIN (NITROSTAT) 0.4 MG SL tablet up to max of 3 total doses.  If no relief after 1 dose, call 911. 25 tablet 0    apixaban (ELIQUIS) 5 MG TABS tablet Take 1 tablet by mouth 2 times daily 60 tablet 0    metoprolol tartrate (LOPRESSOR) 25 MG tablet Take 1 tablet by mouth 2 times daily 60 tablet 0    allopurinol (ZYLOPRIM) 300 MG tablet Take 300 mg by mouth daily      diltiazem (TIAZAC) 360 MG extended release capsule Take 360 mg by mouth daily      triamterene-hydrochlorothiazide (MAXZIDE-25) 37.5-25 MG per tablet Take 1 tablet by mouth daily      metFORMIN (GLUCOPHAGE) 500 MG tablet Take 500 mg by mouth 2 times daily (with meals)      pravastatin (PRAVACHOL) 20 MG tablet Take 20 mg by mouth nightly           Objective:      BP 99/69   Pulse 67   Temp 97.3 °F (36.3 °C) (Oral)   Resp 13   Ht 5' 7.99\" (1.727 m)   Wt 216 lb 0.8 oz (98 kg)   SpO2 92%   BMI 32.86 kg/m²   Bill Risk Score: Bill Scale Score: 11    LABS    CBC:   Lab Results   Component Value Date    WBC 17.2 12/23/2021    RBC 4.36 12/23/2021    HGB 12.3 12/23/2021    HCT 40.9 12/23/2021    MCV 93.8 12/23/2021    MCH 28.2 12/23/2021    MCHC 30.1 12/23/2021    RDW 18.8 12/23/2021     12/23/2021    MPV 12.5 12/23/2021     CMP:    Lab Results   Component Value Date     12/23/2021    K 3.8 12/23/2021     12/23/2021    CO2 30 12/23/2021    BUN 53 12/23/2021    CREATININE 0.5 12/23/2021    GFRAA >60 12/23/2021    LABGLOM >60 Amount Scant 12/23/21 0850   Drainage Description Serosanguinous 12/23/21 0850   Odor None 12/23/21 0850   Alisson-wound Assessment Intact 12/23/21 0850   Margins Defined edges 12/23/21 0850   Wound Thickness Description not for Pressure Injury Partial thickness 12/23/21 0850   Number of days: 13       Response to treatment:  Well tolerated by patient. Pain Assessment:  Severity:  none  Quality of pain: na  Wound Pain Timing/Severity: na  Premedicated: no    Plan:     Plan of Care: [REMOVED] Wound 12/10/21 Tibial Right;Posterior-Dressing/Treatment: Open to air  [REMOVED] Wound 12/10/21 Pretibial Right;Lateral cluster-Dressing/Treatment: Open to air  Wound 12/10/21 Buttocks Left-Dressing/Treatment: Collagen,Silicone border  [REMOVED] Wound 12/16/21 Tibial Left;Posterior-Dressing/Treatment: Open to air     Pt in bed. NA in with pt and assist with turning. Left buttock now stage 2. Leg wounds appear healed-just with dry/scaly skin with scattered dry eschar. Picture and measurements taken. Collagen and silicone foam border applied to buttock. Positioned to right side with heels floated with pillow support. Pt is at high risk for skin breakdown AEB Heriberto. Follow heriberto orders. Specialty Bed Required : yes  [x] Low Air Loss   [x] Pressure Redistribution  [] Fluid Immersion  [] Bariatric  [] Total Pressure Relief  [] Other:     Discharge Plan:  Placement for patient upon discharge: tbd  Hospice Care: no  Patient appropriate for Outpatient [address]: [facility]    Patient/Caregiver Teaching:  Level of patient/caregiver understanding able to:   No evidence of learning.         Electronically signed by Herminio Talley RN, Joseph Cooper on 12/23/2021 at 11:40 AM

## 2021-12-23 NOTE — PROGRESS NOTES
Hospitalist Progress Note      Name:  Toby Peng /Age/Sex: 1945  (77 y.o. male)   MRN & CSN:  [id number] & [id number] Admission Date/Time: 2021  5:33 PM   Location:  -A PCP: Paulo Falukner MD         Hospital Day: 16    Assessment and Plan:     Patient, 43-year-old male with past medical history of atrial fibrillation/flutter, diabetes mellitus type 2, hyperlipidemia, hypertension was admitted on 2021 after an episode of fall. Reported generalized weakness. Patient tested positive for COVID-19 infection. X-ray chest suggestive of pneumonia. Also noted to have UTI. Patient was noted to have acute systolic/diastolic CHF exacerbation. Echocardiogram showed EF 35 to 40%. Patient received 1 dose of Tocilizumab 12/10/2021. Patient was seen by pulmonary/cardiology/cardiothoracic surgery. Patient developed left tension pneumothorax which required chest tube placement. Patient was also seen by urology secondary to CT finding of multifocal bladder traumatic trabeculation/diverticula. Recommended outpatient cystoscopy/TRUS. Assessment    Acute hypoxic respiratory failure secondary to COVID-19 pneumonia. Extubated   Acute complicated cystitis  Sepsis secondary to above, present on admission  Left tension pneumothorax s/p chest tube placement 2021 to   Acute systolic/diastolic CHF exacerbation  Hyponatremia. S/p fall  Hypertension  Paroxysmal atrial fibrillation  Diabetes mellitus type 2  CAD. Plan  Extubated 2023. Wean oxygen as tolerated  Continue IV Decadron    S/p Tocilizumab 12/10/2021  Completed 14 days antibiotics    Lasix/Aldactone/Maxzide on hold given hyponatremia  Nephrology following  Monitor I's and O's. Insulin management per endocrinology  Continue insulin sliding scale.    Continue Eliquis/metoprolol      DVT prophylaxis-Eliquis twice daily      Diet ADULT TUBE FEEDING; Nasogastric; Peptide Based High Protein; Continuous; 40; Yes; 10; Q 4 hours; 70; 30; Q 4 hours  ADULT DIET; Dysphagia - Pureed; Mildly Thick (Nectar)   DVT Prophylaxis [] Lovenox, []  Heparin, [] SCDs, [] Ambulation   GI Prophylaxis [] PPI,  [] H2 Blocker,  [] Carafate,  [] Diet/Tube Feeds   Code Status Full Code   Disposition Patient requires continued admission due to IV diuresis/antibiotics/mechanical ventilation   MDM [] Low, [] Moderate,[x]  High  Patient's risk as above due to acute hypoxic respiratory failure     History of Present Illness:     Patient seen and examined at bedside. Awake, following commands    Pulled out his NGT       Objective: Intake/Output Summary (Last 24 hours) at 12/23/2021 1420  Last data filed at 12/23/2021 0600  Gross per 24 hour   Intake 1780.82 ml   Output 2230 ml   Net -449.18 ml      Vitals:   Vitals:    12/23/21 1200   BP: (!) 141/85   Pulse: 65   Resp: 14   Temp: 97.6 °F (36.4 °C)   SpO2: 96%     Physical Exam:   GEN Awake male, sitting upright in bed in no apparent distress. Appears given age. RESP  Coarse breath sounds bilaterally. CARDIO/VASC S1/S2 auscultated. Regular rate   GI Abdomen is soft without significant tenderness, masses, or guarding. Bowel sounds are normoactive. Lane catheter draining clear urine  EXT   Lympedema swelling significantly decreased  NEURO Awake.  Following commands    Medications:   Medications:    insulin glargine  55 Units SubCUTAneous Nightly    insulin NPH  35 Units SubCUTAneous QAM    insulin lispro  0-24 Units SubCUTAneous Q4H    polyethylene glycol  17 g Oral Daily    chlorhexidine  15 mL Mouth/Throat BID    famotidine (PEPCID) injection  20 mg IntraVENous BID    lidocaine  5 mL IntraDERmal Once    sodium chloride flush  5-40 mL IntraVENous 2 times per day    dexamethasone  6 mg IntraVENous Daily    metoprolol succinate  50 mg Oral BID    allopurinol  300 mg Oral Daily    apixaban  5 mg Oral BID    pravastatin  20 mg Oral Nightly    tamsulosin  0.4 mg Oral Nightly    sodium chloride flush  5-40 mL IntraVENous 2 times per day    cefTRIAXone (ROCEPHIN) IV  1,000 mg IntraVENous Q24H      Infusions:    dextrose 100 mL/hr at 12/23/21 0407    dexmedetomidine (PRECEDEX) IV infusion 0.2 mcg/kg/hr (12/22/21 1502)    propofol Stopped (12/13/21 1443)    fentaNYL Stopped (12/19/21 3919)    sodium chloride      norepinephrine Stopped (12/19/21 0125)    dextrose      midazolam Stopped (12/18/21 0450)    sodium chloride       PRN Meds: morphine, 2 mg, Q4H PRN  magic (miracle) mouthwash, 5 mL, 4x Daily PRN  hydrALAZINE (APRESOLINE) ivpb, 10 mg, Q4H PRN  sodium chloride flush, 5-40 mL, PRN  sodium chloride, 25 mL, PRN  glucose, 15 g, PRN  dextrose, 12.5 g, PRN  glucagon (rDNA), 1 mg, PRN  dextrose, 100 mL/hr, PRN  LORazepam, 1 mg, Q6H PRN  oxyCODONE, 5 mg, Q4H PRN  guaiFENesin-dextromethorphan, 5 mL, Q4H PRN  potassium chloride, 40 mEq, PRN   Or  potassium alternative oral replacement, 40 mEq, PRN   Or  potassium chloride, 10 mEq, PRN  sodium chloride flush, 5-40 mL, PRN  sodium chloride, 25 mL, PRN  ondansetron, 4 mg, Q8H PRN   Or  ondansetron, 4 mg, Q6H PRN  polyethylene glycol, 17 g, Daily PRN  acetaminophen, 650 mg, Q6H PRN   Or  acetaminophen, 650 mg, Q6H PRN          Electronically signed by Zahra Garcia MD on 12/23/2021 at 2:20 PM

## 2021-12-24 LAB
ALBUMIN SERPL-MCNC: 2.5 GM/DL (ref 3.4–5)
ANION GAP SERPL CALCULATED.3IONS-SCNC: 6 MMOL/L (ref 4–16)
BUN BLDV-MCNC: 40 MG/DL (ref 6–23)
CALCIUM SERPL-MCNC: 8.5 MG/DL (ref 8.3–10.6)
CHLORIDE BLD-SCNC: 111 MMOL/L (ref 99–110)
CO2: 28 MMOL/L (ref 21–32)
CREAT SERPL-MCNC: 0.5 MG/DL (ref 0.9–1.3)
GFR AFRICAN AMERICAN: >60 ML/MIN/1.73M2
GFR NON-AFRICAN AMERICAN: >60 ML/MIN/1.73M2
GLUCOSE BLD-MCNC: 104 MG/DL (ref 70–99)
GLUCOSE BLD-MCNC: 106 MG/DL (ref 70–99)
GLUCOSE BLD-MCNC: 107 MG/DL (ref 70–99)
GLUCOSE BLD-MCNC: 113 MG/DL (ref 70–99)
GLUCOSE BLD-MCNC: 135 MG/DL (ref 70–99)
GLUCOSE BLD-MCNC: 155 MG/DL (ref 70–99)
GLUCOSE BLD-MCNC: 171 MG/DL (ref 70–99)
HCT VFR BLD CALC: 40.3 % (ref 42–52)
HEMOGLOBIN: 12.2 GM/DL (ref 13.5–18)
MCH RBC QN AUTO: 28 PG (ref 27–31)
MCHC RBC AUTO-ENTMCNC: 30.3 % (ref 32–36)
MCV RBC AUTO: 92.6 FL (ref 78–100)
PDW BLD-RTO: 18.9 % (ref 11.7–14.9)
PHOSPHORUS: 2.8 MG/DL (ref 2.5–4.9)
PLATELET # BLD: 243 K/CU MM (ref 140–440)
PMV BLD AUTO: 13 FL (ref 7.5–11.1)
POTASSIUM SERPL-SCNC: 3.5 MMOL/L (ref 3.5–5.1)
RBC # BLD: 4.35 M/CU MM (ref 4.6–6.2)
SODIUM BLD-SCNC: 145 MMOL/L (ref 135–145)
WBC # BLD: 13.4 K/CU MM (ref 4–10.5)

## 2021-12-24 PROCEDURE — 99232 SBSQ HOSP IP/OBS MODERATE 35: CPT | Performed by: INTERNAL MEDICINE

## 2021-12-24 PROCEDURE — 80048 BASIC METABOLIC PNL TOTAL CA: CPT

## 2021-12-24 PROCEDURE — 6360000002 HC RX W HCPCS: Performed by: INTERNAL MEDICINE

## 2021-12-24 PROCEDURE — 2500000003 HC RX 250 WO HCPCS: Performed by: NURSE PRACTITIONER

## 2021-12-24 PROCEDURE — 2580000003 HC RX 258: Performed by: INTERNAL MEDICINE

## 2021-12-24 PROCEDURE — 80069 RENAL FUNCTION PANEL: CPT

## 2021-12-24 PROCEDURE — 6370000000 HC RX 637 (ALT 250 FOR IP): Performed by: INTERNAL MEDICINE

## 2021-12-24 PROCEDURE — 2060000000 HC ICU INTERMEDIATE R&B

## 2021-12-24 PROCEDURE — 6370000000 HC RX 637 (ALT 250 FOR IP): Performed by: NURSE PRACTITIONER

## 2021-12-24 PROCEDURE — 94761 N-INVAS EAR/PLS OXIMETRY MLT: CPT

## 2021-12-24 PROCEDURE — 82962 GLUCOSE BLOOD TEST: CPT

## 2021-12-24 PROCEDURE — 85027 COMPLETE CBC AUTOMATED: CPT

## 2021-12-24 PROCEDURE — 2580000003 HC RX 258: Performed by: NURSE PRACTITIONER

## 2021-12-24 RX ADMIN — APIXABAN 5 MG: 5 TABLET, FILM COATED ORAL at 23:30

## 2021-12-24 RX ADMIN — SODIUM CHLORIDE, PRESERVATIVE FREE 10 ML: 5 INJECTION INTRAVENOUS at 23:21

## 2021-12-24 RX ADMIN — APIXABAN 5 MG: 5 TABLET, FILM COATED ORAL at 09:54

## 2021-12-24 RX ADMIN — METOPROLOL SUCCINATE 50 MG: 50 TABLET, EXTENDED RELEASE ORAL at 23:31

## 2021-12-24 RX ADMIN — PRAVASTATIN SODIUM 20 MG: 10 TABLET ORAL at 23:32

## 2021-12-24 RX ADMIN — FAMOTIDINE 20 MG: 10 INJECTION, SOLUTION INTRAVENOUS at 09:54

## 2021-12-24 RX ADMIN — SODIUM CHLORIDE, PRESERVATIVE FREE 10 ML: 5 INJECTION INTRAVENOUS at 23:22

## 2021-12-24 RX ADMIN — METOPROLOL SUCCINATE 50 MG: 50 TABLET, EXTENDED RELEASE ORAL at 09:54

## 2021-12-24 RX ADMIN — SODIUM CHLORIDE, PRESERVATIVE FREE 10 ML: 5 INJECTION INTRAVENOUS at 09:55

## 2021-12-24 RX ADMIN — CHLORHEXIDINE GLUCONATE 0.12% ORAL RINSE 15 ML: 1.2 LIQUID ORAL at 09:52

## 2021-12-24 RX ADMIN — FAMOTIDINE 20 MG: 10 INJECTION, SOLUTION INTRAVENOUS at 23:31

## 2021-12-24 RX ADMIN — DEXAMETHASONE SODIUM PHOSPHATE 6 MG: 10 INJECTION INTRAMUSCULAR; INTRAVENOUS at 09:54

## 2021-12-24 RX ADMIN — SODIUM CHLORIDE, PRESERVATIVE FREE 10 ML: 5 INJECTION INTRAVENOUS at 09:54

## 2021-12-24 RX ADMIN — ALLOPURINOL 300 MG: 300 TABLET ORAL at 09:53

## 2021-12-24 RX ADMIN — TAMSULOSIN HYDROCHLORIDE 0.4 MG: 0.4 CAPSULE ORAL at 23:32

## 2021-12-24 RX ADMIN — POLYETHYLENE GLYCOL (3350) 17 G: 17 POWDER, FOR SOLUTION ORAL at 09:56

## 2021-12-24 ASSESSMENT — PAIN SCALES - GENERAL
PAINLEVEL_OUTOF10: 0

## 2021-12-24 ASSESSMENT — PAIN SCALES - WONG BAKER
WONGBAKER_NUMERICALRESPONSE: 0

## 2021-12-24 NOTE — PLAN OF CARE
Problem: Falls - Risk of:  Goal: Will remain free from falls  Description: Will remain free from falls  Outcome: Ongoing  Goal: Absence of physical injury  Description: Absence of physical injury  Outcome: Ongoing     Problem: Airway Clearance - Ineffective  Goal: Achieve or maintain patent airway  Outcome: Ongoing     Problem: Gas Exchange - Impaired  Goal: Absence of hypoxia  Outcome: Ongoing  Goal: Promote optimal lung function  Outcome: Ongoing     Problem: Breathing Pattern - Ineffective  Goal: Ability to achieve and maintain a regular respiratory rate  Outcome: Ongoing     Problem:  Body Temperature -  Risk of, Imbalanced  Goal: Ability to maintain a body temperature within defined limits  Outcome: Ongoing  Goal: Will regain or maintain usual level of consciousness  Outcome: Ongoing  Goal: Complications related to the disease process, condition or treatment will be avoided or minimized  Outcome: Ongoing     Problem: Isolation Precautions - Risk of Spread of Infection  Goal: Prevent transmission of infection  Outcome: Ongoing     Problem: Nutrition Deficits  Goal: Optimize nutritional status  Outcome: Ongoing     Problem: Risk for Fluid Volume Deficit  Goal: Maintain normal heart rhythm  Outcome: Ongoing  Goal: Maintain absence of muscle cramping  Outcome: Ongoing  Goal: Maintain normal serum potassium, sodium, calcium, phosphorus, and pH  Outcome: Ongoing     Problem: Loneliness or Risk for Loneliness  Goal: Demonstrate positive use of time alone when socialization is not possible  Outcome: Ongoing     Problem: Fatigue  Goal: Verbalize increase energy and improved vitality  Outcome: Ongoing     Problem: Patient Education: Go to Patient Education Activity  Goal: Patient/Family Education  Outcome: Ongoing     Problem: Pain:  Goal: Pain level will decrease  Description: Pain level will decrease  Outcome: Ongoing  Goal: Control of acute pain  Description: Control of acute pain  Outcome: Ongoing  Goal: Control of chronic pain  Description: Control of chronic pain  Outcome: Ongoing     Problem: Skin Integrity:  Goal: Will show no infection signs and symptoms  Description: Will show no infection signs and symptoms  Outcome: Ongoing  Goal: Absence of new skin breakdown  Description: Absence of new skin breakdown  Outcome: Ongoing     Problem: Nutrition  Goal: Optimal nutrition therapy  Outcome: Ongoing     Problem: Non-Violent Restraints  Goal: Removal from restraints as soon as assessed to be safe  Outcome: Ongoing  Goal: No harm/injury to patient while restraints in use  Outcome: Ongoing  Goal: Patient's dignity will be maintained  Outcome: Ongoing

## 2021-12-24 NOTE — PROGRESS NOTES
Comprehensive Nutrition Assessment    Type and Reason for Visit:  Reassess    Nutrition Recommendations/Plan:   · Continue current diet  · Start standard supplements    Nutrition Assessment:  Pt is now extubated and started on a diet. She has no intake recorded. Will d/c the EN and order supplements    Malnutrition Assessment:  Malnutrition Status:  Insufficient data    Context:  Acute Illness       Estimated Daily Nutrient Needs:  Energy (kcal):  [address] [facility] [address] 2010); Weight Used for Energy Requirements:  Current     Protein (g):  140 (at least 2 g/kg IBW); Weight Used for Protein Requirements:  Ideal        Fluid (ml/day):  fluids per neprhology; Method Used for Fluid Requirements:  Standard Renal     Wounds:  Multiple,Deep Tissue Injury,Venous Stasis       Current Nutrition Therapies:    ADULT TUBE FEEDING; Nasogastric; Peptide Based High Protein; Continuous; 40; Yes; 10; Q 4 hours; 70; 30; Q 4 hours  ADULT DIET; Dysphagia - Pureed; Mildly Thick (Nectar)    Anthropometric Measures:  · Height: 5' 7.99\" (172.7 cm)  · Current Body Weight: 215 lb (97.5 kg)   · Admission Body Weight: 233 lb 4 oz (105.8 kg) (first measured weight)    · Usual Body Weight:  (n/a)     · Ideal Body Weight: 154 lbs; % Ideal Body Weight 140.3 %   · BMI: 32.7  · BMI Categories: Obese Class 1 (BMI 30.0-34. 9)       Nutrition Diagnosis:   · Inadequate oral intake related to impaired respiratory function as evidenced by NPO or clear liquid status due to medical condition,intubation (fail to wean from vent)      Nutrition Interventions:   Food and/or Nutrient Delivery:  Continue Current Diet,Start Oral Nutrition Supplement  Nutrition Education/Counseling:  No recommendation at this time   Coordination of Nutrition Care:  Continue to monitor while inpatient    Goals:  pt will meet greater than 75% of estimated nutrient needs via EN       Nutrition Monitoring and Evaluation:   Behavioral-Environmental Outcomes:  None Identified   Food/Nutrient Intake Outcomes:  Enteral Nutrition Intake/Tolerance  Physical Signs/Symptoms Outcomes:  Biochemical Data,GI Status,Hemodynamic Status,Fluid Status or Edema,Weight,Skin     Discharge Planning:     Too soon to determine     Electronically signed by Mahesh Ramos RD, LD on 18/35/94 at 1:39 PM EST    Contact: [phone]

## 2021-12-24 NOTE — PROGRESS NOTES
12/24/21 1430   Oxygen Therapy/Pulse Ox   O2 Therapy Room air   O2 Device None (Room air)   Resp 20   SpO2 92 %

## 2021-12-24 NOTE — PROGRESS NOTES
Hospitalist Progress Note      Name:  Caty Boone /Age/Sex: 1945  (77 y.o. male)   MRN & CSN:  [id number] & [id number] Admission Date/Time: 2021  5:33 PM   Location:  -A PCP: Cookie Valadez MD         Hospital Day: 17    Assessment and Plan:     Patient, 78-year-old male with past medical history of atrial fibrillation/flutter, diabetes mellitus type 2, hyperlipidemia, hypertension was admitted on 2021 after an episode of fall. Reported generalized weakness. Patient tested positive for COVID-19 infection. X-ray chest suggestive of pneumonia. Also noted to have UTI. Patient was noted to have acute systolic/diastolic CHF exacerbation. Echocardiogram showed EF 35 to 40%. Patient received 1 dose of Tocilizumab 12/10/2021. Patient was seen by pulmonary/cardiology/cardiothoracic surgery. Patient developed left tension pneumothorax which required chest tube placement. Patient was also seen by urology secondary to CT finding of multifocal bladder traumatic trabeculation/diverticula. Recommended outpatient cystoscopy/TRUS. Assessment    Acute hypoxic respiratory failure secondary to COVID-19 pneumonia. Extubated   Acute complicated cystitis  Sepsis secondary to above, present on admission  Left tension pneumothorax s/p chest tube placement 2021 to   Acute systolic/diastolic CHF exacerbation  Hyponatremia. S/p fall  Hypertension  Paroxysmal atrial fibrillation  Diabetes mellitus type 2  CAD. Deconditioning    Plan  Extubated 2023. Wean oxygen as tolerated  Continue IV Decadron    S/p Tocilizumab 12/10/2021  Completed 14 days antibiotics    Lasix/Aldactone/Maxzide on hold given hyponatremia  Nephrology following  Monitor I's and O's. Insulin management per endocrinology  Continue insulin sliding scale.    Continue Eliquis/metoprolol  PT/OT    DVT prophylaxis-Eliquis twice daily      Diet ADULT TUBE FEEDING; Nasogastric; Peptide Based High Protein; Continuous; 40; Yes; 10; Q 4 hours; 70; 30; Q 4 hours  ADULT DIET; Dysphagia - Pureed; Mildly Thick (Nectar)   DVT Prophylaxis [] Lovenox, []  Heparin, [] SCDs, [] Ambulation   GI Prophylaxis [] PPI,  [] H2 Blocker,  [] Carafate,  [] Diet/Tube Feeds   Code Status Full Code   Disposition Patient requires continued admission due to IV diuresis/antibiotics/mechanical ventilation   MDM [] Low, [] Moderate,[x]  High  Patient's risk as above due to acute hypoxic respiratory failure     History of Present Illness:     Patient seen and examined at bedside. Awake, interacting appropriately. Is weak. Pulled out his NGT       Objective: Intake/Output Summary (Last 24 hours) at 12/24/2021 1318  Last data filed at 12/24/2021 0830  Gross per 24 hour   Intake 1360.95 ml   Output 3075 ml   Net -1714.05 ml      Vitals:   Vitals:    12/24/21 1111   BP:    Pulse:    Resp: 19   Temp:    SpO2: 93%     Physical Exam:   GEN Awake male, sitting upright in bed in no apparent distress. Appears given age. RESP  Coarse breath sounds bilaterally. CARDIO/VASC S1/S2 auscultated. Regular rate   GI Abdomen is soft without significant tenderness, masses, or guarding. Bowel sounds are normoactive. Lane catheter draining clear urine  EXT   Lympedema swelling significantly decreased  NEURO Awake. Oriented to person and place.  Interacting appropriately    Medications:   Medications:    insulin glargine  55 Units SubCUTAneous Nightly    insulin NPH  35 Units SubCUTAneous QAM    insulin lispro  0-24 Units SubCUTAneous Q4H    polyethylene glycol  17 g Oral Daily    chlorhexidine  15 mL Mouth/Throat BID    famotidine (PEPCID) injection  20 mg IntraVENous BID    lidocaine  5 mL IntraDERmal Once    sodium chloride flush  5-40 mL IntraVENous 2 times per day    dexamethasone  6 mg IntraVENous Daily    metoprolol succinate  50 mg Oral BID    allopurinol  300 mg Oral Daily    apixaban  5 mg Oral BID    pravastatin 20 mg Oral Nightly    tamsulosin  0.4 mg Oral Nightly    sodium chloride flush  5-40 mL IntraVENous 2 times per day      Infusions:    dextrose 100 mL/hr at 12/23/21 2333    dexmedetomidine (PRECEDEX) IV infusion 0.2 mcg/kg/hr (12/23/21 1900)    propofol Stopped (12/13/21 1443)    fentaNYL Stopped (12/19/21 4432)    sodium chloride      norepinephrine Stopped (12/19/21 0125)    dextrose      midazolam Stopped (12/18/21 0450)    sodium chloride       PRN Meds: morphine, 2 mg, Q4H PRN  magic (miracle) mouthwash, 5 mL, 4x Daily PRN  hydrALAZINE (APRESOLINE) ivpb, 10 mg, Q4H PRN  sodium chloride flush, 5-40 mL, PRN  sodium chloride, 25 mL, PRN  glucose, 15 g, PRN  dextrose, 12.5 g, PRN  glucagon (rDNA), 1 mg, PRN  dextrose, 100 mL/hr, PRN  LORazepam, 1 mg, Q6H PRN  oxyCODONE, 5 mg, Q4H PRN  guaiFENesin-dextromethorphan, 5 mL, Q4H PRN  potassium chloride, 40 mEq, PRN   Or  potassium alternative oral replacement, 40 mEq, PRN   Or  potassium chloride, 10 mEq, PRN  sodium chloride flush, 5-40 mL, PRN  sodium chloride, 25 mL, PRN  ondansetron, 4 mg, Q8H PRN   Or  ondansetron, 4 mg, Q6H PRN  polyethylene glycol, 17 g, Daily PRN  acetaminophen, 650 mg, Q6H PRN   Or  acetaminophen, 650 mg, Q6H PRN          Electronically signed by Kim Eric MD on 12/24/2021 at 1:18 PM

## 2021-12-24 NOTE — PLAN OF CARE
Problem: Falls - Risk of:  Goal: Will remain free from falls  Description: Will remain free from falls  12/24/2021 1110 by Hamzah Parmar RN  Outcome: Ongoing  12/24/2021 0144 by Cj Anderson RN  Outcome: Ongoing  Goal: Absence of physical injury  Description: Absence of physical injury  12/24/2021 1110 by Hamzah Parmar RN  Outcome: Ongoing  12/24/2021 0144 by Cj Anderson RN  Outcome: Ongoing     Problem: Airway Clearance - Ineffective  Goal: Achieve or maintain patent airway  12/24/2021 1110 by Hamzah Parmar RN  Outcome: Ongoing  12/24/2021 0144 by Cj Anderson RN  Outcome: Ongoing     Problem: Gas Exchange - Impaired  Goal: Absence of hypoxia  12/24/2021 1110 by Hamzah Parmar RN  Outcome: Ongoing  12/24/2021 0144 by Cj Anderson RN  Outcome: Ongoing  Goal: Promote optimal lung function  12/24/2021 1110 by Hamzah Parmar RN  Outcome: Ongoing  12/24/2021 0144 by Cj Anderson RN  Outcome: Ongoing     Problem: Breathing Pattern - Ineffective  Goal: Ability to achieve and maintain a regular respiratory rate  12/24/2021 1110 by Hamzah Parmar RN  Outcome: Ongoing  12/24/2021 0144 by Cj Anderson RN  Outcome: Ongoing     Problem:  Body Temperature -  Risk of, Imbalanced  Goal: Ability to maintain a body temperature within defined limits  12/24/2021 1110 by Hamzah Parmar RN  Outcome: Ongoing  12/24/2021 0144 by Cj Anderson RN  Outcome: Ongoing  Goal: Will regain or maintain usual level of consciousness  12/24/2021 1110 by Hamzah Parmar RN  Outcome: Ongoing  12/24/2021 0144 by Cj Anderson RN  Outcome: Ongoing  Goal: Complications related to the disease process, condition or treatment will be avoided or minimized  12/24/2021 1110 by Hamzah Parmar RN  Outcome: Ongoing  12/24/2021 0144 by Cj Anderson RN  Outcome: Ongoing     Problem: Isolation Precautions - Risk of Spread of Infection  Goal: Prevent transmission of infection  12/24/2021 1110 by Hamzah Parmar RN  Outcome: Ongoing  12/24/2021 0144 by Tasha Mcmanus RN  Outcome: Ongoing     Problem: Nutrition Deficits  Goal: Optimize nutritional status  12/24/2021 1110 by Vinay Mullen RN  Outcome: Ongoing  12/24/2021 0144 by Tasha Mcmanus RN  Outcome: Ongoing     Problem: Risk for Fluid Volume Deficit  Goal: Maintain normal heart rhythm  12/24/2021 1110 by Vinay Mullen RN  Outcome: Ongoing  12/24/2021 0144 by Tasha Mcmanus RN  Outcome: Ongoing  Goal: Maintain absence of muscle cramping  12/24/2021 1110 by Vinay Mullen RN  Outcome: Ongoing  12/24/2021 0144 by Tasha Mcmanus RN  Outcome: Ongoing  Goal: Maintain normal serum potassium, sodium, calcium, phosphorus, and pH  12/24/2021 1110 by Vinay Mullen RN  Outcome: Ongoing  12/24/2021 0144 by Tasha Mcmanus RN  Outcome: Ongoing     Problem: Loneliness or Risk for Loneliness  Goal: Demonstrate positive use of time alone when socialization is not possible  12/24/2021 1110 by Vinay Mullen RN  Outcome: Ongoing  12/24/2021 0144 by Tasha Mcmanus RN  Outcome: Ongoing     Problem: Fatigue  Goal: Verbalize increase energy and improved vitality  12/24/2021 1110 by Vinay Mullen RN  Outcome: Ongoing  12/24/2021 0144 by Tasha Mcmanus RN  Outcome: Ongoing     Problem: Patient Education: Go to Patient Education Activity  Goal: Patient/Family Education  12/24/2021 1110 by Vinay Mullen RN  Outcome: Ongoing  12/24/2021 0144 by Tasha Mcmanus RN  Outcome: Ongoing     Problem: Pain:  Description: Pain management should include both nonpharmacologic and pharmacologic interventions.   Goal: Pain level will decrease  Description: Pain level will decrease  12/24/2021 1110 by Vinay Mullen RN  Outcome: Ongoing  12/24/2021 0144 by Tasha Mcmanus RN  Outcome: Ongoing  Goal: Control of acute pain  Description: Control of acute pain  12/24/2021 1110 by Vinay Mullen RN  Outcome: Ongoing  12/24/2021 0144 by Tasha Mcmanus RN  Outcome: Ongoing  Goal: Control of chronic pain  Description: Control of chronic pain  12/24/2021 1110 by Derrick Loyd RN  Outcome: Ongoing  12/24/2021 0144 by Aj Vincent RN  Outcome: Ongoing     Problem: Skin Integrity:  Goal: Will show no infection signs and symptoms  Description: Will show no infection signs and symptoms  12/24/2021 1110 by Derrick Loyd RN  Outcome: Ongoing  12/24/2021 0144 by Aj Vincent RN  Outcome: Ongoing  Goal: Absence of new skin breakdown  Description: Absence of new skin breakdown  12/24/2021 1110 by Derrick Loyd RN  Outcome: Ongoing  12/24/2021 0144 by Aj Vincent RN  Outcome: Ongoing     Problem: Nutrition  Goal: Optimal nutrition therapy  12/24/2021 1110 by Derrick Loyd RN  Outcome: Ongoing  12/24/2021 0144 by Aj Vincent RN  Outcome: Ongoing     Problem: Non-Violent Restraints  Goal: Removal from restraints as soon as assessed to be safe  12/24/2021 1110 by Derrick Loyd RN  Outcome: Ongoing  12/24/2021 0144 by Aj Vincent RN  Outcome: Ongoing  Goal: No harm/injury to patient while restraints in use  12/24/2021 1110 by Derrick Loyd RN  Outcome: Ongoing  12/24/2021 0144 by Aj Vincent RN  Outcome: Ongoing  Goal: Patient's dignity will be maintained  12/24/2021 1110 by Derrick Loyd RN  Outcome: Ongoing  12/24/2021 0144 by Aj Vincent RN  Outcome: Ongoing

## 2021-12-24 NOTE — PROGRESS NOTES
Progress Note( Dr. Sharron Baez)  12/23/2021  Subjective:   Admit Date: 12/8/2021  PCP: Harriett Wagner MD    Admitted For :Admitted initially on December 9, 2021 with history of fall and found to have Covid pneumonia    Consulted For: Better control of blood glucose    Interval History: The patient awake and alert. He was extubated and off ventilator. Denies having any oxygen either. Is attempting to talk but did not get much response from him          Intake/Output Summary (Last 24 hours) at 12/23/2021 2311  Last data filed at 12/23/2021 1900  Gross per 24 hour   Intake 2519.53 ml   Output 2280 ml   Net 239.53 ml       DATA    CBC:   Recent Labs     12/21/21  0415 12/22/21  0400 12/23/21  0400   WBC 17.8* 20.0* 17.2*   HGB 12.7* 12.3* 12.3*    269 263    CMP:  Recent Labs     12/21/21  0415 12/21/21  1708 12/22/21  0400 12/22/21 2005 12/23/21  0400   *   < > 156* 148* 147*   K 4.4   < > 3.8 4.1 3.8   *   < > 119* 113* 111*   CO2 29   < > 29 29 30   BUN 86*   < > 70* 60* 53*   CREATININE 0.8*   < > 0.7* 0.6* 0.5*   CALCIUM 8.8   < > 8.8 8.8 8.8   LABALBU 2.8*  --  2.7*  --  2.6*    < > = values in this interval not displayed. Lipids:   Lab Results   Component Value Date    CHOL 137 06/26/2018    HDL 53 06/26/2018    TRIG 101 06/26/2018     Glucose:  Recent Labs     12/23/21  1237 12/23/21  1633 12/23/21  2115   POCGLU 99 131* 151*     [id number]:  Lab Results   Component Value Date    LABA1C 7.3 12/18/2021     High Sensitivity TSH:   Lab Results   Component Value Date    TSHHS 3.280 05/07/2021     Free T3: No results found for: FT3  Free T4:No results found for: T4FREE    XR CHEST PORTABLE   Final Result      XR CHEST PORTABLE   Final Result   Mild bilateral improvement in airspace opacities. Trace left apical   pneumothorax is unchanged. XR CHEST PORTABLE   Final Result   The left apical pneumothorax is still identified. Similar to 12/18/2021 but   smaller than 12/17/2021. The multifocal pulmonary opacities are redemonstrated. May have some   worsening atelectasis in the right lower lobe. XR CHEST PORTABLE   Final Result   Improving small left apical pneumothorax measuring 9 mm. New right lower lobe infiltrate may represent atelectasis versus pneumonia. Lines and tubes are stable      Stable mild pneumomediastinum. XR CHEST PORTABLE   Final Result   Interval development of a small left apical pneumothorax measuring   approximately 2.1 cm between pleural surfaces. Pneumomediastinum appears   increased when compared to the previous exam as well. Continued patchy opacity throughout both lungs, most compatible with   multifocal pneumonia. XR CHEST PORTABLE   Final Result   1. No appreciable left pneumothorax identified on the current exam.   2. Similar bilateral airspace opacities. 3. Support lines and tubes remain in place. XR CHEST PORTABLE   Final Result   1. Small stable left apical pneumothorax. 2. Bilateral airspace opacities are seen without significant change. XR CHEST PORTABLE   Final Result   Stable small left apical pneumothorax. Stable pulmonary vascular congestion along with interstitial opacities and   hazy airspace opacities, left worse than right which may be on the basis of   interstitial and pulmonary edema but can also be seen in the setting of   atypical pneumonia. XR CHEST PORTABLE   Final Result   Persistent small left apical pneumothorax estimated to be approximately 10%   in severity. Support tube/catheters project in stable/satisfactory position   in the frontal projection. Consolidating infiltrates and or pulmonary edema, cardiomegaly unchanged   compared to 12/14/2021 consistent with diffuse bilateral pneumonia and/or   congestive heart failure.          XR CHEST PORTABLE   Final Result   Left chest tube remains in place, with slight interval decrease in size in   small left apical pneumothorax. XR CHEST PORTABLE   Final Result   Persistent small left pneumothorax with 1 cm pleural apical separation, with   intervally placed left-sided chest tube. Similar diffuse bilateral airspace disease. XR ABDOMEN (KUB) (SINGLE AP VIEW)   Final Result   NG tube in place with tip and side port below the diaphragm and in the region   of the gastric body. XR CHEST PORTABLE   Final Result   Interval development of a large left tension pneumothorax. Extensive bilateral airspace opacities without significant change. Findings were discussed with Dr. Gabby Yan  at 7:43 am on 12/13/2021. XR CHEST PORTABLE   Final Result   Persistent bilateral airspace disease without acute interval change. XR CHEST PORTABLE   Final Result   Severe extensive bilateral airspace opacities worsened from the previous exam   compatible with edema, ARDS, or multifocal pneumonia. XR CHEST PORTABLE   Final Result   1. Cardiomegaly with severe congestive heart failure. VL DUP LOWER EXTREMITY VENOUS BILATERAL   Final Result   No evidence of DVT in either lower extremity within the limitations. Mild nonspecific subcutaneous edema to bilateral lower extremities. CT HEAD WO CONTRAST   Final Result   No acute intracranial abnormality. Age related changes including chronic small vessel ischemic disease and   cerebral atrophy. CT ABDOMEN PELVIS W IV CONTRAST Additional Contrast? None   Final Result   Multifocal consolidative changes both lungs worrisome for multifocal   pneumonia. Follow-up in following medical treatment course is recommended   document complete resolution. Multifocal bladder traumatic trabeculation/diverticula noted with slight   haziness of surrounding fat planes. Please correlate with diffuse urinalysis   findings. Is findings could suggest underlying cystitis.       Prominence of the Alisson aortic lymph nodes again identified. These could be   reactive due to an underlying infectious inflammatory process such is the   bladder haziness. However neoplastic process may also considered. Consider   3-6 month follow-up. RECOMMENDATIONS:   Unavailable         CT CHEST W CONTRAST   Final Result   Multifocal consolidative changes both lungs worrisome for multifocal   pneumonia. Follow-up in following medical treatment course is recommended   document complete resolution. Multifocal bladder traumatic trabeculation/diverticula noted with slight   haziness of surrounding fat planes. Please correlate with diffuse urinalysis   findings. Is findings could suggest underlying cystitis. Prominence of the Alisson aortic lymph nodes again identified. These could be   reactive due to an underlying infectious inflammatory process such is the   bladder haziness. However neoplastic process may also considered. Consider   3-6 month follow-up. RECOMMENDATIONS:   Unavailable         XR SHOULDER LEFT (MIN 2 VIEWS)   Final Result   No acute fracture identified. Incidentally noted mass or masslike consolidation in the left upper lobe. Advise chest CT for further evaluation. XR PELVIS (1-2 VIEWS)   Final Result   No acute abnormality detected. Severe degenerative changes at the right hip, with chronic bony remodeling,   not substantially changed when compared to the CT from January 2020. XR CHEST PORTABLE   Final Result   1. No acute cardiopulmonary process identified. 2. Chronic interstitial changes of the lungs. CT CERVICAL SPINE WO CONTRAST   Final Result   Motion degradation challenge evaluation. No convincing evidence acute   displaced fracture traumatic malalignment. There are moderate multilevel   degenerate change         CT HEAD WO CONTRAST   Final Result   No acute intracranial abnormality.       Geographic lucency frontal bone noted, consider bone scan imaging or consider   skeletal survey.               Scheduled Medicines   Medications:    insulin glargine  55 Units SubCUTAneous Nightly    insulin NPH  35 Units SubCUTAneous QAM    insulin lispro  0-24 Units SubCUTAneous Q4H    polyethylene glycol  17 g Oral Daily    chlorhexidine  15 mL Mouth/Throat BID    famotidine (PEPCID) injection  20 mg IntraVENous BID    lidocaine  5 mL IntraDERmal Once    sodium chloride flush  5-40 mL IntraVENous 2 times per day    dexamethasone  6 mg IntraVENous Daily    metoprolol succinate  50 mg Oral BID    allopurinol  300 mg Oral Daily    apixaban  5 mg Oral BID    pravastatin  20 mg Oral Nightly    tamsulosin  0.4 mg Oral Nightly    sodium chloride flush  5-40 mL IntraVENous 2 times per day      Infusions:    dextrose 100 mL/hr at 12/23/21 1900    dexmedetomidine (PRECEDEX) IV infusion 0.2 mcg/kg/hr (12/23/21 1900)    propofol Stopped (12/13/21 1443)    fentaNYL Stopped (12/19/21 8814)    sodium chloride      norepinephrine Stopped (12/19/21 0125)    dextrose      midazolam Stopped (12/18/21 0450)    sodium chloride           Objective:   Vitals: /64   Pulse 70   Temp 97.5 °F (36.4 °C) (Oral)   Resp 18   Ht 5' 7.99\" (1.727 m)   Wt 216 lb 0.8 oz (98 kg)   SpO2 94%   BMI 32.86 kg/m²   General appearance: Patient extubated and off ventilator not on tube feeding either   Neck: no JVD or bruit  Thyroid : Normal lobes   Lungs: Has Vesicular Breath sounds intubated and on ventilator  Heart:  regular rate and rhythm  Abdomen: soft, non-tender; bowel sounds normal; no masses,  no organomegaly  Musculoskeletal: Normal  Extremities: extremities normal, , no edema  Neurologic: Patient extubated and off ventilator not on tube feeding either       Assessment:     Patient Active Problem List:     Diabetes mellitus (HCC)     Atrial flutter (HCC)     Atrial fibrillation ([facility] [address]     Gout     Hyperlipidemia     Hypertension     Arthropathy     Upper GI bleed     NSTEMI (non-ST elevated myocardial infarction) ([facility] [address]     Rectal bleeding     Sepsis ([facility] [address]     BRBPR (bright red blood per rectum)     Acute cystitis without hematuria     Leukocytosis     Acute hypokalemia     Rectal bleed     Anemia     B12 deficiency     Elevated antinuclear antibody (FREDRICK) level     PNA (pneumonia)     Troponin I above reference range      Plan:     1. Reviewed POC blood glucose . Labs and X ray results   2. Reviewed Current Medicines   3. On Correction bolus Humalog/ Basal Lantus /NPH insulin regime  4. Monitor Blood glucose frequently   5. Modified  the dose of Insulin/ other medicines as needed   6. Will follow     .      Eduard Sanchez MD, MD

## 2021-12-24 NOTE — PROGRESS NOTES
Progress Note( Dr. Kathleen Kwong)  12/24/2021  Subjective:   Admit Date: 12/8/2021  PCP: Kailey Davila MD    Admitted For :Admitted initially on December 9, 2021 with history of fall and found to have Covid pneumonia    Consulted For: Better control of blood glucose    Interval History: The patient awake and alert. He was extubated and off ventilator. Not on  oxygen either. Is attempting to talk with whisper          Intake/Output Summary (Last 24 hours) at 12/24/2021 1811  Last data filed at 12/24/2021 1746  Gross per 24 hour   Intake 1360.95 ml   Output 3075 ml   Net -1714.05 ml       DATA    CBC:   Recent Labs     12/22/21  0400 12/23/21  0400 12/24/21  0500   WBC 20.0* 17.2* 13.4*   HGB 12.3* 12.3* 12.2*    263 243    CMP:  Recent Labs     12/22/21  0400 12/22/21  0400 12/22/21 2005 12/23/21  0400 12/24/21  0500   *   < > 148* 147* 145   K 3.8   < > 4.1 3.8 3.5   *   < > 113* 111* 111*   CO2 29   < > 29 30 28   BUN 70*   < > 60* 53* 40*   CREATININE 0.7*   < > 0.6* 0.5* 0.5*   CALCIUM 8.8   < > 8.8 8.8 8.5   LABALBU 2.7*  --   --  2.6* 2.5*    < > = values in this interval not displayed. Lipids:   Lab Results   Component Value Date    CHOL 137 06/26/2018    HDL 53 06/26/2018    TRIG 101 06/26/2018     Glucose:  Recent Labs     12/24/21  0656 12/24/21  0835 12/24/21  1206   POCGLU 106* 113* 107*     [id number]:  Lab Results   Component Value Date    LABA1C 7.3 12/18/2021     High Sensitivity TSH:   Lab Results   Component Value Date    TSHHS 3.280 05/07/2021     Free T3: No results found for: FT3  Free T4:No results found for: T4FREE    XR CHEST PORTABLE   Final Result      XR CHEST PORTABLE   Final Result   Mild bilateral improvement in airspace opacities. Trace left apical   pneumothorax is unchanged. XR CHEST PORTABLE   Final Result   The left apical pneumothorax is still identified. Similar to 12/18/2021 but   smaller than 12/17/2021.       The multifocal pulmonary opacities are redemonstrated. May have some   worsening atelectasis in the right lower lobe. XR CHEST PORTABLE   Final Result   Improving small left apical pneumothorax measuring 9 mm. New right lower lobe infiltrate may represent atelectasis versus pneumonia. Lines and tubes are stable      Stable mild pneumomediastinum. XR CHEST PORTABLE   Final Result   Interval development of a small left apical pneumothorax measuring   approximately 2.1 cm between pleural surfaces. Pneumomediastinum appears   increased when compared to the previous exam as well. Continued patchy opacity throughout both lungs, most compatible with   multifocal pneumonia. XR CHEST PORTABLE   Final Result   1. No appreciable left pneumothorax identified on the current exam.   2. Similar bilateral airspace opacities. 3. Support lines and tubes remain in place. XR CHEST PORTABLE   Final Result   1. Small stable left apical pneumothorax. 2. Bilateral airspace opacities are seen without significant change. XR CHEST PORTABLE   Final Result   Stable small left apical pneumothorax. Stable pulmonary vascular congestion along with interstitial opacities and   hazy airspace opacities, left worse than right which may be on the basis of   interstitial and pulmonary edema but can also be seen in the setting of   atypical pneumonia. XR CHEST PORTABLE   Final Result   Persistent small left apical pneumothorax estimated to be approximately 10%   in severity. Support tube/catheters project in stable/satisfactory position   in the frontal projection. Consolidating infiltrates and or pulmonary edema, cardiomegaly unchanged   compared to 12/14/2021 consistent with diffuse bilateral pneumonia and/or   congestive heart failure. XR CHEST PORTABLE   Final Result   Left chest tube remains in place, with slight interval decrease in size in   small left apical pneumothorax.          XR CHEST PORTABLE   Final Result   Persistent small left pneumothorax with 1 cm pleural apical separation, with   intervally placed left-sided chest tube. Similar diffuse bilateral airspace disease. XR ABDOMEN (KUB) (SINGLE AP VIEW)   Final Result   NG tube in place with tip and side port below the diaphragm and in the region   of the gastric body. XR CHEST PORTABLE   Final Result   Interval development of a large left tension pneumothorax. Extensive bilateral airspace opacities without significant change. Findings were discussed with Dr. Jamil Hamlin  at 7:43 am on 12/13/2021. XR CHEST PORTABLE   Final Result   Persistent bilateral airspace disease without acute interval change. XR CHEST PORTABLE   Final Result   Severe extensive bilateral airspace opacities worsened from the previous exam   compatible with edema, ARDS, or multifocal pneumonia. XR CHEST PORTABLE   Final Result   1. Cardiomegaly with severe congestive heart failure. VL DUP LOWER EXTREMITY VENOUS BILATERAL   Final Result   No evidence of DVT in either lower extremity within the limitations. Mild nonspecific subcutaneous edema to bilateral lower extremities. CT HEAD WO CONTRAST   Final Result   No acute intracranial abnormality. Age related changes including chronic small vessel ischemic disease and   cerebral atrophy. CT ABDOMEN PELVIS W IV CONTRAST Additional Contrast? None   Final Result   Multifocal consolidative changes both lungs worrisome for multifocal   pneumonia. Follow-up in following medical treatment course is recommended   document complete resolution. Multifocal bladder traumatic trabeculation/diverticula noted with slight   haziness of surrounding fat planes. Please correlate with diffuse urinalysis   findings. Is findings could suggest underlying cystitis. Prominence of the Alisson aortic lymph nodes again identified.   These could be reactive due to an underlying infectious inflammatory process such is the   bladder haziness. However neoplastic process may also considered. Consider   3-6 month follow-up. RECOMMENDATIONS:   Unavailable         CT CHEST W CONTRAST   Final Result   Multifocal consolidative changes both lungs worrisome for multifocal   pneumonia. Follow-up in following medical treatment course is recommended   document complete resolution. Multifocal bladder traumatic trabeculation/diverticula noted with slight   haziness of surrounding fat planes. Please correlate with diffuse urinalysis   findings. Is findings could suggest underlying cystitis. Prominence of the Alisson aortic lymph nodes again identified. These could be   reactive due to an underlying infectious inflammatory process such is the   bladder haziness. However neoplastic process may also considered. Consider   3-6 month follow-up. RECOMMENDATIONS:   Unavailable         XR SHOULDER LEFT (MIN 2 VIEWS)   Final Result   No acute fracture identified. Incidentally noted mass or masslike consolidation in the left upper lobe. Advise chest CT for further evaluation. XR PELVIS (1-2 VIEWS)   Final Result   No acute abnormality detected. Severe degenerative changes at the right hip, with chronic bony remodeling,   not substantially changed when compared to the CT from January 2020. XR CHEST PORTABLE   Final Result   1. No acute cardiopulmonary process identified. 2. Chronic interstitial changes of the lungs. CT CERVICAL SPINE WO CONTRAST   Final Result   Motion degradation challenge evaluation. No convincing evidence acute   displaced fracture traumatic malalignment. There are moderate multilevel   degenerate change         CT HEAD WO CONTRAST   Final Result   No acute intracranial abnormality. Geographic lucency frontal bone noted, consider bone scan imaging or consider   skeletal survey. Scheduled Medicines   Medications:    insulin glargine  55 Units SubCUTAneous Nightly    insulin NPH  35 Units SubCUTAneous QAM    insulin lispro  0-24 Units SubCUTAneous Q4H    polyethylene glycol  17 g Oral Daily    chlorhexidine  15 mL Mouth/Throat BID    famotidine (PEPCID) injection  20 mg IntraVENous BID    lidocaine  5 mL IntraDERmal Once    sodium chloride flush  5-40 mL IntraVENous 2 times per day    dexamethasone  6 mg IntraVENous Daily    metoprolol succinate  50 mg Oral BID    allopurinol  300 mg Oral Daily    apixaban  5 mg Oral BID    pravastatin  20 mg Oral Nightly    tamsulosin  0.4 mg Oral Nightly    sodium chloride flush  5-40 mL IntraVENous 2 times per day      Infusions:    dextrose 100 mL/hr at 12/23/21 2333    dexmedetomidine (PRECEDEX) IV infusion 0.2 mcg/kg/hr (12/23/21 1900)    propofol Stopped (12/13/21 1443)    fentaNYL Stopped (12/19/21 3173)    sodium chloride      norepinephrine Stopped (12/19/21 0125)    dextrose      midazolam Stopped (12/18/21 0450)    sodium chloride           Objective:   Vitals: BP (!) 166/107   Pulse 74   Temp 96.4 °F (35.8 °C) (Axillary)   Resp 20   Ht 5' 7.99\" (1.727 m)   Wt 215 lb 9.8 oz (97.8 kg)   SpO2 92%   BMI 32.79 kg/m²   General appearance: Patient extubated and off ventilator not on tube feeding either   Neck: no JVD or bruit  Thyroid : Normal lobes   Lungs: Has Vesicular Breath sounds intubated and on ventilator  Heart:  regular rate and rhythm  Abdomen: soft, non-tender; bowel sounds normal; no masses,  no organomegaly  Musculoskeletal: Normal  Extremities: extremities normal, , no edema  Neurologic: Patient extubated and off ventilator not on tube feeding either       Assessment:     Patient Active Problem List:     Diabetes mellitus (HCC)     Atrial flutter (HCC)     Atrial fibrillation ([facility]     Gout     Hyperlipidemia     Hypertension     Arthropathy     Upper GI bleed     NSTEMI (non-ST elevated myocardial infarction) ([facility] [address]     Rectal bleeding     Sepsis ([facility] [address]     BRBPR (bright red blood per rectum)     Acute cystitis without hematuria     Leukocytosis     Acute hypokalemia     Rectal bleed     Anemia     B12 deficiency     Elevated antinuclear antibody (FREDRICK) level     PNA (pneumonia)     Troponin I above reference range      Plan:     1. Reviewed POC blood glucose . Labs and X ray results   2. Reviewed Current Medicines   3. On Correction bolus Humalog/ Basal Lantus /NPH insulin regime  4. Monitor Blood glucose frequently   5. Modified  the dose of Insulin/ other medicines as needed   6. Will follow     .      Krzysztof Thomason MD, MD

## 2021-12-24 NOTE — PROGRESS NOTES
Nephrology Progress Note  12/24/2021 9:21 AM  Subjective: Interval History: Wilhemena Pulse is a 77 y.o. male off the ventilator somewhat weak and tired    Data:   Scheduled Meds:   insulin glargine  55 Units SubCUTAneous Nightly    insulin NPH  35 Units SubCUTAneous QAM    insulin lispro  0-24 Units SubCUTAneous Q4H    polyethylene glycol  17 g Oral Daily    chlorhexidine  15 mL Mouth/Throat BID    famotidine (PEPCID) injection  20 mg IntraVENous BID    lidocaine  5 mL IntraDERmal Once    sodium chloride flush  5-40 mL IntraVENous 2 times per day    dexamethasone  6 mg IntraVENous Daily    metoprolol succinate  50 mg Oral BID    allopurinol  300 mg Oral Daily    apixaban  5 mg Oral BID    pravastatin  20 mg Oral Nightly    tamsulosin  0.4 mg Oral Nightly    sodium chloride flush  5-40 mL IntraVENous 2 times per day     Continuous Infusions:   dextrose 100 mL/hr at 12/23/21 2333    dexmedetomidine (PRECEDEX) IV infusion 0.2 mcg/kg/hr (12/23/21 1900)    propofol Stopped (12/13/21 1443)    fentaNYL Stopped (12/19/21 2179)    sodium chloride      norepinephrine Stopped (12/19/21 0125)    dextrose      midazolam Stopped (12/18/21 0450)    sodium chloride           CBC   Recent Labs     12/22/21  0400 12/23/21  0400 12/24/21  0500   WBC 20.0* 17.2* 13.4*   HGB 12.3* 12.3* 12.2*   HCT 40.1* 40.9* 40.3*    263 243      BMP   Recent Labs     12/22/21 0400 12/22/21  0400 12/22/21 2005 12/23/21  0400 12/24/21  0500   *   < > 148* 147* 145   K 3.8   < > 4.1 3.8 3.5   *   < > 113* 111* 111*   CO2 29   < > 29 30 28   PHOS 2.7  --   --  3.2 2.8   BUN 70*   < > 60* 53* 40*   CREATININE 0.7*   < > 0.6* 0.5* 0.5*    < > = values in this interval not displayed. Hepatic:   No results for input(s): AST, ALT, ALB, BILITOT, ALKPHOS in the last 72 hours. Troponin: No results for input(s): TROPONINI in the last 72 hours.   BNP: No results for input(s): BNP in the last 72 output:400]  No intake/output data recorded. Vitals: /79   Pulse 90   Temp 96.4 °F (35.8 °C) (Axillary)   Resp 25   Ht 5' 7.99\" (1.727 m)   Wt 215 lb 9.8 oz (97.8 kg)   SpO2 96%   BMI 32.79 kg/m²  {  General appearance: Weak arousable  HEENT: Head: Normal, normocephalic, atraumatic.   Neck: supple, symmetrical, trachea midline  Lungs: diminished breath sounds bilaterally  Heart: S1, S2 normal  Abdomen: abnormal findings:  soft nt  Extremities: edema trace  Neurologic: Mental status: alertness: Weak        Assessment and Plan:      IMP:  #1 hypernatremia  #2 acute renal failure with increased uremia  #3 respiratory failure  #4 COVID-19 pneumonia  #5 status post pneumothorax tension  #6 CHF exacerbation  #7 type 2 diabetes  #8 complicated UTI/cystitis    Plan     #1 sodium holding stable monitor  #2 renal function stable  #3 monitor on oxygen off the ventilator  #4 follow-up CT surgery  #5 overall negative balance keep it that way  #6 monitor glucose control  #7 treat UTI  We will follow monitor         Den Root MD, MD

## 2021-12-24 NOTE — PROGRESS NOTES
Pulmonary and Critical Care  Progress Note      VITALS:  /75   Pulse 85   Temp 96.4 °F (35.8 °C) (Axillary)   Resp 19   Ht 5' 7.99\" (1.727 m)   Wt 215 lb 9.8 oz (97.8 kg)   SpO2 93%   BMI 32.79 kg/m²     Subjective:   CHIEF COMPLAINT :Fall     HPI:                The patient is lying in the bed. He is looking weak but not in acute resp distress    Objective:   PHYSICAL EXAM:    LUNGS:Occasional basal crackles  Abd-soft, BS+,NT  Ext- no pedal edema  CVS-s1s2, no murmurs      DATA:    CBC:  Recent Labs     12/22/21  0400 12/23/21  0400 12/24/21  0500   WBC 20.0* 17.2* 13.4*   RBC 4.34* 4.36* 4.35*   HGB 12.3* 12.3* 12.2*   HCT 40.1* 40.9* 40.3*    263 243   MCV 92.4 93.8 92.6   MCH 28.3 28.2 28.0   MCHC 30.7* 30.1* 30.3*   RDW 19.1* 18.8* 18.9*      BMP:  Recent Labs     12/22/21 2005 12/23/21  0400 12/24/21  0500   * 147* 145   K 4.1 3.8 3.5   * 111* 111*   CO2 29 30 28   BUN 60* 53* 40*   CREATININE 0.6* 0.5* 0.5*   CALCIUM 8.8 8.8 8.5   GLUCOSE 232* 108* 135*      ABG:  Recent Labs     12/22/21  0600 12/23/21  0600   PH 7.54* 7.55*   PO2ART 76 61*   SSX9WKS 31.0* 32.0   O2SAT 93.8* 90.3*     BNP  No results found for: BNP   D-Dimer:  No results found for: DDIMER   1.  Radiology: None      Assessment/Plan     Patient Active Problem List    Diagnosis Date Noted    PNA (pneumonia) 12/09/2021    Troponin I above reference range 12/09/2021    Elevated antinuclear antibody (FREDRICK) level 02/16/2021    B12 deficiency 02/15/2021    Anemia 02/12/2021    Rectal bleed 11/18/2020    Acute hypokalemia 11/17/2020    Acute cystitis without hematuria     Leukocytosis     [facility] 01/17/2020    BRBPR (bright red blood per rectum) 01/17/2020    Rectal bleeding 09/25/2018    NSTEMI (non-ST elevated myocardial infarction) ([facility] 06/25/2018    Upper GI bleed 03/07/2017    Diabetes mellitus ([facility]     Atrial flutter (HCC)     Atrial fibrillation (HCC)     Gout     Hyperlipidemia     Hypertension     Arthropathy    Acute on chronic hypoxic hypercapneic resp failure sec to Cardiogenic and non Cardiogenic Pulmonary edema  Systolic CHF with EF of 56-86%  Grade II Diastolic dysfunction  Bilateral Pneumonia sec to COVID-19  Obesity  DIVYA  Chronic Metabolic alkalosis  VDRF s/p extubation  Proteus bacteremia       1. ICs  2. OOB  3. PT/OT  4. Keep sats > 92%  5. BIPAP prn  6. D/c Decadron as it is now 14 days  7. Swallow eval  8. Await placement  9.  C/w present management    Electronically signed by Sandy Kurtz MD on 12/24/2021 at 11:51 AM

## 2021-12-25 LAB
ALBUMIN SERPL-MCNC: 2.7 GM/DL (ref 3.4–5)
ANION GAP SERPL CALCULATED.3IONS-SCNC: 11 MMOL/L (ref 4–16)
BUN BLDV-MCNC: 40 MG/DL (ref 6–23)
CALCIUM SERPL-MCNC: 9 MG/DL (ref 8.3–10.6)
CHLORIDE BLD-SCNC: 115 MMOL/L (ref 99–110)
CO2: 26 MMOL/L (ref 21–32)
CREAT SERPL-MCNC: 0.6 MG/DL (ref 0.9–1.3)
GFR AFRICAN AMERICAN: >60 ML/MIN/1.73M2
GFR NON-AFRICAN AMERICAN: >60 ML/MIN/1.73M2
GLUCOSE BLD-MCNC: 102 MG/DL (ref 70–99)
GLUCOSE BLD-MCNC: 102 MG/DL (ref 70–99)
GLUCOSE BLD-MCNC: 121 MG/DL (ref 70–99)
GLUCOSE BLD-MCNC: 184 MG/DL (ref 70–99)
GLUCOSE BLD-MCNC: 235 MG/DL (ref 70–99)
HCT VFR BLD CALC: 44 % (ref 42–52)
HEMOGLOBIN: 13.5 GM/DL (ref 13.5–18)
MCH RBC QN AUTO: 28.2 PG (ref 27–31)
MCHC RBC AUTO-ENTMCNC: 30.7 % (ref 32–36)
MCV RBC AUTO: 92.1 FL (ref 78–100)
PDW BLD-RTO: 19.2 % (ref 11.7–14.9)
PHOSPHORUS: 3.4 MG/DL (ref 2.5–4.9)
PLATELET # BLD: 288 K/CU MM (ref 140–440)
PMV BLD AUTO: 12.8 FL (ref 7.5–11.1)
POTASSIUM SERPL-SCNC: 3.6 MMOL/L (ref 3.5–5.1)
RBC # BLD: 4.78 M/CU MM (ref 4.6–6.2)
SODIUM BLD-SCNC: 152 MMOL/L (ref 135–145)
WBC # BLD: 13.8 K/CU MM (ref 4–10.5)

## 2021-12-25 PROCEDURE — 2580000003 HC RX 258: Performed by: NURSE PRACTITIONER

## 2021-12-25 PROCEDURE — 82962 GLUCOSE BLOOD TEST: CPT

## 2021-12-25 PROCEDURE — 2580000003 HC RX 258: Performed by: INTERNAL MEDICINE

## 2021-12-25 PROCEDURE — 2060000000 HC ICU INTERMEDIATE R&B

## 2021-12-25 PROCEDURE — 6360000002 HC RX W HCPCS: Performed by: INTERNAL MEDICINE

## 2021-12-25 PROCEDURE — 80069 RENAL FUNCTION PANEL: CPT

## 2021-12-25 PROCEDURE — 94761 N-INVAS EAR/PLS OXIMETRY MLT: CPT

## 2021-12-25 PROCEDURE — 85027 COMPLETE CBC AUTOMATED: CPT

## 2021-12-25 PROCEDURE — 6370000000 HC RX 637 (ALT 250 FOR IP): Performed by: INTERNAL MEDICINE

## 2021-12-25 PROCEDURE — 80048 BASIC METABOLIC PNL TOTAL CA: CPT

## 2021-12-25 PROCEDURE — 2500000003 HC RX 250 WO HCPCS: Performed by: NURSE PRACTITIONER

## 2021-12-25 PROCEDURE — 99232 SBSQ HOSP IP/OBS MODERATE 35: CPT | Performed by: INTERNAL MEDICINE

## 2021-12-25 PROCEDURE — 80053 COMPREHEN METABOLIC PANEL: CPT

## 2021-12-25 PROCEDURE — 1200000000 HC SEMI PRIVATE

## 2021-12-25 RX ORDER — INSULIN GLARGINE 100 [IU]/ML
35 INJECTION, SOLUTION SUBCUTANEOUS NIGHTLY
Status: DISCONTINUED | OUTPATIENT
Start: 2021-12-25 | End: 2022-01-02

## 2021-12-25 RX ADMIN — ALLOPURINOL 300 MG: 300 TABLET ORAL at 09:39

## 2021-12-25 RX ADMIN — METOPROLOL SUCCINATE 50 MG: 50 TABLET, EXTENDED RELEASE ORAL at 09:38

## 2021-12-25 RX ADMIN — PRAVASTATIN SODIUM 20 MG: 10 TABLET ORAL at 22:33

## 2021-12-25 RX ADMIN — OXYCODONE HYDROCHLORIDE 5 MG: 5 TABLET ORAL at 22:33

## 2021-12-25 RX ADMIN — DEXAMETHASONE SODIUM PHOSPHATE 6 MG: 10 INJECTION INTRAMUSCULAR; INTRAVENOUS at 09:38

## 2021-12-25 RX ADMIN — TAMSULOSIN HYDROCHLORIDE 0.4 MG: 0.4 CAPSULE ORAL at 22:33

## 2021-12-25 RX ADMIN — FAMOTIDINE 20 MG: 10 INJECTION, SOLUTION INTRAVENOUS at 22:33

## 2021-12-25 RX ADMIN — SODIUM CHLORIDE, PRESERVATIVE FREE 10 ML: 5 INJECTION INTRAVENOUS at 13:34

## 2021-12-25 RX ADMIN — INSULIN LISPRO 6 UNITS: 100 INJECTION, SOLUTION INTRAVENOUS; SUBCUTANEOUS at 16:32

## 2021-12-25 RX ADMIN — APIXABAN 5 MG: 5 TABLET, FILM COATED ORAL at 22:33

## 2021-12-25 RX ADMIN — APIXABAN 5 MG: 5 TABLET, FILM COATED ORAL at 09:39

## 2021-12-25 RX ADMIN — SODIUM CHLORIDE, PRESERVATIVE FREE 10 ML: 5 INJECTION INTRAVENOUS at 13:30

## 2021-12-25 RX ADMIN — METOPROLOL SUCCINATE 50 MG: 50 TABLET, EXTENDED RELEASE ORAL at 22:33

## 2021-12-25 RX ADMIN — POLYETHYLENE GLYCOL (3350) 17 G: 17 POWDER, FOR SOLUTION ORAL at 09:38

## 2021-12-25 RX ADMIN — SODIUM CHLORIDE, PRESERVATIVE FREE 10 ML: 5 INJECTION INTRAVENOUS at 22:33

## 2021-12-25 RX ADMIN — FAMOTIDINE 20 MG: 10 INJECTION, SOLUTION INTRAVENOUS at 09:38

## 2021-12-25 ASSESSMENT — PAIN DESCRIPTION - DESCRIPTORS: DESCRIPTORS: ACHING

## 2021-12-25 ASSESSMENT — PAIN SCALES - GENERAL
PAINLEVEL_OUTOF10: 4
PAINLEVEL_OUTOF10: 0

## 2021-12-25 ASSESSMENT — PAIN DESCRIPTION - ONSET: ONSET: GRADUAL

## 2021-12-25 ASSESSMENT — PAIN DESCRIPTION - FREQUENCY: FREQUENCY: INTERMITTENT

## 2021-12-25 ASSESSMENT — PAIN - FUNCTIONAL ASSESSMENT: PAIN_FUNCTIONAL_ASSESSMENT: ACTIVITIES ARE NOT PREVENTED

## 2021-12-25 ASSESSMENT — PAIN DESCRIPTION - PROGRESSION: CLINICAL_PROGRESSION: NOT CHANGED

## 2021-12-25 ASSESSMENT — PAIN DESCRIPTION - PAIN TYPE: TYPE: ACUTE PAIN

## 2021-12-25 ASSESSMENT — PAIN DESCRIPTION - LOCATION: LOCATION: GENERALIZED

## 2021-12-25 NOTE — PROGRESS NOTES
Nephrology Progress Note  12/25/2021 9:32 AM  Subjective: Interval History: Ting Jones is a 77 y.o. male. Doing better no distress    Data:   Scheduled Meds:   insulin glargine  35 Units SubCUTAneous Nightly    insulin NPH  20 Units SubCUTAneous QAM    insulin lispro  0-18 Units SubCUTAneous TID WC    insulin lispro  0-9 Units SubCUTAneous 2 times per day    polyethylene glycol  17 g Oral Daily    chlorhexidine  15 mL Mouth/Throat BID    famotidine (PEPCID) injection  20 mg IntraVENous BID    lidocaine  5 mL IntraDERmal Once    sodium chloride flush  5-40 mL IntraVENous 2 times per day    dexamethasone  6 mg IntraVENous Daily    metoprolol succinate  50 mg Oral BID    allopurinol  300 mg Oral Daily    apixaban  5 mg Oral BID    pravastatin  20 mg Oral Nightly    tamsulosin  0.4 mg Oral Nightly    sodium chloride flush  5-40 mL IntraVENous 2 times per day     Continuous Infusions:   dextrose 100 mL/hr at 12/23/21 2333    dexmedetomidine (PRECEDEX) IV infusion 0.2 mcg/kg/hr (12/23/21 1900)    propofol Stopped (12/13/21 1443)    fentaNYL Stopped (12/19/21 0633)    norepinephrine Stopped (12/19/21 0125)    dextrose      midazolam Stopped (12/18/21 0450)    sodium chloride           CBC   Recent Labs     12/23/21  0400 12/24/21  0500 12/25/21  0400   WBC 17.2* 13.4* 13.8*   HGB 12.3* 12.2* 13.5   HCT 40.9* 40.3* 44.0    243 288      BMP   Recent Labs     12/23/21  0400 12/24/21  0500 12/25/21  0400   * 145 152*   K 3.8 3.5 3.6   * 111* 115*   CO2 30 28 26   PHOS 3.2 2.8 3.4   BUN 53* 40* 40*   CREATININE 0.5* 0.5* 0.6*     Hepatic:   No results for input(s): AST, ALT, ALB, BILITOT, ALKPHOS in the last 72 hours. Troponin: No results for input(s): TROPONINI in the last 72 hours. BNP: No results for input(s): BNP in the last 72 hours. Lipids: No results for input(s): CHOL, HDL in the last 72 hours.     Invalid input(s): LDLCALCU  ABGs:   Lab Results   Component Value Date    PO2ART 61 12/23/2021    BSB4SLS 32.0 12/23/2021     INR: No results for input(s): INR in the last 72 hours.   Renal Labs  Albumin:    Lab Results   Component Value Date    LABALBU 2.7 12/25/2021     Calcium:    Lab Results   Component Value Date    CALCIUM 9.0 12/25/2021     Phosphorus:    Lab Results   Component Value Date    PHOS 3.4 12/25/2021     U/A:    Lab Results   Component Value Date    NITRU NEGATIVE 12/18/2021    COLORU COLORLESS 12/18/2021    WBCUA NONE SEEN 12/18/2021    RBCUA 31 12/18/2021    MUCUS RARE 12/18/2021    TRICHOMONAS NONE SEEN 12/18/2021    BACTERIA NEGATIVE 12/18/2021    CLARITYU CLEAR 12/18/2021    SPECGRAV 1.017 12/18/2021    UROBILINOGEN NORMAL 12/18/2021    BILIRUBINUR NEGATIVE 12/18/2021    BLOODU LARGE 12/18/2021    KETUA NEGATIVE 12/18/2021     ABG:    Lab Results   Component Value Date    XUH4ESI 32.0 12/23/2021    PO2ART 61 12/23/2021    DFA5MRC 28.0 12/23/2021     HgBA1c:    Lab Results   Component Value Date    LABA1C 7.3 12/18/2021     Microalbumen/Creatinine ratio:  No components found for: RUCREAT  TSH:  No results found for: TSH  IRON:    Lab Results   Component Value Date    IRON 48 05/07/2021     Iron Saturation:  No components found for: PERCENTFE  TIBC:    Lab Results   Component Value Date    TIBC 302 05/07/2021     FERRITIN:    Lab Results   Component Value Date    FERRITIN 57 05/07/2021     RPR:  No results found for: RPR  FREDRICK:    Lab Results   Component Value Date    ANATITER 1:160 02/12/2021    FREDRICK DETECTED 02/12/2021     24 Hour Urine for Creatinine Clearance:  No components found for: CREAT4, UHRS10, UTV10      Objective:   I/O: 12/24 0701 - 12/25 0700  In: -   Out: 1900 [Urine:1900]  I/O last 3 completed shifts:  In: -   Out: 1900 [Urine:1900]  I/O this shift:  In: -   Out: 550 [Urine:550]  Vitals: BP (!) 146/102   Pulse 75   Temp 97.2 °F (36.2 °C) (Axillary)   Resp 26   Ht 5' 7.99\" (1.727 m)   Wt 215 lb 9.8 oz (97.8 kg)   SpO2 92%   BMI 32.79 kg/m² {  General appearance: Weak arousable  HEENT: Head: Normal, normocephalic, atraumatic.   Neck: supple, symmetrical, trachea midline  Lungs: diminished breath sounds bilaterally  Heart: S1, S2 normal  Abdomen: abnormal findings:  soft nt  Extremities: edema trace  Neurologic: Mental status: alertness: Weak        Assessment and Plan:      IMP:  #1 hypernatremia  #2 acute renal failure with increased uremia  #3 respiratory failure  #4 COVID-19 pneumonia  #5 status post pneumothorax tension  #6 CHF exacerbation  #7 type 2 diabetes  #8 complicated UTI/cystitis    Plan     #1 sodium slightly increased will try to adjust IV fluids  #2 renal function holding stable monitor  #3 off the ventilator monitor O2  #4 treat in setting of Covid  #5 follow-up with CT surgery  #6 maintain diuresis  #7 glucose stable  #8 monitor for recurrent UTI         Colt Nelson MD, MD

## 2021-12-25 NOTE — PROGRESS NOTES
Pulmonary and Critical Care  Progress Note      VITALS:  BP (!) 146/102   Pulse 75   Temp 97.2 °F (36.2 °C) (Axillary)   Resp 26   Ht 5' 7.99\" (1.727 m)   Wt 215 lb 9.8 oz (97.8 kg)   SpO2 92%   BMI 32.79 kg/m²     Subjective:   CHIEF COMPLAINT :Fall     HPI:                The patient is lying in the bed. He is looking weak. He is not in acute resp distress    Objective:   PHYSICAL EXAM:    LUNGS:Occasional basal crackles  Abd-soft, BS+,NT  Ext- no pedal edema  CVS-s1s2, no murmurs      DATA:    CBC:  Recent Labs     12/23/21  0400 12/24/21  0500 12/25/21  0400   WBC 17.2* 13.4* 13.8*   RBC 4.36* 4.35* 4.78   HGB 12.3* 12.2* 13.5   HCT 40.9* 40.3* 44.0    243 288   MCV 93.8 92.6 92.1   MCH 28.2 28.0 28.2   MCHC 30.1* 30.3* 30.7*   RDW 18.8* 18.9* 19.2*      BMP:  Recent Labs     12/23/21  0400 12/24/21  0500 12/25/21  0400   * 145 152*   K 3.8 3.5 3.6   * 111* 115*   CO2 30 28 26   BUN 53* 40* 40*   CREATININE 0.5* 0.5* 0.6*   CALCIUM 8.8 8.5 9.0   GLUCOSE 108* 135* 102*      ABG:  Recent Labs     12/23/21  0600   PH 7.55*   PO2ART 61*   YVX0GCV 32.0   O2SAT 90.3*     BNP  No results found for: BNP   D-Dimer:  No results found for: DDIMER   1.  Radiology: None      Assessment/Plan     Patient Active Problem List    Diagnosis Date Noted    PNA (pneumonia) 12/09/2021    Troponin I above reference range 12/09/2021    Elevated antinuclear antibody (FREDRICK) level 02/16/2021    B12 deficiency 02/15/2021    Anemia 02/12/2021    Rectal bleed 11/18/2020    Acute hypokalemia 11/17/2020    Acute cystitis without hematuria     Leukocytosis     Sepsis ([address] 01/17/2020    BRBPR (bright red blood per rectum) 01/17/2020    Rectal bleeding 09/25/2018    NSTEMI (non-ST elevated myocardial infarction) ([facility] 06/25/2018    Upper GI bleed 03/07/2017    Diabetes mellitus ([facility]     Atrial flutter (HCC)     Atrial fibrillation (HCC)     Gout     Hyperlipidemia     Hypertension     Arthropathy Acute on chronic hypoxic hypercapneic resp failure sec to Cardiogenic and non Cardiogenic Pulmonary edema  Systolic CHF with EF of 44-82%  Grade II Diastolic dysfunction  Bilateral Pneumonia sec to COVID-19  Obesity  DIVYA  Chronic Metabolic alkalosis  VDRF s/p extubation  Proteus bacteremia  Hyperchloremic Hypernatremia       1. BIPAP prn  2. D5W  3. BMP in am  4. ICS  5. PPB  6. PT/OT  7. Keep sats > 92%  8.  C/w present management  Electronically signed by Aide Morin MD on 12/25/2021 at 1:08 PM

## 2021-12-25 NOTE — PROGRESS NOTES
Progress Note( Dr. Matheus Newton)  12/25/2021  Subjective:   Admit Date: 12/8/2021  PCP: Paulo Faulkner MD    Admitted For :Admitted initially on December 9, 2021 with history of fall and found to have Covid pneumonia    Consulted For: Better control of blood glucose    Interval History: The patient awake and alert. He was extubated and off ventilator. Not on  oxygen either. Is attempting to talk with whisper  According to nurses patient is not eating much      Intake/Output Summary (Last 24 hours) at 12/25/2021 1254  Last data filed at 12/25/2021 7114  Gross per 24 hour   Intake --   Output 1950 ml   Net -1950 ml       DATA    CBC:   Recent Labs     12/23/21  0400 12/24/21  0500 12/25/21  0400   WBC 17.2* 13.4* 13.8*   HGB 12.3* 12.2* 13.5    243 288    CMP:  Recent Labs     12/23/21  0400 12/24/21  0500 12/25/21  0400   * 145 152*   K 3.8 3.5 3.6   * 111* 115*   CO2 30 28 26   BUN 53* 40* 40*   CREATININE 0.5* 0.5* 0.6*   CALCIUM 8.8 8.5 9.0   LABALBU 2.6* 2.5* 2.7*     Lipids:   Lab Results   Component Value Date    CHOL 137 06/26/2018    HDL 53 06/26/2018    TRIG 101 06/26/2018     Glucose:  Recent Labs     12/24/21  2323 12/25/21  0401 12/25/21  0757   POCGLU 104* 102* 121*     [id number]:  Lab Results   Component Value Date    LABA1C 7.3 12/18/2021     High Sensitivity TSH:   Lab Results   Component Value Date    TSHHS 3.280 05/07/2021     Free T3: No results found for: FT3  Free T4:No results found for: T4FREE    XR CHEST PORTABLE   Final Result      XR CHEST PORTABLE   Final Result   Mild bilateral improvement in airspace opacities. Trace left apical   pneumothorax is unchanged. XR CHEST PORTABLE   Final Result   The left apical pneumothorax is still identified. Similar to 12/18/2021 but   smaller than 12/17/2021. The multifocal pulmonary opacities are redemonstrated. May have some   worsening atelectasis in the right lower lobe.          XR CHEST PORTABLE   Final Result Improving small left apical pneumothorax measuring 9 mm. New right lower lobe infiltrate may represent atelectasis versus pneumonia. Lines and tubes are stable      Stable mild pneumomediastinum. XR CHEST PORTABLE   Final Result   Interval development of a small left apical pneumothorax measuring   approximately 2.1 cm between pleural surfaces. Pneumomediastinum appears   increased when compared to the previous exam as well. Continued patchy opacity throughout both lungs, most compatible with   multifocal pneumonia. XR CHEST PORTABLE   Final Result   1. No appreciable left pneumothorax identified on the current exam.   2. Similar bilateral airspace opacities. 3. Support lines and tubes remain in place. XR CHEST PORTABLE   Final Result   1. Small stable left apical pneumothorax. 2. Bilateral airspace opacities are seen without significant change. XR CHEST PORTABLE   Final Result   Stable small left apical pneumothorax. Stable pulmonary vascular congestion along with interstitial opacities and   hazy airspace opacities, left worse than right which may be on the basis of   interstitial and pulmonary edema but can also be seen in the setting of   atypical pneumonia. XR CHEST PORTABLE   Final Result   Persistent small left apical pneumothorax estimated to be approximately 10%   in severity. Support tube/catheters project in stable/satisfactory position   in the frontal projection. Consolidating infiltrates and or pulmonary edema, cardiomegaly unchanged   compared to 12/14/2021 consistent with diffuse bilateral pneumonia and/or   congestive heart failure. XR CHEST PORTABLE   Final Result   Left chest tube remains in place, with slight interval decrease in size in   small left apical pneumothorax.          XR CHEST PORTABLE   Final Result   Persistent small left pneumothorax with 1 cm pleural apical separation, with   intervally placed left-sided chest tube. Similar diffuse bilateral airspace disease. XR ABDOMEN (KUB) (SINGLE AP VIEW)   Final Result   NG tube in place with tip and side port below the diaphragm and in the region   of the gastric body. XR CHEST PORTABLE   Final Result   Interval development of a large left tension pneumothorax. Extensive bilateral airspace opacities without significant change. Findings were discussed with Dr. Vangie Gibson  at 7:43 am on 12/13/2021. XR CHEST PORTABLE   Final Result   Persistent bilateral airspace disease without acute interval change. XR CHEST PORTABLE   Final Result   Severe extensive bilateral airspace opacities worsened from the previous exam   compatible with edema, ARDS, or multifocal pneumonia. XR CHEST PORTABLE   Final Result   1. Cardiomegaly with severe congestive heart failure. VL DUP LOWER EXTREMITY VENOUS BILATERAL   Final Result   No evidence of DVT in either lower extremity within the limitations. Mild nonspecific subcutaneous edema to bilateral lower extremities. CT HEAD WO CONTRAST   Final Result   No acute intracranial abnormality. Age related changes including chronic small vessel ischemic disease and   cerebral atrophy. CT ABDOMEN PELVIS W IV CONTRAST Additional Contrast? None   Final Result   Multifocal consolidative changes both lungs worrisome for multifocal   pneumonia. Follow-up in following medical treatment course is recommended   document complete resolution. Multifocal bladder traumatic trabeculation/diverticula noted with slight   haziness of surrounding fat planes. Please correlate with diffuse urinalysis   findings. Is findings could suggest underlying cystitis. Prominence of the Alisson aortic lymph nodes again identified. These could be   reactive due to an underlying infectious inflammatory process such is the   bladder haziness.   However neoplastic process may also considered. Consider   3-6 month follow-up. RECOMMENDATIONS:   Unavailable         CT CHEST W CONTRAST   Final Result   Multifocal consolidative changes both lungs worrisome for multifocal   pneumonia. Follow-up in following medical treatment course is recommended   document complete resolution. Multifocal bladder traumatic trabeculation/diverticula noted with slight   haziness of surrounding fat planes. Please correlate with diffuse urinalysis   findings. Is findings could suggest underlying cystitis. Prominence of the Alisson aortic lymph nodes again identified. These could be   reactive due to an underlying infectious inflammatory process such is the   bladder haziness. However neoplastic process may also considered. Consider   3-6 month follow-up. RECOMMENDATIONS:   Unavailable         XR SHOULDER LEFT (MIN 2 VIEWS)   Final Result   No acute fracture identified. Incidentally noted mass or masslike consolidation in the left upper lobe. Advise chest CT for further evaluation. XR PELVIS (1-2 VIEWS)   Final Result   No acute abnormality detected. Severe degenerative changes at the right hip, with chronic bony remodeling,   not substantially changed when compared to the CT from January 2020. XR CHEST PORTABLE   Final Result   1. No acute cardiopulmonary process identified. 2. Chronic interstitial changes of the lungs. CT CERVICAL SPINE WO CONTRAST   Final Result   Motion degradation challenge evaluation. No convincing evidence acute   displaced fracture traumatic malalignment. There are moderate multilevel   degenerate change         CT HEAD WO CONTRAST   Final Result   No acute intracranial abnormality. Geographic lucency frontal bone noted, consider bone scan imaging or consider   skeletal survey.          XR CHEST PA INSPIRATION 1 VW    (Results Pending)        Scheduled Medicines   Medications:    insulin glargine  35 Units SubCUTAneous Nightly  insulin NPH  20 Units SubCUTAneous QAM    insulin lispro  0-18 Units SubCUTAneous TID WC    insulin lispro  0-9 Units SubCUTAneous 2 times per day    polyethylene glycol  17 g Oral Daily    chlorhexidine  15 mL Mouth/Throat BID    famotidine (PEPCID) injection  20 mg IntraVENous BID    lidocaine  5 mL IntraDERmal Once    sodium chloride flush  5-40 mL IntraVENous 2 times per day    dexamethasone  6 mg IntraVENous Daily    metoprolol succinate  50 mg Oral BID    allopurinol  300 mg Oral Daily    apixaban  5 mg Oral BID    pravastatin  20 mg Oral Nightly    tamsulosin  0.4 mg Oral Nightly    sodium chloride flush  5-40 mL IntraVENous 2 times per day      Infusions:    dextrose 100 mL/hr at 12/23/21 2333    dexmedetomidine (PRECEDEX) IV infusion 0.2 mcg/kg/hr (12/23/21 1900)    propofol Stopped (12/13/21 1443)    fentaNYL Stopped (12/19/21 2521)    norepinephrine Stopped (12/19/21 0125)    dextrose      midazolam Stopped (12/18/21 0450)    sodium chloride           Objective:   Vitals: BP (!) 146/102   Pulse 75   Temp 97.2 °F (36.2 °C) (Axillary)   Resp 26   Ht 5' 7.99\" (1.727 m)   Wt 215 lb 9.8 oz (97.8 kg)   SpO2 92%   BMI 32.79 kg/m²   General appearance: Patient extubated and off ventilator not on tube feeding either   Neck: no JVD or bruit  Thyroid : Normal lobes   Lungs: Has Vesicular Breath sounds   Heart:  regular rate and rhythm  Abdomen: soft, non-tender; bowel sounds normal; no masses,  no organomegaly  Musculoskeletal: Normal  Extremities: extremities normal, , no edema  Neurologic: Patient extubated and off ventilator not on tube feeding either       Assessment:     Patient Active Problem List:     Diabetes mellitus (HCC)     Atrial flutter (HCC)     Atrial fibrillation (HCC)     Gout     Hyperlipidemia     Hypertension     Arthropathy     Upper GI bleed     NSTEMI (non-ST elevated myocardial infarction) ([facility] [address]     Rectal bleeding     Sepsis (HCC)     BRBPR (bright red blood per rectum)     Acute cystitis without hematuria     Leukocytosis     Acute hypokalemia     Rectal bleed     Anemia     B12 deficiency     Elevated antinuclear antibody (FREDRICK) level     PNA (pneumonia)     Troponin I above reference range     Hypernatremia possibly due to dehydration      Plan:     1. Reviewed POC blood glucose . Labs and X ray results   2. Reviewed Current Medicines   3. On Correction bolus Humalog/ Basal Lantus /NPH insulin regime  4. Monitor Blood glucose frequently   5. Modified  the dose of Insulin/ other medicines as needed   6. Nephrology managing the IV fluid to manage the hypernatremia  7. Will follow     .      Carmen Bains MD, MD

## 2021-12-25 NOTE — PROGRESS NOTES
Hospitalist Progress Note      Name:  Fabiano Serrano /Age/Sex: 1945  (77 y.o. male)   MRN & CSN:  [id number] & [id number] Admission Date/Time: 2021  5:33 PM   Location:  -A PCP: Sosa Flynn MD         Hospital Day: 18    Assessment and Plan:   Fabiano Serrano is a 77 y.o.  male  who presents with fall and Covid pneumonia    Patient, 69-year-old male with past medical history of atrial fibrillation/flutter, diabetes mellitus type 2, hyperlipidemia, hypertension was admitted on 2021 after an episode of fall. Reported generalized weakness. Patient tested positive for COVID-19 infection. X-ray chest suggestive of pneumonia. Also noted to have UTI. Patient was noted to have acute systolic/diastolic CHF exacerbation. Echocardiogram showed EF 35 to 40%. Patient received 1 dose of Tocilizumab 12/10/2021. Patient was seen by pulmonary/cardiology/cardiothoracic surgery. Patient developed left tension pneumothorax which required chest tube placement. Patient was also seen by urology secondary to CT finding of multifocal bladder traumatic trabeculation/diverticula. Recommended outpatient cystoscopy/TRUS.     Assessment     Acute hypoxic respiratory failure secondary to COVID-19 pneumonia. Extubated   Acute complicated cystitis  Sepsis secondary to above, present on admission  Left tension pneumothorax s/p chest tube placement 2021 to   Acute systolic/diastolic CHF exacerbation  Hyponatremia. S/p fall  Hypertension  Paroxysmal atrial fibrillation  Diabetes mellitus type 2  CAD.       Plan  Extubated 2023. Wean oxygen as tolerated  Continue IV Decadron    S/p Tocilizumab 12/10/2021  Completed 14 days antibiotics    Lasix/Aldactone/Maxzide on hold given hypernatremia  Nephrology following  Monitor I's and O's. Insulin management per endocrinology  Continue insulin sliding scale. Continue Eliquis/metoprolol    Diet ADULT DIET;  Dysphagia - Pureed; Mildly Thick (Nectar)  ADULT ORAL NUTRITION SUPPLEMENT; Breakfast, Lunch, Dinner; Standard High Calorie/High Protein Oral Supplement   DVT Prophylaxis [] Lovenox, []  Heparin, [] SCDs, [] Ambulation   GI Prophylaxis [] PPI,  [] H2 Blocker,  [] Carafate,  [] Diet/Tube Feeds   Code Status Full Code   Disposition Patient requires continued admission due to respiratory failure   MDM [] Low, [] Moderate,[x]  High  Patient's risk as above due to respiratory failure     History of Present Illness:     Chief Complaint: Respiratory failure  Tony Whaley is a 77 y.o.  male  who presents with fall and Covid pneumonia     Subjective-patient examined at bedside. He is comfortable in bed. He is on room air and saturating around 96%. He is very weak and has a hoarse whisper. He tells me he is doing \"okay\". Labs reviewed. Consultant notes reviewed. Continue current measures overall prognosis remains very guarded. Called Sue Heading and left a message  Ten point ROS reviewed negative, unless as noted above    Objective:        Intake/Output Summary (Last 24 hours) at 12/25/2021 1014  Last data filed at 12/25/2021 1175  Gross per 24 hour   Intake --   Output 1950 ml   Net -1950 ml      Vitals:   Vitals:    12/25/21 0203   BP: (!) 146/102   Pulse: 75   Resp: 26   Temp: 97.2 °F (36.2 °C)   SpO2:        Vent Information  $Ventilation: $Subsequent Day  Skin Assessment: Clean, dry, & intact  Suction Catheter Diameter: 14  Equipment ID: [id number]  Equipment Changed: HME  Vent Type: [phone]  Vent Mode: SIMV/VC  Vt Ordered: 500 mL  Rate Set: 12 bmp  Peak Flow: 55 L/min  Pressure Support: 15 cmH20  FiO2 : 30 %  SpO2: 92 %  SpO2/FiO2 ratio: 330  Sensitivity: 3  PEEP/CPAP: 5  I Time/ I Time %: 0 s  Humidification Source: HME  Nitric Oxide/Epoprostenol In Use?: No  Mask Type: Full face mask  Mask Size: Medium  Recent Labs     12/23/21  0600   PH 7.55*   [id number] 32.0   PO2ART 61*   LCB9MHN 28.0*   CO2CT 29.0*   O2SAT 90.3*   LABCARB 2.0 Physical Exam:   GEN Awake male, lying in bed in no apparent distress. Appears given age. EYES Pupils are equally round. No scleral erythema, discharge, or conjunctivitis. HENT Mucous membranes are moist. Oral pharynx without exudates, no evidence of thrush. NECK Supple, no apparent thyromegaly or masses. RESP Clear to auscultation, no wheezes, rales or rhonchi. Symmetric chest movement while on room air. CARDIO/VASC S1/S2 auscultated. Regular rate without appreciable murmurs, rubs, or gallops. No JVD or carotid bruits. Peripheral pulses equal bilaterally and palpable. No peripheral edema. GI Abdomen is soft without significant tenderness, masses, or guarding. Bowel sounds are normoactive. Rectal exam deferred.  No costovertebral angle tenderness. Normal appearing external genitalia. Lane catheter is not present. HEME/LYMPH No palpable cervical lymphadenopathy and no hepatosplenomegaly. No petechiae or ecchymoses. MSK No gross joint deformities. SKIN Normal coloration, warm, dry. NEURO Cranial nerves appear grossly intact, normal speech, no lateralizing weakness. PSYCH Awake, alert, oriented x 3. Affect appropriate.     Data:   CBC with Differential:    Lab Results   Component Value Date    WBC 13.8 12/25/2021    RBC 4.78 12/25/2021    HGB 13.5 12/25/2021    HCT 44.0 12/25/2021     12/25/2021    MCV 92.1 12/25/2021    MCH 28.2 12/25/2021    MCHC 30.7 12/25/2021    RDW 19.2 12/25/2021    NRBC 2 12/15/2021    SEGSPCT 68.0 12/15/2021    BANDSPCT 14 12/15/2021    LYMPHOPCT 11.0 12/15/2021    MONOPCT 5.0 12/15/2021    BASOPCT 0.1 12/13/2021    MONOSABS 0.5 12/15/2021    LYMPHSABS 1.0 12/15/2021    EOSABS 0.0 12/13/2021    BASOSABS 0.0 12/13/2021    DIFFTYPE MANUAL DIFFERENTIAL 12/15/2021       CMP:     Lab Results   Component Value Date     12/25/2021    K 3.6 12/25/2021     12/25/2021    CO2 26 12/25/2021    BUN 40 12/25/2021    CREATININE 0.6 12/25/2021    GFRAA >60 12/25/2021    LABGLOM >60 12/25/2021    GLUCOSE 102 12/25/2021    PROT 5.8 12/18/2021    LABALBU 2.7 12/25/2021    CALCIUM 9.0 12/25/2021    BILITOT 0.6 12/18/2021    ALKPHOS 62 12/18/2021    AST 22 12/18/2021    ALT 10 12/18/2021       Troponin:  Lab Results   Component Value Date    TROPONINT 0.154 12/13/2021       U/A:    Lab Results   Component Value Date    COLORU COLORLESS 12/18/2021    PROTEINU 100 12/18/2021    WBCUA NONE SEEN 12/18/2021    RBCUA 31 12/18/2021    MUCUS RARE 12/18/2021    TRICHOMONAS NONE SEEN 12/18/2021    BACTERIA NEGATIVE 12/18/2021    CLARITYU CLEAR 12/18/2021    SPECGRAV 1.017 12/18/2021    LEUKOCYTESUR NEGATIVE 12/18/2021    UROBILINOGEN NORMAL 12/18/2021    BILIRUBINUR NEGATIVE 12/18/2021    BLOODU LARGE 12/18/2021       Urine Culture:  No components found for: CURINE    Radiology results:  XR CHEST PORTABLE   Final Result      XR CHEST PORTABLE   Final Result   Mild bilateral improvement in airspace opacities. Trace left apical   pneumothorax is unchanged. XR CHEST PORTABLE   Final Result   The left apical pneumothorax is still identified. Similar to 12/18/2021 but   smaller than 12/17/2021. The multifocal pulmonary opacities are redemonstrated. May have some   worsening atelectasis in the right lower lobe. XR CHEST PORTABLE   Final Result   Improving small left apical pneumothorax measuring 9 mm. New right lower lobe infiltrate may represent atelectasis versus pneumonia. Lines and tubes are stable      Stable mild pneumomediastinum. XR CHEST PORTABLE   Final Result   Interval development of a small left apical pneumothorax measuring   approximately 2.1 cm between pleural surfaces. Pneumomediastinum appears   increased when compared to the previous exam as well. Continued patchy opacity throughout both lungs, most compatible with   multifocal pneumonia. XR CHEST PORTABLE   Final Result   1.  No appreciable left pneumothorax identified on the current exam.   2. Similar bilateral airspace opacities. 3. Support lines and tubes remain in place. XR CHEST PORTABLE   Final Result   1. Small stable left apical pneumothorax. 2. Bilateral airspace opacities are seen without significant change. XR CHEST PORTABLE   Final Result   Stable small left apical pneumothorax. Stable pulmonary vascular congestion along with interstitial opacities and   hazy airspace opacities, left worse than right which may be on the basis of   interstitial and pulmonary edema but can also be seen in the setting of   atypical pneumonia. XR CHEST PORTABLE   Final Result   Persistent small left apical pneumothorax estimated to be approximately 10%   in severity. Support tube/catheters project in stable/satisfactory position   in the frontal projection. Consolidating infiltrates and or pulmonary edema, cardiomegaly unchanged   compared to 12/14/2021 consistent with diffuse bilateral pneumonia and/or   congestive heart failure. XR CHEST PORTABLE   Final Result   Left chest tube remains in place, with slight interval decrease in size in   small left apical pneumothorax. XR CHEST PORTABLE   Final Result   Persistent small left pneumothorax with 1 cm pleural apical separation, with   intervally placed left-sided chest tube. Similar diffuse bilateral airspace disease. XR ABDOMEN (KUB) (SINGLE AP VIEW)   Final Result   NG tube in place with tip and side port below the diaphragm and in the region   of the gastric body. XR CHEST PORTABLE   Final Result   Interval development of a large left tension pneumothorax. Extensive bilateral airspace opacities without significant change. Findings were discussed with Dr. Nanda Simon  at 7:43 am on 12/13/2021. XR CHEST PORTABLE   Final Result   Persistent bilateral airspace disease without acute interval change.          XR CHEST PORTABLE   Final Result   Severe extensive bilateral airspace opacities worsened from the previous exam   compatible with edema, ARDS, or multifocal pneumonia. XR CHEST PORTABLE   Final Result   1. Cardiomegaly with severe congestive heart failure. VL DUP LOWER EXTREMITY VENOUS BILATERAL   Final Result   No evidence of DVT in either lower extremity within the limitations. Mild nonspecific subcutaneous edema to bilateral lower extremities. CT HEAD WO CONTRAST   Final Result   No acute intracranial abnormality. Age related changes including chronic small vessel ischemic disease and   cerebral atrophy. CT ABDOMEN PELVIS W IV CONTRAST Additional Contrast? None   Final Result   Multifocal consolidative changes both lungs worrisome for multifocal   pneumonia. Follow-up in following medical treatment course is recommended   document complete resolution. Multifocal bladder traumatic trabeculation/diverticula noted with slight   haziness of surrounding fat planes. Please correlate with diffuse urinalysis   findings. Is findings could suggest underlying cystitis. Prominence of the Alisson aortic lymph nodes again identified. These could be   reactive due to an underlying infectious inflammatory process such is the   bladder haziness. However neoplastic process may also considered. Consider   3-6 month follow-up. RECOMMENDATIONS:   Unavailable         CT CHEST W CONTRAST   Final Result   Multifocal consolidative changes both lungs worrisome for multifocal   pneumonia. Follow-up in following medical treatment course is recommended   document complete resolution. Multifocal bladder traumatic trabeculation/diverticula noted with slight   haziness of surrounding fat planes. Please correlate with diffuse urinalysis   findings. Is findings could suggest underlying cystitis. Prominence of the Alisson aortic lymph nodes again identified.   These could be   reactive due to an underlying infectious inflammatory process such is the   bladder haziness. However neoplastic process may also considered. Consider   3-6 month follow-up. RECOMMENDATIONS:   Unavailable         XR SHOULDER LEFT (MIN 2 VIEWS)   Final Result   No acute fracture identified. Incidentally noted mass or masslike consolidation in the left upper lobe. Advise chest CT for further evaluation. XR PELVIS (1-2 VIEWS)   Final Result   No acute abnormality detected. Severe degenerative changes at the right hip, with chronic bony remodeling,   not substantially changed when compared to the CT from January 2020. XR CHEST PORTABLE   Final Result   1. No acute cardiopulmonary process identified. 2. Chronic interstitial changes of the lungs. CT CERVICAL SPINE WO CONTRAST   Final Result   Motion degradation challenge evaluation. No convincing evidence acute   displaced fracture traumatic malalignment. There are moderate multilevel   degenerate change         CT HEAD WO CONTRAST   Final Result   No acute intracranial abnormality. Geographic lucency frontal bone noted, consider bone scan imaging or consider   skeletal survey.              Medications:   Medications:    insulin glargine  35 Units SubCUTAneous Nightly    insulin NPH  20 Units SubCUTAneous QAM    insulin lispro  0-18 Units SubCUTAneous TID WC    insulin lispro  0-9 Units SubCUTAneous 2 times per day    polyethylene glycol  17 g Oral Daily    chlorhexidine  15 mL Mouth/Throat BID    famotidine (PEPCID) injection  20 mg IntraVENous BID    lidocaine  5 mL IntraDERmal Once    sodium chloride flush  5-40 mL IntraVENous 2 times per day    dexamethasone  6 mg IntraVENous Daily    metoprolol succinate  50 mg Oral BID    allopurinol  300 mg Oral Daily    apixaban  5 mg Oral BID    pravastatin  20 mg Oral Nightly    tamsulosin  0.4 mg Oral Nightly    sodium chloride flush  5-40 mL IntraVENous 2 times per day Infusions:    dextrose 100 mL/hr at 12/23/21 2333    dexmedetomidine (PRECEDEX) IV infusion 0.2 mcg/kg/hr (12/23/21 1900)    propofol Stopped (12/13/21 1443)    fentaNYL Stopped (12/19/21 2518)    norepinephrine Stopped (12/19/21 0125)    dextrose      midazolam Stopped (12/18/21 0450)    sodium chloride       PRN Meds: morphine, 2 mg, Q4H PRN  magic (miracle) mouthwash, 5 mL, 4x Daily PRN  hydrALAZINE (APRESOLINE) ivpb, 10 mg, Q4H PRN  sodium chloride flush, 5-40 mL, PRN  glucose, 15 g, PRN  dextrose, 12.5 g, PRN  glucagon (rDNA), 1 mg, PRN  dextrose, 100 mL/hr, PRN  LORazepam, 1 mg, Q6H PRN  oxyCODONE, 5 mg, Q4H PRN  guaiFENesin-dextromethorphan, 5 mL, Q4H PRN  potassium chloride, 40 mEq, PRN   Or  potassium alternative oral replacement, 40 mEq, PRN   Or  potassium chloride, 10 mEq, PRN  sodium chloride flush, 5-40 mL, PRN  sodium chloride, 25 mL, PRN  ondansetron, 4 mg, Q8H PRN   Or  ondansetron, 4 mg, Q6H PRN  polyethylene glycol, 17 g, Daily PRN  acetaminophen, 650 mg, Q6H PRN   Or  acetaminophen, 650 mg, Q6H PRN          Electronically signed by Ely Alexander MD on 12/25/2021 at 10:14 AM

## 2021-12-26 ENCOUNTER — APPOINTMENT (OUTPATIENT)
Dept: GENERAL RADIOLOGY | Age: 76
DRG: 870 | End: 2021-12-26
Payer: COMMERCIAL

## 2021-12-26 LAB
ALBUMIN SERPL-MCNC: 2.6 GM/DL (ref 3.4–5)
ANION GAP SERPL CALCULATED.3IONS-SCNC: 9 MMOL/L (ref 4–16)
BUN BLDV-MCNC: 46 MG/DL (ref 6–23)
CALCIUM SERPL-MCNC: 8.9 MG/DL (ref 8.3–10.6)
CHLORIDE BLD-SCNC: 119 MMOL/L (ref 99–110)
CO2: 26 MMOL/L (ref 21–32)
CREAT SERPL-MCNC: 0.7 MG/DL (ref 0.9–1.3)
GFR AFRICAN AMERICAN: >60 ML/MIN/1.73M2
GFR NON-AFRICAN AMERICAN: >60 ML/MIN/1.73M2
GLUCOSE BLD-MCNC: 136 MG/DL (ref 70–99)
GLUCOSE BLD-MCNC: 143 MG/DL (ref 70–99)
GLUCOSE BLD-MCNC: 156 MG/DL (ref 70–99)
GLUCOSE BLD-MCNC: 187 MG/DL (ref 70–99)
GLUCOSE BLD-MCNC: 192 MG/DL (ref 70–99)
GLUCOSE BLD-MCNC: 230 MG/DL (ref 70–99)
PHOSPHORUS: 3.5 MG/DL (ref 2.5–4.9)
POTASSIUM SERPL-SCNC: 3.6 MMOL/L (ref 3.5–5.1)
SODIUM BLD-SCNC: 154 MMOL/L (ref 135–145)

## 2021-12-26 PROCEDURE — 6370000000 HC RX 637 (ALT 250 FOR IP): Performed by: INTERNAL MEDICINE

## 2021-12-26 PROCEDURE — 2580000003 HC RX 258: Performed by: NURSE PRACTITIONER

## 2021-12-26 PROCEDURE — 6360000002 HC RX W HCPCS: Performed by: INTERNAL MEDICINE

## 2021-12-26 PROCEDURE — 80069 RENAL FUNCTION PANEL: CPT

## 2021-12-26 PROCEDURE — 6360000002 HC RX W HCPCS: Performed by: NURSE PRACTITIONER

## 2021-12-26 PROCEDURE — 2580000003 HC RX 258: Performed by: INTERNAL MEDICINE

## 2021-12-26 PROCEDURE — 6370000000 HC RX 637 (ALT 250 FOR IP): Performed by: NURSE PRACTITIONER

## 2021-12-26 PROCEDURE — 99232 SBSQ HOSP IP/OBS MODERATE 35: CPT | Performed by: INTERNAL MEDICINE

## 2021-12-26 PROCEDURE — 71045 X-RAY EXAM CHEST 1 VIEW: CPT

## 2021-12-26 PROCEDURE — 1200000000 HC SEMI PRIVATE

## 2021-12-26 PROCEDURE — 2060000000 HC ICU INTERMEDIATE R&B

## 2021-12-26 PROCEDURE — 82962 GLUCOSE BLOOD TEST: CPT

## 2021-12-26 PROCEDURE — 94761 N-INVAS EAR/PLS OXIMETRY MLT: CPT

## 2021-12-26 PROCEDURE — 2500000003 HC RX 250 WO HCPCS: Performed by: NURSE PRACTITIONER

## 2021-12-26 RX ADMIN — APIXABAN 5 MG: 5 TABLET, FILM COATED ORAL at 09:37

## 2021-12-26 RX ADMIN — INSULIN LISPRO 3 UNITS: 100 INJECTION, SOLUTION INTRAVENOUS; SUBCUTANEOUS at 17:32

## 2021-12-26 RX ADMIN — SODIUM CHLORIDE, PRESERVATIVE FREE 10 ML: 5 INJECTION INTRAVENOUS at 23:13

## 2021-12-26 RX ADMIN — TAMSULOSIN HYDROCHLORIDE 0.4 MG: 0.4 CAPSULE ORAL at 23:04

## 2021-12-26 RX ADMIN — FAMOTIDINE 20 MG: 10 INJECTION, SOLUTION INTRAVENOUS at 23:04

## 2021-12-26 RX ADMIN — ALLOPURINOL 300 MG: 300 TABLET ORAL at 09:37

## 2021-12-26 RX ADMIN — PRAVASTATIN SODIUM 20 MG: 10 TABLET ORAL at 23:04

## 2021-12-26 RX ADMIN — DEXAMETHASONE SODIUM PHOSPHATE 6 MG: 10 INJECTION INTRAMUSCULAR; INTRAVENOUS at 09:37

## 2021-12-26 RX ADMIN — FAMOTIDINE 20 MG: 10 INJECTION, SOLUTION INTRAVENOUS at 09:37

## 2021-12-26 RX ADMIN — LORAZEPAM 1 MG: 2 INJECTION INTRAMUSCULAR; INTRAVENOUS at 03:10

## 2021-12-26 RX ADMIN — CHLORHEXIDINE GLUCONATE 0.12% ORAL RINSE 15 ML: 1.2 LIQUID ORAL at 09:37

## 2021-12-26 RX ADMIN — SODIUM CHLORIDE, PRESERVATIVE FREE 10 ML: 5 INJECTION INTRAVENOUS at 23:06

## 2021-12-26 RX ADMIN — METOPROLOL SUCCINATE 50 MG: 50 TABLET, EXTENDED RELEASE ORAL at 09:37

## 2021-12-26 RX ADMIN — INSULIN LISPRO 3 UNITS: 100 INJECTION, SOLUTION INTRAVENOUS; SUBCUTANEOUS at 12:22

## 2021-12-26 RX ADMIN — INSULIN LISPRO 6 UNITS: 100 INJECTION, SOLUTION INTRAVENOUS; SUBCUTANEOUS at 09:53

## 2021-12-26 RX ADMIN — METOPROLOL SUCCINATE 50 MG: 50 TABLET, EXTENDED RELEASE ORAL at 23:04

## 2021-12-26 RX ADMIN — POLYETHYLENE GLYCOL (3350) 17 G: 17 POWDER, FOR SOLUTION ORAL at 09:38

## 2021-12-26 RX ADMIN — APIXABAN 5 MG: 5 TABLET, FILM COATED ORAL at 23:04

## 2021-12-26 ASSESSMENT — PAIN SCALES - WONG BAKER
WONGBAKER_NUMERICALRESPONSE: 0

## 2021-12-26 ASSESSMENT — PAIN SCALES - GENERAL
PAINLEVEL_OUTOF10: 0
PAINLEVEL_OUTOF10: 0

## 2021-12-26 NOTE — PROGRESS NOTES
Nephrology Progress Note  12/26/2021 12:42 PM  Subjective: Interval History: Leopold Bruch is a 77 y.o. male. Doing okay weak in bed lethargic    Data:   Scheduled Meds:   insulin glargine  35 Units SubCUTAneous Nightly    insulin lispro  0-18 Units SubCUTAneous TID     insulin lispro  0-9 Units SubCUTAneous 2 times per day    polyethylene glycol  17 g Oral Daily    chlorhexidine  15 mL Mouth/Throat BID    famotidine (PEPCID) injection  20 mg IntraVENous BID    lidocaine  5 mL IntraDERmal Once    sodium chloride flush  5-40 mL IntraVENous 2 times per day    dexamethasone  6 mg IntraVENous Daily    metoprolol succinate  50 mg Oral BID    allopurinol  300 mg Oral Daily    apixaban  5 mg Oral BID    pravastatin  20 mg Oral Nightly    tamsulosin  0.4 mg Oral Nightly    sodium chloride flush  5-40 mL IntraVENous 2 times per day     Continuous Infusions:   dextrose 100 mL/hr at 12/23/21 2333    dexmedetomidine (PRECEDEX) IV infusion 0.2 mcg/kg/hr (12/23/21 1900)    propofol Stopped (12/13/21 1443)    fentaNYL Stopped (12/19/21 0633)    norepinephrine Stopped (12/19/21 0125)    dextrose      midazolam Stopped (12/18/21 0450)    sodium chloride           CBC   Recent Labs     12/24/21  0500 12/25/21  0400   WBC 13.4* 13.8*   HGB 12.2* 13.5   HCT 40.3* 44.0    288      BMP   Recent Labs     12/24/21  0500 12/25/21  0400 12/26/21  0540    152* 154*   K 3.5 3.6 3.6   * 115* 119*   CO2 28 26 26   PHOS 2.8 3.4 3.5   BUN 40* 40* 46*   CREATININE 0.5* 0.6* 0.7*     Hepatic:   No results for input(s): AST, ALT, ALB, BILITOT, ALKPHOS in the last 72 hours. Troponin: No results for input(s): TROPONINI in the last 72 hours. BNP: No results for input(s): BNP in the last 72 hours. Lipids: No results for input(s): CHOL, HDL in the last 72 hours.     Invalid input(s): LDLCALCU  ABGs:   Lab Results   Component Value Date    PO2ART 61 12/23/2021    ODK4FVO 32.0 12/23/2021     INR: No results for input(s): INR in the last 72 hours. Renal Labs  Albumin:    Lab Results   Component Value Date    LABALBU 2.6 12/26/2021     Calcium:    Lab Results   Component Value Date    CALCIUM 8.9 12/26/2021     Phosphorus:    Lab Results   Component Value Date    PHOS 3.5 12/26/2021     U/A:    Lab Results   Component Value Date    NITRU NEGATIVE 12/18/2021    COLORU COLORLESS 12/18/2021    WBCUA NONE SEEN 12/18/2021    RBCUA 31 12/18/2021    MUCUS RARE 12/18/2021    TRICHOMONAS NONE SEEN 12/18/2021    BACTERIA NEGATIVE 12/18/2021    CLARITYU CLEAR 12/18/2021    SPECGRAV 1.017 12/18/2021    UROBILINOGEN NORMAL 12/18/2021    BILIRUBINUR NEGATIVE 12/18/2021    BLOODU LARGE 12/18/2021    KETUA NEGATIVE 12/18/2021     ABG:    Lab Results   Component Value Date    BPL4HPJ 32.0 12/23/2021    PO2ART 61 12/23/2021    WCU6OIR 28.0 12/23/2021     HgBA1c:    Lab Results   Component Value Date    LABA1C 7.3 12/18/2021     Microalbumen/Creatinine ratio:  No components found for: RUCREAT  TSH:  No results found for: TSH  IRON:    Lab Results   Component Value Date    IRON 48 05/07/2021     Iron Saturation:  No components found for: PERCENTFE  TIBC:    Lab Results   Component Value Date    TIBC 302 05/07/2021     FERRITIN:    Lab Results   Component Value Date    FERRITIN 57 05/07/2021     RPR:  No results found for: RPR  FREDRICK:    Lab Results   Component Value Date    ANATITER 1:160 02/12/2021    FREDRICK DETECTED 02/12/2021     24 Hour Urine for Creatinine Clearance:  No components found for: CREAT4, UHRS10, UTV10      Objective:   I/O: 12/25 0701 - 12/26 0700  In: -   Out: 1000 [Urine:1000]  I/O last 3 completed shifts:  In: -   Out: 1000 [Urine:1000]  No intake/output data recorded.   Vitals: /71   Pulse 84   Temp 98.4 °F (36.9 °C) (Axillary)   Resp 14   Ht 5' 7.99\" (1.727 m)   Wt 206 lb 2.1 oz (93.5 kg)   SpO2 96%   BMI 31.35 kg/m²  {  General appearance: Weak arousable  HEENT: Head: Normal, normocephalic, atraumatic.   Neck: supple, symmetrical, trachea midline  Lungs: diminished breath sounds bilaterally  Heart: S1, S2 normal  Abdomen: abnormal findings:  soft nt  Extremities: edema trace  Neurologic: Mental status: alertness: Weak        Assessment and Plan:      IMP:  #1 hypernatremia  #2 acute renal failure with increased uremia  #3 respiratory failure  #4 COVID-19 pneumonia  #5 status post pneumothorax tension  #6 CHF exacerbation  #7 type 2 diabetes  #8 complicated UTI/cystitis    Plan     #1 sodium staying high will maintain on IV fluids  #2 renal function holding stable  #3 off the ventilator on room air  #4 treat in setting of Covid  #5 monitor oxygenation  #6 control glucose  #7 monitor for any new infections         Den Root MD, MD

## 2021-12-26 NOTE — PROGRESS NOTES
Progress Note( Dr. Gladys Epps)  12/26/2021  Subjective:   Admit Date: 12/8/2021  PCP: Juana Victoria MD    Admitted For :Admitted initially on December 9, 2021 with history of fall and found to have Covid pneumonia    Consulted For: Better control of blood glucose    Interval History: The patient awake and alert. He was extubated and off ventilator. Not on  oxygen either. Is attempting to talk with whisper  According to nurses patient is still not eating much      Intake/Output Summary (Last 24 hours) at 12/26/2021 1311  Last data filed at 12/26/2021 0600  Gross per 24 hour   Intake --   Output 450 ml   Net -450 ml       DATA    CBC:   Recent Labs     12/24/21  0500 12/25/21  0400   WBC 13.4* 13.8*   HGB 12.2* 13.5    288    CMP:  Recent Labs     12/24/21  0500 12/25/21  0400 12/26/21  0540    152* 154*   K 3.5 3.6 3.6   * 115* 119*   CO2 28 26 26   BUN 40* 40* 46*   CREATININE 0.5* 0.6* 0.7*   CALCIUM 8.5 9.0 8.9   LABALBU 2.5* 2.7* 2.6*     Lipids:   Lab Results   Component Value Date    CHOL 137 06/26/2018    HDL 53 06/26/2018    TRIG 101 06/26/2018     Glucose:  Recent Labs     12/26/21  0232 12/26/21  0749 12/26/21  1220   POCGLU 136* 230* 187*     [id number]:  Lab Results   Component Value Date    LABA1C 7.3 12/18/2021     High Sensitivity TSH:   Lab Results   Component Value Date    TSHHS 3.280 05/07/2021     Free T3: No results found for: FT3  Free T4:No results found for: T4FREE    XR CHEST PORTABLE   Final Result   1. No significant change in patchy subpleural, basilar predominant airspace   opacities consistent with COVID-19 pneumonia given patient history. 2. Pulmonary vascular congestion and mild cardiomegaly. XR CHEST PORTABLE   Final Result      XR CHEST PORTABLE   Final Result   Mild bilateral improvement in airspace opacities. Trace left apical   pneumothorax is unchanged.          XR CHEST PORTABLE   Final Result   The left apical pneumothorax is still identified. Similar to 12/18/2021 but   smaller than 12/17/2021. The multifocal pulmonary opacities are redemonstrated. May have some   worsening atelectasis in the right lower lobe. XR CHEST PORTABLE   Final Result   Improving small left apical pneumothorax measuring 9 mm. New right lower lobe infiltrate may represent atelectasis versus pneumonia. Lines and tubes are stable      Stable mild pneumomediastinum. XR CHEST PORTABLE   Final Result   Interval development of a small left apical pneumothorax measuring   approximately 2.1 cm between pleural surfaces. Pneumomediastinum appears   increased when compared to the previous exam as well. Continued patchy opacity throughout both lungs, most compatible with   multifocal pneumonia. XR CHEST PORTABLE   Final Result   1. No appreciable left pneumothorax identified on the current exam.   2. Similar bilateral airspace opacities. 3. Support lines and tubes remain in place. XR CHEST PORTABLE   Final Result   1. Small stable left apical pneumothorax. 2. Bilateral airspace opacities are seen without significant change. XR CHEST PORTABLE   Final Result   Stable small left apical pneumothorax. Stable pulmonary vascular congestion along with interstitial opacities and   hazy airspace opacities, left worse than right which may be on the basis of   interstitial and pulmonary edema but can also be seen in the setting of   atypical pneumonia. XR CHEST PORTABLE   Final Result   Persistent small left apical pneumothorax estimated to be approximately 10%   in severity. Support tube/catheters project in stable/satisfactory position   in the frontal projection. Consolidating infiltrates and or pulmonary edema, cardiomegaly unchanged   compared to 12/14/2021 consistent with diffuse bilateral pneumonia and/or   congestive heart failure.          XR CHEST PORTABLE   Final Result   Left chest tube remains in cystitis. Prominence of the Alisson aortic lymph nodes again identified. These could be   reactive due to an underlying infectious inflammatory process such is the   bladder haziness. However neoplastic process may also considered. Consider   3-6 month follow-up. RECOMMENDATIONS:   Unavailable         CT CHEST W CONTRAST   Final Result   Multifocal consolidative changes both lungs worrisome for multifocal   pneumonia. Follow-up in following medical treatment course is recommended   document complete resolution. Multifocal bladder traumatic trabeculation/diverticula noted with slight   haziness of surrounding fat planes. Please correlate with diffuse urinalysis   findings. Is findings could suggest underlying cystitis. Prominence of the Alisson aortic lymph nodes again identified. These could be   reactive due to an underlying infectious inflammatory process such is the   bladder haziness. However neoplastic process may also considered. Consider   3-6 month follow-up. RECOMMENDATIONS:   Unavailable         XR SHOULDER LEFT (MIN 2 VIEWS)   Final Result   No acute fracture identified. Incidentally noted mass or masslike consolidation in the left upper lobe. Advise chest CT for further evaluation. XR PELVIS (1-2 VIEWS)   Final Result   No acute abnormality detected. Severe degenerative changes at the right hip, with chronic bony remodeling,   not substantially changed when compared to the CT from January 2020. XR CHEST PORTABLE   Final Result   1. No acute cardiopulmonary process identified. 2. Chronic interstitial changes of the lungs. CT CERVICAL SPINE WO CONTRAST   Final Result   Motion degradation challenge evaluation. No convincing evidence acute   displaced fracture traumatic malalignment. There are moderate multilevel   degenerate change         CT HEAD WO CONTRAST   Final Result   No acute intracranial abnormality.       Geographic lucency frontal bone noted, consider bone scan imaging or consider   skeletal survey.               Scheduled Medicines   Medications:    insulin glargine  35 Units SubCUTAneous Nightly    insulin lispro  0-18 Units SubCUTAneous TID     insulin lispro  0-9 Units SubCUTAneous 2 times per day    polyethylene glycol  17 g Oral Daily    chlorhexidine  15 mL Mouth/Throat BID    famotidine (PEPCID) injection  20 mg IntraVENous BID    lidocaine  5 mL IntraDERmal Once    sodium chloride flush  5-40 mL IntraVENous 2 times per day    dexamethasone  6 mg IntraVENous Daily    metoprolol succinate  50 mg Oral BID    allopurinol  300 mg Oral Daily    apixaban  5 mg Oral BID    pravastatin  20 mg Oral Nightly    tamsulosin  0.4 mg Oral Nightly    sodium chloride flush  5-40 mL IntraVENous 2 times per day      Infusions:    dextrose 100 mL/hr at 12/23/21 2333    dexmedetomidine (PRECEDEX) IV infusion 0.2 mcg/kg/hr (12/23/21 1900)    propofol Stopped (12/13/21 1443)    fentaNYL Stopped (12/19/21 3722)    norepinephrine Stopped (12/19/21 0125)    dextrose      midazolam Stopped (12/18/21 0450)    sodium chloride           Objective:   Vitals: /71   Pulse 84   Temp 98.4 °F (36.9 °C) (Axillary)   Resp 14   Ht 5' 7.99\" (1.727 m)   Wt 206 lb 2.1 oz (93.5 kg)   SpO2 96%   BMI 31.35 kg/m²   General appearance: Patient extubated and off ventilator not on tube feeding either   Neck: no JVD or bruit  Thyroid : Normal lobes   Lungs: Has Vesicular Breath sounds   Heart:  regular rate and rhythm  Abdomen: soft, non-tender; bowel sounds normal; no masses,  no organomegaly  Musculoskeletal: Normal  Extremities: extremities normal, , no edema  Neurologic: Patient extubated and off ventilator not on tube feeding either       Assessment:     Patient Active Problem List:     Diabetes mellitus (HCC)     Atrial flutter (HCC)     Atrial fibrillation ([facility] [address]     Gout     Hyperlipidemia     Hypertension     Arthropathy     Upper GI bleed     NSTEMI (non-ST elevated myocardial infarction) (HCC)     Rectal bleeding     Sepsis ([facility] [address]     BRBPR (bright red blood per rectum)     Acute cystitis without hematuria     Leukocytosis     Acute hypokalemia     Rectal bleed     Anemia     B12 deficiency     Elevated antinuclear antibody (FREDRICK) level     PNA (pneumonia)     Troponin I above reference range     Hypernatremia possibly due to dehydration      Plan:     1. Reviewed POC blood glucose . Labs and X ray results   2. Reviewed Current Medicines   3. On Correction bolus Humalog/ Basal Lantus /NPH insulin regime  4. Monitor Blood glucose frequently   5. Modified  the dose of Insulin/ other medicines as needed   6. Nephrology managing the IV fluid to manage the hypernatremia  7. Will follow     .      Lawyer Ramiro MD, MD

## 2021-12-26 NOTE — PROGRESS NOTES
Eliquis/metoprolol    Diet ADULT DIET; Dysphagia - Pureed; Mildly Thick (Nectar)  ADULT ORAL NUTRITION SUPPLEMENT; Breakfast, Lunch, Dinner; Standard High Calorie/High Protein Oral Supplement   DVT Prophylaxis [] Lovenox, []  Heparin, [] SCDs, [] Ambulation   GI Prophylaxis [] PPI,  [] H2 Blocker,  [] Carafate,  [] Diet/Tube Feeds   Code Status Full Code   Disposition Patient requires continued admission due to respiratory failure   MDM [] Low, [] Moderate,[x]  High  Patient's risk as above due to respiratory failure     History of Present Illness:     Chief Complaint: Respiratory failure  Nat Slater is a 77 y.o.  male  who presents with fall and Covid pneumonia     Subjective-patient examined at bedside. He is comfortable in bed. He is on room air and saturating around 96%. He is very weak and has a hoarse whisper. He tells me he is doing \"all right\". He is being fed by his nursing staff and the swallow seems to be okay. Discussed with his Sister Balaji Schwartz who is happy with his progress. Patient would ultimately require rehab we will consult PT OT. Labs reviewed. Consultant notes reviewed. Continue current measures overall prognosis remains very guarded. Called Valma Leyden and left a message  Ten point ROS reviewed negative, unless as noted above    Objective:        Intake/Output Summary (Last 24 hours) at 12/26/2021 0943  Last data filed at 12/26/2021 0600  Gross per 24 hour   Intake --   Output 450 ml   Net -450 ml      Vitals:   Vitals:    12/26/21 0400   BP: (!) 139/59   Pulse: 78   Resp: 22   Temp: 97.4 °F (36.3 °C)   SpO2: 92%       Vent Information  $Ventilation: $Subsequent Day  Skin Assessment: Clean, dry, & intact  Suction Catheter Diameter: 14  Equipment ID: [id number]  Equipment Changed: HME  Vent Type: [phone]  Vent Mode: SIMV/VC  Vt Ordered: 500 mL  Rate Set: 12 bmp  Peak Flow: 55 L/min  Pressure Support: 15 cmH20  FiO2 : 30 %  SpO2: 92 %  SpO2/FiO2 ratio: 330  Sensitivity: 3  PEEP/CPAP: 5  I Time/ I Time %: 0 s  Humidification Source: HME  Nitric Oxide/Epoprostenol In Use?: No  Mask Type: Full face mask  Mask Size: Medium  No results for input(s): PH, [id number], PO2ART, BE, FYI0CXG, CO2CT, O2SAT, LABCARB in the last 72 hours. Invalid input(s): METHGBART          Physical Exam:   GEN Awake male, lying in bed in no apparent distress. Appears given age. EYES Pupils are equally round. No scleral erythema, discharge, or conjunctivitis. HENT Mucous membranes are moist. Oral pharynx without exudates, no evidence of thrush. NECK Supple, no apparent thyromegaly or masses. RESP Clear to auscultation, no wheezes, rales or rhonchi. Symmetric chest movement while on room air. CARDIO/VASC S1/S2 auscultated. Regular rate without appreciable murmurs, rubs, or gallops. No JVD or carotid bruits. Peripheral pulses equal bilaterally and palpable. No peripheral edema. GI Abdomen is soft without significant tenderness, masses, or guarding. Bowel sounds are normoactive. Rectal exam deferred.  No costovertebral angle tenderness. Normal appearing external genitalia. Lane catheter is not present. HEME/LYMPH No palpable cervical lymphadenopathy and no hepatosplenomegaly. No petechiae or ecchymoses. MSK No gross joint deformities. SKIN Normal coloration, warm, dry. NEURO Cranial nerves appear grossly intact, normal speech, no lateralizing weakness. PSYCH Awake, alert, oriented x 3. Affect appropriate.     Data:   CBC with Differential:    Lab Results   Component Value Date    WBC 13.8 12/25/2021    RBC 4.78 12/25/2021    HGB 13.5 12/25/2021    HCT 44.0 12/25/2021     12/25/2021    MCV 92.1 12/25/2021    MCH 28.2 12/25/2021    MCHC 30.7 12/25/2021    RDW 19.2 12/25/2021    NRBC 2 12/15/2021    SEGSPCT 68.0 12/15/2021    BANDSPCT 14 12/15/2021    LYMPHOPCT 11.0 12/15/2021    MONOPCT 5.0 12/15/2021    BASOPCT 0.1 12/13/2021    MONOSABS 0.5 12/15/2021    LYMPHSABS 1.0 12/15/2021    EOSABS 0.0 12/13/2021    BASOSABS 0.0 12/13/2021    DIFFTYPE MANUAL DIFFERENTIAL 12/15/2021       CMP:     Lab Results   Component Value Date     12/26/2021    K 3.6 12/26/2021     12/26/2021    CO2 26 12/26/2021    BUN 46 12/26/2021    CREATININE 0.7 12/26/2021    GFRAA >60 12/26/2021    LABGLOM >60 12/26/2021    GLUCOSE 156 12/26/2021    PROT 5.8 12/18/2021    LABALBU 2.6 12/26/2021    CALCIUM 8.9 12/26/2021    BILITOT 0.6 12/18/2021    ALKPHOS 62 12/18/2021    AST 22 12/18/2021    ALT 10 12/18/2021       Troponin:  Lab Results   Component Value Date    TROPONINT 0.154 12/13/2021       U/A:    Lab Results   Component Value Date    COLORU COLORLESS 12/18/2021    PROTEINU 100 12/18/2021    WBCUA NONE SEEN 12/18/2021    RBCUA 31 12/18/2021    MUCUS RARE 12/18/2021    TRICHOMONAS NONE SEEN 12/18/2021    BACTERIA NEGATIVE 12/18/2021    CLARITYU CLEAR 12/18/2021    SPECGRAV 1.017 12/18/2021    LEUKOCYTESUR NEGATIVE 12/18/2021    UROBILINOGEN NORMAL 12/18/2021    BILIRUBINUR NEGATIVE 12/18/2021    BLOODU LARGE 12/18/2021       Urine Culture:  No components found for: CURINE    Radiology results:  XR CHEST PORTABLE   Final Result   1. No significant change in patchy subpleural, basilar predominant airspace   opacities consistent with COVID-19 pneumonia given patient history. 2. Pulmonary vascular congestion and mild cardiomegaly. XR CHEST PORTABLE   Final Result      XR CHEST PORTABLE   Final Result   Mild bilateral improvement in airspace opacities. Trace left apical   pneumothorax is unchanged. XR CHEST PORTABLE   Final Result   The left apical pneumothorax is still identified. Similar to 12/18/2021 but   smaller than 12/17/2021. The multifocal pulmonary opacities are redemonstrated. May have some   worsening atelectasis in the right lower lobe. XR CHEST PORTABLE   Final Result   Improving small left apical pneumothorax measuring 9 mm.       New right lower lobe infiltrate may represent atelectasis versus pneumonia. Lines and tubes are stable      Stable mild pneumomediastinum. XR CHEST PORTABLE   Final Result   Interval development of a small left apical pneumothorax measuring   approximately 2.1 cm between pleural surfaces. Pneumomediastinum appears   increased when compared to the previous exam as well. Continued patchy opacity throughout both lungs, most compatible with   multifocal pneumonia. XR CHEST PORTABLE   Final Result   1. No appreciable left pneumothorax identified on the current exam.   2. Similar bilateral airspace opacities. 3. Support lines and tubes remain in place. XR CHEST PORTABLE   Final Result   1. Small stable left apical pneumothorax. 2. Bilateral airspace opacities are seen without significant change. XR CHEST PORTABLE   Final Result   Stable small left apical pneumothorax. Stable pulmonary vascular congestion along with interstitial opacities and   hazy airspace opacities, left worse than right which may be on the basis of   interstitial and pulmonary edema but can also be seen in the setting of   atypical pneumonia. XR CHEST PORTABLE   Final Result   Persistent small left apical pneumothorax estimated to be approximately 10%   in severity. Support tube/catheters project in stable/satisfactory position   in the frontal projection. Consolidating infiltrates and or pulmonary edema, cardiomegaly unchanged   compared to 12/14/2021 consistent with diffuse bilateral pneumonia and/or   congestive heart failure. XR CHEST PORTABLE   Final Result   Left chest tube remains in place, with slight interval decrease in size in   small left apical pneumothorax. XR CHEST PORTABLE   Final Result   Persistent small left pneumothorax with 1 cm pleural apical separation, with   intervally placed left-sided chest tube. Similar diffuse bilateral airspace disease.          XR ABDOMEN (KUB) (SINGLE AP VIEW)   Final Result   NG tube in place with tip and side port below the diaphragm and in the region   of the gastric body. XR CHEST PORTABLE   Final Result   Interval development of a large left tension pneumothorax. Extensive bilateral airspace opacities without significant change. Findings were discussed with Dr. Preet Ulloa  at 7:43 am on 12/13/2021. XR CHEST PORTABLE   Final Result   Persistent bilateral airspace disease without acute interval change. XR CHEST PORTABLE   Final Result   Severe extensive bilateral airspace opacities worsened from the previous exam   compatible with edema, ARDS, or multifocal pneumonia. XR CHEST PORTABLE   Final Result   1. Cardiomegaly with severe congestive heart failure. VL DUP LOWER EXTREMITY VENOUS BILATERAL   Final Result   No evidence of DVT in either lower extremity within the limitations. Mild nonspecific subcutaneous edema to bilateral lower extremities. CT HEAD WO CONTRAST   Final Result   No acute intracranial abnormality. Age related changes including chronic small vessel ischemic disease and   cerebral atrophy. CT ABDOMEN PELVIS W IV CONTRAST Additional Contrast? None   Final Result   Multifocal consolidative changes both lungs worrisome for multifocal   pneumonia. Follow-up in following medical treatment course is recommended   document complete resolution. Multifocal bladder traumatic trabeculation/diverticula noted with slight   haziness of surrounding fat planes. Please correlate with diffuse urinalysis   findings. Is findings could suggest underlying cystitis. Prominence of the Alisson aortic lymph nodes again identified. These could be   reactive due to an underlying infectious inflammatory process such is the   bladder haziness. However neoplastic process may also considered. Consider   3-6 month follow-up.       RECOMMENDATIONS:   Unavailable         CT CHEST W CONTRAST   Final Result Multifocal consolidative changes both lungs worrisome for multifocal   pneumonia. Follow-up in following medical treatment course is recommended   document complete resolution. Multifocal bladder traumatic trabeculation/diverticula noted with slight   haziness of surrounding fat planes. Please correlate with diffuse urinalysis   findings. Is findings could suggest underlying cystitis. Prominence of the Alisson aortic lymph nodes again identified. These could be   reactive due to an underlying infectious inflammatory process such is the   bladder haziness. However neoplastic process may also considered. Consider   3-6 month follow-up. RECOMMENDATIONS:   Unavailable         XR SHOULDER LEFT (MIN 2 VIEWS)   Final Result   No acute fracture identified. Incidentally noted mass or masslike consolidation in the left upper lobe. Advise chest CT for further evaluation. XR PELVIS (1-2 VIEWS)   Final Result   No acute abnormality detected. Severe degenerative changes at the right hip, with chronic bony remodeling,   not substantially changed when compared to the CT from January 2020. XR CHEST PORTABLE   Final Result   1. No acute cardiopulmonary process identified. 2. Chronic interstitial changes of the lungs. CT CERVICAL SPINE WO CONTRAST   Final Result   Motion degradation challenge evaluation. No convincing evidence acute   displaced fracture traumatic malalignment. There are moderate multilevel   degenerate change         CT HEAD WO CONTRAST   Final Result   No acute intracranial abnormality. Geographic lucency frontal bone noted, consider bone scan imaging or consider   skeletal survey.              Medications:   Medications:    insulin glargine  35 Units SubCUTAneous Nightly    insulin lispro  0-18 Units SubCUTAneous TID WC    insulin lispro  0-9 Units SubCUTAneous 2 times per day    polyethylene glycol  17 g Oral Daily    chlorhexidine  15 mL Mouth/Throat BID    famotidine (PEPCID) injection  20 mg IntraVENous BID    lidocaine  5 mL IntraDERmal Once    sodium chloride flush  5-40 mL IntraVENous 2 times per day    dexamethasone  6 mg IntraVENous Daily    metoprolol succinate  50 mg Oral BID    allopurinol  300 mg Oral Daily    apixaban  5 mg Oral BID    pravastatin  20 mg Oral Nightly    tamsulosin  0.4 mg Oral Nightly    sodium chloride flush  5-40 mL IntraVENous 2 times per day      Infusions:    dextrose 100 mL/hr at 12/23/21 2333    dexmedetomidine (PRECEDEX) IV infusion 0.2 mcg/kg/hr (12/23/21 1900)    propofol Stopped (12/13/21 1443)    fentaNYL Stopped (12/19/21 5705)    norepinephrine Stopped (12/19/21 0125)    dextrose      midazolam Stopped (12/18/21 0450)    sodium chloride       PRN Meds: morphine, 2 mg, Q4H PRN  magic (miracle) mouthwash, 5 mL, 4x Daily PRN  hydrALAZINE (APRESOLINE) ivpb, 10 mg, Q4H PRN  sodium chloride flush, 5-40 mL, PRN  glucose, 15 g, PRN  dextrose, 12.5 g, PRN  glucagon (rDNA), 1 mg, PRN  dextrose, 100 mL/hr, PRN  oxyCODONE, 5 mg, Q4H PRN  guaiFENesin-dextromethorphan, 5 mL, Q4H PRN  potassium chloride, 40 mEq, PRN   Or  potassium alternative oral replacement, 40 mEq, PRN   Or  potassium chloride, 10 mEq, PRN  sodium chloride flush, 5-40 mL, PRN  sodium chloride, 25 mL, PRN  ondansetron, 4 mg, Q8H PRN   Or  ondansetron, 4 mg, Q6H PRN  polyethylene glycol, 17 g, Daily PRN  acetaminophen, 650 mg, Q6H PRN   Or  acetaminophen, 650 mg, Q6H PRN          Electronically signed by Lizet Edwards MD on 12/26/2021 at 9:43 AM

## 2021-12-26 NOTE — PROGRESS NOTES
Pulmonary and Critical Care  Progress Note      VITALS:  /71   Pulse 84   Temp 98.4 °F (36.9 °C) (Axillary)   Resp 14   Ht 5' 7.99\" (1.727 m)   Wt 206 lb 2.1 oz (93.5 kg)   SpO2 96%   BMI 31.35 kg/m²     Subjective:   CHIEF COMPLAINT :Fall     HPI:                The patient is lying in the bed. He is not in acute resp distress    Objective:   PHYSICAL EXAM:    LUNGS:Occasional basal crackles  Abd-soft, BS+,NT  Ext- no pedal edema  CVS-s1s2, no murmurs      DATA:    CBC:  Recent Labs     12/24/21  0500 12/25/21  0400   WBC 13.4* 13.8*   RBC 4.35* 4.78   HGB 12.2* 13.5   HCT 40.3* 44.0    288   MCV 92.6 92.1   MCH 28.0 28.2   MCHC 30.3* 30.7*   RDW 18.9* 19.2*      BMP:  Recent Labs     12/24/21  0500 12/25/21  0400 12/26/21  0540    152* 154*   K 3.5 3.6 3.6   * 115* 119*   CO2 28 26 26   BUN 40* 40* 46*   CREATININE 0.5* 0.6* 0.7*   CALCIUM 8.5 9.0 8.9   GLUCOSE 135* 102* 156*      ABG:  No results for input(s): PH, PO2ART, MED9TGF, HCO3, BEART, O2SAT in the last 72 hours. BNP  No results found for: BNP   D-Dimer:  No results found for: DDIMER   Radiology:   1. No significant change in patchy subpleural, basilar predominant airspace   opacities consistent with COVID-19 pneumonia given patient history. 2. Pulmonary vascular congestion and mild cardiomegaly     1.        Assessment/Plan     Patient Active Problem List    Diagnosis Date Noted    PNA (pneumonia) 12/09/2021    Troponin I above reference range 12/09/2021    Elevated antinuclear antibody (FREDRICK) level 02/16/2021    B12 deficiency 02/15/2021    Anemia 02/12/2021    Rectal bleed 11/18/2020    Acute hypokalemia 11/17/2020    Acute cystitis without hematuria     Leukocytosis     Sepsis ([facility] [address] 01/17/2020    BRBPR (bright red blood per rectum) 01/17/2020    Rectal bleeding 09/25/2018    NSTEMI (non-ST elevated myocardial infarction) ([facility] [address] 06/25/2018    Upper GI bleed 03/07/2017    Diabetes mellitus ([facility]     Atrial flutter ([facility] [address]     Atrial fibrillation (HCC)     Gout     Hyperlipidemia     Hypertension     Arthropathy    Acute on chronic hypoxic hypercapneic resp failure sec to Cardiogenic and non Cardiogenic Pulmonary edema  Systolic CHF with EF of 88-40%  Grade II Diastolic dysfunction  Bilateral Pneumonia sec to COVID-19  Obesity  DIVYA  Chronic Metabolic alkalosis  VDRF s/p extubation  Proteus bacteremia  Hyperchloremic Hypernatremia       1. D5 W  2. BMP in am  3. Keep sats > 92%  4. ICS  5. OOB  6. PT/OT  7. Await placement  8.  C/w present management    Electronically signed by Matt Paredes MD on 12/26/2021 at 12:19 PM

## 2021-12-27 LAB
ALBUMIN SERPL-MCNC: 2.6 GM/DL (ref 3.4–5)
ANION GAP SERPL CALCULATED.3IONS-SCNC: 15 MMOL/L (ref 4–16)
BUN BLDV-MCNC: 57 MG/DL (ref 6–23)
CALCIUM SERPL-MCNC: 9.5 MG/DL (ref 8.3–10.6)
CHLORIDE BLD-SCNC: 117 MMOL/L (ref 99–110)
CO2: 26 MMOL/L (ref 21–32)
CREAT SERPL-MCNC: 0.7 MG/DL (ref 0.9–1.3)
GFR AFRICAN AMERICAN: >60 ML/MIN/1.73M2
GFR NON-AFRICAN AMERICAN: >60 ML/MIN/1.73M2
GLUCOSE BLD-MCNC: 107 MG/DL (ref 70–99)
GLUCOSE BLD-MCNC: 127 MG/DL (ref 70–99)
GLUCOSE BLD-MCNC: 134 MG/DL (ref 70–99)
GLUCOSE BLD-MCNC: 164 MG/DL (ref 70–99)
GLUCOSE BLD-MCNC: 174 MG/DL (ref 70–99)
GLUCOSE BLD-MCNC: 195 MG/DL (ref 70–99)
HCT VFR BLD CALC: 44.8 % (ref 42–52)
HEMOGLOBIN: 13.6 GM/DL (ref 13.5–18)
MCH RBC QN AUTO: 28.3 PG (ref 27–31)
MCHC RBC AUTO-ENTMCNC: 30.4 % (ref 32–36)
MCV RBC AUTO: 93.1 FL (ref 78–100)
PDW BLD-RTO: 19.9 % (ref 11.7–14.9)
PHOSPHORUS: 3.1 MG/DL (ref 2.5–4.9)
PLATELET # BLD: 279 K/CU MM (ref 140–440)
PMV BLD AUTO: 13 FL (ref 7.5–11.1)
POTASSIUM SERPL-SCNC: 3.4 MMOL/L (ref 3.5–5.1)
RBC # BLD: 4.81 M/CU MM (ref 4.6–6.2)
SODIUM BLD-SCNC: 158 MMOL/L (ref 136–145)
WBC # BLD: 12.3 K/CU MM (ref 4–10.5)

## 2021-12-27 PROCEDURE — 6360000002 HC RX W HCPCS: Performed by: NURSE PRACTITIONER

## 2021-12-27 PROCEDURE — 6370000000 HC RX 637 (ALT 250 FOR IP): Performed by: INTERNAL MEDICINE

## 2021-12-27 PROCEDURE — 82962 GLUCOSE BLOOD TEST: CPT

## 2021-12-27 PROCEDURE — 1200000000 HC SEMI PRIVATE

## 2021-12-27 PROCEDURE — 97530 THERAPEUTIC ACTIVITIES: CPT

## 2021-12-27 PROCEDURE — 2580000003 HC RX 258: Performed by: NURSE PRACTITIONER

## 2021-12-27 PROCEDURE — 99232 SBSQ HOSP IP/OBS MODERATE 35: CPT | Performed by: INTERNAL MEDICINE

## 2021-12-27 PROCEDURE — 2500000003 HC RX 250 WO HCPCS: Performed by: NURSE PRACTITIONER

## 2021-12-27 PROCEDURE — 6370000000 HC RX 637 (ALT 250 FOR IP): Performed by: NURSE PRACTITIONER

## 2021-12-27 PROCEDURE — 85027 COMPLETE CBC AUTOMATED: CPT

## 2021-12-27 PROCEDURE — 97167 OT EVAL HIGH COMPLEX 60 MIN: CPT

## 2021-12-27 PROCEDURE — 97162 PT EVAL MOD COMPLEX 30 MIN: CPT

## 2021-12-27 PROCEDURE — 80069 RENAL FUNCTION PANEL: CPT

## 2021-12-27 PROCEDURE — 2580000003 HC RX 258: Performed by: INTERNAL MEDICINE

## 2021-12-27 RX ADMIN — FAMOTIDINE 20 MG: 10 INJECTION, SOLUTION INTRAVENOUS at 22:39

## 2021-12-27 RX ADMIN — MORPHINE SULFATE 2 MG: 2 INJECTION, SOLUTION INTRAMUSCULAR; INTRAVENOUS at 22:40

## 2021-12-27 RX ADMIN — POLYETHYLENE GLYCOL (3350) 17 G: 17 POWDER, FOR SOLUTION ORAL at 09:16

## 2021-12-27 RX ADMIN — METOPROLOL SUCCINATE 50 MG: 50 TABLET, EXTENDED RELEASE ORAL at 09:14

## 2021-12-27 RX ADMIN — SODIUM CHLORIDE, PRESERVATIVE FREE 10 ML: 5 INJECTION INTRAVENOUS at 22:40

## 2021-12-27 RX ADMIN — APIXABAN 5 MG: 5 TABLET, FILM COATED ORAL at 09:14

## 2021-12-27 RX ADMIN — SODIUM CHLORIDE, PRESERVATIVE FREE 10 ML: 5 INJECTION INTRAVENOUS at 09:17

## 2021-12-27 RX ADMIN — INSULIN LISPRO 3 UNITS: 100 INJECTION, SOLUTION INTRAVENOUS; SUBCUTANEOUS at 13:04

## 2021-12-27 RX ADMIN — ALLOPURINOL 300 MG: 300 TABLET ORAL at 09:14

## 2021-12-27 RX ADMIN — FAMOTIDINE 20 MG: 10 INJECTION, SOLUTION INTRAVENOUS at 09:14

## 2021-12-27 RX ADMIN — CHLORHEXIDINE GLUCONATE 0.12% ORAL RINSE 15 ML: 1.2 LIQUID ORAL at 09:15

## 2021-12-27 RX ADMIN — SODIUM CHLORIDE, PRESERVATIVE FREE 10 ML: 5 INJECTION INTRAVENOUS at 22:41

## 2021-12-27 ASSESSMENT — PAIN SCALES - WONG BAKER

## 2021-12-27 ASSESSMENT — PAIN SCALES - GENERAL
PAINLEVEL_OUTOF10: 8
PAINLEVEL_OUTOF10: 0

## 2021-12-27 ASSESSMENT — PAIN DESCRIPTION - PROGRESSION: CLINICAL_PROGRESSION: NOT CHANGED

## 2021-12-27 NOTE — PROGRESS NOTES
Hospitalist Progress Note      Name:  Anna Butler /Age/Sex: 1945  (77 y.o. male)   MRN & CSN:  [id number] & [id number] Admission Date/Time: 2021  5:33 PM   Location:  -A PCP: Yoselyn Becerril MD         Hospital Day:     Assessment and Plan:   Anna Butler is a 77 y.o.  male  who presents with fall and Covid pneumonia    Patient, 81-year-old male with past medical history of atrial fibrillation/flutter, diabetes mellitus type 2, hyperlipidemia, hypertension was admitted on 2021 after an episode of fall. Reported generalized weakness. Patient tested positive for COVID-19 infection. X-ray chest suggestive of pneumonia. Also noted to have UTI. Patient was noted to have acute systolic/diastolic CHF exacerbation. Echocardiogram showed EF 35 to 40%. Patient received 1 dose of Tocilizumab 12/10/2021. Patient was seen by pulmonary/cardiology/cardiothoracic surgery. Patient developed left tension pneumothorax which required chest tube placement. Patient was also seen by urology secondary to CT finding of multifocal bladder traumatic trabeculation/diverticula. Recommended outpatient cystoscopy/TRUS.     Assessment     Acute hypoxic respiratory failure secondary to COVID-19 pneumonia. Extubated   Acute complicated cystitis  Sepsis secondary to above, present on admission  Left tension pneumothorax s/p chest tube placement 2021 to   Acute systolic/diastolic CHF exacerbation  Hypernatremia. S/p fall  Hypertension  Paroxysmal atrial fibrillation  Diabetes mellitus type 2  CAD.       Plan  Extubated 2023. Wean oxygen as tolerated  Continue IV Decadron    S/p Tocilizumab 12/10/2021  Completed 14 days antibiotics    Lasix/Aldactone/Maxzide on hold given hypernatremia. Nephrology to adjust IV fluids for hyper natremia  Nephrology following  Monitor I's and O's. Insulin management per endocrinology  Continue insulin sliding scale.    Continue Eliquis/metoprolol  Disposition: Probably after resuming home diuretics. Defer to nephrology. Diet ADULT DIET; Dysphagia - Pureed; Mildly Thick (Nectar)  ADULT ORAL NUTRITION SUPPLEMENT; Breakfast, Lunch, Dinner; Standard High Calorie/High Protein Oral Supplement   DVT Prophylaxis [] Lovenox, []  Heparin, [] SCDs, [] Ambulation   GI Prophylaxis [] PPI,  [] H2 Blocker,  [] Carafate,  [] Diet/Tube Feeds   Code Status Full Code   Disposition Patient requires continued admission due to respiratory failure   MDM [] Low, [] Moderate,[x]  High  Patient's risk as above due to respiratory failure     History of Present Illness:     Chief Complaint: Respiratory failure  Kathlynn Holter is a 77 y.o.  male  who presents with fall and Covid pneumonia     Subjective-patient examined at bedside. He is comfortable in bed. He is on room air and saturating around 96%. He is very weak and has a hoarse whisper. He said he has no new complaints and feels better. Objective: Intake/Output Summary (Last 24 hours) at 12/27/2021 1651  Last data filed at 12/27/2021 0600  Gross per 24 hour   Intake --   Output 800 ml   Net -800 ml      Vitals:   Vitals:    12/27/21 1109   BP:    Pulse: 76   Resp:    Temp:    SpO2:        Vent Information  $Ventilation: $Subsequent Day  Skin Assessment: Clean, dry, & intact  Suction Catheter Diameter: 14  Equipment ID: [id number]  Equipment Changed: HME  Vent Type: 980  Vent Mode: SIMV/VC  Vt Ordered: 500 mL  Rate Set: 12 bmp  Peak Flow: 55 L/min  Pressure Support: 15 cmH20  FiO2 : 30 %  SpO2: 96 %  SpO2/FiO2 ratio: 330  Sensitivity: 3  PEEP/CPAP: 5  I Time/ I Time %: 0 s  Humidification Source: HME  Nitric Oxide/Epoprostenol In Use?: No  Mask Type: Full face mask  Mask Size: Medium  No results for input(s): PH, HXV7RUR, PO2ART, BE, GME8ILI, CO2CT, O2SAT, LABCARB in the last 72 hours. Invalid input(s): METHGBART          Physical Exam:   GEN Awake male, lying in bed in no apparent distress. Appears given age. EYES Pupils are equally round. No scleral erythema, discharge, or conjunctivitis. HENT Mucous membranes are moist. Oral pharynx without exudates, no evidence of thrush. NECK Supple, no apparent thyromegaly or masses. RESP Clear to auscultation, no wheezes, rales or rhonchi. Symmetric chest movement while on room air. CARDIO/VASC S1/S2 auscultated. Regular rate without appreciable murmurs, rubs, or gallops. No JVD or carotid bruits. Peripheral pulses equal bilaterally and palpable. No peripheral edema. GI Abdomen is soft without significant tenderness, masses, or guarding. Bowel sounds are normoactive. Rectal exam deferred.  No costovertebral angle tenderness. Normal appearing external genitalia. Lane catheter is not present. HEME/LYMPH No palpable cervical lymphadenopathy and no hepatosplenomegaly. No petechiae or ecchymoses. MSK No gross joint deformities. SKIN Normal coloration, warm, dry. NEURO Cranial nerves appear grossly intact, normal speech, no lateralizing weakness. PSYCH Awake, alert, oriented x 3. Affect appropriate.     Data:   CBC with Differential:    Lab Results   Component Value Date    WBC 12.3 12/27/2021    RBC 4.81 12/27/2021    HGB 13.6 12/27/2021    HCT 44.8 12/27/2021     12/27/2021    MCV 93.1 12/27/2021    MCH 28.3 12/27/2021    MCHC 30.4 12/27/2021    RDW 19.9 12/27/2021    NRBC 2 12/15/2021    SEGSPCT 68.0 12/15/2021    BANDSPCT 14 12/15/2021    LYMPHOPCT 11.0 12/15/2021    MONOPCT 5.0 12/15/2021    BASOPCT 0.1 12/13/2021    MONOSABS 0.5 12/15/2021    LYMPHSABS 1.0 12/15/2021    EOSABS 0.0 12/13/2021    BASOSABS 0.0 12/13/2021    DIFFTYPE MANUAL DIFFERENTIAL 12/15/2021       CMP:     Lab Results   Component Value Date     12/27/2021    K 3.4 12/27/2021     12/27/2021    CO2 26 12/27/2021    BUN 57 12/27/2021    CREATININE 0.7 12/27/2021    GFRAA >60 12/27/2021    LABGLOM >60 12/27/2021    GLUCOSE 127 12/27/2021    PROT 5.8 12/18/2021 LABALBU 2.6 12/27/2021    CALCIUM 9.5 12/27/2021    BILITOT 0.6 12/18/2021    ALKPHOS 62 12/18/2021    AST 22 12/18/2021    ALT 10 12/18/2021       Troponin:  Lab Results   Component Value Date    TROPONINT 0.154 12/13/2021       U/A:    Lab Results   Component Value Date    COLORU COLORLESS 12/18/2021    PROTEINU 100 12/18/2021    WBCUA NONE SEEN 12/18/2021    RBCUA 31 12/18/2021    MUCUS RARE 12/18/2021    TRICHOMONAS NONE SEEN 12/18/2021    BACTERIA NEGATIVE 12/18/2021    CLARITYU CLEAR 12/18/2021    SPECGRAV 1.017 12/18/2021    LEUKOCYTESUR NEGATIVE 12/18/2021    UROBILINOGEN NORMAL 12/18/2021    BILIRUBINUR NEGATIVE 12/18/2021    BLOODU LARGE 12/18/2021       Urine Culture:  No components found for: BURKE    Radiology results:  XR CHEST PORTABLE   Final Result   1. No significant change in patchy subpleural, basilar predominant airspace   opacities consistent with COVID-19 pneumonia given patient history. 2. Pulmonary vascular congestion and mild cardiomegaly. XR CHEST PORTABLE   Final Result      XR CHEST PORTABLE   Final Result   Mild bilateral improvement in airspace opacities. Trace left apical   pneumothorax is unchanged. XR CHEST PORTABLE   Final Result   The left apical pneumothorax is still identified. Similar to 12/18/2021 but   smaller than 12/17/2021. The multifocal pulmonary opacities are redemonstrated. May have some   worsening atelectasis in the right lower lobe. XR CHEST PORTABLE   Final Result   Improving small left apical pneumothorax measuring 9 mm. New right lower lobe infiltrate may represent atelectasis versus pneumonia. Lines and tubes are stable      Stable mild pneumomediastinum. XR CHEST PORTABLE   Final Result   Interval development of a small left apical pneumothorax measuring   approximately 2.1 cm between pleural surfaces. Pneumomediastinum appears   increased when compared to the previous exam as well.       Continued patchy opacity throughout both lungs, most compatible with   multifocal pneumonia. XR CHEST PORTABLE   Final Result   1. No appreciable left pneumothorax identified on the current exam.   2. Similar bilateral airspace opacities. 3. Support lines and tubes remain in place. XR CHEST PORTABLE   Final Result   1. Small stable left apical pneumothorax. 2. Bilateral airspace opacities are seen without significant change. XR CHEST PORTABLE   Final Result   Stable small left apical pneumothorax. Stable pulmonary vascular congestion along with interstitial opacities and   hazy airspace opacities, left worse than right which may be on the basis of   interstitial and pulmonary edema but can also be seen in the setting of   atypical pneumonia. XR CHEST PORTABLE   Final Result   Persistent small left apical pneumothorax estimated to be approximately 10%   in severity. Support tube/catheters project in stable/satisfactory position   in the frontal projection. Consolidating infiltrates and or pulmonary edema, cardiomegaly unchanged   compared to 12/14/2021 consistent with diffuse bilateral pneumonia and/or   congestive heart failure. XR CHEST PORTABLE   Final Result   Left chest tube remains in place, with slight interval decrease in size in   small left apical pneumothorax. XR CHEST PORTABLE   Final Result   Persistent small left pneumothorax with 1 cm pleural apical separation, with   intervally placed left-sided chest tube. Similar diffuse bilateral airspace disease. XR ABDOMEN (KUB) (SINGLE AP VIEW)   Final Result   NG tube in place with tip and side port below the diaphragm and in the region   of the gastric body. XR CHEST PORTABLE   Final Result   Interval development of a large left tension pneumothorax. Extensive bilateral airspace opacities without significant change.       Findings were discussed with Dr. Eris Hayden  at 7:43 am on 12/13/2021. XR CHEST PORTABLE   Final Result   Persistent bilateral airspace disease without acute interval change. XR CHEST PORTABLE   Final Result   Severe extensive bilateral airspace opacities worsened from the previous exam   compatible with edema, ARDS, or multifocal pneumonia. XR CHEST PORTABLE   Final Result   1. Cardiomegaly with severe congestive heart failure. VL DUP LOWER EXTREMITY VENOUS BILATERAL   Final Result   No evidence of DVT in either lower extremity within the limitations. Mild nonspecific subcutaneous edema to bilateral lower extremities. CT HEAD WO CONTRAST   Final Result   No acute intracranial abnormality. Age related changes including chronic small vessel ischemic disease and   cerebral atrophy. CT ABDOMEN PELVIS W IV CONTRAST Additional Contrast? None   Final Result   Multifocal consolidative changes both lungs worrisome for multifocal   pneumonia. Follow-up in following medical treatment course is recommended   document complete resolution. Multifocal bladder traumatic trabeculation/diverticula noted with slight   haziness of surrounding fat planes. Please correlate with diffuse urinalysis   findings. Is findings could suggest underlying cystitis. Prominence of the Alisson aortic lymph nodes again identified. These could be   reactive due to an underlying infectious inflammatory process such is the   bladder haziness. However neoplastic process may also considered. Consider   3-6 month follow-up. RECOMMENDATIONS:   Unavailable         CT CHEST W CONTRAST   Final Result   Multifocal consolidative changes both lungs worrisome for multifocal   pneumonia. Follow-up in following medical treatment course is recommended   document complete resolution. Multifocal bladder traumatic trabeculation/diverticula noted with slight   haziness of surrounding fat planes. Please correlate with diffuse urinalysis   findings. Is findings could suggest underlying cystitis. Prominence of the Alisson aortic lymph nodes again identified. These could be   reactive due to an underlying infectious inflammatory process such is the   bladder haziness. However neoplastic process may also considered. Consider   3-6 month follow-up. RECOMMENDATIONS:   Unavailable         XR SHOULDER LEFT (MIN 2 VIEWS)   Final Result   No acute fracture identified. Incidentally noted mass or masslike consolidation in the left upper lobe. Advise chest CT for further evaluation. XR PELVIS (1-2 VIEWS)   Final Result   No acute abnormality detected. Severe degenerative changes at the right hip, with chronic bony remodeling,   not substantially changed when compared to the CT from January 2020. XR CHEST PORTABLE   Final Result   1. No acute cardiopulmonary process identified. 2. Chronic interstitial changes of the lungs. CT CERVICAL SPINE WO CONTRAST   Final Result   Motion degradation challenge evaluation. No convincing evidence acute   displaced fracture traumatic malalignment. There are moderate multilevel   degenerate change         CT HEAD WO CONTRAST   Final Result   No acute intracranial abnormality. Geographic lucency frontal bone noted, consider bone scan imaging or consider   skeletal survey.              Medications:   Medications:    insulin glargine  35 Units SubCUTAneous Nightly    insulin lispro  0-18 Units SubCUTAneous TID     insulin lispro  0-9 Units SubCUTAneous 2 times per day    polyethylene glycol  17 g Oral Daily    chlorhexidine  15 mL Mouth/Throat BID    famotidine (PEPCID) injection  20 mg IntraVENous BID    lidocaine  5 mL IntraDERmal Once    sodium chloride flush  5-40 mL IntraVENous 2 times per day    dexamethasone  6 mg IntraVENous Daily    metoprolol succinate  50 mg Oral BID    allopurinol  300 mg Oral Daily    apixaban  5 mg Oral BID    pravastatin  20 mg Oral Nightly    tamsulosin  0.4 mg Oral Nightly    sodium chloride flush  5-40 mL IntraVENous 2 times per day      Infusions:    dextrose 100 mL/hr at 12/23/21 2333    dexmedetomidine (PRECEDEX) IV infusion 0.2 mcg/kg/hr (12/23/21 1900)    propofol Stopped (12/13/21 1443)    fentaNYL Stopped (12/19/21 5419)    norepinephrine Stopped (12/19/21 0125)    dextrose      midazolam Stopped (12/18/21 0450)    sodium chloride       PRN Meds: morphine, 2 mg, Q4H PRN  magic (miracle) mouthwash, 5 mL, 4x Daily PRN  hydrALAZINE (APRESOLINE) ivpb, 10 mg, Q4H PRN  sodium chloride flush, 5-40 mL, PRN  glucose, 15 g, PRN  dextrose, 12.5 g, PRN  glucagon (rDNA), 1 mg, PRN  dextrose, 100 mL/hr, PRN  oxyCODONE, 5 mg, Q4H PRN  guaiFENesin-dextromethorphan, 5 mL, Q4H PRN  potassium chloride, 40 mEq, PRN   Or  potassium alternative oral replacement, 40 mEq, PRN   Or  potassium chloride, 10 mEq, PRN  sodium chloride flush, 5-40 mL, PRN  sodium chloride, 25 mL, PRN  ondansetron, 4 mg, Q8H PRN   Or  ondansetron, 4 mg, Q6H PRN  polyethylene glycol, 17 g, Daily PRN  acetaminophen, 650 mg, Q6H PRN   Or  acetaminophen, 650 mg, Q6H PRN          Electronically signed by Willie Mccullough MD on 12/27/2021 at 4:51 PM

## 2021-12-27 NOTE — PROGRESS NOTES
Progress Note( Dr. Ladan Huitron)  12/27/2021  Subjective:   Admit Date: 12/8/2021  PCP: Phoenix Maynard MD    Admitted For :Admitted initially on December 9, 2021 with history of fall and found to have Covid pneumonia    Consulted For: Better control of blood glucose    Interval History: The patient awake and alert. He was extubated and off ventilator. Not on  oxygen either. Is attempting to talk with whisper  According to nurses patient is still not eating much    No intake or output data in the 24 hours ending 12/27/21 0615    DATA    CBC:   Recent Labs     12/25/21  0400   WBC 13.8*   HGB 13.5       CMP:  Recent Labs     12/25/21  0400 12/26/21  0540   * 154*   K 3.6 3.6   * 119*   CO2 26 26   BUN 40* 46*   CREATININE 0.6* 0.7*   CALCIUM 9.0 8.9   LABALBU 2.7* 2.6*     Lipids:   Lab Results   Component Value Date    CHOL 137 06/26/2018    HDL 53 06/26/2018    TRIG 101 06/26/2018     Glucose:  Recent Labs     12/26/21  1641 12/26/21  2115 12/27/21  0211   POCGLU 143* 192* 164*     [id number]:  Lab Results   Component Value Date    LABA1C 7.3 12/18/2021     High Sensitivity TSH:   Lab Results   Component Value Date    TSHHS 3.280 05/07/2021     Free T3: No results found for: FT3  Free T4:No results found for: T4FREE    XR CHEST PORTABLE   Final Result   1. No significant change in patchy subpleural, basilar predominant airspace   opacities consistent with COVID-19 pneumonia given patient history. 2. Pulmonary vascular congestion and mild cardiomegaly. XR CHEST PORTABLE   Final Result      XR CHEST PORTABLE   Final Result   Mild bilateral improvement in airspace opacities. Trace left apical   pneumothorax is unchanged. XR CHEST PORTABLE   Final Result   The left apical pneumothorax is still identified. Similar to 12/18/2021 but   smaller than 12/17/2021. The multifocal pulmonary opacities are redemonstrated.   May have some   worsening atelectasis in the right lower lobe.         XR CHEST PORTABLE   Final Result   Improving small left apical pneumothorax measuring 9 mm. New right lower lobe infiltrate may represent atelectasis versus pneumonia. Lines and tubes are stable      Stable mild pneumomediastinum. XR CHEST PORTABLE   Final Result   Interval development of a small left apical pneumothorax measuring   approximately 2.1 cm between pleural surfaces. Pneumomediastinum appears   increased when compared to the previous exam as well. Continued patchy opacity throughout both lungs, most compatible with   multifocal pneumonia. XR CHEST PORTABLE   Final Result   1. No appreciable left pneumothorax identified on the current exam.   2. Similar bilateral airspace opacities. 3. Support lines and tubes remain in place. XR CHEST PORTABLE   Final Result   1. Small stable left apical pneumothorax. 2. Bilateral airspace opacities are seen without significant change. XR CHEST PORTABLE   Final Result   Stable small left apical pneumothorax. Stable pulmonary vascular congestion along with interstitial opacities and   hazy airspace opacities, left worse than right which may be on the basis of   interstitial and pulmonary edema but can also be seen in the setting of   atypical pneumonia. XR CHEST PORTABLE   Final Result   Persistent small left apical pneumothorax estimated to be approximately 10%   in severity. Support tube/catheters project in stable/satisfactory position   in the frontal projection. Consolidating infiltrates and or pulmonary edema, cardiomegaly unchanged   compared to 12/14/2021 consistent with diffuse bilateral pneumonia and/or   congestive heart failure. XR CHEST PORTABLE   Final Result   Left chest tube remains in place, with slight interval decrease in size in   small left apical pneumothorax.          XR CHEST PORTABLE   Final Result   Persistent small left pneumothorax with 1 cm pleural apical separation, with   intervally placed left-sided chest tube. Similar diffuse bilateral airspace disease. XR ABDOMEN (KUB) (SINGLE AP VIEW)   Final Result   NG tube in place with tip and side port below the diaphragm and in the region   of the gastric body. XR CHEST PORTABLE   Final Result   Interval development of a large left tension pneumothorax. Extensive bilateral airspace opacities without significant change. Findings were discussed with Dr. Lenin Agrawal  at 7:43 am on 12/13/2021. XR CHEST PORTABLE   Final Result   Persistent bilateral airspace disease without acute interval change. XR CHEST PORTABLE   Final Result   Severe extensive bilateral airspace opacities worsened from the previous exam   compatible with edema, ARDS, or multifocal pneumonia. XR CHEST PORTABLE   Final Result   1. Cardiomegaly with severe congestive heart failure. VL DUP LOWER EXTREMITY VENOUS BILATERAL   Final Result   No evidence of DVT in either lower extremity within the limitations. Mild nonspecific subcutaneous edema to bilateral lower extremities. CT HEAD WO CONTRAST   Final Result   No acute intracranial abnormality. Age related changes including chronic small vessel ischemic disease and   cerebral atrophy. CT ABDOMEN PELVIS W IV CONTRAST Additional Contrast? None   Final Result   Multifocal consolidative changes both lungs worrisome for multifocal   pneumonia. Follow-up in following medical treatment course is recommended   document complete resolution. Multifocal bladder traumatic trabeculation/diverticula noted with slight   haziness of surrounding fat planes. Please correlate with diffuse urinalysis   findings. Is findings could suggest underlying cystitis. Prominence of the Alisson aortic lymph nodes again identified.   These could be   reactive due to an underlying infectious inflammatory process such is the   bladder haziness. However neoplastic process may also considered. Consider   3-6 month follow-up. RECOMMENDATIONS:   Unavailable         CT CHEST W CONTRAST   Final Result   Multifocal consolidative changes both lungs worrisome for multifocal   pneumonia. Follow-up in following medical treatment course is recommended   document complete resolution. Multifocal bladder traumatic trabeculation/diverticula noted with slight   haziness of surrounding fat planes. Please correlate with diffuse urinalysis   findings. Is findings could suggest underlying cystitis. Prominence of the Alisson aortic lymph nodes again identified. These could be   reactive due to an underlying infectious inflammatory process such is the   bladder haziness. However neoplastic process may also considered. Consider   3-6 month follow-up. RECOMMENDATIONS:   Unavailable         XR SHOULDER LEFT (MIN 2 VIEWS)   Final Result   No acute fracture identified. Incidentally noted mass or masslike consolidation in the left upper lobe. Advise chest CT for further evaluation. XR PELVIS (1-2 VIEWS)   Final Result   No acute abnormality detected. Severe degenerative changes at the right hip, with chronic bony remodeling,   not substantially changed when compared to the CT from January 2020. XR CHEST PORTABLE   Final Result   1. No acute cardiopulmonary process identified. 2. Chronic interstitial changes of the lungs. CT CERVICAL SPINE WO CONTRAST   Final Result   Motion degradation challenge evaluation. No convincing evidence acute   displaced fracture traumatic malalignment. There are moderate multilevel   degenerate change         CT HEAD WO CONTRAST   Final Result   No acute intracranial abnormality. Geographic lucency frontal bone noted, consider bone scan imaging or consider   skeletal survey.               Scheduled Medicines   Medications:    insulin glargine  35 Units SubCUTAneous Nightly    insulin lispro  0-18 Units SubCUTAneous TID     insulin lispro  0-9 Units SubCUTAneous 2 times per day    polyethylene glycol  17 g Oral Daily    chlorhexidine  15 mL Mouth/Throat BID    famotidine (PEPCID) injection  20 mg IntraVENous BID    lidocaine  5 mL IntraDERmal Once    sodium chloride flush  5-40 mL IntraVENous 2 times per day    dexamethasone  6 mg IntraVENous Daily    metoprolol succinate  50 mg Oral BID    allopurinol  300 mg Oral Daily    apixaban  5 mg Oral BID    pravastatin  20 mg Oral Nightly    tamsulosin  0.4 mg Oral Nightly    sodium chloride flush  5-40 mL IntraVENous 2 times per day      Infusions:    dextrose 100 mL/hr at 12/23/21 2333    dexmedetomidine (PRECEDEX) IV infusion 0.2 mcg/kg/hr (12/23/21 1900)    propofol Stopped (12/13/21 1443)    fentaNYL Stopped (12/19/21 4996)    norepinephrine Stopped (12/19/21 0125)    dextrose      midazolam Stopped (12/18/21 0450)    sodium chloride           Objective:   Vitals: BP (!) 152/104   Pulse 85   Temp 98.4 °F (36.9 °C) (Axillary)   Resp 19   Ht 5' 7.99\" (1.727 m)   Wt 206 lb 2.1 oz (93.5 kg)   SpO2 96%   BMI 31.35 kg/m²   General appearance: Patient extubated and off ventilator not on tube feeding either   Neck: no JVD or bruit  Thyroid : Normal lobes   Lungs: Has Vesicular Breath sounds   Heart:  regular rate and rhythm  Abdomen: soft, non-tender; bowel sounds normal; no masses,  no organomegaly  Musculoskeletal: Normal  Extremities: extremities normal, , no edema  Neurologic: Patient extubated and off ventilator not on tube feeding either       Assessment:     Patient Active Problem List:     Diabetes mellitus ([facility]     Atrial flutter (HCC)     Atrial fibrillation (HCC)     Gout     Hyperlipidemia     Hypertension     Arthropathy     Upper GI bleed     NSTEMI (non-ST elevated myocardial infarction) (HCC)     Rectal bleeding     Sepsis (HCC)     BRBPR (bright red blood per rectum)     Acute cystitis without hematuria     Leukocytosis     Acute hypokalemia     Rectal bleed     Anemia     B12 deficiency     Elevated antinuclear antibody (FREDRICK) level     PNA (pneumonia)     Troponin I above reference range     Hypernatremia possibly due to dehydration      Plan:     1. Reviewed POC blood glucose . Labs and X ray results   2. Reviewed Current Medicines   3. On Correction bolus Humalog/ Basal Lantus /NPH insulin regime  4. Monitor Blood glucose frequently   5. Modified  the dose of Insulin/ other medicines as needed   6. Nephrology managing the IV fluid to manage the hypernatremia  7. Will follow     .      Tona Rodgers MD, MD

## 2021-12-27 NOTE — PROGRESS NOTES
Nephrology Progress Note  12/27/2021 3:47 PM        Subjective:   Admit Date: 12/8/2021  PCP: Kristofer Resendiz MD    Interval History: Patient seen in early morning, this is a late entry    Diet: Unsure of oral intake    ROS: Very lethargic  Urine output reported 800 cc for the last 24 hours  No fever, acceptable blood pressure  Also with Precedex        Data:     Current meds:    insulin glargine  35 Units SubCUTAneous Nightly    insulin lispro  0-18 Units SubCUTAneous TID WC    insulin lispro  0-9 Units SubCUTAneous 2 times per day    polyethylene glycol  17 g Oral Daily    chlorhexidine  15 mL Mouth/Throat BID    famotidine (PEPCID) injection  20 mg IntraVENous BID    lidocaine  5 mL IntraDERmal Once    sodium chloride flush  5-40 mL IntraVENous 2 times per day    dexamethasone  6 mg IntraVENous Daily    metoprolol succinate  50 mg Oral BID    allopurinol  300 mg Oral Daily    apixaban  5 mg Oral BID    pravastatin  20 mg Oral Nightly    tamsulosin  0.4 mg Oral Nightly    sodium chloride flush  5-40 mL IntraVENous 2 times per day      dextrose 100 mL/hr at 12/23/21 2333    dexmedetomidine (PRECEDEX) IV infusion 0.2 mcg/kg/hr (12/23/21 1900)    propofol Stopped (12/13/21 1443)    fentaNYL Stopped (12/19/21 3646)    norepinephrine Stopped (12/19/21 0125)    dextrose      midazolam Stopped (12/18/21 0450)    sodium chloride           I/O last 3 completed shifts:  In: -   Out: 800 [Urine:800]    CBC:   Recent Labs     12/25/21  0400 12/27/21  0605   WBC 13.8* 12.3*   HGB 13.5 13.6    279          Recent Labs     12/25/21  0400 12/26/21  0540 12/27/21  0605   * 154* 158*   K 3.6 3.6 3.4*   * 119* 117*   CO2 26 26 26   BUN 40* 46* 57*   CREATININE 0.6* 0.7* 0.7*   GLUCOSE 102* 156* 127*       Lab Results   Component Value Date    CALCIUM 9.5 12/27/2021    PHOS 3.1 12/27/2021       Objective:     Vitals: BP (!) 145/87   Pulse 76   Temp 98.7 °F (37.1 °C) (Axillary)   Resp 23 Ht 5' 7.99\" (1.727 m)   Wt 208 lb 1.8 oz (94.4 kg)   SpO2 96%   BMI 31.65 kg/m²     General appearance: Lethargic  HEENT: 1+ conjunctival pallor-she is edentulous  Neck: Supple-left subclavian central venous catheter  Lungs: Positive adventitious breath sound  Heart: Seems regular rate and rhythm this morning  Abdomen: Soft  Extremities: 1+ edema  Has Lane      Problem List :         Impression :     1. Acute kidney injury-nonoliguric-recovering by creatinine criteria  2. Severe hypernatremia-likely from free water deficit-she is currently with D5 water resulting in likely hypokalemia  3. COVID-19-pneumonia wise better  4. Underlying dysrhythmia, diabetes mellitus  5. Cardiomyopathy-we will watch for pulmonary edema    Recommendation/Plan  :     1. proBNP level tomorrow morning  2. Replete potassium specially D5 water  3. Also watch for pulmonary edema  4. Her diuretics is on hold due to high sodium  5. Watch for iatrogenic nosocomial complication  6.  Follow clinically and biochemically      Len Lima MD MD

## 2021-12-27 NOTE — PROGRESS NOTES
Occupational Therapy    [facility] CARE OCCUPATIONAL THERAPY EVALUATION  Huma Monisha, 1945, 2030/2030-A, 12/27/2021    History  [place]:  The primary encounter diagnosis was Chronic atrial fibrillation ([address] Diagnoses of Fall, initial encounter, NSTEMI (non-ST elevated myocardial infarction) ([address] Acute cystitis without hematuria, and Multifocal pneumonia were also pertinent to this visit. Patient  has a past medical history of Arthropathy, Atrial fibrillation ([address] Atrial flutter ([address] Diabetes mellitus ([address] Gout, Hyperlipidemia, Hypertension, and Lumbago. Patient  has a past surgical history that includes Colonoscopy (N/A, 9/27/2018) and Colonoscopy (N/A, 11/18/2020). Subjective:  Patient states:  Pt difficulty following directions. Pain:  No.    Communication with other providers:  Handoff to RN, co-eval W/ PT  Restrictions: General Precautions, Fall Risk, Droplet Plus    Home Setup/Prior level of function   Pt is a poor historian. Examination of body systems (includes body structures/functions, activity/participation limitations):  · Observation:  Supine in bed upon arrival  · Vision:  Appears WFL  · Hearing:  Appears WFL  · Cardiopulmonary:  No 02 needs      Body Systems and functions:  · ROM R/L:  WFL observed during bed mobility, pt refused to perform AROM when directed. · Strength R/L:  3+/5,   · Sensation: Difficult to assess due to cognition  · Tone: BUE hypotonic  · Coordination: Decreased gross/fine motor coordination  · Perception: mod decreased initiation/sequencing    Activities of Daily Living (ADLs):  · Feeding: maxA  · Grooming: maxA  (attempted to wash face, difficulty following directions)  · UB bathing: maxA  · LB bathing: maxA  · UB dressing: maxA  · LB dressing: maxA  · Toileting: maxA    Cognitive and Psychosocial Functioning:  · Overall cognitive status:  AxO to self only, decreased shrot term/long term memory, difficulty following directions, answers simple questions w/ yes or no  · Affect: Confused, agitated       Mobility:  · Supine to sit:  maxA x 2  · Transfers: DNT  · Sitting balance:  maxA. · Standing balance:  DNT. · Toilet/Shower Transfers: DNT  · Sit to supine: maxA x 2  · Scooting to HOB: maxA x 2             AM-PAC Daily Activity Inpatient   How much help for putting on and taking off regular lower body clothing?: Total  How much help for Bathing?: Total  How much help for Toileting?: Total  How much help for putting on and taking off regular upper body clothing?: Total  How much help for taking care of personal grooming?: Total  How much help for eating meals?: Total  AM-PAC Inpatient Daily Activity Raw Score: 6  AM-PAC Inpatient ADL T-Scale Score : 17.07  ADL Inpatient CMS 0-100% Score: 100  ADL Inpatient CMS G-Code Modifier : CN    Treatment:  Therapeutic Activity Training:   Therapeutic activity training was instructed today. Cues were given for safety, sequence, UE/LE placement, awareness, and balance. Activities performed today included bed mobility training, sup-sit, sitting balance, sit to supine, scooting to [facility]    Safety: patient left in bed with bed alarm, call light within reach, RN notified, gait belt used. Assessment:  Pt is a 78 yo male admitted for PNA. Pt currently presents w/ deficits in ADL and high level IADL independence, functional activity tolerance, dynamic sitting and standing balance and tolerance and functional transfers, BUE strength/ROM, gross/fine motor coordination, initiation/sequencing, cognition and OOB activity.  Pt would benefit from continued acute care OT services w/ discharge to trial SNF  Complexity: High  Prognosis: Fair due to decreased cognition, difficulty following commands, agitation  Plan  Times per week: 1x+  Times per day: Daily  Current Treatment Recommendations: Desirae Bernal Re-education,Patient/Caregiver Education & Training,Equipment Evaluation, Education, & procurement,Self-Care / ADL,Cognitive Reorientation,Balance Training,Pain Management,Home Management Training,Cognitive/Perceptual Training,Functional Mobility Training,Safety Education & Training,Positioning     Equipment: defer    Goals:  Pt goal: go home  Time Frame for STGs: discharge  Goal 1: Pt will perform UE ADLs SBA  Goal 2: Pt will perform LE ADLs modA  Goal 3: Pt will perform toileting modA  Goal 4: Pt will perform functional transfer w/ AD Sunni  Goal 5: Pt will perform all aspects of bed mobility SBA   Goal 6: Pt will perform therex/theract in order to increase functional activity tolerance and dynamic sitting balance    Treatment plan:  Pt will perform functional task in sit reaching in all 3 planes in order to increase dynamic sitting balance and functional activity tolerance    Recommendations for NURSING activity: [place] EOB    Time:   Time in: 1324  Time out: 1338  Timed treatment minutes: 9 minutes  Total time: 14 minutes    Electronically signed by:    Maritza WILLOUGHBY/L [id number]  2:18 PM,12/27/2021

## 2021-12-27 NOTE — CONSULTS
movement may be attributed to pt actively resisting. · Strength R/L:  Unable to determine d/t cognition, likely 4- to 3+/5, decreased in function and endurance. · Neuro:  H/o lumbago, decreased balance      Mobility:  · Rolling L/R:  Max x2  · Supine to sit:  Max x2  · Transfers: N/t  · Sitting balance: Mod-Max. · Standing balance:  N/t. · Gait: N/a    [facility] 6 Clicks Inpatient Mobility:  AM-PAC Inpatient Mobility Raw Score : 9    Treatment:    Bed mobility: PT encourages sup>sit, provides v/c for sequencing. Pt demonstrates confusion, decreased ability to follow commands, requires Max Ax2 for LE/ hips to EOB and Max A x2 for trunk to upright. PT provides reassuring v/c throughout in attempt to calm pt as pt appears anxious. PT v/c for scooting to EOB, pt requires Max A x2. Sitting balance: Once in seated EOB pt becomes increasingly agitated, swinging UE at therapist, wanting return to supine. PT/OT calmly reassure pt, PT educates on need for up put of bed for health, ensures pt that therapy present to assist pt. PT ensures to educate on therapy as being optional to pt. Pt with min decreased agitation. Seated EOB pt demonstrates poor balance, requires UE support and Mod-Max for maintaining upright. Pt with significant trunk weakness, confusion likely contrbuting. Pt tolerates sitting ~5 minutes. Return to supine Max A x2 and scooting to [address] in [place]. Pt agitated and swings arms at PT, makes contact with PT. PT re-directs pt. YOGESH heels floated on pillows. HOB lowered to 45* for change in position. Bed rails up. Education: PT educates on role of PT and pt option for therapy, encourages pt to participate. End of session pt left in semi-folwers  with lines managed, call light, phone, exit alarm, tray, all needs, RN aware. Assessment:    Pt is a 76 y/o male admitted 12/8 from home with c/o  Fall, (+)COVID19.   Patient with significant h/o atrial fibrillation/flutter, diabetes mellitus type 2, hyperlipidemia, hypertension, see chart. Per pt report pt has been performing ADLs/IADLs with assist from [place] [id number] services, pt lives alone with DME for AMB. At this time pt appears to be functioning below baseline. Pt is now presenting with impairments in LE strength, functional endurance, safety awareness, balance, cognition, oxygen dependence, general mobility deficit. Pt would benefit from skilled PT services in order to address impairments and promote return to PLOF. PT to recommend d/c to SNF with PT trial.    Complexity: Moderate  Prognosis: Good, no significant barriers to participation at this time. Plan Times per week: 1+/week, 1 week,   Discharge Recommendations: ECF with PT (PT trial)  Equipment: Defer    Goals:  Short term goals  Time Frame for Short term goals: 1 week  Short term goal 1: pt will participate in sup>sit with v/c and Mod A x2  Short term goal 2: Pt will tolerate seated EOB x5 minutes with CGA. Short term goal 3: Pt will participate in seated EOB LE TherEx x15 reps ea.        Treatment plan:  Bed mobility, transfers, balance, gait, TA, TX,    Recommendations for NURSING mobility: Max A x2 bed mobility    Time:   Time in: 13:24  Time out: 13:39  Timed treatment minutes: 8   Total time: 15    Electronically signed by:    Lorena Ramos PT  15/46/3325, 8:28 PM  PT Lic #: [id number]

## 2021-12-27 NOTE — PROGRESS NOTES
Pulmonary and Critical Care  Progress Note      VITALS:  BP (!) 145/87   Pulse 76   Temp 98.7 °F (37.1 °C) (Axillary)   Resp 23   Ht 5' 7.99\" (1.727 m)   Wt 208 lb 1.8 oz (94.4 kg)   SpO2 96%   BMI 31.65 kg/m²     Subjective:   Chief complaint: Acute on chronic hypoxemic and hypercapnic respiratory failure, bilateral pneumonia secondary to COVID-19, pulmonary edema, systolic heart failure with diastolic dysfunction, DIVYA  No significant resp distress  No chest pain or discomfort  Patient awake but very lethargic    Objective:   PHYSICAL EXAM:    LUNGS: Mildly diminished breath sounds throughout all lung areas without wheezing or rhonchi  O2 saturation 96% on room air  Sodium 158, potassium 3.4, creatinine 0.7  WBC 12.3 with hemoglobin 13.6  Swallowing evaluation noted    DATA:    CBC:  Recent Labs     12/25/21  0400 12/27/21  0605   WBC 13.8* 12.3*   RBC 4.78 4.81   HGB 13.5 13.6   HCT 44.0 44.8    279   MCV 92.1 93.1   MCH 28.2 28.3   MCHC 30.7* 30.4*   RDW 19.2* 19.9*      BMP:  Recent Labs     12/25/21  0400 12/26/21  0540 12/27/21  0605   * 154* 158*   K 3.6 3.6 3.4*   * 119* 117*   CO2 26 26 26   BUN 40* 46* 57*   CREATININE 0.6* 0.7* 0.7*   CALCIUM 9.0 8.9 9.5   GLUCOSE 102* 156* 127*      ABG:  No results for input(s): PH, PO2ART, RPU2XVE, HCO3, BEART, O2SAT in the last 72 hours. BNP  No results found for: BNP   D-Dimer:  No results found for: DDIMER   1. Radiology: Chest x-ray 12/26/2021 reviewed  1. No significant change in patchy subpleural, basilar predominant airspace   opacities consistent with COVID-19 pneumonia given patient history. 2. Pulmonary vascular congestion and mild cardiomegaly.          Assessment:     Patient Active Problem List   Diagnosis    Diabetes mellitus ([address]    Atrial flutter ([address]    Atrial fibrillation (HCC)    Gout    Hyperlipidemia    Hypertension    Arthropathy    Upper GI bleed    NSTEMI (non-ST elevated myocardial infarction) ([facility]    Rectal bleeding    Sepsis (HCC)    BRBPR (bright red blood per rectum)    Acute cystitis without hematuria    Leukocytosis    Acute hypokalemia    Rectal bleed    Anemia    B12 deficiency    Elevated antinuclear antibody (FREDRICK) level    PNA (pneumonia)    Troponin I above reference range       Plan:   1. continue present treatment  2.  Discharge planning and patient may be going to ECF in near future    Josefina Bradshaw MD MD  12/27/2021  2:17 PM

## 2021-12-27 NOTE — PROGRESS NOTES
Speech Language Pathology  [facility]  1945  [id number]      Attempted to see [facility] for a bedside swallowing treatment 12/27/21. Deferred assessment- pt was lethargic and minimally responsive at this time. SLP attempted trial of nectar thick liquids by spoon sip x1- pt resented with no oral manipulation and SLP removed bolus from oral cavity. Nursing reports pt ate breakfast tray of pureed solids and nectar thick liquids without difficulty this AM.  Recommend continue current diet, only feed pt when he is alert and able to participate in PO trials. SLP will continue to follow.      Cyndie Lowe 88, CCC-SLP, 12/27/2021

## 2021-12-27 NOTE — CARE COORDINATION
Phoned patient on COVID unit . No answer. Spoke with patient's nurse Idania Quinones . She stated that might be best to speak with patient's sister regarding discharge planning at this time. Phoned and spoke with patient's sister Balaji Schwartz. She stated that patient is going to need rehab and stated that [facility] is first choice (wilder's other sister is there) and [facility] is second. Phoned referral to [facility]AMZ[facility] at [facility] and had to leave a VM. PT/OT evals are pending.  Case Management to follow

## 2021-12-28 ENCOUNTER — APPOINTMENT (OUTPATIENT)
Dept: GENERAL RADIOLOGY | Age: 76
DRG: 870 | End: 2021-12-28
Payer: COMMERCIAL

## 2021-12-28 LAB
ALBUMIN SERPL-MCNC: 2.6 GM/DL (ref 3.4–5)
ANION GAP SERPL CALCULATED.3IONS-SCNC: 5 MMOL/L (ref 4–16)
ANION GAP SERPL CALCULATED.3IONS-SCNC: 8 MMOL/L (ref 4–16)
BUN BLDV-MCNC: 54 MG/DL (ref 6–23)
BUN BLDV-MCNC: 57 MG/DL (ref 6–23)
CALCIUM SERPL-MCNC: 8.9 MG/DL (ref 8.3–10.6)
CALCIUM SERPL-MCNC: 9.3 MG/DL (ref 8.3–10.6)
CHLORIDE BLD-SCNC: 121 MMOL/L (ref 99–110)
CHLORIDE BLD-SCNC: 126 MMOL/L (ref 99–110)
CO2: 26 MMOL/L (ref 21–32)
CO2: 27 MMOL/L (ref 21–32)
CREAT SERPL-MCNC: 0.9 MG/DL (ref 0.9–1.3)
CREAT SERPL-MCNC: 0.9 MG/DL (ref 0.9–1.3)
GFR AFRICAN AMERICAN: >60 ML/MIN/1.73M2
GFR AFRICAN AMERICAN: >60 ML/MIN/1.73M2
GFR NON-AFRICAN AMERICAN: >60 ML/MIN/1.73M2
GFR NON-AFRICAN AMERICAN: >60 ML/MIN/1.73M2
GLUCOSE BLD-MCNC: 127 MG/DL (ref 70–99)
GLUCOSE BLD-MCNC: 136 MG/DL (ref 70–99)
GLUCOSE BLD-MCNC: 137 MG/DL (ref 70–99)
GLUCOSE BLD-MCNC: 151 MG/DL (ref 70–99)
GLUCOSE BLD-MCNC: 175 MG/DL (ref 70–99)
GLUCOSE BLD-MCNC: 190 MG/DL (ref 70–99)
GLUCOSE BLD-MCNC: 212 MG/DL (ref 70–99)
MAGNESIUM: 2.7 MG/DL (ref 1.8–2.4)
PHOSPHORUS: 3.3 MG/DL (ref 2.5–4.9)
POTASSIUM SERPL-SCNC: 3.2 MMOL/L (ref 3.5–5.1)
POTASSIUM SERPL-SCNC: 3.6 MMOL/L (ref 3.5–5.1)
PRO-BNP: ABNORMAL PG/ML
SODIUM BLD-SCNC: 153 MMOL/L (ref 135–145)
SODIUM BLD-SCNC: 160 MMOL/L (ref 135–145)

## 2021-12-28 PROCEDURE — 2580000003 HC RX 258: Performed by: INTERNAL MEDICINE

## 2021-12-28 PROCEDURE — 82962 GLUCOSE BLOOD TEST: CPT

## 2021-12-28 PROCEDURE — 6370000000 HC RX 637 (ALT 250 FOR IP): Performed by: INTERNAL MEDICINE

## 2021-12-28 PROCEDURE — 80053 COMPREHEN METABOLIC PANEL: CPT

## 2021-12-28 PROCEDURE — 1200000000 HC SEMI PRIVATE

## 2021-12-28 PROCEDURE — 83880 ASSAY OF NATRIURETIC PEPTIDE: CPT

## 2021-12-28 PROCEDURE — 36556 INSERT NON-TUNNEL CV CATH: CPT

## 2021-12-28 PROCEDURE — 2500000003 HC RX 250 WO HCPCS: Performed by: NURSE PRACTITIONER

## 2021-12-28 PROCEDURE — 99232 SBSQ HOSP IP/OBS MODERATE 35: CPT | Performed by: INTERNAL MEDICINE

## 2021-12-28 PROCEDURE — 6370000000 HC RX 637 (ALT 250 FOR IP): Performed by: NURSE PRACTITIONER

## 2021-12-28 PROCEDURE — 83735 ASSAY OF MAGNESIUM: CPT

## 2021-12-28 PROCEDURE — 94761 N-INVAS EAR/PLS OXIMETRY MLT: CPT

## 2021-12-28 PROCEDURE — 6360000002 HC RX W HCPCS: Performed by: INTERNAL MEDICINE

## 2021-12-28 PROCEDURE — 80048 BASIC METABOLIC PNL TOTAL CA: CPT

## 2021-12-28 PROCEDURE — 2580000003 HC RX 258: Performed by: NURSE PRACTITIONER

## 2021-12-28 PROCEDURE — 80069 RENAL FUNCTION PANEL: CPT

## 2021-12-28 PROCEDURE — 92526 ORAL FUNCTION THERAPY: CPT

## 2021-12-28 PROCEDURE — 71045 X-RAY EXAM CHEST 1 VIEW: CPT

## 2021-12-28 RX ADMIN — INSULIN GLARGINE 35 UNITS: 100 INJECTION, SOLUTION SUBCUTANEOUS at 23:30

## 2021-12-28 RX ADMIN — METOPROLOL SUCCINATE 50 MG: 50 TABLET, EXTENDED RELEASE ORAL at 09:39

## 2021-12-28 RX ADMIN — TAMSULOSIN HYDROCHLORIDE 0.4 MG: 0.4 CAPSULE ORAL at 23:30

## 2021-12-28 RX ADMIN — SODIUM CHLORIDE, PRESERVATIVE FREE 10 ML: 5 INJECTION INTRAVENOUS at 23:19

## 2021-12-28 RX ADMIN — DEXTROSE MONOHYDRATE: 50 INJECTION, SOLUTION INTRAVENOUS at 16:19

## 2021-12-28 RX ADMIN — APIXABAN 5 MG: 5 TABLET, FILM COATED ORAL at 23:29

## 2021-12-28 RX ADMIN — CHLORHEXIDINE GLUCONATE 0.12% ORAL RINSE 15 ML: 1.2 LIQUID ORAL at 23:38

## 2021-12-28 RX ADMIN — FAMOTIDINE 20 MG: 10 INJECTION, SOLUTION INTRAVENOUS at 09:39

## 2021-12-28 RX ADMIN — PRAVASTATIN SODIUM 20 MG: 10 TABLET ORAL at 23:29

## 2021-12-28 RX ADMIN — ALLOPURINOL 300 MG: 300 TABLET ORAL at 10:18

## 2021-12-28 RX ADMIN — FAMOTIDINE 20 MG: 10 INJECTION, SOLUTION INTRAVENOUS at 23:16

## 2021-12-28 RX ADMIN — DEXAMETHASONE SODIUM PHOSPHATE 6 MG: 10 INJECTION INTRAMUSCULAR; INTRAVENOUS at 10:18

## 2021-12-28 RX ADMIN — INSULIN LISPRO 3 UNITS: 100 INJECTION, SOLUTION INTRAVENOUS; SUBCUTANEOUS at 16:42

## 2021-12-28 RX ADMIN — SODIUM CHLORIDE, PRESERVATIVE FREE 10 ML: 5 INJECTION INTRAVENOUS at 23:42

## 2021-12-28 RX ADMIN — APIXABAN 5 MG: 5 TABLET, FILM COATED ORAL at 09:39

## 2021-12-28 RX ADMIN — SODIUM CHLORIDE, PRESERVATIVE FREE 10 ML: 5 INJECTION INTRAVENOUS at 09:39

## 2021-12-28 ASSESSMENT — PAIN SCALES - WONG BAKER
WONGBAKER_NUMERICALRESPONSE: 0

## 2021-12-28 ASSESSMENT — PAIN SCALES - GENERAL
PAINLEVEL_OUTOF10: 0

## 2021-12-28 ASSESSMENT — PAIN SCALES - PAIN ASSESSMENT IN ADVANCED DEMENTIA (PAINAD)
BODYLANGUAGE: 1
FACIALEXPRESSION: 1
BREATHING: 0
NEGVOCALIZATION: 0
TOTALSCORE: 3
CONSOLABILITY: 1

## 2021-12-28 NOTE — PROGRESS NOTES
Pulmonary and Critical Care  Progress Note      VITALS:  BP (!) 152/90   Pulse 76   Temp 98 °F (36.7 °C) (Axillary)   Resp 18   Ht 5' 7.99\" (1.727 m)   Wt 201 lb 9 oz (91.4 kg)   SpO2 95%   BMI 30.65 kg/m²     Subjective:   Chief complaint: Acute on chronic hypoxemic and hypercapnic respiratory failure, bilateral pneumonia secondary to COVID-19, pulmonary edema, systolic heart failure with diastolic dysfunction, obstructive sleep apnea, hyponatremia  No significant resp distress  Patient awake but confused and agitated and sometimes mildly combative  Remains on room air and not requiring supplemental oxygen  On puréed diet but seems to be at risk for aspiration    Objective:   PHYSICAL EXAM:    LUNGS: Breath sounds are diminished throughout all lung areas with a few scattered rhonchi  O2 saturation 95% on room air  Serum sodium 160, potassium 3.5, creatinine 0.9 with BUN 54  proBNP 11,631  DATA:    CBC:  Recent Labs     12/27/21  0605   WBC 12.3*   RBC 4.81   HGB 13.6   HCT 44.8      MCV 93.1   MCH 28.3   MCHC 30.4*   RDW 19.9*      BMP:  Recent Labs     12/26/21  0540 12/27/21  0605 12/28/21  0600   * 158* 160*   K 3.6 3.4* 3.6   * 117* 126*   CO2 26 26 26   BUN 46* 57* 54*   CREATININE 0.7* 0.7* 0.9   CALCIUM 8.9 9.5 9.3   GLUCOSE 156* 127* 151*      ABG:  No results for input(s): PH, PO2ART, MIN2AIH, HCO3, BEART, O2SAT in the last 72 hours. BNP  No results found for: BNP   D-Dimer:  No results found for: DDIMER   1. Radiology: Chest x-ray 12/26/2021 reviewed  1. No significant change in patchy subpleural, basilar predominant airspace   opacities consistent with COVID-19 pneumonia given patient history. 2. Pulmonary vascular congestion and mild cardiomegaly.          Assessment:     Patient Active Problem List   Diagnosis    Diabetes mellitus ([address]    Atrial flutter ([facility]    Atrial fibrillation (HCC)    Gout    Hyperlipidemia    Hypertension    Arthropathy    Upper GI bleed    NSTEMI (non-ST elevated myocardial infarction) [facility] [address]    Rectal bleeding    Sepsis ([facility] [address]    BRBPR (bright red blood per rectum)    Acute cystitis without hematuria    Leukocytosis    Acute hypokalemia    Rectal bleed    Anemia    B12 deficiency    Elevated antinuclear antibody (FREDRICK) level    PNA (pneumonia)    Troponin I above reference range       Plan:   1. continue present treatment, treatment for hyponatremia  2. Repeat chest x-ray in a.m.   3. Definitely needs ECF but might need LTAC in future    Scotty Lemos MD MD  12/28/2021  10:39 AM

## 2021-12-28 NOTE — PROGRESS NOTES
Comprehensive Nutrition Assessment    Type and Reason for Visit:  Reassess    Nutrition Recommendations/Plan:     Continue diet consistency per speech   Modify nutrition supplements to be appropriate with honey consistency liquids. Recommend place NGT and start enteral nutrition to help meet estimated nutrient needs until pt able to safely tolerate PO intake with greater than 50% intake of meals. · PS sent to provider     Recommend Jevity 1.5 with goal rate 45 mL/hr to provide 1080 mL volume, 1620 kcal, 68 grams of protein and 820 mL free water. With current D5 will meet 100% kcal needs and 68% estimated protein needs. Nutrition Assessment:  Pt continues extubated on room air. PO intake less than 25% of meals. Pt not meeting estimated nutrient needs with PO intake, diet consistency downgraded today per speech, pulm documenting aspiration risk. Would recommend placing NGT and starting EN until PO intake improves. Pt at high nutrition risk. Malnutrition Assessment:  Malnutrition Status:  Insufficient data    Context:  Acute Illness       Estimated Daily Nutrient Needs:  Energy (kcal):  8407-5657; Weight Used for Energy Requirements:  Current     Protein (g):  105-140 (1.5-2 g/kg IBW); Weight Used for Protein Requirements:  Ideal        Fluid (ml/day):  fluids per neprhology; Method Used for Fluid Requirements:  Standard Renal      Nutrition Related Findings:  Na 160, D5 ordered at 125 mL/hr providing 3L fluid and 150 grams of dex daily. Current -180. Pt on steroid, insulin and laxative. No BM recoeded during stay. Wounds:  Multiple,Deep Tissue Injury,Venous Stasis       Current Nutrition Therapies:    ADULT ORAL NUTRITION SUPPLEMENT; Breakfast, Lunch, Dinner; Standard High Calorie/High Protein Oral Supplement  ADULT DIET; Dysphagia - Pureed;  Moderately Thick (Honey)    Anthropometric Measures:  · Height: 5' 7.99\" (172.7 cm)  · Current Body Weight: 201 lb 8 oz (91.4 kg)   · Admission Body Weight: 233 lb 4 oz (105.8 kg) (first measured weight)    · Usual Body Weight:  (n/a)     · Ideal Body Weight: 154 lbs; % Ideal Body Weight 140.3 %   · BMI: 30.6  · BMI Categories: Obese Class 1 (BMI 30.0-34. 9)       Nutrition Diagnosis:   · Inadequate oral intake related to impaired respiratory function as evidenced by intake 0-25%      Nutrition Interventions:   Food and/or Nutrient Delivery:  Start Oral Diet,Modify Oral Nutrition Supplement,Start Tube Feeding  Nutrition Education/Counseling:  No recommendation at this time   Coordination of Nutrition Care:  Continue to monitor while inpatient    Goals:  Pt will meet greater than 75% estimated nutrient needs during los       Nutrition Monitoring and Evaluation:   Behavioral-Environmental Outcomes:  None Identified   Food/Nutrient Intake Outcomes:  Enteral Nutrition Intake/Tolerance  Physical Signs/Symptoms Outcomes:  Biochemical Data,GI Status,Hemodynamic Status,Fluid Status or Edema,Weight,Skin     Discharge Planning:     Too soon to determine     Electronically signed by Nay Porter RD, LD on 12/28/21 at 4:00 PM EST    Contact: [phone]

## 2021-12-28 NOTE — CARE COORDINATION
Phoned and spoke with Yo Arango at [facility] to check on status of referral. She stated that they are not going to be able to accept referral at this time .  Referral called to Ruth at [facility]

## 2021-12-28 NOTE — PROGRESS NOTES
Hospitalist Progress Note      Name:  Susan Reyes /Age/Sex: 1945  (77 y.o. male)   MRN & CSN:  [id number] & [id number] Admission Date/Time: 2021  5:33 PM   Location:  -A PCP: Maycol Minor MD         Hospital Day:     Assessment and Plan:   Susan Reyes is a 77 y.o.  male  who presents with fall and Covid pneumonia    Patient, 67-year-old male with past medical history of atrial fibrillation/flutter, diabetes mellitus type 2, hyperlipidemia, hypertension was admitted on 2021 after an episode of fall. Reported generalized weakness. Patient tested positive for COVID-19 infection. X-ray chest suggestive of pneumonia. Also noted to have UTI. Patient was noted to have acute systolic/diastolic CHF exacerbation. Echocardiogram showed EF 35 to 40%. Patient received 1 dose of Tocilizumab 12/10/2021. Patient was seen by pulmonary/cardiology/cardiothoracic surgery. Patient developed left tension pneumothorax which required chest tube placement. Patient was also seen by urology secondary to CT finding of multifocal bladder traumatic trabeculation/diverticula. Recommended outpatient cystoscopy/TRUS.     Assessment     Acute hypoxic respiratory failure secondary to COVID-19 pneumonia. Extubated   Acute complicated cystitis  Sepsis secondary to above, present on admission  Left tension pneumothorax s/p chest tube placement 2021 to   Acute systolic/diastolic CHF exacerbation  Hypernatremia. S/p fall  Hypertension  Paroxysmal atrial fibrillation  Diabetes mellitus type 2  CAD.       Plan  Extubated 2023. Wean oxygen as tolerated  Continue IV Decadron    S/p Tocilizumab 12/10/2021  Completed 14 days antibiotics    Lasix/Aldactone/Maxzide on hold given hypernatremia. Nephrology to adjust IV fluids for hyper natremia  Nephrology following  Monitor I's and O's. Insulin management per endocrinology  Continue insulin sliding scale.    Continue Eliquis/metoprolol  Disposition: Probably after resuming home diuretics. Defer to nephrology. Remains on RA at this time, does continue to have severe hypernatremia. IVF changed per nephrology recommendations, will continue to monitor BMP closely and adjust accordingly. Likely ok for dc after correction of hypernatremia. Diet ADULT DIET; Dysphagia - Pureed; Moderately Thick (Honey)  ADULT ORAL NUTRITION SUPPLEMENT; Breakfast, Lunch, Dinner; Frozen Oral Supplement   DVT Prophylaxis [] Lovenox, []  Heparin, [] SCDs, [] Ambulation   GI Prophylaxis [] PPI,  [] H2 Blocker,  [] Carafate,  [] Diet/Tube Feeds   Code Status Full Code   Disposition Patient requires continued admission due to respiratory failure   MDM [] Low, [] Moderate,[x]  High  Patient's risk as above due to respiratory failure     History of Present Illness:     Chief Complaint: Respiratory failure  Javier Mendez is a 77 y.o.  male  who presents with fall and Covid pneumonia     Subjective-  No acute complaints     Objective: Intake/Output Summary (Last 24 hours) at 12/28/2021 1820  Last data filed at 12/28/2021 0800  Gross per 24 hour   Intake 260 ml   Output 2350 ml   Net -2090 ml      Vitals:   Vitals:    12/28/21 1010   BP:    Pulse:    Resp:    Temp:    SpO2: 95%       Vent Information  $Ventilation: $Subsequent Day  Skin Assessment: Clean, dry, & intact  Suction Catheter Diameter: 14  Equipment ID: [id number]  Equipment Changed: HME  Vent Type: 980  Vent Mode: SIMV/VC  Vt Ordered: 500 mL  Rate Set: 12 bmp  Peak Flow: 55 L/min  Pressure Support: 15 cmH20  FiO2 : 30 %  SpO2: 95 %  SpO2/FiO2 ratio: 330  Sensitivity: 3  PEEP/CPAP: 5  I Time/ I Time %: 0 s  Humidification Source: HME  Nitric Oxide/Epoprostenol In Use?: No  Mask Type: Full face mask  Mask Size: Medium  No results for input(s): PH, TGD0KSD, PO2ART, BE, QSR2XLN, CO2CT, O2SAT, LABCARB in the last 72 hours. Invalid input(s):   METHGBART          Physical Exam:   GEN Awake male, lying in bed in no apparent distress. Appears given age. EYES Pupils are equally round. No scleral erythema, discharge, or conjunctivitis. HENT Mucous membranes are moist. Oral pharynx without exudates, no evidence of thrush. NECK Supple, no apparent thyromegaly or masses. RESP Clear to auscultation, no wheezes, rales or rhonchi. Symmetric chest movement while on room air. CARDIO/VASC S1/S2 auscultated. Regular rate without appreciable murmurs, rubs, or gallops. No JVD or carotid bruits. Peripheral pulses equal bilaterally and palpable. No peripheral edema. GI Abdomen is soft without significant tenderness, masses, or guarding. Bowel sounds are normoactive. Rectal exam deferred.  No costovertebral angle tenderness. Normal appearing external genitalia. Lane catheter is not present. HEME/LYMPH No palpable cervical lymphadenopathy and no hepatosplenomegaly. No petechiae or ecchymoses. MSK No gross joint deformities. SKIN Normal coloration, warm, dry. NEURO Cranial nerves appear grossly intact, normal speech, no lateralizing weakness. PSYCH Awake, alert, oriented x 3. Affect appropriate.     Data:   CBC with Differential:    Lab Results   Component Value Date    WBC 12.3 12/27/2021    RBC 4.81 12/27/2021    HGB 13.6 12/27/2021    HCT 44.8 12/27/2021     12/27/2021    MCV 93.1 12/27/2021    MCH 28.3 12/27/2021    MCHC 30.4 12/27/2021    RDW 19.9 12/27/2021    NRBC 2 12/15/2021    SEGSPCT 68.0 12/15/2021    BANDSPCT 14 12/15/2021    LYMPHOPCT 11.0 12/15/2021    MONOPCT 5.0 12/15/2021    BASOPCT 0.1 12/13/2021    MONOSABS 0.5 12/15/2021    LYMPHSABS 1.0 12/15/2021    EOSABS 0.0 12/13/2021    BASOSABS 0.0 12/13/2021    DIFFTYPE MANUAL DIFFERENTIAL 12/15/2021       CMP:     Lab Results   Component Value Date     12/28/2021    K 3.6 12/28/2021     12/28/2021    CO2 26 12/28/2021    BUN 54 12/28/2021    CREATININE 0.9 12/28/2021    GFRAA >60 12/28/2021    LABGLOM >60 12/28/2021    GLUCOSE 151 12/28/2021    PROT 5.8 12/18/2021    LABALBU 2.6 12/28/2021    CALCIUM 9.3 12/28/2021    BILITOT 0.6 12/18/2021    ALKPHOS 62 12/18/2021    AST 22 12/18/2021    ALT 10 12/18/2021       Troponin:  Lab Results   Component Value Date    TROPONINT 0.154 12/13/2021       U/A:    Lab Results   Component Value Date    COLORU COLORLESS 12/18/2021    PROTEINU 100 12/18/2021    WBCUA NONE SEEN 12/18/2021    RBCUA 31 12/18/2021    MUCUS RARE 12/18/2021    TRICHOMONAS NONE SEEN 12/18/2021    BACTERIA NEGATIVE 12/18/2021    CLARITYU CLEAR 12/18/2021    SPECGRAV 1.017 12/18/2021    LEUKOCYTESUR NEGATIVE 12/18/2021    UROBILINOGEN NORMAL 12/18/2021    BILIRUBINUR NEGATIVE 12/18/2021    BLOODU LARGE 12/18/2021       Urine Culture:  No components found for: BURKE    Radiology results:  XR CHEST PORTABLE   Final Result   1. No significant change in patchy subpleural, basilar predominant airspace   opacities consistent with COVID-19 pneumonia given patient history. 2. Pulmonary vascular congestion and mild cardiomegaly. XR CHEST PORTABLE   Final Result      XR CHEST PORTABLE   Final Result   Mild bilateral improvement in airspace opacities. Trace left apical   pneumothorax is unchanged. XR CHEST PORTABLE   Final Result   The left apical pneumothorax is still identified. Similar to 12/18/2021 but   smaller than 12/17/2021. The multifocal pulmonary opacities are redemonstrated. May have some   worsening atelectasis in the right lower lobe. XR CHEST PORTABLE   Final Result   Improving small left apical pneumothorax measuring 9 mm. New right lower lobe infiltrate may represent atelectasis versus pneumonia. Lines and tubes are stable      Stable mild pneumomediastinum. XR CHEST PORTABLE   Final Result   Interval development of a small left apical pneumothorax measuring   approximately 2.1 cm between pleural surfaces.   Pneumomediastinum appears   increased when compared to the previous exam as well. Continued patchy opacity throughout both lungs, most compatible with   multifocal pneumonia. XR CHEST PORTABLE   Final Result   1. No appreciable left pneumothorax identified on the current exam.   2. Similar bilateral airspace opacities. 3. Support lines and tubes remain in place. XR CHEST PORTABLE   Final Result   1. Small stable left apical pneumothorax. 2. Bilateral airspace opacities are seen without significant change. XR CHEST PORTABLE   Final Result   Stable small left apical pneumothorax. Stable pulmonary vascular congestion along with interstitial opacities and   hazy airspace opacities, left worse than right which may be on the basis of   interstitial and pulmonary edema but can also be seen in the setting of   atypical pneumonia. XR CHEST PORTABLE   Final Result   Persistent small left apical pneumothorax estimated to be approximately 10%   in severity. Support tube/catheters project in stable/satisfactory position   in the frontal projection. Consolidating infiltrates and or pulmonary edema, cardiomegaly unchanged   compared to 12/14/2021 consistent with diffuse bilateral pneumonia and/or   congestive heart failure. XR CHEST PORTABLE   Final Result   Left chest tube remains in place, with slight interval decrease in size in   small left apical pneumothorax. XR CHEST PORTABLE   Final Result   Persistent small left pneumothorax with 1 cm pleural apical separation, with   intervally placed left-sided chest tube. Similar diffuse bilateral airspace disease. XR ABDOMEN (KUB) (SINGLE AP VIEW)   Final Result   NG tube in place with tip and side port below the diaphragm and in the region   of the gastric body. XR CHEST PORTABLE   Final Result   Interval development of a large left tension pneumothorax. Extensive bilateral airspace opacities without significant change.       Findings were discussed with Dr. Nanda Simon  at 7:43 am on 12/13/2021. XR CHEST PORTABLE   Final Result   Persistent bilateral airspace disease without acute interval change. XR CHEST PORTABLE   Final Result   Severe extensive bilateral airspace opacities worsened from the previous exam   compatible with edema, ARDS, or multifocal pneumonia. XR CHEST PORTABLE   Final Result   1. Cardiomegaly with severe congestive heart failure. VL DUP LOWER EXTREMITY VENOUS BILATERAL   Final Result   No evidence of DVT in either lower extremity within the limitations. Mild nonspecific subcutaneous edema to bilateral lower extremities. CT HEAD WO CONTRAST   Final Result   No acute intracranial abnormality. Age related changes including chronic small vessel ischemic disease and   cerebral atrophy. CT ABDOMEN PELVIS W IV CONTRAST Additional Contrast? None   Final Result   Multifocal consolidative changes both lungs worrisome for multifocal   pneumonia. Follow-up in following medical treatment course is recommended   document complete resolution. Multifocal bladder traumatic trabeculation/diverticula noted with slight   haziness of surrounding fat planes. Please correlate with diffuse urinalysis   findings. Is findings could suggest underlying cystitis. Prominence of the Alisson aortic lymph nodes again identified. These could be   reactive due to an underlying infectious inflammatory process such is the   bladder haziness. However neoplastic process may also considered. Consider   3-6 month follow-up. RECOMMENDATIONS:   Unavailable         CT CHEST W CONTRAST   Final Result   Multifocal consolidative changes both lungs worrisome for multifocal   pneumonia. Follow-up in following medical treatment course is recommended   document complete resolution. Multifocal bladder traumatic trabeculation/diverticula noted with slight   haziness of surrounding fat planes.   Please correlate with diffuse urinalysis   findings. Is findings could suggest underlying cystitis. Prominence of the Alisson aortic lymph nodes again identified. These could be   reactive due to an underlying infectious inflammatory process such is the   bladder haziness. However neoplastic process may also considered. Consider   3-6 month follow-up. RECOMMENDATIONS:   Unavailable         XR SHOULDER LEFT (MIN 2 VIEWS)   Final Result   No acute fracture identified. Incidentally noted mass or masslike consolidation in the left upper lobe. Advise chest CT for further evaluation. XR PELVIS (1-2 VIEWS)   Final Result   No acute abnormality detected. Severe degenerative changes at the right hip, with chronic bony remodeling,   not substantially changed when compared to the CT from January 2020. XR CHEST PORTABLE   Final Result   1. No acute cardiopulmonary process identified. 2. Chronic interstitial changes of the lungs. CT CERVICAL SPINE WO CONTRAST   Final Result   Motion degradation challenge evaluation. No convincing evidence acute   displaced fracture traumatic malalignment. There are moderate multilevel   degenerate change         CT HEAD WO CONTRAST   Final Result   No acute intracranial abnormality. Geographic lucency frontal bone noted, consider bone scan imaging or consider   skeletal survey.          XR CHEST PORTABLE    (Results Pending)   XR CHEST PORTABLE    (Results Pending)       Medications:   Medications:    insulin glargine  35 Units SubCUTAneous Nightly    insulin lispro  0-18 Units SubCUTAneous TID WC    insulin lispro  0-9 Units SubCUTAneous 2 times per day    polyethylene glycol  17 g Oral Daily    chlorhexidine  15 mL Mouth/Throat BID    famotidine (PEPCID) injection  20 mg IntraVENous BID    lidocaine  5 mL IntraDERmal Once    sodium chloride flush  5-40 mL IntraVENous 2 times per day    dexamethasone  6 mg IntraVENous Daily    metoprolol succinate  50 mg Oral BID    allopurinol  300 mg Oral Daily    apixaban  5 mg Oral BID    pravastatin  20 mg Oral Nightly    tamsulosin  0.4 mg Oral Nightly    sodium chloride flush  5-40 mL IntraVENous 2 times per day      Infusions:    dextrose 125 mL/hr at 12/28/21 1619    dexmedetomidine (PRECEDEX) IV infusion Stopped (12/28/21 0841)    propofol Stopped (12/13/21 1443)    fentaNYL Stopped (12/19/21 6082)    norepinephrine Stopped (12/19/21 0125)    dextrose      midazolam Stopped (12/18/21 0450)    sodium chloride       PRN Meds: morphine, 2 mg, Q4H PRN  magic (miracle) mouthwash, 5 mL, 4x Daily PRN  hydrALAZINE (APRESOLINE) ivpb, 10 mg, Q4H PRN  sodium chloride flush, 5-40 mL, PRN  glucose, 15 g, PRN  dextrose, 12.5 g, PRN  glucagon (rDNA), 1 mg, PRN  dextrose, 100 mL/hr, PRN  oxyCODONE, 5 mg, Q4H PRN  guaiFENesin-dextromethorphan, 5 mL, Q4H PRN  potassium chloride, 40 mEq, PRN   Or  potassium alternative oral replacement, 40 mEq, PRN   Or  potassium chloride, 10 mEq, PRN  sodium chloride flush, 5-40 mL, PRN  sodium chloride, 25 mL, PRN  ondansetron, 4 mg, Q8H PRN   Or  ondansetron, 4 mg, Q6H PRN  polyethylene glycol, 17 g, Daily PRN  acetaminophen, 650 mg, Q6H PRN   Or  acetaminophen, 650 mg, Q6H PRN          Electronically signed by Ricky Quiñones MD on 12/28/2021 at 6:20 PM

## 2021-12-28 NOTE — PROGRESS NOTES
multifocal pulmonary opacities are redemonstrated. May have some   worsening atelectasis in the right lower lobe. XR CHEST PORTABLE   Final Result   Improving small left apical pneumothorax measuring 9 mm. New right lower lobe infiltrate may represent atelectasis versus pneumonia. Lines and tubes are stable      Stable mild pneumomediastinum. XR CHEST PORTABLE   Final Result   Interval development of a small left apical pneumothorax measuring   approximately 2.1 cm between pleural surfaces. Pneumomediastinum appears   increased when compared to the previous exam as well. Continued patchy opacity throughout both lungs, most compatible with   multifocal pneumonia. XR CHEST PORTABLE   Final Result   1. No appreciable left pneumothorax identified on the current exam.   2. Similar bilateral airspace opacities. 3. Support lines and tubes remain in place. XR CHEST PORTABLE   Final Result   1. Small stable left apical pneumothorax. 2. Bilateral airspace opacities are seen without significant change. XR CHEST PORTABLE   Final Result   Stable small left apical pneumothorax. Stable pulmonary vascular congestion along with interstitial opacities and   hazy airspace opacities, left worse than right which may be on the basis of   interstitial and pulmonary edema but can also be seen in the setting of   atypical pneumonia. XR CHEST PORTABLE   Final Result   Persistent small left apical pneumothorax estimated to be approximately 10%   in severity. Support tube/catheters project in stable/satisfactory position   in the frontal projection. Consolidating infiltrates and or pulmonary edema, cardiomegaly unchanged   compared to 12/14/2021 consistent with diffuse bilateral pneumonia and/or   congestive heart failure.          XR CHEST PORTABLE   Final Result   Left chest tube remains in place, with slight interval decrease in size in   small left apical pneumothorax. XR CHEST PORTABLE   Final Result   Persistent small left pneumothorax with 1 cm pleural apical separation, with   intervally placed left-sided chest tube. Similar diffuse bilateral airspace disease. XR ABDOMEN (KUB) (SINGLE AP VIEW)   Final Result   NG tube in place with tip and side port below the diaphragm and in the region   of the gastric body. XR CHEST PORTABLE   Final Result   Interval development of a large left tension pneumothorax. Extensive bilateral airspace opacities without significant change. Findings were discussed with Dr. Zaina Decker  at 7:43 am on 12/13/2021. XR CHEST PORTABLE   Final Result   Persistent bilateral airspace disease without acute interval change. XR CHEST PORTABLE   Final Result   Severe extensive bilateral airspace opacities worsened from the previous exam   compatible with edema, ARDS, or multifocal pneumonia. XR CHEST PORTABLE   Final Result   1. Cardiomegaly with severe congestive heart failure. VL DUP LOWER EXTREMITY VENOUS BILATERAL   Final Result   No evidence of DVT in either lower extremity within the limitations. Mild nonspecific subcutaneous edema to bilateral lower extremities. CT HEAD WO CONTRAST   Final Result   No acute intracranial abnormality. Age related changes including chronic small vessel ischemic disease and   cerebral atrophy. CT ABDOMEN PELVIS W IV CONTRAST Additional Contrast? None   Final Result   Multifocal consolidative changes both lungs worrisome for multifocal   pneumonia. Follow-up in following medical treatment course is recommended   document complete resolution. Multifocal bladder traumatic trabeculation/diverticula noted with slight   haziness of surrounding fat planes. Please correlate with diffuse urinalysis   findings. Is findings could suggest underlying cystitis.       Prominence of the Alisson aortic lymph nodes again identified. These could be   reactive due to an underlying infectious inflammatory process such is the   bladder haziness. However neoplastic process may also considered. Consider   3-6 month follow-up. RECOMMENDATIONS:   Unavailable         CT CHEST W CONTRAST   Final Result   Multifocal consolidative changes both lungs worrisome for multifocal   pneumonia. Follow-up in following medical treatment course is recommended   document complete resolution. Multifocal bladder traumatic trabeculation/diverticula noted with slight   haziness of surrounding fat planes. Please correlate with diffuse urinalysis   findings. Is findings could suggest underlying cystitis. Prominence of the Alisson aortic lymph nodes again identified. These could be   reactive due to an underlying infectious inflammatory process such is the   bladder haziness. However neoplastic process may also considered. Consider   3-6 month follow-up. RECOMMENDATIONS:   Unavailable         XR SHOULDER LEFT (MIN 2 VIEWS)   Final Result   No acute fracture identified. Incidentally noted mass or masslike consolidation in the left upper lobe. Advise chest CT for further evaluation. XR PELVIS (1-2 VIEWS)   Final Result   No acute abnormality detected. Severe degenerative changes at the right hip, with chronic bony remodeling,   not substantially changed when compared to the CT from January 2020. XR CHEST PORTABLE   Final Result   1. No acute cardiopulmonary process identified. 2. Chronic interstitial changes of the lungs. CT CERVICAL SPINE WO CONTRAST   Final Result   Motion degradation challenge evaluation. No convincing evidence acute   displaced fracture traumatic malalignment. There are moderate multilevel   degenerate change         CT HEAD WO CONTRAST   Final Result   No acute intracranial abnormality.       Geographic lucency frontal bone noted, consider bone scan imaging or consider   skeletal survey.               Scheduled Medicines   Medications:    insulin glargine  35 Units SubCUTAneous Nightly    insulin lispro  0-18 Units SubCUTAneous TID     insulin lispro  0-9 Units SubCUTAneous 2 times per day    polyethylene glycol  17 g Oral Daily    chlorhexidine  15 mL Mouth/Throat BID    famotidine (PEPCID) injection  20 mg IntraVENous BID    lidocaine  5 mL IntraDERmal Once    sodium chloride flush  5-40 mL IntraVENous 2 times per day    dexamethasone  6 mg IntraVENous Daily    metoprolol succinate  50 mg Oral BID    allopurinol  300 mg Oral Daily    apixaban  5 mg Oral BID    pravastatin  20 mg Oral Nightly    tamsulosin  0.4 mg Oral Nightly    sodium chloride flush  5-40 mL IntraVENous 2 times per day      Infusions:    dextrose 100 mL/hr at 12/23/21 2333    dexmedetomidine (PRECEDEX) IV infusion 0.2 mcg/kg/hr (12/23/21 1900)    propofol Stopped (12/13/21 1443)    fentaNYL Stopped (12/19/21 2645)    norepinephrine Stopped (12/19/21 0125)    dextrose      midazolam Stopped (12/18/21 0450)    sodium chloride           Objective:   Vitals: BP (!) 152/90   Pulse 76   Temp 98 °F (36.7 °C) (Axillary)   Resp 18   Ht 5' 7.99\" (1.727 m)   Wt 201 lb 9 oz (91.4 kg)   SpO2 95%   BMI 30.65 kg/m²   General appearance: Patient extubated and off ventilator not on tube feeding either   Neck: no JVD or bruit  Thyroid : Normal lobes   Lungs: Has Vesicular Breath sounds   Heart:  regular rate and rhythm  Abdomen: soft, non-tender; bowel sounds normal; no masses,  no organomegaly  Musculoskeletal: Normal  Extremities: extremities normal, , no edema  Neurologic: Patient extubated and off ventilator not on tube feeding either       Assessment:     Patient Active Problem List:     Diabetes mellitus ([facility] [address]     Atrial flutter (HCC)     Atrial fibrillation ([facility] [address]     Gout     Hyperlipidemia     Hypertension     Arthropathy     Upper GI bleed     NSTEMI (non-ST elevated myocardial infarction) ([facility] [address] Rectal bleeding     Sepsis ([facility] [address]     BRBPR (bright red blood per rectum)     Acute cystitis without hematuria     Leukocytosis     Acute hypokalemia     Rectal bleed     Anemia     B12 deficiency     Elevated antinuclear antibody (FREDRICK) level     PNA (pneumonia)     Troponin I above reference range     Hypernatremia possibly due to dehydration      Plan:     1. Reviewed POC blood glucose . Labs and X ray results   2. Reviewed Current Medicines   3. On Correction bolus Humalog/ Basal Lantus /NPH insulin regime  4. Monitor Blood glucose frequently   5. Modified  the dose of Insulin/ other medicines as needed   6. Nephrology managing the IV fluid to manage the hypernatremia  7. Will follow     .      Loc Preston MD, MD

## 2021-12-28 NOTE — PLAN OF CARE
Nutrition Problem #1: Inadequate oral intake  Intervention: Food and/or Nutrient Delivery: Start Oral Diet,Modify Oral Nutrition Supplement,Start Tube Feeding  Nutritional Goals: Pt will meet greater than 75% estimated nutrient needs during los

## 2021-12-28 NOTE — PROGRESS NOTES
[id number] [facility] SPEECH/LANGUAGE PATHOLOGY  DAILY PROGRESS NOTE  Javier Mendez  12/28/2021  [id number]  Chronic atrial fibrillation (HCC) [I48.20]  NSTEMI (non-ST elevated myocardial infarction) ([facility] [address] [I21.4]  Acute cystitis without hematuria [N30.00]  PNA (pneumonia) [J18.9]  Fall, initial encounter [W19. XXXA]  Multifocal pneumonia [J18.9]  Allergies   Allergen Reactions    Demerol Hcl [Meperidine]     Gabapentin     Simvastatin          Pt was seen this date for dysphagia treatment. IMPRESSION AND RECOMMENDATIONS: Javier Mendez was seen for dysphagia follow-up. He was seated upright in bed, alert. RN reports pt refused breakfast. He was seen with his lunch tray including pureed foods, nectar thick liquids via tsp/straw, and presented with additional trials of honey thick liquids via tsp/cup/straw. Oral stage moderate-severely impaired, characterized by weak labial seal with prolonged/disorganized oral manipulation, delayed AP transit with lingual pumping, suspected premature pharyngeal entry. Suspect continued pharyngeal impairment with delayed swallow initiation and reduced laryngeal elevation. Immediate and/or briefly delayed cough followed trials of nectar thick liquids via tsp/cup/straw only. Recommend downgrade to honey thick liquids, continued pureed diet. Continue to give pills in pureed or pudding consistency. SLP will follow. GOALS (current status in bold):  Short-term Goals  Timeframe for Short-term Goals: length of admission  Goal 1: Pt will tolerate pureed diet/nectar thick liquids with adequate oral manipulation/clearance and no s/s aspiration. Not met, overt cough with all trials nectar thick liquids; downgrade to honey thick liquids  Goal 2: Pt will tolerate PO trials of advanced consistencies with adequate oral manipulation/clearance and no s/s aspiration for safe diet upgrade.  Deferred, not appropriate today  Goal 3: Pt/caregivers will

## 2021-12-28 NOTE — PROGRESS NOTES
Nephrology Progress Note  12/28/2021 2:35 PM        Subjective:   Admit Date: 12/8/2021  PCP: Freeman Charles MD    Interval History: Patient seen earlier today, this is a late entry    Diet: Probably not much    ROS: He is lethargic and restless  Also resisted any good physical exam  No fever and acceptable blood pressure  Urine output recorded 1.55 L for the last 24 hours        Data:     Current meds:    insulin glargine  35 Units SubCUTAneous Nightly    insulin lispro  0-18 Units SubCUTAneous TID WC    insulin lispro  0-9 Units SubCUTAneous 2 times per day    polyethylene glycol  17 g Oral Daily    chlorhexidine  15 mL Mouth/Throat BID    famotidine (PEPCID) injection  20 mg IntraVENous BID    lidocaine  5 mL IntraDERmal Once    sodium chloride flush  5-40 mL IntraVENous 2 times per day    dexamethasone  6 mg IntraVENous Daily    metoprolol succinate  50 mg Oral BID    allopurinol  300 mg Oral Daily    apixaban  5 mg Oral BID    pravastatin  20 mg Oral Nightly    tamsulosin  0.4 mg Oral Nightly    sodium chloride flush  5-40 mL IntraVENous 2 times per day      dextrose 100 mL/hr at 12/23/21 2333    dexmedetomidine (PRECEDEX) IV infusion Stopped (12/28/21 0841)    propofol Stopped (12/13/21 1443)    fentaNYL Stopped (12/19/21 2611)    norepinephrine Stopped (12/19/21 0125)    dextrose      midazolam Stopped (12/18/21 0450)    sodium chloride           I/O last 3 completed shifts: In: 260 [P.O.:240;  I.V.:20]  Out: 1550 [XUTBP:6347]    CBC:   Recent Labs     12/27/21  0605   WBC 12.3*   HGB 13.6             Recent Labs     12/26/21  0540 12/27/21  0605 12/28/21  0600   * 158* 160*   K 3.6 3.4* 3.6   * 117* 126*   CO2 26 26 26   BUN 46* 57* 54*   CREATININE 0.7* 0.7* 0.9   GLUCOSE 156* 127* 151*       Lab Results   Component Value Date    CALCIUM 9.3 12/28/2021    PHOS 3.3 12/28/2021       Objective:     Vitals: /89   Pulse 69   Temp 98.1 °F (36.7 °C) (Axillary) Resp 19   Ht 5' 7.99\" (1.727 m)   Wt 201 lb 9 oz (91.4 kg)   SpO2 95%   BMI 30.65 kg/m²     General appearance: Lethargic-he did wake up but would not let me do a good exam  HEENT: Probably mild conjunctival pallor-he seems edentulous  Neck: Seems supple-he does have left subclavian central venous catheter  Lungs: Limited exam but some adventitial breath sound  Heart: Irregular  Abdomen: Soft  Extremities: Trace edema  He does have a Lane      Problem List :         Impression :     1. Acute kidney injury-nonoliguric-creatinine slightly up-remains at risk for recurrent injury  2. Severe hyper natremia-sodium level even higher-probably he was not getting D5 water  3. COVID-19 as well as underlying dysrhythmia and diabetes as well as cardiomyopathy-his proBNP level is rather high    Recommendation/Plan  :     1. Difficult situation for him-he needs water to reduce the sodium level-also probably need diuresis for pulmonary edema-but he does not have much respiratory symptoms at this time-still high probably level could be from ventricular stretch-rather than pulmonary edema  2. Will get a chest x-ray  3. For the time being increase D5 water 225 mL an hour-if chest x-ray showed pulmonary edema-we will have to diurese him  4.  Follow clinically and biochemically      Enrique Hay MD MD

## 2021-12-29 ENCOUNTER — APPOINTMENT (OUTPATIENT)
Dept: GENERAL RADIOLOGY | Age: 76
DRG: 870 | End: 2021-12-29
Payer: COMMERCIAL

## 2021-12-29 LAB
ALBUMIN SERPL-MCNC: 2 GM/DL (ref 3.4–5)
ANION GAP SERPL CALCULATED.3IONS-SCNC: 5 MMOL/L (ref 4–16)
ANION GAP SERPL CALCULATED.3IONS-SCNC: 5 MMOL/L (ref 4–16)
ANION GAP SERPL CALCULATED.3IONS-SCNC: 8 MMOL/L (ref 4–16)
BUN BLDV-MCNC: 46 MG/DL (ref 6–23)
BUN BLDV-MCNC: 50 MG/DL (ref 6–23)
BUN BLDV-MCNC: 52 MG/DL (ref 6–23)
CALCIUM SERPL-MCNC: 7.5 MG/DL (ref 8.3–10.6)
CALCIUM SERPL-MCNC: 7.9 MG/DL (ref 8.3–10.6)
CALCIUM SERPL-MCNC: 8.7 MG/DL (ref 8.3–10.6)
CHLORIDE BLD-SCNC: 125 MMOL/L (ref 99–110)
CHLORIDE BLD-SCNC: 126 MMOL/L (ref 99–110)
CHLORIDE BLD-SCNC: 126 MMOL/L (ref 99–110)
CO2: 23 MMOL/L (ref 21–32)
CO2: 24 MMOL/L (ref 21–32)
CO2: 28 MMOL/L (ref 21–32)
CREAT SERPL-MCNC: 0.7 MG/DL (ref 0.9–1.3)
CREAT SERPL-MCNC: 0.7 MG/DL (ref 0.9–1.3)
CREAT SERPL-MCNC: 0.8 MG/DL (ref 0.9–1.3)
GFR AFRICAN AMERICAN: >60 ML/MIN/1.73M2
GFR NON-AFRICAN AMERICAN: >60 ML/MIN/1.73M2
GLUCOSE BLD-MCNC: 129 MG/DL (ref 70–99)
GLUCOSE BLD-MCNC: 133 MG/DL (ref 70–99)
GLUCOSE BLD-MCNC: 153 MG/DL (ref 70–99)
GLUCOSE BLD-MCNC: 164 MG/DL (ref 70–99)
GLUCOSE BLD-MCNC: 185 MG/DL (ref 70–99)
GLUCOSE BLD-MCNC: 85 MG/DL (ref 70–99)
GLUCOSE BLD-MCNC: 86 MG/DL (ref 70–99)
HCT VFR BLD CALC: 39.6 % (ref 42–52)
HEMOGLOBIN: 11.5 GM/DL (ref 13.5–18)
MCH RBC QN AUTO: 28.3 PG (ref 27–31)
MCHC RBC AUTO-ENTMCNC: 29 % (ref 32–36)
MCV RBC AUTO: 97.3 FL (ref 78–100)
PDW BLD-RTO: 19.6 % (ref 11.7–14.9)
PHOSPHORUS: 2.4 MG/DL (ref 2.5–4.9)
PLATELET # BLD: 193 K/CU MM (ref 140–440)
PMV BLD AUTO: 12.8 FL (ref 7.5–11.1)
POTASSIUM SERPL-SCNC: 3 MMOL/L (ref 3.5–5.1)
POTASSIUM SERPL-SCNC: 4 MMOL/L (ref 3.5–5.1)
POTASSIUM SERPL-SCNC: 4 MMOL/L (ref 3.5–5.1)
RBC # BLD: 4.07 M/CU MM (ref 4.6–6.2)
SODIUM BLD-SCNC: 155 MMOL/L (ref 135–145)
SODIUM BLD-SCNC: 157 MMOL/L (ref 135–145)
SODIUM BLD-SCNC: 158 MMOL/L (ref 135–145)
WBC # BLD: 6.5 K/CU MM (ref 4–10.5)

## 2021-12-29 PROCEDURE — 36592 COLLECT BLOOD FROM PICC: CPT

## 2021-12-29 PROCEDURE — 6370000000 HC RX 637 (ALT 250 FOR IP): Performed by: INTERNAL MEDICINE

## 2021-12-29 PROCEDURE — 80048 BASIC METABOLIC PNL TOTAL CA: CPT

## 2021-12-29 PROCEDURE — 6360000002 HC RX W HCPCS: Performed by: HOSPITALIST

## 2021-12-29 PROCEDURE — 94761 N-INVAS EAR/PLS OXIMETRY MLT: CPT

## 2021-12-29 PROCEDURE — 85027 COMPLETE CBC AUTOMATED: CPT

## 2021-12-29 PROCEDURE — 71045 X-RAY EXAM CHEST 1 VIEW: CPT

## 2021-12-29 PROCEDURE — 6360000002 HC RX W HCPCS: Performed by: INTERNAL MEDICINE

## 2021-12-29 PROCEDURE — 2580000003 HC RX 258: Performed by: NURSE PRACTITIONER

## 2021-12-29 PROCEDURE — 2500000003 HC RX 250 WO HCPCS: Performed by: NURSE PRACTITIONER

## 2021-12-29 PROCEDURE — 6370000000 HC RX 637 (ALT 250 FOR IP): Performed by: NURSE PRACTITIONER

## 2021-12-29 PROCEDURE — 2580000003 HC RX 258: Performed by: INTERNAL MEDICINE

## 2021-12-29 PROCEDURE — 93005 ELECTROCARDIOGRAM TRACING: CPT | Performed by: HOSPITALIST

## 2021-12-29 PROCEDURE — 80069 RENAL FUNCTION PANEL: CPT

## 2021-12-29 PROCEDURE — 99232 SBSQ HOSP IP/OBS MODERATE 35: CPT | Performed by: INTERNAL MEDICINE

## 2021-12-29 PROCEDURE — 82962 GLUCOSE BLOOD TEST: CPT

## 2021-12-29 PROCEDURE — 1200000000 HC SEMI PRIVATE

## 2021-12-29 RX ORDER — DEXAMETHASONE SODIUM PHOSPHATE 4 MG/ML
6 INJECTION, SOLUTION INTRA-ARTICULAR; INTRALESIONAL; INTRAMUSCULAR; INTRAVENOUS; SOFT TISSUE ONCE
Status: COMPLETED | OUTPATIENT
Start: 2021-12-29 | End: 2021-12-29

## 2021-12-29 RX ORDER — DEXAMETHASONE 4 MG/1
2 TABLET ORAL DAILY
Status: COMPLETED | OUTPATIENT
Start: 2022-01-01 | End: 2022-01-02

## 2021-12-29 RX ORDER — DEXAMETHASONE 4 MG/1
4 TABLET ORAL DAILY
Status: COMPLETED | OUTPATIENT
Start: 2021-12-30 | End: 2021-12-31

## 2021-12-29 RX ADMIN — POTASSIUM CHLORIDE 10 MEQ: 7.46 INJECTION, SOLUTION INTRAVENOUS at 11:48

## 2021-12-29 RX ADMIN — POTASSIUM CHLORIDE 10 MEQ: 7.46 INJECTION, SOLUTION INTRAVENOUS at 12:48

## 2021-12-29 RX ADMIN — POLYETHYLENE GLYCOL (3350) 17 G: 17 POWDER, FOR SOLUTION ORAL at 12:38

## 2021-12-29 RX ADMIN — POTASSIUM CHLORIDE 10 MEQ: 7.46 INJECTION, SOLUTION INTRAVENOUS at 17:13

## 2021-12-29 RX ADMIN — CHLORHEXIDINE GLUCONATE 0.12% ORAL RINSE 15 ML: 1.2 LIQUID ORAL at 12:38

## 2021-12-29 RX ADMIN — POTASSIUM CHLORIDE 10 MEQ: 7.46 INJECTION, SOLUTION INTRAVENOUS at 16:05

## 2021-12-29 RX ADMIN — DEXTROSE MONOHYDRATE: 50 INJECTION, SOLUTION INTRAVENOUS at 14:24

## 2021-12-29 RX ADMIN — APIXABAN 5 MG: 5 TABLET, FILM COATED ORAL at 12:38

## 2021-12-29 RX ADMIN — SODIUM CHLORIDE, PRESERVATIVE FREE 10 ML: 5 INJECTION INTRAVENOUS at 22:07

## 2021-12-29 RX ADMIN — POTASSIUM CHLORIDE 10 MEQ: 7.46 INJECTION, SOLUTION INTRAVENOUS at 18:33

## 2021-12-29 RX ADMIN — SODIUM CHLORIDE, PRESERVATIVE FREE 10 ML: 5 INJECTION INTRAVENOUS at 12:40

## 2021-12-29 RX ADMIN — FAMOTIDINE 20 MG: 10 INJECTION, SOLUTION INTRAVENOUS at 22:07

## 2021-12-29 RX ADMIN — DEXAMETHASONE SODIUM PHOSPHATE 6 MG: 4 INJECTION INTRA-ARTICULAR; INTRALESIONAL; INTRAMUSCULAR; INTRAVENOUS; SOFT TISSUE at 12:39

## 2021-12-29 RX ADMIN — POTASSIUM CHLORIDE 10 MEQ: 7.46 INJECTION, SOLUTION INTRAVENOUS at 14:24

## 2021-12-29 RX ADMIN — METOPROLOL SUCCINATE 50 MG: 50 TABLET, EXTENDED RELEASE ORAL at 12:38

## 2021-12-29 RX ADMIN — DEXTROSE MONOHYDRATE 1 ML: 50 INJECTION, SOLUTION INTRAVENOUS at 00:34

## 2021-12-29 RX ADMIN — CHLORHEXIDINE GLUCONATE 0.12% ORAL RINSE 15 ML: 1.2 LIQUID ORAL at 22:07

## 2021-12-29 RX ADMIN — FAMOTIDINE 20 MG: 10 INJECTION, SOLUTION INTRAVENOUS at 12:39

## 2021-12-29 ASSESSMENT — PAIN SCALES - PAIN ASSESSMENT IN ADVANCED DEMENTIA (PAINAD)
FACIALEXPRESSION: 1
BREATHING: 0
FACIALEXPRESSION: 1
BODYLANGUAGE: 1
NEGVOCALIZATION: 0
BODYLANGUAGE: 1
BODYLANGUAGE: 1
CONSOLABILITY: 1
NEGVOCALIZATION: 0
TOTALSCORE: 3
TOTALSCORE: 2
BREATHING: 0
BREATHING: 0
FACIALEXPRESSION: 1
FACIALEXPRESSION: 1
CONSOLABILITY: 1
BODYLANGUAGE: 1
BREATHING: 0
NEGVOCALIZATION: 0
NEGVOCALIZATION: 0
CONSOLABILITY: 0
TOTALSCORE: 3
TOTALSCORE: 3
CONSOLABILITY: 1

## 2021-12-29 ASSESSMENT — PAIN SCALES - GENERAL
PAINLEVEL_OUTOF10: 0
PAINLEVEL_OUTOF10: 0

## 2021-12-29 ASSESSMENT — PAIN SCALES - WONG BAKER
WONGBAKER_NUMERICALRESPONSE: 0

## 2021-12-29 ASSESSMENT — PAIN DESCRIPTION - PROGRESSION: CLINICAL_PROGRESSION: NOT CHANGED

## 2021-12-29 ASSESSMENT — PAIN - FUNCTIONAL ASSESSMENT: PAIN_FUNCTIONAL_ASSESSMENT: ACTIVITIES ARE NOT PREVENTED

## 2021-12-29 NOTE — PROGRESS NOTES
Progress Note( Dr. Carmen Antoine)  12/29/2021  Subjective:   Admit Date: 12/8/2021  PCP: Mary Ortiz MD    Admitted For :Admitted initially on December 9, 2021 with history of fall and found to have Covid pneumonia    Consulted For: Better control of blood glucose    Interval History: The patient awake and alert. He was extubated and off ventilator. Not on  oxygen either. Is attempting to talk with whisper  According to nurses patient is still not eating much      Intake/Output Summary (Last 24 hours) at 12/29/2021 0617  Last data filed at 12/29/2021 0400  Gross per 24 hour   Intake 120 ml   Output 2000 ml   Net -1880 ml       DATA    CBC:   Recent Labs     12/27/21  0605 12/29/21  0432   WBC 12.3* 6.5   HGB 13.6 11.5*    193    CMP:  Recent Labs     12/27/21  0605 12/28/21  0600 12/28/21  2247   * 160* 153*   K 3.4* 3.6 3.2*   * 126* 121*   CO2 26 26 27   BUN 57* 54* 57*   CREATININE 0.7* 0.9 0.9   CALCIUM 9.5 9.3 8.9   LABALBU 2.6* 2.6*  --      Lipids:   Lab Results   Component Value Date    CHOL 137 06/26/2018    HDL 53 06/26/2018    TRIG 101 06/26/2018     Glucose:  Recent Labs     12/28/21  1641 12/28/21  2241 12/29/21  0208   POCGLU 190* 175* 164*     [id number]:  Lab Results   Component Value Date    LABA1C 7.3 12/18/2021     High Sensitivity TSH:   Lab Results   Component Value Date    TSHHS 3.280 05/07/2021     Free T3: No results found for: FT3  Free T4:No results found for: T4FREE    XR CHEST PORTABLE   Final Result   Stable exam with patchy bilateral airspace opacities compatible with   multifocal pneumonia or edema. XR CHEST PORTABLE   Final Result   1. No significant change in patchy subpleural, basilar predominant airspace   opacities consistent with COVID-19 pneumonia given patient history. 2. Pulmonary vascular congestion and mild cardiomegaly.          XR CHEST PORTABLE   Final Result      XR CHEST PORTABLE   Final Result   Mild bilateral improvement in airspace opacities. Trace left apical   pneumothorax is unchanged. XR CHEST PORTABLE   Final Result   The left apical pneumothorax is still identified. Similar to 12/18/2021 but   smaller than 12/17/2021. The multifocal pulmonary opacities are redemonstrated. May have some   worsening atelectasis in the right lower lobe. XR CHEST PORTABLE   Final Result   Improving small left apical pneumothorax measuring 9 mm. New right lower lobe infiltrate may represent atelectasis versus pneumonia. Lines and tubes are stable      Stable mild pneumomediastinum. XR CHEST PORTABLE   Final Result   Interval development of a small left apical pneumothorax measuring   approximately 2.1 cm between pleural surfaces. Pneumomediastinum appears   increased when compared to the previous exam as well. Continued patchy opacity throughout both lungs, most compatible with   multifocal pneumonia. XR CHEST PORTABLE   Final Result   1. No appreciable left pneumothorax identified on the current exam.   2. Similar bilateral airspace opacities. 3. Support lines and tubes remain in place. XR CHEST PORTABLE   Final Result   1. Small stable left apical pneumothorax. 2. Bilateral airspace opacities are seen without significant change. XR CHEST PORTABLE   Final Result   Stable small left apical pneumothorax. Stable pulmonary vascular congestion along with interstitial opacities and   hazy airspace opacities, left worse than right which may be on the basis of   interstitial and pulmonary edema but can also be seen in the setting of   atypical pneumonia. XR CHEST PORTABLE   Final Result   Persistent small left apical pneumothorax estimated to be approximately 10%   in severity. Support tube/catheters project in stable/satisfactory position   in the frontal projection.       Consolidating infiltrates and or pulmonary edema, cardiomegaly unchanged   compared to 12/14/2021 consistent with diffuse bilateral pneumonia and/or   congestive heart failure. XR CHEST PORTABLE   Final Result   Left chest tube remains in place, with slight interval decrease in size in   small left apical pneumothorax. XR CHEST PORTABLE   Final Result   Persistent small left pneumothorax with 1 cm pleural apical separation, with   intervally placed left-sided chest tube. Similar diffuse bilateral airspace disease. XR ABDOMEN (KUB) (SINGLE AP VIEW)   Final Result   NG tube in place with tip and side port below the diaphragm and in the region   of the gastric body. XR CHEST PORTABLE   Final Result   Interval development of a large left tension pneumothorax. Extensive bilateral airspace opacities without significant change. Findings were discussed with Dr. Cristiano Kramer  at 7:43 am on 12/13/2021. XR CHEST PORTABLE   Final Result   Persistent bilateral airspace disease without acute interval change. XR CHEST PORTABLE   Final Result   Severe extensive bilateral airspace opacities worsened from the previous exam   compatible with edema, ARDS, or multifocal pneumonia. XR CHEST PORTABLE   Final Result   1. Cardiomegaly with severe congestive heart failure. VL DUP LOWER EXTREMITY VENOUS BILATERAL   Final Result   No evidence of DVT in either lower extremity within the limitations. Mild nonspecific subcutaneous edema to bilateral lower extremities. CT HEAD WO CONTRAST   Final Result   No acute intracranial abnormality. Age related changes including chronic small vessel ischemic disease and   cerebral atrophy. CT ABDOMEN PELVIS W IV CONTRAST Additional Contrast? None   Final Result   Multifocal consolidative changes both lungs worrisome for multifocal   pneumonia. Follow-up in following medical treatment course is recommended   document complete resolution.       Multifocal bladder traumatic trabeculation/diverticula moderate multilevel   degenerate change         CT HEAD WO CONTRAST   Final Result   No acute intracranial abnormality. Geographic lucency frontal bone noted, consider bone scan imaging or consider   skeletal survey.          XR CHEST PORTABLE    (Results Pending)        Scheduled Medicines   Medications:    insulin glargine  35 Units SubCUTAneous Nightly    insulin lispro  0-18 Units SubCUTAneous TID WC    insulin lispro  0-9 Units SubCUTAneous 2 times per day    polyethylene glycol  17 g Oral Daily    chlorhexidine  15 mL Mouth/Throat BID    famotidine (PEPCID) injection  20 mg IntraVENous BID    lidocaine  5 mL IntraDERmal Once    sodium chloride flush  5-40 mL IntraVENous 2 times per day    dexamethasone  6 mg IntraVENous Daily    metoprolol succinate  50 mg Oral BID    allopurinol  300 mg Oral Daily    apixaban  5 mg Oral BID    pravastatin  20 mg Oral Nightly    tamsulosin  0.4 mg Oral Nightly    sodium chloride flush  5-40 mL IntraVENous 2 times per day      Infusions:    dextrose 1 mL (12/29/21 0034)    dexmedetomidine (PRECEDEX) IV infusion Stopped (12/28/21 0841)    propofol Stopped (12/13/21 1443)    fentaNYL Stopped (12/19/21 1760)    norepinephrine Stopped (12/19/21 0125)    dextrose      midazolam Stopped (12/18/21 0450)    sodium chloride           Objective:   Vitals: /84   Pulse 50   Temp 98.3 °F (36.8 °C) (Axillary)   Resp 18   Ht 5' 7.99\" (1.727 m)   Wt 198 lb 6.6 oz (90 kg)   SpO2 97%   BMI 30.18 kg/m²   General appearance: Patient extubated and off ventilator not on tube feeding either   Neck: no JVD or bruit  Thyroid : Normal lobes   Lungs: Has Vesicular Breath sounds   Heart:  regular rate and rhythm  Abdomen: soft, non-tender; bowel sounds normal; no masses,  no organomegaly  Musculoskeletal: Normal  Extremities: extremities normal, , no edema  Neurologic: Patient extubated and off ventilator not on tube feeding either       Assessment:     Patient Active Problem List:     Diabetes mellitus ([facility] [address]     Atrial flutter (HCC)     Atrial fibrillation (HCC)     Gout     Hyperlipidemia     Hypertension     Arthropathy     Upper GI bleed     NSTEMI (non-ST elevated myocardial infarction) (HCC)     Rectal bleeding     [facility]     BRBPR (bright red blood per rectum)     Acute cystitis without hematuria     Leukocytosis     Acute hypokalemia     Rectal bleed     Anemia     B12 deficiency     Elevated antinuclear antibody (FREDRICK) level     PNA (pneumonia)     Troponin I above reference range     Hypernatremia possibly due to dehydration      Plan:     1. Reviewed POC blood glucose . Labs and X ray results   2. Reviewed Current Medicines   3. On Correction bolus Humalog/ Basal Lantus /NPH insulin regime  4. Monitor Blood glucose frequently   5. Modified  the dose of Insulin/ other medicines as needed   6. Nephrology managing the IV fluid to manage the hypernatremia  7. Will follow     .      Cindi Mclean MD, MD

## 2021-12-29 NOTE — PROGRESS NOTES
Nephrology Progress Note  12/29/2021 2:24 PM        Subjective:   Admit Date: 12/8/2021  PCP: Howie Winston MD    Interval History: This is a late entry, patient seen in early morning    Diet: Probably none    ROS: He was alert and awake and not agitated and allow me for good exam today  Urine output recorded about 2 L for the last 24 hours  No fever and acceptable blood pressure        Data:     Current meds:    [START ON 12/30/2021] dexamethasone  4 mg Oral Daily    [START ON 1/1/2022] dexamethasone  2 mg Oral Daily    insulin glargine  35 Units SubCUTAneous Nightly    insulin lispro  0-18 Units SubCUTAneous TID WC    insulin lispro  0-9 Units SubCUTAneous 2 times per day    polyethylene glycol  17 g Oral Daily    chlorhexidine  15 mL Mouth/Throat BID    famotidine (PEPCID) injection  20 mg IntraVENous BID    lidocaine  5 mL IntraDERmal Once    sodium chloride flush  5-40 mL IntraVENous 2 times per day    metoprolol succinate  50 mg Oral BID    allopurinol  300 mg Oral Daily    apixaban  5 mg Oral BID    pravastatin  20 mg Oral Nightly    tamsulosin  0.4 mg Oral Nightly    sodium chloride flush  5-40 mL IntraVENous 2 times per day      dextrose 125 mL/hr at 12/29/21 1424    dextrose      sodium chloride           I/O last 3 completed shifts:   In: 1680 [P.O.:180; I.V.:1500]  Out: 2000 [Urine:2000]    CBC:   Recent Labs     12/27/21  0605 12/29/21  0432   WBC 12.3* 6.5   HGB 13.6 11.5*    193          Recent Labs     12/28/21  2247 12/29/21  0432 12/29/21  1300   * 157* 158*   K 3.2* 3.0* 4.0   * 126* 125*   CO2 27 23 28   BUN 57* 50* 52*   CREATININE 0.9 0.7* 0.8*   GLUCOSE 212* 153* 85       Lab Results   Component Value Date    CALCIUM 8.7 12/29/2021    PHOS 2.4 (L) 12/29/2021       Objective:     Vitals: /76   Pulse 58   Temp 97.6 °F (36.4 °C) (Oral)   Resp 25   Ht 5' 7.99\" (1.727 m)   Wt 198 lb 6.6 oz (90 kg)   SpO2 95%   BMI 30.18 kg/m²     General appearance: Alert awake, does not respond to verbal stimuli with head of the bed elevated about 60 degrees  HEENT: At least 2+ conjunctival pallor  Neck: Supple, left subclavian central venous catheter-is edentulous  Lungs: Positive adventitious breath sounds  Heart: Bradycardia during my exam  Abdomen: Soft  Extremities: 1+ edema  Has Lane catheter      Problem List :         Impression :     1. Acute kidney injury-good urine output-stable by creatinine criteria  2. Severe hypernatremia-sodium still high-I wonder whether he is not getting D5 water  3. COVID-19-lung wise doing better  4. Underlying diabetes and dysrhythmia  5. Also has cardiomyopathy with proBNP level    Recommendation/Plan  :     1. Since does not have any respiratory symptoms-okay to continue D5 water-get the sodium down to 140  2. Chest x-ray likely reflect COVID-19 pneumonitis rather than pulmonary edema as he does not have much symptoms  3. Watch for iatrogenic nosocomial complication  4. Follow clinically and biochemically  5.  Need to ensure he is getting Henri Lopez MD MD

## 2021-12-29 NOTE — PROGRESS NOTES
Pt is alert, responds with nonverbal gestures. Refuses some medications but does not have any physical outbursts. Pt turns self inbed. Lane output is minial, charted 200mL. Pt transferred to [facility], report given, pt moved with all belongings to [phone].

## 2021-12-29 NOTE — PROGRESS NOTES
Pulmonary and Critical Care  Progress Note      VITALS:  /84   Pulse 50   Temp 98.3 °F (36.8 °C) (Axillary)   Resp 23   Ht 5' 7.99\" (1.727 m)   Wt 198 lb 6.6 oz (90 kg)   SpO2 98%   BMI 30.18 kg/m²     Subjective:   Chief complaint:  Acute on chronic hypoxemic and hypercapnic respiratory failure, bilateral pneumonia secondary to COVID-19, pulmonary edema, systolic heart failure with diastolic dysfunction, obstructive sleep apnea, hyponatremia  No resp distress-denies any chest pain  Does not really like to talk much and hard to take history but otherwise  he remains awake and alert  Agitated at times and nurses report that sometimes he is combative  Remains on room air  Objective:   PHYSICAL EXAM:    LUNGS: No change on chest exam with diminished breath sounds throughout all lung areas. I do not hear any wheezing or rhonchi  O2 saturation 98% last recorded on room air  Serum sodium 157, potassium 3.0, creatinine 0.7 with BUN 50  WBC 6.5 with hemoglobin 11.5      DATA:    CBC:  Recent Labs     12/27/21  0605 12/29/21  0432   WBC 12.3* 6.5   RBC 4.81 4.07*   HGB 13.6 11.5*   HCT 44.8 39.6*    193   MCV 93.1 97.3   MCH 28.3 28.3   MCHC 30.4* 29.0*   RDW 19.9* 19.6*      BMP:  Recent Labs     12/28/21  0600 12/28/21  2247 12/29/21  0432   * 153* 157*   K 3.6 3.2* 3.0*   * 121* 126*   CO2 26 27 23   BUN 54* 57* 50*   CREATININE 0.9 0.9 0.7*   CALCIUM 9.3 8.9 7.9*   GLUCOSE 151* 212* 153*      ABG:  No results for input(s): PH, PO2ART, PDN1BNK, HCO3, BEART, O2SAT in the last 72 hours. BNP  No results found for: BNP   D-Dimer:  No results found for: DDIMER   1. Radiology: Chest x-ray this a.m. reviewed  1.  Stable diffuse bilateral lung infiltrates, compatible with multifocal   pneumonia, including COVID         Assessment:     Patient Active Problem List   Diagnosis    Diabetes mellitus ([facility] [address]    Atrial flutter ([facility] [address]    Atrial fibrillation (HCC)    Gout    Hyperlipidemia    Hypertension    Arthropathy    Upper GI bleed    NSTEMI (non-ST elevated myocardial infarction) (HCC)    Rectal bleeding    Sepsis (HCC)    BRBPR (bright red blood per rectum)    Acute cystitis without hematuria    Leukocytosis    Acute hypokalemia    Rectal bleed    Anemia    B12 deficiency    Elevated antinuclear antibody (FREDRICK) level    PNA (pneumonia)    Troponin I above reference range       Plan:   1. continue present treatment  2.  Nephrology following for hyponatremia    Gricelda Ruiz MD MD  12/29/2021  9:59 AM

## 2021-12-30 LAB
ALBUMIN SERPL-MCNC: 2.2 GM/DL (ref 3.4–5)
ANION GAP SERPL CALCULATED.3IONS-SCNC: 4 MMOL/L (ref 4–16)
ANION GAP SERPL CALCULATED.3IONS-SCNC: 8 MMOL/L (ref 4–16)
ANION GAP SERPL CALCULATED.3IONS-SCNC: 8 MMOL/L (ref 4–16)
ANION GAP SERPL CALCULATED.3IONS-SCNC: 9 MMOL/L (ref 4–16)
BUN BLDV-MCNC: 41 MG/DL (ref 6–23)
BUN BLDV-MCNC: 42 MG/DL (ref 6–23)
BUN BLDV-MCNC: 45 MG/DL (ref 6–23)
BUN BLDV-MCNC: 51 MG/DL (ref 6–23)
CALCIUM SERPL-MCNC: 7.3 MG/DL (ref 8.3–10.6)
CALCIUM SERPL-MCNC: 8.1 MG/DL (ref 8.3–10.6)
CALCIUM SERPL-MCNC: 8.3 MG/DL (ref 8.3–10.6)
CALCIUM SERPL-MCNC: 8.4 MG/DL (ref 8.3–10.6)
CHLORIDE BLD-SCNC: 113 MMOL/L (ref 99–110)
CHLORIDE BLD-SCNC: 118 MMOL/L (ref 99–110)
CHLORIDE BLD-SCNC: 119 MMOL/L (ref 99–110)
CHLORIDE BLD-SCNC: 120 MMOL/L (ref 99–110)
CO2: 22 MMOL/L (ref 21–32)
CO2: 23 MMOL/L (ref 21–32)
CO2: 23 MMOL/L (ref 21–32)
CO2: 24 MMOL/L (ref 21–32)
CREAT SERPL-MCNC: 0.7 MG/DL (ref 0.9–1.3)
CREAT SERPL-MCNC: 0.8 MG/DL (ref 0.9–1.3)
EKG ATRIAL RATE: 300 BPM
EKG DIAGNOSIS: NORMAL
EKG P AXIS: -63 DEGREES
EKG Q-T INTERVAL: 542 MS
EKG QRS DURATION: 94 MS
EKG QTC CALCULATION (BAZETT): 499 MS
EKG R AXIS: -45 DEGREES
EKG T AXIS: 180 DEGREES
EKG VENTRICULAR RATE: 51 BPM
GFR AFRICAN AMERICAN: >60 ML/MIN/1.73M2
GFR NON-AFRICAN AMERICAN: >60 ML/MIN/1.73M2
GLUCOSE BLD-MCNC: 153 MG/DL (ref 70–99)
GLUCOSE BLD-MCNC: 165 MG/DL (ref 70–99)
GLUCOSE BLD-MCNC: 170 MG/DL (ref 70–99)
GLUCOSE BLD-MCNC: 174 MG/DL (ref 70–99)
GLUCOSE BLD-MCNC: 185 MG/DL (ref 70–99)
GLUCOSE BLD-MCNC: 185 MG/DL (ref 70–99)
GLUCOSE BLD-MCNC: 199 MG/DL (ref 70–99)
GLUCOSE BLD-MCNC: 234 MG/DL (ref 70–99)
GLUCOSE BLD-MCNC: 240 MG/DL (ref 70–99)
PHOSPHORUS: 3.1 MG/DL (ref 2.5–4.9)
POTASSIUM SERPL-SCNC: 3.4 MMOL/L (ref 3.5–5.1)
POTASSIUM SERPL-SCNC: 3.9 MMOL/L (ref 3.5–5.1)
POTASSIUM SERPL-SCNC: 4 MMOL/L (ref 3.5–5.1)
POTASSIUM SERPL-SCNC: 4.2 MMOL/L (ref 3.5–5.1)
SODIUM BLD-SCNC: 143 MMOL/L (ref 135–145)
SODIUM BLD-SCNC: 148 MMOL/L (ref 135–145)
SODIUM BLD-SCNC: 149 MMOL/L (ref 135–145)
SODIUM BLD-SCNC: 151 MMOL/L (ref 135–145)

## 2021-12-30 PROCEDURE — 80053 COMPREHEN METABOLIC PANEL: CPT

## 2021-12-30 PROCEDURE — 93010 ELECTROCARDIOGRAM REPORT: CPT | Performed by: INTERNAL MEDICINE

## 2021-12-30 PROCEDURE — 2580000003 HC RX 258: Performed by: NURSE PRACTITIONER

## 2021-12-30 PROCEDURE — 6370000000 HC RX 637 (ALT 250 FOR IP): Performed by: NURSE PRACTITIONER

## 2021-12-30 PROCEDURE — 80048 BASIC METABOLIC PNL TOTAL CA: CPT

## 2021-12-30 PROCEDURE — 99231 SBSQ HOSP IP/OBS SF/LOW 25: CPT | Performed by: INTERNAL MEDICINE

## 2021-12-30 PROCEDURE — 6370000000 HC RX 637 (ALT 250 FOR IP): Performed by: INTERNAL MEDICINE

## 2021-12-30 PROCEDURE — 80069 RENAL FUNCTION PANEL: CPT

## 2021-12-30 PROCEDURE — 1200000000 HC SEMI PRIVATE

## 2021-12-30 PROCEDURE — 2580000003 HC RX 258: Performed by: INTERNAL MEDICINE

## 2021-12-30 PROCEDURE — 36592 COLLECT BLOOD FROM PICC: CPT

## 2021-12-30 PROCEDURE — 6360000002 HC RX W HCPCS: Performed by: HOSPITALIST

## 2021-12-30 PROCEDURE — 2500000003 HC RX 250 WO HCPCS: Performed by: NURSE PRACTITIONER

## 2021-12-30 PROCEDURE — 82962 GLUCOSE BLOOD TEST: CPT

## 2021-12-30 RX ORDER — POTASSIUM CHLORIDE 29.8 MG/ML
20 INJECTION INTRAVENOUS PRN
Status: DISCONTINUED | OUTPATIENT
Start: 2021-12-30 | End: 2022-01-05 | Stop reason: HOSPADM

## 2021-12-30 RX ORDER — POTASSIUM CHLORIDE 7.45 MG/ML
10 INJECTION INTRAVENOUS PRN
Status: DISCONTINUED | OUTPATIENT
Start: 2021-12-30 | End: 2022-01-05 | Stop reason: HOSPADM

## 2021-12-30 RX ORDER — POTASSIUM CHLORIDE 20 MEQ/1
40 TABLET, EXTENDED RELEASE ORAL PRN
Status: DISCONTINUED | OUTPATIENT
Start: 2021-12-30 | End: 2022-01-05 | Stop reason: HOSPADM

## 2021-12-30 RX ORDER — POTASSIUM CHLORIDE 29.8 MG/ML
60 INJECTION INTRAVENOUS ONCE
Status: DISCONTINUED | OUTPATIENT
Start: 2021-12-30 | End: 2021-12-30

## 2021-12-30 RX ADMIN — TAMSULOSIN HYDROCHLORIDE 0.4 MG: 0.4 CAPSULE ORAL at 21:03

## 2021-12-30 RX ADMIN — CHLORHEXIDINE GLUCONATE 0.12% ORAL RINSE 15 ML: 1.2 LIQUID ORAL at 10:03

## 2021-12-30 RX ADMIN — ALLOPURINOL 300 MG: 300 TABLET ORAL at 10:03

## 2021-12-30 RX ADMIN — POTASSIUM CHLORIDE 20 MEQ: 29.8 INJECTION, SOLUTION INTRAVENOUS at 16:32

## 2021-12-30 RX ADMIN — DEXTROSE MONOHYDRATE: 50 INJECTION, SOLUTION INTRAVENOUS at 03:55

## 2021-12-30 RX ADMIN — DEXTROSE MONOHYDRATE: 50 INJECTION, SOLUTION INTRAVENOUS at 14:28

## 2021-12-30 RX ADMIN — APIXABAN 5 MG: 5 TABLET, FILM COATED ORAL at 21:03

## 2021-12-30 RX ADMIN — FAMOTIDINE 20 MG: 10 INJECTION, SOLUTION INTRAVENOUS at 10:04

## 2021-12-30 RX ADMIN — POTASSIUM CHLORIDE 20 MEQ: 29.8 INJECTION, SOLUTION INTRAVENOUS at 17:14

## 2021-12-30 RX ADMIN — DEXAMETHASONE 4 MG: 4 TABLET ORAL at 10:03

## 2021-12-30 RX ADMIN — SODIUM CHLORIDE, PRESERVATIVE FREE 10 ML: 5 INJECTION INTRAVENOUS at 21:01

## 2021-12-30 RX ADMIN — METOPROLOL SUCCINATE 50 MG: 50 TABLET, EXTENDED RELEASE ORAL at 21:03

## 2021-12-30 RX ADMIN — SODIUM CHLORIDE, PRESERVATIVE FREE 10 ML: 5 INJECTION INTRAVENOUS at 10:04

## 2021-12-30 RX ADMIN — PRAVASTATIN SODIUM 20 MG: 10 TABLET ORAL at 21:02

## 2021-12-30 RX ADMIN — SODIUM CHLORIDE, PRESERVATIVE FREE 10 ML: 5 INJECTION INTRAVENOUS at 21:03

## 2021-12-30 RX ADMIN — POLYETHYLENE GLYCOL (3350) 17 G: 17 POWDER, FOR SOLUTION ORAL at 10:03

## 2021-12-30 RX ADMIN — INSULIN LISPRO 3 UNITS: 100 INJECTION, SOLUTION INTRAVENOUS; SUBCUTANEOUS at 17:12

## 2021-12-30 RX ADMIN — APIXABAN 5 MG: 5 TABLET, FILM COATED ORAL at 10:03

## 2021-12-30 RX ADMIN — FAMOTIDINE 20 MG: 10 INJECTION, SOLUTION INTRAVENOUS at 21:05

## 2021-12-30 RX ADMIN — DEXTROSE MONOHYDRATE: 50 INJECTION, SOLUTION INTRAVENOUS at 22:41

## 2021-12-30 RX ADMIN — CHLORHEXIDINE GLUCONATE 0.12% ORAL RINSE 15 ML: 1.2 LIQUID ORAL at 21:05

## 2021-12-30 ASSESSMENT — PAIN SCALES - GENERAL: PAINLEVEL_OUTOF10: 0

## 2021-12-30 NOTE — PROGRESS NOTES
Nephrology Progress Note  12/30/2021 4:55 PM        Subjective:   Admit Date: 12/8/2021  PCP: Edda Olivarez MD    Interval History: Patient seen in early morning, this is a late entry    Diet: Unsure whether he is eating anything    ROS: He was lethargic this morning, did not respond to verbal stimuli  Urine output recorded only 200 cc for the last shift  No fever    Data:     Current meds:    dexamethasone  4 mg Oral Daily    [START ON 1/1/2022] dexamethasone  2 mg Oral Daily    insulin glargine  35 Units SubCUTAneous Nightly    insulin lispro  0-18 Units SubCUTAneous TID WC    insulin lispro  0-9 Units SubCUTAneous 2 times per day    polyethylene glycol  17 g Oral Daily    chlorhexidine  15 mL Mouth/Throat BID    famotidine (PEPCID) injection  20 mg IntraVENous BID    lidocaine  5 mL IntraDERmal Once    sodium chloride flush  5-40 mL IntraVENous 2 times per day    metoprolol succinate  50 mg Oral BID    allopurinol  300 mg Oral Daily    apixaban  5 mg Oral BID    pravastatin  20 mg Oral Nightly    tamsulosin  0.4 mg Oral Nightly    sodium chloride flush  5-40 mL IntraVENous 2 times per day      dextrose 125 mL/hr at 12/30/21 1428    dextrose      sodium chloride           I/O last 3 completed shifts:   In: 20 [I.V.:20]  Out: -     CBC:   Recent Labs     12/29/21  0432   WBC 6.5   HGB 11.5*             Recent Labs     12/29/21  2230 12/30/21  0450 12/30/21  1021   * 149* 148*   K 4.2 3.9 3.4*   * 118* 120*   CO2 23 23 24   BUN 51* 45* 42*   CREATININE 0.8* 0.7* 0.7*   GLUCOSE 240* 170* 185*       Lab Results   Component Value Date    CALCIUM 7.3 (L) 12/30/2021    PHOS 3.1 12/30/2021       Objective:     Vitals: /65   Pulse 72   Temp 97.9 °F (36.6 °C) (Oral)   Resp 16   Ht 5' 7.99\" (1.727 m)   Wt 210 lb 15.7 oz (95.7 kg)   SpO2 98%   BMI 32.09 kg/m²     General appearance: Lethargic  HEENT: On supplemental oxygen has mild conjunctival pallor-he had little puffy face  Neck: Supple, left subclavian central venous catheter  Lungs: Positive crackles on auscultation  Heart: Bradycardia during my exam in the morning  Abdomen: Soft nontender on superficial palpation  Extremities: 1+ edema  He does have a Lane      Problem List :         Impression :     1. Acute kidney injury-urine output recorded low-creatinine still stable  2. High sodium and low potassium-and low ca -sodium getting better with dextrose low potassium likely from dextrose-and low calcium from acute illness  3. He does have diabetes and dysrhythmia  4. Also cardiomyopathy    Recommendation/Plan  :     1. We will discontinue D5 water-likely tomorrow as I expect the sodium to be close to 140  2. Supplement potassium since he has a central line will be IV-  3. If somehow we can get him free water would be nice  4. Watch for pulmonary edema  5.  Follow clinically and biochemically      Leydi Piedra MD MD

## 2021-12-30 NOTE — CARE COORDINATION
LSW spoke with Ruth with [facility] and they can take pt however she was concerned with pt's agitation. Pt does not have a sitter and Dr. Ricardo Esparza note yesterday stated pt is not agitated however pt is refusing to eat or drink. LSW will check during huddle on this pt's behaviors. LSW requested updated PT/OT notes.

## 2021-12-30 NOTE — PROGRESS NOTES
[address]  12/30/2021  [phone]    Attempted to see Jerry Moncadar for dysphagia follow-up. Pt refuses to participate at this time. Noted lunch tray at bedside. He refused all offers of foods/liquids from tray. Will reattempt as able.     Tammie Watson MA CCC-SLP  12/30/2021  12:15 PM

## 2021-12-30 NOTE — PROGRESS NOTES
Pulmonary and Critical Care  Progress Note      VITALS:  /75   Pulse 56   Temp 97.4 °F (36.3 °C) (Oral)   Resp 12   Ht 5' 7.99\" (1.727 m)   Wt 210 lb 15.7 oz (95.7 kg)   SpO2 98%   BMI 32.09 kg/m²     Subjective:   CHIEF COMPLAINT :Fall     HPI:                The patient is sitting in the bed. He is not in acute resp distress    Objective:   PHYSICAL EXAM:    LUNGS:Occasional basal crackles  Abd-soft, BS+,NT  Ext- no pedal edema  CVS-s1s2, no murmurs      DATA:    CBC:  Recent Labs     12/29/21  0432   WBC 6.5   RBC 4.07*   HGB 11.5*   HCT 39.6*      MCV 97.3   MCH 28.3   MCHC 29.0*   RDW 19.6*      BMP:  Recent Labs     12/29/21  2230 12/30/21  0450 12/30/21  1021   * 149* 148*   K 4.2 3.9 3.4*   * 118* 120*   CO2 23 23 24   BUN 51* 45* 42*   CREATININE 0.8* 0.7* 0.7*   CALCIUM 8.3 8.4 7.3*   GLUCOSE 240* 170* 185*      ABG:  No results for input(s): PH, PO2ART, YYS2RSG, HCO3, BEART, O2SAT in the last 72 hours. BNP  No results found for: BNP   D-Dimer:  No results found for: DDIMER   1.  Radiology: None      Assessment/Plan     Patient Active Problem List    Diagnosis Date Noted    PNA (pneumonia) 12/09/2021    Troponin I above reference range 12/09/2021    Elevated antinuclear antibody (FREDRICK) level 02/16/2021    B12 deficiency 02/15/2021    Anemia 02/12/2021    Rectal bleed 11/18/2020    Acute hypokalemia 11/17/2020    Acute cystitis without hematuria     Leukocytosis     Sepsis ([facility] [address] 01/17/2020    BRBPR (bright red blood per rectum) 01/17/2020    Rectal bleeding 09/25/2018    NSTEMI (non-ST elevated myocardial infarction) ([facility] [address] 06/25/2018    Upper GI bleed 03/07/2017    Diabetes mellitus ([facility] [address]     Atrial flutter (HCC)     Atrial fibrillation (HCC)     Gout     Hyperlipidemia     Hypertension     Arthropathy    Acute on chronic hypoxic hypercapneic resp failure sec to Cardiogenic and non Cardiogenic Pulmonary edema  Systolic CHF with EF of 54-41%  Grade II Diastolic dysfunction  Bilateral Pneumonia sec to COVID-19  Obesity  DIVYA  Chronic Metabolic alkalosis  VDRF s/p extubation  Proteus bacteremia  Hyperchloremic Hypernatremia       1. BIPAP prn  2. Keep sats > 92%  3. ICS  4. OOb  5. PT/OT  6. Swallow eval  7.  C/w present management    Electronically signed by Hussein Dawson MD on 12/30/2021 at 1:04 PM

## 2021-12-30 NOTE — PROGRESS NOTES
Eliquis/metoprolol  Disposition: Probably after resuming home diuretics. Defer to nephrology. Remains on RA at this time, does continue to have severe hypernatremia. IVF changed per nephrology recommendations, will continue to monitor BMP closely and adjust accordingly. Likely ok for dc after correction of hypernatremia. Diet ADULT DIET; Dysphagia - Pureed; Moderately Thick (Honey)  ADULT ORAL NUTRITION SUPPLEMENT; Breakfast, Lunch, Dinner; Frozen Oral Supplement   DVT Prophylaxis [] Lovenox, []  Heparin, [] SCDs, [] Ambulation   GI Prophylaxis [] PPI,  [] H2 Blocker,  [] Carafate,  [] Diet/Tube Feeds   Code Status Full Code   Disposition Patient requires continued admission due to respiratory failure   MDM [] Low, [] Moderate,[x]  High  Patient's risk as above due to respiratory failure     History of Present Illness:     Chief Complaint: Respiratory failure  Kathlynn Holter is a 77 y.o.  male  who presents with fall and Covid pneumonia     Subjective-  No acute complaints     Objective: Intake/Output Summary (Last 24 hours) at 12/30/2021 1017  Last data filed at 12/30/2021 1004  Gross per 24 hour   Intake 50 ml   Output 200 ml   Net -150 ml      Vitals:   Vitals:    12/30/21 0954   BP: 107/75   Pulse: 56   Resp: 12   Temp: 97.4 °F (36.3 °C)   SpO2: 98%       Vent Information  $Ventilation: $Subsequent Day  Skin Assessment: Clean, dry, & intact  Suction Catheter Diameter: 14  Equipment ID: [id number]  Equipment Changed: HME  Vent Type: [phone]  Vent Mode: SIMV/VC  Vt Ordered: 500 mL  Rate Set: 12 bmp  Peak Flow: 55 L/min  Pressure Support: 15 cmH20  FiO2 : 30 %  SpO2: 98 %  SpO2/FiO2 ratio: 330  Sensitivity: 3  PEEP/CPAP: 5  I Time/ I Time %: 0 s  Humidification Source: HME  Nitric Oxide/Epoprostenol In Use?: No  Mask Type: Full face mask  Mask Size: Medium  No results for input(s): PH, [id number], PO2ART, BE, EUT0TOB, CO2CT, O2SAT, LABCARB in the last 72 hours. Invalid input(s):   [facility]          Physical Exam:   GEN Awake male, lying in bed in no apparent distress. Appears given age. EYES Pupils are equally round. No scleral erythema, discharge, or conjunctivitis. HENT Mucous membranes are moist. Oral pharynx without exudates, no evidence of thrush. NECK Supple, no apparent thyromegaly or masses. RESP Clear to auscultation, no wheezes, rales or rhonchi. Symmetric chest movement while on room air. CARDIO/VASC S1/S2 auscultated. Regular rate without appreciable murmurs, rubs, or gallops. No JVD or carotid bruits. Peripheral pulses equal bilaterally and palpable. No peripheral edema. GI Abdomen is soft without significant tenderness, masses, or guarding. Bowel sounds are normoactive. Rectal exam deferred.  No costovertebral angle tenderness. Normal appearing external genitalia. Lane catheter is not present. HEME/LYMPH No palpable cervical lymphadenopathy and no hepatosplenomegaly. No petechiae or ecchymoses. MSK No gross joint deformities. SKIN Normal coloration, warm, dry. NEURO Cranial nerves appear grossly intact, normal speech, no lateralizing weakness. PSYCH Awake, alert, oriented x 3. Affect appropriate.     Data:   CBC with Differential:    Lab Results   Component Value Date    WBC 6.5 12/29/2021    RBC 4.07 12/29/2021    HGB 11.5 12/29/2021    HCT 39.6 12/29/2021     12/29/2021    MCV 97.3 12/29/2021    MCH 28.3 12/29/2021    MCHC 29.0 12/29/2021    RDW 19.6 12/29/2021    NRBC 2 12/15/2021    SEGSPCT 68.0 12/15/2021    BANDSPCT 14 12/15/2021    LYMPHOPCT 11.0 12/15/2021    MONOPCT 5.0 12/15/2021    BASOPCT 0.1 12/13/2021    MONOSABS 0.5 12/15/2021    LYMPHSABS 1.0 12/15/2021    EOSABS 0.0 12/13/2021    BASOSABS 0.0 12/13/2021    DIFFTYPE MANUAL DIFFERENTIAL 12/15/2021       CMP:     Lab Results   Component Value Date     12/30/2021    K 3.9 12/30/2021     12/30/2021    CO2 23 12/30/2021    BUN 45 12/30/2021    CREATININE 0.7 12/30/2021    GFRAA >60 12/30/2021    LABGLOM >60 12/30/2021    GLUCOSE 170 12/30/2021    PROT 5.8 12/18/2021    LABALBU 2.2 12/30/2021    CALCIUM 8.4 12/30/2021    BILITOT 0.6 12/18/2021    ALKPHOS 62 12/18/2021    AST 22 12/18/2021    ALT 10 12/18/2021       Troponin:  Lab Results   Component Value Date    TROPONINT 0.154 12/13/2021       U/A:    Lab Results   Component Value Date    COLORU COLORLESS 12/18/2021    PROTEINU 100 12/18/2021    WBCUA NONE SEEN 12/18/2021    RBCUA 31 12/18/2021    MUCUS RARE 12/18/2021    TRICHOMONAS NONE SEEN 12/18/2021    BACTERIA NEGATIVE 12/18/2021    CLARITYU CLEAR 12/18/2021    SPECGRAV 1.017 12/18/2021    LEUKOCYTESUR NEGATIVE 12/18/2021    UROBILINOGEN NORMAL 12/18/2021    BILIRUBINUR NEGATIVE 12/18/2021    BLOODU LARGE 12/18/2021       Urine Culture:  No components found for: BURKE    Radiology results:  XR CHEST PORTABLE   Final Result   1. Stable diffuse bilateral lung infiltrates, compatible with multifocal   pneumonia, including COVID. XR CHEST PORTABLE   Final Result   Stable exam with patchy bilateral airspace opacities compatible with   multifocal pneumonia or edema. XR CHEST PORTABLE   Final Result   1. No significant change in patchy subpleural, basilar predominant airspace   opacities consistent with COVID-19 pneumonia given patient history. 2. Pulmonary vascular congestion and mild cardiomegaly. XR CHEST PORTABLE   Final Result      XR CHEST PORTABLE   Final Result   Mild bilateral improvement in airspace opacities. Trace left apical   pneumothorax is unchanged. XR CHEST PORTABLE   Final Result   The left apical pneumothorax is still identified. Similar to 12/18/2021 but   smaller than 12/17/2021. The multifocal pulmonary opacities are redemonstrated. May have some   worsening atelectasis in the right lower lobe. XR CHEST PORTABLE   Final Result   Improving small left apical pneumothorax measuring 9 mm.       New right lower lobe infiltrate may represent atelectasis versus pneumonia. Lines and tubes are stable      Stable mild pneumomediastinum. XR CHEST PORTABLE   Final Result   Interval development of a small left apical pneumothorax measuring   approximately 2.1 cm between pleural surfaces. Pneumomediastinum appears   increased when compared to the previous exam as well. Continued patchy opacity throughout both lungs, most compatible with   multifocal pneumonia. XR CHEST PORTABLE   Final Result   1. No appreciable left pneumothorax identified on the current exam.   2. Similar bilateral airspace opacities. 3. Support lines and tubes remain in place. XR CHEST PORTABLE   Final Result   1. Small stable left apical pneumothorax. 2. Bilateral airspace opacities are seen without significant change. XR CHEST PORTABLE   Final Result   Stable small left apical pneumothorax. Stable pulmonary vascular congestion along with interstitial opacities and   hazy airspace opacities, left worse than right which may be on the basis of   interstitial and pulmonary edema but can also be seen in the setting of   atypical pneumonia. XR CHEST PORTABLE   Final Result   Persistent small left apical pneumothorax estimated to be approximately 10%   in severity. Support tube/catheters project in stable/satisfactory position   in the frontal projection. Consolidating infiltrates and or pulmonary edema, cardiomegaly unchanged   compared to 12/14/2021 consistent with diffuse bilateral pneumonia and/or   congestive heart failure. XR CHEST PORTABLE   Final Result   Left chest tube remains in place, with slight interval decrease in size in   small left apical pneumothorax. XR CHEST PORTABLE   Final Result   Persistent small left pneumothorax with 1 cm pleural apical separation, with   intervally placed left-sided chest tube. Similar diffuse bilateral airspace disease.          XR ABDOMEN (KUB) (SINGLE AP VIEW)   Final Result NG tube in place with tip and side port below the diaphragm and in the region   of the gastric body. XR CHEST PORTABLE   Final Result   Interval development of a large left tension pneumothorax. Extensive bilateral airspace opacities without significant change. Findings were discussed with Dr. Preet Ulloa  at 7:43 am on 12/13/2021. XR CHEST PORTABLE   Final Result   Persistent bilateral airspace disease without acute interval change. XR CHEST PORTABLE   Final Result   Severe extensive bilateral airspace opacities worsened from the previous exam   compatible with edema, ARDS, or multifocal pneumonia. XR CHEST PORTABLE   Final Result   1. Cardiomegaly with severe congestive heart failure. VL DUP LOWER EXTREMITY VENOUS BILATERAL   Final Result   No evidence of DVT in either lower extremity within the limitations. Mild nonspecific subcutaneous edema to bilateral lower extremities. CT HEAD WO CONTRAST   Final Result   No acute intracranial abnormality. Age related changes including chronic small vessel ischemic disease and   cerebral atrophy. CT ABDOMEN PELVIS W IV CONTRAST Additional Contrast? None   Final Result   Multifocal consolidative changes both lungs worrisome for multifocal   pneumonia. Follow-up in following medical treatment course is recommended   document complete resolution. Multifocal bladder traumatic trabeculation/diverticula noted with slight   haziness of surrounding fat planes. Please correlate with diffuse urinalysis   findings. Is findings could suggest underlying cystitis. Prominence of the Alisson aortic lymph nodes again identified. These could be   reactive due to an underlying infectious inflammatory process such is the   bladder haziness. However neoplastic process may also considered. Consider   3-6 month follow-up.       RECOMMENDATIONS:   Unavailable         CT CHEST W CONTRAST   Final Result Multifocal consolidative changes both lungs worrisome for multifocal   pneumonia. Follow-up in following medical treatment course is recommended   document complete resolution. Multifocal bladder traumatic trabeculation/diverticula noted with slight   haziness of surrounding fat planes. Please correlate with diffuse urinalysis   findings. Is findings could suggest underlying cystitis. Prominence of the Alisson aortic lymph nodes again identified. These could be   reactive due to an underlying infectious inflammatory process such is the   bladder haziness. However neoplastic process may also considered. Consider   3-6 month follow-up. RECOMMENDATIONS:   Unavailable         XR SHOULDER LEFT (MIN 2 VIEWS)   Final Result   No acute fracture identified. Incidentally noted mass or masslike consolidation in the left upper lobe. Advise chest CT for further evaluation. XR PELVIS (1-2 VIEWS)   Final Result   No acute abnormality detected. Severe degenerative changes at the right hip, with chronic bony remodeling,   not substantially changed when compared to the CT from January 2020. XR CHEST PORTABLE   Final Result   1. No acute cardiopulmonary process identified. 2. Chronic interstitial changes of the lungs. CT CERVICAL SPINE WO CONTRAST   Final Result   Motion degradation challenge evaluation. No convincing evidence acute   displaced fracture traumatic malalignment. There are moderate multilevel   degenerate change         CT HEAD WO CONTRAST   Final Result   No acute intracranial abnormality. Geographic lucency frontal bone noted, consider bone scan imaging or consider   skeletal survey.              Medications:   Medications:    dexamethasone  4 mg Oral Daily    [START ON 1/1/2022] dexamethasone  2 mg Oral Daily    insulin glargine  35 Units SubCUTAneous Nightly    insulin lispro  0-18 Units SubCUTAneous TID WC    insulin lispro  0-9 Units SubCUTAneous 2 times per day    polyethylene glycol  17 g Oral Daily    chlorhexidine  15 mL Mouth/Throat BID    famotidine (PEPCID) injection  20 mg IntraVENous BID    lidocaine  5 mL IntraDERmal Once    sodium chloride flush  5-40 mL IntraVENous 2 times per day    metoprolol succinate  50 mg Oral BID    allopurinol  300 mg Oral Daily    apixaban  5 mg Oral BID    pravastatin  20 mg Oral Nightly    tamsulosin  0.4 mg Oral Nightly    sodium chloride flush  5-40 mL IntraVENous 2 times per day      Infusions:    dextrose 125 mL/hr at 12/30/21 0355    dextrose      sodium chloride       PRN Meds: morphine, 2 mg, Q4H PRN  magic (miracle) mouthwash, 5 mL, 4x Daily PRN  hydrALAZINE (APRESOLINE) ivpb, 10 mg, Q4H PRN  sodium chloride flush, 5-40 mL, PRN  glucose, 15 g, PRN  dextrose, 12.5 g, PRN  glucagon (rDNA), 1 mg, PRN  dextrose, 100 mL/hr, PRN  oxyCODONE, 5 mg, Q4H PRN  guaiFENesin-dextromethorphan, 5 mL, Q4H PRN  potassium chloride, 40 mEq, PRN   Or  potassium alternative oral replacement, 40 mEq, PRN   Or  potassium chloride, 10 mEq, PRN  sodium chloride flush, 5-40 mL, PRN  sodium chloride, 25 mL, PRN  ondansetron, 4 mg, Q8H PRN   Or  ondansetron, 4 mg, Q6H PRN  polyethylene glycol, 17 g, Daily PRN  acetaminophen, 650 mg, Q6H PRN   Or  acetaminophen, 650 mg, Q6H PRN          Electronically signed by Mendel Pereira MD on 12/30/2021 at 10:17 AM

## 2021-12-30 NOTE — PLAN OF CARE
Problem: Falls - Risk of:  Goal: Will remain free from falls  Description: Will remain free from falls  Outcome: Ongoing  Goal: Absence of physical injury  Description: Absence of physical injury  Outcome: Ongoing     Problem: Airway Clearance - Ineffective  Goal: Achieve or maintain patent airway  Outcome: Ongoing     Problem: Gas Exchange - Impaired  Goal: Absence of hypoxia  Outcome: Ongoing  Goal: Promote optimal lung function  Outcome: Ongoing     Problem: Breathing Pattern - Ineffective  Goal: Ability to achieve and maintain a regular respiratory rate  Outcome: Ongoing     Problem:  Body Temperature -  Risk of, Imbalanced  Goal: Ability to maintain a body temperature within defined limits  Outcome: Ongoing  Goal: Will regain or maintain usual level of consciousness  Outcome: Ongoing  Goal: Complications related to the disease process, condition or treatment will be avoided or minimized  Outcome: Ongoing     Problem: Isolation Precautions - Risk of Spread of Infection  Goal: Prevent transmission of infection  Outcome: Ongoing     Problem: Risk for Fluid Volume Deficit  Goal: Maintain normal heart rhythm  Outcome: Ongoing  Goal: Maintain absence of muscle cramping  Outcome: Ongoing  Goal: Maintain normal serum potassium, sodium, calcium, phosphorus, and pH  Outcome: Ongoing     Problem: Loneliness or Risk for Loneliness  Goal: Demonstrate positive use of time alone when socialization is not possible  Outcome: Ongoing     Problem: Fatigue  Goal: Verbalize increase energy and improved vitality  Outcome: Ongoing     Problem: Patient Education: Go to Patient Education Activity  Goal: Patient/Family Education  Outcome: Ongoing     Problem: Pain:  Goal: Pain level will decrease  Description: Pain level will decrease  Outcome: Ongoing  Goal: Control of acute pain  Description: Control of acute pain  Outcome: Ongoing  Goal: Control of chronic pain  Description: Control of chronic pain  Outcome: Ongoing     Problem: Skin Integrity:  Goal: Will show no infection signs and symptoms  Description: Will show no infection signs and symptoms  Outcome: Ongoing  Goal: Absence of new skin breakdown  Description: Absence of new skin breakdown  Outcome: Ongoing     Problem: Nutrition  Goal: Optimal nutrition therapy  Outcome: Ongoing     Problem: Non-Violent Restraints  Goal: Removal from restraints as soon as assessed to be safe  Outcome: Ongoing  Goal: No harm/injury to patient while restraints in use  Outcome: Ongoing  Goal: Patient's dignity will be maintained  Outcome: Ongoing

## 2021-12-31 LAB
ALBUMIN SERPL-MCNC: 2.2 GM/DL (ref 3.4–5)
ANION GAP SERPL CALCULATED.3IONS-SCNC: 7 MMOL/L (ref 4–16)
BUN BLDV-MCNC: 38 MG/DL (ref 6–23)
CALCIUM SERPL-MCNC: 7.9 MG/DL (ref 8.3–10.6)
CHLORIDE BLD-SCNC: 113 MMOL/L (ref 99–110)
CO2: 23 MMOL/L (ref 21–32)
CREAT SERPL-MCNC: 0.7 MG/DL (ref 0.9–1.3)
GFR AFRICAN AMERICAN: >60 ML/MIN/1.73M2
GFR NON-AFRICAN AMERICAN: >60 ML/MIN/1.73M2
GLUCOSE BLD-MCNC: 104 MG/DL (ref 70–99)
GLUCOSE BLD-MCNC: 139 MG/DL (ref 70–99)
GLUCOSE BLD-MCNC: 144 MG/DL (ref 70–99)
GLUCOSE BLD-MCNC: 151 MG/DL (ref 70–99)
GLUCOSE BLD-MCNC: 174 MG/DL (ref 70–99)
GLUCOSE BLD-MCNC: 217 MG/DL (ref 70–99)
HCT VFR BLD CALC: 36.6 % (ref 42–52)
HEMOGLOBIN: 11 GM/DL (ref 13.5–18)
MCH RBC QN AUTO: 28.4 PG (ref 27–31)
MCHC RBC AUTO-ENTMCNC: 30.1 % (ref 32–36)
MCV RBC AUTO: 94.3 FL (ref 78–100)
PDW BLD-RTO: 18.9 % (ref 11.7–14.9)
PHOSPHORUS: 2.5 MG/DL (ref 2.5–4.9)
PLATELET # BLD: 177 K/CU MM (ref 140–440)
PMV BLD AUTO: 12.5 FL (ref 7.5–11.1)
POTASSIUM SERPL-SCNC: 4.1 MMOL/L (ref 3.5–5.1)
RBC # BLD: 3.88 M/CU MM (ref 4.6–6.2)
SODIUM BLD-SCNC: 143 MMOL/L (ref 135–145)
WBC # BLD: 6.4 K/CU MM (ref 4–10.5)

## 2021-12-31 PROCEDURE — 6370000000 HC RX 637 (ALT 250 FOR IP): Performed by: INTERNAL MEDICINE

## 2021-12-31 PROCEDURE — 2580000003 HC RX 258: Performed by: NURSE PRACTITIONER

## 2021-12-31 PROCEDURE — 2500000003 HC RX 250 WO HCPCS: Performed by: NURSE PRACTITIONER

## 2021-12-31 PROCEDURE — 1200000000 HC SEMI PRIVATE

## 2021-12-31 PROCEDURE — 36592 COLLECT BLOOD FROM PICC: CPT

## 2021-12-31 PROCEDURE — 2580000003 HC RX 258: Performed by: INTERNAL MEDICINE

## 2021-12-31 PROCEDURE — 82962 GLUCOSE BLOOD TEST: CPT

## 2021-12-31 PROCEDURE — 97530 THERAPEUTIC ACTIVITIES: CPT

## 2021-12-31 PROCEDURE — 85027 COMPLETE CBC AUTOMATED: CPT

## 2021-12-31 PROCEDURE — 97535 SELF CARE MNGMENT TRAINING: CPT

## 2021-12-31 PROCEDURE — 6370000000 HC RX 637 (ALT 250 FOR IP): Performed by: NURSE PRACTITIONER

## 2021-12-31 PROCEDURE — 6360000002 HC RX W HCPCS: Performed by: NURSE PRACTITIONER

## 2021-12-31 PROCEDURE — 99231 SBSQ HOSP IP/OBS SF/LOW 25: CPT | Performed by: INTERNAL MEDICINE

## 2021-12-31 PROCEDURE — 80069 RENAL FUNCTION PANEL: CPT

## 2021-12-31 PROCEDURE — 6360000002 HC RX W HCPCS: Performed by: HOSPITALIST

## 2021-12-31 PROCEDURE — 94761 N-INVAS EAR/PLS OXIMETRY MLT: CPT

## 2021-12-31 RX ORDER — GUAIFENESIN/DEXTROMETHORPHAN 100-10MG/5
5 SYRUP ORAL 3 TIMES DAILY
Status: DISCONTINUED | OUTPATIENT
Start: 2021-12-31 | End: 2022-01-05 | Stop reason: HOSPADM

## 2021-12-31 RX ADMIN — MORPHINE SULFATE 2 MG: 2 INJECTION, SOLUTION INTRAMUSCULAR; INTRAVENOUS at 20:45

## 2021-12-31 RX ADMIN — GUAIFENESIN SYRUP AND DEXTROMETHORPHAN 5 ML: 100; 10 SYRUP ORAL at 16:21

## 2021-12-31 RX ADMIN — SODIUM CHLORIDE, PRESERVATIVE FREE 10 ML: 5 INJECTION INTRAVENOUS at 16:18

## 2021-12-31 RX ADMIN — ALLOPURINOL 300 MG: 300 TABLET ORAL at 11:20

## 2021-12-31 RX ADMIN — METOPROLOL SUCCINATE 50 MG: 50 TABLET, EXTENDED RELEASE ORAL at 20:46

## 2021-12-31 RX ADMIN — SODIUM CHLORIDE, PRESERVATIVE FREE 10 ML: 5 INJECTION INTRAVENOUS at 20:46

## 2021-12-31 RX ADMIN — TAMSULOSIN HYDROCHLORIDE 0.4 MG: 0.4 CAPSULE ORAL at 20:46

## 2021-12-31 RX ADMIN — PRAVASTATIN SODIUM 20 MG: 10 TABLET ORAL at 20:46

## 2021-12-31 RX ADMIN — DEXAMETHASONE 4 MG: 4 TABLET ORAL at 11:19

## 2021-12-31 RX ADMIN — INSULIN LISPRO 6 UNITS: 100 INJECTION, SOLUTION INTRAVENOUS; SUBCUTANEOUS at 17:38

## 2021-12-31 RX ADMIN — METOPROLOL SUCCINATE 50 MG: 50 TABLET, EXTENDED RELEASE ORAL at 11:20

## 2021-12-31 RX ADMIN — SODIUM CHLORIDE, PRESERVATIVE FREE 10 ML: 5 INJECTION INTRAVENOUS at 20:45

## 2021-12-31 RX ADMIN — APIXABAN 5 MG: 5 TABLET, FILM COATED ORAL at 20:45

## 2021-12-31 RX ADMIN — INSULIN GLARGINE 35 UNITS: 100 INJECTION, SOLUTION SUBCUTANEOUS at 20:56

## 2021-12-31 RX ADMIN — APIXABAN 5 MG: 5 TABLET, FILM COATED ORAL at 11:20

## 2021-12-31 RX ADMIN — POLYETHYLENE GLYCOL (3350) 17 G: 17 POWDER, FOR SOLUTION ORAL at 11:19

## 2021-12-31 RX ADMIN — SODIUM CHLORIDE, PRESERVATIVE FREE 10 ML: 5 INJECTION INTRAVENOUS at 16:19

## 2021-12-31 RX ADMIN — FAMOTIDINE 20 MG: 10 INJECTION, SOLUTION INTRAVENOUS at 11:19

## 2021-12-31 RX ADMIN — GUAIFENESIN SYRUP AND DEXTROMETHORPHAN 5 ML: 100; 10 SYRUP ORAL at 20:45

## 2021-12-31 RX ADMIN — FAMOTIDINE 20 MG: 10 INJECTION, SOLUTION INTRAVENOUS at 20:45

## 2021-12-31 RX ADMIN — CHLORHEXIDINE GLUCONATE 0.12% ORAL RINSE 15 ML: 1.2 LIQUID ORAL at 20:46

## 2021-12-31 ASSESSMENT — PAIN SCALES - GENERAL
PAINLEVEL_OUTOF10: 7
PAINLEVEL_OUTOF10: 0

## 2021-12-31 ASSESSMENT — PAIN SCALES - WONG BAKER: WONGBAKER_NUMERICALRESPONSE: 0

## 2021-12-31 ASSESSMENT — PAIN DESCRIPTION - PROGRESSION: CLINICAL_PROGRESSION: NOT CHANGED

## 2021-12-31 NOTE — PROGRESS NOTES
Physical Therapy    Physical Therapy Treatment Note  Name: Fabiano Serrano MRN: [id number] :   1945   Date:  2021   Admission Date: 2021 Room:  [address] [place] [address]-A   Restrictions/Precautions:        (+)COVID, fall risk, decreased cognition  Chart reviewed, patient cleared for activity. Co-tx with Jenniffer BUTCHER for safety. Subjective:  Patient states:  Amenable to therapy visit. \"Its ugly out there\" (looking out window). Pain:   Location, Type, Intensity (0/10 to 10/10): Pt with no c/o  Objective: Therapeutic Activity Training:   Cues given for safety, sequence, UE/LE placement, awareness, and balance. Upon entry, [place] is performing bathing in supine, therapy assists with rolling. Rolling performed YOGESH with Mod A and v/c for sequence. Pt demonstrates improved mobility and command following from previous session. Bed mobility: PT encourages sup>sit, provides v/c for sequencing, HOB raised to 80*. Pt demonstrates fair ability to advance LE, requires increased t/c and Mod A for LE off EOB, Max A x2 for LE/ hips to EOB and Max A x2 for trunk to upright. PT v/c for scooting to EOB, pt requires Max x2. Pt vocalizes fear with mobility as pt is fearful of falling, requires reassurance. Sitting balance: Seated EOB pt demonstrates fair- to poor balance, Max A with  UE support required for maintaining upright as pt with increased retropulsion and R lean. Pt able to hold onto bed rail, however insufficient strength to maintain upright. In seated EOB PT cues and demonstrates for LAQ which pt is able to perform x10 YOGESH with limited ROM. Return to supine with Max A x2 d/t pt fatigue after 5 min seated EOB. Max x2 scooting to [facility] in trendelenburg. Additional x1 partial roll to R side with max A for placement of pillow under L hip. Pillows placed under BLE for heels floated. Education: PT encouraged pt for continued participation as pt shows improvement with mobility.      End of session pt left in supine with HOB raised 80*, OT assists with feeding attempt,  with lines managed, call light, phone, exit alarm, tray, all needs, RN aware. Assessment / Impression: Tolerance of treatment:  fair   Adverse Reaction: none    Plan for Next Session:    Train and educate using established plan to improve functional mobility, safety, and independence.   Time in:  11:38  Time out:  12:05  Timed treatment minutes:  27  Total treatment time:  27  Electronically signed by:    Elena Hooker, PT  12/31/2021, 2:55 PM

## 2021-12-31 NOTE — PROGRESS NOTES
22 days since positive Covid test, per Infection Prevention patient can be clear of isolation status, if afebrile for 24 hours and not increasing 02 demand. Verified with Dr. Han Asa and transfer order placed, for when bed available.

## 2021-12-31 NOTE — PROGRESS NOTES
Progress Note( Dr. Ester Faria)  12/30/2021  Subjective:   Admit Date: 12/8/2021  PCP: Naresh Frey MD    Admitted For :Admitted initially on December 9, 2021 with history of fall and found to have Covid pneumonia    Consulted For: Better control of blood glucose    Interval History: The patient awake and alert. He was extubated and off ventilator. Not on  oxygen either. Is attempting to talk with whisper  According to nurses patient is still not eating much  Patient was transferred to the 3 E. Covid unit on 12/30/2021      Intake/Output Summary (Last 24 hours) at 12/30/2021 2323  Last data filed at 12/30/2021 1850  Gross per 24 hour   Intake 20 ml   Output 350 ml   Net -330 ml       DATA    CBC:   Recent Labs     12/29/21  0432   WBC 6.5   HGB 11.5*       CMP:  Recent Labs     12/28/21  0600 12/28/21  2247 12/29/21  0432 12/29/21  1300 12/30/21  0450 12/30/21  1021 12/30/21  1615   *   < > 157*   < > 149* 148* 143   K 3.6   < > 3.0*   < > 3.9 3.4* 4.0   *   < > 126*   < > 118* 120* 113*   CO2 26   < > 23   < > 23 24 22   BUN 54*   < > 50*   < > 45* 42* 41*   CREATININE 0.9   < > 0.7*   < > 0.7* 0.7* 0.7*   CALCIUM 9.3   < > 7.9*   < > 8.4 7.3* 8.1*   LABALBU 2.6*  --  2.0*  --  2.2*  --   --     < > = values in this interval not displayed. Lipids:   Lab Results   Component Value Date    CHOL 137 06/26/2018    HDL 53 06/26/2018    TRIG 101 06/26/2018     Glucose:  Recent Labs     12/30/21  1154 12/30/21  1627 12/30/21  2201   POCGLU 174* 199* 185*     [id number]:  Lab Results   Component Value Date    LABA1C 7.3 12/18/2021     High Sensitivity TSH:   Lab Results   Component Value Date    TSHHS 3.280 05/07/2021     Free T3: No results found for: FT3  Free T4:No results found for: T4FREE    XR CHEST PORTABLE   Final Result   1. Stable diffuse bilateral lung infiltrates, compatible with multifocal   pneumonia, including COVID.          XR CHEST PORTABLE   Final Result   Stable exam with patchy bilateral airspace opacities compatible with   multifocal pneumonia or edema. XR CHEST PORTABLE   Final Result   1. No significant change in patchy subpleural, basilar predominant airspace   opacities consistent with COVID-19 pneumonia given patient history. 2. Pulmonary vascular congestion and mild cardiomegaly. XR CHEST PORTABLE   Final Result      XR CHEST PORTABLE   Final Result   Mild bilateral improvement in airspace opacities. Trace left apical   pneumothorax is unchanged. XR CHEST PORTABLE   Final Result   The left apical pneumothorax is still identified. Similar to 12/18/2021 but   smaller than 12/17/2021. The multifocal pulmonary opacities are redemonstrated. May have some   worsening atelectasis in the right lower lobe. XR CHEST PORTABLE   Final Result   Improving small left apical pneumothorax measuring 9 mm. New right lower lobe infiltrate may represent atelectasis versus pneumonia. Lines and tubes are stable      Stable mild pneumomediastinum. XR CHEST PORTABLE   Final Result   Interval development of a small left apical pneumothorax measuring   approximately 2.1 cm between pleural surfaces. Pneumomediastinum appears   increased when compared to the previous exam as well. Continued patchy opacity throughout both lungs, most compatible with   multifocal pneumonia. XR CHEST PORTABLE   Final Result   1. No appreciable left pneumothorax identified on the current exam.   2. Similar bilateral airspace opacities. 3. Support lines and tubes remain in place. XR CHEST PORTABLE   Final Result   1. Small stable left apical pneumothorax. 2. Bilateral airspace opacities are seen without significant change. XR CHEST PORTABLE   Final Result   Stable small left apical pneumothorax.       Stable pulmonary vascular congestion along with interstitial opacities and   hazy airspace opacities, left worse than right which may be on the basis of   interstitial and pulmonary edema but can also be seen in the setting of   atypical pneumonia. XR CHEST PORTABLE   Final Result   Persistent small left apical pneumothorax estimated to be approximately 10%   in severity. Support tube/catheters project in stable/satisfactory position   in the frontal projection. Consolidating infiltrates and or pulmonary edema, cardiomegaly unchanged   compared to 12/14/2021 consistent with diffuse bilateral pneumonia and/or   congestive heart failure. XR CHEST PORTABLE   Final Result   Left chest tube remains in place, with slight interval decrease in size in   small left apical pneumothorax. XR CHEST PORTABLE   Final Result   Persistent small left pneumothorax with 1 cm pleural apical separation, with   intervally placed left-sided chest tube. Similar diffuse bilateral airspace disease. XR ABDOMEN (KUB) (SINGLE AP VIEW)   Final Result   NG tube in place with tip and side port below the diaphragm and in the region   of the gastric body. XR CHEST PORTABLE   Final Result   Interval development of a large left tension pneumothorax. Extensive bilateral airspace opacities without significant change. Findings were discussed with Dr. Preet Ulloa  at 7:43 am on 12/13/2021. XR CHEST PORTABLE   Final Result   Persistent bilateral airspace disease without acute interval change. XR CHEST PORTABLE   Final Result   Severe extensive bilateral airspace opacities worsened from the previous exam   compatible with edema, ARDS, or multifocal pneumonia. XR CHEST PORTABLE   Final Result   1. Cardiomegaly with severe congestive heart failure. VL DUP LOWER EXTREMITY VENOUS BILATERAL   Final Result   No evidence of DVT in either lower extremity within the limitations. Mild nonspecific subcutaneous edema to bilateral lower extremities.          CT HEAD WO CONTRAST   Final Result   No acute intracranial abnormality. Age related changes including chronic small vessel ischemic disease and   cerebral atrophy. CT ABDOMEN PELVIS W IV CONTRAST Additional Contrast? None   Final Result   Multifocal consolidative changes both lungs worrisome for multifocal   pneumonia. Follow-up in following medical treatment course is recommended   document complete resolution. Multifocal bladder traumatic trabeculation/diverticula noted with slight   haziness of surrounding fat planes. Please correlate with diffuse urinalysis   findings. Is findings could suggest underlying cystitis. Prominence of the Alisson aortic lymph nodes again identified. These could be   reactive due to an underlying infectious inflammatory process such is the   bladder haziness. However neoplastic process may also considered. Consider   3-6 month follow-up. RECOMMENDATIONS:   Unavailable         CT CHEST W CONTRAST   Final Result   Multifocal consolidative changes both lungs worrisome for multifocal   pneumonia. Follow-up in following medical treatment course is recommended   document complete resolution. Multifocal bladder traumatic trabeculation/diverticula noted with slight   haziness of surrounding fat planes. Please correlate with diffuse urinalysis   findings. Is findings could suggest underlying cystitis. Prominence of the Alisson aortic lymph nodes again identified. These could be   reactive due to an underlying infectious inflammatory process such is the   bladder haziness. However neoplastic process may also considered. Consider   3-6 month follow-up. RECOMMENDATIONS:   Unavailable         XR SHOULDER LEFT (MIN 2 VIEWS)   Final Result   No acute fracture identified. Incidentally noted mass or masslike consolidation in the left upper lobe. Advise chest CT for further evaluation. XR PELVIS (1-2 VIEWS)   Final Result   No acute abnormality detected.       Severe degenerative changes at the right hip, with chronic bony remodeling,   not substantially changed when compared to the CT from January 2020. XR CHEST PORTABLE   Final Result   1. No acute cardiopulmonary process identified. 2. Chronic interstitial changes of the lungs. CT CERVICAL SPINE WO CONTRAST   Final Result   Motion degradation challenge evaluation. No convincing evidence acute   displaced fracture traumatic malalignment. There are moderate multilevel   degenerate change         CT HEAD WO CONTRAST   Final Result   No acute intracranial abnormality. Geographic lucency frontal bone noted, consider bone scan imaging or consider   skeletal survey.               Scheduled Medicines   Medications:    dexamethasone  4 mg Oral Daily    [START ON 1/1/2022] dexamethasone  2 mg Oral Daily    insulin glargine  35 Units SubCUTAneous Nightly    insulin lispro  0-18 Units SubCUTAneous TID WC    insulin lispro  0-9 Units SubCUTAneous 2 times per day    polyethylene glycol  17 g Oral Daily    chlorhexidine  15 mL Mouth/Throat BID    famotidine (PEPCID) injection  20 mg IntraVENous BID    lidocaine  5 mL IntraDERmal Once    sodium chloride flush  5-40 mL IntraVENous 2 times per day    metoprolol succinate  50 mg Oral BID    allopurinol  300 mg Oral Daily    apixaban  5 mg Oral BID    pravastatin  20 mg Oral Nightly    tamsulosin  0.4 mg Oral Nightly    sodium chloride flush  5-40 mL IntraVENous 2 times per day      Infusions:    dextrose 125 mL/hr at 12/30/21 2241    dextrose      sodium chloride           Objective:   Vitals: /78   Pulse 73   Temp 97.5 °F (36.4 °C) (Axillary)   Resp 13   Ht 5' 7.99\" (1.727 m)   Wt 210 lb 15.7 oz (95.7 kg)   SpO2 99%   BMI 32.09 kg/m²   General appearance: Patient extubated and off ventilator not on tube feeding either   Neck: no JVD or bruit  Thyroid : Normal lobes   Lungs: Has Vesicular Breath sounds   Heart:  regular rate and rhythm  Abdomen: soft, non-tender; bowel sounds normal; no masses,  no organomegaly  Musculoskeletal: Normal  Extremities: extremities normal, , no edema  Neurologic: Patient extubated and off ventilator not on tube feeding either       Assessment:     Patient Active Problem List:     Diabetes mellitus ([facility]     Atrial flutter (HCC)     Atrial fibrillation (HCC)     Gout     Hyperlipidemia     Hypertension     Arthropathy     Upper GI bleed     NSTEMI (non-ST elevated myocardial infarction) (HCC)     Rectal bleeding     Sepsis ([facility]     BRBPR (bright red blood per rectum)     Acute cystitis without hematuria     Leukocytosis     Acute hypokalemia     Rectal bleed     Anemia     B12 deficiency     Elevated antinuclear antibody (FREDRICK) level     PNA (pneumonia)     Troponin I above reference range     Hypernatremia possibly due to dehydration      Plan:     1. Reviewed POC blood glucose . Labs and X ray results   2. Reviewed Current Medicines   3. On Correction bolus Humalog/ Basal Lantus /NPH insulin regime  4. Monitor Blood glucose frequently   5. Modified  the dose of Insulin/ other medicines as needed   6. Nephrology managing the IV fluid to manage the hypernatremia  7. Will follow     .      Aleida Mukherjee MD, MD

## 2021-12-31 NOTE — PROGRESS NOTES
12/31/21 0824   Oxygen Therapy/Pulse Ox   O2 Therapy Room air   Resp 17   SpO2 98 %   Pulse Oximeter Device Mode Continuous   $Pulse Oximeter $Spot check (multiple/continuous)   Blood Gas  Performed?  No

## 2021-12-31 NOTE — PROGRESS NOTES
Occupational Therapy      Occupational Therapy Treatment Note      Name: Nat Slater MRN: [id number] :   1945   Date:  2021   Admission Date: 2021 Room:  [facility]     Primary Problem: [place]:  The primary encounter diagnosis [facility] Diagnoses of Fall, initial encounter, NSTEMI (non-ST elevated myocardial infarction) ([facility] Acute cystitis without hematuria, and Multifocal pneumonia were also pertinent to this visit. Restrictions/Precautions:   General precautions, fall risk, Telemetry, BP cuff, Pulse Ox, Lane, Droplet Plus    Communication with other providers: Co-tx with PT Shahana Martinez for safety and endurance     Subjective:  Patient states: \"It feels good to remember\"  Pain: Pt denied pain this date    Objective:    Observation: Pt supine in bed with PCA completing a bath upon OT/PT arrival   Objective Measures: All vitals stable throughout session     Treatment, including education:  Therapeutic Activity Training:   Therapeutic activity training was instructed today. Cues were given for safety, sequence, UE/LE placement, awareness, and balance. Activities performed today included bed mobility training, sup-sit, static sit at EOB, edu on role of OT, edu on importance of EOB/OOB activity. Self Care Training:   Cues were given for safety, sequence, UE/LE placement, visual cues, and balance. Activities performed today included UB/LB dressing and self feeding. Pt supine in bed with PCA completing a bath upon OT/PT arrival. Pt performed log roll to L side Max  A for placement of new pad/sheets. Pt performed UB dressing task of donning new gown Min A for sequencing while supine in bed. Pt performed LB dressing task of donning BL socks dep while supine in bed. Pt performed bed mobility sup to sit EOB Max A x2 with assist to advance BL LEs and to upright trunk. Pt performed static sit at EOB ~4minutes Max A with heavy retropulsive lean back and to R side. Pt returned sit to supine Max A x2 with assist to BL LEs and trunk. Pt boosted up in bed Max A x2. Pt placed in high semi acuna's position in bed and performed self feeding task of taking several bites of food Mod I for set up and handing items to Pt. Pt positioned for comfort and left in bed, alarm set and needs in reach. Assessment / Impression:    Patient's tolerance of treatment: Good  Adverse Reaction: None  Significant change in status and impact: Pt more verbal than in previous session and able to communicate some through note pad and pen. Pt demo increased tolerance of tx this date.    Barriers to improvement: weakness      Plan for Next Session:    Continue per established POC    Time in: 1138  Time out:  1210  Timed treatment minutes: 32  Total treatment time: 32      Electronically signed by:    CASE Canales/L, [address] [facility] [place], [id number]

## 2021-12-31 NOTE — PROGRESS NOTES
Pulmonary and Critical Care  Progress Note      VITALS:  /71   Pulse (!) 46   Temp 97.4 °F (36.3 °C) (Oral)   Resp 17   Ht 5' 7.99\" (1.727 m)   Wt 210 lb 15.7 oz (95.7 kg)   SpO2 98%   BMI 32.09 kg/m²     Subjective:   CHIEF COMPLAINT :fall   HPI:                The patient is lying in the bed. He is not in acute resp distress    Objective:   PHYSICAL EXAM:    LUNGS:Occasional basal crackles  Abd-soft, BS+,NT  Ext- no pedal edema  CVS-s1s2, no murmurs      DATA:    CBC:  Recent Labs     12/29/21  0432 12/31/21  0550   WBC 6.5 6.4   RBC 4.07* 3.88*   HGB 11.5* 11.0*   HCT 39.6* 36.6*    177   MCV 97.3 94.3   MCH 28.3 28.4   MCHC 29.0* 30.1*   RDW 19.6* 18.9*      BMP:  Recent Labs     12/30/21  1021 12/30/21  1615 12/31/21  0550   * 143 143   K 3.4* 4.0 4.1   * 113* 113*   CO2 24 22 23   BUN 42* 41* 38*   CREATININE 0.7* 0.7* 0.7*   CALCIUM 7.3* 8.1* 7.9*   GLUCOSE 185* 234* 144*      ABG:  No results for input(s): PH, PO2ART, HQS0HAP, HCO3, BEART, O2SAT in the last 72 hours. BNP  No results found for: BNP   D-Dimer:  No results found for: DDIMER   1.  Radiology: None      Assessment/Plan     Patient Active Problem List    Diagnosis Date Noted    PNA (pneumonia) 12/09/2021    Troponin I above reference range 12/09/2021    Elevated antinuclear antibody (FREDRICK) level 02/16/2021    B12 deficiency 02/15/2021    Anemia 02/12/2021    Rectal bleed 11/18/2020    Acute hypokalemia 11/17/2020    Acute cystitis without hematuria     Leukocytosis     Sepsis ([facility] 01/17/2020    BRBPR (bright red blood per rectum) 01/17/2020    Rectal bleeding 09/25/2018    [facility] 06/25/2018    Upper GI bleed 03/07/2017    Diabetes mellitus ([facility]     Atrial flutter (HCC)     Atrial fibrillation (HCC)     Gout     Hyperlipidemia     Hypertension     Arthropathy    Acute on chronic hypoxic hypercapneic resp failure sec to Cardiogenic and non Cardiogenic Pulmonary edema  Systolic CHF with EF of 25-37%  Grade II Diastolic dysfunction  Bilateral Pneumonia sec to COVID-19  Obesity  DIVYA  Chronic Metabolic alkalosis  VDRF s/p extubation  Proteus bacteremia  Hyperchloremic Hypernatremia       1. ICS  2. OOB  3. PT/OT  4. Keep sats > 92%  5. BIPAP prn  6. Await placement  7.  C.w present management    Electronically signed by Dora Rush MD on 12/31/2021 at 10:51 AM

## 2021-12-31 NOTE — PROGRESS NOTES
Progress Note( Dr. Alyssa Castaneda)  12/31/2021  Subjective:   Admit Date: 12/8/2021  PCP: Leigh Colmenares MD    Admitted For :Admitted initially on December 9, 2021 with history of fall and found to have Covid pneumonia    Consulted For: Better control of blood glucose    Interval History: The patient awake and alert. He was extubated and off ventilator. Not on  oxygen either. Is attempting to talk with whisper  According to nurses patient is still not eating much  Patient was transferred to the 3 E. Covid unit on 12/30/2021      Intake/Output Summary (Last 24 hours) at 12/31/2021 0645  Last data filed at 12/30/2021 1850  Gross per 24 hour   Intake 20 ml   Output 350 ml   Net -330 ml       DATA    CBC:   Recent Labs     12/29/21  0432   WBC 6.5   HGB 11.5*       CMP:  Recent Labs     12/29/21  0432 12/29/21  1300 12/30/21  0450 12/30/21  1021 12/30/21  1615   *   < > 149* 148* 143   K 3.0*   < > 3.9 3.4* 4.0   *   < > 118* 120* 113*   CO2 23   < > 23 24 22   BUN 50*   < > 45* 42* 41*   CREATININE 0.7*   < > 0.7* 0.7* 0.7*   CALCIUM 7.9*   < > 8.4 7.3* 8.1*   LABALBU 2.0*  --  2.2*  --   --     < > = values in this interval not displayed. Lipids:   Lab Results   Component Value Date    CHOL 137 06/26/2018    HDL 53 06/26/2018    TRIG 101 06/26/2018     Glucose:  Recent Labs     12/30/21  1627 12/30/21  2201 12/31/21  0308   POCGLU 199* 185* 174*     [id number]:  Lab Results   Component Value Date    LABA1C 7.3 12/18/2021     High Sensitivity TSH:   Lab Results   Component Value Date    TSHHS 3.280 05/07/2021     Free T3: No results found for: FT3  Free T4:No results found for: T4FREE    XR CHEST PORTABLE   Final Result   1. Stable diffuse bilateral lung infiltrates, compatible with multifocal   pneumonia, including COVID. XR CHEST PORTABLE   Final Result   Stable exam with patchy bilateral airspace opacities compatible with   multifocal pneumonia or edema.          XR CHEST PORTABLE Final Result   1. No significant change in patchy subpleural, basilar predominant airspace   opacities consistent with COVID-19 pneumonia given patient history. 2. Pulmonary vascular congestion and mild cardiomegaly. XR CHEST PORTABLE   Final Result      XR CHEST PORTABLE   Final Result   Mild bilateral improvement in airspace opacities. Trace left apical   pneumothorax is unchanged. XR CHEST PORTABLE   Final Result   The left apical pneumothorax is still identified. Similar to 12/18/2021 but   smaller than 12/17/2021. The multifocal pulmonary opacities are redemonstrated. May have some   worsening atelectasis in the right lower lobe. XR CHEST PORTABLE   Final Result   Improving small left apical pneumothorax measuring 9 mm. New right lower lobe infiltrate may represent atelectasis versus pneumonia. Lines and tubes are stable      Stable mild pneumomediastinum. XR CHEST PORTABLE   Final Result   Interval development of a small left apical pneumothorax measuring   approximately 2.1 cm between pleural surfaces. Pneumomediastinum appears   increased when compared to the previous exam as well. Continued patchy opacity throughout both lungs, most compatible with   multifocal pneumonia. XR CHEST PORTABLE   Final Result   1. No appreciable left pneumothorax identified on the current exam.   2. Similar bilateral airspace opacities. 3. Support lines and tubes remain in place. XR CHEST PORTABLE   Final Result   1. Small stable left apical pneumothorax. 2. Bilateral airspace opacities are seen without significant change. XR CHEST PORTABLE   Final Result   Stable small left apical pneumothorax.       Stable pulmonary vascular congestion along with interstitial opacities and   hazy airspace opacities, left worse than right which may be on the basis of   interstitial and pulmonary edema but can also be seen in the setting of   atypical pneumonia. XR CHEST PORTABLE   Final Result   Persistent small left apical pneumothorax estimated to be approximately 10%   in severity. Support tube/catheters project in stable/satisfactory position   in the frontal projection. Consolidating infiltrates and or pulmonary edema, cardiomegaly unchanged   compared to 12/14/2021 consistent with diffuse bilateral pneumonia and/or   congestive heart failure. XR CHEST PORTABLE   Final Result   Left chest tube remains in place, with slight interval decrease in size in   small left apical pneumothorax. XR CHEST PORTABLE   Final Result   Persistent small left pneumothorax with 1 cm pleural apical separation, with   intervally placed left-sided chest tube. Similar diffuse bilateral airspace disease. XR ABDOMEN (KUB) (SINGLE AP VIEW)   Final Result   NG tube in place with tip and side port below the diaphragm and in the region   of the gastric body. XR CHEST PORTABLE   Final Result   Interval development of a large left tension pneumothorax. Extensive bilateral airspace opacities without significant change. Findings were discussed with Dr. Abelardo Parker  at 7:43 am on 12/13/2021. XR CHEST PORTABLE   Final Result   Persistent bilateral airspace disease without acute interval change. XR CHEST PORTABLE   Final Result   Severe extensive bilateral airspace opacities worsened from the previous exam   compatible with edema, ARDS, or multifocal pneumonia. XR CHEST PORTABLE   Final Result   1. Cardiomegaly with severe congestive heart failure. VL DUP LOWER EXTREMITY VENOUS BILATERAL   Final Result   No evidence of DVT in either lower extremity within the limitations. Mild nonspecific subcutaneous edema to bilateral lower extremities. CT HEAD WO CONTRAST   Final Result   No acute intracranial abnormality.       Age related changes including chronic small vessel ischemic disease and cerebral atrophy. CT ABDOMEN PELVIS W IV CONTRAST Additional Contrast? None   Final Result   Multifocal consolidative changes both lungs worrisome for multifocal   pneumonia. Follow-up in following medical treatment course is recommended   document complete resolution. Multifocal bladder traumatic trabeculation/diverticula noted with slight   haziness of surrounding fat planes. Please correlate with diffuse urinalysis   findings. Is findings could suggest underlying cystitis. Prominence of the Alisson aortic lymph nodes again identified. These could be   reactive due to an underlying infectious inflammatory process such is the   bladder haziness. However neoplastic process may also considered. Consider   3-6 month follow-up. RECOMMENDATIONS:   Unavailable         CT CHEST W CONTRAST   Final Result   Multifocal consolidative changes both lungs worrisome for multifocal   pneumonia. Follow-up in following medical treatment course is recommended   document complete resolution. Multifocal bladder traumatic trabeculation/diverticula noted with slight   haziness of surrounding fat planes. Please correlate with diffuse urinalysis   findings. Is findings could suggest underlying cystitis. Prominence of the Alisson aortic lymph nodes again identified. These could be   reactive due to an underlying infectious inflammatory process such is the   bladder haziness. However neoplastic process may also considered. Consider   3-6 month follow-up. RECOMMENDATIONS:   Unavailable         XR SHOULDER LEFT (MIN 2 VIEWS)   Final Result   No acute fracture identified. Incidentally noted mass or masslike consolidation in the left upper lobe. Advise chest CT for further evaluation. XR PELVIS (1-2 VIEWS)   Final Result   No acute abnormality detected.       Severe degenerative changes at the right hip, with chronic bony remodeling,   not substantially changed when compared to the CT from January 2020. XR CHEST PORTABLE   Final Result   1. No acute cardiopulmonary process identified. 2. Chronic interstitial changes of the lungs. CT CERVICAL SPINE WO CONTRAST   Final Result   Motion degradation challenge evaluation. No convincing evidence acute   displaced fracture traumatic malalignment. There are moderate multilevel   degenerate change         CT HEAD WO CONTRAST   Final Result   No acute intracranial abnormality. Geographic lucency frontal bone noted, consider bone scan imaging or consider   skeletal survey.               Scheduled Medicines   Medications:    dexamethasone  4 mg Oral Daily    [START ON 1/1/2022] dexamethasone  2 mg Oral Daily    insulin glargine  35 Units SubCUTAneous Nightly    insulin lispro  0-18 Units SubCUTAneous TID WC    insulin lispro  0-9 Units SubCUTAneous 2 times per day    polyethylene glycol  17 g Oral Daily    chlorhexidine  15 mL Mouth/Throat BID    famotidine (PEPCID) injection  20 mg IntraVENous BID    lidocaine  5 mL IntraDERmal Once    sodium chloride flush  5-40 mL IntraVENous 2 times per day    metoprolol succinate  50 mg Oral BID    allopurinol  300 mg Oral Daily    apixaban  5 mg Oral BID    pravastatin  20 mg Oral Nightly    tamsulosin  0.4 mg Oral Nightly    sodium chloride flush  5-40 mL IntraVENous 2 times per day      Infusions:    dextrose 125 mL/hr at 12/30/21 2241    dextrose      sodium chloride           Objective:   Vitals: BP (!) 114/58   Pulse (!) 46   Temp 97.5 °F (36.4 °C) (Axillary)   Resp 16   Ht 5' 7.99\" (1.727 m)   Wt 210 lb 15.7 oz (95.7 kg)   SpO2 99%   BMI 32.09 kg/m²   General appearance: Patient extubated and off ventilator not on tube feeding either   Neck: no JVD or bruit  Thyroid : Normal lobes   Lungs: Has Vesicular Breath sounds   Heart:  regular rate and rhythm  Abdomen: soft, non-tender; bowel sounds normal; no masses,  no organomegaly  Musculoskeletal: Normal  Extremities: extremities normal, , no edema  Neurologic: Patient extubated and off ventilator not on tube feeding either       Assessment:     Patient Active Problem List:     Diabetes mellitus ([facility] [address]     Atrial flutter (HCC)     Atrial fibrillation (HCC)     Gout     Hyperlipidemia     Hypertension     Arthropathy     Upper GI bleed     NSTEMI (non-ST elevated myocardial infarction) (HCC)     Rectal bleeding     Sepsis ([facility] [address]     BRBPR (bright red blood per rectum)     Acute cystitis without hematuria     Leukocytosis     Acute hypokalemia     Rectal bleed     Anemia     B12 deficiency     Elevated antinuclear antibody (FREDRICK) level     PNA (pneumonia)     Troponin I above reference range     Hypernatremia possibly due to dehydration      Plan:     1. Reviewed POC blood glucose . Labs and X ray results   2. Reviewed Current Medicines   3. On Correction bolus Humalog/ Basal Lantus /NPH insulin regime  4. Monitor Blood glucose frequently   5. Modified  the dose of Insulin/ other medicines as needed   6. Nephrology managing the IV fluid to manage the hypernatremia  7. Will follow     .      Stephen Keane MD, MD

## 2021-12-31 NOTE — PLAN OF CARE
Nutrition Problem #1: Moderate malnutrition,In context of acute illness or injury  Intervention: Food and/or Nutrient Delivery: Continue Current Diet,Modify Oral Nutrition Supplement  Nutritional Goals: Pt will meet greater than 75% estimated nutrient needs during los

## 2021-12-31 NOTE — PROGRESS NOTES
Hospitalist Progress Note      Name:  Martin Mcdowell /Age/Sex: 1945  (77 y.o. male)   MRN & CSN:  [id number] & [id number] Admission Date/Time: 2021  5:33 PM   Location:  [facility] PCP: Missy Hernandez MD         Hospital Day: 24    Assessment and Plan:   Martin Mcdowell is a 77 y.o.  male  who presents with fall and Covid pneumonia    Patient, 78-year-old male with past medical history of atrial fibrillation/flutter, diabetes mellitus type 2, hyperlipidemia, hypertension was admitted on 2021 after an episode of fall. Reported generalized weakness. Patient tested positive for COVID-19 infection. X-ray chest suggestive of pneumonia. Also noted to have UTI. Patient was noted to have acute systolic/diastolic CHF exacerbation. Echocardiogram showed EF 35 to 40%. Patient received 1 dose of Tocilizumab 12/10/2021. Patient was seen by pulmonary/cardiology/cardiothoracic surgery. Patient developed left tension pneumothorax which required chest tube placement. Patient was also seen by urology secondary to CT finding of multifocal bladder traumatic trabeculation/diverticula. Recommended outpatient cystoscopy/TRUS.     Assessment     Acute hypoxic respiratory failure secondary to COVID-19 pneumonia. Extubated   Acute complicated cystitis  Sepsis secondary to above, present on admission  Left tension pneumothorax s/p chest tube placement 2021 to   Acute systolic/diastolic CHF exacerbation  Hypernatremia. S/p fall  Hypertension  Paroxysmal atrial fibrillation  Diabetes mellitus type 2  CAD.       Plan  Extubated 2023. Wean oxygen as tolerated  Continue IV Decadron    S/p Tocilizumab 12/10/2021  Completed 14 days antibiotics    Lasix/Aldactone/Maxzide on hold given hypernatremia. Nephrology to adjust IV fluids for hyper natremia  Nephrology following  Monitor I's and O's. Insulin management per endocrinology  Continue insulin sliding scale.    Continue Eliquis/metoprolol  Disposition: Probably after resuming home diuretics. Defer to nephrology. Remains on RA at this time, does continue to have severe hypernatremia. IVF changed per nephrology recommendations, will continue to monitor BMP closely and adjust accordingly. Likely ok for dc after correction of hypernatremia. Diet ADULT DIET; Dysphagia - Pureed; Moderately Thick (Honey)  ADULT ORAL NUTRITION SUPPLEMENT; Breakfast, Lunch, Dinner; Frozen Oral Supplement   DVT Prophylaxis [] Lovenox, []  Heparin, [] SCDs, [] Ambulation   GI Prophylaxis [] PPI,  [] H2 Blocker,  [] Carafate,  [] Diet/Tube Feeds   Code Status Full Code   Disposition Patient requires continued admission due to respiratory failure   MDM [] Low, [] Moderate,[x]  High  Patient's risk as above due to respiratory failure     History of Present Illness:     Chief Complaint: Respiratory failure  Angel Amador is a 77 y.o.  male  who presents with fall and Covid pneumonia     Subjective-  No acute complaints     Objective: Intake/Output Summary (Last 24 hours) at 12/31/2021 0948  Last data filed at 12/30/2021 1850  Gross per 24 hour   Intake 20 ml   Output 350 ml   Net -330 ml      Vitals:   Vitals:    12/31/21 0845   BP: 117/71   Pulse:    Resp:    Temp: 97.4 °F (36.3 °C)   SpO2:        Vent Information  $Ventilation: $Subsequent Day  Skin Assessment: Clean, dry, & intact  Suction Catheter Diameter: 14  Equipment ID: [id number]  Equipment Changed: HME  Vent Type: [id number]  Vent Mode: SIMV/VC  Vt Ordered: 500 mL  Rate Set: 12 bmp  Peak Flow: 55 L/min  Pressure Support: 15 cmH20  FiO2 : 30 %  SpO2: 98 %  SpO2/FiO2 ratio: 330  Sensitivity: 3  PEEP/CPAP: 5  I Time/ I Time %: 0 s  Humidification Source: HME  Nitric Oxide/Epoprostenol In Use?: No  Mask Type: Full face mask  Mask Size: Medium  No results for input(s): PH, QUD8OIV, PO2ART, BE, OOY1SSP, CO2CT, O2SAT, LABCARB in the last 72 hours. Invalid input(s):   METHGBART          Physical Exam: GEN Awake male, lying in bed in no apparent distress. Appears given age. EYES Pupils are equally round. No scleral erythema, discharge, or conjunctivitis. HENT Mucous membranes are moist. Oral pharynx without exudates, no evidence of thrush. NECK Supple, no apparent thyromegaly or masses. RESP Clear to auscultation, no wheezes, rales or rhonchi. Symmetric chest movement while on room air. CARDIO/VASC S1/S2 auscultated. Regular rate without appreciable murmurs, rubs, or gallops. No JVD or carotid bruits. Peripheral pulses equal bilaterally and palpable. No peripheral edema. GI Abdomen is soft without significant tenderness, masses, or guarding. Bowel sounds are normoactive. Rectal exam deferred.  No costovertebral angle tenderness. Normal appearing external genitalia. Lane catheter is not present. HEME/LYMPH No palpable cervical lymphadenopathy and no hepatosplenomegaly. No petechiae or ecchymoses. MSK No gross joint deformities. SKIN Normal coloration, warm, dry. NEURO Cranial nerves appear grossly intact, normal speech, no lateralizing weakness. PSYCH Awake, alert, oriented x 3. Affect appropriate.     Data:   CBC with Differential:    Lab Results   Component Value Date    WBC 6.4 12/31/2021    RBC 3.88 12/31/2021    HGB 11.0 12/31/2021    HCT 36.6 12/31/2021     12/31/2021    MCV 94.3 12/31/2021    MCH 28.4 12/31/2021    MCHC 30.1 12/31/2021    RDW 18.9 12/31/2021    NRBC 2 12/15/2021    SEGSPCT 68.0 12/15/2021    BANDSPCT 14 12/15/2021    LYMPHOPCT 11.0 12/15/2021    MONOPCT 5.0 12/15/2021    BASOPCT 0.1 12/13/2021    MONOSABS 0.5 12/15/2021    LYMPHSABS 1.0 12/15/2021    EOSABS 0.0 12/13/2021    BASOSABS 0.0 12/13/2021    DIFFTYPE MANUAL DIFFERENTIAL 12/15/2021       CMP:     Lab Results   Component Value Date     12/31/2021    K 4.1 12/31/2021     12/31/2021    CO2 23 12/31/2021    BUN 38 12/31/2021    CREATININE 0.7 12/31/2021    GFRAA >60 12/31/2021    LABGLOM >60 versus pneumonia. Lines and tubes are stable      Stable mild pneumomediastinum. XR CHEST PORTABLE   Final Result   Interval development of a small left apical pneumothorax measuring   approximately 2.1 cm between pleural surfaces. Pneumomediastinum appears   increased when compared to the previous exam as well. Continued patchy opacity throughout both lungs, most compatible with   multifocal pneumonia. XR CHEST PORTABLE   Final Result   1. No appreciable left pneumothorax identified on the current exam.   2. Similar bilateral airspace opacities. 3. Support lines and tubes remain in place. XR CHEST PORTABLE   Final Result   1. Small stable left apical pneumothorax. 2. Bilateral airspace opacities are seen without significant change. XR CHEST PORTABLE   Final Result   Stable small left apical pneumothorax. Stable pulmonary vascular congestion along with interstitial opacities and   hazy airspace opacities, left worse than right which may be on the basis of   interstitial and pulmonary edema but can also be seen in the setting of   atypical pneumonia. XR CHEST PORTABLE   Final Result   Persistent small left apical pneumothorax estimated to be approximately 10%   in severity. Support tube/catheters project in stable/satisfactory position   in the frontal projection. Consolidating infiltrates and or pulmonary edema, cardiomegaly unchanged   compared to 12/14/2021 consistent with diffuse bilateral pneumonia and/or   congestive heart failure. XR CHEST PORTABLE   Final Result   Left chest tube remains in place, with slight interval decrease in size in   small left apical pneumothorax. XR CHEST PORTABLE   Final Result   Persistent small left pneumothorax with 1 cm pleural apical separation, with   intervally placed left-sided chest tube. Similar diffuse bilateral airspace disease.          XR ABDOMEN (KUB) (SINGLE AP VIEW)   Final Result NG tube in place with tip and side port below the diaphragm and in the region   of the gastric body. XR CHEST PORTABLE   Final Result   Interval development of a large left tension pneumothorax. Extensive bilateral airspace opacities without significant change. Findings were discussed with Dr. Nanda Simon  at 7:43 am on 12/13/2021. XR CHEST PORTABLE   Final Result   Persistent bilateral airspace disease without acute interval change. XR CHEST PORTABLE   Final Result   Severe extensive bilateral airspace opacities worsened from the previous exam   compatible with edema, ARDS, or multifocal pneumonia. XR CHEST PORTABLE   Final Result   1. Cardiomegaly with severe congestive heart failure. VL DUP LOWER EXTREMITY VENOUS BILATERAL   Final Result   No evidence of DVT in either lower extremity within the limitations. Mild nonspecific subcutaneous edema to bilateral lower extremities. CT HEAD WO CONTRAST   Final Result   No acute intracranial abnormality. Age related changes including chronic small vessel ischemic disease and   cerebral atrophy. CT ABDOMEN PELVIS W IV CONTRAST Additional Contrast? None   Final Result   Multifocal consolidative changes both lungs worrisome for multifocal   pneumonia. Follow-up in following medical treatment course is recommended   document complete resolution. Multifocal bladder traumatic trabeculation/diverticula noted with slight   haziness of surrounding fat planes. Please correlate with diffuse urinalysis   findings. Is findings could suggest underlying cystitis. Prominence of the Alisson aortic lymph nodes again identified. These could be   reactive due to an underlying infectious inflammatory process such is the   bladder haziness. However neoplastic process may also considered. Consider   3-6 month follow-up.       RECOMMENDATIONS:   Unavailable         CT CHEST W CONTRAST   Final Result Multifocal consolidative changes both lungs worrisome for multifocal   pneumonia. Follow-up in following medical treatment course is recommended   document complete resolution. Multifocal bladder traumatic trabeculation/diverticula noted with slight   haziness of surrounding fat planes. Please correlate with diffuse urinalysis   findings. Is findings could suggest underlying cystitis. Prominence of the Alisson aortic lymph nodes again identified. These could be   reactive due to an underlying infectious inflammatory process such is the   bladder haziness. However neoplastic process may also considered. Consider   3-6 month follow-up. RECOMMENDATIONS:   Unavailable         XR SHOULDER LEFT (MIN 2 VIEWS)   Final Result   No acute fracture identified. Incidentally noted mass or masslike consolidation in the left upper lobe. Advise chest CT for further evaluation. XR PELVIS (1-2 VIEWS)   Final Result   No acute abnormality detected. Severe degenerative changes at the right hip, with chronic bony remodeling,   not substantially changed when compared to the CT from January 2020. XR CHEST PORTABLE   Final Result   1. No acute cardiopulmonary process identified. 2. Chronic interstitial changes of the lungs. CT CERVICAL SPINE WO CONTRAST   Final Result   Motion degradation challenge evaluation. No convincing evidence acute   displaced fracture traumatic malalignment. There are moderate multilevel   degenerate change         CT HEAD WO CONTRAST   Final Result   No acute intracranial abnormality. Geographic lucency frontal bone noted, consider bone scan imaging or consider   skeletal survey.              Medications:   Medications:    guaiFENesin-dextromethorphan  5 mL Oral TID    dexamethasone  4 mg Oral Daily    [START ON 1/1/2022] dexamethasone  2 mg Oral Daily    insulin glargine  35 Units SubCUTAneous Nightly    insulin lispro  0-18 Units SubCUTAneous TID WC    insulin lispro  0-9 Units SubCUTAneous 2 times per day    polyethylene glycol  17 g Oral Daily    chlorhexidine  15 mL Mouth/Throat BID    famotidine (PEPCID) injection  20 mg IntraVENous BID    lidocaine  5 mL IntraDERmal Once    sodium chloride flush  5-40 mL IntraVENous 2 times per day    metoprolol succinate  50 mg Oral BID    allopurinol  300 mg Oral Daily    apixaban  5 mg Oral BID    pravastatin  20 mg Oral Nightly    tamsulosin  0.4 mg Oral Nightly    sodium chloride flush  5-40 mL IntraVENous 2 times per day      Infusions:    dextrose 125 mL/hr at 12/30/21 2241    dextrose      sodium chloride       PRN Meds: potassium chloride, 20 mEq, PRN  potassium chloride, 40 mEq, PRN   Or  potassium alternative oral replacement, 40 mEq, PRN   Or  potassium chloride, 10 mEq, PRN  morphine, 2 mg, Q4H PRN  magic (miracle) mouthwash, 5 mL, 4x Daily PRN  hydrALAZINE (APRESOLINE) ivpb, 10 mg, Q4H PRN  sodium chloride flush, 5-40 mL, PRN  glucose, 15 g, PRN  dextrose, 12.5 g, PRN  glucagon (rDNA), 1 mg, PRN  dextrose, 100 mL/hr, PRN  oxyCODONE, 5 mg, Q4H PRN  guaiFENesin-dextromethorphan, 5 mL, Q4H PRN  sodium chloride flush, 5-40 mL, PRN  sodium chloride, 25 mL, PRN  ondansetron, 4 mg, Q8H PRN   Or  ondansetron, 4 mg, Q6H PRN  polyethylene glycol, 17 g, Daily PRN  acetaminophen, 650 mg, Q6H PRN   Or  acetaminophen, 650 mg, Q6H PRN          Electronically signed by Salena Rubalcava MD on 12/31/2021 at 9:48 AM

## 2021-12-31 NOTE — PROGRESS NOTES
Nephrology Progress Note  12/31/2021 5:28 PM        Subjective:   Admit Date: 12/8/2021  PCP: Krystle Valentine MD    Interval History: Patient seen early morning this is a late entry    Diet: Probably some    ROS: He was alert awake and oriented-this is the best I have seen since Monday  He wanted to get a paper and pencil he can write things-I discussed with the nurse to bring him some  Urine output recorded 1 L for the last shift  No fever        Data:     Current meds:    guaiFENesin-dextromethorphan  5 mL Oral TID    [START ON 1/1/2022] dexamethasone  2 mg Oral Daily    insulin glargine  35 Units SubCUTAneous Nightly    insulin lispro  0-18 Units SubCUTAneous TID WC    insulin lispro  0-9 Units SubCUTAneous 2 times per day    polyethylene glycol  17 g Oral Daily    chlorhexidine  15 mL Mouth/Throat BID    famotidine (PEPCID) injection  20 mg IntraVENous BID    lidocaine  5 mL IntraDERmal Once    sodium chloride flush  5-40 mL IntraVENous 2 times per day    metoprolol succinate  50 mg Oral BID    allopurinol  300 mg Oral Daily    apixaban  5 mg Oral BID    pravastatin  20 mg Oral Nightly    tamsulosin  0.4 mg Oral Nightly    sodium chloride flush  5-40 mL IntraVENous 2 times per day      dextrose 125 mL/hr at 12/30/21 2241    dextrose      sodium chloride           I/O last 3 completed shifts:  In: -   Out: 350 [Urine:350]    CBC:   Recent Labs     12/29/21  0432 12/31/21  0550   WBC 6.5 6.4   HGB 11.5* 11.0*    177          Recent Labs     12/30/21  1021 12/30/21  1615 12/31/21  0550   * 143 143   K 3.4* 4.0 4.1   * 113* 113*   CO2 24 22 23   BUN 42* 41* 38*   CREATININE 0.7* 0.7* 0.7*   GLUCOSE 185* 234* 144*       Lab Results   Component Value Date    CALCIUM 7.9 (L) 12/31/2021    PHOS 2.5 12/31/2021       Objective:     Vitals: /71   Pulse 71   Temp 97.5 °F (36.4 °C) (Axillary)   Resp 25   Ht 5' 7.99\" (1.727 m)   Wt 210 lb 15.7 oz (95.7 kg)   SpO2 98%   BMI 32.09 kg/m²     General appearance: Alert awake and follows command  HEENT: No gross conjunctival pallor-is edentulous  Neck: Supple, left subclavian central venous catheter  Lungs: Adventitious breath sound  Heart: Irregular   Abdomen: Soft, nontender  Extremities: Mild edema  He has a Lane catheter      Problem List :         Impression :     1. Acute kidney injury-better urine output now-stable by BUN and creatinine criteria  2. Sodium also normalized-albeit with D5 water  3. Diabetes mellitus but blood sugar is okay  4. Cardiomyopathy without any clinical evidence of pulmonary edema    Recommendation/Plan  :     1. Will order D5 water to 75 mL an hour  2. Hopefully some enteral nutrition  3. Watch for pulmonary edema  4. Seems doing better clinically follow clinically  5.        Akilah Montoya MD MD

## 2021-12-31 NOTE — PROGRESS NOTES
 Acute cystitis without hematuria     Leukocytosis     Sepsis ([facility] 2020    BRBPR (bright red blood per rectum) 2020    Rectal bleeding 2018    NSTEMI (non-ST elevated myocardial infarction) ([facility] 2018    Upper GI bleed 2017    Diabetes mellitus (HCC)     Atrial flutter (HCC)     Atrial fibrillation (HCC)     Gout     Hyperlipidemia     Hypertension     Arthropathy                       Subjective:     Chief Complaint   Patient presents with   Marcelene Cool     Mr. Mortensen Fee of him having some difficulty in breathing I have some phlegm and need to clear my throat more often. Denies any fever chills nausea vomiting diarrhea. Denies any chest pains. Sitting in bed with no acute distress. Objective:   TEMPERATURE:  Current - Temp: 97.4 °F (36.3 °C); Max - Temp  Av.5 °F (36.4 °C)  Min: 97.4 °F (36.3 °C)  Max: 97.9 °F (36.6 °C)  RESPIRATIONS RANGE: Resp  Av.6  Min: 12  Max: 23  PULSE RANGE: Pulse  Av.5  Min: 46  Max: 74  BLOOD PRESSURE RANGE:  Systolic (72QKD), VTH:407 , Min:83 , PEL:297   ; Diastolic (17ODS), FWY:79, Min:53, Max:82    PULSE OXIMETRY RANGE: SpO2  Av.8 %  Min: 95 %  Max: 99 %  24HR INTAKE/OUTPUT:      Intake/Output Summary (Last 24 hours) at 2021 0933  Last data filed at 2021 1850  Gross per 24 hour   Intake 20 ml   Output 350 ml   Net -330 ml       Intake/Output Summary (Last 24 hours) at 2021 0933  Last data filed at 2021 1850  Gross per 24 hour   Intake 20 ml   Output 350 ml   Net -330 ml              Physical Exam:  eneral appearance: Sitting in bed with no acute distress alert oriented not hypoxic. HEENT PERRLA EOMI sclera is pale   neck: No neck rigidity JVD is negative   lungs: Coarse crackles anterior and posteriorly noted cleared by cough mild wheeze.     Heart: Regular rate and rhythm with Normal S1/S2 without  murmurs, rubs or gallops, point of maximum impulse non-displaced  Abdomen: Obese soft nontender bowel sounds positive. Extremities: Chronic edema skin changes due to chronic lymphedema. Noted the present time  Skin: Chronic skin changes due to chronic lymphedema. Neurologic: Alert and oriented X 3, motor seems to be equal bilaterally cranial nerves are grossly intact. Mental status: Alert, oriented, place    Labs:  CBC:   Lab Results   Component Value Date    WBC 6.4 12/31/2021    RBC 3.88 12/31/2021    HGB 11.0 12/31/2021    HCT 36.6 12/31/2021    MCV 94.3 12/31/2021    MCH 28.4 12/31/2021    MCHC 30.1 12/31/2021    RDW 18.9 12/31/2021     12/31/2021    MPV 12.5 12/31/2021     CMP:    Lab Results   Component Value Date     12/31/2021    K 4.1 12/31/2021     12/31/2021    CO2 23 12/31/2021    BUN 38 12/31/2021    CREATININE 0.7 12/31/2021    GFRAA >60 12/31/2021    LABGLOM >60 12/31/2021    GLUCOSE 144 12/31/2021    PROT 5.8 12/18/2021    LABALBU 2.2 12/31/2021    CALCIUM 7.9 12/31/2021    BILITOT 0.6 12/18/2021    ALKPHOS 62 12/18/2021    AST 22 12/18/2021    ALT 10 12/18/2021     No results for input(s): TROPONINT in the last 72 hours.   Lab Results   Component Value Date    [facility] 3.280 05/07/2021        Scheduled Med:   dexamethasone  4 mg Oral Daily    [START ON 1/1/2022] dexamethasone  2 mg Oral Daily    insulin glargine  35 Units SubCUTAneous Nightly    insulin lispro  0-18 Units SubCUTAneous TID     insulin lispro  0-9 Units SubCUTAneous 2 times per day    polyethylene glycol  17 g Oral Daily    chlorhexidine  15 mL Mouth/Throat BID    famotidine (PEPCID) injection  20 mg IntraVENous BID    lidocaine  5 mL IntraDERmal Once    sodium chloride flush  5-40 mL IntraVENous 2 times per day    metoprolol succinate  50 mg Oral BID    allopurinol  300 mg Oral Daily    apixaban  5 mg Oral BID    pravastatin  20 mg Oral Nightly    tamsulosin  0.4 mg Oral Nightly    sodium chloride flush  5-40 mL IntraVENous 2 times per day         Infusion :   dextrose 125 mL/hr at 12/30/21 2241    dextrose      sodium chloride                 Consultants:    IP CONSULT TO CARDIOLOGY  IP CONSULT TO HOSPITALIST  IP CONSULT TO UROLOGY  IP CONSULT TO PULMONOLOGY  IP CONSULT TO PHARMACY  IP CONSULT TO DIETITIAN  IP CONSULT TO PULMONOLOGY  IP CONSULT TO CARDIOTHORACIC SURGERY  IP CONSULT TO DIETITIAN  IP CONSULT TO ENDOCRINOLOGY  IP CONSULT TO NEPHROLOGY  IP CONSULT TO DIETITIAN  IP CONSULT TO PALLIATIVE CARE    Code Status: Full Code      I have explained to the patient and discussed with him/her the treatment plan. Electronically signed by Marcelino Joy MD on 12/31/2021 at 9:33 AM    Time spent roughly >30 minute in interviewing patient performing medical examination, communicating with relevant medical staff and consultants including documentation .

## 2021-12-31 NOTE — PROGRESS NOTES
Comprehensive Nutrition Assessment    Type and Reason for Visit:  Reassess    Nutrition Recommendations/Plan:   · Continue current diet, modification as per SLP, total assist with feeding, po intakes appreciated  · Modify current supplement for greater variety, add high protein pudding  · If intake does not improve, consider need for tube feeding     Nutrition Assessment:  According to nurses patient is still not eating much noted, refused to participate in SLP eval yesterday, consuming 0-25% of dysphagia pureed diet. Pt is a total assist with meals. 10% wt loss noted over stay. Pt meets criteria for moderate malnutrition. Will continue to follow as high nutrition risk. Malnutrition Assessment:  Malnutrition Status: Moderate malnutrition    Context:  Acute Illness     Findings of the 6 clinical characteristics of malnutrition:  Energy Intake:  75% or less of estimated energy requirements for 7 or more days  Weight Loss:  Greater than 5% over 1 month     Body Fat Loss:  Unable to assess     Muscle Mass Loss:  Unable to assess    Fluid Accumulation:  No significant fluid accumulation Extremities   Strength:  Not Performed    Estimated Daily Nutrient Needs:  Energy (kcal):  5856-4201; Weight Used for Energy Requirements:  Current     Protein (g):  105-140 (1.5-2 g/kg IBW); Weight Used for Protein Requirements:  Ideal        Fluid (ml/day):  fluids per neprhology; Method Used for Fluid Requirements:  Standard Renal      Nutrition Related Findings:  Na 160, D5 ordered at 125 mL/hr providing 3L fluid and 150 grams of dex daily. Current -180. Pt on steroid, insulin and laxative. No BM recoeded during stay. Wounds:  Multiple,Deep Tissue Injury,Venous Stasis       Current Nutrition Therapies:    ADULT DIET; Dysphagia - Pureed;  Moderately Thick (Honey)  ADULT ORAL NUTRITION SUPPLEMENT; Breakfast, Lunch, Dinner; Frozen Oral Supplement    Anthropometric Measures:  · Height: 5' 7.99\" (172.7 cm)  · Current Body Weight: 210 lb 15.7 oz (95.7 kg)   · Admission Body Weight: 233 lb 4 oz (105.8 kg) (first measured weight)    · Usual Body Weight:  (n/a)     · Ideal Body Weight: 154 lbs; % Ideal Body Weight 140.3 %   · BMI: 32.1  · BMI Categories: Obese Class 1 (BMI 30.0-34. 9)       Nutrition Diagnosis:   · Moderate malnutrition,In context of acute illness or injury related to inadequate protein-energy intake as evidenced by weight loss greater than or equal to 5% in 1 month,intake 0-25%    Nutrition Interventions:   Food and/or Nutrient Delivery:  Continue Current Diet,Modify Oral Nutrition Supplement  Nutrition Education/Counseling:  No recommendation at this time   Coordination of Nutrition Care:  Continue to monitor while inpatient,Feeding Assistance/Environment Change,Speech Therapy,Swallow Evaluation    Goals:  Pt will meet greater than 75% estimated nutrient needs during los       Nutrition Monitoring and Evaluation:   Behavioral-Environmental Outcomes:  None Identified   Food/Nutrient Intake Outcomes:  Food and Nutrient Intake,Supplement Intake  Physical Signs/Symptoms Outcomes:  Biochemical Data,GI Status,Hemodynamic Status,Fluid Status or Edema,Weight,Skin     Discharge Planning:    Continue Oral Nutrition Supplement     Electronically signed by Prem Larios RD, LD on 12/31/21 at 11:57 AM EST    Contact: [phone]

## 2022-01-01 LAB
ALBUMIN SERPL-MCNC: 2.4 GM/DL (ref 3.4–5)
ALBUMIN SERPL-MCNC: 2.4 GM/DL (ref 3.4–5)
ALP BLD-CCNC: 73 IU/L (ref 40–129)
ALT SERPL-CCNC: 21 U/L (ref 10–40)
ANION GAP SERPL CALCULATED.3IONS-SCNC: 10 MMOL/L (ref 4–16)
AST SERPL-CCNC: 33 IU/L (ref 15–37)
BILIRUB SERPL-MCNC: 1.1 MG/DL (ref 0–1)
BILIRUBIN DIRECT: 0.4 MG/DL (ref 0–0.3)
BILIRUBIN, INDIRECT: 0.7 MG/DL (ref 0–0.7)
BUN BLDV-MCNC: 29 MG/DL (ref 6–23)
CALCIUM SERPL-MCNC: 8.2 MG/DL (ref 8.3–10.6)
CHLORIDE BLD-SCNC: 116 MMOL/L (ref 99–110)
CO2: 20 MMOL/L (ref 21–32)
CREAT SERPL-MCNC: 0.6 MG/DL (ref 0.9–1.3)
GFR AFRICAN AMERICAN: >60 ML/MIN/1.73M2
GFR NON-AFRICAN AMERICAN: >60 ML/MIN/1.73M2
GLUCOSE BLD-MCNC: 108 MG/DL (ref 70–99)
GLUCOSE BLD-MCNC: 118 MG/DL (ref 70–99)
GLUCOSE BLD-MCNC: 174 MG/DL (ref 70–99)
GLUCOSE BLD-MCNC: 317 MG/DL (ref 70–99)
GLUCOSE BLD-MCNC: 66 MG/DL (ref 70–99)
GLUCOSE BLD-MCNC: 69 MG/DL (ref 70–99)
GLUCOSE BLD-MCNC: 71 MG/DL (ref 70–99)
GLUCOSE BLD-MCNC: 82 MG/DL (ref 70–99)
PHOSPHORUS: 1.9 MG/DL (ref 2.5–4.9)
POTASSIUM SERPL-SCNC: 3.7 MMOL/L (ref 3.5–5.1)
SODIUM BLD-SCNC: 146 MMOL/L (ref 135–145)
TOTAL PROTEIN: 4.4 GM/DL (ref 6.4–8.2)

## 2022-01-01 PROCEDURE — 6370000000 HC RX 637 (ALT 250 FOR IP): Performed by: INTERNAL MEDICINE

## 2022-01-01 PROCEDURE — 2580000003 HC RX 258: Performed by: NURSE PRACTITIONER

## 2022-01-01 PROCEDURE — 1200000000 HC SEMI PRIVATE

## 2022-01-01 PROCEDURE — 80053 COMPREHEN METABOLIC PANEL: CPT

## 2022-01-01 PROCEDURE — 2580000003 HC RX 258: Performed by: INTERNAL MEDICINE

## 2022-01-01 PROCEDURE — 82962 GLUCOSE BLOOD TEST: CPT

## 2022-01-01 PROCEDURE — 2500000003 HC RX 250 WO HCPCS: Performed by: INTERNAL MEDICINE

## 2022-01-01 PROCEDURE — 82248 BILIRUBIN DIRECT: CPT

## 2022-01-01 PROCEDURE — 6360000002 HC RX W HCPCS: Performed by: HOSPITALIST

## 2022-01-01 PROCEDURE — 99231 SBSQ HOSP IP/OBS SF/LOW 25: CPT | Performed by: INTERNAL MEDICINE

## 2022-01-01 PROCEDURE — 84100 ASSAY OF PHOSPHORUS: CPT

## 2022-01-01 PROCEDURE — 2500000003 HC RX 250 WO HCPCS: Performed by: NURSE PRACTITIONER

## 2022-01-01 RX ORDER — INSULIN GLARGINE 100 [IU]/ML
10 INJECTION, SOLUTION SUBCUTANEOUS ONCE
Status: COMPLETED | OUTPATIENT
Start: 2022-01-01 | End: 2022-01-01

## 2022-01-01 RX ADMIN — APIXABAN 5 MG: 5 TABLET, FILM COATED ORAL at 08:48

## 2022-01-01 RX ADMIN — DEXAMETHASONE 2 MG: 4 TABLET ORAL at 08:48

## 2022-01-01 RX ADMIN — GUAIFENESIN SYRUP AND DEXTROMETHORPHAN 5 ML: 100; 10 SYRUP ORAL at 08:47

## 2022-01-01 RX ADMIN — DEXTROSE MONOHYDRATE 12.5 G: 500 INJECTION PARENTERAL at 07:56

## 2022-01-01 RX ADMIN — APIXABAN 5 MG: 5 TABLET, FILM COATED ORAL at 21:26

## 2022-01-01 RX ADMIN — ALLOPURINOL 300 MG: 300 TABLET ORAL at 08:47

## 2022-01-01 RX ADMIN — INSULIN GLARGINE 10 UNITS: 100 INJECTION, SOLUTION SUBCUTANEOUS at 22:40

## 2022-01-01 RX ADMIN — SODIUM CHLORIDE, PRESERVATIVE FREE 10 ML: 5 INJECTION INTRAVENOUS at 08:49

## 2022-01-01 RX ADMIN — GUAIFENESIN SYRUP AND DEXTROMETHORPHAN 5 ML: 100; 10 SYRUP ORAL at 15:17

## 2022-01-01 RX ADMIN — METOPROLOL SUCCINATE 50 MG: 50 TABLET, EXTENDED RELEASE ORAL at 21:25

## 2022-01-01 RX ADMIN — FAMOTIDINE 20 MG: 10 INJECTION, SOLUTION INTRAVENOUS at 08:47

## 2022-01-01 RX ADMIN — DEXTROSE MONOHYDRATE: 50 INJECTION, SOLUTION INTRAVENOUS at 08:45

## 2022-01-01 RX ADMIN — GUAIFENESIN SYRUP AND DEXTROMETHORPHAN 5 ML: 100; 10 SYRUP ORAL at 21:26

## 2022-01-01 RX ADMIN — FAMOTIDINE 20 MG: 10 INJECTION, SOLUTION INTRAVENOUS at 21:28

## 2022-01-01 RX ADMIN — SODIUM CHLORIDE, PRESERVATIVE FREE 10 ML: 5 INJECTION INTRAVENOUS at 21:27

## 2022-01-01 RX ADMIN — PRAVASTATIN SODIUM 20 MG: 10 TABLET ORAL at 21:26

## 2022-01-01 RX ADMIN — SODIUM CHLORIDE, PRESERVATIVE FREE 10 ML: 5 INJECTION INTRAVENOUS at 09:01

## 2022-01-01 RX ADMIN — TAMSULOSIN HYDROCHLORIDE 0.4 MG: 0.4 CAPSULE ORAL at 21:26

## 2022-01-01 RX ADMIN — DEXTROSE MONOHYDRATE: 50 INJECTION, SOLUTION INTRAVENOUS at 12:28

## 2022-01-01 ASSESSMENT — PAIN SCALES - WONG BAKER
WONGBAKER_NUMERICALRESPONSE: 0
WONGBAKER_NUMERICALRESPONSE: 0

## 2022-01-01 ASSESSMENT — PAIN SCALES - GENERAL
PAINLEVEL_OUTOF10: 0
PAINLEVEL_OUTOF10: 0

## 2022-01-01 ASSESSMENT — PAIN DESCRIPTION - PROGRESSION
CLINICAL_PROGRESSION: NOT CHANGED
CLINICAL_PROGRESSION: NOT CHANGED

## 2022-01-01 NOTE — PROGRESS NOTES
General appearance: Alert, awake and oriented-no symptoms from hypoglycemia although he did receive D50 also getting D5 water continuous infusion  HEENT: Mild conjunctival pallor and he is edentulous  Neck: Left subclavian central venous catheter-he is on supplemental oxygen  Lungs: Positive few adventitial breath sound  Heart: Bradycardia this morning when I examined him  Abdomen: Soft  Extremities: 1+ lower extremity edema  I did not see any Lane catheter      Problem List :         Impression :     1. Acute kidney injury-recovered by creatinine criteria with good urine output  2. Sodium still little bit high and with hypoglycemia-we will continue the D5-if necessary may keep D10  3. Diabetes and dysrhythmia but with hypoglycemia  4. Also has cardiomyopathy but no apparent pulmonary edema  5. Recent COVID-19 of course    Recommendation/Plan  :     1. We will try to keep normoglycemia  2. Watch electrolyte and replete as necessary  3. Watch for pulmonary edema with severe cardiomyopathy-although somehow it is compensated-probably from malnutrition  4. Watch for iatrogenic nosocomial complication  5. Unfortunately cannot drink liquid  6.  Follow clinically and biochemically      Harvis Gitelman, MD MD

## 2022-01-01 NOTE — PROGRESS NOTES
Progress Note( Dr. Nara Sevilla)  1/1/2022  Subjective:   Admit Date: 12/8/2021  PCP: Phyllis Kaur MD    Admitted For :Admitted initially on December 9, 2021 with history of fall and found to have Covid pneumonia    Consulted For: Better control of blood glucose    Interval History: The patient awake and alert. He was extubated and off ventilator. Not on  oxygen either. Is attempting to talk with whisper    Patient had episode of hypoglycemia last night patient received Lantus insulin as an error as he not supposed to get it as his blood glucose is below 100 according to nurses patient is still not eating much  Patient was transferred to the 3 E. Covid unit on 12/30/2021      Intake/Output Summary (Last 24 hours) at 1/1/2022 1829  Last data filed at 1/1/2022 0811  Gross per 24 hour   Intake --   Output 1750 ml   Net -1750 ml       DATA    CBC:   Recent Labs     12/31/21  0550   WBC 6.4   HGB 11.0*       CMP:  Recent Labs     12/30/21  0450 12/30/21  1021 12/30/21  1615 12/31/21  0550 01/01/22  0700   *   < > 143 143 146*   K 3.9   < > 4.0 4.1 3.7   *   < > 113* 113* 116*   CO2 23   < > 22 23 20*   BUN 45*   < > 41* 38* 29*   CREATININE 0.7*   < > 0.7* 0.7* 0.6*   CALCIUM 8.4   < > 8.1* 7.9* 8.2*   PROT  --   --   --   --  4.4*   LABALBU 2.2*  --   --  2.2* 2.4*  2.4*   BILITOT  --   --   --   --  1.1*   ALKPHOS  --   --   --   --  73   AST  --   --   --   --  33   ALT  --   --   --   --  21    < > = values in this interval not displayed.      Lipids:   Lab Results   Component Value Date    CHOL 137 06/26/2018    HDL 53 06/26/2018    TRIG 101 06/26/2018     Glucose:  Recent Labs     01/01/22  0816 01/01/22  1204 01/01/22  1705   POCGLU 118* 108* 317*     [id number]:  Lab Results   Component Value Date    LABA1C 7.3 12/18/2021     High Sensitivity TSH:   Lab Results   Component Value Date    TSHHS 3.280 05/07/2021     Free T3: No results found for: FT3  Free T4:No results found for: CHEST PORTABLE   Final Result   Stable small left apical pneumothorax. Stable pulmonary vascular congestion along with interstitial opacities and   hazy airspace opacities, left worse than right which may be on the basis of   interstitial and pulmonary edema but can also be seen in the setting of   atypical pneumonia. XR CHEST PORTABLE   Final Result   Persistent small left apical pneumothorax estimated to be approximately 10%   in severity. Support tube/catheters project in stable/satisfactory position   in the frontal projection. Consolidating infiltrates and or pulmonary edema, cardiomegaly unchanged   compared to 12/14/2021 consistent with diffuse bilateral pneumonia and/or   congestive heart failure. XR CHEST PORTABLE   Final Result   Left chest tube remains in place, with slight interval decrease in size in   small left apical pneumothorax. XR CHEST PORTABLE   Final Result   Persistent small left pneumothorax with 1 cm pleural apical separation, with   intervally placed left-sided chest tube. Similar diffuse bilateral airspace disease. XR ABDOMEN (KUB) (SINGLE AP VIEW)   Final Result   NG tube in place with tip and side port below the diaphragm and in the region   of the gastric body. XR CHEST PORTABLE   Final Result   Interval development of a large left tension pneumothorax. Extensive bilateral airspace opacities without significant change. Findings were discussed with Dr. Benito Cavazos  at 7:43 am on 12/13/2021. XR CHEST PORTABLE   Final Result   Persistent bilateral airspace disease without acute interval change. XR CHEST PORTABLE   Final Result   Severe extensive bilateral airspace opacities worsened from the previous exam   compatible with edema, ARDS, or multifocal pneumonia. XR CHEST PORTABLE   Final Result   1. Cardiomegaly with severe congestive heart failure.          VL DUP LOWER EXTREMITY VENOUS BILATERAL Final Result   No evidence of DVT in either lower extremity within the limitations. Mild nonspecific subcutaneous edema to bilateral lower extremities. CT HEAD WO CONTRAST   Final Result   No acute intracranial abnormality. Age related changes including chronic small vessel ischemic disease and   cerebral atrophy. CT ABDOMEN PELVIS W IV CONTRAST Additional Contrast? None   Final Result   Multifocal consolidative changes both lungs worrisome for multifocal   pneumonia. Follow-up in following medical treatment course is recommended   document complete resolution. Multifocal bladder traumatic trabeculation/diverticula noted with slight   haziness of surrounding fat planes. Please correlate with diffuse urinalysis   findings. Is findings could suggest underlying cystitis. Prominence of the Alisson aortic lymph nodes again identified. These could be   reactive due to an underlying infectious inflammatory process such is the   bladder haziness. However neoplastic process may also considered. Consider   3-6 month follow-up. RECOMMENDATIONS:   Unavailable         CT CHEST W CONTRAST   Final Result   Multifocal consolidative changes both lungs worrisome for multifocal   pneumonia. Follow-up in following medical treatment course is recommended   document complete resolution. Multifocal bladder traumatic trabeculation/diverticula noted with slight   haziness of surrounding fat planes. Please correlate with diffuse urinalysis   findings. Is findings could suggest underlying cystitis. Prominence of the Alisson aortic lymph nodes again identified. These could be   reactive due to an underlying infectious inflammatory process such is the   bladder haziness. However neoplastic process may also considered. Consider   3-6 month follow-up. RECOMMENDATIONS:   Unavailable         XR SHOULDER LEFT (MIN 2 VIEWS)   Final Result   No acute fracture identified.       Incidentally Patient extubated and off ventilator not on tube feeding either   Neck: no JVD or bruit  Thyroid : Normal lobes   Lungs: Has Vesicular Breath sounds   Heart:  regular rate and rhythm  Abdomen: soft, non-tender; bowel sounds normal; no masses,  no organomegaly  Musculoskeletal: Normal  Extremities: extremities normal, , no edema  Neurologic: Patient extubated and off ventilator not on tube feeding either       Assessment:     Patient Active Problem List:     Diabetes mellitus ([facility]     Atrial flutter (HCC)     Atrial fibrillation (HCC)     Gout     Hyperlipidemia     Hypertension     Arthropathy     Upper GI bleed     NSTEMI (non-ST elevated myocardial infarction) (HCC)     Rectal bleeding     Sepsis (HCC)     BRBPR (bright red blood per rectum)     Acute cystitis without hematuria     Leukocytosis     Acute hypokalemia     Rectal bleed     Anemia     B12 deficiency     Elevated antinuclear antibody (FREDRICK) level     PNA (pneumonia)     Troponin I above reference range     Hypernatremia possibly due to dehydration      Plan:     1. Reviewed POC blood glucose . Labs and X ray results   2. Reviewed Current Medicines   3. On Correction bolus Humalog/ Basal Lantus /NPH insulin regime  4. Monitor Blood glucose frequently   5. Modified  the dose of Insulin/ other medicines as needed   6. Nephrology managing the IV fluid to manage the hypernatremia   Will follow     NOTE: She received Lantus insulin last night. I brought attention to the charge nurse and the patient's nurse patient not supposed to get his Lantus last night as his blood glucose is below the parameter given for holding the Lantus resulted in hypoglycemic episodes all night. Corrected with administration of extra glucose both p.o. and IV  .      Stephen Keane MD, MD

## 2022-01-01 NOTE — ED PROVIDER NOTES
TO THE NURSERY 1 MINUTE POST BIRTH. LIMP AND DUSKY. PLACED ON THE WARMER AT 36.5. BULB SUCTION DONE. CLEAR MUCOUS NOTED. BLOW BY GIVEN. AT 1812 RESP HERE. ANEUDY PUFF INITIATED FOR A HEART RATE LESS THAN 60. 182 02 100%. . 02 AT 40%.  PINKING UP. MOVING EXTREMITIES BETTER. 1830 97.6.183 SALINE LOCK STARTED IN THE RIGHT HAND X1 ATTEMPT. 24 GAUGE USED. 183 D10 INITIATED AT 7 MLS PER HOURS AS PER MD ORDER. 183 RESP 52. RETRACTIONS NOTED. NO GRUNTING NOR ANY NASAL FLARING NOTED. GLUCOSE 77. DR NOE REMAINS AT THE BEDSIDE. DR NOE WAS PRESENT AT THE DELIVERY AND ACCOMODATED  TO THE NURSERY. 183 02  WAS DECREASED TO 30%. 02 %.184 DR NOE REASSESSING . 184. BULB SUCTION DONE. CLEAR MUCOUS NOTED. MILD RETRACTINGS STILL NOTED. STILL NO GRUNTING NOR ANY NASAL FLARING NOTED. 184 CHEST XRAY IN PROGRESS. 190 DR NOE REMAINS AT BEDSIDE. IV MEDICATIONS OF AMPICILLIN  AND GENTAMYCIN WAS GIVEN AS PER MD ORDERS..CBG REQUESTED AND IS IN PROGRESS. AWAITING THE TRANSPORT TEAM FROM [facility].    Triage Chief Complaint:   Fall (slid down wall, right leg ended up under patinet for ~1 hour); Fever (102.4 pta for EMS); and Hip Pain (right)    [place]:  Ranjit Diaz is a 69 y.o. male that presents after a fall. Patient reports \"I do not know what happened\". Patient awoke on the ground next to his bed. Patient thinks he was down for approximately an hour but is not sure. Patient is unsure if he hit his head or lost consciousness. Patient is on aspirin, Plavix and Eliquis. Patient has been having intermittent bright red blood per rectum. Patient reports \"it is from my diverticulitis\". No current abdominal pain. Some right hip pain that is currently 6/10, constant and non-radiating, worse with movement. Patient does report \"I pee 8 times a night\". Patient is on Maxide and reports adherence. Patient reports he is otherwise been doing well. EMS reports they did notice a fever of 102 degrees and patient did have some bright red blood in his depends per EMS. Point-of-care glucose prehospital 204.     ROS:  General:  + fevers, no chills, no weakness  Eyes:  No recent vison changes, no discharge  ENT:  No sore throat, no nasal congestion, no hearing changes  Cardiovascular:  No chest pain, no palpitations  Respiratory:  No shortness of breath, no cough, no wheezing  Gastrointestinal:  No pain, no nausea, no vomiting, no diarrhea, + GIB  Musculoskeletal:  No muscle pain, + joint pain  Skin:  No rash, no pruritis, no easy bruising  Neurologic:  No speech problems, no headache, no extremity numbness, no extremity tingling, no extremity weakness  Psychiatric:  No anxiety  Genitourinary:  No dysuria, no hematuria, + frequency  Endocrine:  No unexpected weight gain, no unexpected weight loss  Extremities:  no edema, no pain    Past Medical History:   Diagnosis Date    Arthropathy     Atrial fibrillation (HCC)     Atrial flutter (HCC)     Diabetes mellitus (HCC)     Gout     Hyperlipidemia     Hypertension  Lumbago      Past Surgical History:   Procedure Laterality Date    COLONOSCOPY N/A 9/27/2018    COLONOSCOPY POLYPECTOMY SNARE/COLD BIOPSY performed by Leonidas Hallman MD at [place] [address] reviewed. No pertinent family history. Social History     Socioeconomic History    Marital status: Single     Spouse name: Not on file    Number of children: Not on file    Years of education: Not on file    Highest education level: Not on file   Occupational History    Not on file   Social Needs    Financial resource strain: Not on file    Food insecurity:     Worry: Not on file     Inability: Not on file    Transportation needs:     Medical: Not on file     Non-medical: Not on file   Tobacco Use    Smoking status: Former Smoker     Packs/day: 0.50     Types: Cigarettes    Smokeless tobacco: Never Used   Substance and Sexual Activity    Alcohol use: No    Drug use: No    Sexual activity: Not on file   Lifestyle    Physical activity:     Days per week: Not on file     Minutes per session: Not on file    Stress: Not on file   Relationships    Social connections:     Talks on phone: Not on file     Gets together: Not on file     Attends Baptism service: Not on file     Active member of club or organization: Not on file     Attends meetings of clubs or organizations: Not on file     Relationship status: Not on file    Intimate partner violence:     Fear of current or ex partner: Not on file     Emotionally abused: Not on file     Physically abused: Not on file     Forced sexual activity: Not on file   Other Topics Concern    Not on file   Social History Narrative    Not on file     No current facility-administered medications for this encounter.       Current Outpatient Medications   Medication Sig Dispense Refill    docusate (COLACE, DULCOLAX) 100 MG CAPS Take 100 mg by mouth 2 times daily 60 capsule 0    tamsulosin (FLOMAX) 0.4 MG capsule Take 1 capsule by mouth daily 30 capsule 3    ferrous found.    MDM:  Pt presents as above. Emergent conditions considered. Presentation prompted initial labs, EKG and imaging. EKG with A. fib as above. Urinalysis with likely urinary tract infection. Urine was sent for culture. IV cefepime was given for the UTI with concern for possible sepsis as well for broad-spectrum coverage given patient's elevated heart rate, reported fever at home as well as leukocytosis on CBC. No significant anemia with actually improved hemoglobin when compared to baseline. X-ray of patient's chest with questionable pulmonary edema with poor inspiratory effort. X-ray of patient's right hip is demonstrating severe osteoarthritis changes and questionable fracture with recommendation for CT imaging. CT imaging of patient's hip was negative for acute fracture. CT imaging of patient's head and neck negative for acute traumatic injury. CTA of patient's abdomen/pelvis was negative for active extra of. There are findings of some bladder outlet obstruction that is chronic. Patient is actively voiding in the emergency department and urine is with infection as above. Lactate is elevated and suggestive of some degree of tissue hypoperfusion. Blood culture sent prior to antibiotic administration. Patient's lactate is downtrending on recheck. Patient clearly benefit from admission for further antibiotics, fluids, recheck of hemoglobin and further observation. Patient is agreeable with this plan. Case discussed with hospitalist team and patient admitted to medicine. Questions sought and answered with the patient. They voice understanding and agree with plan. Instructed to return for any worsening or worrisome concerns. CRITICAL CARE NOTE:  There was a high probability of clinically significant life-threatening deterioration of the patient's condition requiring my urgent intervention due to sepsis and GIB.   IV fluid resuscitation, IV broad-spectrum antibiotic administration, tele monitoring, chart review including prior admits for NSTEMI and GIBs as well as frequent rechecks was performed to address this. Total critical care time is AT LEAST 35 minutes. This includes vital sign monitoring, pulse oximetry monitoring, telemetry monitoring, clinical response to the IV medications, reviewing the nursing notes, consultation time, dictation/documentation time, and interpretation of the lab work. This time excludes time spent performing procedures and separately billable procedures and family discussion time. Clinical Impression:  1. Acute cystitis without hematuria    2. Sepsis, due to unspecified organism, unspecified whether acute organ dysfunction present ([facility] [address]    3. Gastrointestinal hemorrhage, unspecified gastrointestinal hemorrhage type      Disposition referral (if applicable):  No follow-up provider specified. Disposition medications (if applicable):  New Prescriptions    No medications on file       Comment: Please note this report has been produced using speech recognition software and may contain errors related to that system including errors in grammar, punctuation, and spelling, as well as words and phrases that may be inappropriate. If there are any questions or concerns please feel free to contact the dictating provider for clarification.        Allie Vega MD  01/17/20 [id number]

## 2022-01-01 NOTE — PROGRESS NOTES
1/1/22. Low Blood sugar checked 69, Orange juice given and rechecked, BS- 66 Dextrose 50% 12.5ml given, pt remains alert , passed on for continuous monitoring.

## 2022-01-01 NOTE — PROGRESS NOTES
Pulmonary and Critical Care  Progress Note      VITALS:  /77   Pulse 56   Temp 97.4 °F (36.3 °C) (Oral)   Resp 20   Ht 5' 7.99\" (1.727 m)   Wt 210 lb 15.7 oz (95.7 kg)   SpO2 98%   BMI 32.09 kg/m²     Subjective:   CHIEF COMPLAINT :Fall     HPI:                The patient is sitting in the bed. He is not in acute resp distress    Objective:   PHYSICAL EXAM:    LUNGS:Occasional basal crackles  Abd-soft, BS+,NT  Ext- no pedal edema  CVS-s1s2, no murmurs      DATA:    CBC:  Recent Labs     12/31/21  0550   WBC 6.4   RBC 3.88*   HGB 11.0*   HCT 36.6*      MCV 94.3   MCH 28.4   MCHC 30.1*   RDW 18.9*      BMP:  Recent Labs     12/30/21  1615 12/31/21  0550 01/01/22  0700    143 146*   K 4.0 4.1 3.7   * 113* 116*   CO2 22 23 20*   BUN 41* 38* 29*   CREATININE 0.7* 0.7* 0.6*   CALCIUM 8.1* 7.9* 8.2*   GLUCOSE 234* 144* 71      ABG:  No results for input(s): PH, PO2ART, YTX2SNO, HCO3, BEART, O2SAT in the last 72 hours. BNP  No results found for: BNP   D-Dimer:  No results found for: DDIMER   1.  Radiology: None      Assessment/Plan     Patient Active Problem List    Diagnosis Date Noted    PNA (pneumonia) 12/09/2021    Troponin I above reference range 12/09/2021    Elevated antinuclear antibody (FREDRICK) level 02/16/2021    B12 deficiency 02/15/2021    Anemia 02/12/2021    Rectal bleed 11/18/2020    Acute hypokalemia 11/17/2020    Acute cystitis without hematuria     Leukocytosis     Sepsis ([facility] [address] 01/17/2020    BRBPR (bright red blood per rectum) 01/17/2020    Rectal bleeding 09/25/2018    NSTEMI (non-ST elevated myocardial infarction) ([facility] [address] 06/25/2018    Upper GI bleed 03/07/2017    Diabetes mellitus ([facility] [address]     Atrial flutter (HCC)     Atrial fibrillation (HCC)     Gout     Hyperlipidemia     Hypertension     Arthropathy    Acute on chronic hypoxic hypercapneic resp failure sec to Cardiogenic and non Cardiogenic Pulmonary edema  Systolic CHF with EF of 93-49%  Grade II Diastolic dysfunction  Bilateral Pneumonia sec to COVID-19  Obesity  DIVYA  Chronic Metabolic alkalosis  VDRF s/p extubation  Proteus bacteremia  Hyperchloremic Hypernatremia- improved       1. Decadron  2. Insulin  3. Inhalers  4. ICS  5. OOB  6. Keep sats > 92%  7.  C.w present management    Electronically signed by Dean Rodrigues MD on 1/1/2022 at 12:06 PM

## 2022-01-01 NOTE — PROGRESS NOTES
Hospitalist Progress Note      Name:  Ciro Stephens /Age/Sex: 1945  (77 y.o. male)   MRN & CSN:  [id number] & [id number] Admission Date/Time: 2021  5:33 PM   Location:  [facility] PCP: Robinson Valdez MD         Hospital Day: 25    Assessment and Plan:   Ciro Stephens is a 77 y.o.  male  who presents with <principal problem not specified>    1. Acute hypoxic respiratory failure 2/2 COVID, patient is extubated now, he is severely deconditioned, for IS, chest physiotherapy. Dex till 2022  2. DM: on insulin, he had episodes of hypoglycemia, per endo recs, no insulin of bs below than 200.   3. History of A fib: on metoprolol. Diltiazem on hold. On Eliquis. 4. Hypernatremia: he is on D5 now, follow up sodium in am.   5. Acute systolic heart failure: EF last month 35-40%. Patient on IVF per nephrology for hypernatremia/borderline blood sugar. We have low threshold to d/c that as he has some edema, weight is going up. Check bnp in am.    6. Hypertension: hold home blood pressure medication as blood pressure is soft. 7. Sever myopathy/deconditioning: need PT/Ot, most likely will need SNF vs IPR. Diet ADULT DIET; Dysphagia - Pureed; Moderately Thick (Honey)  ADULT ORAL NUTRITION SUPPLEMENT; Dinner, Breakfast; Fortified Pudding Oral Supplement  ADULT ORAL NUTRITION SUPPLEMENT; Lunch; Frozen Oral Supplement   DVT Prophylaxis [] Lovenox, []  Heparin, [] SCDs, [] Ambulation. Eliquis   GI Prophylaxis [] PPI,  [] H2 Blocker,  [] Carafate,  [] Diet/Tube Feeds   Code Status Full Code   Disposition Patient requires continued admission due to [place] [] Low, [x] Moderate,[]  High  Patient's risk as above due to COVID     History of Present Illness:     Chief Complaint: <principal problem not specified>  Ciro Stephens is a 77 y.o.  male  who presents with COVID, patient feels ok with no significant complaint, but he is very weak, muffled voice.         Ten point ROS reviewed negative, unless as noted above    Objective: Intake/Output Summary (Last 24 hours) at 1/1/2022 1841  Last data filed at 1/1/2022 0811  Gross per 24 hour   Intake --   Output 1750 ml   Net -1750 ml      Vitals:   Vitals:    01/01/22 1437   BP: (!) 100/50   Pulse: 70   Resp: 19   Temp: 97.3 °F (36.3 °C)   SpO2: 96%     Physical Exam:   GEN Awake male, exhausted   HENT Mucous membranes are moist. Oral pharynx without exudates, no evidence of thrush. NECK Supple, no apparent thyromegaly or masses. RESP No labor breathing. GI Abdomen is soft without significant tenderness, masses, or guarding. Bowel sounds are normoactive. Rectal exam deferred.  No costovertebral angle tenderness. Normal appearing external genitalia. Lane catheter is not present. HEME/LYMPH No palpable cervical lymphadenopathy and no hepatosplenomegaly. No petechiae or ecchymoses.   MSK Bilateral pitting edema     Medications:   Medications:    guaiFENesin-dextromethorphan  5 mL Oral TID    dexamethasone  2 mg Oral Daily    insulin glargine  35 Units SubCUTAneous Nightly    insulin lispro  0-18 Units SubCUTAneous TID WC    insulin lispro  0-9 Units SubCUTAneous 2 times per day    polyethylene glycol  17 g Oral Daily    chlorhexidine  15 mL Mouth/Throat BID    famotidine (PEPCID) injection  20 mg IntraVENous BID    lidocaine  5 mL IntraDERmal Once    sodium chloride flush  5-40 mL IntraVENous 2 times per day    metoprolol succinate  50 mg Oral BID    allopurinol  300 mg Oral Daily    apixaban  5 mg Oral BID    pravastatin  20 mg Oral Nightly    tamsulosin  0.4 mg Oral Nightly    sodium chloride flush  5-40 mL IntraVENous 2 times per day      Infusions:    dextrose 75 mL/hr at 01/01/22 1228    dextrose      sodium chloride       PRN Meds: potassium chloride, 20 mEq, PRN  potassium chloride, 40 mEq, PRN   Or  potassium alternative oral replacement, 40 mEq, PRN   Or  potassium chloride, 10 mEq, PRN  morphine, 2 mg, Q4H PRN  magic (miracle) mouthwash, 5 mL, 4x Daily PRN  hydrALAZINE (APRESOLINE) ivpb, 10 mg, Q4H PRN  sodium chloride flush, 5-40 mL, PRN  glucose, 15 g, PRN  dextrose, 12.5 g, PRN  glucagon (rDNA), 1 mg, PRN  dextrose, 100 mL/hr, PRN  oxyCODONE, 5 mg, Q4H PRN  guaiFENesin-dextromethorphan, 5 mL, Q4H PRN  sodium chloride flush, 5-40 mL, PRN  sodium chloride, 25 mL, PRN  ondansetron, 4 mg, Q8H PRN   Or  ondansetron, 4 mg, Q6H PRN  polyethylene glycol, 17 g, Daily PRN  acetaminophen, 650 mg, Q6H PRN   Or  acetaminophen, 650 mg, Q6H PRN          Electronically signed by Gisela Vera MD on 1/1/2022 at 6:41 PM

## 2022-01-02 ENCOUNTER — APPOINTMENT (OUTPATIENT)
Dept: GENERAL RADIOLOGY | Age: 77
DRG: 870 | End: 2022-01-02
Payer: COMMERCIAL

## 2022-01-02 LAB
ALBUMIN SERPL-MCNC: 2.1 GM/DL (ref 3.4–5)
ANION GAP SERPL CALCULATED.3IONS-SCNC: 6 MMOL/L (ref 4–16)
BASOPHILS ABSOLUTE: 0 K/CU MM
BASOPHILS RELATIVE PERCENT: 0 % (ref 0–1)
BUN BLDV-MCNC: 25 MG/DL (ref 6–23)
CALCIUM SERPL-MCNC: 8 MG/DL (ref 8.3–10.6)
CHLORIDE BLD-SCNC: 112 MMOL/L (ref 99–110)
CO2: 22 MMOL/L (ref 21–32)
CREAT SERPL-MCNC: 0.5 MG/DL (ref 0.9–1.3)
DIFFERENTIAL TYPE: ABNORMAL
EOSINOPHILS ABSOLUTE: 0.5 K/CU MM
EOSINOPHILS RELATIVE PERCENT: 7.7 % (ref 0–3)
GFR AFRICAN AMERICAN: >60 ML/MIN/1.73M2
GFR NON-AFRICAN AMERICAN: >60 ML/MIN/1.73M2
GLUCOSE BLD-MCNC: 111 MG/DL (ref 70–99)
GLUCOSE BLD-MCNC: 136 MG/DL (ref 70–99)
GLUCOSE BLD-MCNC: 139 MG/DL (ref 70–99)
GLUCOSE BLD-MCNC: 163 MG/DL (ref 70–99)
GLUCOSE BLD-MCNC: 72 MG/DL (ref 70–99)
GLUCOSE BLD-MCNC: 87 MG/DL (ref 70–99)
HCT VFR BLD CALC: 37.3 % (ref 42–52)
HEMOGLOBIN: 11.3 GM/DL (ref 13.5–18)
IMMATURE NEUTROPHIL %: 0.3 % (ref 0–0.43)
LYMPHOCYTES ABSOLUTE: 0.9 K/CU MM
LYMPHOCYTES RELATIVE PERCENT: 13.5 % (ref 24–44)
MAGNESIUM: 2.2 MG/DL (ref 1.8–2.4)
MCH RBC QN AUTO: 28.4 PG (ref 27–31)
MCHC RBC AUTO-ENTMCNC: 30.3 % (ref 32–36)
MCV RBC AUTO: 93.7 FL (ref 78–100)
MONOCYTES ABSOLUTE: 0.2 K/CU MM
MONOCYTES RELATIVE PERCENT: 3 % (ref 0–4)
NUCLEATED RBC %: 0 %
PDW BLD-RTO: 18.8 % (ref 11.7–14.9)
PHOSPHORUS: 2.4 MG/DL (ref 2.5–4.9)
PLATELET # BLD: 148 K/CU MM (ref 140–440)
PMV BLD AUTO: 12.2 FL (ref 7.5–11.1)
POTASSIUM SERPL-SCNC: 3.8 MMOL/L (ref 3.5–5.1)
PRO-BNP: ABNORMAL PG/ML
RBC # BLD: 3.98 M/CU MM (ref 4.6–6.2)
SEGMENTED NEUTROPHILS ABSOLUTE COUNT: 5 K/CU MM
SEGMENTED NEUTROPHILS RELATIVE PERCENT: 75.5 % (ref 36–66)
SODIUM BLD-SCNC: 140 MMOL/L (ref 135–145)
TOTAL IMMATURE NEUTOROPHIL: 0.02 K/CU MM
TOTAL NUCLEATED RBC: 0 K/CU MM
WBC # BLD: 6.7 K/CU MM (ref 4–10.5)

## 2022-01-02 PROCEDURE — 71045 X-RAY EXAM CHEST 1 VIEW: CPT

## 2022-01-02 PROCEDURE — 85025 COMPLETE CBC W/AUTO DIFF WBC: CPT

## 2022-01-02 PROCEDURE — 6370000000 HC RX 637 (ALT 250 FOR IP): Performed by: NURSE PRACTITIONER

## 2022-01-02 PROCEDURE — 6360000002 HC RX W HCPCS: Performed by: NURSE PRACTITIONER

## 2022-01-02 PROCEDURE — 1200000000 HC SEMI PRIVATE

## 2022-01-02 PROCEDURE — 83880 ASSAY OF NATRIURETIC PEPTIDE: CPT

## 2022-01-02 PROCEDURE — 2500000003 HC RX 250 WO HCPCS: Performed by: NURSE PRACTITIONER

## 2022-01-02 PROCEDURE — 80069 RENAL FUNCTION PANEL: CPT

## 2022-01-02 PROCEDURE — 2700000000 HC OXYGEN THERAPY PER DAY

## 2022-01-02 PROCEDURE — 6360000002 HC RX W HCPCS: Performed by: HOSPITALIST

## 2022-01-02 PROCEDURE — 99231 SBSQ HOSP IP/OBS SF/LOW 25: CPT | Performed by: INTERNAL MEDICINE

## 2022-01-02 PROCEDURE — 82962 GLUCOSE BLOOD TEST: CPT

## 2022-01-02 PROCEDURE — 83735 ASSAY OF MAGNESIUM: CPT

## 2022-01-02 PROCEDURE — 6370000000 HC RX 637 (ALT 250 FOR IP): Performed by: INTERNAL MEDICINE

## 2022-01-02 PROCEDURE — 2580000003 HC RX 258: Performed by: NURSE PRACTITIONER

## 2022-01-02 PROCEDURE — 94761 N-INVAS EAR/PLS OXIMETRY MLT: CPT

## 2022-01-02 PROCEDURE — 2580000003 HC RX 258: Performed by: INTERNAL MEDICINE

## 2022-01-02 PROCEDURE — 80053 COMPREHEN METABOLIC PANEL: CPT

## 2022-01-02 RX ORDER — INSULIN GLARGINE 100 [IU]/ML
20 INJECTION, SOLUTION SUBCUTANEOUS NIGHTLY
Status: DISCONTINUED | OUTPATIENT
Start: 2022-01-02 | End: 2022-01-02

## 2022-01-02 RX ADMIN — GUAIFENESIN SYRUP AND DEXTROMETHORPHAN 5 ML: 100; 10 SYRUP ORAL at 17:03

## 2022-01-02 RX ADMIN — GUAIFENESIN SYRUP AND DEXTROMETHORPHAN 5 ML: 100; 10 SYRUP ORAL at 23:00

## 2022-01-02 RX ADMIN — SODIUM CHLORIDE, PRESERVATIVE FREE 10 ML: 5 INJECTION INTRAVENOUS at 09:10

## 2022-01-02 RX ADMIN — POLYETHYLENE GLYCOL (3350) 17 G: 17 POWDER, FOR SOLUTION ORAL at 10:52

## 2022-01-02 RX ADMIN — CHLORHEXIDINE GLUCONATE 0.12% ORAL RINSE 15 ML: 1.2 LIQUID ORAL at 10:53

## 2022-01-02 RX ADMIN — MORPHINE SULFATE 2 MG: 2 INJECTION, SOLUTION INTRAMUSCULAR; INTRAVENOUS at 10:51

## 2022-01-02 RX ADMIN — METOPROLOL SUCCINATE 50 MG: 50 TABLET, EXTENDED RELEASE ORAL at 10:53

## 2022-01-02 RX ADMIN — DEXAMETHASONE 2 MG: 4 TABLET ORAL at 10:53

## 2022-01-02 RX ADMIN — APIXABAN 5 MG: 5 TABLET, FILM COATED ORAL at 10:44

## 2022-01-02 RX ADMIN — ALLOPURINOL 300 MG: 300 TABLET ORAL at 10:53

## 2022-01-02 RX ADMIN — PRAVASTATIN SODIUM 20 MG: 10 TABLET ORAL at 23:01

## 2022-01-02 RX ADMIN — INSULIN LISPRO 1 UNITS: 100 INJECTION, SOLUTION INTRAVENOUS; SUBCUTANEOUS at 18:09

## 2022-01-02 RX ADMIN — APIXABAN 5 MG: 5 TABLET, FILM COATED ORAL at 23:01

## 2022-01-02 RX ADMIN — FAMOTIDINE 20 MG: 10 INJECTION, SOLUTION INTRAVENOUS at 23:01

## 2022-01-02 RX ADMIN — SODIUM CHLORIDE, PRESERVATIVE FREE 10 ML: 5 INJECTION INTRAVENOUS at 23:01

## 2022-01-02 RX ADMIN — METOPROLOL SUCCINATE 50 MG: 50 TABLET, EXTENDED RELEASE ORAL at 23:01

## 2022-01-02 RX ADMIN — SODIUM CHLORIDE, PRESERVATIVE FREE 10 ML: 5 INJECTION INTRAVENOUS at 23:02

## 2022-01-02 RX ADMIN — FAMOTIDINE 20 MG: 10 INJECTION, SOLUTION INTRAVENOUS at 10:51

## 2022-01-02 RX ADMIN — TAMSULOSIN HYDROCHLORIDE 0.4 MG: 0.4 CAPSULE ORAL at 23:01

## 2022-01-02 RX ADMIN — GUAIFENESIN SYRUP AND DEXTROMETHORPHAN 5 ML: 100; 10 SYRUP ORAL at 10:53

## 2022-01-02 RX ADMIN — CHLORHEXIDINE GLUCONATE 0.12% ORAL RINSE 15 ML: 1.2 LIQUID ORAL at 23:02

## 2022-01-02 ASSESSMENT — PAIN DESCRIPTION - PROGRESSION
CLINICAL_PROGRESSION: NOT CHANGED

## 2022-01-02 ASSESSMENT — PAIN SCALES - GENERAL
PAINLEVEL_OUTOF10: 0
PAINLEVEL_OUTOF10: 6

## 2022-01-02 ASSESSMENT — PAIN SCALES - WONG BAKER
WONGBAKER_NUMERICALRESPONSE: 0

## 2022-01-02 NOTE — PROGRESS NOTES
2 person skin assessment done by this RN and Yue Klein. There are NO wounds on the buttocks that was observed by either myself or [place]. Unsure of current wound care orders.   Scattered burising; scabbing and BLE vascular herbert

## 2022-01-02 NOTE — PROGRESS NOTES
Progress Note( Dr. Murtaza Zamorano)  1/2/2022  Subjective:   Admit Date: 12/8/2021  PCP: Kusum Delarosa MD    Admitted For :Admitted initially on December 9, 2021 with history of fall and found to have Covid pneumonia    Consulted For: Better control of blood glucose    Interval History: The patient awake and alert. He was extubated and off ventilator. Not on  oxygen either. Is attempting to talk with whisper    Pt was transferred to [address] [place] [address] [facility] on 1/1/2022  Patient is trying to self-feed      Intake/Output Summary (Last 24 hours) at 1/2/2022 0751  Last data filed at 1/2/2022 0447  Gross per 24 hour   Intake 10 ml   Output 1300 ml   Net -1290 ml       DATA    CBC:   Recent Labs     12/31/21  0550 01/02/22  0043   WBC 6.4 6.7   HGB 11.0* 11.3*    148    CMP:  Recent Labs     12/31/21  0550 01/01/22  0700 01/02/22  0043    146* 140   K 4.1 3.7 3.8   * 116* 112*   CO2 23 20* 22   BUN 38* 29* 25*   CREATININE 0.7* 0.6* 0.5*   CALCIUM 7.9* 8.2* 8.0*   PROT  --  4.4*  --    LABALBU 2.2* 2.4*  2.4* 2.1*   BILITOT  --  1.1*  --    ALKPHOS  --  73  --    AST  --  33  --    ALT  --  21  --      Lipids:   Lab Results   Component Value Date    CHOL 137 06/26/2018    HDL 53 06/26/2018    TRIG 101 06/26/2018     Glucose:  Recent Labs     01/01/22  1705 01/01/22  2116 01/02/22  0225   POCGLU 317* 174* 111*     [id number]:  Lab Results   Component Value Date    LABA1C 7.3 12/18/2021     High Sensitivity TSH:   Lab Results   Component Value Date    TSHHS 3.280 05/07/2021     Free T3: No results found for: FT3  Free T4:No results found for: T4FREE    XR CHEST PORTABLE   Final Result   Worsened extensive patchy bilateral airspace opacities. Possible trace right effusion. XR CHEST PORTABLE   Final Result   1. Stable diffuse bilateral lung infiltrates, compatible with multifocal   pneumonia, including COVID.          XR CHEST PORTABLE   Final Result   Stable exam with patchy bilateral airspace opacities compatible with   multifocal pneumonia or edema. XR CHEST PORTABLE   Final Result   1. No significant change in patchy subpleural, basilar predominant airspace   opacities consistent with COVID-19 pneumonia given patient history. 2. Pulmonary vascular congestion and mild cardiomegaly. XR CHEST PORTABLE   Final Result      XR CHEST PORTABLE   Final Result   Mild bilateral improvement in airspace opacities. Trace left apical   pneumothorax is unchanged. XR CHEST PORTABLE   Final Result   The left apical pneumothorax is still identified. Similar to 12/18/2021 but   smaller than 12/17/2021. The multifocal pulmonary opacities are redemonstrated. May have some   worsening atelectasis in the right lower lobe. XR CHEST PORTABLE   Final Result   Improving small left apical pneumothorax measuring 9 mm. New right lower lobe infiltrate may represent atelectasis versus pneumonia. Lines and tubes are stable      Stable mild pneumomediastinum. XR CHEST PORTABLE   Final Result   Interval development of a small left apical pneumothorax measuring   approximately 2.1 cm between pleural surfaces. Pneumomediastinum appears   increased when compared to the previous exam as well. Continued patchy opacity throughout both lungs, most compatible with   multifocal pneumonia. XR CHEST PORTABLE   Final Result   1. No appreciable left pneumothorax identified on the current exam.   2. Similar bilateral airspace opacities. 3. Support lines and tubes remain in place. XR CHEST PORTABLE   Final Result   1. Small stable left apical pneumothorax. 2. Bilateral airspace opacities are seen without significant change. XR CHEST PORTABLE   Final Result   Stable small left apical pneumothorax.       Stable pulmonary vascular congestion along with interstitial opacities and   hazy airspace opacities, left worse than right which may be on the basis of interstitial and pulmonary edema but can also be seen in the setting of   atypical pneumonia. XR CHEST PORTABLE   Final Result   Persistent small left apical pneumothorax estimated to be approximately 10%   in severity. Support tube/catheters project in stable/satisfactory position   in the frontal projection. Consolidating infiltrates and or pulmonary edema, cardiomegaly unchanged   compared to 12/14/2021 consistent with diffuse bilateral pneumonia and/or   congestive heart failure. XR CHEST PORTABLE   Final Result   Left chest tube remains in place, with slight interval decrease in size in   small left apical pneumothorax. XR CHEST PORTABLE   Final Result   Persistent small left pneumothorax with 1 cm pleural apical separation, with   intervally placed left-sided chest tube. Similar diffuse bilateral airspace disease. XR ABDOMEN (KUB) (SINGLE AP VIEW)   Final Result   NG tube in place with tip and side port below the diaphragm and in the region   of the gastric body. XR CHEST PORTABLE   Final Result   Interval development of a large left tension pneumothorax. Extensive bilateral airspace opacities without significant change. Findings were discussed with Dr. Deja Gibbs  at 7:43 am on 12/13/2021. XR CHEST PORTABLE   Final Result   Persistent bilateral airspace disease without acute interval change. XR CHEST PORTABLE   Final Result   Severe extensive bilateral airspace opacities worsened from the previous exam   compatible with edema, ARDS, or multifocal pneumonia. XR CHEST PORTABLE   Final Result   1. Cardiomegaly with severe congestive heart failure. VL DUP LOWER EXTREMITY VENOUS BILATERAL   Final Result   No evidence of DVT in either lower extremity within the limitations. Mild nonspecific subcutaneous edema to bilateral lower extremities.          CT HEAD WO CONTRAST   Final Result   No acute intracranial abnormality. Age related changes including chronic small vessel ischemic disease and   cerebral atrophy. CT ABDOMEN PELVIS W IV CONTRAST Additional Contrast? None   Final Result   Multifocal consolidative changes both lungs worrisome for multifocal   pneumonia. Follow-up in following medical treatment course is recommended   document complete resolution. Multifocal bladder traumatic trabeculation/diverticula noted with slight   haziness of surrounding fat planes. Please correlate with diffuse urinalysis   findings. Is findings could suggest underlying cystitis. Prominence of the Alisson aortic lymph nodes again identified. These could be   reactive due to an underlying infectious inflammatory process such is the   bladder haziness. However neoplastic process may also considered. Consider   3-6 month follow-up. RECOMMENDATIONS:   Unavailable         CT CHEST W CONTRAST   Final Result   Multifocal consolidative changes both lungs worrisome for multifocal   pneumonia. Follow-up in following medical treatment course is recommended   document complete resolution. Multifocal bladder traumatic trabeculation/diverticula noted with slight   haziness of surrounding fat planes. Please correlate with diffuse urinalysis   findings. Is findings could suggest underlying cystitis. Prominence of the Alisson aortic lymph nodes again identified. These could be   reactive due to an underlying infectious inflammatory process such is the   bladder haziness. However neoplastic process may also considered. Consider   3-6 month follow-up. RECOMMENDATIONS:   Unavailable         XR SHOULDER LEFT (MIN 2 VIEWS)   Final Result   No acute fracture identified. Incidentally noted mass or masslike consolidation in the left upper lobe. Advise chest CT for further evaluation. XR PELVIS (1-2 VIEWS)   Final Result   No acute abnormality detected.       Severe degenerative changes at the normal; no masses,  no organomegaly  Musculoskeletal: Normal  Extremities: extremities normal, , no edema  Neurologic: Patient extubated and off ventilator not on tube feeding either       Assessment:     Patient Active Problem List:     Diabetes mellitus ([facility]     Atrial flutter (HCC)     Atrial fibrillation (HCC)     Gout     Hyperlipidemia     Hypertension     Arthropathy     Upper GI bleed     NSTEMI (non-ST elevated myocardial infarction) (HCC)     Rectal bleeding     Sepsis ([facility]     BRBPR (bright red blood per rectum)     Acute cystitis without hematuria     Leukocytosis     Acute hypokalemia     Rectal bleed     Anemia     B12 deficiency     Elevated antinuclear antibody (FREDRICK) level     PNA (pneumonia)     Troponin I above reference range     Hypernatremia possibly due to dehydration      Plan:     1. Reviewed POC blood glucose . Labs and X ray results   2. Reviewed Current Medicines   3. On Correction bolus Humalog/ Basal Lantus /NPH insulin regime  4. Monitor Blood glucose frequently   5. Modified  the dose of Insulin/ other medicines as needed   6. Nephrology managing the IV fluid to manage the hypernatremia   Will follow     NOTE: She received Lantus insulin last night. I brought attention to the charge nurse and the patient's nurse patient not supposed to get his Lantus last night as his blood glucose is below the parameter given for holding the Lantus resulted in hypoglycemic episodes all night. Corrected with administration of extra glucose both p.o. and IV  .      Steph Huynh MD, MD

## 2022-01-02 NOTE — PROGRESS NOTES
Nurse reports concerns for congestion, wet sounding cough. Poc glucose above 130. Will pause fluids and get a cxr. Will resume fluids if no vascular congestion, pleural effusion or edema. He has a reduced EF.

## 2022-01-02 NOTE — PROGRESS NOTES
Nephrology Progress Note  1/2/2022 3:56 PM        Subjective:   Admit Date: 12/8/2021  PCP: Leigh Colmenares MD    Interval History: Patient seen earlier today, this is a late entry    Diet: He will need some assistance with feeding    ROS: He was alert awake and oriented only to eat-special diet  Urine output recorded 2.3 L for the last 24 hours  No fever      Data:     Current meds:    insulin glargine  20 Units SubCUTAneous Nightly    guaiFENesin-dextromethorphan  5 mL Oral TID    insulin lispro  0-18 Units SubCUTAneous TID WC    insulin lispro  0-9 Units SubCUTAneous 2 times per day    polyethylene glycol  17 g Oral Daily    chlorhexidine  15 mL Mouth/Throat BID    famotidine (PEPCID) injection  20 mg IntraVENous BID    lidocaine  5 mL IntraDERmal Once    sodium chloride flush  5-40 mL IntraVENous 2 times per day    metoprolol succinate  50 mg Oral BID    allopurinol  300 mg Oral Daily    apixaban  5 mg Oral BID    pravastatin  20 mg Oral Nightly    tamsulosin  0.4 mg Oral Nightly    sodium chloride flush  5-40 mL IntraVENous 2 times per day      dextrose 75 mL/hr at 01/01/22 1228    dextrose      sodium chloride           I/O last 3 completed shifts:   In: 10 [I.V.:10]  Out: 850 [Urine:850]    CBC:   Recent Labs     12/31/21  0550 01/02/22  0043   WBC 6.4 6.7   HGB 11.0* 11.3*    148          Recent Labs     12/31/21  0550 01/01/22  0700 01/02/22  0043    146* 140   K 4.1 3.7 3.8   * 116* 112*   CO2 23 20* 22   BUN 38* 29* 25*   CREATININE 0.7* 0.6* 0.5*   GLUCOSE 144* 71 139*       Lab Results   Component Value Date    CALCIUM 8.0 (L) 01/02/2022    PHOS 2.4 (L) 01/02/2022       Objective:     Vitals: /75   Pulse 71   Temp 97.6 °F (36.4 °C) (Oral)   Resp 16   Ht 5' 7.99\" (1.727 m)   Wt 205 lb 4 oz (93.1 kg)   SpO2 95%   BMI 31.22 kg/m²     General appearance: Alert awake and at least partially oriented  HEENT: 1+ conjunctival pallor he is with supplemental oxygen per nasal cannula  Neck: Supple-left subclavian central venous catheter  Lungs: Few adventitial breath sound  Heart: Irregular  Abdomen: Soft  Extremities: Mild edema      Problem List :         Impression :     1. Acute kidney injury-recovered by creatinine criteria with good urine output  2. High sodium also normalized-blood sugar acceptable albeit with D5 water-likely from poor glycogen stores  3. Diabetes with dysrhythmia-  4. He has cardiomyopathy-but no clinical evidence of pulmonary edema  5. Recent COVID-19  6. Malnutrition and severe hypoalbuminemia    Recommendation/Plan  :     1. I have spoken to the nurse to help feed him  2. With D5 water for now until blood sugar gets better  3. Better nutrition  4. Watch for iatrogenic nosocomial complication as well as pulmonary edema with cardiomyopathy  5.  Follow clinically and biochemically      Romero Fraser MD MD

## 2022-01-02 NOTE — PROGRESS NOTES
Pulmonary and Critical Care  Progress Note      VITALS:  /75   Pulse 71   Temp 97.6 °F (36.4 °C) (Oral)   Resp 16   Ht 5' 7.99\" (1.727 m)   Wt 205 lb 4 oz (93.1 kg)   SpO2 95%   BMI 31.22 kg/m²     Subjective:   CHIEF COMPLAINT :Fall     HPI:                The patient is sitting in the bed. He is not in acute reps distress    Objective:   PHYSICAL EXAM:    LUNGS:Occasional basal crackles  Abd-soft, BS+,NT  Ext- no pedal edema  CVS-s1s2, no murmurs      DATA:    CBC:  Recent Labs     12/31/21  0550 01/02/22  0043   WBC 6.4 6.7   RBC 3.88* 3.98*   HGB 11.0* 11.3*   HCT 36.6* 37.3*    148   MCV 94.3 93.7   MCH 28.4 28.4   MCHC 30.1* 30.3*   RDW 18.9* 18.8*   SEGSPCT  --  75.5*      BMP:  Recent Labs     12/31/21  0550 01/01/22  0700 01/02/22  0043    146* 140   K 4.1 3.7 3.8   * 116* 112*   CO2 23 20* 22   BUN 38* 29* 25*   CREATININE 0.7* 0.6* 0.5*   CALCIUM 7.9* 8.2* 8.0*   GLUCOSE 144* 71 139*      ABG:  No results for input(s): PH, PO2ART, CNV2FYN, HCO3, BEART, O2SAT in the last 72 hours. BNP  No results found for: BNP   D-Dimer:  No results found for: DDIMER   Radiology:   Worsened extensive patchy bilateral airspace opacities.       Possible trace right effusion.      1.       Assessment/Plan     Patient Active Problem List    Diagnosis Date Noted    PNA (pneumonia) 12/09/2021    Troponin I above reference range 12/09/2021    Elevated antinuclear antibody (FREDRICK) level 02/16/2021    B12 deficiency 02/15/2021    Anemia 02/12/2021    Rectal bleed 11/18/2020    Acute hypokalemia 11/17/2020    Acute cystitis without hematuria     Leukocytosis     Sepsis ([address] 01/17/2020    BRBPR (bright red blood per rectum) 01/17/2020    Rectal bleeding 09/25/2018    [facility]STEM[facility] (non-ST elevated [facility] infarction) [facility] 06/25/2018    Upper GI bleed 03/07/2017    Diabetes mellitus ([facility]     Atrial flutter (HCC)     Atrial fibrillation (HCC)     Gout     Hyperlipidemia     Hypertension

## 2022-01-02 NOTE — PROGRESS NOTES
Hospitalist Progress Note      Name:  Jessenia Carballo /Age/Sex: 1945  (77 y.o. male)   MRN & CSN:  [id number] & [id number] Admission Date/Time: 2021  5:33 PM   Location:  [address] [place] [address] PCP: Rochelle Zamorano MD         Hospital Day: 26    Assessment and Plan:   Jessenia Carballo is a 77 y.o.  male  who presents with <principal problem not specified>    1. Acute hypoxic respiratory failure 2/2 COVID, patient is extubated now, he is severely deconditioned, for IS, chest physiotherapy. Dex till 2022  2. DM: on insulin, he had episodes of hypoglycemia, per endo recs, no insulin of bs below than 200, but still he got it, so will discontinue glargine. 3. History of A fib: on metoprolol. Diltiazem on hold. On Eliquis. 4. Hypernatremia: he is on D5 now per nephrology but BNP is high, mild congestion, will d/c IVF, sodium is 140 today. 5. Acute systolic heart failure: EF last month 35-40%. D/C fluid, hold Lasix for now. 6. Hypertension: hold home blood pressure medication as blood pressure is soft. 7. Sever myopathy/deconditioning: need PT/Ot, most likely will need SNF vs IPR. Diet ADULT DIET; Dysphagia - Pureed; Moderately Thick (Honey)  ADULT ORAL NUTRITION SUPPLEMENT; Dinner, Breakfast; Fortified Pudding Oral Supplement  ADULT ORAL NUTRITION SUPPLEMENT; Lunch; Frozen Oral Supplement   DVT Prophylaxis [] Lovenox, []  Heparin, [] SCDs, [] Ambulation. Eliquis   GI Prophylaxis [] PPI,  [] H2 Blocker,  [] Carafate,  [] Diet/Tube Feeds   Code Status Full Code   Disposition Patient requires continued admission due to COVID   MDM [] Low, [x] Moderate,[]  High  Patient's risk as above due to COVID     History of Present Illness:     Chief Complaint: <principal problem not specified>  Jessenia Carballo is a 77 y.o.  male  who presents with COVID, patient is doing better today, he is more strong, he denied any chest pain, dyspnea, or any other significant symptom.       Ten point ROS reviewed negative, unless as noted above    Objective: Intake/Output Summary (Last 24 hours) at 1/2/2022 1743  Last data filed at 1/2/2022 0447  Gross per 24 hour   Intake 10 ml   Output 850 ml   Net -840 ml      Vitals:   Vitals:    01/02/22 0827   BP: 109/75   Pulse: 71   Resp:    Temp:    SpO2:      Physical Exam:   GEN Awake male, no distress   HENT Mucous membranes are moist. Oral pharynx without exudates, no evidence of thrush. NECK Supple, no apparent thyromegaly or masses. RESP No labor breathing. GI Abdomen is soft without significant tenderness, masses, or guarding. Bowel sounds are normoactive. Rectal exam deferred.  No costovertebral angle tenderness. Normal appearing external genitalia. Lane catheter is not present. HEME/LYMPH No palpable cervical lymphadenopathy and no hepatosplenomegaly. No petechiae or ecchymoses.   MSK Bilateral pitting edema     Medications:   Medications:    guaiFENesin-dextromethorphan  5 mL Oral TID    insulin lispro  0-18 Units SubCUTAneous TID WC    insulin lispro  0-9 Units SubCUTAneous 2 times per day    polyethylene glycol  17 g Oral Daily    chlorhexidine  15 mL Mouth/Throat BID    famotidine (PEPCID) injection  20 mg IntraVENous BID    lidocaine  5 mL IntraDERmal Once    sodium chloride flush  5-40 mL IntraVENous 2 times per day    metoprolol succinate  50 mg Oral BID    allopurinol  300 mg Oral Daily    apixaban  5 mg Oral BID    pravastatin  20 mg Oral Nightly    tamsulosin  0.4 mg Oral Nightly    sodium chloride flush  5-40 mL IntraVENous 2 times per day      Infusions:    dextrose      sodium chloride       PRN Meds: potassium chloride, 20 mEq, PRN  potassium chloride, 40 mEq, PRN   Or  potassium alternative oral replacement, 40 mEq, PRN   Or  potassium chloride, 10 mEq, PRN  morphine, 2 mg, Q4H PRN  magic (miracle) mouthwash, 5 mL, 4x Daily PRN  hydrALAZINE (APRESOLINE) ivpb, 10 mg, Q4H PRN  sodium chloride flush, 5-40 mL, PRN  glucose, 15 g, PRN  dextrose, 12.5 g, PRN  glucagon (rDNA), 1 mg, PRN  dextrose, 100 mL/hr, PRN  oxyCODONE, 5 mg, Q4H PRN  guaiFENesin-dextromethorphan, 5 mL, Q4H PRN  sodium chloride flush, 5-40 mL, PRN  sodium chloride, 25 mL, PRN  ondansetron, 4 mg, Q8H PRN   Or  ondansetron, 4 mg, Q6H PRN  polyethylene glycol, 17 g, Daily PRN  acetaminophen, 650 mg, Q6H PRN   Or  acetaminophen, 650 mg, Q6H PRN          Electronically signed by Annalise Reese MD on 1/2/2022 at 5:43 PM

## 2022-01-03 LAB
ALBUMIN SERPL-MCNC: 2.2 GM/DL (ref 3.4–5)
ALP BLD-CCNC: 87 IU/L (ref 40–129)
ALT SERPL-CCNC: 24 U/L (ref 10–40)
ANION GAP SERPL CALCULATED.3IONS-SCNC: 7 MMOL/L (ref 4–16)
AST SERPL-CCNC: 31 IU/L (ref 15–37)
BASOPHILS ABSOLUTE: 0 K/CU MM
BASOPHILS RELATIVE PERCENT: 0 % (ref 0–1)
BILIRUB SERPL-MCNC: 1.3 MG/DL (ref 0–1)
BUN BLDV-MCNC: 26 MG/DL (ref 6–23)
CALCIUM SERPL-MCNC: 8.3 MG/DL (ref 8.3–10.6)
CHLORIDE BLD-SCNC: 114 MMOL/L (ref 99–110)
CO2: 22 MMOL/L (ref 21–32)
CREAT SERPL-MCNC: 0.5 MG/DL (ref 0.9–1.3)
DIFFERENTIAL TYPE: ABNORMAL
EOSINOPHILS ABSOLUTE: 0.5 K/CU MM
EOSINOPHILS RELATIVE PERCENT: 8.7 % (ref 0–3)
GFR AFRICAN AMERICAN: >60 ML/MIN/1.73M2
GFR NON-AFRICAN AMERICAN: >60 ML/MIN/1.73M2
GLUCOSE BLD-MCNC: 122 MG/DL (ref 70–99)
GLUCOSE BLD-MCNC: 128 MG/DL (ref 70–99)
GLUCOSE BLD-MCNC: 178 MG/DL (ref 70–99)
GLUCOSE BLD-MCNC: 69 MG/DL (ref 70–99)
GLUCOSE BLD-MCNC: 74 MG/DL (ref 70–99)
GLUCOSE BLD-MCNC: 91 MG/DL (ref 70–99)
HCT VFR BLD CALC: 37.5 % (ref 42–52)
HEMOGLOBIN: 11.6 GM/DL (ref 13.5–18)
IMMATURE NEUTROPHIL %: 0.2 % (ref 0–0.43)
LYMPHOCYTES ABSOLUTE: 0.7 K/CU MM
LYMPHOCYTES RELATIVE PERCENT: 11 % (ref 24–44)
MAGNESIUM: 2.3 MG/DL (ref 1.8–2.4)
MCH RBC QN AUTO: 28.4 PG (ref 27–31)
MCHC RBC AUTO-ENTMCNC: 30.9 % (ref 32–36)
MCV RBC AUTO: 91.9 FL (ref 78–100)
MONOCYTES ABSOLUTE: 0.2 K/CU MM
MONOCYTES RELATIVE PERCENT: 3.6 % (ref 0–4)
NUCLEATED RBC %: 0 %
PDW BLD-RTO: 19.3 % (ref 11.7–14.9)
PLATELET # BLD: 147 K/CU MM (ref 140–440)
PMV BLD AUTO: 11.8 FL (ref 7.5–11.1)
POTASSIUM SERPL-SCNC: 3.7 MMOL/L (ref 3.5–5.1)
RBC # BLD: 4.08 M/CU MM (ref 4.6–6.2)
SEGMENTED NEUTROPHILS ABSOLUTE COUNT: 4.7 K/CU MM
SEGMENTED NEUTROPHILS RELATIVE PERCENT: 76.5 % (ref 36–66)
SODIUM BLD-SCNC: 143 MMOL/L (ref 135–145)
TOTAL IMMATURE NEUTOROPHIL: 0.01 K/CU MM
TOTAL NUCLEATED RBC: 0 K/CU MM
TOTAL PROTEIN: 4.5 GM/DL (ref 6.4–8.2)
WBC # BLD: 6.1 K/CU MM (ref 4–10.5)

## 2022-01-03 PROCEDURE — 6370000000 HC RX 637 (ALT 250 FOR IP): Performed by: NURSE PRACTITIONER

## 2022-01-03 PROCEDURE — 6370000000 HC RX 637 (ALT 250 FOR IP): Performed by: INTERNAL MEDICINE

## 2022-01-03 PROCEDURE — 85025 COMPLETE CBC W/AUTO DIFF WBC: CPT

## 2022-01-03 PROCEDURE — 80053 COMPREHEN METABOLIC PANEL: CPT

## 2022-01-03 PROCEDURE — 99231 SBSQ HOSP IP/OBS SF/LOW 25: CPT | Performed by: INTERNAL MEDICINE

## 2022-01-03 PROCEDURE — 97535 SELF CARE MNGMENT TRAINING: CPT

## 2022-01-03 PROCEDURE — 83735 ASSAY OF MAGNESIUM: CPT

## 2022-01-03 PROCEDURE — 2500000003 HC RX 250 WO HCPCS: Performed by: NURSE PRACTITIONER

## 2022-01-03 PROCEDURE — 97530 THERAPEUTIC ACTIVITIES: CPT

## 2022-01-03 PROCEDURE — 1200000000 HC SEMI PRIVATE

## 2022-01-03 PROCEDURE — 2700000000 HC OXYGEN THERAPY PER DAY

## 2022-01-03 PROCEDURE — 94761 N-INVAS EAR/PLS OXIMETRY MLT: CPT

## 2022-01-03 PROCEDURE — 82962 GLUCOSE BLOOD TEST: CPT

## 2022-01-03 PROCEDURE — 2580000003 HC RX 258: Performed by: INTERNAL MEDICINE

## 2022-01-03 PROCEDURE — 2580000003 HC RX 258: Performed by: NURSE PRACTITIONER

## 2022-01-03 PROCEDURE — 36592 COLLECT BLOOD FROM PICC: CPT

## 2022-01-03 PROCEDURE — 6360000002 HC RX W HCPCS: Performed by: NURSE PRACTITIONER

## 2022-01-03 RX ADMIN — APIXABAN 5 MG: 5 TABLET, FILM COATED ORAL at 22:29

## 2022-01-03 RX ADMIN — GUAIFENESIN SYRUP AND DEXTROMETHORPHAN 5 ML: 100; 10 SYRUP ORAL at 14:26

## 2022-01-03 RX ADMIN — SODIUM CHLORIDE, PRESERVATIVE FREE 10 ML: 5 INJECTION INTRAVENOUS at 09:02

## 2022-01-03 RX ADMIN — TAMSULOSIN HYDROCHLORIDE 0.4 MG: 0.4 CAPSULE ORAL at 22:29

## 2022-01-03 RX ADMIN — FAMOTIDINE 20 MG: 10 INJECTION, SOLUTION INTRAVENOUS at 22:29

## 2022-01-03 RX ADMIN — POLYETHYLENE GLYCOL (3350) 17 G: 17 POWDER, FOR SOLUTION ORAL at 09:48

## 2022-01-03 RX ADMIN — SODIUM CHLORIDE, PRESERVATIVE FREE 10 ML: 5 INJECTION INTRAVENOUS at 22:29

## 2022-01-03 RX ADMIN — MORPHINE SULFATE 2 MG: 2 INJECTION, SOLUTION INTRAMUSCULAR; INTRAVENOUS at 06:10

## 2022-01-03 RX ADMIN — PRAVASTATIN SODIUM 20 MG: 10 TABLET ORAL at 22:29

## 2022-01-03 RX ADMIN — GUAIFENESIN SYRUP AND DEXTROMETHORPHAN 5 ML: 100; 10 SYRUP ORAL at 09:48

## 2022-01-03 RX ADMIN — APIXABAN 5 MG: 5 TABLET, FILM COATED ORAL at 09:48

## 2022-01-03 RX ADMIN — GUAIFENESIN SYRUP AND DEXTROMETHORPHAN 5 ML: 100; 10 SYRUP ORAL at 22:29

## 2022-01-03 RX ADMIN — CHLORHEXIDINE GLUCONATE 0.12% ORAL RINSE 15 ML: 1.2 LIQUID ORAL at 22:30

## 2022-01-03 RX ADMIN — SODIUM CHLORIDE, PRESERVATIVE FREE 10 ML: 5 INJECTION INTRAVENOUS at 22:28

## 2022-01-03 RX ADMIN — METOPROLOL SUCCINATE 50 MG: 50 TABLET, EXTENDED RELEASE ORAL at 22:29

## 2022-01-03 RX ADMIN — ALLOPURINOL 300 MG: 300 TABLET ORAL at 09:48

## 2022-01-03 RX ADMIN — METOPROLOL SUCCINATE 50 MG: 50 TABLET, EXTENDED RELEASE ORAL at 09:48

## 2022-01-03 RX ADMIN — SODIUM CHLORIDE, PRESERVATIVE FREE 10 ML: 5 INJECTION INTRAVENOUS at 09:03

## 2022-01-03 RX ADMIN — CHLORHEXIDINE GLUCONATE 0.12% ORAL RINSE 15 ML: 1.2 LIQUID ORAL at 09:02

## 2022-01-03 RX ADMIN — FAMOTIDINE 20 MG: 10 INJECTION, SOLUTION INTRAVENOUS at 09:48

## 2022-01-03 ASSESSMENT — PAIN DESCRIPTION - PROGRESSION
CLINICAL_PROGRESSION: NOT CHANGED

## 2022-01-03 ASSESSMENT — PAIN SCALES - WONG BAKER
WONGBAKER_NUMERICALRESPONSE: 0

## 2022-01-03 ASSESSMENT — PAIN SCALES - GENERAL
PAINLEVEL_OUTOF10: 6
PAINLEVEL_OUTOF10: 0

## 2022-01-03 NOTE — PROGRESS NOTES
Hospital Medicine: Progress Note     Ronald Chu  1945         Admit Date: 12/8/2021   Primary Care Physician:  Naresh Frey MD    /62   Pulse 54   Temp 97.7 °F (36.5 °C) (Axillary)   Resp 16   Ht 5' 7.99\" (1.727 m)   Wt 202 lb 9.6 oz (91.9 kg)   SpO2 97%   BMI 30.81 kg/m²     Chief Complaint:    Shortness of breath. Perpetual history:   25-year-old male who was admitted for acute hypoxic respiratory failure due to COVID-19. He was initially intubated, now extubated, oxygenation is holding on room air. He is awaiting placement. Impression/Plan:      Acute hypoxic respiratory failure:  Now resolved. Saturating well on room air. Continue bronchodilators as needed. COVID-19 pneumonia:  Improved. No longer requiring oxygen. Stable hemodynamics. Continue to monitor. Hypernatremia:  Now resolved. Liberalize oral intake. Monitor the electrolytes and correct deficits. Systolic CHF:  Last known LVEF of 35 to 40%. Holding Lasix due to hyponatremia. Continue other medical management. Atrial fibrillation:  HR is controlled. Continue Eliquis and metoprolol. Severe deconditioning:  PT/OT eval.  Would likely need ECF. Discharge plan:  Pending discharge to ECF. Discussed with case management. I expect discharge in a day or 2. Interval History:    He was seen and examined at bedside, resting in bed, no apparent distress. Nursing has no pressing concerns. Physical exam:  General appearance: Supine in bed, no acute distress. Head/EENT: ncat, perrl, eomi, mmm. Neck: supple, no meningismus. Chest: symmetric with equal expansion. Lungs: Clear to anterior auscultation, no wheezes or rhonchi. Heart: normal s1s2, no added sounds. Abdomen: soft, +ve bowel sound,non tender. Extremities: No pitting edema. Pulses: palpable and strong bilaterally. Skin: warm and dry.   Neurologic: no focal deficits. Data Review:     CBC: Recent Labs     01/02/22  0043 01/03/22  0605   WBC 6.7 6.1   HGB 11.3* 11.6*    147     [place]. Panel:    Recent Labs     01/01/22  0700 01/02/22  0043 01/03/22  0605   * 140 143   K 3.7 3.8 3.7   * 112* 114*   CO2 20* 22 22   BUN 29* 25* 26*   CREATININE 0.6* 0.5* 0.5*   GLUCOSE 71 139* 69*     Hepatic:   Recent Labs     01/01/22  0700 01/03/22  0605   AST 33 31   ALT 21 24   BILITOT 1.1* 1.3*   ALKPHOS 73 87     Meds:    guaiFENesin-dextromethorphan  5 mL Oral TID    insulin lispro  0-18 Units SubCUTAneous TID WC    insulin lispro  0-9 Units SubCUTAneous 2 times per day    polyethylene glycol  17 g Oral Daily    chlorhexidine  15 mL Mouth/Throat BID    famotidine (PEPCID) injection  20 mg IntraVENous BID    lidocaine  5 mL IntraDERmal Once    sodium chloride flush  5-40 mL IntraVENous 2 times per day    metoprolol succinate  50 mg Oral BID    allopurinol  300 mg Oral Daily    apixaban  5 mg Oral BID    pravastatin  20 mg Oral Nightly    tamsulosin  0.4 mg Oral Nightly    sodium chloride flush  5-40 mL IntraVENous 2 times per day     Steele Litten, MD    Note: Computer voice recognition system was used in parts of this documentation. There is a possibility of sound alike word errors inherent to this technology that may be missed during proof-reading and may alter the context of this note.

## 2022-01-03 NOTE — PROGRESS NOTES
Occupational Therapy  . Occupational Therapy Treatment Note      Name: Anna Butler MRN: [id number] :   1945   Date:  1/3/2022   Admission Date: 2021 Room:  [facility]     Primary Problem:      Restrictions/Precautions:    General precautions, fall risk        Communication with other providers:  Per chart review, patient ok for tx. Subjective:  Patient states:  whats that  Pain: denies (location, type, intensity)    Objective:    Observation:  Patient naked, nasal canula off, dried blood on sheets, taking off telemetry wires. Confused, mostly unable to be redirected without Max cues. Objective Measures:   Pocket tele    Treatment, including education:    ADL activity training was instructed today. Cues were given for safety, sequence, UE/LE placement, visual cues, and balance. Activities performed today included  hand hygiene, and grooming. Dressing- Mod A for robe change. Facial hygiene SBA with max cues to participate. Feeding- SET UP, however, lunch tray had many spilled items. Therapeutic activity training was instructed today. Cues were given for safety, sequence, UE/LE placement, awareness, and balance. Activities performed today included bed mobility training,     Bed mobility- Max A- for sliding patient to center of bed via chux pad. Patient able to grab bed rails when prompted but did not assist. Max A to move trunk to center. Patient tends to pull himself to the R side. Max A to boost patient to [facility] in trendelenburg. Patient educated on role of OT , benefits of OT and rationale for therapeutic intervention. All therapeutic intervention performed c emphasis on BUE strengthening and endurance to  increase strength for functional tasks / transfers. Patient left safely in bed at end of session, with call light in reach, alarm on and nursing aware. Gait belt was used for func transfers / mobility.         Assessment / Impression:    Patient's tolerance of treatment: poor  Adverse Reaction: none  Significant change in status and impact:  none  Barriers to improvement: confusion, weakness      Plan for Next Session:    Per chart review, patient ok for tx.        Time in:  1557  Time out:  1620  Timed treatment minutes:  23  Total treatment time:  23      Electronically signed by:    TRA Garcia COTA/L [id number]    1/3/2022, 4:21 PM

## 2022-01-03 NOTE — PROGRESS NOTES
Nephrology Progress Note  1/3/2022 10:34 AM  Subjective: Interval History: Nadiya Ling is a 77 y.o. male. Weak tired    Data:   Scheduled Meds:   guaiFENesin-dextromethorphan  5 mL Oral TID    insulin lispro  0-18 Units SubCUTAneous TID WC    insulin lispro  0-9 Units SubCUTAneous 2 times per day    polyethylene glycol  17 g Oral Daily    chlorhexidine  15 mL Mouth/Throat BID    famotidine (PEPCID) injection  20 mg IntraVENous BID    lidocaine  5 mL IntraDERmal Once    sodium chloride flush  5-40 mL IntraVENous 2 times per day    metoprolol succinate  50 mg Oral BID    allopurinol  300 mg Oral Daily    apixaban  5 mg Oral BID    pravastatin  20 mg Oral Nightly    tamsulosin  0.4 mg Oral Nightly    sodium chloride flush  5-40 mL IntraVENous 2 times per day     Continuous Infusions:   dextrose      sodium chloride           CBC   Recent Labs     01/02/22  0043 01/03/22  0605   WBC 6.7 6.1   HGB 11.3* 11.6*   HCT 37.3* 37.5*    147      BMP   Recent Labs     01/01/22  0700 01/02/22  0043 01/03/22  0605   * 140 143   K 3.7 3.8 3.7   * 112* 114*   CO2 20* 22 22   PHOS 1.9* 2.4*  --    BUN 29* 25* 26*   CREATININE 0.6* 0.5* 0.5*     Hepatic:   Recent Labs     01/01/22  0700 01/03/22  0605   AST 33 31   ALT 21 24   BILITOT 1.1* 1.3*   ALKPHOS 73 87     Troponin: No results for input(s): TROPONINI in the last 72 hours. BNP: No results for input(s): BNP in the last 72 hours. Lipids: No results for input(s): CHOL, HDL in the last 72 hours. Invalid input(s): LDLCALCU  ABGs:   Lab Results   Component Value Date    PO2ART 61 12/23/2021    KPY9NUK 32.0 12/23/2021     INR: No results for input(s): INR in the last 72 hours.   Renal Labs  Albumin:    Lab Results   Component Value Date    LABALBU 2.2 01/03/2022     Calcium:    Lab Results   Component Value Date    CALCIUM 8.3 01/03/2022     Phosphorus:    Lab Results   Component Value Date    PHOS 2.4 01/02/2022     U/A:    Lab Results   Component Value Date    NITRU NEGATIVE 12/18/2021    COLORU COLORLESS 12/18/2021    WBCUA NONE SEEN 12/18/2021    RBCUA 31 12/18/2021    MUCUS RARE 12/18/2021    TRICHOMONAS NONE SEEN 12/18/2021    BACTERIA NEGATIVE 12/18/2021    CLARITYU CLEAR 12/18/2021    SPECGRAV 1.017 12/18/2021    UROBILINOGEN NORMAL 12/18/2021    BILIRUBINUR NEGATIVE 12/18/2021    BLOODU LARGE 12/18/2021    KETUA NEGATIVE 12/18/2021     ABG:    Lab Results   Component Value Date    ODV1ARR 32.0 12/23/2021    PO2ART 61 12/23/2021    YNH4FIE 28.0 12/23/2021     HgBA1c:    Lab Results   Component Value Date    LABA1C 7.3 12/18/2021     Microalbumen/Creatinine ratio:  No components found for: RUCREAT  TSH:  No results found for: TSH  IRON:    Lab Results   Component Value Date    IRON 48 05/07/2021     Iron Saturation:  No components found for: PERCENTFE  TIBC:    Lab Results   Component Value Date    TIBC 302 05/07/2021     FERRITIN:    Lab Results   Component Value Date    FERRITIN 57 05/07/2021     RPR:  No results found for: RPR  FREDRICK:    Lab Results   Component Value Date    ANATITER 1:160 02/12/2021    FREDRICK DETECTED 02/12/2021     24 Hour Urine for Creatinine Clearance:  No components found for: CREAT4, UHRS10, UTV10      Objective:   I/O: 01/02 0701 - 01/03 0700  In: -   Out: 450 [Urine:450]  I/O last 3 completed shifts:  In: -   Out: 450 [Urine:450]  No intake/output data recorded. Vitals: /62   Pulse (!) 49   Temp 97.7 °F (36.5 °C) (Axillary)   Resp 16   Ht 5' 7.99\" (1.727 m)   Wt 202 lb 9.6 oz (91.9 kg)   SpO2 97%   BMI 30.81 kg/m²  {  General appearance: Weak arousable  HEENT: Head: Normal, normocephalic, atraumatic.   Neck: supple, symmetrical, trachea midline  Lungs: diminished breath sounds bilaterally  Heart: S1, S2 normal  Abdomen: abnormal findings:  soft nt  Extremities: edema trace  Neurologic: Mental status: alertness: Weak        Assessment and Plan:      IMP:  #1 hypernatremia  #2 acute renal failure with increased uremia  #3 respiratory failure  #4 COVID-19 pneumonia  #5 status post pneumothorax tension  #6 CHF exacerbation  #7 type 2 diabetes  #8 complicated UTI/cystitis    Plan     1 na stable  2 renal stable  3 o2 monitor  4 treated covid  5 ssi  6 no fever  Fu plan neuro and affect           Hakan Rubio MD, MD

## 2022-01-03 NOTE — CARE COORDINATION
Spoke with Ruth at [facility], She was questioning if pt is agitated. Spoke with pt's RN ruth and pt confused but no agitation. Updated Ruth at [facility] and they can take pt anytime but will need updated therapy notes 1st.  WB message to therapy.

## 2022-01-03 NOTE — PROGRESS NOTES
Pulmonary and Critical Care  Progress Note      VITALS:  /62   Pulse (!) 49   Temp 97.7 °F (36.5 °C) (Axillary)   Resp 16   Ht 5' 7.99\" (1.727 m)   Wt 202 lb 9.6 oz (91.9 kg)   SpO2 97%   BMI 30.81 kg/m²     Subjective:   CHIEF COMPLAINT :Fall     HPI:                The patient is lying in the bed. He is not in acute reps distress    Objective:   PHYSICAL EXAM:    LUNGS:Occasional basal crackles  Abd-soft, BS+,NT  Ext- no pedal edema  CVS-s1s2, no murmurs      DATA:    CBC:  Recent Labs     01/02/22  0043 01/03/22  0605   WBC 6.7 6.1   RBC 3.98* 4.08*   HGB 11.3* 11.6*   HCT 37.3* 37.5*    147   MCV 93.7 91.9   MCH 28.4 28.4   MCHC 30.3* 30.9*   RDW 18.8* 19.3*   SEGSPCT 75.5* 76.5*      BMP:  Recent Labs     01/01/22  0700 01/02/22  0043 01/03/22  0605   * 140 143   K 3.7 3.8 3.7   * 112* 114*   CO2 20* 22 22   BUN 29* 25* 26*   CREATININE 0.6* 0.5* 0.5*   CALCIUM 8.2* 8.0* 8.3   GLUCOSE 71 139* 69*      ABG:  No results for input(s): PH, PO2ART, UBI1GEN, HCO3, BEART, O2SAT in the last 72 hours. BNP  No results found for: BNP   D-Dimer:  No results found for: DDIMER   1.  Radiology: None      Assessment/Plan     Patient Active Problem List    Diagnosis Date Noted    PNA (pneumonia) 12/09/2021    Troponin I above reference range 12/09/2021    Elevated antinuclear antibody (FREDRICK) level 02/16/2021    B12 deficiency 02/15/2021    Anemia 02/12/2021    Rectal bleed 11/18/2020    Acute hypokalemia 11/17/2020    Acute cystitis without hematuria     Leukocytosis     Sepsis ([address] 01/17/2020    BRBPR (bright red blood per rectum) 01/17/2020    Rectal bleeding 09/25/2018    NSTEMI (non-ST elevated myocardial infarction) ([facility] 06/25/2018    Upper GI bleed 03/07/2017    Diabetes mellitus ([facility]     Atrial flutter (HCC)     Atrial fibrillation (HCC)     Gout     Hyperlipidemia     Hypertension     Arthropathy    Acute on chronic hypoxic hypercapneic resp failure sec to Cardiogenic and non Cardiogenic Pulmonary edema  Systolic CHF with EF of 47-36%  Grade II Diastolic dysfunction  Bilateral Pneumonia sec to COVID-19  Obesity  DIVYA  Chronic Metabolic alkalosis  VDRF s/p extubation  Proteus bacteremia       1. Inhalers  2. ICS  3. OOB  4. Keep sats > 92%  5. Await placement  6.  C.w present management    Electronically signed by Matt Paredes MD on 1/3/2022 at 12:33 PM

## 2022-01-04 LAB
ALBUMIN SERPL-MCNC: 2.2 GM/DL (ref 3.4–5)
ALP BLD-CCNC: 90 IU/L (ref 40–129)
ALT SERPL-CCNC: 25 U/L (ref 10–40)
ANION GAP SERPL CALCULATED.3IONS-SCNC: 9 MMOL/L (ref 4–16)
AST SERPL-CCNC: 29 IU/L (ref 15–37)
BILIRUB SERPL-MCNC: 1.3 MG/DL (ref 0–1)
BUN BLDV-MCNC: 26 MG/DL (ref 6–23)
CALCIUM SERPL-MCNC: 8.5 MG/DL (ref 8.3–10.6)
CHLORIDE BLD-SCNC: 117 MMOL/L (ref 99–110)
CO2: 22 MMOL/L (ref 21–32)
CREAT SERPL-MCNC: 0.5 MG/DL (ref 0.9–1.3)
GFR AFRICAN AMERICAN: >60 ML/MIN/1.73M2
GFR NON-AFRICAN AMERICAN: >60 ML/MIN/1.73M2
GLUCOSE BLD-MCNC: 102 MG/DL (ref 70–99)
GLUCOSE BLD-MCNC: 118 MG/DL (ref 70–99)
GLUCOSE BLD-MCNC: 121 MG/DL (ref 70–99)
GLUCOSE BLD-MCNC: 171 MG/DL (ref 70–99)
GLUCOSE BLD-MCNC: 268 MG/DL (ref 70–99)
GLUCOSE BLD-MCNC: 54 MG/DL (ref 70–99)
HCT VFR BLD CALC: 37.6 % (ref 42–52)
HEMOGLOBIN: 11.6 GM/DL (ref 13.5–18)
MCH RBC QN AUTO: 28.6 PG (ref 27–31)
MCHC RBC AUTO-ENTMCNC: 30.9 % (ref 32–36)
MCV RBC AUTO: 92.8 FL (ref 78–100)
PDW BLD-RTO: 19.6 % (ref 11.7–14.9)
PLATELET # BLD: 146 K/CU MM (ref 140–440)
PMV BLD AUTO: 11.5 FL (ref 7.5–11.1)
POTASSIUM SERPL-SCNC: 3.8 MMOL/L (ref 3.5–5.1)
RBC # BLD: 4.05 M/CU MM (ref 4.6–6.2)
SODIUM BLD-SCNC: 148 MMOL/L (ref 135–145)
TOTAL PROTEIN: 5 GM/DL (ref 6.4–8.2)
WBC # BLD: 4.2 K/CU MM (ref 4–10.5)

## 2022-01-04 PROCEDURE — 6370000000 HC RX 637 (ALT 250 FOR IP): Performed by: INTERNAL MEDICINE

## 2022-01-04 PROCEDURE — 97535 SELF CARE MNGMENT TRAINING: CPT

## 2022-01-04 PROCEDURE — 99213 OFFICE O/P EST LOW 20 MIN: CPT

## 2022-01-04 PROCEDURE — 2500000003 HC RX 250 WO HCPCS: Performed by: NURSE PRACTITIONER

## 2022-01-04 PROCEDURE — 97530 THERAPEUTIC ACTIVITIES: CPT

## 2022-01-04 PROCEDURE — 1200000000 HC SEMI PRIVATE

## 2022-01-04 PROCEDURE — 99231 SBSQ HOSP IP/OBS SF/LOW 25: CPT | Performed by: INTERNAL MEDICINE

## 2022-01-04 PROCEDURE — 82962 GLUCOSE BLOOD TEST: CPT

## 2022-01-04 PROCEDURE — 36592 COLLECT BLOOD FROM PICC: CPT

## 2022-01-04 PROCEDURE — 99223 1ST HOSP IP/OBS HIGH 75: CPT | Performed by: OBSTETRICS & GYNECOLOGY

## 2022-01-04 PROCEDURE — 2580000003 HC RX 258: Performed by: NURSE PRACTITIONER

## 2022-01-04 PROCEDURE — 97164 PT RE-EVAL EST PLAN CARE: CPT

## 2022-01-04 PROCEDURE — 2580000003 HC RX 258: Performed by: INTERNAL MEDICINE

## 2022-01-04 PROCEDURE — 2700000000 HC OXYGEN THERAPY PER DAY

## 2022-01-04 PROCEDURE — 85027 COMPLETE CBC AUTOMATED: CPT

## 2022-01-04 PROCEDURE — 94761 N-INVAS EAR/PLS OXIMETRY MLT: CPT

## 2022-01-04 PROCEDURE — 6370000000 HC RX 637 (ALT 250 FOR IP): Performed by: NURSE PRACTITIONER

## 2022-01-04 PROCEDURE — 80053 COMPREHEN METABOLIC PANEL: CPT

## 2022-01-04 RX ORDER — SODIUM CHLORIDE 450 MG/100ML
INJECTION, SOLUTION INTRAVENOUS CONTINUOUS
Status: DISCONTINUED | OUTPATIENT
Start: 2022-01-04 | End: 2022-01-05 | Stop reason: HOSPADM

## 2022-01-04 RX ADMIN — METOPROLOL SUCCINATE 50 MG: 50 TABLET, EXTENDED RELEASE ORAL at 09:50

## 2022-01-04 RX ADMIN — FAMOTIDINE 20 MG: 10 INJECTION, SOLUTION INTRAVENOUS at 09:50

## 2022-01-04 RX ADMIN — PRAVASTATIN SODIUM 20 MG: 10 TABLET ORAL at 23:35

## 2022-01-04 RX ADMIN — POLYETHYLENE GLYCOL (3350) 17 G: 17 POWDER, FOR SOLUTION ORAL at 10:02

## 2022-01-04 RX ADMIN — ALLOPURINOL 300 MG: 300 TABLET ORAL at 09:50

## 2022-01-04 RX ADMIN — APIXABAN 5 MG: 5 TABLET, FILM COATED ORAL at 23:35

## 2022-01-04 RX ADMIN — APIXABAN 5 MG: 5 TABLET, FILM COATED ORAL at 09:50

## 2022-01-04 RX ADMIN — INSULIN LISPRO 3 UNITS: 100 INJECTION, SOLUTION INTRAVENOUS; SUBCUTANEOUS at 17:42

## 2022-01-04 RX ADMIN — SODIUM CHLORIDE, PRESERVATIVE FREE 10 ML: 5 INJECTION INTRAVENOUS at 23:36

## 2022-01-04 RX ADMIN — GUAIFENESIN SYRUP AND DEXTROMETHORPHAN 5 ML: 100; 10 SYRUP ORAL at 15:28

## 2022-01-04 RX ADMIN — CHLORHEXIDINE GLUCONATE 0.12% ORAL RINSE 15 ML: 1.2 LIQUID ORAL at 09:49

## 2022-01-04 RX ADMIN — FAMOTIDINE 20 MG: 10 INJECTION, SOLUTION INTRAVENOUS at 23:35

## 2022-01-04 RX ADMIN — TAMSULOSIN HYDROCHLORIDE 0.4 MG: 0.4 CAPSULE ORAL at 23:35

## 2022-01-04 RX ADMIN — SODIUM CHLORIDE, PRESERVATIVE FREE 10 ML: 5 INJECTION INTRAVENOUS at 09:50

## 2022-01-04 RX ADMIN — SODIUM CHLORIDE: 4.5 INJECTION, SOLUTION INTRAVENOUS at 10:55

## 2022-01-04 RX ADMIN — GUAIFENESIN SYRUP AND DEXTROMETHORPHAN 5 ML: 100; 10 SYRUP ORAL at 09:49

## 2022-01-04 RX ADMIN — SODIUM CHLORIDE, PRESERVATIVE FREE 10 ML: 5 INJECTION INTRAVENOUS at 09:49

## 2022-01-04 RX ADMIN — INSULIN LISPRO 9 UNITS: 100 INJECTION, SOLUTION INTRAVENOUS; SUBCUTANEOUS at 13:05

## 2022-01-04 RX ADMIN — GUAIFENESIN SYRUP AND DEXTROMETHORPHAN 5 ML: 100; 10 SYRUP ORAL at 23:35

## 2022-01-04 ASSESSMENT — PAIN SCALES - PAIN ASSESSMENT IN ADVANCED DEMENTIA (PAINAD)
BODYLANGUAGE: 0
TOTALSCORE: 0
CONSOLABILITY: 0
BREATHING: 0
TOTALSCORE: 0
FACIALEXPRESSION: 0
NEGVOCALIZATION: 0
NEGVOCALIZATION: 0
BODYLANGUAGE: 0
BREATHING: 0
CONSOLABILITY: 0
FACIALEXPRESSION: 0

## 2022-01-04 ASSESSMENT — PAIN SCALES - GENERAL
PAINLEVEL_OUTOF10: 0

## 2022-01-04 ASSESSMENT — PAIN SCALES - WONG BAKER: WONGBAKER_NUMERICALRESPONSE: 0

## 2022-01-04 ASSESSMENT — PAIN DESCRIPTION - PROGRESSION: CLINICAL_PROGRESSION: NOT CHANGED

## 2022-01-04 NOTE — PROGRESS NOTES
Pulmonary and Critical Care  Progress Note      VITALS:  /66   Pulse 71   Temp 97.5 °F (36.4 °C) (Oral)   Resp 18   Ht 5' 7.99\" (1.727 m)   Wt 202 lb 9.6 oz (91.9 kg)   SpO2 96%   BMI 30.81 kg/m²     Subjective:   CHIEF COMPLAINT :Fall     HPI:                The patient is lying in the bed. He is not in acute resp distress    Objective:   PHYSICAL EXAM:    LUNGS:Occasional basal crackles  Abd-soft, BS+,NT  Ext- no pedal edema  CVS-s1s2, no murmurs      DATA:    CBC:  Recent Labs     01/02/22  0043 01/03/22  0605 01/04/22  0625   WBC 6.7 6.1 4.2   RBC 3.98* 4.08* 4.05*   HGB 11.3* 11.6* 11.6*   HCT 37.3* 37.5* 37.6*    147 146   MCV 93.7 91.9 92.8   MCH 28.4 28.4 28.6   MCHC 30.3* 30.9* 30.9*   RDW 18.8* 19.3* 19.6*   SEGSPCT 75.5* 76.5*  --       BMP:  Recent Labs     01/02/22  0043 01/03/22  0605 01/04/22  0625    143 148*   K 3.8 3.7 3.8   * 114* 117*   CO2 22 22 22   BUN 25* 26* 26*   CREATININE 0.5* 0.5* 0.5*   CALCIUM 8.0* 8.3 8.5   GLUCOSE 139* 69* 121*      ABG:  No results for input(s): PH, PO2ART, QZX1KGY, HCO3, BEART, O2SAT in the last 72 hours. BNP  No results found for: BNP   D-Dimer:  No results found for: DDIMER   1.  Radiology: None      Assessment/Plan     Patient Active Problem List    Diagnosis Date Noted    PNA (pneumonia) 12/09/2021    Troponin I above reference range 12/09/2021    Elevated antinuclear antibody (FREDRICK) level 02/16/2021    B12 deficiency 02/15/2021    Anemia 02/12/2021    Rectal bleed 11/18/2020    Acute hypokalemia 11/17/2020    Acute cystitis without hematuria     Leukocytosis     Sepsis ([address] 01/17/2020    BRBPR (bright red blood per rectum) 01/17/2020    Rectal bleeding 09/25/2018    NSTEMI (non-ST elevated myocardial infarction) ([facility] 06/25/2018    Upper GI bleed 03/07/2017    Diabetes mellitus ([facility]     Atrial flutter (HCC)     Atrial fibrillation (HCC)     Gout     Hyperlipidemia     Hypertension     Arthropathy    Acute on chronic hypoxic hypercapneic resp failure sec to Cardiogenic and non Cardiogenic Pulmonary edema  Systolic CHF with EF of 18-45%  Grade II Diastolic dysfunction  Bilateral Pneumonia sec to COVID-19  Obesity  DIVYA  Chronic Metabolic alkalosis  VDRF s/p extubation  Proteus bacteremia       1. ICS  2. OOB  3. PT/OT  4. Inhalers  5. Keep sats > 92%  6. Await placement  7.  C.w present management    Electronically signed by Yazan Rudolph MD on 1/4/2022 at 11:08 AM

## 2022-01-04 NOTE — CONSULTS
[address] [place] [id number] Continence Nurse  Consult Note       Mirella Alston  AGE: 77 y.o. GENDER: male  : 1945  TODAY'S DATE:  2022    Subjective:     Reason for Evaluation and Assessment: wound care Reassessment. Mirella Alston is a 77 y.o. male referred by:   [x] Physician  [] Nursing  [] Other:     Wound Identification:  Wound Type: pressure  Contributing Factors: chronic pressure, decreased mobility, obesity and incontinence of urine        PAST MEDICAL HISTORY        Diagnosis Date    Arthropathy     Atrial fibrillation (HCC)     Atrial flutter (HCC)     Diabetes mellitus ([facility]     Gout     Hyperlipidemia     Hypertension     Lumbago        PAST SURGICAL HISTORY    Past Surgical History:   Procedure Laterality Date    COLONOSCOPY N/A 2018    COLONOSCOPY POLYPECTOMY SNARE/COLD BIOPSY performed by Wendy Bradley MD at [place] [address] COLONOSCOPY N/A 2020    COLONOSCOPY DIAGNOSTIC performed by Zain Mobley MD at [facility] ENDOSCOPY       FAMILY HISTORY    Family History   Problem Relation Age of Onset    No Known Problems Mother     No Known Problems Father        SOCIAL HISTORY    Social History     Tobacco Use    Smoking status: Former Smoker     Packs/day: 0.50     Types: Cigarettes    Smokeless tobacco: Never Used   Substance Use Topics    Alcohol use: No    Drug use: No       ALLERGIES    Allergies   Allergen Reactions    Demerol Hcl [Meperidine]     Gabapentin     Simvastatin        MEDICATIONS    No current facility-administered medications on file prior to encounter.      Current Outpatient Medications on File Prior to Encounter   Medication Sig Dispense Refill    FEROSUL 325 (65 Fe) MG tablet take ONE TABLET BY MOUTH TWICE DAILY 60 tablet 5    lidocaine (LIDODERM) 5 % APPLY 1 PATCH TO SKIN EVERY DAY AS NEEDED - LEAVE ON FOR UP TO 12 HOURS - AFTER YOU TAKE THE PATCH OFF, DO NOT PUT ANOTHER PATCH ON THAT AREA OF SKIN FOR AT LEAST 12 HOURS      losartan (COZAAR) 100 MG tablet TAKE ONE TABLET BY MOUTH EVERY DAY      cyanocobalamin ([employer] VITAMIN B12) 1000 MCG tablet Take 1 tablet by mouth daily 30 tablet 5    famotidine (PEPCID) 40 MG tablet Take 40 mg by mouth nightly      potassium chloride (KLOR-CON M) 20 MEQ extended release tablet Take 20 mEq by mouth daily      tamsulosin (FLOMAX) 0.4 MG capsule Take 1 capsule by mouth daily (Patient taking differently: Take 0.4 mg by mouth nightly ) 30 capsule 3    nitroGLYCERIN (NITROSTAT) 0.4 MG SL tablet up to max of 3 total doses.  If no relief after 1 dose, call 911. 25 tablet 0    apixaban (ELIQUIS) 5 MG TABS tablet Take 1 tablet by mouth 2 times daily 60 tablet 0    metoprolol tartrate (LOPRESSOR) 25 MG tablet Take 1 tablet by mouth 2 times daily 60 tablet 0    allopurinol (ZYLOPRIM) 300 MG tablet Take 300 mg by mouth daily      diltiazem (TIAZAC) 360 MG extended release capsule Take 360 mg by mouth daily      triamterene-hydrochlorothiazide (MAXZIDE-25) 37.5-25 MG per tablet Take 1 tablet by mouth daily      metFORMIN (GLUCOPHAGE) 500 MG tablet Take 500 mg by mouth 2 times daily (with meals)      pravastatin (PRAVACHOL) 20 MG tablet Take 20 mg by mouth nightly           Objective:      /66   Pulse 71   Temp 97.5 °F (36.4 °C) (Oral)   Resp 18   Ht 5' 7.99\" (1.727 m)   Wt 202 lb 9.6 oz (91.9 kg)   SpO2 96%   BMI 30.81 kg/m²   Bill Risk Score: Bill Scale Score: 12    LABS    CBC:   Lab Results   Component Value Date    WBC 4.2 01/04/2022    RBC 4.05 01/04/2022    HGB 11.6 01/04/2022    HCT 37.6 01/04/2022    MCV 92.8 01/04/2022    MCH 28.6 01/04/2022    MCHC 30.9 01/04/2022    RDW 19.6 01/04/2022     01/04/2022    MPV 11.5 01/04/2022     CMP:    Lab Results   Component Value Date     01/04/2022    K 3.8 01/04/2022     01/04/2022    CO2 22 01/04/2022    BUN 26 01/04/2022    CREATININE 0.5 01/04/2022    GFRAA >60 01/04/2022    LABGLOM >60 01/04/2022    GLUCOSE 121 01/04/2022 PROT 5.0 01/04/2022    LABALBU 2.2 01/04/2022    CALCIUM 8.5 01/04/2022    BILITOT 1.3 01/04/2022    ALKPHOS 90 01/04/2022    AST 29 01/04/2022    ALT 25 01/04/2022     Albumin:    Lab Results   Component Value Date    LABALBU 2.2 01/04/2022     PT/INR:    Lab Results   Component Value Date    PROTIME 21.9 11/17/2020    INR 1.80 11/17/2020     HgBA1c:    Lab Results   Component Value Date    LABA1C 7.3 12/18/2021         Assessment:     Patient Active Problem List   Diagnosis    [facility] mellitus [facility]    Atrial flutter (HCC)    Atrial fibrillation ([facility]    Gout    Hyperlipidemia    Hypertension    Arthropathy    Upper GI bleed    NSTEMI (non-ST elevated myocardial infarction) (HCC)    Rectal bleeding    Sepsis (HCC)    BRBPR (bright red blood per rectum)    Acute cystitis without hematuria    Leukocytosis    Acute hypokalemia    Rectal bleed    Anemia    B12 deficiency    Elevated antinuclear antibody (FRERDICK) level    PNA (pneumonia)    Troponin I above reference range       Measurements:  Wound 12/10/21 Buttocks Left (Active)   Wound Image   12/23/21 0850   Wound Etiology Pressure Stage  2 01/04/22 1020   Dressing Status New dressing applied 01/04/22 1020   Wound Cleansed Soap and water 01/04/22 1020   Dressing/Treatment Silicone border 42/78/05 1020   Dressing Change Due 12/22/21 12/29/21 1200   Wound Length (cm) 0.4 cm 01/04/22 1020   Wound Width (cm) 0.4 cm 01/04/22 1020   Wound Depth (cm) 0.1 cm 01/04/22 1020   Wound Surface Area (cm^2) 0.16 cm^2 01/04/22 1020   Change in Wound Size % (l*w) 80 01/04/22 1020   Wound Volume (cm^3) 0.016 cm^3 01/04/22 1020   Distance Tunneling (cm) 0 cm 01/04/22 1020   Tunneling Position ___ O'Clock 0 01/04/22 1020   Undermining Starts ___ O'Clock 0 01/04/22 1020   Undermining Ends___ O'Clock 0 01/04/22 1020   Undermining Maxium Distance (cm) 0 01/04/22 1020   Wound Assessment Pink/red 12/29/21 1200   Drainage Amount None 01/04/22 1020   Drainage Description Serosanguinous 12/29/21 1200   Odor None 12/29/21 1200   Alisson-wound Assessment Intact 01/04/22 1020   Margins Attached edges 01/04/22 1020   Wound Thickness Description not for Pressure Injury Partial thickness 12/29/21 1200   Number of days: 25       Wound 01/04/22 Left hip/buttocks (Active)   Wound Image   01/04/22 1020   Wound Etiology Pressure Stage  2 01/04/22 1020   Dressing Status New dressing applied 01/04/22 1020   Wound Cleansed Soap and water 01/04/22 1020   Dressing/Treatment Silicone border 35/29/11 1020   Wound Length (cm) 12 cm 01/04/22 1020   Wound Width (cm) 2 cm 01/04/22 1020   Wound Depth (cm) 0.1 cm 01/04/22 1020   Wound Surface Area (cm^2) 24 cm^2 01/04/22 1020   Wound Volume (cm^3) 2.4 cm^3 01/04/22 1020   Distance Tunneling (cm) 0 cm 01/04/22 1020   Tunneling Position ___ O'Clock 0 01/04/22 1020   Undermining Starts ___ O'Clock 0 01/04/22 1020   Undermining Ends___ O'Clock 0 01/04/22 1020   Undermining Maxium Distance (cm) 0 01/04/22 1020   Drainage Amount None 01/04/22 1020   Alisson-wound Assessment Intact 01/04/22 1020   Margins Attached edges 01/04/22 1020   Number of days: 0       Wound 01/04/22 Ischium Right (Active)   Wound Image   01/04/22 1020   Wound Etiology Deep tissue/Injury 01/04/22 1020   Dressing Status New dressing applied 01/04/22 1020   Wound Cleansed Soap and water 01/04/22 1020   Dressing/Treatment Silicone border 96/08/12 1020   Wound Length (cm) 3 cm 01/04/22 1020   Wound Width (cm) 4 cm 01/04/22 1020   Wound Depth (cm) 0 cm 01/04/22 1020   Wound Surface Area (cm^2) 12 cm^2 01/04/22 1020   Wound Volume (cm^3) 0 cm^3 01/04/22 1020   Distance Tunneling (cm) 0 cm 01/04/22 1020   Tunneling Position ___ O'Clock 0 01/04/22 1020   Undermining Starts ___ O'Clock 0 01/04/22 1020   Undermining Ends___ O'Clock 0 01/04/22 1020   Undermining Maxium Distance (cm) 0 01/04/22 1020   Drainage Amount None 01/04/22 1020   Odor None 01/04/22 1020   Alisson-wound Assessment Intact 01/04/22 1020 Margins Attached edges 01/04/22 1020   Number of days: 0       Response to treatment:  Well tolerated by patient. Pain Assessment:  Severity:  none  Quality of pain:   Wound Pain Timing/Severity:   Premedicated: no    Plan:     Plan of Care: [REMOVED] Wound 12/10/21 Tibial Right;Posterior-Dressing/Treatment: Open to air  [REMOVED] Wound 12/10/21 Pretibial Right;Lateral cluster-Dressing/Treatment: Open to air  Wound 12/10/21 Buttocks Left-Dressing/Treatment: Silicone border  [REMOVED] Wound 12/16/21 Tibial Left;Posterior-Dressing/Treatment: Open to air  Wound 01/04/22 Left hip/buttocks-Dressing/Treatment: Silicone border  Wound 01/04/22 Ischium Right-Dressing/Treatment: Silicone border     Patient in bed with 2 nurses and tech in the room bathing patient. Pt agreeable to wound care Reassessment. Pt had leg wounds that are healed now. Heels intact. Pt has a pressure wound to left buttocks stage 2 dry/inact measured and pictured. Applied silicone border. Pt has 2 new pressure wounds left hip/buttocks pressure stage 2 and rt ischial deep tissue injury from being left on a bedpan. Wounds measured and pictured applied silicone border dressings. Pt is at high risk for skin breakdown AEB heriberto. Follow heriberto orders. Pt is in an ICU bed. Nurses in the room finishing getting pt turned in bed. Specialty Bed Required :  yes  [x] Low Air Loss   [] Pressure Redistribution  [] Fluid Immersion  [] Bariatric  [] Total Pressure Relief  [] Other:     Discharge Plan:  Placement for patient upon discharge: tbd  Hospice Care: no  Patient appropriate for Outpatient [address]: [facility]    Patient/Caregiver Teaching:  Level of patient/caregiver understanding able to:   Cares explained as given. No evidence of learning. Electronically signed by Rose Louie.  HAI Hoang,  on 1/4/2022 at 11:58 AM

## 2022-01-04 NOTE — PROGRESS NOTES
Occupational Therapy      Occupational Therapy Treatment Note      Name: Ciro Stephens MRN: [id number] :   1945   Date:  2022   Admission Date: 2021 Room:  [address] [place] [address]-A     Primary Problem:  {NA    Restrictions/Precautions:    General Precautions, Fall Risk    Communication with other providers:  Nursing handoff    Subjective:  Patient states:  \"I'll try to sit up but I am scared\"  Pain: NO (location, type, intensity)    Objective:    Observation:  Pt received supine in bed, agreeable to therapy  Objective Measures:  [facility]    Treatment, including education:  Therapeutic Activity Training:   Therapeutic activity training was instructed today. Cues were given for safety, sequence, UE/LE placement, awareness, and balance. Activities performed today included bed mobility training, sup-sit, sitting balance, sit to supine, scooting to [address] Training:   Cues were given for safety, sequence, UE/LE placement, visual cues, and balance. Activities performed today included grooming, LB dressing    Supine to sit maxA, sitting EOB maxA moving to modA w/ proper hand placement and positioning. Sitting EOB ~6 minutes before fatiguing, sit to supine maxA, scooting to HOB maxA x 2.     Pt supine in bed, bed alarm on, call light at side, nursing notified      Assessment / Impression:    Patient's tolerance of treatment: Pt tolerated treatment well  Adverse Reaction: none  Significant change in status and impact:  none  Barriers to improvement: none      Plan for Next Session:    Continue OT POC    Time in:  1624  Time out:  1649  Timed treatment minutes:  25 minutes  Total treatment time:  25 minutes      Electronically signed by:    Magdalena WILLOUGHBY/CHAPIN [id number]  5:11 PM,2022

## 2022-01-04 NOTE — CONSULTS
Physical Therapy Treatment Note  Name: Anna Butler MRN: [id number] :   1945   Date:  2022   Admission Date: 2021 Room:  [address] [place] [address]   Restrictions/Precautions:          Chart reviewed, patient cleared for activity. Subjective:  Patient states:  Good job. Food tastes good. scooch up. No.    Pain:   Location, Type, Intensity (0/10 to 10/10):  unrated by patient, seems comfortable and seems pleased with mobility  Objective: Therapeutic Activity Training:   Cues given for safety, sequence, UE/LE placement, awareness, and balance. Bed mobility:  Dependent. Patient ineffectively reaches for therapist.  Does not follow commands for positioning or efficiency. Sup-sit:  Dependent via bed features. Sit-stand:  Unable  Observation:  Alert, improved alertness with repositioning from extreme right side bend lying in bed. Follows simple one step commands for each UE AROM. Follows periodic command for each LE AROM, but only moves trace amount. Does not feel good with each BLE PROM repositioning, does not tolerate PROM knee flexion well today. Assessment / Impression:     Needs dependent care. Enormous functional impairments. Unsure of capacity to improve. Has not made much progress towards goals. Anticipate he will require dependent care and permanent placement, support evaluating PT d/c recs. Tolerance of treatment:  Fair to poor   Adverse Reaction: none    Plan for Next Session:    Will continue with once per week per original PT plan of care. Train and educate using established plan to improve functional mobility, safety, and independence. Time in:  1325  Time out:  1350  Timed treatment minutes:  15  Total treatment time:  25  Electronically signed by:     Petrona Rausch, PT  2022, 1:55 PM

## 2022-01-04 NOTE — PLAN OF CARE
Nutrition Problem #1: Moderate malnutrition,In context of acute illness or injury  Intervention: Food and/or Nutrient Delivery: Continue Current Diet,Continue Oral Nutrition Supplement  Nutritional Goals: Pt will meet greater than 75% estimated nutrient needs during los

## 2022-01-04 NOTE — PROGRESS NOTES
Patient refused telemetry monitoring, continues to pull off wires. Code status changed this AM. New orders per Dr. Augusta Phillip to discontinue tele.

## 2022-01-04 NOTE — PROGRESS NOTES
Progress Note( Dr. Kathleen Kwong)  1/4/2022  Subjective:   Admit Date: 12/8/2021  PCP: Kailey Davila MD    Admitted For :Admitted initially on December 9, 2021 with history of fall and found to have Covid pneumonia    Consulted For: Better control of blood glucose    Interval History: The patient awake and alert. He was extubated and off ventilator. Not on  oxygen either. Is attempting to talk with whisper    Pt was transferred to [address] [place] [address] [facility] on 1/1/2022  Patient is trying to self-feed      Intake/Output Summary (Last 24 hours) at 1/4/2022 0640  Last data filed at 1/4/2022 0329  Gross per 24 hour   Intake --   Output 800 ml   Net -800 ml       DATA    CBC:   Recent Labs     01/02/22  0043 01/03/22  0605   WBC 6.7 6.1   HGB 11.3* 11.6*    147    CMP:  Recent Labs     01/01/22  0700 01/02/22  0043 01/03/22  0605   * 140 143   K 3.7 3.8 3.7   * 112* 114*   CO2 20* 22 22   BUN 29* 25* 26*   CREATININE 0.6* 0.5* 0.5*   CALCIUM 8.2* 8.0* 8.3   PROT 4.4*  --  4.5*   LABALBU 2.4*  2.4* 2.1* 2.2*   BILITOT 1.1*  --  1.3*   ALKPHOS 73  --  87   AST 33  --  31   ALT 21  --  24     Lipids:   Lab Results   Component Value Date    CHOL 137 06/26/2018    HDL 53 06/26/2018    TRIG 101 06/26/2018     Glucose:  Recent Labs     01/03/22  0800 01/03/22  1246 01/03/22  1738   POCGLU 74 122* 178*     [id number]:  Lab Results   Component Value Date    LABA1C 7.3 12/18/2021     High Sensitivity TSH:   Lab Results   Component Value Date    TSHHS 3.280 05/07/2021     Free T3: No results found for: FT3  Free T4:No results found for: T4FREE    XR CHEST PORTABLE   Final Result   Worsened extensive patchy bilateral airspace opacities. Possible trace right effusion. XR CHEST PORTABLE   Final Result   1. Stable diffuse bilateral lung infiltrates, compatible with multifocal   pneumonia, including COVID.          XR CHEST PORTABLE   Final Result   Stable exam with patchy bilateral airspace opacities compatible with   multifocal pneumonia or edema. XR CHEST PORTABLE   Final Result   1. No significant change in patchy subpleural, basilar predominant airspace   opacities consistent with COVID-19 pneumonia given patient history. 2. Pulmonary vascular congestion and mild cardiomegaly. XR CHEST PORTABLE   Final Result      XR CHEST PORTABLE   Final Result   Mild bilateral improvement in airspace opacities. Trace left apical   pneumothorax is unchanged. XR CHEST PORTABLE   Final Result   The left apical pneumothorax is still identified. Similar to 12/18/2021 but   smaller than 12/17/2021. The multifocal pulmonary opacities are redemonstrated. May have some   worsening atelectasis in the right lower lobe. XR CHEST PORTABLE   Final Result   Improving small left apical pneumothorax measuring 9 mm. New right lower lobe infiltrate may represent atelectasis versus pneumonia. Lines and tubes are stable      Stable mild pneumomediastinum. XR CHEST PORTABLE   Final Result   Interval development of a small left apical pneumothorax measuring   approximately 2.1 cm between pleural surfaces. Pneumomediastinum appears   increased when compared to the previous exam as well. Continued patchy opacity throughout both lungs, most compatible with   multifocal pneumonia. XR CHEST PORTABLE   Final Result   1. No appreciable left pneumothorax identified on the current exam.   2. Similar bilateral airspace opacities. 3. Support lines and tubes remain in place. XR CHEST PORTABLE   Final Result   1. Small stable left apical pneumothorax. 2. Bilateral airspace opacities are seen without significant change. XR CHEST PORTABLE   Final Result   Stable small left apical pneumothorax.       Stable pulmonary vascular congestion along with interstitial opacities and   hazy airspace opacities, left worse than right which may be on the basis of   interstitial and pulmonary edema but can also be seen in the setting of   atypical pneumonia. XR CHEST PORTABLE   Final Result   Persistent small left apical pneumothorax estimated to be approximately 10%   in severity. Support tube/catheters project in stable/satisfactory position   in the frontal projection. Consolidating infiltrates and or pulmonary edema, cardiomegaly unchanged   compared to 12/14/2021 consistent with diffuse bilateral pneumonia and/or   congestive heart failure. XR CHEST PORTABLE   Final Result   Left chest tube remains in place, with slight interval decrease in size in   small left apical pneumothorax. XR CHEST PORTABLE   Final Result   Persistent small left pneumothorax with 1 cm pleural apical separation, with   intervally placed left-sided chest tube. Similar diffuse bilateral airspace disease. XR ABDOMEN (KUB) (SINGLE AP VIEW)   Final Result   NG tube in place with tip and side port below the diaphragm and in the region   of the gastric body. XR CHEST PORTABLE   Final Result   Interval development of a large left tension pneumothorax. Extensive bilateral airspace opacities without significant change. Findings were discussed with Dr. Mckay Retana  at 7:43 am on 12/13/2021. XR CHEST PORTABLE   Final Result   Persistent bilateral airspace disease without acute interval change. XR CHEST PORTABLE   Final Result   Severe extensive bilateral airspace opacities worsened from the previous exam   compatible with edema, ARDS, or multifocal pneumonia. XR CHEST PORTABLE   Final Result   1. Cardiomegaly with severe congestive heart failure. VL DUP LOWER EXTREMITY VENOUS BILATERAL   Final Result   No evidence of DVT in either lower extremity within the limitations. Mild nonspecific subcutaneous edema to bilateral lower extremities. CT HEAD WO CONTRAST   Final Result   No acute intracranial abnormality.       Age related changes including chronic small vessel ischemic disease and   cerebral atrophy. CT ABDOMEN PELVIS W IV CONTRAST Additional Contrast? None   Final Result   Multifocal consolidative changes both lungs worrisome for multifocal   pneumonia. Follow-up in following medical treatment course is recommended   document complete resolution. Multifocal bladder traumatic trabeculation/diverticula noted with slight   haziness of surrounding fat planes. Please correlate with diffuse urinalysis   findings. Is findings could suggest underlying cystitis. Prominence of the Alisson aortic lymph nodes again identified. These could be   reactive due to an underlying infectious inflammatory process such is the   bladder haziness. However neoplastic process may also considered. Consider   3-6 month follow-up. RECOMMENDATIONS:   Unavailable         CT CHEST W CONTRAST   Final Result   Multifocal consolidative changes both lungs worrisome for multifocal   pneumonia. Follow-up in following medical treatment course is recommended   document complete resolution. Multifocal bladder traumatic trabeculation/diverticula noted with slight   haziness of surrounding fat planes. Please correlate with diffuse urinalysis   findings. Is findings could suggest underlying cystitis. Prominence of the Alisson aortic lymph nodes again identified. These could be   reactive due to an underlying infectious inflammatory process such is the   bladder haziness. However neoplastic process may also considered. Consider   3-6 month follow-up. RECOMMENDATIONS:   Unavailable         XR SHOULDER LEFT (MIN 2 VIEWS)   Final Result   No acute fracture identified. Incidentally noted mass or masslike consolidation in the left upper lobe. Advise chest CT for further evaluation. XR PELVIS (1-2 VIEWS)   Final Result   No acute abnormality detected.       Severe degenerative changes at the right hip, with chronic bony remodeling,   not substantially changed when compared to the CT from January 2020. XR CHEST PORTABLE   Final Result   1. No acute cardiopulmonary process identified. 2. Chronic interstitial changes of the lungs. CT CERVICAL SPINE WO CONTRAST   Final Result   Motion degradation challenge evaluation. No convincing evidence acute   displaced fracture traumatic malalignment. There are moderate multilevel   degenerate change         CT HEAD WO CONTRAST   Final Result   No acute intracranial abnormality. Geographic lucency frontal bone noted, consider bone scan imaging or consider   skeletal survey.               Scheduled Medicines   Medications:    guaiFENesin-dextromethorphan  5 mL Oral TID    insulin lispro  0-18 Units SubCUTAneous TID WC    insulin lispro  0-9 Units SubCUTAneous 2 times per day    polyethylene glycol  17 g Oral Daily    chlorhexidine  15 mL Mouth/Throat BID    famotidine (PEPCID) injection  20 mg IntraVENous BID    lidocaine  5 mL IntraDERmal Once    sodium chloride flush  5-40 mL IntraVENous 2 times per day    metoprolol succinate  50 mg Oral BID    allopurinol  300 mg Oral Daily    apixaban  5 mg Oral BID    pravastatin  20 mg Oral Nightly    tamsulosin  0.4 mg Oral Nightly    sodium chloride flush  5-40 mL IntraVENous 2 times per day      Infusions:    dextrose      sodium chloride           Objective:   Vitals: BP (!) 164/143   Pulse 70   Temp 98 °F (36.7 °C) (Oral)   Resp 17   Ht 5' 7.99\" (1.727 m)   Wt 202 lb 9.6 oz (91.9 kg)   SpO2 94%   BMI 30.81 kg/m²   General appearance: Patient extubated and off ventilator not on tube feeding either   Neck: no JVD or bruit  Thyroid : Normal lobes   Lungs: Has Vesicular Breath sounds   Heart:  regular rate and rhythm  Abdomen: soft, non-tender; bowel sounds normal; no masses,  no organomegaly  Musculoskeletal: Normal  Extremities: extremities normal, , no edema  Neurologic: Patient extubated and off ventilator not on tube feeding either       Assessment:     Patient Active Problem List:     Diabetes mellitus ([facility] [address]     Atrial flutter (HCC)     Atrial fibrillation (HCC)     Gout     Hyperlipidemia     Hypertension     Arthropathy     Upper GI bleed     NSTEMI (non-ST elevated myocardial infarction) (HCC)     Rectal bleeding     Sepsis ([facility] [address]     BRBPR (bright red blood per rectum)     Acute cystitis without hematuria     Leukocytosis     Acute hypokalemia     Rectal bleed     Anemia     B12 deficiency     Elevated antinuclear antibody (FREDRICK) level     PNA (pneumonia)     Troponin I above reference range     Hypernatremia possibly due to dehydration      Plan:     1. Reviewed POC blood glucose . Labs and X ray results   2. Reviewed Current Medicines   3. On Correction bolus Humalog/ Basal Lantus /NPH insulin regime  4. Monitor Blood glucose frequently   5. Modified  the dose of Insulin/ other medicines as needed   6. Nephrology managing the IV fluid to manage the hypernatremia   Will follow     NOTE: She received Lantus insulin last night. I brought attention to the charge nurse and the patient's nurse patient not supposed to get his Lantus last night as his blood glucose is below the parameter given for holding the Lantus resulted in hypoglycemic episodes all night. Corrected with administration of extra glucose both p.o. and IV  .      Tristin Monreal MD, MD

## 2022-01-04 NOTE — CONSULTS
and support.   1.                 Past Medical History:        Diagnosis Date    Arthropathy     Atrial fibrillation (HCC)     Atrial flutter (HCC)     Diabetes mellitus ([address]     Gout     Hyperlipidemia     Hypertension     Lumbago        Past Surgical History:        Procedure Laterality Date    COLONOSCOPY N/A 9/27/2018    COLONOSCOPY POLYPECTOMY SNARE/COLD BIOPSY performed by Valerie Cotter MD at [place] [address] N/A 11/18/2020    COLONOSCOPY DIAGNOSTIC performed by Sonal Leach MD at [facility] ENDOSCOPY       Current Medications:    Current Facility-Administered Medications: 0.45 % sodium chloride infusion, , IntraVENous, Continuous  guaiFENesin-dextromethorphan (ROBITUSSIN DM) 100-10 MG/5ML syrup 5 mL, 5 mL, Oral, TID  potassium chloride 20 mEq/50 mL IVPB (Central Line), 20 mEq, IntraVENous, PRN  potassium chloride (KLOR-CON M) extended release tablet 40 mEq, 40 mEq, Oral, PRN **OR** potassium bicarb-citric acid (EFFER-K) effervescent tablet 40 mEq, 40 mEq, Oral, PRN **OR** potassium chloride 10 mEq/100 mL IVPB (Peripheral Line), 10 mEq, IntraVENous, PRN  insulin lispro (HUMALOG) injection vial 0-18 Units, 0-18 Units, SubCUTAneous, TID WC  insulin lispro (HUMALOG) injection vial 0-9 Units, 0-9 Units, SubCUTAneous, 2 times per day  morphine (PF) injection 2 mg, 2 mg, IntraVENous, Q4H PRN  magic (miracle) mouthwash, 5 mL, Swish & Spit, 4x Daily PRN  polyethylene glycol (GLYCOLAX) packet 17 g, 17 g, Oral, Daily  hydrALAZINE (APRESOLINE) 10 mg in sodium chloride 0.9 % 50 mL ivpb, 10 mg, IntraVENous, Q4H PRN  chlorhexidine (PERIDEX) 0.12 % solution 15 mL, 15 mL, Mouth/Throat, BID  famotidine (PEPCID) injection 20 mg, 20 mg, IntraVENous, BID  lidocaine 1 % injection 5 mL, 5 mL, IntraDERmal, Once  sodium chloride flush 0.9 % injection 5-40 mL, 5-40 mL, IntraVENous, 2 times per day  sodium chloride flush 0.9 % injection 5-40 mL, 5-40 mL, IntraVENous, PRN  glucose (GLUTOSE) 40 % oral gel 15 g, 15 g, Oral, PRN  dextrose 50 % IV solution, 12.5 g, IntraVENous, PRN  glucagon (rDNA) injection 1 mg, 1 mg, IntraMUSCular, PRN  dextrose 5 % solution, 100 mL/hr, IntraVENous, PRN  oxyCODONE (ROXICODONE) immediate release tablet 5 mg, 5 mg, Oral, Q4H PRN  guaiFENesin-dextromethorphan (ROBITUSSIN DM) 100-10 MG/5ML syrup 5 mL, 5 mL, Oral, Q4H PRN  metoprolol succinate (TOPROL XL) extended release tablet 50 mg, 50 mg, Oral, BID  allopurinol (ZYLOPRIM) tablet 300 mg, 300 mg, Oral, Daily  apixaban (ELIQUIS) tablet 5 mg, 5 mg, Oral, BID  pravastatin (PRAVACHOL) tablet 20 mg, 20 mg, Oral, Nightly  tamsulosin (FLOMAX) capsule 0.4 mg, 0.4 mg, Oral, Nightly  sodium chloride flush 0.9 % injection 5-40 mL, 5-40 mL, IntraVENous, 2 times per day  sodium chloride flush 0.9 % injection 5-40 mL, 5-40 mL, IntraVENous, PRN  0.9 % sodium chloride infusion, 25 mL, IntraVENous, PRN  ondansetron (ZOFRAN-ODT) disintegrating tablet 4 mg, 4 mg, Oral, Q8H PRN **OR** ondansetron (ZOFRAN) injection 4 mg, 4 mg, IntraVENous, Q6H PRN  polyethylene glycol (GLYCOLAX) packet 17 g, 17 g, Oral, Daily PRN  acetaminophen (TYLENOL) tablet 650 mg, 650 mg, Oral, Q6H PRN **OR** acetaminophen (TYLENOL) suppository 650 mg, 650 mg, Rectal, Q6H PRN    Allergies:  Demerol hcl [meperidine], Gabapentin, and Simvastatin    Social History:    Resides in subsidized housing with caregivers. Never  and no children.     Family History:       Problem Relation Age of Onset    No Known Problems Mother     No Known Problems Father        REVIEW OF SYSTEMS:    Review of systems not obtained due to patient factors - mental status    Vitals:    Vitals:    01/04/22 0745   BP: 101/66   Pulse: 71   Resp: 18   Temp: 97.5 °F (36.4 °C)   SpO2: 96%       PHYSICAL EXAM:  No visitors at bedside  GENERAL: awake and alert in bed but confused  HEENT: No cervical adenopathy, MM moist, PERRL  COR: RRR  LUNGS: Diminished breath sounds bilaterally  ABD: soft, ND/NT, +BS  SKIN: lymphedema of lower extremities with skin changes due to the same. PSYCH: confused. Thought it was 1972. Did not know presidient  NEURO: CN II-XII grossly intact    DATA:    CBC with Differential:    Lab Results   Component Value Date    WBC 4.2 01/04/2022    RBC 4.05 01/04/2022    HGB 11.6 01/04/2022    HCT 37.6 01/04/2022     01/04/2022    MCV 92.8 01/04/2022    MCH 28.6 01/04/2022    MCHC 30.9 01/04/2022    RDW 19.6 01/04/2022    NRBC 2 12/15/2021    SEGSPCT 76.5 01/03/2022    BANDSPCT 14 12/15/2021    LYMPHOPCT 11.0 01/03/2022    MONOPCT 3.6 01/03/2022    BASOPCT 0.0 01/03/2022    MONOSABS 0.2 01/03/2022    LYMPHSABS 0.7 01/03/2022    EOSABS 0.5 01/03/2022    BASOSABS 0.0 01/03/2022    DIFFTYPE AUTOMATED DIFFERENTIAL 01/03/2022     CMP:    Lab Results   Component Value Date     01/04/2022    K 3.8 01/04/2022     01/04/2022    CO2 22 01/04/2022    BUN 26 01/04/2022    CREATININE 0.5 01/04/2022    GFRAA >60 01/04/2022    LABGLOM >60 01/04/2022    GLUCOSE 121 01/04/2022    PROT 5.0 01/04/2022    LABALBU 2.2 01/04/2022    CALCIUM 8.5 01/04/2022    BILITOT 1.3 01/04/2022    ALKPHOS 90 01/04/2022    AST 29 01/04/2022    ALT 25 01/04/2022     Albumin:    Lab Results   Component Value Date    LABALBU 2.2 01/04/2022     CT Chest/ABD/Pelvis: 12/09/2021   Multifocal consolidative changes both lungs worrisome for multifocal   pneumonia.  Follow-up in following medical treatment course is recommended   document complete resolution.       Multifocal bladder traumatic trabeculation/diverticula noted with slight   haziness of surrounding fat planes.  Please correlate with diffuse urinalysis   findings.  Is findings could suggest underlying cystitis.       Prominence of the Alisson aortic lymph nodes again identified.  These could be   reactive due to an underlying infectious inflammatory process such is the   bladder haziness.  However neoplastic process may also considered.  Consider   3-6 month follow-up.      ECHO: 12/09/2021  Summary   Left ventricular systolic function is abnormal.   Ejection fraction is visually estimated at 35-40 %. Septal wall is hypokinetic   Moderate left ventricular hypertrophy. Grade III diastolic dysfunction. Moderately dilated left atrium. Mild to moderate mitral regurgitation. No evidence of any pericardial effusion. IMPRESSION:    77year old male with history of COVID-19 pneumonia and multi co-morbidities for Palliative Care encounter. The patient was seen and examined. He could not really participate in a goals of care discussion due to his confusion. He does have ACP documents and I called and spoke with his sister Miladys Yepez, who is his HCPOA. We discussed the patient's quality of life at this point and whether or not he would want aggressive intervention with an acute event. Miladys Yepez did not believe that he would and his code status was changed. The patient will be going to a SNF at discharge. I suggested to Miladys Lucho that she let the SNF know of his code status as well.          Electronically signed by Princess Clayton MD on 1/4/2022 at 12:11 PM

## 2022-01-04 NOTE — CARE COORDINATION
Spoke with [facility] still awaiting PT note then they should be able to take pt. CM will continue to follow.      Spoke with Nai Ma can take pt in am.

## 2022-01-04 NOTE — PROGRESS NOTES
Comprehensive Nutrition Assessment    Type and Reason for Visit:  Reassess    Nutrition Recommendations/Plan:   · Continue current diet and supplements  · Total assist with feeding  · PO intakes appreciated    Nutrition Assessment:  Pt continues with confusion and poor po intake. Is a total assist with feeding and consuming 0-25% of meals per Flowsheets. Code status change noted. Will continue to follow as high nutrition risk. Malnutrition Assessment:  Malnutrition Status: Moderate malnutrition    Context:  Acute Illness     Findings of the 6 clinical characteristics of malnutrition:  Energy Intake:  75% or less of estimated energy requirements for 7 or more days  Weight Loss:   Greater than 5% over 1 month     Body Fat Loss:  Unable to assess     Muscle Mass Loss:  Unable to assess    Fluid Accumulation:  No significant fluid accumulation Extremities   Strength:  Not Performed    Estimated Daily Nutrient Needs:  Energy (kcal):  2439-6000; Weight Used for Energy Requirements:  Current     Protein (g):  105-140 (1.5-2 g/kg IBW); Weight Used for Protein Requirements:  Ideal        Fluid (ml/day):  fluids per neprhology; Method Used for Fluid Requirements:  Standard Renal      Nutrition Related Findings:  Na 148, Glu 268, A1C 7.3      Wounds:  Multiple,Deep Tissue Injury,Venous Stasis       Current Nutrition Therapies:    ADULT DIET; Dysphagia - Pureed; Moderately Thick (Honey)  ADULT ORAL NUTRITION SUPPLEMENT; Dinner, Breakfast; Fortified Pudding Oral Supplement  ADULT ORAL NUTRITION SUPPLEMENT; Lunch; Frozen Oral Supplement    Anthropometric Measures:  · Height: 5' 7.99\" (172.7 cm)  · Current Body Weight: 202 lb 9.6 oz (91.9 kg)   · Admission Body Weight: 233 lb 4 oz (105.8 kg) (first measured weight)    · Usual Body Weight:  (n/a)     · Ideal Body Weight: 154 lbs; % Ideal Body Weight 140.3 %   · BMI: 30.8  · BMI Categories: Obese Class 1 (BMI 30.0-34. 9)       Nutrition Diagnosis:   · Moderate malnutrition,In context of acute illness or injury related to inadequate protein-energy intake as evidenced by weight loss greater than or equal to 5% in 1 month,intake 0-25%    Nutrition Interventions:   Food and/or Nutrient Delivery:  Continue Current Diet,Continue Oral Nutrition Supplement  Nutrition Education/Counseling:  No recommendation at this time   Coordination of Nutrition Care:  Continue to monitor while inpatient,Feeding Assistance/Environment Change,Speech Therapy,Swallow Evaluation    Goals:  Pt will meet greater than 75% estimated nutrient needs during los       Nutrition Monitoring and Evaluation:   Behavioral-Environmental Outcomes:  None Identified   Food/Nutrient Intake Outcomes:  Food and Nutrient Intake,Supplement Intake  Physical Signs/Symptoms Outcomes:  Biochemical Data,GI Status,Hemodynamic Status,Fluid Status or Edema,Weight,Skin     Discharge Planning:    Continue Oral Nutrition Supplement     Electronically signed by Yovani Almanza RD, LD on 1/4/22 at 2:06 PM EST    Contact: [phone]

## 2022-01-04 NOTE — PROGRESS NOTES
Progress Note( Dr. Freya Reynolds)    Subjective:   Admit Date: 12/8/2021  PCP: Yoselyn Becerril MD    Admitted For :Admitted initially on December 9, 2021 with history of fall and found to have Covid pneumonia    Consulted For: Better control of blood glucose    Interval History: The patient awake and alert. He was extubated and off ventilator. Not on  oxygen either. Is attempting to talk with whisper    Pt was transferred to [address] [place] [address],  [facility] on 1/1/2022  Patient is trying to self-feed      Intake/Output Summary (Last 24 hours) at 1/4/2022 0640  Last data filed at 1/4/2022 0329  Gross per 24 hour   Intake --   Output 800 ml   Net -800 ml       DATA    CBC:   Recent Labs     01/02/22  0043 01/03/22  0605   WBC 6.7 6.1   HGB 11.3* 11.6*    147    CMP:  Recent Labs     01/01/22  0700 01/02/22  0043 01/03/22  0605   * 140 143   K 3.7 3.8 3.7   * 112* 114*   CO2 20* 22 22   BUN 29* 25* 26*   CREATININE 0.6* 0.5* 0.5*   CALCIUM 8.2* 8.0* 8.3   PROT 4.4*  --  4.5*   LABALBU 2.4*  2.4* 2.1* 2.2*   BILITOT 1.1*  --  1.3*   ALKPHOS 73  --  87   AST 33  --  31   ALT 21  --  24     Lipids:   Lab Results   Component Value Date    CHOL 137 06/26/2018    HDL 53 06/26/2018    TRIG 101 06/26/2018     Glucose:  Recent Labs     01/03/22  0800 01/03/22  1246 01/03/22  1738   POCGLU 74 122* 178*     [id number]:  Lab Results   Component Value Date    LABA1C 7.3 12/18/2021     High Sensitivity TSH:   Lab Results   Component Value Date    TSHHS 3.280 05/07/2021     Free T3: No results found for: FT3  Free T4:No results found for: T4FREE    XR CHEST PORTABLE   Final Result   Worsened extensive patchy bilateral airspace opacities. Possible trace right effusion. XR CHEST PORTABLE   Final Result   1. Stable diffuse bilateral lung infiltrates, compatible with multifocal   pneumonia, including COVID.          XR CHEST PORTABLE   Final Result   Stable exam with patchy bilateral airspace opacities compatible with   multifocal pneumonia or edema. XR CHEST PORTABLE   Final Result   1. No significant change in patchy subpleural, basilar predominant airspace   opacities consistent with COVID-19 pneumonia given patient history. 2. Pulmonary vascular congestion and mild cardiomegaly. XR CHEST PORTABLE   Final Result      XR CHEST PORTABLE   Final Result   Mild bilateral improvement in airspace opacities. Trace left apical   pneumothorax is unchanged. XR CHEST PORTABLE   Final Result   The left apical pneumothorax is still identified. Similar to 12/18/2021 but   smaller than 12/17/2021. The multifocal pulmonary opacities are redemonstrated. May have some   worsening atelectasis in the right lower lobe. XR CHEST PORTABLE   Final Result   Improving small left apical pneumothorax measuring 9 mm. New right lower lobe infiltrate may represent atelectasis versus pneumonia. Lines and tubes are stable      Stable mild pneumomediastinum. XR CHEST PORTABLE   Final Result   Interval development of a small left apical pneumothorax measuring   approximately 2.1 cm between pleural surfaces. Pneumomediastinum appears   increased when compared to the previous exam as well. Continued patchy opacity throughout both lungs, most compatible with   multifocal pneumonia. XR CHEST PORTABLE   Final Result   1. No appreciable left pneumothorax identified on the current exam.   2. Similar bilateral airspace opacities. 3. Support lines and tubes remain in place. XR CHEST PORTABLE   Final Result   1. Small stable left apical pneumothorax. 2. Bilateral airspace opacities are seen without significant change. XR CHEST PORTABLE   Final Result   Stable small left apical pneumothorax.       Stable pulmonary vascular congestion along with interstitial opacities and   hazy airspace opacities, left worse than right which may be on the basis of   interstitial and pulmonary edema but can also be seen in the setting of   atypical pneumonia. XR CHEST PORTABLE   Final Result   Persistent small left apical pneumothorax estimated to be approximately 10%   in severity. Support tube/catheters project in stable/satisfactory position   in the frontal projection. Consolidating infiltrates and or pulmonary edema, cardiomegaly unchanged   compared to 12/14/2021 consistent with diffuse bilateral pneumonia and/or   congestive heart failure. XR CHEST PORTABLE   Final Result   Left chest tube remains in place, with slight interval decrease in size in   small left apical pneumothorax. XR CHEST PORTABLE   Final Result   Persistent small left pneumothorax with 1 cm pleural apical separation, with   intervally placed left-sided chest tube. Similar diffuse bilateral airspace disease. XR ABDOMEN (KUB) (SINGLE AP VIEW)   Final Result   NG tube in place with tip and side port below the diaphragm and in the region   of the gastric body. XR CHEST PORTABLE   Final Result   Interval development of a large left tension pneumothorax. Extensive bilateral airspace opacities without significant change. Findings were discussed with Dr. Lenin Agrawal  at 7:43 am on 12/13/2021. XR CHEST PORTABLE   Final Result   Persistent bilateral airspace disease without acute interval change. XR CHEST PORTABLE   Final Result   Severe extensive bilateral airspace opacities worsened from the previous exam   compatible with edema, ARDS, or multifocal pneumonia. XR CHEST PORTABLE   Final Result   1. Cardiomegaly with severe congestive heart failure. VL DUP LOWER EXTREMITY VENOUS BILATERAL   Final Result   No evidence of DVT in either lower extremity within the limitations. Mild nonspecific subcutaneous edema to bilateral lower extremities. CT HEAD WO CONTRAST   Final Result   No acute intracranial abnormality.       Age related changes including chronic small vessel ischemic disease and   cerebral atrophy. CT ABDOMEN PELVIS W IV CONTRAST Additional Contrast? None   Final Result   Multifocal consolidative changes both lungs worrisome for multifocal   pneumonia. Follow-up in following medical treatment course is recommended   document complete resolution. Multifocal bladder traumatic trabeculation/diverticula noted with slight   haziness of surrounding fat planes. Please correlate with diffuse urinalysis   findings. Is findings could suggest underlying cystitis. Prominence of the Alisson aortic lymph nodes again identified. These could be   reactive due to an underlying infectious inflammatory process such is the   bladder haziness. However neoplastic process may also considered. Consider   3-6 month follow-up. RECOMMENDATIONS:   Unavailable         CT CHEST W CONTRAST   Final Result   Multifocal consolidative changes both lungs worrisome for multifocal   pneumonia. Follow-up in following medical treatment course is recommended   document complete resolution. Multifocal bladder traumatic trabeculation/diverticula noted with slight   haziness of surrounding fat planes. Please correlate with diffuse urinalysis   findings. Is findings could suggest underlying cystitis. Prominence of the Alisson aortic lymph nodes again identified. These could be   reactive due to an underlying infectious inflammatory process such is the   bladder haziness. However neoplastic process may also considered. Consider   3-6 month follow-up. RECOMMENDATIONS:   Unavailable         XR SHOULDER LEFT (MIN 2 VIEWS)   Final Result   No acute fracture identified. Incidentally noted mass or masslike consolidation in the left upper lobe. Advise chest CT for further evaluation. XR PELVIS (1-2 VIEWS)   Final Result   No acute abnormality detected.       Severe degenerative changes at the right hip, with chronic bony remodeling,   not substantially changed when compared to the CT from January 2020. XR CHEST PORTABLE   Final Result   1. No acute cardiopulmonary process identified. 2. Chronic interstitial changes of the lungs. CT CERVICAL SPINE WO CONTRAST   Final Result   Motion degradation challenge evaluation. No convincing evidence acute   displaced fracture traumatic malalignment. There are moderate multilevel   degenerate change         CT HEAD WO CONTRAST   Final Result   No acute intracranial abnormality. Geographic lucency frontal bone noted, consider bone scan imaging or consider   skeletal survey.               Scheduled Medicines   Medications:    guaiFENesin-dextromethorphan  5 mL Oral TID    insulin lispro  0-18 Units SubCUTAneous TID WC    insulin lispro  0-9 Units SubCUTAneous 2 times per day    polyethylene glycol  17 g Oral Daily    chlorhexidine  15 mL Mouth/Throat BID    famotidine (PEPCID) injection  20 mg IntraVENous BID    lidocaine  5 mL IntraDERmal Once    sodium chloride flush  5-40 mL IntraVENous 2 times per day    metoprolol succinate  50 mg Oral BID    allopurinol  300 mg Oral Daily    apixaban  5 mg Oral BID    pravastatin  20 mg Oral Nightly    tamsulosin  0.4 mg Oral Nightly    sodium chloride flush  5-40 mL IntraVENous 2 times per day      Infusions:    dextrose      sodium chloride           Objective:   Vitals: BP (!) 164/143   Pulse 70   Temp 98 °F (36.7 °C) (Oral)   Resp 17   Ht 5' 7.99\" (1.727 m)   Wt 202 lb 9.6 oz (91.9 kg)   SpO2 94%   BMI 30.81 kg/m²   General appearance: Patient extubated and off ventilator not on tube feeding either   Neck: no JVD or bruit  Thyroid : Normal lobes   Lungs: Has Vesicular Breath sounds   Heart:  regular rate and rhythm  Abdomen: soft, non-tender; bowel sounds normal; no masses,  no organomegaly  Musculoskeletal: Normal  Extremities: extremities normal, , no edema  Neurologic: Patient extubated and off ventilator not on tube feeding either       Assessment:     Patient Active Problem List:     Diabetes mellitus ([facility] [address]     Atrial flutter (HCC)     Atrial fibrillation (HCC)     Gout     Hyperlipidemia     Hypertension     Arthropathy     Upper GI bleed     NSTEMI (non-ST elevated myocardial infarction) (HCC)     Rectal bleeding     Sepsis ([facility] [address]     BRBPR (bright red blood per rectum)     Acute cystitis without hematuria     Leukocytosis     Acute hypokalemia     Rectal bleed     Anemia     B12 deficiency     Elevated antinuclear antibody (FREDRICK) level     PNA (pneumonia)     Troponin I above reference range     Hypernatremia possibly due to dehydration      Plan:     1. Reviewed POC blood glucose . Labs and X ray results   2. Reviewed Current Medicines   3. On Correction bolus Humalog/ Basal Lantus /NPH insulin regime  4. Monitor Blood glucose frequently   5. Modified  the dose of Insulin/ other medicines as needed   6. Nephrology managing the IV fluid to manage the hypernatremia   Will follow     NOTE: She received Lantus insulin last night. I brought attention to the charge nurse and the patient's nurse patient not supposed to get his Lantus last night as his blood glucose is below the parameter given for holding the Lantus resulted in hypoglycemic episodes all night. Corrected with administration of extra glucose both p.o. and IV  .      Cramen Bains MD, MD

## 2022-01-04 NOTE — PROGRESS NOTES
Hospital Medicine: Progress Note     Javier Mendez  1945         Admit Date: 12/8/2021   Primary Care Physician:  Kristofer Resendiz MD    BP (!) 134/53   Pulse 71   Temp 94.3 °F (34.6 °C) (Skin)   Resp 19   Ht 5' 7.99\" (1.727 m)   Wt 202 lb 9.6 oz (91.9 kg)   SpO2 99%   BMI 30.81 kg/m²     Chief Complaint:    Shortness of breath. Perpetual history:   81-year-old male who was admitted for acute hypoxic respiratory failure due to COVID-19. He was initially intubated, now extubated, oxygenation is holding on room air. He is awaiting placement. Impression/Plan:      Acute hypoxic respiratory failure:  Now resolved. Oxygenation was holding on 2 L, wean to RA. Thresa Abed Continue bronchodilators as needed. COVID-19 pneumonia:  Improved. He is afebrile,  Stable hemodynamics. Continue to monitor. Hypernatremia:  Now resolved. Liberalize oral intake. Monitor the electrolytes and correct deficits. Systolic CHF:  Last known LVEF of 35 to 40%. Holding Lasix due to hyponatremia. Continue other medical management. Atrial fibrillation:  HR is controlled. Continue Eliquis and metoprolol. Severe deconditioning:  PT/OT eval.    Discharge plan:  P[facility] to [facility]. Discussed with case management. He can be discharge at anytime when accepted to [facility]. Interval History:    Seen and examined at bedside on rounds, resting in bed, he is slightly delirious, pleasant, no acute distress. Nursing has no concerns. Physical exam:  General appearance: Sitting up in bed, conversant, no apparent distress. Head/EENT: ncat, perrl, eomi, mmm. Neck: supple, no meningismus. Chest: symmetric with equal expansion. Lungs: Clear to anterior auscultation, no wheezes or rhonchi. Heart: normal s1s2, no added sounds. Abdomen: soft, +ve bowel sound,non tender. Extremities: No pitting edema, chronic stasis derm.   Pulses: palpable and strong bilaterally. Skin: warm and dry. Neurologic: no focal deficits. Data Review:     CBC:   Recent Labs     01/02/22  0043 01/03/22  0605 01/04/22  0625   WBC 6.7 6.1 4.2   HGB 11.3* 11.6* 11.6*    147 146     [place]. Panel:    Recent Labs     01/02/22  0043 01/03/22  0605 01/04/22  0625    143 148*   K 3.8 3.7 3.8   * 114* 117*   CO2 22 22 22   BUN 25* 26* 26*   CREATININE 0.5* 0.5* 0.5*   GLUCOSE 139* 69* 121*     Hepatic:   Recent Labs     01/03/22  0605 01/04/22  0625   AST 31 29   ALT 24 25   BILITOT 1.3* 1.3*   ALKPHOS 87 90     Meds:    guaiFENesin-dextromethorphan  5 mL Oral TID    insulin lispro  0-18 Units SubCUTAneous TID WC    insulin lispro  0-9 Units SubCUTAneous 2 times per day    polyethylene glycol  17 g Oral Daily    chlorhexidine  15 mL Mouth/Throat BID    famotidine (PEPCID) injection  20 mg IntraVENous BID    lidocaine  5 mL IntraDERmal Once    sodium chloride flush  5-40 mL IntraVENous 2 times per day    metoprolol succinate  50 mg Oral BID    allopurinol  300 mg Oral Daily    apixaban  5 mg Oral BID    pravastatin  20 mg Oral Nightly    tamsulosin  0.4 mg Oral Nightly    sodium chloride flush  5-40 mL IntraVENous 2 times per day     eMrna Pedroza MD    Note: Computer voice recognition system was used in parts of this documentation. There is a possibility of sound alike word errors inherent to this technology that may be missed during proof-reading and may alter the context of this note.

## 2022-01-04 NOTE — PROGRESS NOTES
Nephrology Progress Note  1/4/2022 9:50 AM  Subjective: Interval History: Martin Mcdowell is a 77 y.o. male. Weak resting in bed    Data:   Scheduled Meds:   guaiFENesin-dextromethorphan  5 mL Oral TID    insulin lispro  0-18 Units SubCUTAneous TID WC    insulin lispro  0-9 Units SubCUTAneous 2 times per day    polyethylene glycol  17 g Oral Daily    chlorhexidine  15 mL Mouth/Throat BID    famotidine (PEPCID) injection  20 mg IntraVENous BID    lidocaine  5 mL IntraDERmal Once    sodium chloride flush  5-40 mL IntraVENous 2 times per day    metoprolol succinate  50 mg Oral BID    allopurinol  300 mg Oral Daily    apixaban  5 mg Oral BID    pravastatin  20 mg Oral Nightly    tamsulosin  0.4 mg Oral Nightly    sodium chloride flush  5-40 mL IntraVENous 2 times per day     Continuous Infusions:   dextrose      sodium chloride           CBC   Recent Labs     01/02/22  0043 01/03/22  0605 01/04/22  0625   WBC 6.7 6.1 4.2   HGB 11.3* 11.6* 11.6*   HCT 37.3* 37.5* 37.6*    147 146      BMP   Recent Labs     01/02/22  0043 01/03/22  0605 01/04/22  0625    143 148*   K 3.8 3.7 3.8   * 114* 117*   CO2 22 22 22   PHOS 2.4*  --   --    BUN 25* 26* 26*   CREATININE 0.5* 0.5* 0.5*     Hepatic:   Recent Labs     01/03/22  0605 01/04/22  0625   AST 31 29   ALT 24 25   BILITOT 1.3* 1.3*   ALKPHOS 87 90     Troponin: No results for input(s): TROPONINI in the last 72 hours. BNP: No results for input(s): BNP in the last 72 hours. Lipids: No results for input(s): CHOL, HDL in the last 72 hours. Invalid input(s): LDLCALCU  ABGs:   Lab Results   Component Value Date    PO2ART 61 12/23/2021    [id number] 32.0 12/23/2021     INR: No results for input(s): INR in the last 72 hours.   Renal Labs  Albumin:    Lab Results   Component Value Date    LABALBU 2.2 01/04/2022     Calcium:    Lab Results   Component Value Date    CALCIUM 8.5 01/04/2022     Phosphorus:    Lab Results   Component Value Date PHOS 2.4 01/02/2022     U/A:    Lab Results   Component Value Date    NITRU NEGATIVE 12/18/2021    COLORU COLORLESS 12/18/2021    WBCUA NONE SEEN 12/18/2021    RBCUA 31 12/18/2021    MUCUS RARE 12/18/2021    TRICHOMONAS NONE SEEN 12/18/2021    BACTERIA NEGATIVE 12/18/2021    CLARITYU CLEAR 12/18/2021    SPECGRAV 1.017 12/18/2021    UROBILINOGEN NORMAL 12/18/2021    BILIRUBINUR NEGATIVE 12/18/2021    BLOODU LARGE 12/18/2021    KETUA NEGATIVE 12/18/2021     ABG:    Lab Results   Component Value Date    BNZ4UXK 32.0 12/23/2021    PO2ART 61 12/23/2021    NDO6HXF 28.0 12/23/2021     HgBA1c:    Lab Results   Component Value Date    LABA1C 7.3 12/18/2021     Microalbumen/Creatinine ratio:  No components found for: RUCREAT  TSH:  No results found for: TSH  IRON:    Lab Results   Component Value Date    IRON 48 05/07/2021     Iron Saturation:  No components found for: PERCENTFE  TIBC:    Lab Results   Component Value Date    TIBC 302 05/07/2021     FERRITIN:    Lab Results   Component Value Date    FERRITIN 57 05/07/2021     RPR:  No results found for: RPR  FREDRICK:    Lab Results   Component Value Date    ANATITER 1:160 02/12/2021    FREDRICK DETECTED 02/12/2021     24 Hour Urine for Creatinine Clearance:  No components found for: CREAT4, UHRS10, UTV10      Objective:   I/O: 01/03 0701 - 01/04 0700  In: -   Out: 800 [Urine:800]  I/O last 3 completed shifts:  In: -   Out: 800 [Urine:800]  No intake/output data recorded. Vitals: /66   Pulse 71   Temp 97.5 °F (36.4 °C) (Oral)   Resp 18   Ht 5' 7.99\" (1.727 m)   Wt 202 lb 9.6 oz (91.9 kg)   SpO2 96%   BMI 30.81 kg/m²  {  General appearance: Weak arousable  HEENT: Head: Normal, normocephalic, atraumatic.   Neck: supple, symmetrical, trachea midline  Lungs: diminished breath sounds bilaterally  Heart: S1, S2 normal  Abdomen: abnormal findings:  soft nt  Extremities: edema trace  Neurologic: Mental status: alertness: Weak        Assessment and Plan:      IMP:  #1

## 2022-01-05 VITALS
HEIGHT: 68 IN | SYSTOLIC BLOOD PRESSURE: 130 MMHG | DIASTOLIC BLOOD PRESSURE: 60 MMHG | HEART RATE: 73 BPM | BODY MASS INDEX: 31.21 KG/M2 | OXYGEN SATURATION: 99 % | TEMPERATURE: 98.3 F | RESPIRATION RATE: 16 BRPM | WEIGHT: 205.91 LBS

## 2022-01-05 LAB
GLUCOSE BLD-MCNC: 118 MG/DL (ref 70–99)
GLUCOSE BLD-MCNC: 131 MG/DL (ref 70–99)
GLUCOSE BLD-MCNC: 211 MG/DL (ref 70–99)

## 2022-01-05 PROCEDURE — 82962 GLUCOSE BLOOD TEST: CPT

## 2022-01-05 PROCEDURE — 2580000003 HC RX 258: Performed by: INTERNAL MEDICINE

## 2022-01-05 PROCEDURE — 2500000003 HC RX 250 WO HCPCS: Performed by: NURSE PRACTITIONER

## 2022-01-05 PROCEDURE — 6370000000 HC RX 637 (ALT 250 FOR IP): Performed by: NURSE PRACTITIONER

## 2022-01-05 PROCEDURE — 2580000003 HC RX 258: Performed by: NURSE PRACTITIONER

## 2022-01-05 PROCEDURE — 99231 SBSQ HOSP IP/OBS SF/LOW 25: CPT | Performed by: INTERNAL MEDICINE

## 2022-01-05 PROCEDURE — 6370000000 HC RX 637 (ALT 250 FOR IP): Performed by: INTERNAL MEDICINE

## 2022-01-05 RX ORDER — OXYCODONE HYDROCHLORIDE 5 MG/1
5 TABLET ORAL EVERY 4 HOURS PRN
Qty: 12 TABLET | Refills: 0 | Status: SHIPPED | OUTPATIENT
Start: 2022-01-05 | End: 2022-01-08

## 2022-01-05 RX ADMIN — METOPROLOL SUCCINATE 50 MG: 50 TABLET, EXTENDED RELEASE ORAL at 10:34

## 2022-01-05 RX ADMIN — CHLORHEXIDINE GLUCONATE 0.12% ORAL RINSE 15 ML: 1.2 LIQUID ORAL at 10:33

## 2022-01-05 RX ADMIN — SODIUM CHLORIDE, PRESERVATIVE FREE 10 ML: 5 INJECTION INTRAVENOUS at 10:34

## 2022-01-05 RX ADMIN — GUAIFENESIN SYRUP AND DEXTROMETHORPHAN 5 ML: 100; 10 SYRUP ORAL at 10:33

## 2022-01-05 RX ADMIN — ALLOPURINOL 300 MG: 300 TABLET ORAL at 09:17

## 2022-01-05 RX ADMIN — GUAIFENESIN SYRUP AND DEXTROMETHORPHAN 5 ML: 100; 10 SYRUP ORAL at 13:05

## 2022-01-05 RX ADMIN — APIXABAN 5 MG: 5 TABLET, FILM COATED ORAL at 10:32

## 2022-01-05 RX ADMIN — FAMOTIDINE 20 MG: 10 INJECTION, SOLUTION INTRAVENOUS at 10:33

## 2022-01-05 ASSESSMENT — PAIN SCALES - GENERAL: PAINLEVEL_OUTOF10: 0

## 2022-01-05 ASSESSMENT — PAIN SCALES - PAIN ASSESSMENT IN ADVANCED DEMENTIA (PAINAD)
BREATHING: 0
BREATHING: 0
NEGVOCALIZATION: 0
BODYLANGUAGE: 0
CONSOLABILITY: 0
NEGVOCALIZATION: 0
CONSOLABILITY: 0
BODYLANGUAGE: 0
TOTALSCORE: 0
TOTALSCORE: 0
FACIALEXPRESSION: 0
FACIALEXPRESSION: 0

## 2022-01-05 NOTE — CARE COORDINATION
Spoke with Ruth at [facility], they are ready for pt today. PS to Dr Gary Carlisle. 1030 Message from Dr Gary Carlisle that will discharge pt this pm.   Set up transport for 1430. Updated Ruth at [facility] and pt's RN Amrit Valenzuela. Also called pt's sister Yovani Handy she is in agreement with plan.

## 2022-01-05 NOTE — DISCHARGE INSTR - COC
Continuity of Care Form    Patient Name: Fabián Hines   :  1945  MRN:  [id number]    Admit date:  2021  Discharge date:  ***    Code Status Order: DNR-CC   Advance Directives:      Admitting Physician:  Palmer Martinez MD  PCP: Freeman Charles MD    Discharging Nurse: [facility] Unit/Room#: 4270/1038-P  Discharging Unit Phone Number: ***    Emergency Contact:   Extended Emergency Contact Information  Primary Emergency Contact: Maria M Lauren  Home Phone: [phone]  Relation: Brother/Sister    Past Surgical History:  Past Surgical History:   Procedure Laterality Date    COLONOSCOPY N/A 2018    COLONOSCOPY POLYPECTOMY SNARE/COLD BIOPSY performed by Richard Arroyo MD at [address] [place] [address] N/A 2020    COLONOSCOPY DIAGNOSTIC performed by Gonzalez Turner MD at [address] [place] [address] ENDOSCOPY       Immunization History:   Immunization History   Administered Date(s) Administered    Pneumococcal Vaccine 2005    Tdap (Boostrix, Adacel) 2017       Active Problems:  Patient Active Problem List   Diagnosis Code    Diabetes mellitus ([address] E11.9    Atrial flutter ([address] I48.92    Atrial fibrillation ([address] I48.91    Gout M10.9    Hyperlipidemia E78.5    Hypertension I10    Arthropathy M12.9    Upper GI bleed K92.2    NSTEMI (non-ST elevated myocardial infarction) ([address] I21.4    Rectal bleeding K62.5    Sepsis ([facility] A41.9    BRBPR (bright red blood per rectum) K62.5    Acute cystitis without hematuria N30.00    Leukocytosis D72.829    Acute hypokalemia E87.6    Rectal bleed K62.5    Anemia D64.9    B12 deficiency E53.8    Elevated antinuclear antibody (FREDRICK) level R76.8    PNA (pneumonia) J18.9    Troponin I above reference range R77.8       Isolation/Infection:   Isolation            No Isolation          Patient Infection Status       Infection Onset Added Last Indicated Last Indicated By Review Planned Expiration Resolved Resolved By    None active    Resolved    COVID-19 21 12/09/21 12/09/21 COVID-19, Rapid   01/02/22 Joaquín Pratt RN    20 days of isolation complete    COVID-19 (Rule Out) 12/08/21 12/08/21 12/09/21 COVID-19, Rapid (Ordered)   12/09/21 Rule-Out Test Resulted            Nurse Assessment:  Last Vital Signs: /71   Pulse 57   Temp 97.3 °F (36.3 °C) (Axillary)   Resp 16   Ht 5' 7.99\" (1.727 m)   Wt 205 lb 14.6 oz (93.4 kg)   SpO2 99%   BMI 31.32 kg/m²     Last documented pain score (0-10 scale): Pain Level: 0  Last Weight:   Wt Readings from Last 1 Encounters:   01/05/22 205 lb 14.6 oz (93.4 kg)     Mental Status:  {IP PT MENTAL STATUS:20030}    IV Access:  { FERMIN IV ACCESS:079247883}    Nursing Mobility/ADLs:  Walking   {CHP DME IDBY:921679072}  Transfer  {CHP DME BVLZ:874732795}  Bathing  {CHP DME RPZX:152212401}  Dressing  {CHP DME ZXJQ:083485192}  Toileting  {CHP DME JTAF:190546051}  Feeding  {CHP DME XZUJ:892900326}  Med Admin  {CHP DME KSZU:005828053}  Med Delivery   { FERMIN MED Delivery:458781434}    Wound Care Documentation and Therapy:  Wound 12/10/21 Buttocks Left (Active)   Wound Image   01/04/22 1020   Wound Etiology Pressure Stage  2 01/04/22 1020   Dressing Status Clean;Dry; Intact 01/04/22 2040   Wound Cleansed Soap and water 01/04/22 1020   Dressing/Treatment Silicone border 20/61/36 2040   Dressing Change Due 12/22/21 12/29/21 1200   Wound Length (cm) 0.4 cm 01/04/22 1020   Wound Width (cm) 0.4 cm 01/04/22 1020   Wound Depth (cm) 0.1 cm 01/04/22 1020   Wound Surface Area (cm^2) 0.16 cm^2 01/04/22 1020   Change in Wound Size % (l*w) 80 01/04/22 1020   Wound Volume (cm^3) 0.016 cm^3 01/04/22 1020   Distance Tunneling (cm) 0 cm 01/04/22 1020   Tunneling Position ___ O'Clock 0 01/04/22 1020   Undermining Starts ___ O'Clock 0 01/04/22 1020   Undermining Ends___ O'Clock 0 01/04/22 1020   Undermining Maxium Distance (cm) 0 01/04/22 1020   Wound Assessment Pink/red 12/29/21 1200   Drainage Amount None 01/04/22 2040   Drainage Description Serosanguinous 12/29/21 1200   Odor None 12/29/21 1200   Alisson-wound Assessment Intact 01/04/22 1020   Margins Attached edges 01/04/22 1020   Wound Thickness Description not for Pressure Injury Partial thickness 12/29/21 1200   Number of days: 25       Wound 01/04/22 Left hip/buttocks (Active)   Wound Image   01/04/22 1020   Wound Etiology Pressure Stage  2 01/04/22 1020   Dressing Status Clean;Dry; Intact 01/04/22 2040   Wound Cleansed Soap and water 01/04/22 1020   Dressing/Treatment Silicone border 41/57/43 2040   Wound Length (cm) 12 cm 01/04/22 1020   Wound Width (cm) 2 cm 01/04/22 1020   Wound Depth (cm) 0.1 cm 01/04/22 1020   Wound Surface Area (cm^2) 24 cm^2 01/04/22 1020   Wound Volume (cm^3) 2.4 cm^3 01/04/22 1020   Distance Tunneling (cm) 0 cm 01/04/22 1020   Tunneling Position ___ O'Clock 0 01/04/22 1020   Undermining Starts ___ O'Clock 0 01/04/22 1020   Undermining Ends___ O'Clock 0 01/04/22 1020   Undermining Maxium Distance (cm) 0 01/04/22 1020   Drainage Amount None 01/04/22 2040   Alisson-wound Assessment Intact 01/04/22 1020   Margins Attached edges 01/04/22 1020   Number of days: 0       Wound 01/04/22 Ischium Right (Active)   Wound Image   01/04/22 1020   Wound Etiology Deep tissue/Injury 01/04/22 1020   Dressing Status Clean;Dry; Intact 01/04/22 2040   Wound Cleansed Soap and water 01/04/22 1020   Dressing/Treatment Silicone border 60/19/11 2040   Wound Length (cm) 3 cm 01/04/22 1020   Wound Width (cm) 4 cm 01/04/22 1020   Wound Depth (cm) 0 cm 01/04/22 1020   Wound Surface Area (cm^2) 12 cm^2 01/04/22 1020   Wound Volume (cm^3) 0 cm^3 01/04/22 1020   Distance Tunneling (cm) 0 cm 01/04/22 1020   Tunneling Position ___ O'Clock 0 01/04/22 1020   Undermining Starts ___ O'Clock 0 01/04/22 1020   Undermining Ends___ O'Clock 0 01/04/22 1020   Undermining Maxium Distance (cm) 0 01/04/22 1020   Drainage Amount None 01/04/22 2040   Odor None 01/04/22 1020   Alisson-wound Assessment Intact 01/04/22 1020   Margins deep tissue injury wounds apply silicone foam border change every 3 days and prn. Left buttock cleanse with NS apply silicone border change every 3 days and prn. Pt is at high risk for skin breakdown AEB heriberto. Follow heriberto orders. Wound team Electronically signed by Katelyn Bennett. Viktor RN on 1/4/2022 at 11:52 AM   Last edited by Carlyn Lofton RN on 01/04/22 at 1152       Prognosis: Good    Condition at Discharge: Stable    Rehab Potential (if transferring to Rehab): Good    Recommended Labs or Other Treatments After Discharge: BMP in 1 week. Focus renal function and potassium level. Physician Certification: I certify the above information and transfer of Huma Bearden  is necessary for the continuing treatment of the diagnosis listed and that he requires [facility] for greater 30 days.      Update Admission H&P: No change in H&P    PHYSICIAN SIGNATURE:  Electronically signed by Julieth Merino MD on 1/5/22 at 12:54 PM EST

## 2022-01-05 NOTE — PROGRESS NOTES
Nephrology Progress Note  1/5/2022 9:52 AM  Subjective: Interval History: Robert Yeager is a 77 y.o. male. More awake in bed today    Data:   Scheduled Meds:   guaiFENesin-dextromethorphan  5 mL Oral TID    insulin lispro  0-18 Units SubCUTAneous TID WC    insulin lispro  0-9 Units SubCUTAneous 2 times per day    polyethylene glycol  17 g Oral Daily    chlorhexidine  15 mL Mouth/Throat BID    famotidine (PEPCID) injection  20 mg IntraVENous BID    lidocaine  5 mL IntraDERmal Once    sodium chloride flush  5-40 mL IntraVENous 2 times per day    metoprolol succinate  50 mg Oral BID    allopurinol  300 mg Oral Daily    apixaban  5 mg Oral BID    pravastatin  20 mg Oral Nightly    tamsulosin  0.4 mg Oral Nightly    sodium chloride flush  5-40 mL IntraVENous 2 times per day     Continuous Infusions:   sodium chloride 50 mL/hr at 01/04/22 1055    dextrose      sodium chloride           CBC   Recent Labs     01/03/22  0605 01/04/22  0625   WBC 6.1 4.2   HGB 11.6* 11.6*   HCT 37.5* 37.6*    146      BMP   Recent Labs     01/03/22  0605 01/04/22  0625    148*   K 3.7 3.8   * 117*   CO2 22 22   BUN 26* 26*   CREATININE 0.5* 0.5*     Hepatic:   Recent Labs     01/03/22  0605 01/04/22  0625   AST 31 29   ALT 24 25   BILITOT 1.3* 1.3*   ALKPHOS 87 90     Troponin: No results for input(s): TROPONINI in the last 72 hours. BNP: No results for input(s): BNP in the last 72 hours. Lipids: No results for input(s): CHOL, HDL in the last 72 hours. Invalid input(s): LDLCALCU  ABGs:   Lab Results   Component Value Date    PO2ART 61 12/23/2021    GJN9SMM 32.0 12/23/2021     INR: No results for input(s): INR in the last 72 hours.   Renal Labs  Albumin:    Lab Results   Component Value Date    LABALBU 2.2 01/04/2022     Calcium:    Lab Results   Component Value Date    CALCIUM 8.5 01/04/2022     Phosphorus:    Lab Results   Component Value Date    PHOS 2.4 01/02/2022     U/A:    Lab Results Component Value Date    NITRU NEGATIVE 12/18/2021    COLORU COLORLESS 12/18/2021    WBCUA NONE SEEN 12/18/2021    RBCUA 31 12/18/2021    MUCUS RARE 12/18/2021    TRICHOMONAS NONE SEEN 12/18/2021    BACTERIA NEGATIVE 12/18/2021    CLARITYU CLEAR 12/18/2021    SPECGRAV 1.017 12/18/2021    UROBILINOGEN NORMAL 12/18/2021    BILIRUBINUR NEGATIVE 12/18/2021    BLOODU LARGE 12/18/2021    KETUA NEGATIVE 12/18/2021     ABG:    Lab Results   Component Value Date    OXM8RPQ 32.0 12/23/2021    PO2ART 61 12/23/2021    PHY3ASZ 28.0 12/23/2021     HgBA1c:    Lab Results   Component Value Date    LABA1C 7.3 12/18/2021     Microalbumen/Creatinine ratio:  No components found for: RUCREAT  TSH:  No results found for: TSH  IRON:    Lab Results   Component Value Date    IRON 48 05/07/2021     Iron Saturation:  No components found for: PERCENTFE  TIBC:    Lab Results   Component Value Date    TIBC 302 05/07/2021     FERRITIN:    Lab Results   Component Value Date    FERRITIN 57 05/07/2021     RPR:  No results found for: RPR  FREDRICK:    Lab Results   Component Value Date    ANATITER 1:160 02/12/2021    FREDRICK DETECTED 02/12/2021     24 Hour Urine for Creatinine Clearance:  No components found for: CREAT4, UHRS10, UTV10      Objective:   I/O: 01/04 0701 - 01/05 0700  In: 300 [P.O.:300]  Out: 450 [Urine:450]  I/O last 3 completed shifts: In: 300 [P.O.:300]  Out: 1250 [Urine:1250]  No intake/output data recorded. Vitals: /71   Pulse 57   Temp 97.3 °F (36.3 °C) (Axillary)   Resp 16   Ht 5' 7.99\" (1.727 m)   Wt 205 lb 14.6 oz (93.4 kg)   SpO2 99%   BMI 31.32 kg/m²  {  General appearance: Weak interactive  HEENT: Head: Normal, normocephalic, atraumatic.   Neck: supple, symmetrical, trachea midline  Lungs: diminished breath sounds bilaterally  Heart: S1, S2 normal  Abdomen: abnormal findings:  soft nt  Extremities: edema trace  Neurologic: Mental status: alertness: Weak alert        Assessment and Plan:      IMP:  #1 hypernatremia  #2 acute renal failure with increased uremia  #3 respiratory failure  #4 COVID-19 pneumonia  #5 status post pneumothorax tension  #6 CHF exacerbation  #7 type 2 diabetes  #8 complicated UTI/cystitis    Plan     1 fu na level  2 renal to baseline  3 o2 stable  4 treated covid  5 stable diuresis  6 ssi stable overall  7 no fever wbc stable  Will monitor           Brian Bansal MD, MD

## 2022-01-05 NOTE — PROGRESS NOTES
Pulmonary and Critical Care  Progress Note      VITALS:  /71   Pulse 57   Temp 97.3 °F (36.3 °C) (Axillary)   Resp 16   Ht 5' 7.99\" (1.727 m)   Wt 205 lb 14.6 oz (93.4 kg)   SpO2 99%   BMI 31.32 kg/m²     Subjective:   CHIEF COMPLAINT :Fall     HPI:                The patient is sitting in the bed. He is not in acute resp distress    Objective:   PHYSICAL EXAM:    LUNGS:Occasional basal crackles  Abd-soft, BS+,NT  Ext- no pedal edema  CVS-s1s2, no murmurs      DATA:    CBC:  Recent Labs     01/03/22  0605 01/04/22  0625   WBC 6.1 4.2   RBC 4.08* 4.05*   HGB 11.6* 11.6*   HCT 37.5* 37.6*    146   MCV 91.9 92.8   MCH 28.4 28.6   MCHC 30.9* 30.9*   RDW 19.3* 19.6*   SEGSPCT 76.5*  --       BMP:  Recent Labs     01/03/22  0605 01/04/22  0625    148*   K 3.7 3.8   * 117*   CO2 22 22   BUN 26* 26*   CREATININE 0.5* 0.5*   CALCIUM 8.3 8.5   GLUCOSE 69* 121*      ABG:  No results for input(s): PH, PO2ART, WQZ4ZOQ, HCO3, BEART, O2SAT in the last 72 hours. BNP  No results found for: BNP   D-Dimer:  No results found for: DDIMER   1.  Radiology: None      Assessment/Plan     Patient Active Problem List    Diagnosis Date Noted    PNA (pneumonia) 12/09/2021    Troponin I above reference range 12/09/2021    Elevated antinuclear antibody (FREDRICK) level 02/16/2021    B12 deficiency 02/15/2021    Anemia 02/12/2021    Rectal bleed 11/18/2020    Acute hypokalemia 11/17/2020    Acute cystitis without hematuria     Leukocytosis     Sepsis ([facility] 01/17/2020    BRBPR (bright red blood per rectum) 01/17/2020    Rectal bleeding 09/25/2018    NSTEMI (non-ST elevated myocardial infarction) ([facility] 06/25/2018    Upper GI bleed 03/07/2017    Diabetes mellitus ([address]     Atrial flutter (HCC)     Atrial fibrillation (HCC)     Gout     Hyperlipidemia     Hypertension     Arthropathy    Acute on chronic hypoxic hypercapneic resp failure sec to Cardiogenic and non Cardiogenic Pulmonary edema  Systolic CHF with EF of 35-40%  Grade II Diastolic dysfunction  Bilateral Pneumonia sec to COVID-19  Obesity  DIVYA  Chronic Metabolic alkalosis  VDRF s/p extubation  Proteus bacteremia       1. Inhalers  2. ICS  3. OOB  4. PT/OT  5. Keep sats > 92%  6. Await placement  7.  C./w present management    Electronically signed by Karishma Ward MD on 1/5/2022 at 10:43 AM

## 2022-01-06 NOTE — PROGRESS NOTES
Progress Note( Dr. Solange Garsia)  1/5/2022  Subjective:   Admit Date: 12/8/2021  PCP: Jaxson London MD    Admitted For :Admitted initially on December 9, 2021 with history of fall and found to have Covid pneumonia    Consulted For: Better control of blood glucose    Interval History: The patient awake and alert. He was extubated and off ventilator. Not on  oxygen either. Is attempting to talk with whisper    Pt was transferred to [address] [place] [address]  floor on 1/1/2022  Patient is trying to self-feed      Intake/Output Summary (Last 24 hours) at 1/5/2022 2203  Last data filed at 1/5/2022 0245  Gross per 24 hour   Intake 60 ml   Output 175 ml   Net -115 ml       DATA    CBC:   Recent Labs     01/03/22  0605 01/04/22  0625   WBC 6.1 4.2   HGB 11.6* 11.6*    146    CMP:  Recent Labs     01/03/22  0605 01/04/22  0625    148*   K 3.7 3.8   * 117*   CO2 22 22   BUN 26* 26*   CREATININE 0.5* 0.5*   CALCIUM 8.3 8.5   PROT 4.5* 5.0*   LABALBU 2.2* 2.2*   BILITOT 1.3* 1.3*   ALKPHOS 87 90   AST 31 29   ALT 24 25     Lipids:   Lab Results   Component Value Date    CHOL 137 06/26/2018    HDL 53 06/26/2018    TRIG 101 06/26/2018     Glucose:  Recent Labs     01/05/22  0244 01/05/22  0807 01/05/22  1206   POCGLU 118* 131* 211*     HjcqwszffpF3S:  Lab Results   Component Value Date    LABA1C 7.3 12/18/2021     High Sensitivity TSH:   Lab Results   Component Value Date    TSHHS 3.280 05/07/2021     Free T3: No results found for: FT3  Free T4:No results found for: T4FREE    XR CHEST PORTABLE   Final Result   Worsened extensive patchy bilateral airspace opacities. Possible trace right effusion. XR CHEST PORTABLE   Final Result   1. Stable diffuse bilateral lung infiltrates, compatible with multifocal   pneumonia, including COVID. XR CHEST PORTABLE   Final Result   Stable exam with patchy bilateral airspace opacities compatible with   multifocal pneumonia or edema.          XR CHEST PORTABLE Final Result   1. No significant change in patchy subpleural, basilar predominant airspace   opacities consistent with COVID-19 pneumonia given patient history. 2. Pulmonary vascular congestion and mild cardiomegaly. XR CHEST PORTABLE   Final Result      XR CHEST PORTABLE   Final Result   Mild bilateral improvement in airspace opacities. Trace left apical   pneumothorax is unchanged. XR CHEST PORTABLE   Final Result   The left apical pneumothorax is still identified. Similar to 12/18/2021 but   smaller than 12/17/2021. The multifocal pulmonary opacities are redemonstrated. May have some   worsening atelectasis in the right lower lobe. XR CHEST PORTABLE   Final Result   Improving small left apical pneumothorax measuring 9 mm. New right lower lobe infiltrate may represent atelectasis versus pneumonia. Lines and tubes are stable      Stable mild pneumomediastinum. XR CHEST PORTABLE   Final Result   Interval development of a small left apical pneumothorax measuring   approximately 2.1 cm between pleural surfaces. Pneumomediastinum appears   increased when compared to the previous exam as well. Continued patchy opacity throughout both lungs, most compatible with   multifocal pneumonia. XR CHEST PORTABLE   Final Result   1. No appreciable left pneumothorax identified on the current exam.   2. Similar bilateral airspace opacities. 3. Support lines and tubes remain in place. XR CHEST PORTABLE   Final Result   1. Small stable left apical pneumothorax. 2. Bilateral airspace opacities are seen without significant change. XR CHEST PORTABLE   Final Result   Stable small left apical pneumothorax.       Stable pulmonary vascular congestion along with interstitial opacities and   hazy airspace opacities, left worse than right which may be on the basis of   interstitial and pulmonary edema but can also be seen in the setting of   atypical pneumonia. XR CHEST PORTABLE   Final Result   Persistent small left apical pneumothorax estimated to be approximately 10%   in severity. Support tube/catheters project in stable/satisfactory position   in the frontal projection. Consolidating infiltrates and or pulmonary edema, cardiomegaly unchanged   compared to 12/14/2021 consistent with diffuse bilateral pneumonia and/or   congestive heart failure. XR CHEST PORTABLE   Final Result   Left chest tube remains in place, with slight interval decrease in size in   small left apical pneumothorax. XR CHEST PORTABLE   Final Result   Persistent small left pneumothorax with 1 cm pleural apical separation, with   intervally placed left-sided chest tube. Similar diffuse bilateral airspace disease. XR ABDOMEN (KUB) (SINGLE AP VIEW)   Final Result   NG tube in place with tip and side port below the diaphragm and in the region   of the gastric body. XR CHEST PORTABLE   Final Result   Interval development of a large left tension pneumothorax. Extensive bilateral airspace opacities without significant change. Findings were discussed with Dr. Nanda Simon  at 7:43 am on 12/13/2021. XR CHEST PORTABLE   Final Result   Persistent bilateral airspace disease without acute interval change. XR CHEST PORTABLE   Final Result   Severe extensive bilateral airspace opacities worsened from the previous exam   compatible with edema, ARDS, or multifocal pneumonia. XR CHEST PORTABLE   Final Result   1. Cardiomegaly with severe congestive heart failure. VL DUP LOWER EXTREMITY VENOUS BILATERAL   Final Result   No evidence of DVT in either lower extremity within the limitations. Mild nonspecific subcutaneous edema to bilateral lower extremities. CT HEAD WO CONTRAST   Final Result   No acute intracranial abnormality.       Age related changes including chronic small vessel ischemic disease and cerebral atrophy. CT ABDOMEN PELVIS W IV CONTRAST Additional Contrast? None   Final Result   Multifocal consolidative changes both lungs worrisome for multifocal   pneumonia. Follow-up in following medical treatment course is recommended   document complete resolution. Multifocal bladder traumatic trabeculation/diverticula noted with slight   haziness of surrounding fat planes. Please correlate with diffuse urinalysis   findings. Is findings could suggest underlying cystitis. Prominence of the Alisson aortic lymph nodes again identified. These could be   reactive due to an underlying infectious inflammatory process such is the   bladder haziness. However neoplastic process may also considered. Consider   3-6 month follow-up. RECOMMENDATIONS:   Unavailable         CT CHEST W CONTRAST   Final Result   Multifocal consolidative changes both lungs worrisome for multifocal   pneumonia. Follow-up in following medical treatment course is recommended   document complete resolution. Multifocal bladder traumatic trabeculation/diverticula noted with slight   haziness of surrounding fat planes. Please correlate with diffuse urinalysis   findings. Is findings could suggest underlying cystitis. Prominence of the Alisson aortic lymph nodes again identified. These could be   reactive due to an underlying infectious inflammatory process such is the   bladder haziness. However neoplastic process may also considered. Consider   3-6 month follow-up. RECOMMENDATIONS:   Unavailable         XR SHOULDER LEFT (MIN 2 VIEWS)   Final Result   No acute fracture identified. Incidentally noted mass or masslike consolidation in the left upper lobe. Advise chest CT for further evaluation. XR PELVIS (1-2 VIEWS)   Final Result   No acute abnormality detected.       Severe degenerative changes at the right hip, with chronic bony remodeling,   not substantially changed when compared to the CT from January 2020. XR CHEST PORTABLE   Final Result   1. No acute cardiopulmonary process identified. 2. Chronic interstitial changes of the lungs. CT CERVICAL SPINE WO CONTRAST   Final Result   Motion degradation challenge evaluation. No convincing evidence acute   displaced fracture traumatic malalignment. There are moderate multilevel   degenerate change         CT HEAD WO CONTRAST   Final Result   No acute intracranial abnormality. Geographic lucency frontal bone noted, consider bone scan imaging or consider   skeletal survey. Scheduled Medicines   Medications:      Infusions:         Objective:   Vitals: /60   Pulse 73   Temp 98.3 °F (36.8 °C)   Resp 16   Ht 5' 7.99\" (1.727 m)   Wt 205 lb 14.6 oz (93.4 kg)   SpO2 99%   BMI 31.32 kg/m²   General appearance: Patient extubated and off ventilator not on tube feeding either   Neck: no JVD or bruit  Thyroid : Normal lobes   Lungs: Has Vesicular Breath sounds   Heart:  regular rate and rhythm  Abdomen: soft, non-tender; bowel sounds normal; no masses,  no organomegaly  Musculoskeletal: Normal  Extremities: extremities normal, , no edema  Neurologic: Patient extubated and off ventilator not on tube feeding either       Assessment:     Patient Active Problem List:     Diabetes mellitus (HCC)     Atrial flutter (HCC)     Atrial fibrillation (HCC)     Gout     Hyperlipidemia     Hypertension     Arthropathy     Upper GI bleed     NSTEMI (non-ST elevated myocardial infarction) (HCC)     Rectal bleeding     Sepsis (HCC)     BRBPR (bright red blood per rectum)     Acute cystitis without hematuria     Leukocytosis     Acute hypokalemia     Rectal bleed     Anemia     B12 deficiency     Elevated antinuclear antibody (FREDRICK) level     PNA (pneumonia)     Troponin I above reference range     Hypernatremia possibly due to dehydration      Plan:     1. Reviewed POC blood glucose . Labs and X ray results   2.  Reviewed Current Medicines   3. On Correction bolus Humalog/ Basal Lantus /NPH insulin regime  4. Monitor Blood glucose frequently   5. Modified  the dose of Insulin/ other medicines as needed   6. Nephrology managing the IV fluid to manage the hypernatremia   Will follow     NOTE: She received Lantus insulin last night. I brought attention to the charge nurse and the patient's nurse patient not supposed to get his Lantus last night as his blood glucose is below the parameter given for holding the Lantus resulted in hypoglycemic episodes all night. Corrected with administration of extra glucose both p.o. and IV  .      Gregorio Adkins MD, MD

## 2022-01-06 NOTE — DISCHARGE SUMMARY
Physician Discharge Summary     Patient ID  Ronald Chu   1945  [id number]    Primary Care Doctor:  Naresh Frey MD    Admit date: 12/8/2021   Discharge date: 1/5/2022      Admitting Physician: Krista Goldberg, MD   Discharge Physician: Giuliana Ramirez MD     Discharge Diagnoses:   Acute hypoxic respiratory failure  Covid 19 pneumonia  Hypernatremia  CHF  Atrial fibrillation    Discharge Condition:  Stable    Brief admission history and hospital course:   71-year-old male who was admitted for acute hypoxic respiratory failure due to COVID-19. He was initially intubated for airway support and later successfully extubated with stable oxygenation on 2 liters 02.     Issues addressed during this hospitalization include but not limited to-     Acute hypoxic respiratory failure: Resolved, required intubation and mechanical ventilation. Oxygenation improved, he was extubated and maintained adequate oxygenation on 2 L. Bronchodilators were continued prn.     COVID-19 pneumonia: He tested positive for Covid 19 virus on 12/09/21. Was treated with 02 support, Decadron and Toclizumab. He improved. Fall: He presented with complaint of fall 2 days prior to presentation. Imaging studies showed no acute fractures or other acute processes. Fall precaution was observed.     Hypernatremia: Resolved with fluid hydration, oral intake was liberalized. Electrolytes were monitored and corrected appropriately. Systolic CHF: He was not in acute exacerbation. Last known LVEF of 35-40%. Lasix was held due to hypernatremia, other medical management were continued. He will need to be reevaluated outpatient for determination on when it is appropriate to restart diuretics. Atrial fibrillation: HR was controlled. He was continued on Eliquis and metoprolol. Diabetes mellitus: BG was controlled with insulin. OHA was restarted on discharge.     Severe deconditioning: PT/OT evaluated him.  He was discharged to [facility] for ongoing rehab.    Consultations:  Pulmonary medicine. Nephrology  Palliative care  Endocrinology. Issues that need outpatient follow up:  Timing on when to restart diuresis. Physical Examination on the day of discharge: On the day of discharge, I performed a final bedside evaluation including a physical examination. I reviewed discharge recommendations with patient, instructions including activity and outpatient follow-up were given to the patient on discharge. Disposition: [facility]    Patient Instructions:     Medication List      START taking these medications    oxyCODONE 5 MG immediate release tablet  Commonly known as: ROXICODONE  Take 1 tablet by mouth every 4 hours as needed for Pain for up to 3 days. CHANGE how you take these medications    tamsulosin 0.4 MG capsule  Commonly known as: FLOMAX  Take 1 capsule by mouth daily  What changed: when to take this        CONTINUE taking these medications    allopurinol 300 MG tablet  Commonly known as: ZYLOPRIM     apixaban 5 MG Tabs tablet  Commonly known as: ELIQUIS  Take 1 tablet by mouth 2 times daily     cyanocobalamin 1000 MCG tablet  Commonly known as: [employer] VITAMIN B12  Take 1 tablet by mouth daily     famotidine 40 MG tablet  Commonly known as: PEPCID     FeroSul 325 (65 Fe) MG tablet  Generic drug: ferrous sulfate  take ONE TABLET BY MOUTH TWICE DAILY     lidocaine 5 %  Commonly known as: LIDODERM     losartan 100 MG tablet  Commonly known as: COZAAR     metFORMIN 500 MG tablet  Commonly known as: GLUCOPHAGE     metoprolol tartrate 25 MG tablet  Commonly known as: LOPRESSOR  Take 1 tablet by mouth 2 times daily     nitroGLYCERIN 0.4 MG SL tablet  Commonly known as: NITROSTAT  up to max of 3 total doses. If no relief after 1 dose, call 911.      potassium chloride 20 MEQ extended release tablet  Commonly known as: KLOR-CON M     pravastatin 20 MG tablet  Commonly known as: PRAVACHOL        STOP taking these medications    dilTIAZem 360 MG extended release capsule  Commonly known as: TIAZAC     triamterene-hydroCHLOROthiazide 37.5-25 MG per tablet  Commonly known as: MAXZIDE-25           Where to Get Your Medications      You can get these medications from any pharmacy    Bring a paper prescription for each of these medications  · oxyCODONE 5 MG immediate release tablet       Activity: Ad chetan. Follow-up plans/arrangement:   PCP in 5-7 days. Code status on discharge:  DNR [facility]    Time spent on discharge was35 minutes. Signed: Kenna Bazan MD     Note: Computer voice recognition system was used in parts of this documentation. There is a possibility of sound alike word errors inherent to this technology that may be missed during proof-reading and may alter the context of this discharge summary.

## 2022-01-07 ENCOUNTER — HOSPITAL ENCOUNTER (OUTPATIENT)
Age: 77
Setting detail: SPECIMEN
Discharge: HOME | End: 2022-01-07

## 2022-01-07 LAB
ALBUMIN SERPL-MCNC: 2.1 GM/DL (ref 3.4–5)
ALP BLD-CCNC: 101 IU/L (ref 40–128)
ALT SERPL-CCNC: 23 U/L (ref 10–40)
ANION GAP SERPL CALCULATED.3IONS-SCNC: 10 MMOL/L (ref 4–16)
AST SERPL-CCNC: 24 IU/L (ref 15–37)
BANDED NEUTROPHILS ABSOLUTE COUNT: 0.09 K/CU MM
BANDED NEUTROPHILS RELATIVE PERCENT: 2 % (ref 5–11)
BILIRUB SERPL-MCNC: 1.1 MG/DL (ref 0–1)
BUN BLDV-MCNC: 23 MG/DL (ref 6–23)
CALCIUM SERPL-MCNC: 7.4 MG/DL (ref 8.3–10.6)
CHLORIDE BLD-SCNC: 112 MMOL/L (ref 99–110)
CO2: 21 MMOL/L (ref 21–32)
CREAT SERPL-MCNC: 0.6 MG/DL (ref 0.9–1.3)
DIFFERENTIAL TYPE: ABNORMAL
EOSINOPHILS ABSOLUTE: 0.3 K/CU MM
EOSINOPHILS RELATIVE PERCENT: 6 % (ref 0–3)
GFR AFRICAN AMERICAN: >60 ML/MIN/1.73M2
GFR NON-AFRICAN AMERICAN: >60 ML/MIN/1.73M2
GLUCOSE BLD-MCNC: 97 MG/DL (ref 70–99)
HCT VFR BLD CALC: 33.7 % (ref 42–52)
HEMOGLOBIN: 10.4 GM/DL (ref 13.5–18)
LYMPHOCYTES ABSOLUTE: 0.3 K/CU MM
LYMPHOCYTES RELATIVE PERCENT: 7 % (ref 24–44)
MCH RBC QN AUTO: 28.8 PG (ref 27–31)
MCHC RBC AUTO-ENTMCNC: 30.9 % (ref 32–36)
MCV RBC AUTO: 93.4 FL (ref 78–100)
MONOCYTES ABSOLUTE: 0.3 K/CU MM
MONOCYTES RELATIVE PERCENT: 7 % (ref 0–4)
PDW BLD-RTO: 20.5 % (ref 11.7–14.9)
PLATELET # BLD: 179 K/CU MM (ref 140–440)
PMV BLD AUTO: 11.3 FL (ref 7.5–11.1)
POTASSIUM SERPL-SCNC: 4.1 MMOL/L (ref 3.5–5.1)
RBC # BLD: 3.61 M/CU MM (ref 4.6–6.2)
SEGMENTED NEUTROPHILS ABSOLUTE COUNT: 3.7 K/CU MM
SEGMENTED NEUTROPHILS RELATIVE PERCENT: 78 % (ref 36–66)
SODIUM BLD-SCNC: 143 MMOL/L (ref 135–145)
TOTAL PROTEIN: 4.3 GM/DL (ref 6.4–8.2)
WBC # BLD: 4.7 K/CU MM (ref 4–10.5)

## 2022-01-07 PROCEDURE — 80053 COMPREHEN METABOLIC PANEL: CPT

## 2022-01-07 PROCEDURE — 85027 COMPLETE CBC AUTOMATED: CPT

## 2022-01-07 PROCEDURE — 85007 BL SMEAR W/DIFF WBC COUNT: CPT

## 2022-01-07 PROCEDURE — 36415 COLL VENOUS BLD VENIPUNCTURE: CPT

## 2022-01-17 ENCOUNTER — HOSPITAL ENCOUNTER (OUTPATIENT)
Age: 77
Setting detail: SPECIMEN
Discharge: HOME | End: 2022-01-17

## 2022-01-17 LAB
ALBUMIN SERPL-MCNC: 2 GM/DL (ref 3.4–5)
ALP BLD-CCNC: 96 IU/L (ref 40–128)
ALT SERPL-CCNC: 10 U/L (ref 10–40)
ANION GAP SERPL CALCULATED.3IONS-SCNC: 7 MMOL/L (ref 4–16)
AST SERPL-CCNC: 16 IU/L (ref 15–37)
BILIRUB SERPL-MCNC: 0.3 MG/DL (ref 0–1)
BUN BLDV-MCNC: 11 MG/DL (ref 6–23)
CALCIUM SERPL-MCNC: 8.2 MG/DL (ref 8.3–10.6)
CHLORIDE BLD-SCNC: 108 MMOL/L (ref 99–110)
CO2: 22 MMOL/L (ref 21–32)
CREAT SERPL-MCNC: 0.4 MG/DL (ref 0.9–1.3)
GFR AFRICAN AMERICAN: >60 ML/MIN/1.73M2
GFR NON-AFRICAN AMERICAN: >60 ML/MIN/1.73M2
GLUCOSE BLD-MCNC: 71 MG/DL (ref 70–99)
HCT VFR BLD CALC: 34.8 % (ref 42–52)
HEMOGLOBIN: 9.8 GM/DL (ref 13.5–18)
MCH RBC QN AUTO: 28.7 PG (ref 27–31)
MCHC RBC AUTO-ENTMCNC: 28.2 % (ref 32–36)
MCV RBC AUTO: 102.1 FL (ref 78–100)
PDW BLD-RTO: 20 % (ref 11.7–14.9)
PLATELET # BLD: 535 K/CU MM (ref 140–440)
PMV BLD AUTO: 9.3 FL (ref 7.5–11.1)
POTASSIUM SERPL-SCNC: 5.1 MMOL/L (ref 3.5–5.1)
RBC # BLD: 3.41 M/CU MM (ref 4.6–6.2)
SODIUM BLD-SCNC: 137 MMOL/L (ref 135–145)
TOTAL PROTEIN: 4.6 GM/DL (ref 6.4–8.2)
WBC # BLD: 5.1 K/CU MM (ref 4–10.5)

## 2022-01-17 PROCEDURE — 85027 COMPLETE CBC AUTOMATED: CPT

## 2022-01-17 PROCEDURE — 36415 COLL VENOUS BLD VENIPUNCTURE: CPT

## 2022-01-17 PROCEDURE — 80053 COMPREHEN METABOLIC PANEL: CPT

## 2022-01-19 ENCOUNTER — HOSPITAL ENCOUNTER (OUTPATIENT)
Age: 77
Setting detail: SPECIMEN
Discharge: HOME | End: 2022-01-19

## 2022-01-19 LAB
HCT VFR BLD CALC: 31 % (ref 42–52)
HEMOGLOBIN: 9.2 GM/DL (ref 13.5–18)
MCH RBC QN AUTO: 28.5 PG (ref 27–31)
MCHC RBC AUTO-ENTMCNC: 29.7 % (ref 32–36)
MCV RBC AUTO: 96 FL (ref 78–100)
PDW BLD-RTO: 19.7 % (ref 11.7–14.9)
PLATELET # BLD: 606 K/CU MM (ref 140–440)
PMV BLD AUTO: 9.1 FL (ref 7.5–11.1)
RBC # BLD: 3.23 M/CU MM (ref 4.6–6.2)
WBC # BLD: 5.5 K/CU MM (ref 4–10.5)

## 2022-01-19 PROCEDURE — 85027 COMPLETE CBC AUTOMATED: CPT

## 2022-01-19 PROCEDURE — 36415 COLL VENOUS BLD VENIPUNCTURE: CPT

## 2023-07-07 NOTE — FLOWSHEET NOTE
BLE cleansed with soap, water and bath oil. Lotion applied. Abrasions TRA. THIS IS NOT AN OFFICE VISIT. THIS IS AN ABSTRACT ENCOUNTER CREATED IN PREPARATION OF AN UPCOMING VISIT, NOT TO BE USED FOR DOCUMENTATION/TREATMENT PURPOSES.    Follow up- PVC's, AF, AFL on Sotalol     Due for BMP, Mg      Domingo Lyon is a 61 year old male seen in clinic today for follow-up of symptomatic PVCs, paroxysmal atrial fibrillation, and atrial flutter.  Had loop recorder implanted for arrhythmia monitoring now with battery at end of life.      • [employer] loop recorder, battery at EOL   ? Prior loop recorder implanted 4/4/14 for arrhythmia monitoring, explanted loop with insertion of new loop 5/15/17   • Symptomatic PVCs  ? Symptomatic with palpitations, most often correlates with SR with occasional PVC on loop recorder  • Paroxysmal, drug refractory (Flecainide, Sotalol) atrial fibrillation  • Atypical atrial flutter  ? AF/AFL diagnosed 2013 following spinal surgery   ? Symptomatic with chest heaviness, flushing and palpitations  ? Previously on Flecainide, failed, discontinued 2014 (Tikosyn too expensive)  ? Initiated on Sotalol 7/2014  ? S/P AF cryoablation 9/29/14  ? Sotalol discontinued 3/2015 due to maintenance of sinus rhythm   ? Recurrent AF 4/2015, Sotalol resumed  ? Also on diltiazem 240 mg daily   ? Breakthrough episodes of AF on Sotalol 12/2015  ? S/P AF redo RF ablation 1/27/16, HX of spontaneous hematoma left abdomen post ablation, resolved  ? Maintaining SR  • High risk medication use on Sotalol 160 mg BID  ? Loaded 7/2014  ? Labs: K, Cr 2/1/23. Mg 12/8/22  • CHADSVASc 1-2? (DM, ? CAD)  • Anticoagulated on Warfarin   ? F/B: [facility] (Dr. Cassidy)   ? Patient prefers to stay on anticoagulation despite low CHADSVASc score  • LAVON on CPAP  • Cardiac Imaging/testing  ? Coronary CTA 12/7/22: calcium score 0, EF cannot be calculated, right coronary dominant system free of disease   ? Stress test 10/19/22:  Probably abnormal myocardial perfusion scan most consistent with a  moderate amount of ischemia in a moderate portion of the inferior wall  ? Echo 10/18/22:  EF 70%

## (undated) DEVICE — Z DISCONTINUED NO SUB IDED TUBING ETCO2 AD L6.5FT NSL ORAL CVD PRNG NONFLARED TIP OVR

## (undated) DEVICE — FORCEPS BX L240CM JAW DIA2.8MM L CAP W/ NDL MIC MESH TOOTH

## (undated) DEVICE — ACUSNARE POLYPECTOMY SNARE: Brand: ACUSNARE